# Patient Record
Sex: MALE | Race: WHITE | URBAN - METROPOLITAN AREA
[De-identification: names, ages, dates, MRNs, and addresses within clinical notes are randomized per-mention and may not be internally consistent; named-entity substitution may affect disease eponyms.]

---

## 2017-01-01 ENCOUNTER — HOSPITAL ENCOUNTER (OUTPATIENT)
Facility: CLINIC | Age: 57
Discharge: HOME | End: 2017-05-24
Attending: INTERNAL MEDICINE | Admitting: INTERNAL MEDICINE
Payer: MEDICAID

## 2017-01-01 ENCOUNTER — HOSPITAL ENCOUNTER (OUTPATIENT)
Dept: LAB | Facility: CLINIC | Age: 57
Discharge: HOME | End: 2017-10-19
Attending: INTERNAL MEDICINE | Admitting: INTERNAL MEDICINE
Payer: COMMERCIAL

## 2017-01-01 ENCOUNTER — ANESTHESIA EVENT (OUTPATIENT)
Dept: SURGERY | Facility: CLINIC | Age: 57
DRG: 371 | End: 2017-01-01
Payer: COMMERCIAL

## 2017-01-01 ENCOUNTER — OFFICE VISIT (OUTPATIENT)
Dept: FAMILY MEDICINE | Facility: CLINIC | Age: 57
End: 2017-01-01
Payer: COMMERCIAL

## 2017-01-01 ENCOUNTER — APPOINTMENT (OUTPATIENT)
Dept: CT IMAGING | Facility: CLINIC | Age: 57
DRG: 180 | End: 2017-01-01
Attending: FAMILY MEDICINE
Payer: MEDICAID

## 2017-01-01 ENCOUNTER — HOSPITAL ENCOUNTER (INPATIENT)
Facility: CLINIC | Age: 57
LOS: 4 days | Discharge: NURSING, REHAB OR LONG-TERM CARE | DRG: 371 | End: 2017-09-29
Attending: FAMILY MEDICINE | Admitting: FAMILY MEDICINE
Payer: COMMERCIAL

## 2017-01-01 ENCOUNTER — TELEPHONE (OUTPATIENT)
Dept: ONCOLOGY | Facility: CLINIC | Age: 57
End: 2017-01-01

## 2017-01-01 ENCOUNTER — ONCOLOGY VISIT (OUTPATIENT)
Dept: ONCOLOGY | Facility: CLINIC | Age: 57
End: 2017-01-01
Attending: INTERNAL MEDICINE
Payer: MEDICAID

## 2017-01-01 ENCOUNTER — INFUSION THERAPY VISIT (OUTPATIENT)
Dept: INFUSION THERAPY | Facility: CLINIC | Age: 57
End: 2017-01-01
Attending: INTERNAL MEDICINE
Payer: COMMERCIAL

## 2017-01-01 ENCOUNTER — TELEPHONE (OUTPATIENT)
Dept: GERIATRICS | Facility: CLINIC | Age: 57
End: 2017-01-01

## 2017-01-01 ENCOUNTER — APPOINTMENT (OUTPATIENT)
Dept: PHYSICAL THERAPY | Facility: CLINIC | Age: 57
DRG: 180 | End: 2017-01-01
Payer: MEDICAID

## 2017-01-01 ENCOUNTER — APPOINTMENT (OUTPATIENT)
Dept: OCCUPATIONAL THERAPY | Facility: CLINIC | Age: 57
DRG: 180 | End: 2017-01-01
Payer: MEDICAID

## 2017-01-01 ENCOUNTER — HOSPITAL ENCOUNTER (OUTPATIENT)
Facility: CLINIC | Age: 57
Discharge: HOME | End: 2017-09-07
Attending: INTERNAL MEDICINE | Admitting: INTERNAL MEDICINE
Payer: COMMERCIAL

## 2017-01-01 ENCOUNTER — HOSPITAL ENCOUNTER (OUTPATIENT)
Dept: LAB | Facility: CLINIC | Age: 57
Discharge: HOME | End: 2017-05-04
Attending: INTERNAL MEDICINE | Admitting: INTERNAL MEDICINE
Payer: MEDICAID

## 2017-01-01 ENCOUNTER — APPOINTMENT (OUTPATIENT)
Dept: MRI IMAGING | Facility: CLINIC | Age: 57
DRG: 371 | End: 2017-01-01
Attending: FAMILY MEDICINE
Payer: COMMERCIAL

## 2017-01-01 ENCOUNTER — APPOINTMENT (OUTPATIENT)
Dept: GENERAL RADIOLOGY | Facility: CLINIC | Age: 57
End: 2017-01-01
Attending: SURGERY
Payer: MEDICAID

## 2017-01-01 ENCOUNTER — NURSING HOME VISIT (OUTPATIENT)
Dept: GERIATRICS | Facility: CLINIC | Age: 57
End: 2017-01-01
Payer: COMMERCIAL

## 2017-01-01 ENCOUNTER — ONCOLOGY VISIT (OUTPATIENT)
Dept: ONCOLOGY | Facility: CLINIC | Age: 57
End: 2017-01-01
Attending: INTERNAL MEDICINE
Payer: COMMERCIAL

## 2017-01-01 ENCOUNTER — TRANSFERRED RECORDS (OUTPATIENT)
Dept: HEALTH INFORMATION MANAGEMENT | Facility: CLINIC | Age: 57
End: 2017-01-01

## 2017-01-01 ENCOUNTER — ONCOLOGY VISIT (OUTPATIENT)
Dept: ONCOLOGY | Facility: CLINIC | Age: 57
End: 2017-01-01
Attending: NURSE PRACTITIONER
Payer: COMMERCIAL

## 2017-01-01 ENCOUNTER — TELEPHONE (OUTPATIENT)
Dept: NUTRITION | Facility: CLINIC | Age: 57
End: 2017-01-01

## 2017-01-01 ENCOUNTER — NURSING HOME VISIT (OUTPATIENT)
Dept: GERIATRICS | Facility: CLINIC | Age: 57
End: 2017-01-01
Payer: MEDICAID

## 2017-01-01 ENCOUNTER — ANESTHESIA (OUTPATIENT)
Dept: SURGERY | Facility: CLINIC | Age: 57
End: 2017-01-01
Payer: MEDICAID

## 2017-01-01 ENCOUNTER — HOSPITAL ENCOUNTER (OUTPATIENT)
Dept: CT IMAGING | Facility: CLINIC | Age: 57
Discharge: HOME | End: 2017-10-18
Attending: INTERNAL MEDICINE | Admitting: INTERNAL MEDICINE
Payer: COMMERCIAL

## 2017-01-01 ENCOUNTER — CARE COORDINATION (OUTPATIENT)
Dept: CARE COORDINATION | Facility: CLINIC | Age: 57
End: 2017-01-01

## 2017-01-01 ENCOUNTER — MEDICAL CORRESPONDENCE (OUTPATIENT)
Dept: HEALTH INFORMATION MANAGEMENT | Facility: CLINIC | Age: 57
End: 2017-01-01

## 2017-01-01 ENCOUNTER — HOSPITAL ENCOUNTER (OUTPATIENT)
Facility: CLINIC | Age: 57
Discharge: HOME | End: 2017-09-14
Attending: INTERNAL MEDICINE | Admitting: INTERNAL MEDICINE
Payer: COMMERCIAL

## 2017-01-01 ENCOUNTER — OFFICE VISIT (OUTPATIENT)
Dept: PALLIATIVE MEDICINE | Facility: CLINIC | Age: 57
End: 2017-01-01
Payer: COMMERCIAL

## 2017-01-01 ENCOUNTER — OFFICE VISIT (OUTPATIENT)
Dept: FAMILY MEDICINE | Facility: CLINIC | Age: 57
End: 2017-01-01
Payer: MEDICAID

## 2017-01-01 ENCOUNTER — ANESTHESIA (OUTPATIENT)
Dept: SURGERY | Facility: CLINIC | Age: 57
DRG: 371 | End: 2017-01-01
Payer: COMMERCIAL

## 2017-01-01 ENCOUNTER — CARE COORDINATION (OUTPATIENT)
Dept: ENDOCRINOLOGY | Facility: CLINIC | Age: 57
End: 2017-01-01

## 2017-01-01 ENCOUNTER — TELEPHONE (OUTPATIENT)
Dept: ENDOCRINOLOGY | Facility: CLINIC | Age: 57
End: 2017-01-01

## 2017-01-01 ENCOUNTER — DOCUMENTATION ONLY (OUTPATIENT)
Dept: OTHER | Facility: CLINIC | Age: 57
End: 2017-01-01

## 2017-01-01 ENCOUNTER — RECORDS - HEALTHEAST (OUTPATIENT)
Dept: LAB | Facility: CLINIC | Age: 57
End: 2017-01-01

## 2017-01-01 ENCOUNTER — TELEPHONE (OUTPATIENT)
Dept: PALLIATIVE MEDICINE | Facility: CLINIC | Age: 57
End: 2017-01-01

## 2017-01-01 ENCOUNTER — APPOINTMENT (OUTPATIENT)
Dept: GENERAL RADIOLOGY | Facility: CLINIC | Age: 57
DRG: 180 | End: 2017-01-01
Attending: RADIOLOGY
Payer: MEDICAID

## 2017-01-01 ENCOUNTER — APPOINTMENT (OUTPATIENT)
Dept: GENERAL RADIOLOGY | Facility: CLINIC | Age: 57
DRG: 180 | End: 2017-01-01
Attending: FAMILY MEDICINE
Payer: MEDICAID

## 2017-01-01 ENCOUNTER — OFFICE VISIT (OUTPATIENT)
Dept: ENDOCRINOLOGY | Facility: CLINIC | Age: 57
End: 2017-01-01

## 2017-01-01 ENCOUNTER — OFFICE VISIT (OUTPATIENT)
Dept: SURGERY | Facility: CLINIC | Age: 57
End: 2017-01-01
Payer: MEDICAID

## 2017-01-01 ENCOUNTER — HOSPITAL ENCOUNTER (OUTPATIENT)
Facility: CLINIC | Age: 57
Discharge: HOME | End: 2017-10-26
Attending: INTERNAL MEDICINE | Admitting: INTERNAL MEDICINE
Payer: COMMERCIAL

## 2017-01-01 ENCOUNTER — DISCHARGE SUMMARY NURSING HOME (OUTPATIENT)
Dept: GERIATRICS | Facility: CLINIC | Age: 57
End: 2017-01-01
Payer: COMMERCIAL

## 2017-01-01 ENCOUNTER — APPOINTMENT (OUTPATIENT)
Dept: CT IMAGING | Facility: CLINIC | Age: 57
DRG: 371 | End: 2017-01-01
Attending: FAMILY MEDICINE
Payer: COMMERCIAL

## 2017-01-01 ENCOUNTER — SURGERY (OUTPATIENT)
Age: 57
End: 2017-01-01

## 2017-01-01 ENCOUNTER — APPOINTMENT (OUTPATIENT)
Dept: GENERAL RADIOLOGY | Facility: CLINIC | Age: 57
DRG: 371 | End: 2017-01-01
Attending: FAMILY MEDICINE
Payer: COMMERCIAL

## 2017-01-01 ENCOUNTER — NURSE TRIAGE (OUTPATIENT)
Dept: NURSING | Facility: CLINIC | Age: 57
End: 2017-01-01

## 2017-01-01 ENCOUNTER — HOSPITAL ENCOUNTER (OUTPATIENT)
Facility: CLINIC | Age: 57
Discharge: HOME | End: 2017-07-20
Attending: INTERNAL MEDICINE | Admitting: INTERNAL MEDICINE
Payer: COMMERCIAL

## 2017-01-01 ENCOUNTER — APPOINTMENT (OUTPATIENT)
Dept: PHYSICAL THERAPY | Facility: CLINIC | Age: 57
DRG: 371 | End: 2017-01-01
Payer: COMMERCIAL

## 2017-01-01 ENCOUNTER — HOSPITAL ENCOUNTER (OUTPATIENT)
Facility: CLINIC | Age: 57
Discharge: HOME | End: 2017-06-14
Attending: INTERNAL MEDICINE | Admitting: INTERNAL MEDICINE
Payer: MEDICAID

## 2017-01-01 ENCOUNTER — APPOINTMENT (OUTPATIENT)
Dept: ULTRASOUND IMAGING | Facility: CLINIC | Age: 57
DRG: 180 | End: 2017-01-01
Attending: INTERNAL MEDICINE
Payer: MEDICAID

## 2017-01-01 ENCOUNTER — HOSPITAL ENCOUNTER (OUTPATIENT)
Facility: CLINIC | Age: 57
Discharge: HOME | End: 2017-06-22
Attending: INTERNAL MEDICINE | Admitting: INTERNAL MEDICINE
Payer: MEDICAID

## 2017-01-01 ENCOUNTER — ANESTHESIA (OUTPATIENT)
Dept: CT IMAGING | Facility: CLINIC | Age: 57
DRG: 180 | End: 2017-01-01
Payer: MEDICAID

## 2017-01-01 ENCOUNTER — DOCUMENTATION ONLY (OUTPATIENT)
Dept: ONCOLOGY | Facility: CLINIC | Age: 57
End: 2017-01-01

## 2017-01-01 ENCOUNTER — INFUSION THERAPY VISIT (OUTPATIENT)
Dept: INFUSION THERAPY | Facility: CLINIC | Age: 57
End: 2017-01-01
Attending: INTERNAL MEDICINE
Payer: MEDICAID

## 2017-01-01 ENCOUNTER — HOSPITAL ENCOUNTER (OUTPATIENT)
Facility: CLINIC | Age: 57
Discharge: HOME | End: 2017-08-17
Attending: INTERNAL MEDICINE | Admitting: INTERNAL MEDICINE
Payer: COMMERCIAL

## 2017-01-01 ENCOUNTER — APPOINTMENT (OUTPATIENT)
Dept: OCCUPATIONAL THERAPY | Facility: CLINIC | Age: 57
DRG: 371 | End: 2017-01-01
Payer: COMMERCIAL

## 2017-01-01 ENCOUNTER — HOSPITAL ENCOUNTER (OUTPATIENT)
Facility: CLINIC | Age: 57
Discharge: HOME | End: 2017-05-17
Attending: SURGERY | Admitting: SURGERY
Payer: MEDICAID

## 2017-01-01 ENCOUNTER — APPOINTMENT (OUTPATIENT)
Dept: MRI IMAGING | Facility: CLINIC | Age: 57
DRG: 180 | End: 2017-01-01
Attending: FAMILY MEDICINE
Payer: MEDICAID

## 2017-01-01 ENCOUNTER — OFFICE VISIT (OUTPATIENT)
Dept: OTOLARYNGOLOGY | Facility: CLINIC | Age: 57
End: 2017-01-01
Payer: MEDICAID

## 2017-01-01 ENCOUNTER — DISCHARGE SUMMARY NURSING HOME (OUTPATIENT)
Dept: GERIATRICS | Facility: CLINIC | Age: 57
End: 2017-01-01
Payer: MEDICAID

## 2017-01-01 ENCOUNTER — APPOINTMENT (OUTPATIENT)
Dept: MRI IMAGING | Facility: CLINIC | Age: 57
DRG: 371 | End: 2017-01-01
Attending: PHYSICIAN ASSISTANT
Payer: COMMERCIAL

## 2017-01-01 ENCOUNTER — HOSPITAL ENCOUNTER (OUTPATIENT)
Dept: CT IMAGING | Facility: CLINIC | Age: 57
Discharge: HOME | End: 2017-08-03
Attending: INTERNAL MEDICINE | Admitting: INTERNAL MEDICINE
Payer: COMMERCIAL

## 2017-01-01 ENCOUNTER — CARE COORDINATION (OUTPATIENT)
Dept: ONCOLOGY | Facility: CLINIC | Age: 57
End: 2017-01-01

## 2017-01-01 ENCOUNTER — HOSPITAL ENCOUNTER (OUTPATIENT)
Facility: CLINIC | Age: 57
Discharge: HOME | End: 2017-08-10
Attending: INTERNAL MEDICINE | Admitting: INTERNAL MEDICINE
Payer: COMMERCIAL

## 2017-01-01 ENCOUNTER — ANESTHESIA EVENT (OUTPATIENT)
Dept: CT IMAGING | Facility: CLINIC | Age: 57
DRG: 180 | End: 2017-01-01
Payer: MEDICAID

## 2017-01-01 ENCOUNTER — HOSPITAL ENCOUNTER (INPATIENT)
Facility: CLINIC | Age: 57
LOS: 7 days | Discharge: NURSING, REHAB OR LONG-TERM CARE | DRG: 180 | End: 2017-04-27
Attending: FAMILY MEDICINE | Admitting: INTERNAL MEDICINE
Payer: MEDICAID

## 2017-01-01 ENCOUNTER — HOSPITAL ENCOUNTER (OUTPATIENT)
Facility: CLINIC | Age: 57
Discharge: HOME | End: 2017-07-13
Attending: INTERNAL MEDICINE | Admitting: INTERNAL MEDICINE
Payer: COMMERCIAL

## 2017-01-01 ENCOUNTER — INFUSION THERAPY VISIT (OUTPATIENT)
Dept: SPIRITUAL SERVICES | Facility: CLINIC | Age: 57
End: 2017-01-01

## 2017-01-01 ENCOUNTER — APPOINTMENT (OUTPATIENT)
Dept: CARDIOLOGY | Facility: CLINIC | Age: 57
DRG: 371 | End: 2017-01-01
Attending: FAMILY MEDICINE
Payer: COMMERCIAL

## 2017-01-01 ENCOUNTER — RADIANT APPOINTMENT (OUTPATIENT)
Dept: PET IMAGING | Facility: CLINIC | Age: 57
End: 2017-01-01
Attending: INTERNAL MEDICINE
Payer: MEDICAID

## 2017-01-01 ENCOUNTER — ANESTHESIA EVENT (OUTPATIENT)
Dept: SURGERY | Facility: CLINIC | Age: 57
End: 2017-01-01
Payer: MEDICAID

## 2017-01-01 VITALS
TEMPERATURE: 98.9 F | HEIGHT: 71 IN | OXYGEN SATURATION: 99 % | SYSTOLIC BLOOD PRESSURE: 135 MMHG | HEART RATE: 72 BPM | RESPIRATION RATE: 18 BRPM | BODY MASS INDEX: 21.14 KG/M2 | DIASTOLIC BLOOD PRESSURE: 82 MMHG | WEIGHT: 151 LBS

## 2017-01-01 VITALS
WEIGHT: 144 LBS | DIASTOLIC BLOOD PRESSURE: 71 MMHG | HEART RATE: 97 BPM | OXYGEN SATURATION: 97 % | TEMPERATURE: 98 F | RESPIRATION RATE: 18 BRPM | BODY MASS INDEX: 20 KG/M2 | SYSTOLIC BLOOD PRESSURE: 105 MMHG

## 2017-01-01 VITALS
DIASTOLIC BLOOD PRESSURE: 65 MMHG | WEIGHT: 136 LBS | BODY MASS INDEX: 22.98 KG/M2 | OXYGEN SATURATION: 91 % | TEMPERATURE: 97.4 F | HEART RATE: 98 BPM | SYSTOLIC BLOOD PRESSURE: 106 MMHG | RESPIRATION RATE: 20 BRPM

## 2017-01-01 VITALS — DIASTOLIC BLOOD PRESSURE: 76 MMHG | SYSTOLIC BLOOD PRESSURE: 125 MMHG | HEART RATE: 94 BPM

## 2017-01-01 VITALS
RESPIRATION RATE: 16 BRPM | HEART RATE: 92 BPM | WEIGHT: 137 LBS | OXYGEN SATURATION: 96 % | HEART RATE: 89 BPM | BODY MASS INDEX: 23.15 KG/M2 | DIASTOLIC BLOOD PRESSURE: 92 MMHG | SYSTOLIC BLOOD PRESSURE: 136 MMHG | BODY MASS INDEX: 23.15 KG/M2 | RESPIRATION RATE: 20 BRPM | WEIGHT: 137 LBS | OXYGEN SATURATION: 97 % | DIASTOLIC BLOOD PRESSURE: 65 MMHG | SYSTOLIC BLOOD PRESSURE: 96 MMHG | TEMPERATURE: 97.8 F | TEMPERATURE: 98.6 F

## 2017-01-01 VITALS
HEART RATE: 74 BPM | OXYGEN SATURATION: 98 % | SYSTOLIC BLOOD PRESSURE: 106 MMHG | RESPIRATION RATE: 18 BRPM | DIASTOLIC BLOOD PRESSURE: 68 MMHG | TEMPERATURE: 96.2 F

## 2017-01-01 VITALS
WEIGHT: 141.54 LBS | OXYGEN SATURATION: 94 % | DIASTOLIC BLOOD PRESSURE: 77 MMHG | BODY MASS INDEX: 23.93 KG/M2 | SYSTOLIC BLOOD PRESSURE: 109 MMHG | RESPIRATION RATE: 12 BRPM | HEART RATE: 94 BPM | TEMPERATURE: 99 F

## 2017-01-01 VITALS
HEART RATE: 65 BPM | RESPIRATION RATE: 16 BRPM | OXYGEN SATURATION: 98 % | DIASTOLIC BLOOD PRESSURE: 74 MMHG | TEMPERATURE: 97.9 F | HEIGHT: 71 IN | SYSTOLIC BLOOD PRESSURE: 115 MMHG | BODY MASS INDEX: 23.3 KG/M2 | WEIGHT: 166.4 LBS

## 2017-01-01 VITALS
WEIGHT: 181.44 LBS | SYSTOLIC BLOOD PRESSURE: 118 MMHG | TEMPERATURE: 98.2 F | DIASTOLIC BLOOD PRESSURE: 84 MMHG | HEART RATE: 84 BPM | OXYGEN SATURATION: 95 % | RESPIRATION RATE: 18 BRPM

## 2017-01-01 VITALS
TEMPERATURE: 98.6 F | RESPIRATION RATE: 18 BRPM | WEIGHT: 160.1 LBS | SYSTOLIC BLOOD PRESSURE: 114 MMHG | DIASTOLIC BLOOD PRESSURE: 69 MMHG | BODY MASS INDEX: 22.41 KG/M2 | HEIGHT: 71 IN | HEART RATE: 80 BPM | OXYGEN SATURATION: 100 %

## 2017-01-01 VITALS
OXYGEN SATURATION: 99 % | HEART RATE: 55 BPM | DIASTOLIC BLOOD PRESSURE: 82 MMHG | TEMPERATURE: 97.4 F | SYSTOLIC BLOOD PRESSURE: 134 MMHG

## 2017-01-01 VITALS
SYSTOLIC BLOOD PRESSURE: 122 MMHG | HEART RATE: 94 BPM | SYSTOLIC BLOOD PRESSURE: 128 MMHG | RESPIRATION RATE: 16 BRPM | WEIGHT: 146 LBS | DIASTOLIC BLOOD PRESSURE: 85 MMHG | HEART RATE: 86 BPM | WEIGHT: 157.9 LBS | OXYGEN SATURATION: 100 % | HEIGHT: 71 IN | BODY MASS INDEX: 22.1 KG/M2 | RESPIRATION RATE: 18 BRPM | OXYGEN SATURATION: 96 % | DIASTOLIC BLOOD PRESSURE: 82 MMHG | BODY MASS INDEX: 24.68 KG/M2 | TEMPERATURE: 98.2 F | TEMPERATURE: 98.2 F

## 2017-01-01 VITALS
SYSTOLIC BLOOD PRESSURE: 108 MMHG | DIASTOLIC BLOOD PRESSURE: 64 MMHG | TEMPERATURE: 96.9 F | OXYGEN SATURATION: 100 % | HEART RATE: 68 BPM

## 2017-01-01 VITALS
OXYGEN SATURATION: 100 % | HEART RATE: 67 BPM | TEMPERATURE: 97.3 F | BODY MASS INDEX: 21.97 KG/M2 | SYSTOLIC BLOOD PRESSURE: 111 MMHG | DIASTOLIC BLOOD PRESSURE: 72 MMHG | RESPIRATION RATE: 18 BRPM | HEIGHT: 71 IN | WEIGHT: 156.9 LBS

## 2017-01-01 VITALS
OXYGEN SATURATION: 97 % | HEIGHT: 65 IN | BODY MASS INDEX: 24.03 KG/M2 | RESPIRATION RATE: 14 BRPM | HEART RATE: 98 BPM | TEMPERATURE: 99 F | DIASTOLIC BLOOD PRESSURE: 67 MMHG | SYSTOLIC BLOOD PRESSURE: 106 MMHG | WEIGHT: 144.2 LBS

## 2017-01-01 VITALS
TEMPERATURE: 97.8 F | DIASTOLIC BLOOD PRESSURE: 72 MMHG | SYSTOLIC BLOOD PRESSURE: 128 MMHG | RESPIRATION RATE: 22 BRPM | OXYGEN SATURATION: 94 % | HEART RATE: 76 BPM | WEIGHT: 137 LBS | BODY MASS INDEX: 23.15 KG/M2

## 2017-01-01 VITALS
TEMPERATURE: 98.5 F | RESPIRATION RATE: 18 BRPM | SYSTOLIC BLOOD PRESSURE: 115 MMHG | HEART RATE: 102 BPM | OXYGEN SATURATION: 94 % | WEIGHT: 137 LBS | BODY MASS INDEX: 23.16 KG/M2 | DIASTOLIC BLOOD PRESSURE: 81 MMHG

## 2017-01-01 VITALS
TEMPERATURE: 99.5 F | BODY MASS INDEX: 21.82 KG/M2 | HEIGHT: 71 IN | OXYGEN SATURATION: 99 % | SYSTOLIC BLOOD PRESSURE: 111 MMHG | HEART RATE: 87 BPM | DIASTOLIC BLOOD PRESSURE: 72 MMHG | WEIGHT: 155.9 LBS | RESPIRATION RATE: 99 BRPM

## 2017-01-01 VITALS
OXYGEN SATURATION: 96 % | WEIGHT: 182 LBS | HEART RATE: 81 BPM | TEMPERATURE: 98.7 F | SYSTOLIC BLOOD PRESSURE: 130 MMHG | BODY MASS INDEX: 30.77 KG/M2 | DIASTOLIC BLOOD PRESSURE: 89 MMHG | RESPIRATION RATE: 16 BRPM

## 2017-01-01 VITALS
HEIGHT: 64 IN | WEIGHT: 171.5 LBS | OXYGEN SATURATION: 97 % | BODY MASS INDEX: 29.28 KG/M2 | DIASTOLIC BLOOD PRESSURE: 81 MMHG | RESPIRATION RATE: 18 BRPM | HEART RATE: 85 BPM | TEMPERATURE: 97.5 F | SYSTOLIC BLOOD PRESSURE: 129 MMHG

## 2017-01-01 VITALS
WEIGHT: 137 LBS | TEMPERATURE: 98 F | OXYGEN SATURATION: 98 % | RESPIRATION RATE: 16 BRPM | DIASTOLIC BLOOD PRESSURE: 76 MMHG | HEART RATE: 74 BPM | SYSTOLIC BLOOD PRESSURE: 124 MMHG | BODY MASS INDEX: 23.15 KG/M2

## 2017-01-01 VITALS
SYSTOLIC BLOOD PRESSURE: 108 MMHG | HEIGHT: 65 IN | HEART RATE: 85 BPM | DIASTOLIC BLOOD PRESSURE: 71 MMHG | BODY MASS INDEX: 28.49 KG/M2 | WEIGHT: 171 LBS

## 2017-01-01 VITALS
SYSTOLIC BLOOD PRESSURE: 111 MMHG | HEART RATE: 67 BPM | TEMPERATURE: 97.3 F | WEIGHT: 156.97 LBS | DIASTOLIC BLOOD PRESSURE: 72 MMHG | BODY MASS INDEX: 21.81 KG/M2

## 2017-01-01 VITALS
TEMPERATURE: 98.2 F | HEART RATE: 66 BPM | SYSTOLIC BLOOD PRESSURE: 138 MMHG | DIASTOLIC BLOOD PRESSURE: 86 MMHG | BODY MASS INDEX: 21.98 KG/M2 | OXYGEN SATURATION: 100 % | RESPIRATION RATE: 18 BRPM | HEIGHT: 71 IN | WEIGHT: 157 LBS

## 2017-01-01 VITALS
WEIGHT: 178.4 LBS | HEIGHT: 65 IN | DIASTOLIC BLOOD PRESSURE: 81 MMHG | BODY MASS INDEX: 29.72 KG/M2 | TEMPERATURE: 98.6 F | HEART RATE: 92 BPM | OXYGEN SATURATION: 96 % | SYSTOLIC BLOOD PRESSURE: 116 MMHG | RESPIRATION RATE: 20 BRPM

## 2017-01-01 VITALS
DIASTOLIC BLOOD PRESSURE: 68 MMHG | OXYGEN SATURATION: 98 % | RESPIRATION RATE: 18 BRPM | SYSTOLIC BLOOD PRESSURE: 114 MMHG | HEART RATE: 116 BPM | TEMPERATURE: 100.4 F | HEIGHT: 71 IN | BODY MASS INDEX: 20.77 KG/M2 | WEIGHT: 148.4 LBS

## 2017-01-01 VITALS
RESPIRATION RATE: 16 BRPM | WEIGHT: 146.8 LBS | HEIGHT: 65 IN | DIASTOLIC BLOOD PRESSURE: 83 MMHG | BODY MASS INDEX: 24.46 KG/M2 | TEMPERATURE: 98.4 F | OXYGEN SATURATION: 95 % | SYSTOLIC BLOOD PRESSURE: 118 MMHG | HEART RATE: 94 BPM

## 2017-01-01 VITALS
SYSTOLIC BLOOD PRESSURE: 114 MMHG | HEART RATE: 80 BPM | DIASTOLIC BLOOD PRESSURE: 69 MMHG | BODY MASS INDEX: 22.23 KG/M2 | WEIGHT: 160.05 LBS | OXYGEN SATURATION: 100 % | TEMPERATURE: 98.6 F | RESPIRATION RATE: 18 BRPM

## 2017-01-01 VITALS
HEART RATE: 74 BPM | SYSTOLIC BLOOD PRESSURE: 135 MMHG | DIASTOLIC BLOOD PRESSURE: 87 MMHG | OXYGEN SATURATION: 99 % | BODY MASS INDEX: 22.54 KG/M2 | HEIGHT: 71 IN | WEIGHT: 161 LBS | TEMPERATURE: 98 F

## 2017-01-01 VITALS
WEIGHT: 144 LBS | HEART RATE: 103 BPM | TEMPERATURE: 98.9 F | BODY MASS INDEX: 20 KG/M2 | RESPIRATION RATE: 16 BRPM | OXYGEN SATURATION: 96 % | SYSTOLIC BLOOD PRESSURE: 120 MMHG | DIASTOLIC BLOOD PRESSURE: 80 MMHG

## 2017-01-01 VITALS
SYSTOLIC BLOOD PRESSURE: 134 MMHG | HEART RATE: 59 BPM | TEMPERATURE: 96.7 F | OXYGEN SATURATION: 96 % | RESPIRATION RATE: 16 BRPM | DIASTOLIC BLOOD PRESSURE: 82 MMHG

## 2017-01-01 VITALS
HEIGHT: 64 IN | SYSTOLIC BLOOD PRESSURE: 76 MMHG | RESPIRATION RATE: 18 BRPM | OXYGEN SATURATION: 98 % | HEART RATE: 104 BPM | TEMPERATURE: 99 F | DIASTOLIC BLOOD PRESSURE: 45 MMHG

## 2017-01-01 VITALS
HEART RATE: 103 BPM | RESPIRATION RATE: 16 BRPM | SYSTOLIC BLOOD PRESSURE: 73 MMHG | TEMPERATURE: 99.3 F | OXYGEN SATURATION: 96 % | DIASTOLIC BLOOD PRESSURE: 50 MMHG

## 2017-01-01 VITALS
RESPIRATION RATE: 93 BRPM | OXYGEN SATURATION: 12 % | WEIGHT: 141.6 LBS | HEIGHT: 64 IN | HEART RATE: 94 BPM | BODY MASS INDEX: 24.17 KG/M2 | SYSTOLIC BLOOD PRESSURE: 109 MMHG | DIASTOLIC BLOOD PRESSURE: 77 MMHG | TEMPERATURE: 99 F

## 2017-01-01 VITALS
TEMPERATURE: 97.1 F | WEIGHT: 155.8 LBS | BODY MASS INDEX: 21.81 KG/M2 | HEIGHT: 71 IN | DIASTOLIC BLOOD PRESSURE: 73 MMHG | SYSTOLIC BLOOD PRESSURE: 124 MMHG | HEART RATE: 68 BPM | OXYGEN SATURATION: 100 %

## 2017-01-01 VITALS
HEART RATE: 105 BPM | TEMPERATURE: 99 F | RESPIRATION RATE: 18 BRPM | SYSTOLIC BLOOD PRESSURE: 102 MMHG | OXYGEN SATURATION: 93 % | WEIGHT: 147.49 LBS | BODY MASS INDEX: 20.49 KG/M2 | DIASTOLIC BLOOD PRESSURE: 76 MMHG

## 2017-01-01 VITALS
HEIGHT: 71 IN | HEART RATE: 93 BPM | TEMPERATURE: 97.5 F | OXYGEN SATURATION: 100 % | DIASTOLIC BLOOD PRESSURE: 68 MMHG | BODY MASS INDEX: 21.36 KG/M2 | WEIGHT: 152.6 LBS | SYSTOLIC BLOOD PRESSURE: 90 MMHG | RESPIRATION RATE: 18 BRPM

## 2017-01-01 VITALS
RESPIRATION RATE: 18 BRPM | OXYGEN SATURATION: 89 % | TEMPERATURE: 97.6 F | SYSTOLIC BLOOD PRESSURE: 91 MMHG | HEART RATE: 91 BPM | WEIGHT: 137 LBS | DIASTOLIC BLOOD PRESSURE: 56 MMHG | BODY MASS INDEX: 23.15 KG/M2

## 2017-01-01 VITALS — DIASTOLIC BLOOD PRESSURE: 74 MMHG | SYSTOLIC BLOOD PRESSURE: 123 MMHG | HEART RATE: 76 BPM

## 2017-01-01 VITALS
TEMPERATURE: 98 F | HEIGHT: 70 IN | DIASTOLIC BLOOD PRESSURE: 54 MMHG | HEART RATE: 78 BPM | BODY MASS INDEX: 26.05 KG/M2 | OXYGEN SATURATION: 92 % | SYSTOLIC BLOOD PRESSURE: 100 MMHG | RESPIRATION RATE: 19 BRPM | WEIGHT: 182 LBS

## 2017-01-01 VITALS — SYSTOLIC BLOOD PRESSURE: 134 MMHG | HEART RATE: 82 BPM | DIASTOLIC BLOOD PRESSURE: 89 MMHG | TEMPERATURE: 97.6 F

## 2017-01-01 VITALS — SYSTOLIC BLOOD PRESSURE: 114 MMHG | TEMPERATURE: 97.2 F | DIASTOLIC BLOOD PRESSURE: 71 MMHG | HEART RATE: 65 BPM

## 2017-01-01 VITALS
HEART RATE: 79 BPM | SYSTOLIC BLOOD PRESSURE: 192 MMHG | HEIGHT: 71 IN | TEMPERATURE: 97.8 F | DIASTOLIC BLOOD PRESSURE: 82 MMHG | RESPIRATION RATE: 14 BRPM | WEIGHT: 157 LBS | BODY MASS INDEX: 21.98 KG/M2

## 2017-01-01 VITALS
OXYGEN SATURATION: 98 % | TEMPERATURE: 99 F | DIASTOLIC BLOOD PRESSURE: 58 MMHG | HEART RATE: 105 BPM | SYSTOLIC BLOOD PRESSURE: 74 MMHG | WEIGHT: 144.18 LBS | BODY MASS INDEX: 24.38 KG/M2 | RESPIRATION RATE: 18 BRPM

## 2017-01-01 VITALS
DIASTOLIC BLOOD PRESSURE: 74 MMHG | HEART RATE: 83 BPM | SYSTOLIC BLOOD PRESSURE: 124 MMHG | TEMPERATURE: 97.8 F | BODY MASS INDEX: 21.91 KG/M2 | WEIGHT: 157 LBS

## 2017-01-01 VITALS
HEART RATE: 83 BPM | SYSTOLIC BLOOD PRESSURE: 112 MMHG | HEIGHT: 64 IN | BODY MASS INDEX: 29.19 KG/M2 | OXYGEN SATURATION: 97 % | DIASTOLIC BLOOD PRESSURE: 75 MMHG | WEIGHT: 171 LBS | TEMPERATURE: 97.7 F

## 2017-01-01 VITALS
SYSTOLIC BLOOD PRESSURE: 110 MMHG | OXYGEN SATURATION: 95 % | TEMPERATURE: 96.9 F | DIASTOLIC BLOOD PRESSURE: 66 MMHG | HEART RATE: 60 BPM

## 2017-01-01 VITALS
WEIGHT: 182 LBS | RESPIRATION RATE: 16 BRPM | SYSTOLIC BLOOD PRESSURE: 116 MMHG | TEMPERATURE: 97.8 F | BODY MASS INDEX: 25.48 KG/M2 | OXYGEN SATURATION: 95 % | DIASTOLIC BLOOD PRESSURE: 77 MMHG | HEIGHT: 71 IN

## 2017-01-01 VITALS
OXYGEN SATURATION: 91 % | DIASTOLIC BLOOD PRESSURE: 76 MMHG | HEART RATE: 77 BPM | TEMPERATURE: 99.2 F | WEIGHT: 182 LBS | BODY MASS INDEX: 30.76 KG/M2 | SYSTOLIC BLOOD PRESSURE: 121 MMHG | RESPIRATION RATE: 16 BRPM

## 2017-01-01 VITALS
TEMPERATURE: 97.2 F | HEIGHT: 71 IN | DIASTOLIC BLOOD PRESSURE: 81 MMHG | SYSTOLIC BLOOD PRESSURE: 132 MMHG | HEART RATE: 76 BPM | BODY MASS INDEX: 22.4 KG/M2 | OXYGEN SATURATION: 98 % | WEIGHT: 160 LBS

## 2017-01-01 VITALS — HEART RATE: 62 BPM | SYSTOLIC BLOOD PRESSURE: 117 MMHG | DIASTOLIC BLOOD PRESSURE: 66 MMHG

## 2017-01-01 DIAGNOSIS — R26.81 UNSTEADY GAIT: ICD-10-CM

## 2017-01-01 DIAGNOSIS — E86.1 HYPOVOLEMIA: ICD-10-CM

## 2017-01-01 DIAGNOSIS — A04.72 C. DIFFICILE COLITIS: ICD-10-CM

## 2017-01-01 DIAGNOSIS — R63.4 WEIGHT LOSS: ICD-10-CM

## 2017-01-01 DIAGNOSIS — G89.3 CANCER ASSOCIATED PAIN: ICD-10-CM

## 2017-01-01 DIAGNOSIS — C34.90 NON-SMALL CELL LUNG CANCER (NSCLC) (H): ICD-10-CM

## 2017-01-01 DIAGNOSIS — I95.1 ORTHOSTATIC HYPOTENSION: ICD-10-CM

## 2017-01-01 DIAGNOSIS — D35.2 PITUITARY MACROADENOMA (H): Primary | ICD-10-CM

## 2017-01-01 DIAGNOSIS — G89.3 CANCER ASSOCIATED PAIN: Chronic | ICD-10-CM

## 2017-01-01 DIAGNOSIS — C79.51 BONY METASTASIS: ICD-10-CM

## 2017-01-01 DIAGNOSIS — C78.00 MALIGNANT NEOPLASM METASTATIC TO LUNG, UNSPECIFIED LATERALITY (H): ICD-10-CM

## 2017-01-01 DIAGNOSIS — M62.81 GENERALIZED MUSCLE WEAKNESS: ICD-10-CM

## 2017-01-01 DIAGNOSIS — D64.9 ANEMIA, UNSPECIFIED TYPE: ICD-10-CM

## 2017-01-01 DIAGNOSIS — K59.03 DRUG-INDUCED CONSTIPATION: ICD-10-CM

## 2017-01-01 DIAGNOSIS — R41.0 DELIRIUM: Primary | ICD-10-CM

## 2017-01-01 DIAGNOSIS — C34.92 NON-SMALL CELL CANCER OF LEFT LUNG (H): Primary | Chronic | ICD-10-CM

## 2017-01-01 DIAGNOSIS — D69.6 THROMBOCYTOPENIA (H): ICD-10-CM

## 2017-01-01 DIAGNOSIS — F17.200 TOBACCO USE DISORDER: ICD-10-CM

## 2017-01-01 DIAGNOSIS — Z72.0 TOBACCO USE: ICD-10-CM

## 2017-01-01 DIAGNOSIS — C34.90 NON-SMALL CELL LUNG CANCER, UNSPECIFIED LATERALITY (H): Primary | Chronic | ICD-10-CM

## 2017-01-01 DIAGNOSIS — D35.2 PITUITARY MACROADENOMA (H): ICD-10-CM

## 2017-01-01 DIAGNOSIS — G89.3 CANCER ASSOCIATED PAIN: Primary | ICD-10-CM

## 2017-01-01 DIAGNOSIS — D63.8 ANEMIA, CHRONIC DISEASE: Primary | ICD-10-CM

## 2017-01-01 DIAGNOSIS — K59.09 CONSTIPATION, CHRONIC: Primary | Chronic | ICD-10-CM

## 2017-01-01 DIAGNOSIS — C79.31 MALIGNANT NEOPLASM METASTATIC TO BRAIN (H): ICD-10-CM

## 2017-01-01 DIAGNOSIS — R41.0 DELIRIUM: ICD-10-CM

## 2017-01-01 DIAGNOSIS — R41.89 COGNITIVE DECLINE: ICD-10-CM

## 2017-01-01 DIAGNOSIS — C78.00 MALIGNANT NEOPLASM METASTATIC TO LUNG, UNSPECIFIED LATERALITY (H): Primary | ICD-10-CM

## 2017-01-01 DIAGNOSIS — D63.8 ANEMIA, CHRONIC DISEASE: ICD-10-CM

## 2017-01-01 DIAGNOSIS — F33.1 MAJOR DEPRESSIVE DISORDER, RECURRENT EPISODE, MODERATE (H): ICD-10-CM

## 2017-01-01 DIAGNOSIS — C34.90 NON-SMALL CELL LUNG CANCER (NSCLC) (H): Primary | ICD-10-CM

## 2017-01-01 DIAGNOSIS — E29.1 HYPOGONADISM MALE: Primary | ICD-10-CM

## 2017-01-01 DIAGNOSIS — R20.2 PARESTHESIA: ICD-10-CM

## 2017-01-01 DIAGNOSIS — K59.03 CONSTIPATION DUE TO OPIOID THERAPY: ICD-10-CM

## 2017-01-01 DIAGNOSIS — R33.9 URINARY RETENTION WITH INCOMPLETE BLADDER EMPTYING: ICD-10-CM

## 2017-01-01 DIAGNOSIS — C79.9 METASTATIC CANCER (H): ICD-10-CM

## 2017-01-01 DIAGNOSIS — G93.89 BRAIN MASS: ICD-10-CM

## 2017-01-01 DIAGNOSIS — I95.1 ORTHOSTATIC HYPOTENSION: Primary | ICD-10-CM

## 2017-01-01 DIAGNOSIS — D64.9 ANEMIA, UNSPECIFIED TYPE: Chronic | ICD-10-CM

## 2017-01-01 DIAGNOSIS — Z72.0 TOBACCO ABUSE: ICD-10-CM

## 2017-01-01 DIAGNOSIS — R63.4 LOSS OF WEIGHT: ICD-10-CM

## 2017-01-01 DIAGNOSIS — R40.4 ALTERED LEVEL OF CONSCIOUSNESS: ICD-10-CM

## 2017-01-01 DIAGNOSIS — R33.8 ACUTE RETENTION OF URINE: ICD-10-CM

## 2017-01-01 DIAGNOSIS — F10.11 H/O ETOH ABUSE: ICD-10-CM

## 2017-01-01 DIAGNOSIS — D64.9 ANEMIA, UNSPECIFIED TYPE: Primary | Chronic | ICD-10-CM

## 2017-01-01 DIAGNOSIS — M54.18 RADICULAR PAIN OF SACRUM: ICD-10-CM
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
## 2017-01-01 DIAGNOSIS — R41.0 DISORIENTATION: ICD-10-CM

## 2017-01-01 DIAGNOSIS — M54.10 RADICULAR PAIN OF LOWER EXTREMITY: ICD-10-CM

## 2017-01-01 DIAGNOSIS — C34.90 NON-SMALL CELL LUNG CANCER, UNSPECIFIED LATERALITY (H): ICD-10-CM

## 2017-01-01 DIAGNOSIS — F10.10 ETOH ABUSE: ICD-10-CM

## 2017-01-01 DIAGNOSIS — E86.1 HYPOVOLEMIA: Primary | ICD-10-CM

## 2017-01-01 DIAGNOSIS — K59.03 DRUG-INDUCED CONSTIPATION: Primary | ICD-10-CM

## 2017-01-01 DIAGNOSIS — R52 PAIN: ICD-10-CM

## 2017-01-01 DIAGNOSIS — T40.2X5A CONSTIPATION DUE TO OPIOID THERAPY: ICD-10-CM

## 2017-01-01 DIAGNOSIS — C34.90 NON-SMALL CELL LUNG CANCER (NSCLC) (H): Chronic | ICD-10-CM

## 2017-01-01 DIAGNOSIS — Z71.89 ADVANCE CARE PLANNING: ICD-10-CM

## 2017-01-01 DIAGNOSIS — C34.90 NON-SMALL CELL LUNG CANCER, UNSPECIFIED LATERALITY (H): Chronic | ICD-10-CM

## 2017-01-01 DIAGNOSIS — R53.81 DEBILITY: ICD-10-CM

## 2017-01-01 DIAGNOSIS — I95.9 HYPOTENSION, UNSPECIFIED HYPOTENSION TYPE: ICD-10-CM

## 2017-01-01 DIAGNOSIS — E87.6 HYPOKALEMIA: ICD-10-CM

## 2017-01-01 DIAGNOSIS — K59.03 CONSTIPATION DUE TO PAIN MEDICATION: ICD-10-CM

## 2017-01-01 DIAGNOSIS — J32.0 CHRONIC MAXILLARY SINUSITIS: Primary | ICD-10-CM

## 2017-01-01 DIAGNOSIS — R91.8 LUNG MASS: Primary | ICD-10-CM

## 2017-01-01 DIAGNOSIS — R91.8 LUNG MASS: ICD-10-CM

## 2017-01-01 DIAGNOSIS — M25.50 MULTIPLE JOINT PAIN: ICD-10-CM

## 2017-01-01 DIAGNOSIS — C34.92 NON-SMALL CELL CANCER OF LEFT LUNG (H): ICD-10-CM

## 2017-01-01 DIAGNOSIS — R41.0 CONFUSION: ICD-10-CM

## 2017-01-01 DIAGNOSIS — C34.92 NON-SMALL CELL CANCER OF LEFT LUNG (H): Primary | ICD-10-CM

## 2017-01-01 DIAGNOSIS — R29.6 FALLS FREQUENTLY: ICD-10-CM

## 2017-01-01 DIAGNOSIS — R53.81 PHYSICAL DECONDITIONING: ICD-10-CM

## 2017-01-01 LAB
ABO + RH BLD: NORMAL
ACTH PLAS-MCNC: NORMAL PG/ML
ALBUMIN SERPL ELPH-MCNC: 3.2 G/DL (ref 3.7–5.1)
ALBUMIN SERPL ELPH-MCNC: 3.6 G/DL (ref 3.7–5.1)
ALBUMIN SERPL-MCNC: 2 G/DL (ref 3.4–5)
ALBUMIN SERPL-MCNC: 2.1 G/DL (ref 3.4–5)
ALBUMIN SERPL-MCNC: 2.1 G/DL (ref 3.4–5)
ALBUMIN SERPL-MCNC: 2.3 G/DL (ref 3.4–5)
ALBUMIN SERPL-MCNC: 2.4 G/DL (ref 3.4–5)
ALBUMIN SERPL-MCNC: 2.5 G/DL (ref 3.4–5)
ALBUMIN SERPL-MCNC: 3.1 G/DL (ref 3.4–5)
ALBUMIN SERPL-MCNC: 3.2 G/DL (ref 3.4–5)
ALBUMIN SERPL-MCNC: 3.2 G/DL (ref 3.4–5)
ALBUMIN SERPL-MCNC: 3.3 G/DL (ref 3.4–5)
ALBUMIN SERPL-MCNC: 3.3 G/DL (ref 3.4–5)
ALBUMIN SERPL-MCNC: 3.4 G/DL (ref 3.4–5)
ALBUMIN SERPL-MCNC: 3.4 G/DL (ref 3.4–5)
ALBUMIN SERPL-MCNC: 3.5 G/DL (ref 3.4–5)
ALBUMIN SERPL-MCNC: 3.5 G/DL (ref 3.4–5)
ALBUMIN SERPL-MCNC: 3.6 G/DL (ref 3.4–5)
ALBUMIN SERPL-MCNC: 3.7 G/DL (ref 3.4–5)
ALBUMIN SERPL-MCNC: 3.7 G/DL (ref 3.4–5)
ALBUMIN UR-MCNC: NEGATIVE MG/DL
ALP SERPL-CCNC: 108 U/L (ref 40–150)
ALP SERPL-CCNC: 110 U/L (ref 40–150)
ALP SERPL-CCNC: 111 U/L (ref 40–150)
ALP SERPL-CCNC: 124 U/L (ref 40–150)
ALP SERPL-CCNC: 129 U/L (ref 40–150)
ALP SERPL-CCNC: 157 U/L (ref 40–150)
ALP SERPL-CCNC: 171 U/L (ref 40–150)
ALP SERPL-CCNC: 175 U/L (ref 40–150)
ALP SERPL-CCNC: 178 U/L (ref 40–150)
ALP SERPL-CCNC: 197 U/L (ref 40–150)
ALP SERPL-CCNC: 214 U/L (ref 40–150)
ALP SERPL-CCNC: 214 U/L (ref 40–150)
ALP SERPL-CCNC: 237 U/L (ref 40–150)
ALP SERPL-CCNC: 244 U/L (ref 40–150)
ALP SERPL-CCNC: 246 U/L (ref 40–150)
ALP SERPL-CCNC: 247 U/L (ref 40–150)
ALP SERPL-CCNC: 249 U/L (ref 40–150)
ALP SERPL-CCNC: 274 U/L (ref 40–150)
ALP SERPL-CCNC: 276 U/L (ref 40–150)
ALP SERPL-CCNC: 315 U/L (ref 40–150)
ALPHA1 GLOB SERPL ELPH-MCNC: 0.4 G/DL (ref 0.2–0.4)
ALPHA1 GLOB SERPL ELPH-MCNC: 0.4 G/DL (ref 0.2–0.4)
ALPHA2 GLOB SERPL ELPH-MCNC: 0.9 G/DL (ref 0.5–0.9)
ALPHA2 GLOB SERPL ELPH-MCNC: 0.9 G/DL (ref 0.5–0.9)
ALT SERPL W P-5'-P-CCNC: 17 U/L (ref 0–70)
ALT SERPL W P-5'-P-CCNC: 18 U/L (ref 0–70)
ALT SERPL W P-5'-P-CCNC: 19 U/L (ref 0–70)
ALT SERPL W P-5'-P-CCNC: 19 U/L (ref 0–70)
ALT SERPL W P-5'-P-CCNC: 20 U/L (ref 0–70)
ALT SERPL W P-5'-P-CCNC: 21 U/L (ref 0–70)
ALT SERPL W P-5'-P-CCNC: 22 U/L (ref 0–70)
ALT SERPL W P-5'-P-CCNC: 22 U/L (ref 0–70)
ALT SERPL W P-5'-P-CCNC: 23 U/L (ref 0–70)
ALT SERPL W P-5'-P-CCNC: 23 U/L (ref 0–70)
ALT SERPL W P-5'-P-CCNC: 28 U/L (ref 0–70)
ALT SERPL W P-5'-P-CCNC: 28 U/L (ref 0–70)
ALT SERPL W P-5'-P-CCNC: 37 U/L (ref 0–70)
ALT SERPL W P-5'-P-CCNC: 38 U/L (ref 0–70)
ALT SERPL W P-5'-P-CCNC: 63 U/L (ref 0–70)
AMMONIA PLAS-SCNC: 17 UMOL/L (ref 10–50)
AMPHETAMINES UR QL SCN: NEGATIVE
ANION GAP SERPL CALCULATED.3IONS-SCNC: 10 MMOL/L (ref 3–14)
ANION GAP SERPL CALCULATED.3IONS-SCNC: 11 MMOL/L (ref 3–14)
ANION GAP SERPL CALCULATED.3IONS-SCNC: 12 MMOL/L (ref 3–14)
ANION GAP SERPL CALCULATED.3IONS-SCNC: 3 MMOL/L (ref 3–14)
ANION GAP SERPL CALCULATED.3IONS-SCNC: 5 MMOL/L (ref 3–14)
ANION GAP SERPL CALCULATED.3IONS-SCNC: 5 MMOL/L (ref 3–14)
ANION GAP SERPL CALCULATED.3IONS-SCNC: 7 MMOL/L (ref 3–14)
ANION GAP SERPL CALCULATED.3IONS-SCNC: 8 MMOL/L (ref 3–14)
ANION GAP SERPL CALCULATED.3IONS-SCNC: 8 MMOL/L (ref 5–18)
ANION GAP SERPL CALCULATED.3IONS-SCNC: 8 MMOL/L (ref 5–18)
ANION GAP SERPL CALCULATED.3IONS-SCNC: 9 MMOL/L (ref 3–14)
ANISOCYTOSIS BLD QL SMEAR: SLIGHT
APAP SERPL-MCNC: <2 MG/L (ref 10–20)
APPEARANCE UR: CLEAR
AST SERPL W P-5'-P-CCNC: 104 U/L (ref 0–45)
AST SERPL W P-5'-P-CCNC: 149 U/L (ref 0–45)
AST SERPL W P-5'-P-CCNC: 19 U/L (ref 0–45)
AST SERPL W P-5'-P-CCNC: 21 U/L (ref 0–45)
AST SERPL W P-5'-P-CCNC: 22 U/L (ref 0–45)
AST SERPL W P-5'-P-CCNC: 24 U/L (ref 0–45)
AST SERPL W P-5'-P-CCNC: 25 U/L (ref 0–45)
AST SERPL W P-5'-P-CCNC: 31 U/L (ref 0–45)
AST SERPL W P-5'-P-CCNC: 32 U/L (ref 0–45)
AST SERPL W P-5'-P-CCNC: 35 U/L (ref 0–45)
AST SERPL W P-5'-P-CCNC: 36 U/L (ref 0–45)
AST SERPL W P-5'-P-CCNC: 37 U/L (ref 0–45)
AST SERPL W P-5'-P-CCNC: 44 U/L (ref 0–45)
AST SERPL W P-5'-P-CCNC: 59 U/L (ref 0–45)
AST SERPL W P-5'-P-CCNC: 59 U/L (ref 0–45)
AST SERPL W P-5'-P-CCNC: 62 U/L (ref 0–45)
AST SERPL W P-5'-P-CCNC: 74 U/L (ref 0–45)
AST SERPL W P-5'-P-CCNC: 74 U/L (ref 0–45)
AST SERPL W P-5'-P-CCNC: 84 U/L (ref 0–45)
AST SERPL W P-5'-P-CCNC: 92 U/L (ref 0–45)
B-GLOBULIN SERPL ELPH-MCNC: 0.7 G/DL (ref 0.6–1)
B-GLOBULIN SERPL ELPH-MCNC: 0.8 G/DL (ref 0.6–1)
BACTERIA SPEC CULT: ABNORMAL
BACTERIA SPEC CULT: NO GROWTH
BACTERIA SPEC CULT: NORMAL
BARBITURATES UR QL: NEGATIVE
BASE DEFICIT BLDV-SCNC: 7.2 MMOL/L
BASE DEFICIT BLDV-SCNC: 8.9 MMOL/L
BASOPHILS # BLD AUTO: 0 10E9/L (ref 0–0.2)
BASOPHILS # BLD AUTO: 0.1 10E9/L (ref 0–0.2)
BASOPHILS NFR BLD AUTO: 0 %
BASOPHILS NFR BLD AUTO: 0.1 %
BASOPHILS NFR BLD AUTO: 0.2 %
BASOPHILS NFR BLD AUTO: 0.2 %
BASOPHILS NFR BLD AUTO: 0.3 %
BASOPHILS NFR BLD AUTO: 0.3 %
BASOPHILS NFR BLD AUTO: 0.4 %
BASOPHILS NFR BLD AUTO: 0.8 %
BENZODIAZ UR QL: NEGATIVE
BILIRUB SERPL-MCNC: 0.2 MG/DL (ref 0.2–1.3)
BILIRUB SERPL-MCNC: 0.3 MG/DL (ref 0.2–1.3)
BILIRUB SERPL-MCNC: 0.4 MG/DL (ref 0.2–1.3)
BILIRUB SERPL-MCNC: 0.5 MG/DL (ref 0.2–1.3)
BILIRUB SERPL-MCNC: 0.5 MG/DL (ref 0.2–1.3)
BILIRUB SERPL-MCNC: 0.6 MG/DL (ref 0.2–1.3)
BILIRUB SERPL-MCNC: 0.7 MG/DL (ref 0.2–1.3)
BILIRUB SERPL-MCNC: 0.9 MG/DL (ref 0.2–1.3)
BILIRUB SERPL-MCNC: 1 MG/DL (ref 0.2–1.3)
BILIRUB SERPL-MCNC: 1.1 MG/DL (ref 0.2–1.3)
BILIRUB UR QL STRIP: NEGATIVE
BLD GP AB SCN SERPL QL: NORMAL
BLD PROD TYP BPU: NORMAL
BLD UNIT ID BPU: 0
BLOOD BANK CMNT PATIENT-IMP: NORMAL
BLOOD PRODUCT CODE: NORMAL
BPU ID: NORMAL
BUN SERPL-MCNC: 10 MG/DL (ref 7–30)
BUN SERPL-MCNC: 11 MG/DL (ref 7–30)
BUN SERPL-MCNC: 11 MG/DL (ref 7–30)
BUN SERPL-MCNC: 12 MG/DL (ref 6–22)
BUN SERPL-MCNC: 12 MG/DL (ref 7–30)
BUN SERPL-MCNC: 13 MG/DL (ref 7–30)
BUN SERPL-MCNC: 14 MG/DL (ref 7–30)
BUN SERPL-MCNC: 15 MG/DL (ref 7–30)
BUN SERPL-MCNC: 15 MG/DL (ref 7–30)
BUN SERPL-MCNC: 16 MG/DL (ref 7–30)
BUN SERPL-MCNC: 16 MG/DL (ref 8–22)
BUN SERPL-MCNC: 17 MG/DL (ref 7–30)
BUN SERPL-MCNC: 18 MG/DL (ref 7–30)
BUN SERPL-MCNC: 19 MG/DL (ref 7–30)
BUN SERPL-MCNC: 21 MG/DL (ref 7–30)
BUN SERPL-MCNC: 22 MG/DL (ref 7–30)
BUN SERPL-MCNC: 23 MG/DL (ref 7–30)
BUN SERPL-MCNC: 9 MG/DL (ref 7–30)
BUN SERPL-MCNC: 9 MG/DL (ref 7–30)
C DIFF TOX B STL QL: POSITIVE
CALCIUM SERPL-MCNC: 7.1 MG/DL (ref 8.5–10.1)
CALCIUM SERPL-MCNC: 7.5 MG/DL (ref 8.5–10.1)
CALCIUM SERPL-MCNC: 7.5 MG/DL (ref 8.5–10.1)
CALCIUM SERPL-MCNC: 7.6 MG/DL (ref 8.5–10.1)
CALCIUM SERPL-MCNC: 7.8 MG/DL (ref 8.5–10.1)
CALCIUM SERPL-MCNC: 7.9 MG/DL (ref 8.5–10.1)
CALCIUM SERPL-MCNC: 8 MG/DL (ref 8.5–10.1)
CALCIUM SERPL-MCNC: 8 MG/DL (ref 8.5–10.1)
CALCIUM SERPL-MCNC: 8.4 MG/DL (ref 8.5–10.1)
CALCIUM SERPL-MCNC: 8.6 MG/DL (ref 8.5–10.1)
CALCIUM SERPL-MCNC: 8.7 MG/DL (ref 8.5–10.1)
CALCIUM SERPL-MCNC: 8.8 MG/DL (ref 8.5–10.1)
CALCIUM SERPL-MCNC: 8.8 MG/DL (ref 8.5–10.1)
CALCIUM SERPL-MCNC: 8.9 MG/DL (ref 8.5–10.1)
CALCIUM SERPL-MCNC: 9 MG/DL (ref 8.5–10.1)
CALCIUM SERPL-MCNC: 9.1 MG/DL (ref 8.5–10.1)
CALCIUM SERPL-MCNC: 9.1 MG/DL (ref 8.5–10.5)
CALCIUM SERPL-MCNC: 9.2 MG/DL (ref 8.5–10.1)
CALCIUM SERPL-MCNC: 9.4 MG/DL (ref 8.5–10.1)
CALCIUM SERPL-MCNC: 9.4 MG/DL (ref 8.5–10.5)
CALCIUM SERPL-MCNC: 9.5 MG/DL (ref 8.5–10.1)
CALCIUM SERPL-MCNC: 9.5 MG/DL (ref 8.5–10.1)
CANNABINOIDS UR QL SCN: NEGATIVE
CHLORIDE SERPL-SCNC: 104 MMOL/L (ref 94–109)
CHLORIDE SERPL-SCNC: 105 MMOL/L (ref 94–109)
CHLORIDE SERPL-SCNC: 105 MMOL/L (ref 94–109)
CHLORIDE SERPL-SCNC: 106 MMOL/L (ref 94–109)
CHLORIDE SERPL-SCNC: 106 MMOL/L (ref 94–109)
CHLORIDE SERPL-SCNC: 107 MMOL/L (ref 94–109)
CHLORIDE SERPL-SCNC: 108 MMOL/L (ref 94–109)
CHLORIDE SERPL-SCNC: 109 MMOL/L (ref 94–109)
CHLORIDE SERPL-SCNC: 109 MMOL/L (ref 94–109)
CHLORIDE SERPL-SCNC: 112 MMOL/L (ref 94–109)
CHLORIDE SERPL-SCNC: 113 MMOL/L (ref 94–109)
CHLORIDE SERPL-SCNC: 114 MMOL/L (ref 94–109)
CHLORIDE SERPL-SCNC: 114 MMOL/L (ref 94–109)
CHLORIDE SERPL-SCNC: 116 MMOL/L (ref 94–109)
CHLORIDE SERPL-SCNC: 117 MMOL/L (ref 94–109)
CHLORIDE SERPL-SCNC: 118 MMOL/L (ref 94–109)
CHLORIDE SERPL-SCNC: 119 MMOL/L (ref 94–109)
CHLORIDE SERPL-SCNC: 121 MMOL/L (ref 94–109)
CHLORIDE SERPL-SCNC: 122 MMOL/L (ref 94–109)
CHLORIDE SERPLBLD-SCNC: 102 MMOL/L (ref 98–107)
CHLORIDE SERPLBLD-SCNC: 110 MMOL/L (ref 98–107)
CK SERPL-CCNC: 1064 U/L (ref 30–300)
CK SERPL-CCNC: 353 U/L (ref 30–300)
CO2 SERPL-SCNC: 16 MMOL/L (ref 20–32)
CO2 SERPL-SCNC: 17 MMOL/L (ref 20–32)
CO2 SERPL-SCNC: 18 MMOL/L (ref 20–32)
CO2 SERPL-SCNC: 18 MMOL/L (ref 20–32)
CO2 SERPL-SCNC: 19 MMOL/L (ref 20–32)
CO2 SERPL-SCNC: 20 MMOL/L (ref 20–32)
CO2 SERPL-SCNC: 21 MMOL/L (ref 20–32)
CO2 SERPL-SCNC: 22 MMOL/L (ref 20–32)
CO2 SERPL-SCNC: 24 MMOL/L (ref 20–32)
CO2 SERPL-SCNC: 24 MMOL/L (ref 20–32)
CO2 SERPL-SCNC: 25 MMOL/L (ref 20–32)
CO2 SERPL-SCNC: 25 MMOL/L (ref 22–31)
CO2 SERPL-SCNC: 26 MMOL/L (ref 20–32)
CO2 SERPL-SCNC: 27 MMOL/L (ref 20–32)
CO2 SERPL-SCNC: 27 MMOL/L (ref 22–31)
CO2 SERPL-SCNC: 29 MMOL/L (ref 20–32)
COCAINE UR QL: NEGATIVE
COLLECT DURATION TIME UR: 24 H
COLOR UR AUTO: ABNORMAL
COLOR UR AUTO: NORMAL
COLOR UR AUTO: YELLOW
COLOR UR AUTO: YELLOW
COPATH REPORT: NORMAL
CORTICOSTER 1H P 250 UG ACTH SERPL-SCNC: 19.6 UG/DL
CORTICOSTER 30M P 250 UG ACTH SERPL-SCNC: 14.8 UG/DL
CORTICOSTER SERPL-MCNC: 5 UG/DL (ref 4–22)
CORTIS 24H UR-MRATE: 7.3
CORTIS SERPL-MCNC: 13 UG/DL (ref 4–22)
CORTIS SERPL-MCNC: 4.4 UG/DL (ref 4–22)
CORTISONE 24H UR-MRATE: 36
CREAT SERPL-MCNC: 0.69 MG/DL (ref 0.66–1.25)
CREAT SERPL-MCNC: 0.71 MG/DL (ref 0.66–1.25)
CREAT SERPL-MCNC: 0.73 MG/DL (ref 0.66–1.25)
CREAT SERPL-MCNC: 0.74 MG/DL (ref 0.7–1.3)
CREAT SERPL-MCNC: 0.75 MG/DL (ref 0.66–1.25)
CREAT SERPL-MCNC: 0.76 MG/DL (ref 0.66–1.25)
CREAT SERPL-MCNC: 0.77 MG/DL (ref 0.66–1.25)
CREAT SERPL-MCNC: 0.79 MG/DL (ref 0.66–1.25)
CREAT SERPL-MCNC: 0.8 MG/DL (ref 0.66–1.25)
CREAT SERPL-MCNC: 0.8 MG/DL (ref 0.66–1.25)
CREAT SERPL-MCNC: 0.82 MG/DL (ref 0.66–1.25)
CREAT SERPL-MCNC: 0.83 MG/DL (ref 0.7–1.3)
CREAT SERPL-MCNC: 0.85 MG/DL (ref 0.66–1.25)
CREAT SERPL-MCNC: 0.86 MG/DL (ref 0.66–1.25)
CREAT SERPL-MCNC: 0.86 MG/DL (ref 0.66–1.25)
CREAT SERPL-MCNC: 0.88 MG/DL (ref 0.66–1.25)
CREAT SERPL-MCNC: 0.93 MG/DL (ref 0.66–1.25)
CREAT SERPL-MCNC: 1 MG/DL (ref 0.66–1.25)
CREAT SERPL-MCNC: 1.07 MG/DL (ref 0.66–1.25)
CREAT SERPL-MCNC: 1.11 MG/DL (ref 0.66–1.25)
CREAT SERPL-MCNC: 1.26 MG/DL (ref 0.66–1.25)
CREAT SERPL-MCNC: 1.29 MG/DL (ref 0.66–1.25)
CREAT SERPL-MCNC: 1.32 MG/DL (ref 0.66–1.25)
CREAT SERPL-MCNC: 1.38 MG/DL (ref 0.66–1.25)
CREAT SERPL-MCNC: 1.81 MG/DL (ref 0.66–1.25)
DIFFERENTIAL METHOD BLD: ABNORMAL
DIFFERENTIAL: ABNORMAL
EOSINOPHIL # BLD AUTO: 0 10E9/L (ref 0–0.7)
EOSINOPHIL # BLD AUTO: 0.1 10E9/L (ref 0–0.7)
EOSINOPHIL # BLD AUTO: 0.6 10E9/L (ref 0–0.7)
EOSINOPHIL NFR BLD AUTO: 0 %
EOSINOPHIL NFR BLD AUTO: 0.8 %
EOSINOPHIL NFR BLD AUTO: 0.8 %
EOSINOPHIL NFR BLD AUTO: 1.4 %
EOSINOPHIL NFR BLD AUTO: 2 %
EOSINOPHIL NFR BLD AUTO: 7.3 %
EPO SERPL-ACNC: 59
ERYTHROCYTE [DISTWIDTH] IN BLOOD BY AUTOMATED COUNT: 15.6 % (ref 10–15)
ERYTHROCYTE [DISTWIDTH] IN BLOOD BY AUTOMATED COUNT: 15.8 % (ref 10–15)
ERYTHROCYTE [DISTWIDTH] IN BLOOD BY AUTOMATED COUNT: 15.9 % (ref 10–15)
ERYTHROCYTE [DISTWIDTH] IN BLOOD BY AUTOMATED COUNT: 15.9 % (ref 10–15)
ERYTHROCYTE [DISTWIDTH] IN BLOOD BY AUTOMATED COUNT: 16.1 % (ref 10–15)
ERYTHROCYTE [DISTWIDTH] IN BLOOD BY AUTOMATED COUNT: 16.2 % (ref 10–15)
ERYTHROCYTE [DISTWIDTH] IN BLOOD BY AUTOMATED COUNT: 16.5 % (ref 10–15)
ERYTHROCYTE [DISTWIDTH] IN BLOOD BY AUTOMATED COUNT: 16.5 % (ref 10–15)
ERYTHROCYTE [DISTWIDTH] IN BLOOD BY AUTOMATED COUNT: 16.9 % (ref 11–14.5)
ERYTHROCYTE [DISTWIDTH] IN BLOOD BY AUTOMATED COUNT: 17.7 % (ref 10–15)
ERYTHROCYTE [DISTWIDTH] IN BLOOD BY AUTOMATED COUNT: 17.8 % (ref 10–15)
ERYTHROCYTE [DISTWIDTH] IN BLOOD BY AUTOMATED COUNT: 18.2 % (ref 10–15)
ERYTHROCYTE [DISTWIDTH] IN BLOOD BY AUTOMATED COUNT: 18.4 % (ref 10–15)
ERYTHROCYTE [DISTWIDTH] IN BLOOD BY AUTOMATED COUNT: 18.6 % (ref 10–15)
ERYTHROCYTE [DISTWIDTH] IN BLOOD BY AUTOMATED COUNT: 18.7 % (ref 10–15)
ERYTHROCYTE [DISTWIDTH] IN BLOOD BY AUTOMATED COUNT: 20 % (ref 10–15)
ERYTHROCYTE [DISTWIDTH] IN BLOOD BY AUTOMATED COUNT: 20.8 % (ref 10–15)
ERYTHROCYTE [DISTWIDTH] IN BLOOD BY AUTOMATED COUNT: 21.4 % (ref 10–15)
ERYTHROCYTE [DISTWIDTH] IN BLOOD BY AUTOMATED COUNT: 22.9 % (ref 10–15)
ERYTHROCYTE [DISTWIDTH] IN BLOOD BY AUTOMATED COUNT: 23 % (ref 10–15)
ERYTHROCYTE [DISTWIDTH] IN BLOOD BY AUTOMATED COUNT: 23.2 % (ref 10–15)
ERYTHROCYTE [DISTWIDTH] IN BLOOD BY AUTOMATED COUNT: 23.3 % (ref 10–15)
ETHANOL SERPL-MCNC: 0 G/DL
ETHANOL SERPL-MCNC: <0.01 G/DL
FECAL OCCULT BLOOD - HISTORICAL: NEGATIVE
FERRITIN SERPL-MCNC: 1135 NG/ML (ref 26–388)
FERRITIN SERPL-MCNC: 687 NG/ML (ref 26–388)
FERRITIN SERPL-MCNC: 752 NG/ML (ref 26–388)
FOLATE SERPL-MCNC: 3.2 NG/ML
FOLATE SERPL-MCNC: 32 NG/ML
FSH SERPL-ACNC: 0.4 IU/L (ref 0.7–10.8)
GAMMA GLOB SERPL ELPH-MCNC: 0.9 G/DL (ref 0.7–1.6)
GAMMA GLOB SERPL ELPH-MCNC: 1.5 G/DL (ref 0.7–1.6)
GFR SERPL CREATININE-BSD FRML MDRD: 39 ML/MIN/1.7M2
GFR SERPL CREATININE-BSD FRML MDRD: 53 ML/MIN/1.7M2
GFR SERPL CREATININE-BSD FRML MDRD: 56 ML/MIN/1.7M2
GFR SERPL CREATININE-BSD FRML MDRD: 57 ML/MIN/1.7M2
GFR SERPL CREATININE-BSD FRML MDRD: 59 ML/MIN/1.7M2
GFR SERPL CREATININE-BSD FRML MDRD: 68 ML/MIN/1.7M2
GFR SERPL CREATININE-BSD FRML MDRD: 71 ML/MIN/1.7M2
GFR SERPL CREATININE-BSD FRML MDRD: 76 ML/MIN/1.7M2
GFR SERPL CREATININE-BSD FRML MDRD: 83 ML/MIN/1.7M2
GFR SERPL CREATININE-BSD FRML MDRD: 89 ML/MIN/1.7M2
GFR SERPL CREATININE-BSD FRML MDRD: >60 ML/MIN/1.73M2
GFR SERPL CREATININE-BSD FRML MDRD: >60 ML/MIN/1.73M2
GFR SERPL CREATININE-BSD FRML MDRD: >90 ML/MIN/1.7M2
GFR SERPL CREATININE-BSD FRML MDRD: ABNORMAL ML/MIN/1.7M2
GLUCOSE BLDC GLUCOMTR-MCNC: 88 MG/DL (ref 70–99)
GLUCOSE SERPL-MCNC: 101 MG/DL (ref 70–99)
GLUCOSE SERPL-MCNC: 105 MG/DL (ref 70–99)
GLUCOSE SERPL-MCNC: 112 MG/DL (ref 70–99)
GLUCOSE SERPL-MCNC: 116 MG/DL (ref 70–99)
GLUCOSE SERPL-MCNC: 123 MG/DL (ref 70–99)
GLUCOSE SERPL-MCNC: 124 MG/DL (ref 70–99)
GLUCOSE SERPL-MCNC: 126 MG/DL (ref 70–99)
GLUCOSE SERPL-MCNC: 128 MG/DL (ref 70–99)
GLUCOSE SERPL-MCNC: 138 MG/DL (ref 70–99)
GLUCOSE SERPL-MCNC: 155 MG/DL (ref 70–99)
GLUCOSE SERPL-MCNC: 175 MG/DL (ref 70–99)
GLUCOSE SERPL-MCNC: 75 MG/DL (ref 70–99)
GLUCOSE SERPL-MCNC: 80 MG/DL (ref 70–99)
GLUCOSE SERPL-MCNC: 81 MG/DL (ref 70–99)
GLUCOSE SERPL-MCNC: 81 MG/DL (ref 70–99)
GLUCOSE SERPL-MCNC: 85 MG/DL (ref 70–99)
GLUCOSE SERPL-MCNC: 85 MG/DL (ref 70–99)
GLUCOSE SERPL-MCNC: 91 MG/DL (ref 70–125)
GLUCOSE SERPL-MCNC: 92 MG/DL (ref 70–99)
GLUCOSE SERPL-MCNC: 93 MG/DL (ref 70–99)
GLUCOSE SERPL-MCNC: 95 MG/DL (ref 70–125)
GLUCOSE SERPL-MCNC: 97 MG/DL (ref 70–99)
GLUCOSE SERPL-MCNC: 98 MG/DL (ref 70–99)
GLUCOSE UR STRIP-MCNC: NEGATIVE MG/DL
HCO3 BLDV-SCNC: 16 MMOL/L (ref 21–28)
HCO3 BLDV-SCNC: 17 MMOL/L (ref 21–28)
HCT VFR BLD AUTO: 19.3 % (ref 40–53)
HCT VFR BLD AUTO: 19.7 % (ref 40–53)
HCT VFR BLD AUTO: 19.8 % (ref 40–53)
HCT VFR BLD AUTO: 19.9 % (ref 40–53)
HCT VFR BLD AUTO: 21.8 % (ref 40–53)
HCT VFR BLD AUTO: 24 % (ref 40–53)
HCT VFR BLD AUTO: 24.4 % (ref 40–51)
HCT VFR BLD AUTO: 24.6 % (ref 40–53)
HCT VFR BLD AUTO: 25.1 % (ref 40–53)
HCT VFR BLD AUTO: 25.6 % (ref 40–53)
HCT VFR BLD AUTO: 25.8 % (ref 40–53)
HCT VFR BLD AUTO: 26 % (ref 40–53)
HCT VFR BLD AUTO: 26.2 % (ref 40–53)
HCT VFR BLD AUTO: 26.6 % (ref 40–53)
HCT VFR BLD AUTO: 26.6 % (ref 40–53)
HCT VFR BLD AUTO: 26.9 % (ref 40–53)
HCT VFR BLD AUTO: 27.6 % (ref 40–53)
HCT VFR BLD AUTO: 27.9 % (ref 40–53)
HCT VFR BLD AUTO: 28.1 % (ref 40–53)
HCT VFR BLD AUTO: 28.2 % (ref 40–53)
HCT VFR BLD AUTO: 29.1 % (ref 40–53)
HCT VFR BLD AUTO: 31.2 % (ref 40–53)
HCT VFR BLD AUTO: 31.9 % (ref 40–53)
HEMOCCULT STL QL: NORMAL
HEMOGLOBIN: 7.7 G/DL (ref 14–18)
HEMOGLOBIN: 7.9 G/DL (ref 14–18)
HEMOGLOBIN: 8.2 G/DL (ref 14–18)
HEMOGLOBIN: 8.9 G/DL (ref 14–18)
HGB A1 MFR BLD: 95.3 %
HGB A2 MFR BLD: 3.9 %
HGB BLD-MCNC: 10.7 G/DL (ref 13.3–17.7)
HGB BLD-MCNC: 10.7 G/DL (ref 13.3–17.7)
HGB BLD-MCNC: 6.3 G/DL (ref 13.3–17.7)
HGB BLD-MCNC: 6.6 G/DL (ref 13.3–17.7)
HGB BLD-MCNC: 7.3 G/DL (ref 13.3–17.7)
HGB BLD-MCNC: 7.7 G/DL (ref 13.3–17.7)
HGB BLD-MCNC: 7.8 G/DL (ref 13.3–17.7)
HGB BLD-MCNC: 8.4 G/DL (ref 13.3–17.7)
HGB BLD-MCNC: 8.4 G/DL (ref 13.3–17.7)
HGB BLD-MCNC: 8.5 G/DL (ref 13.3–17.7)
HGB BLD-MCNC: 8.5 G/DL (ref 13.3–17.7)
HGB BLD-MCNC: 8.6 G/DL (ref 13.3–17.7)
HGB BLD-MCNC: 8.7 G/DL (ref 13.3–17.7)
HGB BLD-MCNC: 8.7 G/DL (ref 13.3–17.7)
HGB BLD-MCNC: 8.8 G/DL (ref 13.3–17.7)
HGB BLD-MCNC: 9 G/DL (ref 13.3–17.7)
HGB BLD-MCNC: 9 G/DL (ref 13.3–17.7)
HGB BLD-MCNC: 9.2 G/DL (ref 13.3–17.7)
HGB BLD-MCNC: 9.3 G/DL (ref 13.3–17.7)
HGB BLD-MCNC: 9.5 G/DL (ref 13.3–17.7)
HGB C MFR BLD: 0 %
HGB E MFR BLD: 0 %
HGB F MFR BLD: 0.8 %
HGB FRACT BLD ELPH-IMP: ABNORMAL
HGB OTHER MFR BLD: 0 %
HGB S BLD QL SOLY: ABNORMAL
HGB S MFR BLD: 0 %
HGB UR QL STRIP: NEGATIVE
HYPOCHROMIA BLD QL: PRESENT
HYPOCHROMIA BLD QL: PRESENT
IGA SERPL-MCNC: 265 MG/DL (ref 70–380)
IGG SERPL-MCNC: 1460 MG/DL (ref 695–1620)
IGM SERPL-MCNC: 184 MG/DL (ref 60–265)
IMM GRANULOCYTES # BLD: 0 10E9/L (ref 0–0.4)
IMM GRANULOCYTES # BLD: 0.1 10E9/L (ref 0–0.4)
IMM GRANULOCYTES NFR BLD: 0 %
IMM GRANULOCYTES NFR BLD: 0.1 %
IMM GRANULOCYTES NFR BLD: 0.1 %
IMM GRANULOCYTES NFR BLD: 0.2 %
IMM GRANULOCYTES NFR BLD: 0.3 %
IMM GRANULOCYTES NFR BLD: 0.3 %
IMM GRANULOCYTES NFR BLD: 0.4 %
IMM GRANULOCYTES NFR BLD: 0.5 %
IMM GRANULOCYTES NFR BLD: 0.6 %
IMM GRANULOCYTES NFR BLD: 0.8 %
IMM GRANULOCYTES NFR BLD: 0.8 %
IMMUNOFIXATION ELP: NORMAL
INR PPP: 1.02 (ref 0.86–1.14)
INR PPP: 1.05 (ref 0.86–1.14)
INTERNAL QC OK POCT: YES
IRON SATN MFR SERPL: 59 % (ref 15–46)
IRON SERPL-MCNC: 81 UG/DL (ref 35–180)
KETONES UR STRIP-MCNC: NEGATIVE MG/DL
LAB SCANNED RESULT: NORMAL
LAB SCANNED RESULT: NORMAL
LACTATE BLD-SCNC: 0.9 MMOL/L (ref 0.7–2)
LACTATE BLD-SCNC: 1.1 MMOL/L (ref 0.7–2.1)
LACTATE BLD-SCNC: 2.1 MMOL/L (ref 0.7–2.1)
LACTATE BLD-SCNC: 2.4 MMOL/L (ref 0.7–2.1)
LDH SERPL L TO P-CCNC: 256 U/L (ref 85–227)
LEUKOCYTE ESTERASE UR QL STRIP: NEGATIVE
LH SERPL-ACNC: <0.2 IU/L (ref 1.5–9.3)
LYMPHOCYTES # BLD AUTO: 0.4 10E9/L (ref 0.8–5.3)
LYMPHOCYTES # BLD AUTO: 0.4 10E9/L (ref 0.8–5.3)
LYMPHOCYTES # BLD AUTO: 0.5 10E9/L (ref 0.8–5.3)
LYMPHOCYTES # BLD AUTO: 0.6 10E9/L (ref 0.8–5.3)
LYMPHOCYTES # BLD AUTO: 0.7 10E9/L (ref 0.8–5.3)
LYMPHOCYTES # BLD AUTO: 0.8 10E9/L (ref 0.8–5.3)
LYMPHOCYTES # BLD AUTO: 0.8 10E9/L (ref 0.8–5.3)
LYMPHOCYTES # BLD AUTO: 0.9 10E9/L (ref 0.8–5.3)
LYMPHOCYTES # BLD AUTO: 0.9 10E9/L (ref 0.8–5.3)
LYMPHOCYTES # BLD AUTO: 1 10E9/L (ref 0.8–5.3)
LYMPHOCYTES # BLD AUTO: 1.5 10E9/L (ref 0.8–5.3)
LYMPHOCYTES # BLD AUTO: 1.6 10E9/L (ref 0.8–5.3)
LYMPHOCYTES # BLD AUTO: 1.8 10E9/L (ref 0.8–5.3)
LYMPHOCYTES # BLD AUTO: 2 10E9/L (ref 0.8–5.3)
LYMPHOCYTES # BLD AUTO: 2.5 10E9/L (ref 0.8–5.3)
LYMPHOCYTES NFR BLD AUTO: 12.6 %
LYMPHOCYTES NFR BLD AUTO: 18 %
LYMPHOCYTES NFR BLD AUTO: 19.1 %
LYMPHOCYTES NFR BLD AUTO: 19.2 %
LYMPHOCYTES NFR BLD AUTO: 20.9 %
LYMPHOCYTES NFR BLD AUTO: 21.3 %
LYMPHOCYTES NFR BLD AUTO: 23.5 %
LYMPHOCYTES NFR BLD AUTO: 24.5 %
LYMPHOCYTES NFR BLD AUTO: 28.7 %
LYMPHOCYTES NFR BLD AUTO: 29.7 %
LYMPHOCYTES NFR BLD AUTO: 31.1 %
LYMPHOCYTES NFR BLD AUTO: 31.6 %
LYMPHOCYTES NFR BLD AUTO: 8.1 %
LYMPHOCYTES NFR BLD AUTO: 9.2 %
LYMPHOCYTES NFR BLD AUTO: 9.8 %
Lab: NORMAL
M PROTEIN SERPL ELPH-MCNC: 0 G/DL
M PROTEIN SERPL ELPH-MCNC: 0.1 G/DL
MAGNESIUM SERPL-MCNC: 1.5 MG/DL (ref 1.6–2.3)
MAGNESIUM SERPL-MCNC: 1.8 MG/DL (ref 1.6–2.3)
MAGNESIUM SERPL-MCNC: 2 MG/DL (ref 1.6–2.3)
MAGNESIUM SERPL-MCNC: 2 MG/DL (ref 1.6–2.3)
MAGNESIUM SERPL-MCNC: 2.7 MG/DL (ref 1.6–2.3)
MCH RBC QN AUTO: 21.6 PG (ref 26.5–33)
MCH RBC QN AUTO: 23.6 PG (ref 26.5–33)
MCH RBC QN AUTO: 23.7 PG (ref 26.5–33)
MCH RBC QN AUTO: 24 PG (ref 26.5–33)
MCH RBC QN AUTO: 24 PG (ref 26.5–33)
MCH RBC QN AUTO: 24.1 PG (ref 26.5–33)
MCH RBC QN AUTO: 24.2 PG (ref 26.5–33)
MCH RBC QN AUTO: 24.2 PG (ref 26.5–33)
MCH RBC QN AUTO: 24.3 PG (ref 26.5–33)
MCH RBC QN AUTO: 24.4 PG (ref 26.5–33)
MCH RBC QN AUTO: 24.7 PG (ref 26.5–33)
MCH RBC QN AUTO: 24.8 PG (ref 26.5–33)
MCH RBC QN AUTO: 25.2 PG (ref 26.5–33)
MCH RBC QN AUTO: 25.3 PG (ref 26.5–33)
MCH RBC QN AUTO: 25.3 PG (ref 26.5–33)
MCH RBC QN AUTO: 25.7 PG (ref 26.5–33)
MCH RBC QN AUTO: 25.9 PG (ref 26.5–33)
MCH RBC QN AUTO: 26.2 PG (ref 26.5–33)
MCH RBC QN AUTO: 26.5 PG (ref 26.5–33)
MCH RBC QN AUTO: 26.6 PG (ref 26.5–33)
MCH RBC QN AUTO: 26.6 PG (ref 27–34)
MCH RBC QN AUTO: 26.7 PG (ref 26.5–33)
MCHC RBC AUTO-ENTMCNC: 32.3 G/DL (ref 31.5–36.5)
MCHC RBC AUTO-ENTMCNC: 32.4 G/DL (ref 32–36)
MCHC RBC AUTO-ENTMCNC: 32.5 G/DL (ref 31.5–36.5)
MCHC RBC AUTO-ENTMCNC: 32.6 G/DL (ref 31.5–36.5)
MCHC RBC AUTO-ENTMCNC: 32.7 G/DL (ref 31.5–36.5)
MCHC RBC AUTO-ENTMCNC: 32.7 G/DL (ref 31.5–36.5)
MCHC RBC AUTO-ENTMCNC: 32.8 G/DL (ref 31.5–36.5)
MCHC RBC AUTO-ENTMCNC: 33.1 G/DL (ref 31.5–36.5)
MCHC RBC AUTO-ENTMCNC: 33.2 G/DL (ref 31.5–36.5)
MCHC RBC AUTO-ENTMCNC: 33.3 G/DL (ref 31.5–36.5)
MCHC RBC AUTO-ENTMCNC: 33.5 G/DL (ref 31.5–36.5)
MCHC RBC AUTO-ENTMCNC: 33.6 G/DL (ref 31.5–36.5)
MCHC RBC AUTO-ENTMCNC: 33.8 G/DL (ref 31.5–36.5)
MCHC RBC AUTO-ENTMCNC: 34.1 G/DL (ref 31.5–36.5)
MCHC RBC AUTO-ENTMCNC: 34.3 G/DL (ref 31.5–36.5)
MCHC RBC AUTO-ENTMCNC: 34.3 G/DL (ref 31.5–36.5)
MCV RBC AUTO: 65 FL (ref 78–100)
MCV RBC AUTO: 70 FL (ref 78–100)
MCV RBC AUTO: 73 FL (ref 78–100)
MCV RBC AUTO: 74 FL (ref 78–100)
MCV RBC AUTO: 75 FL (ref 78–100)
MCV RBC AUTO: 77 FL (ref 78–100)
MCV RBC AUTO: 77 FL (ref 78–100)
MCV RBC AUTO: 78 FL (ref 78–100)
MCV RBC AUTO: 79 FL (ref 78–100)
MCV RBC AUTO: 79 FL (ref 78–100)
MCV RBC AUTO: 82 FL (ref 80–100)
METAMYELOCYTES # BLD: 0 10E9/L
METAMYELOCYTES NFR BLD MANUAL: 1 %
MICRO REPORT STATUS: ABNORMAL
MICRO REPORT STATUS: NORMAL
MICRO REPORT STATUS: NORMAL
MONOCYTES # BLD AUTO: 0 10E9/L (ref 0–1.3)
MONOCYTES # BLD AUTO: 0.1 10E9/L (ref 0–1.3)
MONOCYTES # BLD AUTO: 0.1 10E9/L (ref 0–1.3)
MONOCYTES # BLD AUTO: 0.2 10E9/L (ref 0–1.3)
MONOCYTES # BLD AUTO: 0.3 10E9/L (ref 0–1.3)
MONOCYTES # BLD AUTO: 0.4 10E9/L (ref 0–1.3)
MONOCYTES # BLD AUTO: 0.5 10E9/L (ref 0–1.3)
MONOCYTES # BLD AUTO: 0.6 10E9/L (ref 0–1.3)
MONOCYTES # BLD AUTO: 0.9 10E9/L (ref 0–1.3)
MONOCYTES NFR BLD AUTO: 0.9 %
MONOCYTES NFR BLD AUTO: 1.9 %
MONOCYTES NFR BLD AUTO: 10.3 %
MONOCYTES NFR BLD AUTO: 11.2 %
MONOCYTES NFR BLD AUTO: 2.1 %
MONOCYTES NFR BLD AUTO: 2.7 %
MONOCYTES NFR BLD AUTO: 4.8 %
MONOCYTES NFR BLD AUTO: 6.9 %
MONOCYTES NFR BLD AUTO: 7 %
MONOCYTES NFR BLD AUTO: 7.7 %
MONOCYTES NFR BLD AUTO: 8 %
MONOCYTES NFR BLD AUTO: 8.4 %
MONOCYTES NFR BLD AUTO: 8.5 %
MONOCYTES NFR BLD AUTO: 9.1 %
MONOCYTES NFR BLD AUTO: 9.3 %
MRSA DNA SPEC QL NAA+PROBE: NORMAL
NEUTROPHILS # BLD AUTO: 1.4 10E9/L (ref 1.6–8.3)
NEUTROPHILS # BLD AUTO: 1.5 10E9/L (ref 1.6–8.3)
NEUTROPHILS # BLD AUTO: 1.6 10E9/L (ref 1.6–8.3)
NEUTROPHILS # BLD AUTO: 2.7 10E9/L (ref 1.6–8.3)
NEUTROPHILS # BLD AUTO: 3.5 10E9/L (ref 1.6–8.3)
NEUTROPHILS # BLD AUTO: 3.6 10E9/L (ref 1.6–8.3)
NEUTROPHILS # BLD AUTO: 3.7 10E9/L (ref 1.6–8.3)
NEUTROPHILS # BLD AUTO: 3.8 10E9/L (ref 1.6–8.3)
NEUTROPHILS # BLD AUTO: 3.9 10E9/L (ref 1.6–8.3)
NEUTROPHILS # BLD AUTO: 4 10E9/L (ref 1.6–8.3)
NEUTROPHILS # BLD AUTO: 4.5 10E9/L (ref 1.6–8.3)
NEUTROPHILS # BLD AUTO: 4.8 10E9/L (ref 1.6–8.3)
NEUTROPHILS # BLD AUTO: 5.3 10E9/L (ref 1.6–8.3)
NEUTROPHILS # BLD AUTO: 6 10E9/L (ref 1.6–8.3)
NEUTROPHILS # BLD AUTO: 7.3 10E9/L (ref 1.6–8.3)
NEUTROPHILS NFR BLD AUTO: 57.7 %
NEUTROPHILS NFR BLD AUTO: 57.8 %
NEUTROPHILS NFR BLD AUTO: 59.6 %
NEUTROPHILS NFR BLD AUTO: 60.4 %
NEUTROPHILS NFR BLD AUTO: 61.8 %
NEUTROPHILS NFR BLD AUTO: 64.7 %
NEUTROPHILS NFR BLD AUTO: 67.6 %
NEUTROPHILS NFR BLD AUTO: 70 %
NEUTROPHILS NFR BLD AUTO: 73.1 %
NEUTROPHILS NFR BLD AUTO: 74 %
NEUTROPHILS NFR BLD AUTO: 75.7 %
NEUTROPHILS NFR BLD AUTO: 86.3 %
NEUTROPHILS NFR BLD AUTO: 87 %
NEUTROPHILS NFR BLD AUTO: 88 %
NEUTROPHILS NFR BLD AUTO: 89.9 %
NITRATE UR QL: NEGATIVE
NRBC # BLD AUTO: 0 10*3/UL
NRBC BLD AUTO-RTO: 1 /100
NUM BPU REQUESTED: 2
NUM BPU REQUESTED: 3
OPIATES UR QL SCN: POSITIVE
PATH INTERP BLD-IMP: ABNORMAL
PCO2 BLDV: 28 MM HG (ref 40–50)
PCO2 BLDV: 31 MM HG (ref 40–50)
PCP UR QL SCN: NEGATIVE
PH BLDV: 7.33 PH (ref 7.32–7.43)
PH BLDV: 7.4 PH (ref 7.32–7.43)
PH UR STRIP: 6 PH (ref 5–7)
PH UR STRIP: 6 PH (ref 5–7)
PH UR STRIP: 6.5 PH (ref 5–7)
PH UR STRIP: 7 PH (ref 5–7)
PHOSPHATE SERPL-MCNC: 1.3 MG/DL (ref 2.5–4.5)
PHOSPHATE SERPL-MCNC: 1.4 MG/DL (ref 2.5–4.5)
PHOSPHATE SERPL-MCNC: 2.7 MG/DL (ref 2.5–4.5)
PHOSPHATE SERPL-MCNC: 3.1 MG/DL (ref 2.5–4.5)
PLATELET # BLD AUTO: 143 10E9/L (ref 150–450)
PLATELET # BLD AUTO: 154 10E9/L (ref 150–450)
PLATELET # BLD AUTO: 159 10E9/L (ref 150–450)
PLATELET # BLD AUTO: 159 10E9/L (ref 150–450)
PLATELET # BLD AUTO: 173 10E9/L (ref 150–450)
PLATELET # BLD AUTO: 176 10E9/L (ref 150–450)
PLATELET # BLD AUTO: 196 10E9/L (ref 150–450)
PLATELET # BLD AUTO: 206 10E9/L (ref 150–450)
PLATELET # BLD AUTO: 206 THOU/UL (ref 140–440)
PLATELET # BLD AUTO: 217 10E9/L (ref 150–450)
PLATELET # BLD AUTO: 235 10E9/L (ref 150–450)
PLATELET # BLD AUTO: 236 10E9/L (ref 150–450)
PLATELET # BLD AUTO: 24 10E9/L (ref 150–450)
PLATELET # BLD AUTO: 245 10E9/L (ref 150–450)
PLATELET # BLD AUTO: 276 10E9/L (ref 150–450)
PLATELET # BLD AUTO: 314 10E9/L (ref 150–450)
PLATELET # BLD AUTO: 322 10E9/L (ref 150–450)
PLATELET # BLD AUTO: 372 10E9/L (ref 150–450)
PLATELET # BLD AUTO: 382 10E9/L (ref 150–450)
PLATELET # BLD AUTO: 42 10E9/L (ref 150–450)
PLATELET # BLD AUTO: 59 10E9/L (ref 150–450)
PLATELET # BLD AUTO: 64 10E9/L (ref 150–450)
PLATELET # BLD EST: ABNORMAL 10*3/UL
PLATELET # BLD EST: ABNORMAL 10*3/UL
PO2 BLDV: 40 MM HG (ref 25–47)
PO2 BLDV: 56 MM HG (ref 25–47)
POIKILOCYTOSIS BLD QL SMEAR: SLIGHT
POTASSIUM SERPL-SCNC: 2.8 MMOL/L (ref 3.4–5.3)
POTASSIUM SERPL-SCNC: 3.2 MMOL/L (ref 3.4–5.3)
POTASSIUM SERPL-SCNC: 3.3 MMOL/L (ref 3.4–5.3)
POTASSIUM SERPL-SCNC: 3.3 MMOL/L (ref 3.4–5.3)
POTASSIUM SERPL-SCNC: 3.4 MMOL/L (ref 3.4–5.3)
POTASSIUM SERPL-SCNC: 3.4 MMOL/L (ref 3.4–5.3)
POTASSIUM SERPL-SCNC: 3.5 MMOL/L (ref 3.4–5.3)
POTASSIUM SERPL-SCNC: 3.7 MMOL/L (ref 3.4–5.3)
POTASSIUM SERPL-SCNC: 3.8 MMOL/L (ref 3.4–5.3)
POTASSIUM SERPL-SCNC: 3.9 MMOL/L (ref 3.4–5.3)
POTASSIUM SERPL-SCNC: 3.9 MMOL/L (ref 3.5–5)
POTASSIUM SERPL-SCNC: 4 MMOL/L (ref 3.4–5.3)
POTASSIUM SERPL-SCNC: 4 MMOL/L (ref 3.4–5.3)
POTASSIUM SERPL-SCNC: 4 MMOL/L (ref 3.5–5)
POTASSIUM SERPL-SCNC: 4.1 MMOL/L (ref 3.4–5.3)
POTASSIUM SERPL-SCNC: 4.2 MMOL/L (ref 3.4–5.3)
POTASSIUM SERPL-SCNC: 4.3 MMOL/L (ref 3.4–5.3)
POTASSIUM SERPL-SCNC: 4.4 MMOL/L (ref 3.4–5.3)
POTASSIUM SERPL-SCNC: 5.4 MMOL/L (ref 3.4–5.3)
PROCALCITONIN SERPL-MCNC: 0.26 NG/ML
PROLACTIN SERPL-MCNC: 21 UG/L (ref 2–18)
PROT PATTERN SERPL ELPH-IMP: ABNORMAL
PROT PATTERN SERPL ELPH-IMP: ABNORMAL
PROT PATTERN SERPL IFE-IMP: NORMAL
PROT SERPL-MCNC: 5 G/DL (ref 6.8–8.8)
PROT SERPL-MCNC: 5 G/DL (ref 6.8–8.8)
PROT SERPL-MCNC: 5.5 G/DL (ref 6.8–8.8)
PROT SERPL-MCNC: 5.7 G/DL (ref 6.8–8.8)
PROT SERPL-MCNC: 5.8 G/DL (ref 6.8–8.8)
PROT SERPL-MCNC: 5.9 G/DL (ref 6.8–8.8)
PROT SERPL-MCNC: 6.2 G/DL (ref 6.8–8.8)
PROT SERPL-MCNC: 6.7 G/DL (ref 6.8–8.8)
PROT SERPL-MCNC: 6.8 G/DL (ref 6.8–8.8)
PROT SERPL-MCNC: 6.9 G/DL (ref 6.8–8.8)
PROT SERPL-MCNC: 6.9 G/DL (ref 6.8–8.8)
PROT SERPL-MCNC: 7.2 G/DL (ref 6.8–8.8)
PROT SERPL-MCNC: 7.3 G/DL (ref 6.8–8.8)
PROT SERPL-MCNC: 7.5 G/DL (ref 6.8–8.8)
PROT SERPL-MCNC: 7.5 G/DL (ref 6.8–8.8)
PROT SERPL-MCNC: 7.6 G/DL (ref 6.8–8.8)
PROT SERPL-MCNC: 7.7 G/DL (ref 6.8–8.8)
PROT SERPL-MCNC: 8.7 G/DL (ref 6.8–8.8)
RBC # BLD AUTO: 2.63 10E12/L (ref 4.4–5.9)
RBC # BLD AUTO: 2.67 10E12/L (ref 4.4–5.9)
RBC # BLD AUTO: 2.8 10E12/L (ref 4.4–5.9)
RBC # BLD AUTO: 2.89 10E12/L (ref 4.4–5.9)
RBC # BLD AUTO: 2.97 MILL/UL (ref 4.4–6.2)
RBC # BLD AUTO: 3.05 10E12/L (ref 4.4–5.9)
RBC # BLD AUTO: 3.22 10E12/L (ref 4.4–5.9)
RBC # BLD AUTO: 3.22 10E12/L (ref 4.4–5.9)
RBC # BLD AUTO: 3.37 10E12/L (ref 4.4–5.9)
RBC # BLD AUTO: 3.38 10E12/L (ref 4.4–5.9)
RBC # BLD AUTO: 3.39 10E12/L (ref 4.4–5.9)
RBC # BLD AUTO: 3.44 10E12/L (ref 4.4–5.9)
RBC # BLD AUTO: 3.5 10E12/L (ref 4.4–5.9)
RBC # BLD AUTO: 3.52 10E12/L (ref 4.4–5.9)
RBC # BLD AUTO: 3.56 10E12/L (ref 4.4–5.9)
RBC # BLD AUTO: 3.59 10E12/L (ref 4.4–5.9)
RBC # BLD AUTO: 3.61 10E12/L (ref 4.4–5.9)
RBC # BLD AUTO: 3.7 10E12/L (ref 4.4–5.9)
RBC # BLD AUTO: 3.75 10E12/L (ref 4.4–5.9)
RBC # BLD AUTO: 3.89 10E12/L (ref 4.4–5.9)
RBC # BLD AUTO: 4.02 10E12/L (ref 4.4–5.9)
RBC # BLD AUTO: 4.04 10E12/L (ref 4.4–5.9)
RBC #/AREA URNS AUTO: 0 /HPF (ref 0–2)
RBC #/AREA URNS AUTO: 1 /HPF (ref 0–2)
RBC #/AREA URNS AUTO: <1 /HPF (ref 0–2)
RBC INCLUSIONS BLD: SLIGHT
RETICS # AUTO: 17.6 10E9/L (ref 25–95)
RETICS/RBC NFR AUTO: 0.6 % (ref 0.5–2)
SALICYLATES SERPL-MCNC: 5 MG/DL
SODIUM SERPL-SCNC: 137 MMOL/L (ref 133–144)
SODIUM SERPL-SCNC: 137 MMOL/L (ref 136–145)
SODIUM SERPL-SCNC: 138 MMOL/L (ref 133–144)
SODIUM SERPL-SCNC: 139 MMOL/L (ref 133–144)
SODIUM SERPL-SCNC: 140 MMOL/L (ref 133–144)
SODIUM SERPL-SCNC: 141 MMOL/L (ref 133–144)
SODIUM SERPL-SCNC: 141 MMOL/L (ref 133–144)
SODIUM SERPL-SCNC: 143 MMOL/L (ref 135–146)
SODIUM SERPL-SCNC: 145 MMOL/L (ref 133–144)
SODIUM SERPL-SCNC: 146 MMOL/L (ref 133–144)
SODIUM SERPL-SCNC: 147 MMOL/L (ref 133–144)
SODIUM SERPL-SCNC: 147 MMOL/L (ref 133–144)
SODIUM SERPL-SCNC: 148 MMOL/L (ref 133–144)
SODIUM SERPL-SCNC: 148 MMOL/L (ref 133–144)
SODIUM SERPL-SCNC: 150 MMOL/L (ref 133–144)
SODIUM UR-SCNC: 11 MMOL/L
SOURCE: NORMAL
SP GR UR STRIP: 1 (ref 1–1.03)
SPECIMEN EXP DATE BLD: NORMAL
SPECIMEN SOURCE: ABNORMAL
SPECIMEN SOURCE: ABNORMAL
SPECIMEN SOURCE: NORMAL
SPECIMEN VOL 24H UR: 3825 L
T3FREE SERPL-MCNC: 1 PG/ML (ref 2.3–4.2)
T4 FREE SERPL-MCNC: 0.38 NG/DL (ref 0.76–1.46)
T4 FREE SERPL-MCNC: 0.76 NG/DL (ref 0.76–1.46)
T4 FREE SERPL-MCNC: 0.8 NG/DL (ref 0.76–1.46)
TEST CARD LOT NUMBER: NORMAL
TESTOST SERPL-MCNC: 5 NG/DL (ref 240–950)
TIBC SERPL-MCNC: 137 UG/DL (ref 240–430)
TRANSFUSION STATUS PATIENT QL: NORMAL
TROPONIN I SERPL-MCNC: <0.015 UG/L (ref 0–0.04)
TROPONIN I SERPL-MCNC: NORMAL UG/L (ref 0–0.04)
TSH SERPL DL<=0.005 MIU/L-ACNC: 0.17 MU/L (ref 0.4–4)
TSH SERPL DL<=0.005 MIU/L-ACNC: 0.34 MU/L (ref 0.4–4)
TSH SERPL DL<=0.005 MIU/L-ACNC: 0.66 MU/L (ref 0.4–4)
TSH SERPL DL<=0.05 MIU/L-ACNC: 1.3 MU/L (ref 0.4–4)
URN SPEC COLLECT METH UR: ABNORMAL
UROBILINOGEN UR STRIP-MCNC: NORMAL MG/DL (ref 0–2)
VIT B12 SERPL-MCNC: 1516 PG/ML (ref 193–986)
VIT B12 SERPL-MCNC: 510 PG/ML (ref 193–986)
VIT B12 SERPL-MCNC: 564 PG/ML (ref 193–986)
WBC # BLD AUTO: 2.2 10E9/L (ref 4–11)
WBC # BLD AUTO: 2.3 10E9/L (ref 4–11)
WBC # BLD AUTO: 2.5 10E9/L (ref 4–11)
WBC # BLD AUTO: 4 10E9/L (ref 4–11)
WBC # BLD AUTO: 4.4 10E9/L (ref 4–11)
WBC # BLD AUTO: 4.6 10E9/L (ref 4–11)
WBC # BLD AUTO: 4.7 10E9/L (ref 4–11)
WBC # BLD AUTO: 4.9 10E9/L (ref 4–11)
WBC # BLD AUTO: 5.4 10E9/L (ref 4–11)
WBC # BLD AUTO: 5.5 10E9/L (ref 4–11)
WBC # BLD AUTO: 5.6 10E9/L (ref 4–11)
WBC # BLD AUTO: 5.8 10E9/L (ref 4–11)
WBC # BLD AUTO: 5.8 THOU/UL (ref 4–11)
WBC # BLD AUTO: 5.9 10E9/L (ref 4–11)
WBC # BLD AUTO: 6.1 10E9/L (ref 4–11)
WBC # BLD AUTO: 6.5 10E9/L (ref 4–11)
WBC # BLD AUTO: 6.7 10E9/L (ref 4–11)
WBC # BLD AUTO: 7.2 10E9/L (ref 4–11)
WBC # BLD AUTO: 7.5 10E9/L (ref 4–11)
WBC # BLD AUTO: 7.6 10E9/L (ref 4–11)
WBC # BLD AUTO: 8.2 10E9/L (ref 4–11)
WBC # BLD AUTO: 8.3 10E9/L (ref 4–11)
WBC #/AREA URNS AUTO: 1 /HPF (ref 0–2)
WBC #/AREA URNS AUTO: 1 /HPF (ref 0–2)
WBC #/AREA URNS AUTO: <1 /HPF (ref 0–2)

## 2017-01-01 PROCEDURE — 96375 TX/PRO/DX INJ NEW DRUG ADDON: CPT

## 2017-01-01 PROCEDURE — 99285 EMERGENCY DEPT VISIT HI MDM: CPT | Mod: 25

## 2017-01-01 PROCEDURE — 25000132 ZZH RX MED GY IP 250 OP 250 PS 637: Performed by: PHYSICIAN ASSISTANT

## 2017-01-01 PROCEDURE — 25000128 H RX IP 250 OP 636: Performed by: INTERNAL MEDICINE

## 2017-01-01 PROCEDURE — 36415 COLL VENOUS BLD VENIPUNCTURE: CPT | Performed by: FAMILY MEDICINE

## 2017-01-01 PROCEDURE — 85025 COMPLETE CBC W/AUTO DIFF WBC: CPT | Performed by: INTERNAL MEDICINE

## 2017-01-01 PROCEDURE — 85027 COMPLETE CBC AUTOMATED: CPT | Performed by: FAMILY MEDICINE

## 2017-01-01 PROCEDURE — 97110 THERAPEUTIC EXERCISES: CPT | Mod: GO

## 2017-01-01 PROCEDURE — 40000277 XR SURGERY CARM FLUORO LESS THAN 5 MIN W STILLS

## 2017-01-01 PROCEDURE — 86901 BLOOD TYPING SEROLOGIC RH(D): CPT | Performed by: NURSE PRACTITIONER

## 2017-01-01 PROCEDURE — 12000000 ZZH R&B MED SURG/OB

## 2017-01-01 PROCEDURE — 85018 HEMOGLOBIN: CPT | Performed by: INTERNAL MEDICINE

## 2017-01-01 PROCEDURE — 80053 COMPREHEN METABOLIC PANEL: CPT | Performed by: INTERNAL MEDICINE

## 2017-01-01 PROCEDURE — 25000128 H RX IP 250 OP 636: Performed by: NURSE ANESTHETIST, CERTIFIED REGISTERED

## 2017-01-01 PROCEDURE — 84300 ASSAY OF URINE SODIUM: CPT | Performed by: INTERNAL MEDICINE

## 2017-01-01 PROCEDURE — 84484 ASSAY OF TROPONIN QUANT: CPT | Performed by: FAMILY MEDICINE

## 2017-01-01 PROCEDURE — 86850 RBC ANTIBODY SCREEN: CPT | Performed by: FAMILY MEDICINE

## 2017-01-01 PROCEDURE — 25000128 H RX IP 250 OP 636

## 2017-01-01 PROCEDURE — 99211 OFF/OP EST MAY X REQ PHY/QHP: CPT

## 2017-01-01 PROCEDURE — P9016 RBC LEUKOCYTES REDUCED: HCPCS | Performed by: FAMILY MEDICINE

## 2017-01-01 PROCEDURE — 85610 PROTHROMBIN TIME: CPT | Performed by: FAMILY MEDICINE

## 2017-01-01 PROCEDURE — 25000132 ZZH RX MED GY IP 250 OP 250 PS 637: Performed by: FAMILY MEDICINE

## 2017-01-01 PROCEDURE — 88377 M/PHMTRC ALYS ISHQUANT/SEMIQ: CPT | Performed by: FAMILY MEDICINE

## 2017-01-01 PROCEDURE — 86850 RBC ANTIBODY SCREEN: CPT | Performed by: INTERNAL MEDICINE

## 2017-01-01 PROCEDURE — C1788 PORT, INDWELLING, IMP: HCPCS | Performed by: SURGERY

## 2017-01-01 PROCEDURE — 36561 INSERT TUNNELED CV CATH: CPT | Performed by: SURGERY

## 2017-01-01 PROCEDURE — 25000132 ZZH RX MED GY IP 250 OP 250 PS 637: Performed by: INTERNAL MEDICINE

## 2017-01-01 PROCEDURE — 85014 HEMATOCRIT: CPT | Performed by: INTERNAL MEDICINE

## 2017-01-01 PROCEDURE — 99310 SBSQ NF CARE HIGH MDM 45: CPT | Performed by: NURSE PRACTITIONER

## 2017-01-01 PROCEDURE — 83605 ASSAY OF LACTIC ACID: CPT | Performed by: FAMILY MEDICINE

## 2017-01-01 PROCEDURE — 96413 CHEMO IV INFUSION 1 HR: CPT

## 2017-01-01 PROCEDURE — 99207 ZZC CDG-CODE INCORRECT PER BILLING BASED ON TIME: CPT | Performed by: FAMILY MEDICINE

## 2017-01-01 PROCEDURE — 86900 BLOOD TYPING SEROLOGIC ABO: CPT | Performed by: NURSE PRACTITIONER

## 2017-01-01 PROCEDURE — 99309 SBSQ NF CARE MODERATE MDM 30: CPT | Performed by: NURSE PRACTITIONER

## 2017-01-01 PROCEDURE — 83735 ASSAY OF MAGNESIUM: CPT | Performed by: FAMILY MEDICINE

## 2017-01-01 PROCEDURE — 40000133 ZZH STATISTIC OT WARD VISIT

## 2017-01-01 PROCEDURE — 83605 ASSAY OF LACTIC ACID: CPT | Performed by: INTERNAL MEDICINE

## 2017-01-01 PROCEDURE — 99214 OFFICE O/P EST MOD 30 MIN: CPT | Performed by: INTERNAL MEDICINE

## 2017-01-01 PROCEDURE — 25000128 H RX IP 250 OP 636: Performed by: RADIOLOGY

## 2017-01-01 PROCEDURE — S0028 INJECTION, FAMOTIDINE, 20 MG: HCPCS | Performed by: INTERNAL MEDICINE

## 2017-01-01 PROCEDURE — 93005 ELECTROCARDIOGRAM TRACING: CPT | Mod: 76 | Performed by: FAMILY MEDICINE

## 2017-01-01 PROCEDURE — 96417 CHEMO IV INFUS EACH ADDL SEQ: CPT

## 2017-01-01 PROCEDURE — 99205 OFFICE O/P NEW HI 60 MIN: CPT | Performed by: INTERNAL MEDICINE

## 2017-01-01 PROCEDURE — 97116 GAIT TRAINING THERAPY: CPT | Mod: GP | Performed by: PHYSICAL THERAPIST

## 2017-01-01 PROCEDURE — 25000125 ZZHC RX 250: Performed by: INTERNAL MEDICINE

## 2017-01-01 PROCEDURE — 99214 OFFICE O/P EST MOD 30 MIN: CPT | Performed by: NURSE PRACTITIONER

## 2017-01-01 PROCEDURE — 82533 TOTAL CORTISOL: CPT | Performed by: FAMILY MEDICINE

## 2017-01-01 PROCEDURE — 99306 1ST NF CARE HIGH MDM 50: CPT | Performed by: FAMILY MEDICINE

## 2017-01-01 PROCEDURE — 36430 TRANSFUSION BLD/BLD COMPNT: CPT

## 2017-01-01 PROCEDURE — 84443 ASSAY THYROID STIM HORMONE: CPT | Performed by: PHYSICIAN ASSISTANT

## 2017-01-01 PROCEDURE — 84443 ASSAY THYROID STIM HORMONE: CPT | Performed by: INTERNAL MEDICINE

## 2017-01-01 PROCEDURE — 80053 COMPREHEN METABOLIC PANEL: CPT | Performed by: FAMILY MEDICINE

## 2017-01-01 PROCEDURE — 85025 COMPLETE CBC W/AUTO DIFF WBC: CPT | Performed by: FAMILY MEDICINE

## 2017-01-01 PROCEDURE — 96361 HYDRATE IV INFUSION ADD-ON: CPT

## 2017-01-01 PROCEDURE — 96367 TX/PROPH/DG ADDL SEQ IV INF: CPT

## 2017-01-01 PROCEDURE — 99207 ZZC CDG-CORRECTLY CODED, REVIEWED AND AGREE: CPT | Performed by: NURSE PRACTITIONER

## 2017-01-01 PROCEDURE — 40000847 ZZHCL STATISTIC MORPHOLOGY W/INTERP HISTOLOGY TC 85060: Performed by: FAMILY MEDICINE

## 2017-01-01 PROCEDURE — 83021 HEMOGLOBIN CHROMOTOGRAPHY: CPT | Performed by: FAMILY MEDICINE

## 2017-01-01 PROCEDURE — 25000128 H RX IP 250 OP 636: Performed by: PHYSICIAN ASSISTANT

## 2017-01-01 PROCEDURE — 25000128 H RX IP 250 OP 636: Performed by: FAMILY MEDICINE

## 2017-01-01 PROCEDURE — 99211 OFF/OP EST MAY X REQ PHY/QHP: CPT | Mod: 25

## 2017-01-01 PROCEDURE — 84443 ASSAY THYROID STIM HORMONE: CPT | Performed by: FAMILY MEDICINE

## 2017-01-01 PROCEDURE — 36000052 ZZH SURGERY LEVEL 2 EA 15 ADDTL MIN: Performed by: SURGERY

## 2017-01-01 PROCEDURE — 80307 DRUG TEST PRSMV CHEM ANLYZR: CPT | Performed by: FAMILY MEDICINE

## 2017-01-01 PROCEDURE — 80048 BASIC METABOLIC PNL TOTAL CA: CPT | Performed by: INTERNAL MEDICINE

## 2017-01-01 PROCEDURE — 83540 ASSAY OF IRON: CPT | Performed by: FAMILY MEDICINE

## 2017-01-01 PROCEDURE — 99233 SBSQ HOSP IP/OBS HIGH 50: CPT | Performed by: FAMILY MEDICINE

## 2017-01-01 PROCEDURE — 71000027 ZZH RECOVERY PHASE 2 EACH 15 MINS: Performed by: SURGERY

## 2017-01-01 PROCEDURE — 97110 THERAPEUTIC EXERCISES: CPT | Mod: GP

## 2017-01-01 PROCEDURE — 36415 COLL VENOUS BLD VENIPUNCTURE: CPT | Performed by: INTERNAL MEDICINE

## 2017-01-01 PROCEDURE — 82668 ASSAY OF ERYTHROPOIETIN: CPT | Performed by: INTERNAL MEDICINE

## 2017-01-01 PROCEDURE — 84439 ASSAY OF FREE THYROXINE: CPT | Performed by: FAMILY MEDICINE

## 2017-01-01 PROCEDURE — 99215 OFFICE O/P EST HI 40 MIN: CPT | Performed by: INTERNAL MEDICINE

## 2017-01-01 PROCEDURE — 88341 IMHCHEM/IMCYTCHM EA ADD ANTB: CPT | Performed by: FAMILY MEDICINE

## 2017-01-01 PROCEDURE — 70450 CT HEAD/BRAIN W/O DYE: CPT

## 2017-01-01 PROCEDURE — 84439 ASSAY OF FREE THYROXINE: CPT | Performed by: INTERNAL MEDICINE

## 2017-01-01 PROCEDURE — 86923 COMPATIBILITY TEST ELECTRIC: CPT | Performed by: INTERNAL MEDICINE

## 2017-01-01 PROCEDURE — 25000125 ZZHC RX 250: Performed by: RADIOLOGY

## 2017-01-01 PROCEDURE — 90686 IIV4 VACC NO PRSV 0.5 ML IM: CPT | Performed by: FAMILY MEDICINE

## 2017-01-01 PROCEDURE — 88305 TISSUE EXAM BY PATHOLOGIST: CPT | Performed by: FAMILY MEDICINE

## 2017-01-01 PROCEDURE — 80329 ANALGESICS NON-OPIOID 1 OR 2: CPT | Performed by: FAMILY MEDICINE

## 2017-01-01 PROCEDURE — 84132 ASSAY OF SERUM POTASSIUM: CPT | Performed by: FAMILY MEDICINE

## 2017-01-01 PROCEDURE — 88305 TISSUE EXAM BY PATHOLOGIST: CPT | Mod: 26 | Performed by: FAMILY MEDICINE

## 2017-01-01 PROCEDURE — 84145 PROCALCITONIN (PCT): CPT | Performed by: INTERNAL MEDICINE

## 2017-01-01 PROCEDURE — 81001 URINALYSIS AUTO W/SCOPE: CPT | Performed by: FAMILY MEDICINE

## 2017-01-01 PROCEDURE — 85018 HEMOGLOBIN: CPT | Performed by: FAMILY MEDICINE

## 2017-01-01 PROCEDURE — 31231 NASAL ENDOSCOPY DX: CPT | Performed by: OTOLARYNGOLOGY

## 2017-01-01 PROCEDURE — 71000012 ZZH RECOVERY PHASE 1 LEVEL 1 FIRST HR

## 2017-01-01 PROCEDURE — 82728 ASSAY OF FERRITIN: CPT | Performed by: FAMILY MEDICINE

## 2017-01-01 PROCEDURE — 80320 DRUG SCREEN QUANTALCOHOLS: CPT | Performed by: FAMILY MEDICINE

## 2017-01-01 PROCEDURE — 00000402 ZZHCL STATISTIC TOTAL PROTEIN: Performed by: INTERNAL MEDICINE

## 2017-01-01 PROCEDURE — 84481 FREE ASSAY (FT-3): CPT | Performed by: FAMILY MEDICINE

## 2017-01-01 PROCEDURE — 70553 MRI BRAIN STEM W/O & W/DYE: CPT

## 2017-01-01 PROCEDURE — 71020 XR CHEST 2 VW: CPT

## 2017-01-01 PROCEDURE — 27110028 ZZH OR GENERAL SUPPLY NON-STERILE: Performed by: SURGERY

## 2017-01-01 PROCEDURE — 25000125 ZZHC RX 250: Performed by: SURGERY

## 2017-01-01 PROCEDURE — 82803 BLOOD GASES ANY COMBINATION: CPT | Performed by: FAMILY MEDICINE

## 2017-01-01 PROCEDURE — 99204 OFFICE O/P NEW MOD 45 MIN: CPT | Performed by: SURGERY

## 2017-01-01 PROCEDURE — 25000125 ZZHC RX 250: Performed by: FAMILY MEDICINE

## 2017-01-01 PROCEDURE — 81003 URINALYSIS AUTO W/O SCOPE: CPT | Performed by: INTERNAL MEDICINE

## 2017-01-01 PROCEDURE — 96372 THER/PROPH/DIAG INJ SC/IM: CPT | Mod: 59

## 2017-01-01 PROCEDURE — 99497 ADVNCD CARE PLAN 30 MIN: CPT | Performed by: NURSE PRACTITIONER

## 2017-01-01 PROCEDURE — 88342 IMHCHEM/IMCYTCHM 1ST ANTB: CPT | Mod: 26 | Performed by: FAMILY MEDICINE

## 2017-01-01 PROCEDURE — 93306 TTE W/DOPPLER COMPLETE: CPT | Mod: 26 | Performed by: INTERNAL MEDICINE

## 2017-01-01 PROCEDURE — 87040 BLOOD CULTURE FOR BACTERIA: CPT | Performed by: FAMILY MEDICINE

## 2017-01-01 PROCEDURE — 0B9G3ZX DRAINAGE OF LEFT UPPER LUNG LOBE, PERCUTANEOUS APPROACH, DIAGNOSTIC: ICD-10-PCS | Performed by: RADIOLOGY

## 2017-01-01 PROCEDURE — 85045 AUTOMATED RETICULOCYTE COUNT: CPT | Performed by: FAMILY MEDICINE

## 2017-01-01 PROCEDURE — 12000007 ZZH R&B INTERMEDIATE

## 2017-01-01 PROCEDURE — 80048 BASIC METABOLIC PNL TOTAL CA: CPT | Performed by: FAMILY MEDICINE

## 2017-01-01 PROCEDURE — 99214 OFFICE O/P EST MOD 30 MIN: CPT | Performed by: FAMILY MEDICINE

## 2017-01-01 PROCEDURE — 90732 PPSV23 VACC 2 YRS+ SUBQ/IM: CPT | Performed by: INTERNAL MEDICINE

## 2017-01-01 PROCEDURE — 99207 ZZC CDG-CORRECTLY CODED, REVIEWED AND AGREE: CPT | Performed by: FAMILY MEDICINE

## 2017-01-01 PROCEDURE — 25800025 ZZH RX 258: Performed by: FAMILY MEDICINE

## 2017-01-01 PROCEDURE — 82024 ASSAY OF ACTH: CPT | Performed by: FAMILY MEDICINE

## 2017-01-01 PROCEDURE — 74177 CT ABD & PELVIS W/CONTRAST: CPT

## 2017-01-01 PROCEDURE — P9016 RBC LEUKOCYTES REDUCED: HCPCS

## 2017-01-01 PROCEDURE — 85060 BLOOD SMEAR INTERPRETATION: CPT | Performed by: INTERNAL MEDICINE

## 2017-01-01 PROCEDURE — 82272 OCCULT BLD FECES 1-3 TESTS: CPT | Performed by: FAMILY MEDICINE

## 2017-01-01 PROCEDURE — 84100 ASSAY OF PHOSPHORUS: CPT | Performed by: FAMILY MEDICINE

## 2017-01-01 PROCEDURE — 83002 ASSAY OF GONADOTROPIN (LH): CPT | Performed by: FAMILY MEDICINE

## 2017-01-01 PROCEDURE — 82746 ASSAY OF FOLIC ACID SERUM: CPT | Performed by: PHYSICIAN ASSISTANT

## 2017-01-01 PROCEDURE — 40000986 XR CHEST 1 VW

## 2017-01-01 PROCEDURE — 84403 ASSAY OF TOTAL TESTOSTERONE: CPT | Performed by: INTERNAL MEDICINE

## 2017-01-01 PROCEDURE — 25000125 ZZHC RX 250: Performed by: NURSE ANESTHETIST, CERTIFIED REGISTERED

## 2017-01-01 PROCEDURE — 97535 SELF CARE MNGMENT TRAINING: CPT | Mod: GO

## 2017-01-01 PROCEDURE — 86334 IMMUNOFIX E-PHORESIS SERUM: CPT | Performed by: INTERNAL MEDICINE

## 2017-01-01 PROCEDURE — 20000003 ZZH R&B ICU

## 2017-01-01 PROCEDURE — 96415 CHEMO IV INFUSION ADDL HR: CPT

## 2017-01-01 PROCEDURE — 87640 STAPH A DNA AMP PROBE: CPT | Performed by: INTERNAL MEDICINE

## 2017-01-01 PROCEDURE — 40000305 ZZH STATISTIC PRE PROC ASSESS I: Performed by: SURGERY

## 2017-01-01 PROCEDURE — 87086 URINE CULTURE/COLONY COUNT: CPT | Performed by: FAMILY MEDICINE

## 2017-01-01 PROCEDURE — 82550 ASSAY OF CK (CPK): CPT | Performed by: FAMILY MEDICINE

## 2017-01-01 PROCEDURE — 88342 IMHCHEM/IMCYTCHM 1ST ANTB: CPT | Performed by: INTERNAL MEDICINE

## 2017-01-01 PROCEDURE — 37000009 ZZH ANESTHESIA TECHNICAL FEE, EACH ADDTL 15 MIN

## 2017-01-01 PROCEDURE — 97165 OT EVAL LOW COMPLEX 30 MIN: CPT | Mod: GO

## 2017-01-01 PROCEDURE — 78815 PET IMAGE W/CT SKULL-THIGH: CPT | Mod: PI | Performed by: RADIOLOGY

## 2017-01-01 PROCEDURE — 82533 TOTAL CORTISOL: CPT | Performed by: PHYSICIAN ASSISTANT

## 2017-01-01 PROCEDURE — 99316 NF DSCHRG MGMT 30 MIN+: CPT | Performed by: NURSE PRACTITIONER

## 2017-01-01 PROCEDURE — 88342 IMHCHEM/IMCYTCHM 1ST ANTB: CPT | Performed by: FAMILY MEDICINE

## 2017-01-01 PROCEDURE — 37000008 ZZH ANESTHESIA TECHNICAL FEE, 1ST 30 MIN: Performed by: SURGERY

## 2017-01-01 PROCEDURE — 71260 CT THORAX DX C+: CPT

## 2017-01-01 PROCEDURE — 93005 ELECTROCARDIOGRAM TRACING: CPT

## 2017-01-01 PROCEDURE — 80053 COMPREHEN METABOLIC PANEL: CPT | Performed by: PHYSICIAN ASSISTANT

## 2017-01-01 PROCEDURE — 96365 THER/PROPH/DIAG IV INF INIT: CPT

## 2017-01-01 PROCEDURE — 99291 CRITICAL CARE FIRST HOUR: CPT | Mod: 25 | Performed by: FAMILY MEDICINE

## 2017-01-01 PROCEDURE — 99285 EMERGENCY DEPT VISIT HI MDM: CPT | Mod: 25 | Performed by: FAMILY MEDICINE

## 2017-01-01 PROCEDURE — 99205 OFFICE O/P NEW HI 60 MIN: CPT | Performed by: NURSE PRACTITIONER

## 2017-01-01 PROCEDURE — 83001 ASSAY OF GONADOTROPIN (FSH): CPT | Performed by: FAMILY MEDICINE

## 2017-01-01 PROCEDURE — 96360 HYDRATION IV INFUSION INIT: CPT

## 2017-01-01 PROCEDURE — 37000009 ZZH ANESTHESIA TECHNICAL FEE, EACH ADDTL 15 MIN: Performed by: SURGERY

## 2017-01-01 PROCEDURE — 88341 IMHCHEM/IMCYTCHM EA ADD ANTB: CPT | Mod: 26 | Performed by: FAMILY MEDICINE

## 2017-01-01 PROCEDURE — 82550 ASSAY OF CK (CPK): CPT | Performed by: INTERNAL MEDICINE

## 2017-01-01 PROCEDURE — 74176 CT ABD & PELVIS W/O CONTRAST: CPT

## 2017-01-01 PROCEDURE — 99239 HOSP IP/OBS DSCHRG MGMT >30: CPT | Performed by: FAMILY MEDICINE

## 2017-01-01 PROCEDURE — 83735 ASSAY OF MAGNESIUM: CPT | Performed by: INTERNAL MEDICINE

## 2017-01-01 PROCEDURE — 84165 PROTEIN E-PHORESIS SERUM: CPT | Performed by: INTERNAL MEDICINE

## 2017-01-01 PROCEDURE — 86901 BLOOD TYPING SEROLOGIC RH(D): CPT | Performed by: INTERNAL MEDICINE

## 2017-01-01 PROCEDURE — 99203 OFFICE O/P NEW LOW 30 MIN: CPT | Performed by: FAMILY MEDICINE

## 2017-01-01 PROCEDURE — 93005 ELECTROCARDIOGRAM TRACING: CPT | Performed by: FAMILY MEDICINE

## 2017-01-01 PROCEDURE — 36430 TRANSFUSION BLD/BLD COMPNT: CPT | Performed by: NURSE PRACTITIONER

## 2017-01-01 PROCEDURE — 82607 VITAMIN B-12: CPT | Performed by: INTERNAL MEDICINE

## 2017-01-01 PROCEDURE — 87641 MR-STAPH DNA AMP PROBE: CPT | Performed by: INTERNAL MEDICINE

## 2017-01-01 PROCEDURE — 99204 OFFICE O/P NEW MOD 45 MIN: CPT | Mod: 25 | Performed by: OTOLARYNGOLOGY

## 2017-01-01 PROCEDURE — G0179 MD RECERTIFICATION HHA PT: HCPCS | Performed by: FAMILY MEDICINE

## 2017-01-01 PROCEDURE — 36000054 ZZH SURGERY LEVEL 2 W FLUORO 1ST 30 MIN: Performed by: SURGERY

## 2017-01-01 PROCEDURE — 82607 VITAMIN B-12: CPT | Performed by: FAMILY MEDICINE

## 2017-01-01 PROCEDURE — A9585 GADOBUTROL INJECTION: HCPCS | Performed by: FAMILY MEDICINE

## 2017-01-01 PROCEDURE — 97110 THERAPEUTIC EXERCISES: CPT | Mod: GP | Performed by: PHYSICAL THERAPIST

## 2017-01-01 PROCEDURE — 85025 COMPLETE CBC W/AUTO DIFF WBC: CPT | Performed by: PHYSICIAN ASSISTANT

## 2017-01-01 PROCEDURE — 76700 US EXAM ABDOM COMPLETE: CPT

## 2017-01-01 PROCEDURE — 00000159 ZZHCL STATISTIC H-SEND OUTS PREP: Performed by: FAMILY MEDICINE

## 2017-01-01 PROCEDURE — 82784 ASSAY IGA/IGD/IGG/IGM EACH: CPT | Performed by: INTERNAL MEDICINE

## 2017-01-01 PROCEDURE — A9552 F18 FDG: HCPCS | Performed by: RADIOLOGY

## 2017-01-01 PROCEDURE — 25500064 ZZH RX 255 OP 636: Performed by: FAMILY MEDICINE

## 2017-01-01 PROCEDURE — 86923 COMPATIBILITY TEST ELECTRIC: CPT | Performed by: NURSE PRACTITIONER

## 2017-01-01 PROCEDURE — 85060 BLOOD SMEAR INTERPRETATION: CPT | Performed by: FAMILY MEDICINE

## 2017-01-01 PROCEDURE — 40000847 ZZHCL STATISTIC MORPHOLOGY W/INTERP HISTOLOGY TC 85060: Performed by: INTERNAL MEDICINE

## 2017-01-01 PROCEDURE — 83550 IRON BINDING TEST: CPT | Performed by: FAMILY MEDICINE

## 2017-01-01 PROCEDURE — 85027 COMPLETE CBC AUTOMATED: CPT | Performed by: INTERNAL MEDICINE

## 2017-01-01 PROCEDURE — 82728 ASSAY OF FERRITIN: CPT | Performed by: INTERNAL MEDICINE

## 2017-01-01 PROCEDURE — 81445 SO NEO GSAP 5-50DNA/DNA&RNA: CPT | Performed by: INTERNAL MEDICINE

## 2017-01-01 PROCEDURE — 86850 RBC ANTIBODY SCREEN: CPT | Performed by: NURSE PRACTITIONER

## 2017-01-01 PROCEDURE — 86923 COMPATIBILITY TEST ELECTRIC: CPT | Performed by: FAMILY MEDICINE

## 2017-01-01 PROCEDURE — 87493 C DIFF AMPLIFIED PROBE: CPT | Performed by: FAMILY MEDICINE

## 2017-01-01 PROCEDURE — 77001 FLUOROGUIDE FOR VEIN DEVICE: CPT | Mod: 26 | Performed by: SURGERY

## 2017-01-01 PROCEDURE — 93306 TTE W/DOPPLER COMPLETE: CPT

## 2017-01-01 PROCEDURE — 25800025 ZZH RX 258: Performed by: INTERNAL MEDICINE

## 2017-01-01 PROCEDURE — 25000128 H RX IP 250 OP 636: Performed by: SURGERY

## 2017-01-01 PROCEDURE — 82746 ASSAY OF FOLIC ACID SERUM: CPT | Performed by: FAMILY MEDICINE

## 2017-01-01 PROCEDURE — 86900 BLOOD TYPING SEROLOGIC ABO: CPT | Performed by: FAMILY MEDICINE

## 2017-01-01 PROCEDURE — 84100 ASSAY OF PHOSPHORUS: CPT | Performed by: INTERNAL MEDICINE

## 2017-01-01 PROCEDURE — 97161 PT EVAL LOW COMPLEX 20 MIN: CPT | Mod: GP | Performed by: PHYSICAL THERAPIST

## 2017-01-01 PROCEDURE — 86901 BLOOD TYPING SEROLOGIC RH(D): CPT | Performed by: FAMILY MEDICINE

## 2017-01-01 PROCEDURE — 82542 COL CHROMOTOGRAPHY QUAL/QUAN: CPT | Performed by: FAMILY MEDICINE

## 2017-01-01 PROCEDURE — 40000193 ZZH STATISTIC PT WARD VISIT: Performed by: PHYSICAL THERAPIST

## 2017-01-01 PROCEDURE — 82140 ASSAY OF AMMONIA: CPT | Performed by: PHYSICIAN ASSISTANT

## 2017-01-01 PROCEDURE — 99223 1ST HOSP IP/OBS HIGH 75: CPT | Mod: AI | Performed by: FAMILY MEDICINE

## 2017-01-01 PROCEDURE — 99238 HOSP IP/OBS DSCHRG MGMT 30/<: CPT | Performed by: FAMILY MEDICINE

## 2017-01-01 PROCEDURE — 00000146 ZZHCL STATISTIC GLUCOSE BY METER IP

## 2017-01-01 PROCEDURE — 86900 BLOOD TYPING SEROLOGIC ABO: CPT | Performed by: INTERNAL MEDICINE

## 2017-01-01 PROCEDURE — 84146 ASSAY OF PROLACTIN: CPT | Performed by: FAMILY MEDICINE

## 2017-01-01 PROCEDURE — 97116 GAIT TRAINING THERAPY: CPT | Mod: GP

## 2017-01-01 PROCEDURE — 37000008 ZZH ANESTHESIA TECHNICAL FEE, 1ST 30 MIN

## 2017-01-01 PROCEDURE — 40000193 ZZH STATISTIC PT WARD VISIT

## 2017-01-01 PROCEDURE — 83615 LACTATE (LD) (LDH) ENZYME: CPT | Performed by: INTERNAL MEDICINE

## 2017-01-01 PROCEDURE — 36415 COLL VENOUS BLD VENIPUNCTURE: CPT | Performed by: NURSE PRACTITIONER

## 2017-01-01 PROCEDURE — 96360 HYDRATION IV INFUSION INIT: CPT | Performed by: FAMILY MEDICINE

## 2017-01-01 PROCEDURE — 82530 CORTISOL FREE: CPT | Performed by: FAMILY MEDICINE

## 2017-01-01 PROCEDURE — 27210794 ZZH OR GENERAL SUPPLY STERILE: Performed by: SURGERY

## 2017-01-01 PROCEDURE — 25000132 ZZH RX MED GY IP 250 OP 250 PS 637: Performed by: SURGERY

## 2017-01-01 PROCEDURE — 83519 RIA NONANTIBODY: CPT | Performed by: FAMILY MEDICINE

## 2017-01-01 PROCEDURE — P9041 ALBUMIN (HUMAN),5%, 50ML: HCPCS | Performed by: FAMILY MEDICINE

## 2017-01-01 PROCEDURE — 93010 ELECTROCARDIOGRAM REPORT: CPT | Performed by: FAMILY MEDICINE

## 2017-01-01 PROCEDURE — 84439 ASSAY OF FREE THYROXINE: CPT | Performed by: PHYSICIAN ASSISTANT

## 2017-01-01 PROCEDURE — 97161 PT EVAL LOW COMPLEX 20 MIN: CPT | Mod: GP

## 2017-01-01 PROCEDURE — 99223 1ST HOSP IP/OBS HIGH 75: CPT | Mod: AI | Performed by: INTERNAL MEDICINE

## 2017-01-01 PROCEDURE — 83735 ASSAY OF MAGNESIUM: CPT | Performed by: PHYSICIAN ASSISTANT

## 2017-01-01 DEVICE — CATH PORT MRI POWERPORT 8FR SL 1808000: Type: IMPLANTABLE DEVICE | Site: CHEST  WALL | Status: FUNCTIONAL

## 2017-01-01 RX ORDER — EPINEPHRINE 0.3 MG/.3ML
0.3 INJECTION SUBCUTANEOUS EVERY 5 MIN PRN
Status: CANCELLED | OUTPATIENT
Start: 2017-01-01

## 2017-01-01 RX ORDER — IBUPROFEN 600 MG/1
600 TABLET, FILM COATED ORAL EVERY 8 HOURS PRN
Qty: 30 TABLET | Refills: 3 | Status: SHIPPED | OUTPATIENT
Start: 2017-01-01 | End: 2017-01-01

## 2017-01-01 RX ORDER — LORAZEPAM 0.5 MG/1
0.5 TABLET ORAL EVERY 4 HOURS PRN
Qty: 30 TABLET | Refills: 2 | Status: SHIPPED | OUTPATIENT
Start: 2017-01-01 | End: 2017-01-01

## 2017-01-01 RX ORDER — PROMETHAZINE HYDROCHLORIDE 25 MG/ML
12.5 INJECTION, SOLUTION INTRAMUSCULAR; INTRAVENOUS
Status: DISCONTINUED | OUTPATIENT
Start: 2017-01-01 | End: 2017-01-01 | Stop reason: HOSPADM

## 2017-01-01 RX ORDER — SODIUM CHLORIDE, SODIUM LACTATE, POTASSIUM CHLORIDE, CALCIUM CHLORIDE 600; 310; 30; 20 MG/100ML; MG/100ML; MG/100ML; MG/100ML
INJECTION, SOLUTION INTRAVENOUS CONTINUOUS
Status: DISCONTINUED | OUTPATIENT
Start: 2017-01-01 | End: 2017-01-01

## 2017-01-01 RX ORDER — ALBUTEROL SULFATE 0.83 MG/ML
2.5 SOLUTION RESPIRATORY (INHALATION)
Status: CANCELLED | OUTPATIENT
Start: 2017-01-01

## 2017-01-01 RX ORDER — DIPHENHYDRAMINE HYDROCHLORIDE 50 MG/ML
50 INJECTION INTRAMUSCULAR; INTRAVENOUS
Status: CANCELLED
Start: 2017-01-01

## 2017-01-01 RX ORDER — OXYCODONE HYDROCHLORIDE 10 MG/1
10 TABLET ORAL EVERY 4 HOURS PRN
Qty: 100 TABLET | Refills: 0 | Status: ON HOLD | OUTPATIENT
Start: 2017-01-01 | End: 2017-01-01

## 2017-01-01 RX ORDER — ALBUTEROL SULFATE 90 UG/1
1-2 AEROSOL, METERED RESPIRATORY (INHALATION)
Status: CANCELLED
Start: 2017-01-01

## 2017-01-01 RX ORDER — DEXAMETHASONE SODIUM PHOSPHATE 4 MG/ML
4 INJECTION, SOLUTION INTRA-ARTICULAR; INTRALESIONAL; INTRAMUSCULAR; INTRAVENOUS; SOFT TISSUE
Status: DISCONTINUED | OUTPATIENT
Start: 2017-01-01 | End: 2017-01-01 | Stop reason: HOSPADM

## 2017-01-01 RX ORDER — HEPARIN SODIUM (PORCINE) LOCK FLUSH IV SOLN 100 UNIT/ML 100 UNIT/ML
SOLUTION INTRAVENOUS
Status: COMPLETED
Start: 2017-01-01 | End: 2017-01-01

## 2017-01-01 RX ORDER — HEPARIN SODIUM (PORCINE) LOCK FLUSH IV SOLN 100 UNIT/ML 100 UNIT/ML
5 SOLUTION INTRAVENOUS
Status: DISCONTINUED | OUTPATIENT
Start: 2017-01-01 | End: 2017-01-01 | Stop reason: HOSPADM

## 2017-01-01 RX ORDER — SODIUM CHLORIDE 9 MG/ML
1000 INJECTION, SOLUTION INTRAVENOUS CONTINUOUS PRN
Status: CANCELLED
Start: 2017-01-01

## 2017-01-01 RX ORDER — SODIUM CHLORIDE, SODIUM LACTATE, POTASSIUM CHLORIDE, CALCIUM CHLORIDE 600; 310; 30; 20 MG/100ML; MG/100ML; MG/100ML; MG/100ML
INJECTION, SOLUTION INTRAVENOUS CONTINUOUS
Status: DISCONTINUED | OUTPATIENT
Start: 2017-01-01 | End: 2017-01-01 | Stop reason: HOSPADM

## 2017-01-01 RX ORDER — POTASSIUM CHLORIDE 1500 MG/1
20-40 TABLET, EXTENDED RELEASE ORAL
Status: DISCONTINUED | OUTPATIENT
Start: 2017-01-01 | End: 2017-01-01 | Stop reason: HOSPADM

## 2017-01-01 RX ORDER — MEPERIDINE HYDROCHLORIDE 25 MG/ML
25 INJECTION INTRAMUSCULAR; INTRAVENOUS; SUBCUTANEOUS EVERY 30 MIN PRN
Status: CANCELLED | OUTPATIENT
Start: 2017-01-01

## 2017-01-01 RX ORDER — LIDOCAINE 40 MG/G
CREAM TOPICAL DAILY PRN
Status: DISCONTINUED | OUTPATIENT
Start: 2017-01-01 | End: 2017-01-01 | Stop reason: HOSPADM

## 2017-01-01 RX ORDER — LEVOTHYROXINE SODIUM 100 UG/1
100 TABLET ORAL
Status: DISCONTINUED | OUTPATIENT
Start: 2017-01-01 | End: 2017-01-01

## 2017-01-01 RX ORDER — NALOXONE HYDROCHLORIDE 0.4 MG/ML
.1-.4 INJECTION, SOLUTION INTRAMUSCULAR; INTRAVENOUS; SUBCUTANEOUS
Status: DISCONTINUED | OUTPATIENT
Start: 2017-01-01 | End: 2017-01-01 | Stop reason: HOSPADM

## 2017-01-01 RX ORDER — MORPHINE SULFATE 2 MG/ML
1 INJECTION, SOLUTION INTRAMUSCULAR; INTRAVENOUS
Status: DISCONTINUED | OUTPATIENT
Start: 2017-01-01 | End: 2017-01-01 | Stop reason: HOSPADM

## 2017-01-01 RX ORDER — HYDROXYZINE HYDROCHLORIDE 25 MG/1
25 TABLET, FILM COATED ORAL EVERY 6 HOURS PRN
Status: DISCONTINUED | OUTPATIENT
Start: 2017-01-01 | End: 2017-01-01 | Stop reason: HOSPADM

## 2017-01-01 RX ORDER — POTASSIUM CHLORIDE 1.5 G/1.58G
20-40 POWDER, FOR SOLUTION ORAL
Status: DISCONTINUED | OUTPATIENT
Start: 2017-01-01 | End: 2017-01-01 | Stop reason: HOSPADM

## 2017-01-01 RX ORDER — OXYCODONE HYDROCHLORIDE 5 MG/1
5 TABLET ORAL EVERY 4 HOURS PRN
Qty: 40 TABLET | Refills: 0 | Status: SHIPPED | OUTPATIENT
Start: 2017-01-01 | End: 2017-01-01

## 2017-01-01 RX ORDER — HYDROCODONE BITARTRATE AND ACETAMINOPHEN 7.5; 325 MG/1; MG/1
1 TABLET ORAL EVERY 4 HOURS PRN
Qty: 60 TABLET | Refills: 0 | COMMUNITY
Start: 2017-01-01 | End: 2017-01-01

## 2017-01-01 RX ORDER — FENTANYL CITRATE 50 UG/ML
INJECTION, SOLUTION INTRAMUSCULAR; INTRAVENOUS PRN
Status: DISCONTINUED | OUTPATIENT
Start: 2017-01-01 | End: 2017-01-01

## 2017-01-01 RX ORDER — MORPHINE SULFATE 30 MG/1
30 TABLET, FILM COATED, EXTENDED RELEASE ORAL 2 TIMES DAILY
Qty: 60 TABLET | Refills: 0 | Status: SHIPPED | OUTPATIENT
Start: 2017-01-01

## 2017-01-01 RX ORDER — POLYETHYLENE GLYCOL 3350 17 G/17G
17 POWDER, FOR SOLUTION ORAL 2 TIMES DAILY
COMMUNITY

## 2017-01-01 RX ORDER — DEXAMETHASONE 4 MG/1
20 TABLET ORAL DAILY
Qty: 15 TABLET | Refills: 3 | Status: SHIPPED | OUTPATIENT
Start: 2017-01-01 | End: 2017-01-01

## 2017-01-01 RX ORDER — LIDOCAINE 40 MG/G
CREAM TOPICAL
Status: DISCONTINUED | OUTPATIENT
Start: 2017-01-01 | End: 2017-01-01 | Stop reason: HOSPADM

## 2017-01-01 RX ORDER — THIAMINE HYDROCHLORIDE 100 MG/ML
100 INJECTION, SOLUTION INTRAMUSCULAR; INTRAVENOUS ONCE
Status: DISCONTINUED | OUTPATIENT
Start: 2017-01-01 | End: 2017-01-01 | Stop reason: DRUGHIGH

## 2017-01-01 RX ORDER — LIDOCAINE HYDROCHLORIDE 30 MG/G
CREAM TOPICAL 3 TIMES DAILY
COMMUNITY
End: 2017-01-01

## 2017-01-01 RX ORDER — MEPERIDINE HYDROCHLORIDE 25 MG/ML
12.5 INJECTION INTRAMUSCULAR; INTRAVENOUS; SUBCUTANEOUS
Status: DISCONTINUED | OUTPATIENT
Start: 2017-01-01 | End: 2017-01-01 | Stop reason: HOSPADM

## 2017-01-01 RX ORDER — AMOXICILLIN 250 MG
1 CAPSULE ORAL 2 TIMES DAILY
COMMUNITY
End: 2017-01-01

## 2017-01-01 RX ORDER — POTASSIUM CHLORIDE 29.8 MG/ML
20 INJECTION INTRAVENOUS
Status: DISCONTINUED | OUTPATIENT
Start: 2017-01-01 | End: 2017-01-01 | Stop reason: HOSPADM

## 2017-01-01 RX ORDER — OXYCODONE AND ACETAMINOPHEN 5; 325 MG/1; MG/1
1-2 TABLET ORAL EVERY 4 HOURS PRN
Status: DISCONTINUED | OUTPATIENT
Start: 2017-01-01 | End: 2017-01-01 | Stop reason: HOSPADM

## 2017-01-01 RX ORDER — METHYLPREDNISOLONE SODIUM SUCCINATE 125 MG/2ML
125 INJECTION, POWDER, LYOPHILIZED, FOR SOLUTION INTRAMUSCULAR; INTRAVENOUS
Status: CANCELLED
Start: 2017-01-01

## 2017-01-01 RX ORDER — FOLIC ACID 1 MG/1
1 TABLET ORAL DAILY
Qty: 30 TABLET | COMMUNITY
Start: 2017-01-01 | End: 2017-01-01

## 2017-01-01 RX ORDER — HEPARIN SODIUM (PORCINE) LOCK FLUSH IV SOLN 100 UNIT/ML 100 UNIT/ML
5 SOLUTION INTRAVENOUS
Status: CANCELLED | OUTPATIENT
Start: 2017-01-01

## 2017-01-01 RX ORDER — CAPSAICIN 0.025 %
CREAM (GRAM) TOPICAL
Status: DISCONTINUED | OUTPATIENT
Start: 2017-01-01 | End: 2017-01-01 | Stop reason: HOSPADM

## 2017-01-01 RX ORDER — NICOTINE 21 MG/24HR
1 PATCH, TRANSDERMAL 24 HOURS TRANSDERMAL EVERY 24 HOURS
Qty: 30 PATCH | Refills: 0 | Status: SHIPPED | OUTPATIENT
Start: 2017-01-01 | End: 2017-01-01

## 2017-01-01 RX ORDER — PROCHLORPERAZINE MALEATE 5 MG
5-10 TABLET ORAL EVERY 6 HOURS PRN
Status: DISCONTINUED | OUTPATIENT
Start: 2017-01-01 | End: 2017-01-01 | Stop reason: HOSPADM

## 2017-01-01 RX ORDER — POTASSIUM CHLORIDE 1.5 G/1.58G
20-40 POWDER, FOR SOLUTION ORAL ONCE
Status: COMPLETED | OUTPATIENT
Start: 2017-01-01 | End: 2017-01-01

## 2017-01-01 RX ORDER — COSYNTROPIN 0.25 MG/ML
0.25 INJECTION, POWDER, FOR SOLUTION INTRAMUSCULAR; INTRAVENOUS ONCE
Status: COMPLETED | OUTPATIENT
Start: 2017-01-01 | End: 2017-01-01

## 2017-01-01 RX ORDER — HEPARIN SODIUM,PORCINE 10 UNIT/ML
5-10 VIAL (ML) INTRAVENOUS EVERY 24 HOURS
Status: DISCONTINUED | OUTPATIENT
Start: 2017-01-01 | End: 2017-01-01 | Stop reason: HOSPADM

## 2017-01-01 RX ORDER — LORAZEPAM 2 MG/ML
0.5 INJECTION INTRAMUSCULAR EVERY 4 HOURS PRN
Status: CANCELLED
Start: 2017-01-01

## 2017-01-01 RX ORDER — METOCLOPRAMIDE 10 MG/1
10 TABLET ORAL EVERY 6 HOURS PRN
Status: DISCONTINUED | OUTPATIENT
Start: 2017-01-01 | End: 2017-01-01 | Stop reason: HOSPADM

## 2017-01-01 RX ORDER — AMOXICILLIN 250 MG
1-2 CAPSULE ORAL 2 TIMES DAILY
Status: DISCONTINUED | OUTPATIENT
Start: 2017-01-01 | End: 2017-01-01 | Stop reason: HOSPADM

## 2017-01-01 RX ORDER — EPINEPHRINE 1 MG/ML
0.3 INJECTION INTRAMUSCULAR; INTRAVENOUS; SUBCUTANEOUS EVERY 5 MIN PRN
Status: CANCELLED | OUTPATIENT
Start: 2017-01-01

## 2017-01-01 RX ORDER — ONDANSETRON 2 MG/ML
4 INJECTION INTRAMUSCULAR; INTRAVENOUS EVERY 6 HOURS PRN
Status: DISCONTINUED | OUTPATIENT
Start: 2017-01-01 | End: 2017-01-01 | Stop reason: HOSPADM

## 2017-01-01 RX ORDER — NALOXONE HYDROCHLORIDE 0.4 MG/ML
.1-.4 INJECTION, SOLUTION INTRAMUSCULAR; INTRAVENOUS; SUBCUTANEOUS
Status: DISCONTINUED | OUTPATIENT
Start: 2017-01-01 | End: 2017-01-01

## 2017-01-01 RX ORDER — CEFAZOLIN SODIUM 1 G/3ML
1 INJECTION, POWDER, FOR SOLUTION INTRAMUSCULAR; INTRAVENOUS SEE ADMIN INSTRUCTIONS
Status: DISCONTINUED | OUTPATIENT
Start: 2017-01-01 | End: 2017-01-01 | Stop reason: HOSPADM

## 2017-01-01 RX ORDER — DEXAMETHASONE 4 MG/1
20 TABLET ORAL DAILY
Qty: 15 TABLET | Refills: 3 | COMMUNITY
Start: 2017-01-01 | End: 2017-01-01

## 2017-01-01 RX ORDER — METRONIDAZOLE 500 MG/1
500 TABLET ORAL EVERY 8 HOURS SCHEDULED
Status: DISCONTINUED | OUTPATIENT
Start: 2017-01-01 | End: 2017-01-01 | Stop reason: HOSPADM

## 2017-01-01 RX ORDER — MULTIPLE VITAMINS W/ MINERALS TAB 9MG-400MCG
1 TAB ORAL DAILY
Status: DISCONTINUED | OUTPATIENT
Start: 2017-01-01 | End: 2017-01-01 | Stop reason: HOSPADM

## 2017-01-01 RX ORDER — MORPHINE SULFATE 15 MG/1
15 TABLET, FILM COATED, EXTENDED RELEASE ORAL EVERY 12 HOURS
Qty: 60 TABLET | Refills: 0 | Status: SHIPPED | OUTPATIENT
Start: 2017-01-01 | End: 2017-01-01

## 2017-01-01 RX ORDER — PAROXETINE 10 MG/1
10 TABLET, FILM COATED ORAL AT BEDTIME
Status: DISCONTINUED | OUTPATIENT
Start: 2017-01-01 | End: 2017-01-01 | Stop reason: HOSPADM

## 2017-01-01 RX ORDER — PAROXETINE 10 MG/1
10 TABLET, FILM COATED ORAL AT BEDTIME
Qty: 30 TABLET | Refills: 0 | Status: SHIPPED | OUTPATIENT
Start: 2017-01-01 | End: 2017-01-01

## 2017-01-01 RX ORDER — FENTANYL CITRATE 50 UG/ML
25-50 INJECTION, SOLUTION INTRAMUSCULAR; INTRAVENOUS
Status: DISCONTINUED | OUTPATIENT
Start: 2017-01-01 | End: 2017-01-01 | Stop reason: HOSPADM

## 2017-01-01 RX ORDER — LORAZEPAM 2 MG/ML
1 INJECTION INTRAMUSCULAR ONCE
Status: COMPLETED | OUTPATIENT
Start: 2017-01-01 | End: 2017-01-01

## 2017-01-01 RX ORDER — ONDANSETRON 2 MG/ML
4 INJECTION INTRAMUSCULAR; INTRAVENOUS EVERY 30 MIN PRN
Status: DISCONTINUED | OUTPATIENT
Start: 2017-01-01 | End: 2017-01-01 | Stop reason: HOSPADM

## 2017-01-01 RX ORDER — CEFAZOLIN SODIUM 2 G/100ML
2 INJECTION, SOLUTION INTRAVENOUS
Status: COMPLETED | OUTPATIENT
Start: 2017-01-01 | End: 2017-01-01

## 2017-01-01 RX ORDER — OXYCODONE HYDROCHLORIDE 5 MG/1
10 TABLET ORAL EVERY 4 HOURS PRN
Status: DISCONTINUED | OUTPATIENT
Start: 2017-01-01 | End: 2017-01-01 | Stop reason: HOSPADM

## 2017-01-01 RX ORDER — LANOLIN ALCOHOL/MO/W.PET/CERES
100 CREAM (GRAM) TOPICAL DAILY
Status: DISCONTINUED | OUTPATIENT
Start: 2017-01-01 | End: 2017-01-01 | Stop reason: HOSPADM

## 2017-01-01 RX ORDER — FOLIC ACID 1 MG/1
1 TABLET ORAL DAILY
Status: DISCONTINUED | OUTPATIENT
Start: 2017-01-01 | End: 2017-01-01 | Stop reason: HOSPADM

## 2017-01-01 RX ORDER — HYDROMORPHONE HYDROCHLORIDE 1 MG/ML
.3-.5 INJECTION, SOLUTION INTRAMUSCULAR; INTRAVENOUS; SUBCUTANEOUS EVERY 5 MIN PRN
Status: DISCONTINUED | OUTPATIENT
Start: 2017-01-01 | End: 2017-01-01 | Stop reason: HOSPADM

## 2017-01-01 RX ORDER — POLYETHYLENE GLYCOL 3350 17 G/17G
17 POWDER, FOR SOLUTION ORAL DAILY
Status: DISCONTINUED | OUTPATIENT
Start: 2017-01-01 | End: 2017-01-01

## 2017-01-01 RX ORDER — LIDOCAINE HYDROCHLORIDE 10 MG/ML
10 INJECTION, SOLUTION EPIDURAL; INFILTRATION; INTRACAUDAL; PERINEURAL ONCE
Status: COMPLETED | OUTPATIENT
Start: 2017-01-01 | End: 2017-01-01

## 2017-01-01 RX ORDER — MAGNESIUM CARB/ALUMINUM HYDROX 105-160MG
148 TABLET,CHEWABLE ORAL
Status: DISCONTINUED | OUTPATIENT
Start: 2017-01-01 | End: 2017-01-01 | Stop reason: HOSPADM

## 2017-01-01 RX ORDER — ALBUTEROL SULFATE 0.83 MG/ML
2.5 SOLUTION RESPIRATORY (INHALATION) EVERY 4 HOURS PRN
Status: DISCONTINUED | OUTPATIENT
Start: 2017-01-01 | End: 2017-01-01 | Stop reason: HOSPADM

## 2017-01-01 RX ORDER — POTASSIUM CHLORIDE 7.45 MG/ML
10 INJECTION INTRAVENOUS
Status: DISCONTINUED | OUTPATIENT
Start: 2017-01-01 | End: 2017-01-01 | Stop reason: HOSPADM

## 2017-01-01 RX ORDER — FLUDROCORTISONE ACETATE 0.1 MG/1
100 TABLET ORAL DAILY
Status: DISCONTINUED | OUTPATIENT
Start: 2017-01-01 | End: 2017-01-01

## 2017-01-01 RX ORDER — LIDOCAINE HYDROCHLORIDE 10 MG/ML
INJECTION, SOLUTION EPIDURAL; INFILTRATION; INTRACAUDAL; PERINEURAL PRN
Status: DISCONTINUED | OUTPATIENT
Start: 2017-01-01 | End: 2017-01-01

## 2017-01-01 RX ORDER — SODIUM CHLORIDE 9 MG/ML
INJECTION, SOLUTION INTRAVENOUS CONTINUOUS
Status: DISCONTINUED | OUTPATIENT
Start: 2017-01-01 | End: 2017-01-01

## 2017-01-01 RX ORDER — DIPHENHYDRAMINE HCL 50 MG
50 CAPSULE ORAL ONCE
Status: CANCELLED
Start: 2017-01-01

## 2017-01-01 RX ORDER — MAGNESIUM SULFATE HEPTAHYDRATE 40 MG/ML
4 INJECTION, SOLUTION INTRAVENOUS EVERY 4 HOURS PRN
Status: DISCONTINUED | OUTPATIENT
Start: 2017-01-01 | End: 2017-01-01 | Stop reason: HOSPADM

## 2017-01-01 RX ORDER — ALBUMIN, HUMAN INJ 5% 5 %
12.5 SOLUTION INTRAVENOUS EVERY 8 HOURS
Status: DISPENSED | OUTPATIENT
Start: 2017-01-01 | End: 2017-01-01

## 2017-01-01 RX ORDER — ACETAMINOPHEN 325 MG/1
650 TABLET ORAL EVERY 4 HOURS PRN
Status: DISCONTINUED | OUTPATIENT
Start: 2017-01-01 | End: 2017-01-01 | Stop reason: HOSPADM

## 2017-01-01 RX ORDER — PROPOFOL 10 MG/ML
INJECTION, EMULSION INTRAVENOUS PRN
Status: DISCONTINUED | OUTPATIENT
Start: 2017-01-01 | End: 2017-01-01

## 2017-01-01 RX ORDER — ONDANSETRON 4 MG/1
4 TABLET, ORALLY DISINTEGRATING ORAL EVERY 30 MIN PRN
Status: DISCONTINUED | OUTPATIENT
Start: 2017-01-01 | End: 2017-01-01 | Stop reason: HOSPADM

## 2017-01-01 RX ORDER — AMOXICILLIN 250 MG
1 CAPSULE ORAL 2 TIMES DAILY
Status: DISCONTINUED | OUTPATIENT
Start: 2017-01-01 | End: 2017-01-01

## 2017-01-01 RX ORDER — PAROXETINE 10 MG/1
10 TABLET, FILM COATED ORAL AT BEDTIME
Qty: 30 TABLET | Refills: 1 | Status: SHIPPED | OUTPATIENT
Start: 2017-01-01 | End: 2017-01-01

## 2017-01-01 RX ORDER — ONDANSETRON 2 MG/ML
INJECTION INTRAMUSCULAR; INTRAVENOUS PRN
Status: DISCONTINUED | OUTPATIENT
Start: 2017-01-01 | End: 2017-01-01

## 2017-01-01 RX ORDER — TAMSULOSIN HYDROCHLORIDE 0.4 MG/1
0.4 CAPSULE ORAL DAILY
Qty: 30 CAPSULE | Refills: 0 | Status: SHIPPED | OUTPATIENT
Start: 2017-01-01

## 2017-01-01 RX ORDER — IOPAMIDOL 755 MG/ML
77 INJECTION, SOLUTION INTRAVASCULAR ONCE
Status: COMPLETED | OUTPATIENT
Start: 2017-01-01 | End: 2017-01-01

## 2017-01-01 RX ORDER — FLUDROCORTISONE ACETATE 0.1 MG/1
100 TABLET ORAL DAILY
Status: DISCONTINUED | OUTPATIENT
Start: 2017-01-01 | End: 2017-01-01 | Stop reason: HOSPADM

## 2017-01-01 RX ORDER — PANTOPRAZOLE SODIUM 40 MG/1
40 TABLET, DELAYED RELEASE ORAL
Status: DISCONTINUED | OUTPATIENT
Start: 2017-01-01 | End: 2017-01-01 | Stop reason: HOSPADM

## 2017-01-01 RX ORDER — POTASSIUM CL/LIDO/0.9 % NACL 10MEQ/0.1L
10 INTRAVENOUS SOLUTION, PIGGYBACK (ML) INTRAVENOUS ONCE
Status: COMPLETED | OUTPATIENT
Start: 2017-01-01 | End: 2017-01-01

## 2017-01-01 RX ORDER — TAMSULOSIN HYDROCHLORIDE 0.4 MG/1
0.4 CAPSULE ORAL DAILY
Status: DISCONTINUED | OUTPATIENT
Start: 2017-01-01 | End: 2017-01-01 | Stop reason: HOSPADM

## 2017-01-01 RX ORDER — BISACODYL 10 MG
10 SUPPOSITORY, RECTAL RECTAL DAILY PRN
Qty: 12 SUPPOSITORY | Refills: 11 | Status: SHIPPED | OUTPATIENT
Start: 2017-01-01

## 2017-01-01 RX ORDER — PROCHLORPERAZINE MALEATE 10 MG
10 TABLET ORAL EVERY 6 HOURS PRN
Qty: 30 TABLET | Refills: 2 | Status: SHIPPED | OUTPATIENT
Start: 2017-01-01 | End: 2017-01-01

## 2017-01-01 RX ORDER — OXYCODONE HYDROCHLORIDE 5 MG/1
5 TABLET ORAL EVERY 4 HOURS PRN
Qty: 40 TABLET | Refills: 0 | Status: SHIPPED | OUTPATIENT
Start: 2017-01-01 | End: 2017-01-01 | Stop reason: DRUGHIGH

## 2017-01-01 RX ORDER — HYDROCODONE BITARTRATE AND ACETAMINOPHEN 5; 325 MG/1; MG/1
1-2 TABLET ORAL EVERY 4 HOURS PRN
Status: DISCONTINUED | OUTPATIENT
Start: 2017-01-01 | End: 2017-01-01

## 2017-01-01 RX ORDER — MORPHINE SULFATE 30 MG/1
30 TABLET, FILM COATED, EXTENDED RELEASE ORAL 2 TIMES DAILY
Qty: 60 TABLET | Refills: 0 | Status: ON HOLD | OUTPATIENT
Start: 2017-01-01 | End: 2017-01-01

## 2017-01-01 RX ORDER — LIDOCAINE HYDROCHLORIDE 10 MG/ML
INJECTION, SOLUTION INFILTRATION; PERINEURAL PRN
Status: DISCONTINUED | OUTPATIENT
Start: 2017-01-01 | End: 2017-01-01

## 2017-01-01 RX ORDER — OXYCODONE HYDROCHLORIDE 10 MG/1
10 TABLET ORAL EVERY 4 HOURS PRN
Qty: 60 TABLET | Refills: 0 | Status: SHIPPED | OUTPATIENT
Start: 2017-01-01 | End: 2017-01-01

## 2017-01-01 RX ORDER — PANTOPRAZOLE SODIUM 40 MG/1
40 TABLET, DELAYED RELEASE ORAL
Qty: 60 TABLET | Refills: 6 | Status: SHIPPED | OUTPATIENT
Start: 2017-01-01

## 2017-01-01 RX ORDER — SODIUM CHLORIDE 9 MG/ML
INJECTION, SOLUTION INTRAVENOUS CONTINUOUS PRN
Status: DISCONTINUED | OUTPATIENT
Start: 2017-01-01 | End: 2017-01-01

## 2017-01-01 RX ORDER — BUPIVACAINE HYDROCHLORIDE AND EPINEPHRINE 2.5; 5 MG/ML; UG/ML
INJECTION, SOLUTION INFILTRATION; PERINEURAL PRN
Status: DISCONTINUED | OUTPATIENT
Start: 2017-01-01 | End: 2017-01-01 | Stop reason: HOSPADM

## 2017-01-01 RX ORDER — GADOBUTROL 604.72 MG/ML
7 INJECTION INTRAVENOUS ONCE
Status: COMPLETED | OUTPATIENT
Start: 2017-01-01 | End: 2017-01-01

## 2017-01-01 RX ORDER — POTASSIUM CL/LIDO/0.9 % NACL 10MEQ/0.1L
10 INTRAVENOUS SOLUTION, PIGGYBACK (ML) INTRAVENOUS
Status: DISCONTINUED | OUTPATIENT
Start: 2017-01-01 | End: 2017-01-01 | Stop reason: HOSPADM

## 2017-01-01 RX ORDER — AMOXICILLIN 250 MG
2 CAPSULE ORAL 2 TIMES DAILY
Qty: 100 TABLET | Refills: 11 | COMMUNITY
Start: 2017-01-01

## 2017-01-01 RX ORDER — MORPHINE SULFATE 30 MG/1
30 TABLET, FILM COATED, EXTENDED RELEASE ORAL 2 TIMES DAILY
Status: DISCONTINUED | OUTPATIENT
Start: 2017-01-01 | End: 2017-01-01 | Stop reason: HOSPADM

## 2017-01-01 RX ORDER — NICOTINE 21 MG/24HR
1 PATCH, TRANSDERMAL 24 HOURS TRANSDERMAL DAILY
Status: DISCONTINUED | OUTPATIENT
Start: 2017-01-01 | End: 2017-01-01 | Stop reason: HOSPADM

## 2017-01-01 RX ORDER — HEPARIN SODIUM (PORCINE) LOCK FLUSH IV SOLN 100 UNIT/ML 100 UNIT/ML
5 SOLUTION INTRAVENOUS
Status: DISCONTINUED | OUTPATIENT
Start: 2017-01-01 | End: 2017-01-01 | Stop reason: CLARIF

## 2017-01-01 RX ORDER — PROPOFOL 10 MG/ML
INJECTION, EMULSION INTRAVENOUS CONTINUOUS PRN
Status: DISCONTINUED | OUTPATIENT
Start: 2017-01-01 | End: 2017-01-01

## 2017-01-01 RX ORDER — PANTOPRAZOLE SODIUM 40 MG/1
40 TABLET, DELAYED RELEASE ORAL
Qty: 30 TABLET | Refills: 11 | Status: SHIPPED | OUTPATIENT
Start: 2017-01-01 | End: 2017-01-01

## 2017-01-01 RX ORDER — GADOBUTROL 604.72 MG/ML
6 INJECTION INTRAVENOUS ONCE
Status: COMPLETED | OUTPATIENT
Start: 2017-01-01 | End: 2017-01-01

## 2017-01-01 RX ORDER — DEXAMETHASONE SODIUM PHOSPHATE 4 MG/ML
INJECTION, SOLUTION INTRA-ARTICULAR; INTRALESIONAL; INTRAMUSCULAR; INTRAVENOUS; SOFT TISSUE PRN
Status: DISCONTINUED | OUTPATIENT
Start: 2017-01-01 | End: 2017-01-01

## 2017-01-01 RX ORDER — LIDOCAINE 50 MG/G
2 PATCH TOPICAL
Status: DISCONTINUED | OUTPATIENT
Start: 2017-01-01 | End: 2017-01-01 | Stop reason: HOSPADM

## 2017-01-01 RX ORDER — IOPAMIDOL 755 MG/ML
67 INJECTION, SOLUTION INTRAVASCULAR ONCE
Status: COMPLETED | OUTPATIENT
Start: 2017-01-01 | End: 2017-01-01

## 2017-01-01 RX ORDER — OXYCODONE HYDROCHLORIDE 5 MG/1
5-10 TABLET ORAL EVERY 4 HOURS PRN
Status: DISCONTINUED | OUTPATIENT
Start: 2017-01-01 | End: 2017-01-01 | Stop reason: HOSPADM

## 2017-01-01 RX ORDER — DEXAMETHASONE 4 MG/1
20 TABLET ORAL DAILY
Qty: 15 TABLET | Refills: 3 | Status: ON HOLD | OUTPATIENT
Start: 2017-01-01 | End: 2017-01-01

## 2017-01-01 RX ORDER — ACETAMINOPHEN 325 MG/1
650 TABLET ORAL EVERY 4 HOURS PRN
Qty: 100 TABLET | Refills: 0 | COMMUNITY
Start: 2017-01-01 | End: 2017-01-01

## 2017-01-01 RX ORDER — GLYCOPYRROLATE 0.2 MG/ML
INJECTION, SOLUTION INTRAMUSCULAR; INTRAVENOUS PRN
Status: DISCONTINUED | OUTPATIENT
Start: 2017-01-01 | End: 2017-01-01

## 2017-01-01 RX ORDER — SODIUM CHLORIDE 9 MG/ML
INJECTION, SOLUTION INTRAVENOUS CONTINUOUS
Status: DISCONTINUED | OUTPATIENT
Start: 2017-01-01 | End: 2017-01-01 | Stop reason: HOSPADM

## 2017-01-01 RX ORDER — POLYETHYLENE GLYCOL 3350 17 G/17G
1 POWDER, FOR SOLUTION ORAL DAILY
Qty: 510 G | Refills: 11 | Status: SHIPPED | OUTPATIENT
Start: 2017-01-01 | End: 2017-01-01

## 2017-01-01 RX ORDER — FOLIC ACID 1 MG/1
1 TABLET ORAL DAILY
Qty: 30 TABLET | Refills: 0 | Status: SHIPPED | OUTPATIENT
Start: 2017-01-01

## 2017-01-01 RX ORDER — PAROXETINE 10 MG/1
10 TABLET, FILM COATED ORAL AT BEDTIME
Qty: 30 TABLET | Refills: 11 | Status: SHIPPED | OUTPATIENT
Start: 2017-01-01

## 2017-01-01 RX ORDER — MORPHINE SULFATE 15 MG/1
15 TABLET, FILM COATED, EXTENDED RELEASE ORAL EVERY 8 HOURS
Qty: 90 TABLET | Refills: 0 | Status: SHIPPED | OUTPATIENT
Start: 2017-01-01 | End: 2017-01-01

## 2017-01-01 RX ORDER — PHYSOSTIGMINE SALICYLATE 1 MG/ML
1.2 INJECTION INTRAVENOUS
Status: DISCONTINUED | OUTPATIENT
Start: 2017-01-01 | End: 2017-01-01 | Stop reason: HOSPADM

## 2017-01-01 RX ORDER — DEXAMETHASONE SODIUM PHOSPHATE 4 MG/ML
4 INJECTION, SOLUTION INTRA-ARTICULAR; INTRALESIONAL; INTRAMUSCULAR; INTRAVENOUS; SOFT TISSUE EVERY 10 MIN PRN
Status: DISCONTINUED | OUTPATIENT
Start: 2017-01-01 | End: 2017-01-01 | Stop reason: HOSPADM

## 2017-01-01 RX ORDER — PANTOPRAZOLE SODIUM 40 MG/1
40 TABLET, DELAYED RELEASE ORAL
Qty: 30 TABLET | COMMUNITY
Start: 2017-01-01 | End: 2017-01-01

## 2017-01-01 RX ORDER — CAPSAICIN 0.025 %
CREAM (GRAM) TOPICAL
Qty: 120 G | Refills: 3 | Status: SHIPPED | OUTPATIENT
Start: 2017-01-01 | End: 2017-01-01

## 2017-01-01 RX ORDER — AMOXICILLIN 250 MG
1 CAPSULE ORAL 2 TIMES DAILY
Qty: 100 TABLET | COMMUNITY
Start: 2017-01-01 | End: 2017-01-01

## 2017-01-01 RX ORDER — ONDANSETRON 8 MG/1
8 TABLET, FILM COATED ORAL EVERY 8 HOURS PRN
Qty: 10 TABLET | Refills: 2 | Status: SHIPPED | OUTPATIENT
Start: 2017-01-01 | End: 2017-01-01

## 2017-01-01 RX ORDER — IBUPROFEN 600 MG/1
600 TABLET, FILM COATED ORAL EVERY 8 HOURS PRN
Qty: 30 TABLET | Refills: 1 | Status: SHIPPED | OUTPATIENT
Start: 2017-01-01 | End: 2017-01-01

## 2017-01-01 RX ORDER — ONDANSETRON 4 MG/1
4 TABLET, ORALLY DISINTEGRATING ORAL EVERY 6 HOURS PRN
Status: DISCONTINUED | OUTPATIENT
Start: 2017-01-01 | End: 2017-01-01 | Stop reason: HOSPADM

## 2017-01-01 RX ORDER — METOPROLOL TARTRATE 1 MG/ML
1-2 INJECTION, SOLUTION INTRAVENOUS EVERY 5 MIN PRN
Status: DISCONTINUED | OUTPATIENT
Start: 2017-01-01 | End: 2017-01-01 | Stop reason: HOSPADM

## 2017-01-01 RX ORDER — SODIUM CHLORIDE 9 MG/ML
1000 INJECTION, SOLUTION INTRAVENOUS CONTINUOUS
Status: DISCONTINUED | OUTPATIENT
Start: 2017-01-01 | End: 2017-01-01

## 2017-01-01 RX ORDER — POLYETHYLENE GLYCOL 3350 17 G/17G
17 POWDER, FOR SOLUTION ORAL DAILY PRN
Status: DISCONTINUED | OUTPATIENT
Start: 2017-01-01 | End: 2017-01-01 | Stop reason: HOSPADM

## 2017-01-01 RX ORDER — PROCHLORPERAZINE 25 MG
25 SUPPOSITORY, RECTAL RECTAL EVERY 12 HOURS PRN
Status: DISCONTINUED | OUTPATIENT
Start: 2017-01-01 | End: 2017-01-01 | Stop reason: HOSPADM

## 2017-01-01 RX ORDER — OXYCODONE HYDROCHLORIDE 10 MG/1
10 TABLET ORAL EVERY 4 HOURS PRN
Qty: 100 TABLET | Refills: 0 | Status: SHIPPED | OUTPATIENT
Start: 2017-01-01 | End: 2017-01-01

## 2017-01-01 RX ORDER — MORPHINE SULFATE 30 MG/1
30 TABLET, FILM COATED, EXTENDED RELEASE ORAL 2 TIMES DAILY
Qty: 60 TABLET | Refills: 0 | Status: SHIPPED | OUTPATIENT
Start: 2017-01-01 | End: 2017-01-01

## 2017-01-01 RX ORDER — LIDOCAINE/PRILOCAINE 2.5 %-2.5%
CREAM (GRAM) TOPICAL
Qty: 30 G | Refills: 3 | Status: SHIPPED | OUTPATIENT
Start: 2017-01-01 | End: 2017-01-01

## 2017-01-01 RX ORDER — HYDROMORPHONE HYDROCHLORIDE 1 MG/ML
.3-.5 INJECTION, SOLUTION INTRAMUSCULAR; INTRAVENOUS; SUBCUTANEOUS EVERY 10 MIN PRN
Status: DISCONTINUED | OUTPATIENT
Start: 2017-01-01 | End: 2017-01-01 | Stop reason: HOSPADM

## 2017-01-01 RX ORDER — POTASSIUM CHLORIDE 29.8 MG/ML
20 INJECTION INTRAVENOUS
Status: DISCONTINUED | OUTPATIENT
Start: 2017-01-01 | End: 2017-01-01

## 2017-01-01 RX ORDER — HYDROMORPHONE HCL/0.9% NACL/PF 0.2MG/0.2
0.2 SYRINGE (ML) INTRAVENOUS
Status: DISCONTINUED | OUTPATIENT
Start: 2017-01-01 | End: 2017-01-01

## 2017-01-01 RX ORDER — METOCLOPRAMIDE HYDROCHLORIDE 5 MG/ML
10 INJECTION INTRAMUSCULAR; INTRAVENOUS EVERY 6 HOURS PRN
Status: DISCONTINUED | OUTPATIENT
Start: 2017-01-01 | End: 2017-01-01 | Stop reason: HOSPADM

## 2017-01-01 RX ORDER — LANOLIN ALCOHOL/MO/W.PET/CERES
100 CREAM (GRAM) TOPICAL DAILY
COMMUNITY
Start: 2017-01-01 | End: 2017-01-01

## 2017-01-01 RX ORDER — CAPSAICIN 0.025 %
CREAM (GRAM) TOPICAL 3 TIMES DAILY PRN
Status: DISCONTINUED | OUTPATIENT
Start: 2017-01-01 | End: 2017-01-01 | Stop reason: HOSPADM

## 2017-01-01 RX ORDER — AMOXICILLIN 250 MG
1 CAPSULE ORAL 2 TIMES DAILY
Qty: 100 TABLET | Refills: 3 | Status: SHIPPED | OUTPATIENT
Start: 2017-01-01 | End: 2017-01-01

## 2017-01-01 RX ORDER — METRONIDAZOLE 500 MG/1
500 TABLET ORAL 3 TIMES DAILY
Qty: 21 TABLET | Refills: 0 | Status: SHIPPED | OUTPATIENT
Start: 2017-01-01 | End: 2017-01-01

## 2017-01-01 RX ORDER — HYDROXYZINE HYDROCHLORIDE 50 MG/1
50 TABLET, FILM COATED ORAL EVERY 6 HOURS PRN
Status: DISCONTINUED | OUTPATIENT
Start: 2017-01-01 | End: 2017-01-01 | Stop reason: HOSPADM

## 2017-01-01 RX ADMIN — COSYNTROPIN 0.25 MG: 0.25 INJECTION, POWDER, LYOPHILIZED, FOR SOLUTION INTRAMUSCULAR; INTRAVENOUS at 18:39

## 2017-01-01 RX ADMIN — ACETAMINOPHEN 650 MG: 325 TABLET, FILM COATED ORAL at 23:34

## 2017-01-01 RX ADMIN — MIDAZOLAM HYDROCHLORIDE 1 MG: 1 INJECTION, SOLUTION INTRAMUSCULAR; INTRAVENOUS at 12:14

## 2017-01-01 RX ADMIN — MORPHINE SULFATE 30 MG: 30 TABLET, EXTENDED RELEASE ORAL at 08:31

## 2017-01-01 RX ADMIN — PANTOPRAZOLE SODIUM 40 MG: 40 TABLET, DELAYED RELEASE ORAL at 07:34

## 2017-01-01 RX ADMIN — PANTOPRAZOLE SODIUM 40 MG: 40 TABLET, DELAYED RELEASE ORAL at 15:45

## 2017-01-01 RX ADMIN — SODIUM CHLORIDE 1000 ML: 9 INJECTION, SOLUTION INTRAVENOUS at 14:25

## 2017-01-01 RX ADMIN — SODIUM CHLORIDE 500 ML: 9 INJECTION, SOLUTION INTRAVENOUS at 07:00

## 2017-01-01 RX ADMIN — LIDOCAINE 1 PATCH: 50 PATCH TOPICAL at 20:55

## 2017-01-01 RX ADMIN — OXYCODONE HYDROCHLORIDE 5 MG: 5 TABLET ORAL at 20:14

## 2017-01-01 RX ADMIN — POTASSIUM CHLORIDE 40 MEQ: 1.5 POWDER, FOR SOLUTION ORAL at 08:18

## 2017-01-01 RX ADMIN — CEFAZOLIN SODIUM 2 G: 2 INJECTION, SOLUTION INTRAVENOUS at 11:46

## 2017-01-01 RX ADMIN — DIPHENHYDRAMINE HYDROCHLORIDE 50 MG: 50 INJECTION, SOLUTION INTRAMUSCULAR; INTRAVENOUS at 09:19

## 2017-01-01 RX ADMIN — PANTOPRAZOLE SODIUM 40 MG: 40 TABLET, DELAYED RELEASE ORAL at 06:25

## 2017-01-01 RX ADMIN — PACLITAXEL 329 MG: 6 INJECTION, SOLUTION INTRAVENOUS at 12:00

## 2017-01-01 RX ADMIN — PROPOFOL 30 MG: 10 INJECTION, EMULSION INTRAVENOUS at 09:30

## 2017-01-01 RX ADMIN — PANTOPRAZOLE SODIUM 40 MG: 40 TABLET, DELAYED RELEASE ORAL at 18:26

## 2017-01-01 RX ADMIN — IOPAMIDOL 67 ML: 755 INJECTION, SOLUTION INTRAVENOUS at 08:44

## 2017-01-01 RX ADMIN — PROPOFOL 75 MCG/KG/MIN: 10 INJECTION, EMULSION INTRAVENOUS at 11:51

## 2017-01-01 RX ADMIN — PACLITAXEL 329 MG: 6 INJECTION, SOLUTION INTRAVENOUS at 09:43

## 2017-01-01 RX ADMIN — METRONIDAZOLE 500 MG: 500 TABLET ORAL at 05:55

## 2017-01-01 RX ADMIN — FENTANYL CITRATE 100 MCG: 50 INJECTION, SOLUTION INTRAMUSCULAR; INTRAVENOUS at 09:30

## 2017-01-01 RX ADMIN — FOLIC ACID 1 MG: 1 TABLET ORAL at 11:02

## 2017-01-01 RX ADMIN — PAROXETINE 10 MG: 10 TABLET, FILM COATED ORAL at 21:33

## 2017-01-01 RX ADMIN — PANTOPRAZOLE SODIUM 40 MG: 40 TABLET, DELAYED RELEASE ORAL at 16:49

## 2017-01-01 RX ADMIN — Medication 100 MG: at 08:37

## 2017-01-01 RX ADMIN — OXYCODONE HYDROCHLORIDE 5 MG: 5 TABLET ORAL at 18:49

## 2017-01-01 RX ADMIN — PANTOPRAZOLE SODIUM 40 MG: 40 TABLET, DELAYED RELEASE ORAL at 16:20

## 2017-01-01 RX ADMIN — SODIUM CHLORIDE 59 ML: 9 INJECTION, SOLUTION INTRAVENOUS at 10:55

## 2017-01-01 RX ADMIN — SODIUM CHLORIDE, PRESERVATIVE FREE 5 ML: 5 INJECTION INTRAVENOUS at 13:46

## 2017-01-01 RX ADMIN — POTASSIUM CHLORIDE 20 MEQ: 1.5 POWDER, FOR SOLUTION ORAL at 12:16

## 2017-01-01 RX ADMIN — OXYCODONE HYDROCHLORIDE 10 MG: 5 TABLET ORAL at 18:22

## 2017-01-01 RX ADMIN — POTASSIUM CHLORIDE 20 MEQ: 1500 TABLET, EXTENDED RELEASE ORAL at 21:26

## 2017-01-01 RX ADMIN — OXYCODONE HYDROCHLORIDE 5 MG: 5 TABLET ORAL at 16:34

## 2017-01-01 RX ADMIN — Medication 100 MG: at 08:35

## 2017-01-01 RX ADMIN — SODIUM CHLORIDE, POTASSIUM CHLORIDE, SODIUM LACTATE AND CALCIUM CHLORIDE 500 ML: 600; 310; 30; 20 INJECTION, SOLUTION INTRAVENOUS at 21:18

## 2017-01-01 RX ADMIN — NICOTINE 1 PATCH: 14 PATCH, EXTENDED RELEASE TRANSDERMAL at 09:26

## 2017-01-01 RX ADMIN — DICLOFENAC SODIUM 2 G: 10 GEL TOPICAL at 21:39

## 2017-01-01 RX ADMIN — FLUDROCORTISONE ACETATE 100 MCG: 0.1 TABLET ORAL at 08:12

## 2017-01-01 RX ADMIN — METRONIDAZOLE 500 MG: 500 TABLET ORAL at 21:25

## 2017-01-01 RX ADMIN — FOLIC ACID 1 MG: 1 TABLET ORAL at 08:45

## 2017-01-01 RX ADMIN — PANTOPRAZOLE SODIUM 40 MG: 40 TABLET, DELAYED RELEASE ORAL at 06:48

## 2017-01-01 RX ADMIN — POTASSIUM CHLORIDE 40 MEQ: 1.5 POWDER, FOR SOLUTION ORAL at 16:45

## 2017-01-01 RX ADMIN — PANTOPRAZOLE SODIUM 40 MG: 40 TABLET, DELAYED RELEASE ORAL at 18:22

## 2017-01-01 RX ADMIN — ONDANSETRON 4 MG: 4 TABLET, ORALLY DISINTEGRATING ORAL at 12:03

## 2017-01-01 RX ADMIN — MORPHINE SULFATE 30 MG: 30 TABLET, EXTENDED RELEASE ORAL at 08:08

## 2017-01-01 RX ADMIN — DEXAMETHASONE SODIUM PHOSPHATE: 10 INJECTION, SOLUTION INTRAMUSCULAR; INTRAVENOUS at 10:39

## 2017-01-01 RX ADMIN — GADOBUTROL 6 ML: 604.72 INJECTION INTRAVENOUS at 10:00

## 2017-01-01 RX ADMIN — PANTOPRAZOLE SODIUM 40 MG: 40 TABLET, DELAYED RELEASE ORAL at 05:57

## 2017-01-01 RX ADMIN — CARBOPLATIN 795 MG: 10 INJECTION, SOLUTION INTRAVENOUS at 14:59

## 2017-01-01 RX ADMIN — GADOBUTROL 7 ML: 604.72 INJECTION INTRAVENOUS at 11:51

## 2017-01-01 RX ADMIN — SODIUM CHLORIDE: 9 INJECTION, SOLUTION INTRAVENOUS at 00:51

## 2017-01-01 RX ADMIN — LORAZEPAM 1 MG: 2 INJECTION INTRAMUSCULAR; INTRAVENOUS at 13:23

## 2017-01-01 RX ADMIN — MULTIPLE VITAMINS W/ MINERALS TAB 1 TABLET: TAB at 08:45

## 2017-01-01 RX ADMIN — LIDOCAINE HYDROCHLORIDE 10 ML: 10 INJECTION, SOLUTION EPIDURAL; INFILTRATION; INTRACAUDAL; PERINEURAL at 09:35

## 2017-01-01 RX ADMIN — OXYCODONE HYDROCHLORIDE 10 MG: 5 TABLET ORAL at 08:45

## 2017-01-01 RX ADMIN — MIDAZOLAM HYDROCHLORIDE 1 MG: 1 INJECTION, SOLUTION INTRAMUSCULAR; INTRAVENOUS at 11:46

## 2017-01-01 RX ADMIN — FLUDROCORTISONE ACETATE 100 MCG: 0.1 TABLET ORAL at 07:57

## 2017-01-01 RX ADMIN — METRONIDAZOLE 500 MG: 500 TABLET ORAL at 21:36

## 2017-01-01 RX ADMIN — DICLOFENAC SODIUM 2 G: 10 GEL TOPICAL at 06:26

## 2017-01-01 RX ADMIN — SODIUM CHLORIDE, PRESERVATIVE FREE 500 UNITS: 5 INJECTION INTRAVENOUS at 13:58

## 2017-01-01 RX ADMIN — IOPAMIDOL 77 ML: 755 INJECTION, SOLUTION INTRAVENOUS at 10:55

## 2017-01-01 RX ADMIN — SODIUM CHLORIDE 250 ML: 9 INJECTION, SOLUTION INTRAVENOUS at 10:39

## 2017-01-01 RX ADMIN — PANTOPRAZOLE SODIUM 40 MG: 40 TABLET, DELAYED RELEASE ORAL at 15:56

## 2017-01-01 RX ADMIN — SODIUM CHLORIDE: 9 INJECTION, SOLUTION INTRAVENOUS at 14:11

## 2017-01-01 RX ADMIN — PANTOPRAZOLE SODIUM 40 MG: 40 TABLET, DELAYED RELEASE ORAL at 06:31

## 2017-01-01 RX ADMIN — SODIUM CHLORIDE 250 ML: 9 INJECTION, SOLUTION INTRAVENOUS at 09:36

## 2017-01-01 RX ADMIN — METRONIDAZOLE 500 MG: 500 TABLET ORAL at 16:09

## 2017-01-01 RX ADMIN — SENNOSIDES AND DOCUSATE SODIUM 1 TABLET: 8.6; 5 TABLET ORAL at 17:05

## 2017-01-01 RX ADMIN — Medication 100 MG: at 08:32

## 2017-01-01 RX ADMIN — SODIUM CHLORIDE, PRESERVATIVE FREE 500 UNITS: 5 INJECTION INTRAVENOUS at 14:44

## 2017-01-01 RX ADMIN — FAMOTIDINE 20 MG: 20 INJECTION, SOLUTION INTRAVENOUS at 10:59

## 2017-01-01 RX ADMIN — CARBOPLATIN 625 MG: 10 INJECTION, SOLUTION INTRAVENOUS at 12:50

## 2017-01-01 RX ADMIN — DEXAMETHASONE SODIUM PHOSPHATE: 10 INJECTION, SOLUTION INTRAMUSCULAR; INTRAVENOUS at 09:37

## 2017-01-01 RX ADMIN — SODIUM CHLORIDE, PRESERVATIVE FREE 5 ML: 5 INJECTION INTRAVENOUS at 14:15

## 2017-01-01 RX ADMIN — SODIUM CHLORIDE: 9 INJECTION, SOLUTION INTRAVENOUS at 18:45

## 2017-01-01 RX ADMIN — MIDAZOLAM HYDROCHLORIDE 2 MG: 1 INJECTION, SOLUTION INTRAMUSCULAR; INTRAVENOUS at 09:30

## 2017-01-01 RX ADMIN — SODIUM CHLORIDE, PRESERVATIVE FREE 5 ML: 5 INJECTION INTRAVENOUS at 15:56

## 2017-01-01 RX ADMIN — PNEUMOCOCCAL VACCINE POLYVALENT 0.5 ML
25; 25; 25; 25; 25; 25; 25; 25; 25; 25; 25; 25; 25; 25; 25; 25; 25; 25; 25; 25; 25; 25; 25 INJECTION, SOLUTION INTRAMUSCULAR; SUBCUTANEOUS at 12:17

## 2017-01-01 RX ADMIN — ACETAMINOPHEN 650 MG: 325 TABLET, FILM COATED ORAL at 00:31

## 2017-01-01 RX ADMIN — SODIUM CHLORIDE 500 ML: 9 INJECTION, SOLUTION INTRAVENOUS at 20:56

## 2017-01-01 RX ADMIN — SODIUM CHLORIDE, PRESERVATIVE FREE 5 ML: 5 INJECTION INTRAVENOUS at 14:26

## 2017-01-01 RX ADMIN — SODIUM CHLORIDE: 9 INJECTION, SOLUTION INTRAVENOUS at 03:04

## 2017-01-01 RX ADMIN — DIPHENHYDRAMINE HYDROCHLORIDE 50 MG: 50 INJECTION, SOLUTION INTRAMUSCULAR; INTRAVENOUS at 09:17

## 2017-01-01 RX ADMIN — FOLIC ACID 1 MG: 1 TABLET ORAL at 07:57

## 2017-01-01 RX ADMIN — SODIUM CHLORIDE, POTASSIUM CHLORIDE, SODIUM LACTATE AND CALCIUM CHLORIDE 2244 ML: 600; 310; 30; 20 INJECTION, SOLUTION INTRAVENOUS at 13:35

## 2017-01-01 RX ADMIN — PROPOFOL 30 MG: 10 INJECTION, EMULSION INTRAVENOUS at 12:14

## 2017-01-01 RX ADMIN — SODIUM CHLORIDE 500 ML: 9 INJECTION, SOLUTION INTRAVENOUS at 08:48

## 2017-01-01 RX ADMIN — HYDROCODONE BITARTRATE AND ACETAMINOPHEN 2 TABLET: 5; 325 TABLET ORAL at 10:35

## 2017-01-01 RX ADMIN — OXYCODONE HYDROCHLORIDE 10 MG: 5 TABLET ORAL at 16:25

## 2017-01-01 RX ADMIN — Medication 100 MG: at 07:57

## 2017-01-01 RX ADMIN — POTASSIUM PHOSPHATE, MONOBASIC AND POTASSIUM PHOSPHATE, DIBASIC 20 MMOL: 224; 236 INJECTION, SOLUTION INTRAVENOUS at 16:44

## 2017-01-01 RX ADMIN — SODIUM CHLORIDE, POTASSIUM CHLORIDE, SODIUM LACTATE AND CALCIUM CHLORIDE: 600; 310; 30; 20 INJECTION, SOLUTION INTRAVENOUS at 11:16

## 2017-01-01 RX ADMIN — FOLIC ACID 1 MG: 1 TABLET ORAL at 09:26

## 2017-01-01 RX ADMIN — PAROXETINE 10 MG: 10 TABLET, FILM COATED ORAL at 21:25

## 2017-01-01 RX ADMIN — SODIUM CHLORIDE: 9 INJECTION, SOLUTION INTRAVENOUS at 16:02

## 2017-01-01 RX ADMIN — HYDROCORTISONE SODIUM SUCCINATE 50 MG: 100 INJECTION, POWDER, FOR SOLUTION INTRAMUSCULAR; INTRAVENOUS at 07:46

## 2017-01-01 RX ADMIN — SENNOSIDES AND DOCUSATE SODIUM 1 TABLET: 8.6; 5 TABLET ORAL at 08:37

## 2017-01-01 RX ADMIN — OXYCODONE HYDROCHLORIDE 10 MG: 5 TABLET ORAL at 10:54

## 2017-01-01 RX ADMIN — FENTANYL CITRATE 50 MCG: 50 INJECTION, SOLUTION INTRAMUSCULAR; INTRAVENOUS at 11:54

## 2017-01-01 RX ADMIN — FENTANYL CITRATE 50 MCG: 50 INJECTION, SOLUTION INTRAMUSCULAR; INTRAVENOUS at 11:47

## 2017-01-01 RX ADMIN — ALBUMIN HUMAN 12.5 G: 0.05 INJECTION, SOLUTION INTRAVENOUS at 08:50

## 2017-01-01 RX ADMIN — Medication 100 MG: at 08:31

## 2017-01-01 RX ADMIN — MORPHINE SULFATE 30 MG: 30 TABLET, EXTENDED RELEASE ORAL at 20:05

## 2017-01-01 RX ADMIN — FAMOTIDINE 20 MG: 20 INJECTION, SOLUTION INTRAVENOUS at 08:48

## 2017-01-01 RX ADMIN — LIDOCAINE HYDROCHLORIDE 50 MG: 10 INJECTION, SOLUTION EPIDURAL; INFILTRATION; INTRACAUDAL; PERINEURAL at 09:30

## 2017-01-01 RX ADMIN — NICOTINE 1 PATCH: 14 PATCH, EXTENDED RELEASE TRANSDERMAL at 08:09

## 2017-01-01 RX ADMIN — OXYCODONE HYDROCHLORIDE 5 MG: 5 TABLET ORAL at 23:52

## 2017-01-01 RX ADMIN — PANTOPRAZOLE SODIUM 40 MG: 40 TABLET, DELAYED RELEASE ORAL at 16:10

## 2017-01-01 RX ADMIN — DIPHENHYDRAMINE HYDROCHLORIDE 50 MG: 50 INJECTION, SOLUTION INTRAMUSCULAR; INTRAVENOUS at 10:04

## 2017-01-01 RX ADMIN — MULTIPLE VITAMINS W/ MINERALS TAB 1 TABLET: TAB at 08:08

## 2017-01-01 RX ADMIN — SODIUM CHLORIDE: 9 INJECTION, SOLUTION INTRAVENOUS at 22:50

## 2017-01-01 RX ADMIN — ACETAMINOPHEN 650 MG: 325 TABLET, FILM COATED ORAL at 08:37

## 2017-01-01 RX ADMIN — PANTOPRAZOLE SODIUM 40 MG: 40 TABLET, DELAYED RELEASE ORAL at 08:37

## 2017-01-01 RX ADMIN — OXYCODONE HYDROCHLORIDE 10 MG: 5 TABLET ORAL at 14:06

## 2017-01-01 RX ADMIN — SODIUM CHLORIDE 57 ML: 9 INJECTION, SOLUTION INTRAVENOUS at 08:44

## 2017-01-01 RX ADMIN — NICOTINE 1 PATCH: 14 PATCH, EXTENDED RELEASE TRANSDERMAL at 08:33

## 2017-01-01 RX ADMIN — ONDANSETRON 4 MG: 2 INJECTION INTRAMUSCULAR; INTRAVENOUS at 11:53

## 2017-01-01 RX ADMIN — LEVOTHYROXINE SODIUM 100 MCG: 100 TABLET ORAL at 11:01

## 2017-01-01 RX ADMIN — SODIUM CHLORIDE 1000 ML: 9 INJECTION, SOLUTION INTRAVENOUS at 22:08

## 2017-01-01 RX ADMIN — PAROXETINE 10 MG: 10 TABLET, FILM COATED ORAL at 21:35

## 2017-01-01 RX ADMIN — SENNOSIDES AND DOCUSATE SODIUM 1 TABLET: 8.6; 5 TABLET ORAL at 09:26

## 2017-01-01 RX ADMIN — GADOBUTROL 6 ML: 604.72 INJECTION INTRAVENOUS at 15:08

## 2017-01-01 RX ADMIN — Medication 100 MG: at 11:00

## 2017-01-01 RX ADMIN — DIPHENHYDRAMINE HYDROCHLORIDE 50 MG: 50 INJECTION, SOLUTION INTRAMUSCULAR; INTRAVENOUS at 11:15

## 2017-01-01 RX ADMIN — MORPHINE SULFATE 30 MG: 30 TABLET, EXTENDED RELEASE ORAL at 08:45

## 2017-01-01 RX ADMIN — CARBOPLATIN 750 MG: 10 INJECTION, SOLUTION INTRAVENOUS at 13:29

## 2017-01-01 RX ADMIN — POTASSIUM CHLORIDE 40 MEQ: 1500 TABLET, EXTENDED RELEASE ORAL at 18:36

## 2017-01-01 RX ADMIN — SODIUM CHLORIDE 250 ML: 9 INJECTION, SOLUTION INTRAVENOUS at 09:37

## 2017-01-01 RX ADMIN — LIDOCAINE HYDROCHLORIDE 100 MG: 10 INJECTION, SOLUTION INFILTRATION; PERINEURAL at 11:51

## 2017-01-01 RX ADMIN — FOLIC ACID 1 MG: 1 TABLET ORAL at 08:08

## 2017-01-01 RX ADMIN — MORPHINE SULFATE 30 MG: 30 TABLET, EXTENDED RELEASE ORAL at 21:26

## 2017-01-01 RX ADMIN — TAMSULOSIN HYDROCHLORIDE 0.4 MG: 0.4 CAPSULE ORAL at 08:08

## 2017-01-01 RX ADMIN — METRONIDAZOLE 500 MG: 500 TABLET ORAL at 06:25

## 2017-01-01 RX ADMIN — SENNOSIDES AND DOCUSATE SODIUM 1 TABLET: 8.6; 5 TABLET ORAL at 08:36

## 2017-01-01 RX ADMIN — SODIUM CHLORIDE: 0.9 INJECTION, SOLUTION INTRAVENOUS at 09:28

## 2017-01-01 RX ADMIN — MORPHINE SULFATE 30 MG: 30 TABLET, EXTENDED RELEASE ORAL at 20:03

## 2017-01-01 RX ADMIN — FAMOTIDINE 20 MG: 20 INJECTION, SOLUTION INTRAVENOUS at 09:37

## 2017-01-01 RX ADMIN — METRONIDAZOLE 500 MG: 500 TABLET ORAL at 13:56

## 2017-01-01 RX ADMIN — HYDROMORPHONE HYDROCHLORIDE 0.2 MG: 1 INJECTION, SOLUTION INTRAMUSCULAR; INTRAVENOUS; SUBCUTANEOUS at 01:43

## 2017-01-01 RX ADMIN — MAGNESIUM SULFATE IN WATER 4 G: 40 INJECTION, SOLUTION INTRAVENOUS at 16:03

## 2017-01-01 RX ADMIN — PANTOPRAZOLE SODIUM 40 MG: 40 TABLET, DELAYED RELEASE ORAL at 06:28

## 2017-01-01 RX ADMIN — PANTOPRAZOLE SODIUM 40 MG: 40 TABLET, DELAYED RELEASE ORAL at 05:55

## 2017-01-01 RX ADMIN — SODIUM CHLORIDE, POTASSIUM CHLORIDE, SODIUM LACTATE AND CALCIUM CHLORIDE: 600; 310; 30; 20 INJECTION, SOLUTION INTRAVENOUS at 20:04

## 2017-01-01 RX ADMIN — PROPOFOL 20 MG: 10 INJECTION, EMULSION INTRAVENOUS at 09:46

## 2017-01-01 RX ADMIN — METRONIDAZOLE 500 MG: 500 TABLET ORAL at 06:28

## 2017-01-01 RX ADMIN — METRONIDAZOLE 500 MG: 500 TABLET ORAL at 12:34

## 2017-01-01 RX ADMIN — INFLUENZA A VIRUS A/MICHIGAN/45/2015 X-275 (H1N1) ANTIGEN (FORMALDEHYDE INACTIVATED), INFLUENZA A VIRUS A/HONG KONG/4801/2014 X-263B (H3N2) ANTIGEN (FORMALDEHYDE INACTIVATED), INFLUENZA B VIRUS B/PHUKET/3073/2013 ANTIGEN (FORMALDEHYDE INACTIVATED), AND INFLUENZA B VIRUS B/BRISBANE/60/2008 ANTIGEN (FORMALDEHYDE INACTIVATED) 0.5 ML: 15; 15; 15; 15 INJECTION, SUSPENSION INTRAMUSCULAR at 11:35

## 2017-01-01 RX ADMIN — PANTOPRAZOLE SODIUM 40 MG: 40 TABLET, DELAYED RELEASE ORAL at 20:48

## 2017-01-01 RX ADMIN — ACETAMINOPHEN 650 MG: 325 TABLET, FILM COATED ORAL at 23:21

## 2017-01-01 RX ADMIN — PROPOFOL 30 MG: 10 INJECTION, EMULSION INTRAVENOUS at 12:27

## 2017-01-01 RX ADMIN — DARBEPOETIN ALFA 100 MCG: 100 INJECTION, SOLUTION INTRAVENOUS; SUBCUTANEOUS at 15:27

## 2017-01-01 RX ADMIN — OXYCODONE HYDROCHLORIDE 10 MG: 5 TABLET ORAL at 04:02

## 2017-01-01 RX ADMIN — CARBOPLATIN 760 MG: 10 INJECTION, SOLUTION INTRAVENOUS at 13:08

## 2017-01-01 RX ADMIN — Medication 100 MG: at 09:26

## 2017-01-01 RX ADMIN — ACETAMINOPHEN 650 MG: 325 TABLET, FILM COATED ORAL at 23:52

## 2017-01-01 RX ADMIN — PROPOFOL 20 MG: 10 INJECTION, EMULSION INTRAVENOUS at 12:19

## 2017-01-01 RX ADMIN — PANTOPRAZOLE SODIUM 40 MG: 40 TABLET, DELAYED RELEASE ORAL at 16:09

## 2017-01-01 RX ADMIN — OXYCODONE HYDROCHLORIDE AND ACETAMINOPHEN 1 TABLET: 5; 325 TABLET ORAL at 13:11

## 2017-01-01 RX ADMIN — DEXAMETHASONE SODIUM PHOSPHATE 4 MG: 4 INJECTION, SOLUTION INTRA-ARTICULAR; INTRALESIONAL; INTRAMUSCULAR; INTRAVENOUS; SOFT TISSUE at 11:53

## 2017-01-01 RX ADMIN — Medication 100 MG: at 08:45

## 2017-01-01 RX ADMIN — ACETAMINOPHEN 650 MG: 325 TABLET, FILM COATED ORAL at 08:36

## 2017-01-01 RX ADMIN — SODIUM CHLORIDE, PRESERVATIVE FREE 5 ML: 5 INJECTION INTRAVENOUS at 15:30

## 2017-01-01 RX ADMIN — FLUDROCORTISONE ACETATE 100 MCG: 0.1 TABLET ORAL at 11:02

## 2017-01-01 RX ADMIN — GLYCOPYRROLATE 0.1 MG: 0.2 INJECTION, SOLUTION INTRAMUSCULAR; INTRAVENOUS at 11:46

## 2017-01-01 RX ADMIN — SODIUM CHLORIDE, PRESERVATIVE FREE 500 UNITS: 5 INJECTION INTRAVENOUS at 09:05

## 2017-01-01 RX ADMIN — MORPHINE SULFATE 30 MG: 30 TABLET, EXTENDED RELEASE ORAL at 09:25

## 2017-01-01 RX ADMIN — FAMOTIDINE 20 MG: 20 INJECTION, SOLUTION INTRAVENOUS at 08:52

## 2017-01-01 RX ADMIN — OXYCODONE HYDROCHLORIDE 5 MG: 5 TABLET ORAL at 11:08

## 2017-01-01 RX ADMIN — DEXAMETHASONE SODIUM PHOSPHATE: 10 INJECTION, SOLUTION INTRAMUSCULAR; INTRAVENOUS at 09:03

## 2017-01-01 RX ADMIN — SODIUM CHLORIDE 1000 ML: 9 INJECTION, SOLUTION INTRAVENOUS at 21:13

## 2017-01-01 RX ADMIN — Medication 100 MG: at 08:08

## 2017-01-01 RX ADMIN — OXYCODONE HYDROCHLORIDE 10 MG: 5 TABLET ORAL at 01:43

## 2017-01-01 RX ADMIN — TAMSULOSIN HYDROCHLORIDE 0.4 MG: 0.4 CAPSULE ORAL at 09:18

## 2017-01-01 RX ADMIN — SODIUM CHLORIDE, PRESERVATIVE FREE 5 ML: 5 INJECTION INTRAVENOUS at 13:29

## 2017-01-01 RX ADMIN — POLYETHYLENE GLYCOL 3350 17 G: 17 POWDER, FOR SOLUTION ORAL at 09:26

## 2017-01-01 RX ADMIN — SODIUM CHLORIDE: 9 INJECTION, SOLUTION INTRAVENOUS at 17:52

## 2017-01-01 RX ADMIN — PANTOPRAZOLE SODIUM 40 MG: 40 TABLET, DELAYED RELEASE ORAL at 06:18

## 2017-01-01 RX ADMIN — MIDAZOLAM HYDROCHLORIDE 1 MG: 1 INJECTION, SOLUTION INTRAMUSCULAR; INTRAVENOUS at 11:52

## 2017-01-01 RX ADMIN — LIDOCAINE 2 PATCH: 50 PATCH TOPICAL at 20:48

## 2017-01-01 RX ADMIN — FLUDROCORTISONE ACETATE 100 MCG: 0.1 TABLET ORAL at 08:35

## 2017-01-01 RX ADMIN — DEXAMETHASONE SODIUM PHOSPHATE: 10 INJECTION, SOLUTION INTRAMUSCULAR; INTRAVENOUS at 09:02

## 2017-01-01 RX ADMIN — OXYCODONE HYDROCHLORIDE 10 MG: 5 TABLET ORAL at 04:58

## 2017-01-01 RX ADMIN — MULTIPLE VITAMINS W/ MINERALS TAB 1 TABLET: TAB at 09:25

## 2017-01-01 RX ADMIN — OXYCODONE HYDROCHLORIDE 10 MG: 5 TABLET ORAL at 13:05

## 2017-01-01 RX ADMIN — MULTIPLE VITAMINS W/ MINERALS TAB 1 TABLET: TAB at 08:31

## 2017-01-01 RX ADMIN — SODIUM CHLORIDE 250 ML: 9 INJECTION, SOLUTION INTRAVENOUS at 08:51

## 2017-01-01 RX ADMIN — SODIUM CHLORIDE 240 MG: 9 INJECTION, SOLUTION INTRAVENOUS at 10:02

## 2017-01-01 RX ADMIN — PANTOPRAZOLE SODIUM 40 MG: 40 TABLET, DELAYED RELEASE ORAL at 09:26

## 2017-01-01 RX ADMIN — ACETAMINOPHEN 650 MG: 325 TABLET, FILM COATED ORAL at 17:31

## 2017-01-01 RX ADMIN — PACLITAXEL 329 MG: 6 INJECTION, SOLUTION INTRAVENOUS at 10:28

## 2017-01-01 RX ADMIN — FLUDROCORTISONE ACETATE 100 MCG: 0.1 TABLET ORAL at 14:09

## 2017-01-01 RX ADMIN — THIAMINE HYDROCHLORIDE: 100 INJECTION, SOLUTION INTRAMUSCULAR; INTRAVENOUS at 15:22

## 2017-01-01 RX ADMIN — PACLITAXEL 254 MG: 6 INJECTION, SOLUTION INTRAVENOUS at 09:42

## 2017-01-01 RX ADMIN — SODIUM CHLORIDE, PRESERVATIVE FREE 5 ML: 5 INJECTION INTRAVENOUS at 11:15

## 2017-01-01 RX ADMIN — OXYCODONE HYDROCHLORIDE 5 MG: 5 TABLET ORAL at 15:59

## 2017-01-01 RX ADMIN — Medication 100 MG: at 08:12

## 2017-01-01 RX ADMIN — GLYCOPYRROLATE 0.1 MG: 0.2 INJECTION, SOLUTION INTRAMUSCULAR; INTRAVENOUS at 11:47

## 2017-01-01 RX ADMIN — MIDAZOLAM HYDROCHLORIDE 1 MG: 1 INJECTION, SOLUTION INTRAMUSCULAR; INTRAVENOUS at 11:47

## 2017-01-01 RX ADMIN — SODIUM CHLORIDE, POTASSIUM CHLORIDE, SODIUM LACTATE AND CALCIUM CHLORIDE: 600; 310; 30; 20 INJECTION, SOLUTION INTRAVENOUS at 10:35

## 2017-01-01 RX ADMIN — SENNOSIDES AND DOCUSATE SODIUM 2 TABLET: 8.6; 5 TABLET ORAL at 09:50

## 2017-01-01 RX ADMIN — PANTOPRAZOLE SODIUM 40 MG: 40 TABLET, DELAYED RELEASE ORAL at 08:35

## 2017-01-01 RX ADMIN — Medication 100 MG: at 07:34

## 2017-01-01 RX ADMIN — FOLIC ACID 1 MG: 1 TABLET ORAL at 08:31

## 2017-01-01 RX ADMIN — SODIUM CHLORIDE, PRESERVATIVE FREE 5 ML: 5 INJECTION INTRAVENOUS at 14:05

## 2017-01-01 RX ADMIN — OXYCODONE HYDROCHLORIDE 5 MG: 5 TABLET ORAL at 17:05

## 2017-01-01 RX ADMIN — FAMOTIDINE 20 MG: 20 INJECTION, SOLUTION INTRAVENOUS at 09:50

## 2017-01-01 RX ADMIN — METRONIDAZOLE 500 MG: 500 TABLET ORAL at 21:33

## 2017-01-01 RX ADMIN — POTASSIUM CHLORIDE 10 MEQ: 14.9 INJECTION, SOLUTION, CONCENTRATE PARENTERAL at 16:45

## 2017-01-01 ASSESSMENT — ACTIVITIES OF DAILY LIVING (ADL)
DRESS: 2-->ASSISTIVE PERSON
TRANSFERRING: 2-->ASSISTIVE PERSON
EATING: 0-->INDEPENDENT
RETIRED_EATING: 0-->INDEPENDENT
AMBULATION: 2-->ASSISTIVE PERSON
AMBULATION: 2-->ASSISTIVE PERSON
DRESS: 2-->ASSISTIVE PERSON
TOILETING: 2-->ASSISTIVE PERSON
NUMBER_OF_TIMES_PATIENT_HAS_FALLEN_WITHIN_LAST_SIX_MONTHS: 10
FALL_HISTORY_WITHIN_LAST_SIX_MONTHS: YES
TRANSFERRING: 2-->ASSISTIVE PERSON
RETIRED_COMMUNICATION: 0-->UNDERSTANDS/COMMUNICATES WITHOUT DIFFICULTY
TOILETING: 2-->ASSISTIVE PERSON
COGNITION: 0 - NO COGNITION ISSUES REPORTED
BATHING: 2-->ASSISTIVE PERSON
SWALLOWING: 0-->SWALLOWS FOODS/LIQUIDS WITHOUT DIFFICULTY
PREVIOUS_RESPONSIBILITIES: MEAL PREP;HOUSEKEEPING;LAUNDRY;MEDICATION MANAGEMENT
COMMUNICATION: 0-->UNDERSTANDS/COMMUNICATES WITHOUT DIFFICULTY
BATHING: 2-->ASSISTIVE PERSON
SWALLOWING: 0-->SWALLOWS FOODS/LIQUIDS WITHOUT DIFFICULTY

## 2017-01-01 ASSESSMENT — PAIN SCALES - GENERAL
PAINLEVEL: MODERATE PAIN (5)
PAINLEVEL: WORST PAIN (10)
PAINLEVEL: MILD PAIN (2)
PAINLEVEL: MILD PAIN (2)
PAINLEVEL: MILD PAIN (3)
PAINLEVEL: MILD PAIN (3)
PAINLEVEL: MODERATE PAIN (5)
PAINLEVEL: WORST PAIN (10)
PAINLEVEL: WORST PAIN (10)
PAINLEVEL: EXTREME PAIN (8)
PAINLEVEL: SEVERE PAIN (6)
PAINLEVEL: NO PAIN (0)
PAINLEVEL: EXTREME PAIN (9)
PAINLEVEL: NO PAIN (0)
PAINLEVEL: SEVERE PAIN (7)
PAINLEVEL: MILD PAIN (2)
PAINLEVEL: NO PAIN (0)
PAINLEVEL: NO PAIN (1)
PAINLEVEL: EXTREME PAIN (8)

## 2017-01-01 ASSESSMENT — ENCOUNTER SYMPTOMS
MYALGIAS: 1
SORE THROAT: 0
PALPITATIONS: 0
WHEEZING: 0
DIARRHEA: 0
HEADACHES: 0
BACK PAIN: 1
SHORTNESS OF BREATH: 0
FEVER: 1
CONSTIPATION: 0
NAUSEA: 0
CHILLS: 1
SINUS PRESSURE: 0
VOMITING: 0
DYSURIA: 0
CONFUSION: 1
ABDOMINAL PAIN: 0
DIAPHORESIS: 1
BLOOD IN STOOL: 0
COUGH: 0
FREQUENCY: 0

## 2017-01-01 ASSESSMENT — PAIN DESCRIPTION - DESCRIPTORS
DESCRIPTORS: ACHING

## 2017-01-01 ASSESSMENT — VISUAL ACUITY
OU: NORMAL ACUITY
OU: NORMAL ACUITY

## 2017-01-01 ASSESSMENT — ANXIETY QUESTIONNAIRES
3. WORRYING TOO MUCH ABOUT DIFFERENT THINGS: NOT AT ALL
6. BECOMING EASILY ANNOYED OR IRRITABLE: NOT AT ALL
7. FEELING AFRAID AS IF SOMETHING AWFUL MIGHT HAPPEN: NOT AT ALL
1. FEELING NERVOUS, ANXIOUS, OR ON EDGE: NOT AT ALL
2. NOT BEING ABLE TO STOP OR CONTROL WORRYING: NOT AT ALL
5. BEING SO RESTLESS THAT IT IS HARD TO SIT STILL: NOT AT ALL
GAD7 TOTAL SCORE: 0
GAD7 TOTAL SCORE: 0

## 2017-01-01 ASSESSMENT — LIFESTYLE VARIABLES
TOBACCO_USE: 1

## 2017-01-01 ASSESSMENT — PATIENT HEALTH QUESTIONNAIRE - PHQ9
5. POOR APPETITE OR OVEREATING: NOT AT ALL
SUM OF ALL RESPONSES TO PHQ QUESTIONS 1-9: 6

## 2017-04-20 PROBLEM — I95.9 HYPOTENSION: Status: ACTIVE | Noted: 2017-01-01

## 2017-04-20 PROBLEM — D64.9 ANEMIA: Status: ACTIVE | Noted: 2017-01-01

## 2017-04-20 NOTE — IP AVS SNAPSHOT
` ` Patient Information     Patient Name Sex Wade Payne ([id number]) Male 1960       Room Bed    23066      Patient Demographics     Address Phone    [address] [facility] [address] [phone] (Home)  [phone] (Mobile)      Patient Ethnicity & Race     Ethnic Group Patient Race    American White      Emergency Contact(s)     Name Relation Home Work Mobile    Sarah Burch Other [phone]    Cristian Acevedo Relative [phone]        Documents on File        Status Date Received Description       Documents for the Patient    Privacy Notice - [place]  04     Insurance Card  10/25/06     Consent Form  10/25/06     Face Sheet  10/25/06     Consent Form  10/31/06     Affiliate Privacy placeholder   phase3    Consent for EHR Access Received 17     External Medication Information Consent       Patient ID       [facility] Specified Other       Consent for Services/Privacy Notice - Hospital/Clinic Received 17     Privacy Notice - [place] Received 17     Consent for Services - UM          Documents for the Encounter    CMS IM for Patient Signature       EMS/Ambulance Record  17 [facility] - EMS    Monitoring Device Output  17 SAVED EVENT REPORT    Consent for Services - Informed  17 CONSENT TO RECEIVE A BLOOD TRANSFUSION    Monitoring Device Output  17 SAVED EVENT REPORT    Assessment/Questionnaire  17 MRI HISTORY QUESTIONNAIRE AND CONTRAST NOTICE    ECG   ECG Report      Admission Information     Attending Provider Admitting Provider Admission Type Admission Date/Time    Cristian Batista MD Hemmila, Jay Michael, MD Emergency 17  1238    Discharge Date Hospital Service Auth/Cert [facility]    [facility] Room/Bed Admission Status       [place] [facility] 230 Admission (Confirmed)       Admission     Complaint    Anemia, Anemia, Hypotension,  Hypotension      Hospital Account     Name Acct ID Class Status Primary Coverage    Wade Acevedo [id number] [place]            Guarantor Account (for Hospital Account #[id number])     Name Relation to Pt Service Area Active? Acct Type    Wade Acevedo  FCS Yes Personal/Family    Address Phone          [address] [place] [address] [phone](H)              Coverage Information (for Hospital Account #[id number])     F/O Payor/Plan Precert #    [place]     Subscriber Subscriber #    Wade Acevedo [id number]    Address Phone    [address] [address]  [place] [address] [phone]

## 2017-04-20 NOTE — IP AVS SNAPSHOT
MRN:[id number]                      After Visit Summary   4/20/2017    Wade Acevedo    MRN: [id number]           Thank you!     Thank you for choosing [facility] for your care. Our goal is always to provide you with excellent care. Hearing back from our patients is one way we can continue to improve our services. Please take a few minutes to complete the written survey that you may receive in the mail after you visit with us. Thank you!        Patient Information     Date Of Birth          1960        Designated Caregiver       Most Recent Value    Caregiver    Will someone help with your care after discharge? no      About your hospital stay     You were admitted on:  April 20, 2017 You last received care in the:  [facility]    You were discharged on:  April 27, 2017       Who to Call     For medical emergencies, please call 911.  For non-urgent questions about your medical care, please call your primary care provider or clinic, None          Attending Provider     Provider Specialty    Blayne Guzman MD Emergency Medicine    Patel Schrader MD Internal Medicine    Placido Juares MD [facility]    [facility] Hospital[facility]       Primary Care Provider    Doctor Unknown, MD       No address on file        After Care Instructions     Activity - Up with nursing assistance           Advance Diet as Tolerated       Follow this diet upon discharge:       Regular Diet Adult            General info for SNF       Length of Stay Estimate: Short Term Care: Estimated # of Days <30  Condition at Discharge: Stable  Level of care:skilled   Rehabilitation Potential: Fair  Admission H&P remains valid and up-to-date: Yes  Recent Chemotherapy: N/A  Use Nursing Home Standing Orders: Yes            Mantoux instructions       Give two-step Mantoux (PPD) Per Facility Policy Yes                  Your next 10 appointments already scheduled     May 04, 2017 11:00 AM CDT   New  Visit with Pasquale Naylor MD   [facility] ([facility])    [facility]  [address]  [facility] [address]   [phone]            May 10, 2017 11:00 AM CDT   (Arrive by 10:45 AM)   NEW ENDOCRINE with Padma Medley MD   [facility] ([facility] [place])    [address]  3rd Floor  [facility] [address] [phone]              Additional Services     ENDOCRINOLOGY ADULT REFERRAL       Your provider has referred you to: [facility]: Endocrinology and Diabetes Clinic - [place] [phone]   [email or web address]      Please be aware that coverage of these services is subject to the terms and limitations of your health insurance plan.  Call member services at your health plan with any benefit or coverage questions.      Please bring the following to your appointment:    >>   Any x-rays, CTs or MRIs which have been performed.  Contact the facility where they were done to arrange for  prior to your scheduled appointment.    >>   List of current medications   >>   This referral request   >>   Any documents/labs given to you for this referral            Occupational Therapy Adult Consult       Evaluate and treat as clinically indicated.    Reason:  Weak            Physical Therapy Adult Consult       Evaluate and treat as clinically indicated.    Reason:  WEAK                  Further instructions from your care team                       Radiology  Discharge Instructions for Lung/Chest Biopsy    A lung/chest biopsy is a procedure to obtain a small tissue sample from your lung/chest.  This tissue is obtained using a biopsy needle.  This sample will be examined in a laboratory for any abnormalities.    AFTER YOU ARE HOME:    You may return to your normal diet    Relax and take it easy for 24 hours    Do have someone stay with you for the next 24 hours to help you if  you have any difficulties.  You may develop a complete or partial collapse of your lung (pneumothorax), from the needle entering your lung.    Remove band-aid from biopsy site in 24 hours.    You may use Tylenol for discomfort at biopsy site.    You may cough up small amounts of blood.    CALL YOUR PRIMARY PROVIDER IF:    You develop temperature over 101o F or redness at biopsy site.    AFTER HOURS CALL [facility] AT [phone],    COME TO EMERGENCY ROOM IF:    You are having severe difficulty breathing, severe chest pain, coughing up large amounts of blood, or have heavy bleeding from biopsy site.  DO NOT DRIVE YOURSELF.    Your ordering physician will contact you with the laboratory results.      ADDITIONAL INSTRUCTIONS:       I have reviewed and understand these discharge instructions.        __________________________________________________  Patient Signature        __________________________________________________  Nurse/Radiologist Signature  4/26/2017       Follow up with endocrinology.   Follow up with oncology.  Appointment is made for this     Pending Results     Date and Time Order Name Status Description    4/26/2017 0949 Surgical pathology exam In process     4/26/2017 0642 Adrenal corticotropin In process     4/24/2017 0000 IgF binding protein 1 In process             Statement of Approval     Ordered          04/27/17 0957  I have reviewed and agree with all the recommendations and orders detailed in this document.  EFFECTIVE NOW     Approved and electronically signed by:  Cristian Batista MD             Admission Information     Date & Time Provider Department Dept. Phone    4/20/2017 Cristian Batista MD [facility] Surgical [phone]      Your Vitals Were     Blood Pressure Pulse Temperature Respirations Weight Pulse Oximetry    118/84 84 98.2  F (36.8  C) (Oral) 18 82.3 kg (181 lb 7 oz) 95%      MyChart Information     [employer] lets you send messages to  "your doctor, view your test results, renew your prescriptions, schedule appointments and more. To sign up, go to [email or web address] . Click on \"Log in\" on the left side of the screen, which will take you to the Welcome page. Then click on \"Sign up Now\" on the right side of the page.     You will be asked to enter the access code listed below, as well as some personal information. Please follow the directions to create your username and password.     Your access code is: [email or web address]  Expires: 2017  9:09 AM     Your access code will  in 90 days. If you need help or a new code, please call your [facility] clinic or [phone].        Care EveryWhere ID     This is your Care EveryWhere ID. This could be used by other organizations to access your [facility] medical records  [id number]           Review of your medicines      START taking        Dose / Directions    folic acid 1 MG tablet   Commonly known as:  FOLVITE        Dose:  1 mg   Take 1 tablet (1 mg) by mouth daily   Quantity:  30 tablet   Refills:  0       pantoprazole 40 MG EC tablet   Commonly known as:  PROTONIX        Dose:  40 mg   Take 1 tablet (40 mg) by mouth 2 times daily (before meals)   Quantity:  30 tablet   Refills:  0       senna-docusate 8.6-50 MG per tablet   Commonly known as:  SENOKOT-S;PERICOLACE        Dose:  1-2 tablet   Take 1-2 tablets by mouth 2 times daily   Quantity:  100 tablet   Refills:  0       thiamine 100 MG tablet        Dose:  100 mg   Take 1 tablet (100 mg) by mouth daily   Refills:  0                Protect others around you: Learn how to safely use, store and throw away your medicines at www.disposemymeds.org.             Medication List: This is a list of all your medications and when to take them. Check marks below indicate your daily home schedule. Keep this list as a reference.      Medications           Morning Afternoon Evening Bedtime As Needed    folic acid 1 MG tablet   Commonly known as:  FOLVITE "   Take 1 tablet (1 mg) by mouth daily   Last time this was given:  1 mg on 4/27/2017  7:57 AM                                   pantoprazole 40 MG EC tablet   Commonly known as:  PROTONIX   Take 1 tablet (40 mg) by mouth 2 times daily (before meals)   Last time this was given:  40 mg on 4/27/2017  5:57 AM                                      senna-docusate 8.6-50 MG per tablet   Commonly known as:  SENOKOT-S;PERICOLACE   Take 1-2 tablets by mouth 2 times daily   Last time this was given:  1 tablet on 4/24/2017  5:05 PM                                      thiamine 100 MG tablet   Take 1 tablet (100 mg) by mouth daily   Last time this was given:  100 mg on 4/27/2017  7:57 AM

## 2017-04-20 NOTE — ED NOTES
Many bottles of ETOH were found in the house, 20 empty bottles of gin, states last drink was January

## 2017-04-20 NOTE — IP AVS SNAPSHOT
` `           [facility] SURGICAL: [phone]                                              INTERAGENCY TRANSFER FORM - NURSING   2017                    Hospital Admission Date: 2017  JOHN SANCHEZ   : 1960  Sex: Male        Attending Provider: Cristian Batista MD     Allergies:  Lubriderm    Infection:  None   Service:  HOSPITALIST    Ht:  --   Wt:  82.3 kg (181 lb 7 oz)   Admission Wt:  74.8 kg (165 lb)    BMI:  --   BSA:  --            Patient PCP Information     Provider PCP Type    Doctor Joe, MD General      Current Code Status     Date Active Code Status Order ID Comments User Context       2017  5:31 PM Full Code [id number]  Patel Schrader MD Inpatient       Code Status History     Date Active Date Inactive Code Status Order ID Comments User Context    This patient has a current code status but no historical code status.      Advance Directives        Does patient have a scanned Advance Directive/ACP document in EPIC?           No        Hospital Problems as of 2017              Priority Class Noted POA    Anemia Medium  2017 Yes    Hypotension Medium  2017 Yes      Non-Hospital Problems as of 2017     None      Immunizations     Name Date      Pneumococcal 23 valent 17          END      ASSESSMENT     Discharge Profile Flowsheet     EXPECTED DISCHARGE     Additional Documentation  Bowel Program (Group) 17 1718    Expected Discharge Date  17 1301   COMMUNICATION ASSESSMENT      DISCHARGE NEEDS ASSESSMENT     Patient's communication style  spoken language (English or Bilingual) 17 1241    Anticipated Changes Related to Illness  none 17 1731   FINAL RESOURCES      Transportation Available  family or friend will provide 17 1558   Resources List  Transitional Care 17 1121    FUNCTIONAL LEVEL CURRENT     Other Resources  Chemical Dependency Services 17 1121    Ambulation  0-->independent  "04/24/17 1718   SKIN      Transferring  0-->independent 04/24/17 1718   Inspection  Full 04/27/17 1114    Toileting  0-->independent 04/24/17 1718   Skin WDL  ex 04/27/17 1114    Bathing  2-->assistive person 04/24/17 1718   Skin Color/Characteristics  pale 04/27/17 1114    Dressing  2-->assistive person 04/24/17 1718   Skin Temperature  warm 04/27/17 1114    Eating  0-->independent 04/24/17 1718   Skin Moisture  dry 04/25/17 0823    Communication  0-->understands/communicates without difficulty 04/24/17 1718   Skin Elasticity  quick return to original state 04/25/17 0055    Swallowing  0-->swallows foods/liquids without difficulty 04/24/17 1718   Skin Integrity  bruise(s) 04/27/17 1114    GASTROINTESTINAL (ADULT,PEDIATRIC,OB)     Skin areas NOT inspected  Coccyx;Sacrum;Buttock, right;Buttock, left;Hip, right;Hip, left 04/26/17 0119    GI WDL  WDL 04/27/17 1109   SAFETY      All Quadrants Bowel Sounds  audible and active in all quadrants 04/27/17 1109   Safety WDL  WDL 04/27/17 1114    Last Bowel Movement  04/27/17 04/27/17 1109   Safety Factors  other (see comments) (refused alarm activation) 04/26/17 0119    Passing flatus  yes 04/27/17 0012                      Assessment WDL (Within Defined Limits) Definitions           Safety WDL     Effective: 09/28/15    Row Information: <b>WDL Definition:</b> Bed in low position, wheels locked; call light in reach; upper side rails up x 2; ID band on<br> <font color=\"gray\"><i>Item=AS safety wdl>>List=AS safety wdl>>Version=F14</i></font>      Skin WDL     Effective: 09/28/15    Row Information: <b>WDL Definition:</b> Warm; dry; intact; elastic; without discoloration; pressure points without redness<br> <font color=\"gray\"><i>Item=AS skin wdl>>List=AS skin wdl>>Version=F14</i></font>      Vitals     Vital Signs Flowsheet     VITAL SIGNS     Change in Pain  getting better 04/27/17 0803    Temp  98.2  F (36.8  C) 04/27/17 0721   Pain Control  partially effective 04/27/17 0803    " Temp src  Oral 04/27/17 0721   Functioning  can do most things, but pain gets in the way of some 04/27/17 0803    Resp  18 04/27/17 0721   Sleep  normal sleep 04/27/17 0803    Pulse  84 04/27/17 0721   ANALGESIA SIDE EFFECTS MONITORING      Heart Rate  80 04/26/17 2248   Side Effects Monitoring: Respiratory Quality  R 04/26/17 2223    Pulse/Heart Rate Source  Monitor 04/27/17 0721   Side Effects Monitoring: Respiratory Depth  N 04/26/17 2223    BP  118/84 04/27/17 0721   Side Effects Monitoring: Sedation Level  1 04/26/17 2223    BP Location  Right arm 04/26/17 2248   HEIGHT AND WEIGHT      OXYGEN THERAPY     Weight  82.3 kg (181 lb 7 oz) 04/26/17 0636    SpO2  95 % 04/27/17 0721   ECG      O2 Device  None (Room air) 04/27/17 0721   ECG Rhythm  Sinus rhythm 04/26/17 0750    PAIN/COMFORT     DE Interval  0.14 04/26/17 0750    Patient Currently in Pain  denies 04/26/17 0045   QRS Interval  0.08 04/26/17 0750    Preferred Pain Scale  CAPA (Clinically Aligned Pain Assessment) ([facility] Adults Only) 04/27/17 0013   QT Interval  0.33 04/26/17 0750    0-10 Pain Scale  0 04/22/17 1206   Lead Monitored  Lead II 04/26/17 0750    Pain Location  Head 04/26/17 1638   Ectopy  None 04/26/17 0750    Pain Orientation  Upper;Right 04/24/17 2128   POSITIONING      Pain Descriptors  Headache 04/27/17 0803   Body Position  independently positioning 04/27/17 0756    Pain Management Interventions  analgesia administered 04/24/17 2336   Head of Bed (HOB)  HOB at 30-45 degrees 04/26/17 1851    Pain Intervention(s)  Medication (See eMAR) 04/27/17 0013   Positioning/Transfer Devices  pillows;in use 04/24/17 1718    Response to Interventions  Relief 04/21/17 1207   DAILY CARE      CLINICALLY ALIGNED PAIN ASSESSMENT (CAPA) ([facility] ADULTS ONLY)     Activity Type  ambulated in ellis (with therapy) 04/27/17 1026    Comfort  comfortably manageable 04/27/17 0803   Activity Level of Assistance  assistance, stand-by  04/27/17 0756            Patient Lines/Drains/Airways Status    Active LINES/DRAINS/AIRWAYS     Name: Placement date: Placement time: Site: Days: Last dressing change:    Peripheral IV 04/25/17 Left 04/25/17         2             Patient Lines/Drains/Airways Status    Active PICC/CVC     None            Intake/Output Detail Report     Date Intake         Output   Net    Shift P.O. I.V. IV Piggyback Colloid Blood Components Total Urine Emesis/NG output Total       Noc 04/25/17 2300 - 04/26/17 0659 -- -- -- -- -- -- 1775 -- 1775 -1775    Day 04/26/17 0700 - 04/26/17 1459 960 400 -- -- -- 1360 250 -- 250 1110    [place] 04/26/17 1500 - 04/26/17 2259 240 -- -- -- -- 240 1400 -- 1400 -1160    Noc 04/26/17 2300 - 04/27/17 0659 -- -- -- -- -- -- 850 -- 850 -850    Day 04/27/17 0700 - 04/27/17 1459 -- -- -- -- -- -- -- -- -- 0      Last Void/BM       Most Recent Value    Urine Occurrence 1 at 04/27/2017 0800    Stool Occurrence 1 at 04/27/2017 0800      Case Management/Discharge Planning     Case Management/Discharge Planning Flowsheet     REFERRAL INFORMATION     EXPECTED DISCHARGE      Did the Initial Social Work Assessment result in a Social Work Case?  Yes 04/21/17 1121   Expected Discharge Date  04/26/17 04/25/17 1301    Admission Type  inpatient 04/21/17 1121   DISCHARGE PLANNING      Arrived From  home or self-care 04/21/17 1121   Anticipated Changes Related to Illness  none 04/20/17 1731    Referral Source  nursing 04/21/17 1121   Transportation Available  family or friend will provide 04/22/17 1558    Reason For Consult  discharge planning;substance use concerns 04/21/17 1121   FINAL RESOURCES      LIVING ENVIRONMENT     Resources List  Transitional Care 04/21/17 1121    Lives With  alone 04/22/17 1558   Other Resources  Chemical Dependency Services 04/21/17 1121    Living Arrangements  house 04/22/17 1558   ABUSE RISK SCREEN      ASSESSMENT OF FAMILY/SOCIAL SUPPORT     QUESTION TO PATIENT:  Has a member of your family  or a partner(now or in the past) intimidated, hurt, manipulated, or controlled you in any way?  no 04/20/17 1729    Who is your support system?  Other (specify) (Uncle) 04/21/17 1121   QUESTION TO PATIENT: Do you feel safe going back to the place where you are living?  yes 04/20/17 1729    Description of Support System  Uninvolved 04/21/17 1121   OBSERVATION: Is there reason to believe there has been maltreatment of a vulnerable adult (ie. Physical/Sexual/Emotional abuse, self neglect, lack of adequate food, shelter, medical care, or financial exploitation)?  no 04/20/17 1729    Support Assessment  Lacks necessary supervision and assistance;Limited social contact and support 04/21/17 1121   (R) MENTAL HEALTH SUICIDE RISK      COPING/STRESS     Willingness to Contact Staff Member if Feeling Like Hurting Self  no 04/20/17 1743    Major Change/Loss/Stressor  financial 04/20/17 1731   Are you depressed or being treated for depression?  No 04/20/17 1743

## 2017-04-20 NOTE — H&P
[facility]    History and Physical  Hospital Medicine       Date of Admission:  4/20/2017  Date of Service: 4/20/2017     Assessment & Plan   Wade Acevedo is a 57 year old male who presents with weakness and Dizziness on getting up. He was found to have a 3 cm mass in the Left Upper Lobes, Mediastinal Lymphadenopathy, , destructive bone lesion Left 5th Rib with history of Smoking and Brain CT scan showing ? Pituitary mass with ? Right Maxillary Sinusitis., Acute Hypokalemia and Acute Kidney Injury.     Active Problems:    Anemia  ? Due to nutritional deficiencies and/or due to cancer/CKD  Peripheral Blood Smear is done   2 Units of Blood will be transfused  Iron studies as well as B12 and Folate Levels will be sent     Acute Kidney Injury  ? Due to NSAIDS Induced Nephropathy or Pigment induced Nephropathy due to Rhabdomyolysis  Will check UA  Give IV Fluids     Acute Hypokalemia   Will replace Potassium  Also check Magnesium and Phosphorus level in a patient with history of Alcohol use.    Lung mass- Probably Lung Cancer  3 cm mass in the Left Upper Lobe AND Mediastinal Lymphadenopathy, destructive bone lesion Left 5th Rib  ? Due to metastatic Lung Cancer  Will discuss with Radiology and do Lung Biopsy    Alcohol Abuse  Recently quit Drinking  Will give Thiamine  Start Folic Acid later after Folic level is checked    Acute Rhabdomyolysis (New problem- added later)   IV Fluids  Monitor CPK in am    DVT Prophylaxis: Pneumatic Compression Devices  Code Status: Full Code    Disposition: Anticipate discharge in ? day(s). Appropriate for Inpatient care.    Patel Schrader        History is obtained from the patient and review of old records via the EMR.          Chief Complaint:  Weakness      Past Medical History    None known otherwise   Smoking  Alcohol use up to a liter of Gin daily up until last month    Past Surgical History   None known    History of Present Illness   Wade Acevedo is a 57  year old male with the above past medical history now presents with weakness which has been going on for 2-3 months and was progressive to a point that for the past few days he is not comfortable getting up at all with the fear of falling down. He felt lightheaded when he got up to stand. He did fall 2 weeks ago on the Right side. He has been taking Ibuprofen regularly.     Prior to Admission Medications   None     Allergies   Allergies   Allergen Reactions     Lubriderm Unknown       Family History    Father had Pancreatic Cancer. Mother had Lung Cancer.     Social History   Lives alone. Has no children. He was brought to the Emergency department on admission by his Uncle. Brother Sebastian and Sister who lives in [place].   Smoked a pack of cigarettes up until earlier lately a quarter pack per day. Was drinking up to a Liter of Gin a day but has quit since a month ago.     Review of Systems   The 10 point Review of Systems is negative other than noted in the HPI or here.    He denies any Headache, Neck, Jaw or Shoulder Pain. No Chest pain at rest or on normal exercise. Has SOB, cough. No nausea or Vomiting. No Abdominal pain or Urinary symptoms. No Hip pain or calf pain. No fever or Chills. No Bleeding.  No rashes or weakness in hands or Legs. No Seizure activity.    Physical Exam   /68  Pulse 103  Temp 98.7  F (37.1  C) (Oral)  Resp 12  Wt 74.8 kg (165 lb)  SpO2 100% /68  Pulse 103  Temp 98.7  F (37.1  C) (Oral)  Resp 12  Wt 74.8 kg (165 lb)  SpO2 100%      Weight: 165 lbs 0 oz There is no height or weight on file to calculate BMI.     Constitutional:cooperative, no apparent distress, appears nontoxic,  Eyes: Eyes are clear, pupils are reactive. Nystagmus Horizontal  HEENT: Oropharynx is clear and moist. No evidence of cranial trauma.  Lymph/Hematologic: No epitrochlear, axillary, anterior or posterior cervical, or supraclavicular lymphadenopathy is appreciated.  Cardiovascular: Regular rate and  rhythm, normal S1 and S2, and no murmur noted. JVP is normal. Good peripheral pulses in wrists bilaterally. No lower extremity edema.  Respiratory: Clear to auscultation bilaterally.   GI: Soft, non-tender, normal bowel sounds, no hepatosplenomegaly.  Genitourinary: Deferred  Musculoskeletal: Normal muscle bulk and tone.  Skin: Warm and dry, no rashes.   Neurologic:  Alert, oriented x 3 Neck supple. Cranial nerves are grossly intact.  is symmetric.     Data   Data reviewed today:     Recent Labs  Lab 04/20/17  1255   WBC 7.6   HGB 6.6*   MCV 65*      INR 1.05      POTASSIUM 2.8*   CHLORIDE 108   CO2 21   BUN 14   CR 1.81*   ANIONGAP 11   JOHANN 9.5   *   ALBUMIN 3.2*   PROTTOTAL 7.7   BILITOTAL 1.0   ALKPHOS 244*   ALT 28   *       Recent Results (from the past 24 hour(s))   CT Head w/o Contrast    Narrative    CT HEAD W/O CONTRAST   4/20/2017 2:20 PM     HISTORY: fall    TECHNIQUE: Axial images of the head without IV contrast material.  Radiation dose for this scan was reduced using automated exposure  control, adjustment of the mA and/or kV according to patient size, or  iterative reconstruction technique.    COMPARISON: None.    FINDINGS:  There is generalized atrophy of the brain. . There is a  mass arising from the sella extending into the suprasellar cistern.  This measures approximately 1.2 cm in cephalocaudad dimension. Extends  up to the optic chiasm. This would be consistent with a pituitary  adenoma. The right maxillary sinus is completely opacified. The medial  wall of the sinuses displaced medially. There is sclerosis and  thickening of the wall of the sinus. The findings would be consistent  with a mucocele within the right maxillary sinus. Mucosal thickening  is seen in the right frontal sinus and several right anterior ethmoid  sinuses.. There is no evidence of trauma.      Impression    IMPRESSION:   1. No intracranial bleed or skull fractures.  2. Soft tissue mass  arising from the sella extending into the  suprasellar region. This is probably due to a pituitary adenoma.  Meningioma is also in the differential diagnosis. MR scan of the sella  would be helpful for further characterization of this lesion. This  lesion could affect the optic chiasm.  3. Opacified right maxillary sinus with medial displacement of the  medial wall of the sinus. The appearance raises the possibility of a  mucocele within the right maxillary sinus.  4. Atrophy of the brain.    MICHAEL GARZON MD   CT Chest Abdomen Pelvis w/o Contrast    Narrative    CT CHEST/ABDOMEN/PELVIS WITHOUT CONTRAST April 20, 2017 2:22 PM    HISTORY: Fall, chest pain, falling hemoglobin. No IV contrast due to  poor renal function.    TECHNIQUE: CT scan obtained of the chest, abdomen, and pelvis without  IV contrast. Radiation dose for this scan was reduced using automated  exposure control, adjustment of the mA and/or kV according to patient  size, or iterative reconstruction technique.    COMPARISON:  None.    FINDINGS:  Chest: There is no acute thoracic aortic abnormality identified within  the limits of unenhanced scanning. Mild coronary artery and thoracic  aortic calcifications are present. No pleural or pericardial  effusions. No pneumothorax identified. There is prominent adenopathy  in the mediastinum. An example at the anterior mediastinum measures  4.1 x 2.2 cm series 3 image 21. Enlarged subcarinal lymph node  measures 2.6 x 1.6 cm image 30. There are other examples. Cannot  exclude hilar enlarged lymph nodes at unenhanced scanning. Axillary  lymph nodes are small. There may be a tiny sebaceous cyst at the left  anterior axilla image 13. There is a focal ill-defined nodular lesion  measuring 3.0 x 2.1 cm medial anterior left upper lobe series 4 image  22 with some atelectasis leading towards the left hilar region. No  acute airspace disease otherwise seen. Some mild subpleural  emphysematous change suggested. There are  no convincing acute  fractures identified. A few subacute fractures noted in the ribs.  There is a destructive bony lesion occupying the left lateral fifth  rib measuring approximately 4.4 x 1.5 cm series 3 image 23.    Abdomen/pelvis: No acute fractures visualized at the abdomen or pelvis  levels. No convincing destructive bony lesions identified at the  abdomen or pelvis. Contracted gallbladder. Unenhanced liver, adrenals,  spleen, pancreas, and kidneys do not show any acute abnormalities. No  hydronephrosis. Nonobstructing small stone lower left kidney is only  0.2 cm image 75. Diffuse vascular calcifications. No acute abdominal  aortic abnormality. A borderline prominent lymph node deep to the  right hemidiaphragm reji 0.8 cm series 3 image 53. Portocaval region  lymph node is 1.6 x 1.1 cm series 3 image 63. No acute bowel  abnormality. No bowel obstruction. Normal appendix. No free fluid or  free air. No evidence for acute hemorrhage.      Impression    IMPRESSION:  1. No acute traumatic abnormality is seen.  2. There are findings worrisome for neoplasm including an irregular  and elongated focal opacity measuring approximately 3 cm at the medial  left upper lobe. This could represent pulmonary neoplasm. There is  adjacent atelectasis leading towards the left hilum. There is also  prominent adenopathy within the mediastinum, and a destructive bone  lesion involving the left lateral fifth rib that may represent rib  metastasis. Recommend further oncologic workup.  3. Mildly enlarged retrocrural right-sided lymph node is nonspecific.  4. Nonobstructing small stone at the left kidney.    NISH WEBB MD       I personally reviewed the chest CT image(s) showing above findings.    Patel Schrader   Hospitalist. [facility]. [place].

## 2017-04-20 NOTE — IP AVS SNAPSHOT
` `     [facility] SURGICAL: [phone]            Medication Administration Report for Wade Acevedo as of 04/27/17 1307   Legend:    Given Hold Not Given Due Canceled Entry Other Actions    Time Time (Time) Time  Time-Action       Inactive    Active    Linked        Medications 04/21/17 04/22/17 04/23/17 04/24/17 04/25/17 04/26/17 04/27/17    acetaminophen (TYLENOL) tablet 650 mg  Dose: 650 mg Freq: EVERY 4 HOURS PRN Route: PO  PRN Reason: mild pain  Start: 04/20/17 1724   Admin Instructions: Alternate ibuprofen (if ordered) with acetaminophen.  Max 2400 mg per day.  Maximum acetaminophen dose from all sources = 75 mg/kg/day not to exceed 4 grams/day.      0836 (650 mg)-Given [C]        0031 (650 mg)-Given       0837 (650 mg)-Given        2334 (650 mg)-Given         2352 (650 mg)-Given            fludrocortisone (FLORINEF) tablet 100 mcg  Dose: 100 mcg Freq: DAILY Route: PO  Start: 04/26/17 1100         1102 (100 mcg)-Given        0757 (100 mcg)-Given           folic acid (FOLVITE) tablet 1 mg  Dose: 1 mg Freq: DAILY Route: PO  Start: 04/26/17 1030         1102 (1 mg)-Given        0757 (1 mg)-Given           lactated ringers BOLUS 500 mL  Dose: 500 mL Freq: EVERY 1 HOUR PRN Route: IV  PRN Comment: MAP < 60  Start: 04/22/17 0108              lidocaine (LIDODERM) 5 % Patch 2 patch  Dose: 2 patch Freq: EVERY 24 HOURS 2000 Route: TD  Start: 04/23/17 2000   Admin Instructions: Apply patch(s) to right anterior chest . To prevent lidocaine toxicity, patient should be patch free for 12 hrs daily. Patches may be cut to smaller size prior to removing release liner.  NEVER APPLY HEAT OVER PATCH which will increase absorption and may lead to risk of local anesthetic toxicity. Do not apply over area where liposomal bupivacaine was injected for 96 hours post injection.       2048 (2 patch)-Given        2055 (1 patch)-Given [C]        (2009)-Not Given        (1955)-Not Given [C]        [ ] 2000         "  And  lidocaine (LIDODERM) patch REMOVAL  Freq: EVERY 24 HOURS 0800 Route: TD  Start: 04/24/17 0800   Admin Instructions: Remove lidocaine Patch.        0839 ( )-Patch Removed        0812 ( )-Patch Removed                          And  lidocaine (LIDODERM) Patch in Place  Freq: EVERY 8 HOURS Route: TD  Start: 04/23/17 2000   Admin Instructions: Chart every shift, confirming that patch is still in place on patient (no barcode scan needed). See patch order for dose information.  NEVER APPLY HEAT OVER PATCH which will increase absorption and may lead to risk of local anesthetic toxicity. Do not apply over area where liposomal bupivacaine injected for 96 hours.       2054 ( )-Patch in Place        0343 ( )-Patch in Place       (1211)-Not Given       2100 ( )-Patch in Place        0400 ( )-Patch in Place       (1321)-Not Given [C]       (2009)-Not Given               (1319)-Not Given [C]       1955 ( )-Read [C]               [ ] 1200       [ ] 2000           lidocaine (LMX4) kit  Freq: EVERY 1 HOUR PRN Route: Top  PRN Reason: pain  PRN Comment: with VAD insertion or accessing implanted port.  Start: 04/26/17 0849   Admin Instructions: Do NOT give if patient has a history of allergy to any local anesthetic or any \"shaka\" product.   Apply 30 minutes prior to VAD insertion or port access. MAX Dose: 2.5 g (  of 5 g tube)               lidocaine 1 % 1 mL  Dose: 1 mL Freq: EVERY 1 HOUR PRN Route: OTHER  PRN Comment: mild pain with VAD insertion or accessing implanted port  Start: 04/26/17 0849   Admin Instructions: Do NOT give if patient has a history of allergy to any local anesthetic or any \"shaka\" product. MAX dose 1 mL subcutaneous OR intradermal in divided doses.               magnesium sulfate 4 g in 100 mL sterile water (premade)  Dose: 4 g Freq: EVERY 4 HOURS PRN Route: IV  PRN Reason: magnesium supplementation  Start: 04/20/17 4137   Admin Instructions: For serum Mg++ less than 1.6 mg/dL  Give 4 g and recheck " magnesium level 2 hours after dose, and next AM.               morphine (PF) injection 1 mg  Dose: 1 mg Freq: EVERY 2 HOURS PRN Route: IV  PRN Reason: severe pain  Start: 04/20/17 1724   Admin Instructions: Hold while on PCA.               naloxone (NARCAN) injection 0.1-0.4 mg  Dose: 0.1-0.4 mg Freq: EVERY 2 MIN PRN Route: IV  PRN Reason: opioid reversal  Start: 04/20/17 1749   Admin Instructions: For respiratory rate LESS than or EQUAL to 8.  Partial reversal dose:  0.1 mg titrated q 2 minutes for Analgesia Side Effects Monitoring Sedation Level of 3 (frequently drowsy, arousable, drifts to sleep during conversation).Full reversal dose:  0.4 mg bolus for Analgesia Side Effects Monitoring Sedation Level of 4 (somnolent, minimal or no response to stimulation).               ondansetron (ZOFRAN-ODT) ODT tab 4 mg  Dose: 4 mg Freq: EVERY 6 HOURS PRN Route: PO  PRN Reason: nausea  Start: 04/20/17 1724   Admin Instructions: This is Step 1 of nausea and vomiting management.  If nausea not resolved in 15 minutes, go to Step 2 prochlorperazine (COMPAZINE). Do not push through foil backing. Peel back foil and gently remove. Place on tongue immediately. Administration with liquid unnecessary      1203 (4 mg)-Given               Or  ondansetron (ZOFRAN) injection 4 mg  Dose: 4 mg Freq: EVERY 6 HOURS PRN Route: IV  PRN Reasons: nausea,vomiting  Start: 04/20/17 1724   Admin Instructions: This is Step 1 of nausea and vomiting management.  If nausea not resolved in 15 minutes, go to Step 2 prochlorperazine (COMPAZINE).  Irritant.                      oxyCODONE (ROXICODONE) IR tablet 5-10 mg  Dose: 5-10 mg Freq: EVERY 4 HOURS PRN Route: PO  PRN Reason: moderate to severe pain  Start: 04/21/17 1409      1108 (5 mg)-Given        1705 (5 mg)-Given        1559 (5 mg)-Given       2014 (5 mg)-Given        1634 (5 mg)-Given       1849 (5 mg)-Given       2352 (5 mg)-Given        1054 (10 mg)-Given           pantoprazole (PROTONIX) EC  tablet 40 mg  Dose: 40 mg Freq: 2 TIMES DAILY BEFORE MEALS Route: PO  Start: 04/20/17 1745   Admin Instructions: DO NOT CRUSH.     0734 (40 mg)-Given       1649 (40 mg)-Given        0648 (40 mg)-Given       1826 (40 mg)-Given        (0631)-Not Given       0837 (40 mg)-Given       (1738)-Not Given        (0629)-Not Given       0835 (40 mg)-Given       1610 (40 mg)-Given        0631 (40 mg)-Given       1556 (40 mg)-Given        0618 (40 mg)-Given       1620 (40 mg)-Given        0557 (40 mg)-Given       [ ] 1630           potassium chloride (KLOR-CON) Packet 20-40 mEq  Dose: 20-40 mEq Freq: EVERY 2 HOURS PRN Route: ORAL OR FEED  PRN Reason: potassium supplementation  Start: 04/20/17 1726   Admin Instructions: Use if unable to tolerate tablets.  If Serum K+ 3.0-3.3, dose = 60 mEq po total dose (40 mEq x1 followed in 2 hours by 20 mEq x1). Recheck K+ level 4 hours after dose and the next AM.  If Serum K+ 2.5-2.9, dose = 80 mEq po total dose (40 mEq Q2H x2). Recheck K+ level 4 hours after dose and the next AM.  If Serum K+ less than 2.5, See IV order.  Dissolve packet contents in 4-8 ounces of cold water or juice.     0818 (40 mEq)-Given       1216 (20 mEq)-Given                 potassium chloride 10 mEq in 100 mL intermittent infusion  Dose: 10 mEq Freq: EVERY 1 HOUR PRN Route: IV  PRN Reason: potassium supplementation  Start: 04/20/17 1726   Admin Instructions: Infuse via PERIPHERAL LINE or CENTRAL LINE. Use for central line replacement if patient weight less than 65 kg, if patient is on TPN with high potassium content or if unit does not stock 20 mEq bags.   If Serum K+ 3.0-3.3, dose = 10 mEq/hr x4 doses (40 mEq IV total dose). Recheck K+ level 2 hours after dose and the next AM.   If Serum K+ less than 3.0, dose = 10 mEq/hr x6 doses (60 mEq IV total dose). Recheck K+ level 2 hours after dose and the next AM.               potassium chloride 10 mEq in 100 mL intermittent infusion with 10 mg lidocaine  Dose: 10 mEq  Freq: EVERY 1 HOUR PRN Route: IV  PRN Reason: potassium supplementation  Start: 04/20/17 1726   Admin Instructions: Infuse via PERIPHERAL LINE. Use potassium with lidocaine for pain with peripheral administration.  If Serum K+ 3.0-3.3, dose = 10 mEq/hr x4 doses (40 mEq IV total dose). Recheck K+ level 2 hours after dose and the next AM.  If Serum K+ less than 3.0, dose = 10 mEq/hr x6 doses (60 mEq IV total dose). Recheck K+ level 2 hours after dose and the next AM.               potassium chloride SA (K-DUR/KLOR-CON M) CR tablet 20-40 mEq  Dose: 20-40 mEq Freq: EVERY 2 HOURS PRN Route: PO  PRN Reason: potassium supplementation  Start: 04/20/17 1726   Admin Instructions: Use if able to take PO.   If Serum K+ 3.0-3.3, dose = 60 mEq po total dose (40 mEq x1 followed in 2 hours by 20 mEq x1). Recheck K+ level 4 hours after dose and the next AM.  If Serum K+ 2.5-2.9, dose = 80 mEq po total dose (40 mEq Q2H x2). Recheck K+ level 4 hours after dose and the next AM.  If Serum K+ less than 2.5, See IV order.  DO NOT CRUSH               potassium phosphate 15 mmol in D5W 250 mL intermittent infusion  Dose: 15 mmol Freq: DAILY PRN Route: IV  PRN Reason: phosphorous supplementation  Start: 04/21/17 1429   Admin Instructions: For serum phosphorus level 2-2.4  Do not infuse Phosphorus in the same line as TPN.   Give 15 mmol and recheck phosphorus level next AM.               potassium phosphate 20 mmol in D5W 250 mL intermittent infusion  Dose: 20 mmol Freq: EVERY 6 HOURS PRN Route: IV  PRN Reason: phosphorous supplementation  Start: 04/21/17 1429   Admin Instructions: For serum phosphorus level 1.1-1.9  For CENTRAL Line ONLY  Do not infuse Phosphorus in the same line as TPN.   Give 20 mmol and recheck phosphorus level 2 hours after last dose and next AM.     1644 (20 mmol)-New Bag                 potassium phosphate 20 mmol in D5W 500 mL intermittent infusion  Dose: 20 mmol Freq: EVERY 6 HOURS PRN Route: IV  PRN Reason:  phosphorous supplementation  Start: 04/21/17 1429   Admin Instructions: For serum phosphorus level 1.1-1.9  For Peripheral Line  Do not infuse Phosphorus in the same line as TPN.   Give 20 mmol and recheck phosphorus level 2 hours after last dose and next AM.               potassium phosphate 25 mmol in D5W 500 mL intermittent infusion  Dose: 25 mmol Freq: EVERY 8 HOURS PRN Route: IV  PRN Reason: phosphorous supplementation  Start: 04/21/17 1429   Admin Instructions: For serum phosphorus level less than 1.1  Do not infuse Phosphorus in the same line as TPN.   Give 25 mmol and recheck phosphorus level 2 hours after last dose and next AM.               senna-docusate (SENOKOT-S;PERICOLACE) 8.6-50 MG per tablet 1-2 tablet  Dose: 1-2 tablet Freq: 2 TIMES DAILY Route: PO  Start: 04/20/17 2000   Admin Instructions: Start with 1 tablet PO BID, If no bowel movement in 24 hours, increase to 2 tablets PO BID.  Hold for loose stools.     (0734)-Not Given       (1938)-Not Given [C]        (0841)-Not Given       (2004)-Not Given        0837 (1 tablet)-Given       (2056)-Not Given        0836 (1 tablet)-Given       1705 (1 tablet)-Given               (0812)-Not Given [C]       (2009)-Not Given        (1101)-Not Given       (1955)-Not Given        (0758)-Not Given [C]       [ ] 2000           sodium chloride (PF) 0.9% PF flush 3 mL  Dose: 3 mL Freq: EVERY 8 HOURS Route: IK  Start: 04/26/17 0900   Admin Instructions: And Q1H PRN, to lock peripheral IV dormant line.                 1621 (3 mL)-Given       2353 (3 mL)-Given               0801 (3 mL)-Given       [ ] 1700           sodium chloride (PF) 0.9% PF flush 3 mL  Dose: 3 mL Freq: EVERY 1 HOUR PRN Route: IK  PRN Reason: line flush  Start: 04/26/17 0849   Admin Instructions: for peripheral IV flush post IV meds               thiamine tablet 100 mg  Dose: 100 mg Freq: DAILY Route: PO  Start: 04/21/17 0800    0734 (100 mg)-Given        0832 (100 mg)-Given        0837 (100  mg)-Given        0835 (100 mg)-Given        0812 (100 mg)-Given        1100 (100 mg)-Given        0757 (100 mg)-Given          Completed Medications  Medications 04/21/17 04/22/17 04/23/17 04/24/17 04/25/17 04/26/17 04/27/17         Dose: 0.25 mg Freq: ONCE Route: IV  Start: 04/25/17 1830   End: 04/25/17 1839   Admin Instructions: Give over 2 minutes         1839 (0.25 mg)-Given               Dose: 10 mL Freq: ONCE Route: ID  Start: 04/26/17 0930   End: 04/26/17 0935         0935 (10 mL)-Given by Other Clinician [C]           Discontinued Medications  Medications 04/21/17 04/22/17 04/23/17 04/24/17 04/25/17 04/26/17 04/27/17         Dose: 100 mcg Freq: DAILY Route: PO  Start: 04/23/17 1330   End: 04/26/17 0641      1409 (100 mcg)-Given        0835 (100 mcg)-Given        0812 (100 mcg)-Given        0641-Med Discontinued          Dose: 50 mg Freq: EVERY 8 HOURS Route: IV  Start: 04/26/17 0645   End: 04/26/17 0647                0647-Med Discontinued          Dose: 50 mg Freq: EVERY 8 HOURS Route: IV  Start: 04/26/17 0645   End: 04/26/17 0644         0644-Med Discontinued          Dose: 50 mg Freq: EVERY 8 HOURS Route: IV  Start: 04/26/17 0700   End: 04/26/17 1431         0746 (50 mg)-New Bag       1431-Med Discontinued          Dose: 100 mcg Freq: EVERY MORNING BEFORE BREAKFAST Route: PO  Start: 04/26/17 1100   End: 04/26/17 1431         1101 (100 mcg)-Given       1431-Med Discontinued          Dose: 3 mL Freq: EVERY 8 HOURS Route: IK  Start: 04/21/17 2015   End: 04/26/17 1614 2004 (3 mL)-Given        0655 (3 mL)-Given       (1336)-Not Given       (2003)-Not Given        (0343)-Not Given       (1410)-Not Given [C]       (2056)-Not Given        (0344)-Not Given       (1610)-Not Given       (2101)-Not Given        (0401)-Not Given       (1323)-Not Given       2014 (3 mL)-Given        0051 (3 mL)-Given       1106 (3 mL)-Given       1614-Med Discontinued     Medications 04/21/17 04/22/17 04/23/17 04/24/17 04/25/17  04/26/17 04/27/17

## 2017-04-20 NOTE — PROGRESS NOTES
1730- transfer from ER - admission for GI bleed.   Transfer to bed after shower- patient states he has not bathed in weeks.   Assessed. IV infusing. Labs and orders reviewed. Alert and oriented. Void in ER. X1.   History of ETOH abuse- states he has not drank since January. Stool guiac negative for blood.  Mass in brain and lung- MD has informed patient. hgb 6.6 /K+ 2.8  1740- b/p- 84/53 - additional reading- 70/46 pulse of 96.  MD notified- order placed for transfer to ICU.   1820- report given to Lashay CRUZ in ICU. Transferred via cart.

## 2017-04-20 NOTE — IP AVS SNAPSHOT
[facility]: [phone]                                              INTERAGENCY TRANSFER FORM - LAB / IMAGING / EKG / EMG RESULTS   2017                    Hospital Admission Date: 2017  JOHN SANCHEZ   : 1960  Sex: Male        Attending Provider: Cristian Batista MD     Allergies:  Lubriderm    Infection:  None   Service:  HOSPITALIST    Ht:  --   Wt:  82.3 kg (181 lb 7 oz)   Admission Wt:  74.8 kg (165 lb)    BMI:  --   BSA:  --            Patient PCP Information     Provider PCP Type    Doctor Unknown, MD General         Lab Results - 3 Days      Testosterone total [[id number]] (Abnormal)  Resulted: 17 0809, Result status: Final result    Ordering provider: Dmitry Molina MD  17 0640 Resulting lab: [facility]    Specimen Information    Type Source Collected On     17 0640          Components       Value Reference Range Flag Lab   Testosterone Total 5 240 - 950 ng/dL L 51   Comment:         This test was developed and its performance characteristics determined by the   [facility],  Special Chemistry Laboratory. It has   not been cleared or approved by the FDA. The laboratory is regulated under   CLIA   as qualified to perform high-complexity testing. This test is used for   clinical   purposes. It should not be regarded as investigational or for research.              Comprehensive metabolic panel [[id number]] (Abnormal)  Resulted: 17 0713, Result status: Final result    Ordering provider: Cristian Batista MD  17 0000 Resulting lab: [facility]    Specimen Information    Type Source Collected On   Blood  17 0630          Components       Value Reference Range Flag Lab   Sodium 145 133 - 144 mmol/L H 59   Potassium 3.5 3.4 - 5.3 mmol/L  59   Chloride 112 94 - 109 mmol/L H 59   Carbon Dioxide 22 20 - 32 mmol/L  59   Anion Gap 11 3 - 14 mmol/L   59   Glucose 101 70 - 99 mg/dL H 59   Urea Nitrogen 16 7 - 30 mg/dL  59   Creatinine 1.00 0.66 - 1.25 mg/dL  59   GFR Estimate 76 >60 mL/min/1.7m2  59   Comment:  Non  GFR Calc   GFR Estimate If Black -- >60 mL/min/1.7m2  59   Result:         >90   GFR Calc     Calcium 7.8 8.5 - 10.1 mg/dL L 59   Result:     Bilirubin Total 0.4 0.2 - 1.3 mg/dL  59   Albumin 2.4 3.4 - 5.0 g/dL L 59   Protein Total 5.8 6.8 - 8.8 g/dL L 59   Alkaline Phosphatase 246 40 - 150 U/L H 59   ALT 21 0 - 70 U/L  59   AST 59 0 - 45 U/L H 59            CBC with platelets [[id number]] (Abnormal)  Resulted: 04/27/17 0658, Result status: Final result    Ordering provider: Cristian Batista MD  04/27/17 0000 Resulting lab: [facility]    Specimen Information    Type Source Collected On   Blood  04/27/17 0630          Components       Value Reference Range Flag Lab   WBC 5.5 4.0 - 11.0 10e9/L  59   RBC Count 3.22 4.4 - 5.9 10e12/L L 59   Hemoglobin 7.8 13.3 - 17.7 g/dL L 59   Hematocrit 24.0 40.0 - 53.0 % L 59   MCV 75 78 - 100 fl L 59   MCH 24.2 26.5 - 33.0 pg L 59   MCHC 32.5 31.5 - 36.5 g/dL  59   RDW 23.3 10.0 - 15.0 % H 59   Platelet Count 217 150 - 450 10e9/L  59            Cortisol Cortisone Free 24 Hour Urine [[id number]]  Resulted: 04/26/17 1635, Result status: Final result    Ordering provider: Placido Juares MD  04/23/17 1320 Resulting lab: [facility]    Specimen Information    Type Source Collected On   Urine Urine 04/24/17 1428          Components       Value Reference Range Flag Lab   Cortisol Urine 7.3   59   Comment:  Reference range: 3.5 to 45  Unit: mcg/24 h     Cortisone Urine 36   59   Comment:  Reference range: 17 to 129  Unit: mcg/24 h     Collection Duration 24   59   Comment:  Unit: h  CORRECTED ON 04/26 AT 1635: PREVIOUSLY REPORTED AS 24.0     Urine Volume 3825   59   Comment:         Unit: mL  (Note)  This test was developed and its performance  characteristics  determined by [facility] in a manner consistent with CLIA  requirements. This test has not been cleared or approved by  the U.S. Food and Drug Administration.  Test Performed by:  [facility] - [address] [address]              Surgical pathology exam [[id number]]  Resulted: 04/26/17 1232, Result status: In process    Ordering provider: Chirs Kimble MD  04/26/17 0949 Resulting lab: [facility]    Specimen Information    Type Source Collected On     04/26/17 0945            Blood culture [[id number]]  Resulted: 04/26/17 0845, Result status: Final result    Ordering provider: Blayne Guzman MD  04/20/17 1313 Resulting lab: [facility]    Specimen Information    Type Source Collected On   Blood Arm, Right 04/20/17 1630          Components       Value Reference Range Flag Lab   Specimen Description Blood   59   Special Requests Right Arm   59   Culture Micro No growth after 6 days   59   Micro Report Status FINAL 04/26/2017   59            Blood culture [[id number]]  Resulted: 04/26/17 0845, Result status: Final result    Ordering provider: Blayne Guzman MD  04/20/17 1313 Resulting lab: [facility]    Specimen Information    Type Source Collected On   Blood Arm, Left 04/20/17 1255          Components       Value Reference Range Flag Lab   Specimen Description Blood   59   Culture Micro No growth after 6 days   59   Micro Report Status FINAL 04/26/2017   59            Comprehensive metabolic panel [[id number]] (Abnormal)  Resulted: 04/26/17 0736, Result status: Final result    Ordering provider: Cristian Batista MD  04/26/17 0000 Resulting lab: [facility]    Specimen Information    Type Source Collected On   Blood  04/26/17 0710          Components       Value Reference Range Flag Lab   Sodium 145 133 - 144 mmol/L H 59   Potassium 3.8 3.4 - 5.3 mmol/L  59   Chloride 113 94 - 109  mmol/L H 59   Carbon Dioxide 25 20 - 32 mmol/L  59   Anion Gap 7 3 - 14 mmol/L  59   Glucose 92 70 - 99 mg/dL  59   Urea Nitrogen 12 7 - 30 mg/dL  59   Creatinine 0.93 0.66 - 1.25 mg/dL  59   GFR Estimate 83 >60 mL/min/1.7m2  59   Comment:  Non  GFR Calc   GFR Estimate If Black -- >60 mL/min/1.7m2  59   Result:         >90   GFR Calc     Calcium 8.0 8.5 - 10.1 mg/dL L 59   Result:     Bilirubin Total 0.6 0.2 - 1.3 mg/dL  59   Albumin 2.5 3.4 - 5.0 g/dL L 59   Protein Total 5.9 6.8 - 8.8 g/dL L 59   Alkaline Phosphatase 276 40 - 150 U/L H 59   ALT 19 0 - 70 U/L  59   AST 62 0 - 45 U/L H 59            CBC with platelets [[id number]] (Abnormal)  Resulted: 04/26/17 0718, Result status: Final result    Ordering provider: Cristian Batista MD  04/26/17 0000 Resulting lab: [facility]    Specimen Information    Type Source Collected On   Blood  04/26/17 0710          Components       Value Reference Range Flag Lab   WBC 5.6 4.0 - 11.0 10e9/L  59   RBC Count 3.56 4.4 - 5.9 10e12/L L 59   Hemoglobin 8.6 13.3 - 17.7 g/dL L 59   Hematocrit 26.2 40.0 - 53.0 % L 59   MCV 74 78 - 100 fl L 59   MCH 24.2 26.5 - 33.0 pg L 59   MCHC 32.8 31.5 - 36.5 g/dL  59   RDW 23.0 10.0 - 15.0 % H 59   Platelet Count 235 150 - 450 10e9/L  59            Adrenal corticotropin [[id number]]  Resulted: 04/26/17 0715, Result status: In process    Ordering provider: Cristian Batista MD  04/26/17 0642 Resulting lab: [facility]    Specimen Information    Type Source Collected On   Blood  04/26/17 0710            Cosyntropin stimulation study post 30 [[id number]] (Abnormal)  Resulted: 04/26/17 0308, Result status: Final result    Ordering provider: Cristian Batista MD  04/25/17 5555 Resulting lab: [facility]    Specimen Information    Type Source Collected On   Blood  04/25/17 1900          Components       Value Reference Range Flag Lab   Cortisol Stimulation Post 30  14.8 >20 ug/dL L 51   Comment:         Peak serum cortisol should be greater than 20 ug/dL 30-60 minutes post   stimulation.              Cosyntropin stimulation study post 60 [[id number]] (Abnormal)  Resulted: 04/26/17 0308, Result status: Final result    Ordering provider: Cristian Batista MD  04/25/17 1730 Resulting lab: [facility]    Specimen Information    Type Source Collected On   Blood  04/25/17 1930          Components       Value Reference Range Flag Lab   Cortisol Stimulation Post 60 19.6 >20 ug/dL L 51   Comment:         Peak serum cortisol should be greater than 20 ug/dL 30-60 minutes post   stimulation.              Cosyntropin stimulation study baseline [[id number]]  Resulted: 04/26/17 0308, Result status: Final result    Ordering provider: Cristian Batista MD  04/25/17 1730 Resulting lab: [facility]    Specimen Information    Type Source Collected On   Blood  04/25/17 1755          Components       Value Reference Range Flag Lab   Cortisol Stimulation Baseline 5.0 4 - 22 ug/dL  51   Comment:         8 AM Cortisol Reference Range = 4-22 ug/dL   4 PM Cortisol Reference Range = 3-17 ug/dL              HGB Eval Reflex to ELP or RBC Solubility [[id number]] (Abnormal)  Resulted: 04/25/17 1301, Result status: Final result    Ordering provider: Placido Juares MD  04/24/17 0000 Resulting lab: [facility]    Specimen Information    Type Source Collected On   Blood  04/24/17 0640          Components       Value Reference Range Flag Lab   Hemoglobin A1 95.3   59   Comment:  Reference range: 95.0 to 97.9  Unit: %     Hemoglobin A2 3.9  H 59   Comment:  Reference range: 2.0 to 3.5  Unit: %     Hemoglobin F 0.8   59   Comment:         Reference range: 0.0 to 2.1  Unit: %  (Note)  REFERENCE INTERVAL: Hemoglobin F  Access complete set of age- and/or gender-specific  reference intervals for this test in the [facility]  Laboratory  Test Directory ([email or web address]).     Hemoglobin S Eval 0.0   59   Comment:  Reference range: 0.0 to 0.0  Unit: %     Hemoglobin C 0.0   59   Comment:  Reference range: 0.0 to 0.0  Unit: %     Hemoglobin E 0.0   59   Comment:  Reference range: 0.0 to 0.0  Unit: %     Hemoglobin Other 0.0   59   Comment:  Reference range: 0.0 to 0.0  Unit: %     HGB Abn Evaluation --   59   Result:         SEE NOTE  (Note)    Impression:  Elevated Hb A2    An elevated Hb A2 level can be seen in beta thalassemia  trait and rarely in unstable hemoglobin variants.     Sickle Cell Solubility Confirm Not Performed   59   Result:     Hemoglobin Capillary ELP --   59   Result:         Not Performed  (Note)  Performed by [employer],  [address] [place] [address] [phone]  [email or web address], Pardeep Anthony MD, Lab. Director              Comprehensive metabolic panel [[id number]] (Abnormal)  Resulted: 04/25/17 0736, Result status: Final result    Ordering provider: Cristian Batista MD  04/25/17 0001 Resulting lab: [facility]    Specimen Information    Type Source Collected On   Blood  04/25/17 0700          Components       Value Reference Range Flag Lab   Sodium 146 133 - 144 mmol/L H 59   Potassium 3.8 3.4 - 5.3 mmol/L  59   Chloride 114 94 - 109 mmol/L H 59   Carbon Dioxide 21 20 - 32 mmol/L  59   Anion Gap 11 3 - 14 mmol/L  59   Glucose 81 70 - 99 mg/dL  59   Urea Nitrogen 12 7 - 30 mg/dL  59   Creatinine 1.07 0.66 - 1.25 mg/dL  59   GFR Estimate 71 >60 mL/min/1.7m2  59   Comment:  Non  GFR Calc   GFR Estimate If Black 86 >60 mL/min/1.7m2  59   Comment:  African American GFR Calc   Calcium 8.0 8.5 - 10.1 mg/dL L 59   Bilirubin Total 0.6 0.2 - 1.3 mg/dL  59   Albumin 2.3 3.4 - 5.0 g/dL L 59   Protein Total 5.7 6.8 - 8.8 g/dL L 59   Alkaline Phosphatase 274 40 - 150 U/L H 59   ALT 20 0 - 70 U/L  59   AST 74 0 - 45 U/L H 59            INR [[id number]]  Resulted: 04/25/17 0721, Result  status: Final result    Ordering provider: Cristian Batista MD  04/25/17 0001 Resulting lab: [facility]    Specimen Information    Type Source Collected On   Blood  04/25/17 0700          Components       Value Reference Range Flag Lab   INR 1.02 0.86 - 1.14  59            CBC with platelets [[id number]] (Abnormal)  Resulted: 04/25/17 0710, Result status: Final result    Ordering provider: Cristian Batista MD  04/25/17 0001 Resulting lab: [facility]    Specimen Information    Type Source Collected On   Blood  04/25/17 0700          Components       Value Reference Range Flag Lab   WBC 5.4 4.0 - 11.0 10e9/L  59   RBC Count 3.50 4.4 - 5.9 10e12/L L 59   Hemoglobin 8.4 13.3 - 17.7 g/dL L 59   Hematocrit 25.8 40.0 - 53.0 % L 59   MCV 74 78 - 100 fl L 59   MCH 24.0 26.5 - 33.0 pg L 59   MCHC 32.6 31.5 - 36.5 g/dL  59   RDW 22.9 10.0 - 15.0 % H 59   Platelet Count 206 150 - 450 10e9/L  59            Cortisol [[id number]]  Resulted: 04/24/17 2259, Result status: Final result    Ordering provider: Cristian Batista MD  04/24/17 1207 Resulting lab: [facility]    Specimen Information    Type Source Collected On   Blood  04/24/17 0640          Components       Value Reference Range Flag Lab   Cortisol Serum 4.4 4 - 22 ug/dL  51   Comment:         8 AM Cortisol Reference Range = 4-22 ug/dL   4 PM Cortisol Reference Range = 3-17 ug/dL              Prolactin [[id number]] (Abnormal)  Resulted: 04/24/17 1634, Result status: Final result    Ordering provider: Placido Juares MD  04/24/17 0000 Resulting lab: [facility]    Specimen Information    Type Source Collected On   Blood  04/24/17 0640          Components       Value Reference Range Flag Lab   Prolactin 21 2 - 18 ug/L H 51            Lutropin [[id number]] (Abnormal)  Resulted: 04/24/17 1634, Result status: Final result    Ordering provider: Placido Juares MD   04/24/17 0000 Resulting lab: [facility]    Specimen Information    Type Source Collected On   Blood  04/24/17 0640          Components       Value Reference Range Flag Lab   Lutropin <0.2 1.5 - 9.3 IU/L L 51            Follicle stimulating hormone [[id number]] (Abnormal)  Resulted: 04/24/17 1634, Result status: Final result    Ordering provider: Placido Juares MD  04/24/17 0000 Resulting lab: [facility]    Specimen Information    Type Source Collected On   Blood  04/24/17 0640          Components       Value Reference Range Flag Lab   FSH 0.4 0.7 - 10.8 IU/L L 51            T3 Free [[id number]] (Abnormal)  Resulted: 04/24/17 1621, Result status: Final result    Ordering provider: Placido Juares MD  04/24/17 0712 Resulting lab: [facility]    Specimen Information    Type Source Collected On   Blood  04/24/17 0645          Components       Value Reference Range Flag Lab   Free T3 1.0 2.3 - 4.2 pg/mL L 51            Basic metabolic panel [[id number]] (Abnormal)  Resulted: 04/24/17 1332, Result status: Final result    Ordering provider: Cristian Batista MD  04/24/17 1210 Resulting lab: [facility]    Specimen Information    Type Source Collected On   Blood  04/24/17 0640          Components       Value Reference Range Flag Lab   Sodium 147 133 - 144 mmol/L H 59   Potassium 4.0 3.4 - 5.3 mmol/L  59   Chloride 117 94 - 109 mmol/L H 59   Carbon Dioxide 18 20 - 32 mmol/L L 59   Anion Gap 12 3 - 14 mmol/L  59   Glucose 75 70 - 99 mg/dL  59   Urea Nitrogen 12 7 - 30 mg/dL  59   Creatinine 1.11 0.66 - 1.25 mg/dL  59   GFR Estimate 68 >60 mL/min/1.7m2  59   Comment:  Non  GFR Calc   GFR Estimate If Black 82 >60 mL/min/1.7m2  59   Comment:  African American GFR Calc   Calcium 7.9 8.5 - 10.1 mg/dL L 59            Ferritin [[id number]] (Abnormal)  Resulted: 04/24/17 0827, Result status:  Final result    Ordering provider: Placido Juares MD  04/24/17 0000 Resulting lab: [facility]    Specimen Information    Type Source Collected On   Blood  04/24/17 0640          Components       Value Reference Range Flag Lab   Ferritin 752 26 - 388 ng/mL H 59            T4 free [[id number]] (Abnormal)  Resulted: 04/24/17 0756, Result status: Final result    Ordering provider: Placido Juares MD  04/24/17 0712 Resulting lab: [facility]    Specimen Information    Type Source Collected On   Blood  04/24/17 0645          Components       Value Reference Range Flag Lab   T4 Free 0.38 0.76 - 1.46 ng/dL L 59            TSH [[id number]]  Resulted: 04/24/17 0723, Result status: Final result    Ordering provider: Placido Juares MD  04/24/17 0000 Resulting lab: [facility]    Specimen Information    Type Source Collected On   Blood  04/24/17 0640          Components       Value Reference Range Flag Lab   TSH 1.30 0.40 - 4.00 mU/L  59            IgF binding protein 1 [[id number]]  Resulted: 04/24/17 0646, Result status: In process    Ordering provider: Placido Juares MD  04/24/17 0000 Resulting lab: [facility]    Specimen Information    Type Source Collected On   Blood  04/24/17 0640            Testing Performed By     Lab - Abbreviation Name Director Address Valid Date Range    45 - XUV140 [facility] Unknown Unknown 01/28/02 0000 - Present    51 - Unknown [facility] Unknown [address] [facility] [address] 12/31/14 1010 - Present    59 - Unknown [facility] Unknown [address] [facility] [address] 12/31/14 1006 - Present    88 - Unknown [facility] Unknown Unknown 10/30/02 0000 - Present            Unresulted Labs (24h ago through future)    Start       Ordered    04/25/17 0600  Comprehensive metabolic panel  DAILY,   Routine      04/24/17 1212    04/25/17 0600  CBC with platelets  DAILY,   Routine     Comments:   "Last Lab Result: Hemoglobin (g/dL)       Date                     Value                 04/23/2017               8.4 (L)          ----------    04/24/17 1212    Unscheduled  Potassium  (Potassium Replacement - \"Standard\" - For K levels less than 3.4 mmol/L - UU,UR,UA,RH,SH,PH,WY )  CONDITIONAL (SPECIFY),   Routine     Comments:  Obtain Potassium Level for these conditions:  *IF no potassium result within 24 hours before initiation of order set, draw potassium level with next lab collect.    *2 HOURS AFTER last IV potassium replacement dose and 4 hours after an oral replacement dose.  *Next morning after potassium dose.     Repeat Potassium Replacement if necessary.    04/20/17 1503    Unscheduled  Potassium  (Potassium Replacement - \"Standard\" - For K levels less than 3.4 mmol/L - UU,UR,UA,RH,SH,PH,WY )  CONDITIONAL (SPECIFY),   Routine     Comments:  Obtain Potassium Level for these conditions:  *IF no potassium result within 24 hours before initiation of order set, draw potassium level with next lab collect.    *2 HOURS AFTER last IV potassium replacement dose and 4 hours after an oral replacement dose.  *Next morning after potassium dose.     Repeat Potassium Replacement if necessary.    04/20/17 1731    Unscheduled  Magnesium  (Magnesium Replacement -  Adult - \"Standard\" - Replacement for all levels less than 1.6 mg/dL )  CONDITIONAL (SPECIFY),   Routine     Comments:  Obtain Magnesium Level for these conditions:  *IF no magnesium result within 24 hrs before initiation of order set, draw magnesium level with next lab collect.    *2 HOURS AFTER last magnesium replacement dose when magnesium replacement given for level less than 1.6   *Next morning after magnesium dose.     Repeat Magnesium Replacement if necessary.    04/20/17 1731    Unscheduled  Phosphorus  (POTASSIUM Phosphate - \"Standard\" - Replacement for levels less than or equal to 2.4 mg/dL )  CONDITIONAL (SPECIFY),   Routine     Comments:  Obtain " Phosphorus Level for these conditions:  *IF no phosphorus result within 24 hrs before initiation of order set, draw phosphorus level with next lab collect.    *2 HOURS AFTER last phosphorus replacement dose for levels less than 2.0.  *Next morning after phosphorus dose.     Repeat Phosphorus Replacement if necessary.    04/21/17 1429         Imaging Results - 3 Days      XR Chest 2 Views [[id number]]  Resulted: 04/27/17 0735, Result status: Final result    Ordering provider: Cristian Batista MD  04/27/17 0005 Resulted by: Azalea Art MD    Performed: 04/27/17 0604 - 04/27/17 0610 Resulting lab: RADIOLOGY RESULTS    Narrative:       XR CHEST 2 VW 4/27/2017 6:10 AM    HISTORY: Follow-up of a small pneumothorax after lung biopsy.    COMPARISON: Prior study done at 1208 hours on April 26.      Impression:       IMPRESSION: 2 views of the chest show a modest left apical  pneumothorax, slightly larger compared to the prior study. It measures  1.9 cm of lucency in the apical area compared to 0.9 cm on the prior  study. Small bilateral pleural effusions are present, also seen on  other recent imaging studies.     AZALEA ART MD      XR Chest 1 View [[id number]]  Resulted: 04/26/17 1446, Result status: Final result    Ordering provider: Azalea Art MD  04/26/17 0950 Resulted by: Hossein Powers MD    Performed: 04/26/17 1205 - 04/26/17 1206 Resulting lab: RADIOLOGY RESULTS    Narrative:       CHEST ONE VIEW April 26, 2017 12:06 PM     HISTORY: Chest biopsy.     COMPARISON: Chest x-ray 4/26/2017 at 10:00 AM.      Impression:       IMPRESSION: Single view of the chest is performed following CT-guided  left lung biopsy. A trace left apical pneumothorax is noted. This was  not definitely evident on the prior exam. No evidence of mediastinal  shift. Heart is normal in size. Anterior left lung mass is again  noted. Right lung is well expanded and clear.    HOSSEIN POWERS MD      XR Chest 1 View  [[id number]]  Resulted: 04/26/17 1008, Result status: Final result    Ordering provider: Azalea Art MD  04/26/17 0950 Resulted by: Moe Eagle MD    Performed: 04/26/17 0954 - 04/26/17 1000 Resulting lab: RADIOLOGY RESULTS    Narrative:       XR CHEST 1 VW 4/26/2017 10:00 AM    HISTORY: Lung biopsy.    COMPARISON: 10/31/2006    FINDINGS: No pneumothorax. Known mass is adjacent to the left  pulmonary hilum. Interstitial edema.      Impression:       IMPRESSION: No pneumothorax.    MOE EAGLE MD      CT Lung Mediastinum Biopsy [[id number]]  Resulted: 04/26/17 0954, Result status: Final result    Ordering provider: Placido Juares MD  04/23/17 1502 Resulted by: Azalea Art MD    Performed: 04/26/17 0908 - 04/26/17 1000 Resulting lab: RADIOLOGY RESULTS    Narrative:       4/26/2017 9:53 AM    HISTORY: Left upper lobe lung mass.    COMPARISON: 4/20/2017.    PROCEDURE: Risks and benefits of a CT guided core biopsy of the left  upper lobe lung mass are discussed with the patient. Under CT  guidance, aseptic conditions, and utilizing 10 mL 1% lidocaine as  local anesthetic, a 19 gauge coaxial needle is placed. Through this,  two 20 gauge core biopsy samples are obtained and placed in formalin  for pathologic analysis. The patient tolerated the procedure well.    Radiation dose for this procedure is reduced using automated exposure  control of mA and/or kV according to patient size, or iterative  reconstruction technique.    CONSCIOUS SEDATION: Provided by the anesthesia service.      Impression:       IMPRESSION: Technically uneventful CT guided needle core biopsy, as  described above. Pathologic results are pending.    AZALEA ART MD      Testing Performed By     Lab - Abbreviation Name Director Address Valid Date Range    104 - Rad R[facility]ts RADIOLOGY RESULTS Unknown Unknown 02/16/05 1553 - Present            Encounter-Level Documents:     There are no encounter-level documents.       Order-Level Documents:     There are no order-level documents.

## 2017-04-20 NOTE — IP AVS SNAPSHOT
[facility] SURGICAL: [phone]                                              INTERAGENCY TRANSFER FORM - PHYSICIAN ORDERS   2017                    Hospital Admission Date: 2017  JOHN SANCHEZ   : 1960  Sex: Male        Attending Provider: Cristian Batista MD     Allergies:  Lubriderm    Infection:  None   Service:  HOSPITALIST    Ht:  --   Wt:  82.3 kg (181 lb 7 oz)   Admission Wt:  74.8 kg (165 lb)    BMI:  --   BSA:  --            Patient PCP Information     Provider PCP Type    Doctor Unknown, MD General      ED Clinical Impression     Diagnosis Description Comment Added By Time Added    Hypotension, unspecified hypotension type [I95.9] Hypotension, unspecified hypotension type [I95.9]  Blayne Guzman MD 2017  3:54 PM    Anemia, unspecified type [D64.9] Anemia, unspecified type [D64.9]  Blayne Guzman MD 2017  3:54 PM    Lung mass [R91.8] Lung mass [R91.8]  Blayne Guzman MD 2017  3:55 PM    Brain mass [G93.9] Brain mass [G93.9]  Blayne Guzman MD 2017  3:55 PM    Hypokalemia [E87.6] Hypokalemia [E87.6]  Blayne Guzman MD 2017  3:55 PM      Hospital Problems as of 2017              Priority Class Noted POA    Anemia Medium  2017 Yes    Hypotension Medium  2017 Yes      Non-Hospital Problems as of 2017     None      Code Status History     Date Active Date Inactive Code Status Order ID Comments User Context    This patient has a current code status but no historical code status.         Medication Review      START taking        Dose / Directions Comments    folic acid 1 MG tablet   Commonly known as:  FOLVITE        Dose:  1 mg   Take 1 tablet (1 mg) by mouth daily   Quantity:  30 tablet   Refills:  0        pantoprazole 40 MG EC tablet   Commonly known as:  PROTONIX        Dose:  40 mg   Take 1 tablet (40 mg) by mouth 2 times daily (before meals)   Quantity:  30 tablet   Refills:  0        senna-docusate 8.6-50 MG per  tablet   Commonly known as:  SENOKOT-S;PERICOLACE        Dose:  1-2 tablet   Take 1-2 tablets by mouth 2 times daily   Quantity:  100 tablet   Refills:  0        thiamine 100 MG tablet        Dose:  100 mg   Take 1 tablet (100 mg) by mouth daily   Refills:  0                  Further instructions from your care team                       Radiology  Discharge Instructions for Lung/Chest Biopsy    A lung/chest biopsy is a procedure to obtain a small tissue sample from your lung/chest.  This tissue is obtained using a biopsy needle.  This sample will be examined in a laboratory for any abnormalities.    AFTER YOU ARE HOME:    You may return to your normal diet    Relax and take it easy for 24 hours    Do have someone stay with you for the next 24 hours to help you if you have any difficulties.  You may develop a complete or partial collapse of your lung (pneumothorax), from the needle entering your lung.    Remove band-aid from biopsy site in 24 hours.    You may use Tylenol for discomfort at biopsy site.    You may cough up small amounts of blood.    CALL YOUR PRIMARY PROVIDER IF:    You develop temperature over 101o F or redness at biopsy site.    AFTER HOURS CALL [facility] AT [phone],    COME TO EMERGENCY ROOM IF:    You are having severe difficulty breathing, severe chest pain, coughing up large amounts of blood, or have heavy bleeding from biopsy site.  DO NOT DRIVE YOURSELF.    Your ordering physician will contact you with the laboratory results.      ADDITIONAL INSTRUCTIONS:       I have reviewed and understand these discharge instructions.        __________________________________________________  Patient Signature        __________________________________________________  Nurse/Radiologist Signature  4/26/2017       Follow up with endocrinology.   Follow up with oncology.  Appointment is made for this     After Care     Activity - Up with nursing assistance           Advance Diet as  Tolerated       Follow this diet upon discharge:       Regular Diet Adult       General info for SNF       Length of Stay Estimate: Short Term Care: Estimated # of Days <30  Condition at Discharge: Stable  Level of care:skilled   Rehabilitation Potential: Fair  Admission H&P remains valid and up-to-date: Yes  Recent Chemotherapy: N/A  Use Nursing Home Standing Orders: Yes       Mantoux instructions       Give two-step Mantoux (PPD) Per Facility Policy Yes             Referrals     ENDOCRINOLOGY ADULT REFERRAL       Your provider has referred you to: [facility]: Endocrinology and Diabetes Clinic [facility] [phone]   [email or web address]      Please be aware that coverage of these services is subject to the terms and limitations of your health insurance plan.  Call member services at your health plan with any benefit or coverage questions.      Please bring the following to your appointment:    >>   Any x-rays, CTs or MRIs which have been performed.  Contact the facility where they were done to arrange for  prior to your scheduled appointment.    >>   List of current medications   >>   This referral request   >>   Any documents/labs given to you for this referral       Occupational Therapy Adult Consult       Evaluate and treat as clinically indicated.    Reason:  Weak       Physical Therapy Adult Consult       Evaluate and treat as clinically indicated.    Reason:  WEAK             Your next 10 appointments already scheduled     May 04, 2017 11:00 AM CDT   New Visit with Pasquale Naylor MD   [facility] ([facility])    [facility]  [address]  [place] [address]   [phone]            May 10, 2017 11:00 AM CDT   (Arrive by 10:45 AM)   NEW ENDOCRINE with Padma Medley MD   [facility] ([facility])    [address]  [address]rd [address]  [place]  [address]   [phone]              Statement of Approval     Ordered          04/27/17 0957  I have reviewed and agree with all the recommendations and orders detailed in this document.  EFFECTIVE NOW     Approved and electronically signed by:  Cristian Batista MD

## 2017-04-20 NOTE — ED NOTES
Pt alert and talking to uncle, who arrived shortly after the patient. Patient states he needs a shower, as he has been unable to shower at his home for fear of falling, and inability to get up off the couch.

## 2017-04-20 NOTE — PROGRESS NOTES
Patient had Hypotension  Will transfer to the ICU  Give a 500 ml NS Bolus as well as give Blood   Check Hb after Blood ptransfusion    Patel Schrader M.D.  Hospitalist

## 2017-04-20 NOTE — IP AVS SNAPSHOT
[facility]    [address] [address]    Phone:  [phone]    Fax:  [phone]                                       After Visit Summary   4/20/2017    Wade Acevedo    MRN: [id number]           After Visit Summary Signature Page     I have received my discharge instructions, and my questions have been answered. I have discussed any challenges I see with this plan with the nurse or doctor.    ..........................................................................................................................................  Patient/Patient Representative Signature      ..........................................................................................................................................  Patient Representative Print Name and Relationship to Patient    ..................................................               ................................................  Date                                            Time    ..........................................................................................................................................  Reviewed by Signature/Title    ...................................................              ..............................................  Date                                                            Time

## 2017-04-20 NOTE — IP AVS SNAPSHOT
` `     [facility] SURGICAL: [phone]                 INTERAGENCY TRANSFER FORM - NOTES (H&P, Discharge Summary, Consults, Procedures, Therapies)   2017                    Hospital Admission Date: 2017  WADE SANCHEZ   : 1960  Sex: Male        Patient PCP Information     Provider PCP Type    Doctor Unknown, MD General         History & Physicals      Interval H&P Note by Cristian Haskins APRN CRNA at 2017 10:57 AM     Author:  Cristian Haskins APRN CRNA Service:  Anesthesiology Author Type:  Nurse Anesthetist    Filed:  2017 10:57 AM Date of Service:  2017 10:57 AM Note Created:  2017 10:57 AM    Related:  Original note: H&P by Patel Schrader MD filed at 2017  1:35 AM Status:  Signed :  Cristian Haskins APRN CRNA (Nurse Anesthetist)         This H&P has been reviewed and there are no clinically significant changes in the patient s condition.  The Patient is approved for surgery.[JK1.1]         Revision History        User Key Date/Time User Provider Type Action    > JK1.1 2017 10:57 AM Cristian Haskins APRN CRNA Nurse Anesthetist Sign            H&P by Patel Schrader MD at 2017  5:11 PM     Author:  Patel Schrader MD Service:  Internal Medicine Author Type:  Physician    Filed:  2017  1:35 AM Date of Service:  2017  5:11 PM Note Created:  2017  4:28 PM    Status:  Addendum :  Patel Schrader MD (Physician)         [facility]    History and Physical  Hospital Medicine       Date of Admission:  2017  Date of Service: 2017     Assessment & Plan   Wade Sanchez is a 57 year old male who presents with weakness and Dizziness on getting up. He was found to have a 3 cm mass in the Left Upper Lobes, Mediastinal Lymphadenopathy, , destructive bone lesion Left 5th Rib with history of Smoking and Brain CT scan showing ? Pituitary mass with ? Right Maxillary Sinusitis., Acute Hypokalemia and Acute Kidney  Injury.     Active Problems:    Anemia  ? Due to nutritional deficiencies and/or due to cancer/CKD  Peripheral Blood Smear is done   2 Units of Blood will be transfused  Iron studies as well as B12 and Folate Levels will be sent[RK1.1]     Acute Kidney Injury  ? Due to NSAIDS Induced Nephropathy[RK1.2] or Pigment induced Nephropathy due to Rhabdomyolysis[RK1.3]  Will check UA  Give IV Fluids     Acute Hypokalemia   Will replace Potassium  Also check Magnesium and Phosphorus level in a patient with history of Alcohol use.[RK1.2]    Lung mass- Probably Lung Cancer  3 cm mass in the Left Upper Lobe AND Mediastinal Lymphadenopathy, destructive bone lesion Left 5th Rib  ? Due to metastatic Lung Cancer  Will discuss with Radiology and do Lung Biopsy[RK1.1]    Alcohol Abuse  Recently quit Drinking  Will give Thiamine  Start Folic Acid later after Folic level is checked[RK1.2]    Acute Rhabdomyolysis (New problem- added later)   IV Fluids  Monitor CPK in am[RK1.4]    DVT Prophylaxis:[RK1.1] Pneumatic Compression Devices[RK1.2]  Code Status:[RK1.1] Full Code[RK1.2]    Disposition: Anticipate discharge in[RK1.1] ?[RK1.2] day(s). Appropriate for[RK1.1] Inpatient[RK1.2] care.    Patel Schrader        History is obtained from the patient and review of old records via the EMR.          Chief Complaint:  Weakness      Past Medical History    None known otherwise   Smoking  Alcohol use up to a liter of Gin daily up until last month    Past Surgical History   None known    History of Present Illness   Wade Acevedo is a 57 year old male with the above past medical history now presents with weakness which has been going on for 2-3 months and was progressive to a point that for the past few days he is not comfortable getting up at all with the fear of falling down. He felt lightheaded when he got up to stand. He did fall 2 weeks ago on the Right side. He has been taking Ibuprofen regularly.     Prior to Admission Medications   None      Allergies   Allergies   Allergen Reactions     Lubriderm Unknown       Family History    Father had Pancreatic Cancer. Mother had Lung Cancer.     Social History   Lives alone. Has no children. He was brought to the Emergency department on admission by his Uncle. Brother Sebastian and Sister who lives in [place].   Smoked a pack of cigarettes up until earlier lately a quarter pack per day. Was drinking up to a Liter of Gin a day but has quit since a month ago.     Review of Systems   The 10 point Review of Systems is negative other than noted in the HPI or here.    He denies any Headache, Neck, Jaw or Shoulder Pain. No Chest pain at rest or on normal exercise. Has SOB, cough. No nausea or Vomiting. No Abdominal pain or Urinary symptoms. No Hip pain or calf pain. No fever or Chills. No Bleeding.  No rashes or weakness in hands or Legs. No Seizure activity.    Physical Exam   /68  Pulse 103  Temp 98.7  F (37.1  C) (Oral)  Resp 12  Wt 74.8 kg (165 lb)  SpO2 100% /68  Pulse 103  Temp 98.7  F (37.1  C) (Oral)  Resp 12  Wt 74.8 kg (165 lb)  SpO2 100%      Weight: 165 lbs 0 oz There is no height or weight on file to calculate BMI.     Constitutional:cooperative, no apparent distress, appears nontoxic,  Eyes: Eyes are clear, pupils are reactive.[RK1.1] Nystagmus Horizontal[RK1.2]  HEENT: Oropharynx is clear and moist. No evidence of cranial trauma.  Lymph/Hematologic: No epitrochlear, axillary, anterior or posterior cervical, or supraclavicular lymphadenopathy is appreciated.  Cardiovascular: Regular rate and rhythm, normal S1 and S2, and no murmur noted. JVP is normal. Good peripheral pulses in wrists bilaterally. No lower extremity edema.  Respiratory: Clear to auscultation bilaterally.   GI: Soft, non-tender, normal bowel sounds, no hepatosplenomegaly.  Genitourinary: Deferred  Musculoskeletal: Normal muscle bulk and tone.  Skin: Warm and dry, no rashes.   Neurologic:  Alert, oriented[RK1.1] x  3[RK1.2] Neck supple. Cranial nerves are grossly intact.  is symmetric.     Data   Data reviewed today:     Recent Labs  Lab 04/20/17  1255   WBC 7.6   HGB 6.6*   MCV 65*      INR 1.05      POTASSIUM 2.8*   CHLORIDE 108   CO2 21   BUN 14   CR 1.81*   ANIONGAP 11   JOHANN 9.5   *   ALBUMIN 3.2*   PROTTOTAL 7.7   BILITOTAL 1.0   ALKPHOS 244*   ALT 28   *       Recent Results (from the past 24 hour(s))   CT Head w/o Contrast    Narrative    CT HEAD W/O CONTRAST   4/20/2017 2:20 PM     HISTORY: fall    TECHNIQUE: Axial images of the head without IV contrast material.  Radiation dose for this scan was reduced using automated exposure  control, adjustment of the mA and/or kV according to patient size, or  iterative reconstruction technique.    COMPARISON: None.    FINDINGS:  There is generalized atrophy of the brain. . There is a  mass arising from the sella extending into the suprasellar cistern.  This measures approximately 1.2 cm in cephalocaudad dimension. Extends  up to the optic chiasm. This would be consistent with a pituitary  adenoma. The right maxillary sinus is completely opacified. The medial  wall of the sinuses displaced medially. There is sclerosis and  thickening of the wall of the sinus. The findings would be consistent  with a mucocele within the right maxillary sinus. Mucosal thickening  is seen in the right frontal sinus and several right anterior ethmoid  sinuses.. There is no evidence of trauma.      Impression    IMPRESSION:   1. No intracranial bleed or skull fractures.  2. Soft tissue mass arising from the sella extending into the  suprasellar region. This is probably due to a pituitary adenoma.  Meningioma is also in the differential diagnosis. MR scan of the sella  would be helpful for further characterization of this lesion. This  lesion could affect the optic chiasm.  3. Opacified right maxillary sinus with medial displacement of the  medial wall of the sinus. The  appearance raises the possibility of a  mucocele within the right maxillary sinus.  4. Atrophy of the brain.    MICHAEL GARZON MD   CT Chest Abdomen Pelvis w/o Contrast    Narrative    CT CHEST/ABDOMEN/PELVIS WITHOUT CONTRAST April 20, 2017 2:22 PM    HISTORY: Fall, chest pain, falling hemoglobin. No IV contrast due to  poor renal function.    TECHNIQUE: CT scan obtained of the chest, abdomen, and pelvis without  IV contrast. Radiation dose for this scan was reduced using automated  exposure control, adjustment of the mA and/or kV according to patient  size, or iterative reconstruction technique.    COMPARISON:  None.    FINDINGS:  Chest: There is no acute thoracic aortic abnormality identified within  the limits of unenhanced scanning. Mild coronary artery and thoracic  aortic calcifications are present. No pleural or pericardial  effusions. No pneumothorax identified. There is prominent adenopathy  in the mediastinum. An example at the anterior mediastinum measures  4.1 x 2.2 cm series 3 image 21. Enlarged subcarinal lymph node  measures 2.6 x 1.6 cm image 30. There are other examples. Cannot  exclude hilar enlarged lymph nodes at unenhanced scanning. Axillary  lymph nodes are small. There may be a tiny sebaceous cyst at the left  anterior axilla image 13. There is a focal ill-defined nodular lesion  measuring 3.0 x 2.1 cm medial anterior left upper lobe series 4 image  22 with some atelectasis leading towards the left hilar region. No  acute airspace disease otherwise seen. Some mild subpleural  emphysematous change suggested. There are no convincing acute  fractures identified. A few subacute fractures noted in the ribs.  There is a destructive bony lesion occupying the left lateral fifth  rib measuring approximately 4.4 x 1.5 cm series 3 image 23.    Abdomen/pelvis: No acute fractures visualized at the abdomen or pelvis  levels. No convincing destructive bony lesions identified at the  abdomen or pelvis. Contracted  gallbladder. Unenhanced liver, adrenals,  spleen, pancreas, and kidneys do not show any acute abnormalities. No  hydronephrosis. Nonobstructing small stone lower left kidney is only  0.2 cm image 75. Diffuse vascular calcifications. No acute abdominal  aortic abnormality. A borderline prominent lymph node deep to the  right hemidiaphragm reji 0.8 cm series 3 image 53. Portocaval region  lymph node is 1.6 x 1.1 cm series 3 image 63. No acute bowel  abnormality. No bowel obstruction. Normal appendix. No free fluid or  free air. No evidence for acute hemorrhage.      Impression    IMPRESSION:  1. No acute traumatic abnormality is seen.  2. There are findings worrisome for neoplasm including an irregular  and elongated focal opacity measuring approximately 3 cm at the medial  left upper lobe. This could represent pulmonary neoplasm. There is  adjacent atelectasis leading towards the left hilum. There is also  prominent adenopathy within the mediastinum, and a destructive bone  lesion involving the left lateral fifth rib that may represent rib  metastasis. Recommend further oncologic workup.  3. Mildly enlarged retrocrural right-sided lymph node is nonspecific.  4. Nonobstructing small stone at the left kidney.    NISH WEBB MD       I personally reviewed[RK1.1] the chest CT image(s) showing above findings[RK1.2].    Patel Schrader   Hospitalist. [facility]. [place].[RK1.1]        Revision History        User Key Date/Time User Provider Type Action    > RK1.3 4/21/2017  1:35 AM Patel Schrader MD Physician Addend     RK1.4 4/21/2017  1:34 AM Patel Schrader MD Physician Addend     RK1.2 4/20/2017  5:52 PM Patel Schrader MD Physician Sign     RK1.1 4/20/2017  4:28 PM Patel Schrader MD Physician                   Discharge Summaries     No notes of this type exist for this encounter.         Consult Notes      Consults by Micki Ramos LICSW at 4/21/2017 11:24 AM     Author:  Micki Ramos LICSW Service:   (none) Author Type:      Filed:  4/21/2017 11:24 AM Date of Service:  4/21/2017 11:24 AM Note Created:  4/21/2017 11:21 AM    Status:  Signed :  Micki Ramos LICSW ()     Consult Orders:    1. Care Transition RN/SW IP Consult [[id number]] ordered by Placido Juares MD at 04/21/17 0903                CARE TRANSITION SOCIAL WORK INITIAL ASSESSMENT:  Reason For Consult: discharge planning, substance use concerns   Met with: Patient.    DATA  Active Problems:    Anemia    Hypotension             Contact information and PCP information verified: Has not PCP      ASSESSMENT  Cognitive Status: awake, alert and oriented.       Resources List: Transitional Care  Other Resources: [facility] Depend[facility] Services  Lives With: alone  Living Arrangements: house, other (see comments) (trash scattered throughout)     Description of Support System: Uninvolved   Who is your support system?: Other (specify) (Uncle)   Support Assessment: Lacks necessary supervision and assistance, Limited social contact and support   Insurance Concerns: MA pending          This writer met with pt, introduced self and role. PFR also met with pt and completed MA application as pt has no insurance. This writer discussed TCU on dc as pt reports being quite weak. He understands that he will need to participate in PT and OT. Patient was provided with Medicare certified nursing home list. Pts choices are as follows [facility] Phone: ([phone]) Fax: ([phone]) and [facility] (Phone: [phone] Fax: [phone]).  Referrals pending at both facilities. CTS will cont to follow.       PLAN    Will need to verify TCU benefits, waiting on bed availability, therapy to evaluate.        Micki Ramos MSW, JAIR, [facility] [phone][AK1.1]       Revision History        User Key Date/Time User Provider Type Action    > AK1.1 4/21/2017 11:24 AM Micki Ramos LICSW  Sign                      Progress Notes - Physician (Notes from 04/24/17 through 04/27/17)      Progress Notes by Cristian Batista MD at 4/27/2017  9:57 AM     Author:  Cristian Batista MD Service:  Hospitalist Author Type:  Physician    Filed:  4/27/2017 10:33 AM Date of Service:  4/27/2017  9:57 AM Note Created:  4/27/2017  9:57 AM    Status:  Signed :  Cristian Batista MD (Physician)         [facility] Hospitalist Service      Subjective:[JE1.1]  Doing well  No difficulty breathing  ambulatory[JE1.2]    Review of Systems:[JE1.1]  C: NEGATIVE for fever, chills, change in weight  E/M: NEGATIVE for ear, mouth and throat problems  R: NEGATIVE for significant cough or SOB  CV: NEGATIVE for chest pain, palpitations or peripheral edema    P[JE1.2]hysical Exam:  Vitals Were Reviewed    Patient Vitals for the past 16 hrs:   BP Temp Temp src Pulse Heart Rate Resp SpO2   04/27/17 0720 118/84 98.2  F (36.8  C) Oral 84 - 18 95 %   04/26/17 2246 115/74 97.9  F (36.6  C) Oral - 80 18 96 %   04/26/17 1939 111/70 98.2  F (36.8  C) Oral 75 - 18 96 %         Intake/Output Summary (Last 24 hours) at 04/27/17 0957  Last data filed at 04/27/17 0641   Gross per 24 hour   Intake             1200 ml   Output             2500 ml   Net            -1300 ml[JE1.1]       GENERAL APPEARANCE: healthy, alert and no distress  EYES: conjunctiva clear, eyes grossly normal  RESP: lungs clear to auscultation - no rales, rhonchi or wheezes  CV: regular rate and rhythm, normal S1 S2, no S3 or S4 and no murmur, click or rub   ABDOMEN: soft, nontender, no HSM or masses and bowel sounds normal  MS: no clubbing, cyanosis; no edema  SKIN: clear without significant rashes or lesions    Lab:[JE1.2]  Recent Labs   Lab Test  04/27/17   0630  04/26/17   0710   NA  145*  145*   POTASSIUM  3.5  3.8   CHLORIDE  112*  113*   CO2  22  25   ANIONGAP  11  7   GLC  101*  92   BUN  16  12   CR  1.00  0.93   JOHANN  7.8*  8.0*     CBC RESULTS:   Recent Labs   Lab  Test  04/27/17   0630  04/26/17   0710   WBC  5.5  5.6   RBC  3.22*  3.56*   HGB  7.8*  8.6*   HCT  24.0*  26.2*   PLT  217  235       Results for orders placed or performed during the hospital encounter of 04/20/17 (from the past 24 hour(s))   CT Lung Mediastinum Biopsy    Narrative    4/26/2017 9:53 AM    HISTORY: Left upper lobe lung mass.    COMPARISON: 4/20/2017.    PROCEDURE: Risks and benefits of a CT guided core biopsy of the left  upper lobe lung mass are discussed with the patient. Under CT  guidance, aseptic conditions, and utilizing 10 mL 1% lidocaine as  local anesthetic, a 19 gauge coaxial needle is placed. Through this,  two 20 gauge core biopsy samples are obtained and placed in formalin  for pathologic analysis. The patient tolerated the procedure well.    Radiation dose for this procedure is reduced using automated exposure  control of mA and/or kV according to patient size, or iterative  reconstruction technique.    CONSCIOUS SEDATION: Provided by the anesthesia service.      Impression    IMPRESSION: Technically uneventful CT guided needle core biopsy, as  described above. Pathologic results are pending.    AZALEA ART MD   XR Chest 1 View    Narrative    XR CHEST 1 VW 4/26/2017 10:00 AM    HISTORY: Lung biopsy.    COMPARISON: 10/31/2006    FINDINGS: No pneumothorax. Known mass is adjacent to the left  pulmonary hilum. Interstitial edema.      Impression    IMPRESSION: No pneumothorax.    ALLEN ORTIZ MD   XR Chest 1 View    Narrative    CHEST ONE VIEW April 26, 2017 12:06 PM     HISTORY: Chest biopsy.     COMPARISON: Chest x-ray 4/26/2017 at 10:00 AM.      Impression    IMPRESSION: Single view of the chest is performed following CT-guided  left lung biopsy. A trace left apical pneumothorax is noted. This was  not definitely evident on the prior exam. No evidence of mediastinal  shift. Heart is normal in size. Anterior left lung mass is again  noted. Right lung is well expanded and  clear.    JIM POWERS MD   XR Chest 2 Views    Narrative    XR CHEST 2 VW 4/27/2017 6:10 AM    HISTORY: Follow-up of a small pneumothorax after lung biopsy.    COMPARISON: Prior study done at 1208 hours on April 26.      Impression    IMPRESSION: 2 views of the chest show a modest left apical  pneumothorax, slightly larger compared to the prior study. It measures  1.9 cm of lucency in the apical area compared to 0.9 cm on the prior  study. Small bilateral pleural effusions are present, also seen on  other recent imaging studies.     AZALEA ART MD   Comprehensive metabolic panel   Result Value Ref Range    Sodium 145 (H) 133 - 144 mmol/L    Potassium 3.5 3.4 - 5.3 mmol/L    Chloride 112 (H) 94 - 109 mmol/L    Carbon Dioxide 22 20 - 32 mmol/L    Anion Gap 11 3 - 14 mmol/L    Glucose 101 (H) 70 - 99 mg/dL    Urea Nitrogen 16 7 - 30 mg/dL    Creatinine 1.00 0.66 - 1.25 mg/dL    GFR Estimate 76 >60 mL/min/1.7m2    GFR Estimate If Black >90   GFR Calc   >60 mL/min/1.7m2    Calcium 7.8 (L) 8.5 - 10.1 mg/dL    Bilirubin Total 0.4 0.2 - 1.3 mg/dL    Albumin 2.4 (L) 3.4 - 5.0 g/dL    Protein Total 5.8 (L) 6.8 - 8.8 g/dL    Alkaline Phosphatase 246 (H) 40 - 150 U/L    ALT 21 0 - 70 U/L    AST 59 (H) 0 - 45 U/L   CBC with platelets   Result Value Ref Range    WBC 5.5 4.0 - 11.0 10e9/L    RBC Count 3.22 (L) 4.4 - 5.9 10e12/L    Hemoglobin 7.8 (L) 13.3 - 17.7 g/dL    Hematocrit 24.0 (L) 40.0 - 53.0 %    MCV 75 (L) 78 - 100 fl    MCH 24.2 (L) 26.5 - 33.0 pg    MCHC 32.5 31.5 - 36.5 g/dL    RDW 23.3 (H) 10.0 - 15.0 %    Platelet Count 217 150 - 450 10e9/L       Assessment and Plan:    Hypotension   April 26, 2017 starting stress dose steroids  April 27, 2017 discussed results of corticotropin stim test with endo yesterday--they don't want steroids now         Sellar mass-etiology unclear, possible metastasis vs adenoma/pituitary insufficiency:   -incidental noted, >10mm. Hormonal workup recommended. If low  cortisol, this could be the reason for the low BPs.  April 24, 2017 T4 quite low-but normal tsh, awaiting other lab   April 25, 2017 low T3, T4, FSH,LH, mildly elevated prolactin, normal cortisol, 24 cortisol pending, normal tsh-------will discuss with endo.  April 26, 2017 abn corticotropin stim test--stress dose steroids started, will start thyroid also, testosterone pending, continuing florinef[JE1.1]  April 27, 2017[JE1.3] as above      NAGMA:   Fluid related-resolved      Weakness: due to adrenal insufficiency, hypotension, probable cancer, hypothyroidism      Lung mass- Probably Lung Cancer/weight loss greater than 20% in 1 year (severe malnutrition)  3 cm mass in the Left Upper Lobe AND Mediastinal Lymphadenopathy, destructive bone lesion Left 5th Rib  ? Due to metastatic Lung Cancer  April 26, 2017 biopsy today[JE1.1]  April 27, 2017[JE1.3] small pneumo after bx.      Anemia  ? Due to nutritional deficiencies and/or due to cancer/CKD  Peripheral Blood Smear is done   2 Units of Blood will be transfused  Iron studies as well as B12 and Folate Levels normal .   Likely anemia of chronic disease.  April 25, 2017 hgb 8.4--8.4, assume some chronic ds      Acute Kidney Injury, likely on CKD.   ? Due to NSAIDS Induced Nephropathy or Pigment induced Nephropathy due to Rhabdomyolysis  -slightly better with fluids already.   -stable at 1.3 Cr.   April 25, 2017 creat 1.07      Acute Hypokalemia   Will replace Potassium  Also check Magnesium and Phosphorus level in a patient with history of Alcohol use.      Hypophos: Needs replacement.          Alcohol Abuse  Recently quit Drinking  Will give Thiamine     Low folic acid  replacement      Acute Rhabdomyolysis   resolved       DVT Prophylaxis: Pneumatic Compression Devices  Code Status: Full Code   Dispo:[JE1.1] to tcu, no hormonal rx now, needs endo fu, has oncology apmnt next week[JE1.2]                      To tcu[JE1.1]         Revision History        User Key  Date/Time User Provider Type Action    > JE1.2 4/27/2017 10:33 AM Cristian Batista MD Physician Sign     JE1.3 4/27/2017  9:58 AM Cristian Batista MD Physician      JE1.1 4/27/2017  9:57 AM Cristian Batista MD Physician             Progress Notes by Cristian Batista MD at 4/26/2017 10:17 AM     Author:  Cristian Batista MD Service:  Hospitalist Author Type:  Physician    Filed:  4/26/2017  3:22 PM Date of Service:  4/26/2017 10:17 AM Note Created:  4/26/2017 10:17 AM    Status:  Addendum :  Cristian Batista MD (Physician)         [facility] Hospitalist Service      Subjective:[JE1.1]  Feeling stronger  Dizziness is better  Biopsy went well  No sob[JE1.2]    Review of Systems:[JE1.1]  CONSTITUTIONAL:above  I: NEGATIVE for worrisome rashes, moles or lesions  E: NEGATIVE for vision changes or irritation  E/M: NEGATIVE for ear, mouth and throat problems  R: NEGATIVE for significant cough or SOB  B: NEGATIVE for masses, tenderness or discharge  CV: NEGATIVE for chest pain, palpitations or peripheral edema  GI: NEGATIVE for nausea, abdominal pain, heartburn, or change in bowel habits  : NEGATIVE for frequency, dysuria, or hematuria  M: NEGATIVE for significant arthralgias or myalgia  NEURO: above  E: NEGATIVE for temperature intolerance, skin/hair changes  H: NEGATIVE for bleeding problems  P: NEGATIVE for changes in mood or affect    Physical Ex[JE1.2]am:  Vitals Were Reviewed[JE1.1]    Patient Vitals for the past 16 hrs:   BP Temp Temp src Pulse Heart Rate Resp SpO2 Weight   04/26/17 0955 116/84 - - - 82 16 94 % -   04/26/17 0758 120/81 98.2  F (36.8  C) Oral - 81 18 96 % -   04/26/17 0636 - - - - - - - 82.3 kg (181 lb 7 oz)   04/26/17 0300 98/65 98.6  F (37  C) Oral 75 - 16 94 % -   04/25/17 2309 100/68 98.8  F (37.1  C) Oral - 82 16 92 % -   04/25/17 1928 100/68 - - - 85 18 96 % -[JE1.3]         Intake/Output Summary (Last 24 hours) at 04/26/17 1018  Last data filed at  04/26/17 0949   Gross per 24 hour   Intake             1200 ml   Output             2825 ml   Net            -1625 ml[JE1.1]       GENERAL APPEARANCE: healthy, alert and no distress  EYES: conjunctiva clear, eyes grossly normal  RESP: lungs clear to auscultation - no rales, rhonchi or wheezes  CV: regular rate and rhythm, normal S1 S2, no S3 or S4 and no murmur, click or rub   ABDOMEN: soft, nontender, no HSM or masses and bowel sounds normal  MS: no clubbing, cyanosis; no edema  SKIN: clear without significant rashes or lesions  NEURO: Normal strength and tone, sensory exam grossly normal, mentation intact and speech normal--diffuse weakness    Lab:[JE1.2]  Recent Labs   Lab Test  04/26/17   0710  04/25/17   0700   NA  145*  146*   POTASSIUM  3.8  3.8   CHLORIDE  113*  114*   CO2  25  21   ANIONGAP  7  11   GLC  92  81   BUN  12  12   CR  0.93  1.07   JOHANN  8.0*  8.0*     CBC RESULTS:   Recent Labs   Lab Test  04/26/17   0710  04/25/17   0700   WBC  5.6  5.4   RBC  3.56*  3.50*   HGB  8.6*  8.4*   HCT  26.2*  25.8*   PLT  235  206[JE1.1]       Results for orders placed or performed during the hospital encounter of 04/20/17 (from the past 24 hour(s))   Cosyntropin stimulation study baseline   Result Value Ref Range    Cortisol Stimulation Baseline 5.0 4 - 22 ug/dL   Cosyntropin stimulation study post 30   Result Value Ref Range    Cortisol Stimulation Post 30 14.8 (L) >20 ug/dL   Cosyntropin stimulation study post 60   Result Value Ref Range    Cortisol Stimulation Post 60 19.6 (L) >20 ug/dL   Comprehensive metabolic panel   Result Value Ref Range    Sodium 145 (H) 133 - 144 mmol/L    Potassium 3.8 3.4 - 5.3 mmol/L    Chloride 113 (H) 94 - 109 mmol/L    Carbon Dioxide 25 20 - 32 mmol/L    Anion Gap 7 3 - 14 mmol/L    Glucose 92 70 - 99 mg/dL    Urea Nitrogen 12 7 - 30 mg/dL    Creatinine 0.93 0.66 - 1.25 mg/dL    GFR Estimate 83 >60 mL/min/1.7m2    GFR Estimate If Black >90   GFR Calc   >60  mL/min/1.7m2    Calcium 8.0 (L) 8.5 - 10.1 mg/dL    Bilirubin Total 0.6 0.2 - 1.3 mg/dL    Albumin 2.5 (L) 3.4 - 5.0 g/dL    Protein Total 5.9 (L) 6.8 - 8.8 g/dL    Alkaline Phosphatase 276 (H) 40 - 150 U/L    ALT 19 0 - 70 U/L    AST 62 (H) 0 - 45 U/L   CBC with platelets   Result Value Ref Range    WBC 5.6 4.0 - 11.0 10e9/L    RBC Count 3.56 (L) 4.4 - 5.9 10e12/L    Hemoglobin 8.6 (L) 13.3 - 17.7 g/dL    Hematocrit 26.2 (L) 40.0 - 53.0 %    MCV 74 (L) 78 - 100 fl    MCH 24.2 (L) 26.5 - 33.0 pg    MCHC 32.8 31.5 - 36.5 g/dL    RDW 23.0 (H) 10.0 - 15.0 %    Platelet Count 235 150 - 450 10e9/L   CT Lung Mediastinum Biopsy    Narrative    4/26/2017 9:53 AM    HISTORY: Left upper lobe lung mass.    COMPARISON: 4/20/2017.    PROCEDURE: Risks and benefits of a CT guided core biopsy of the left  upper lobe lung mass are discussed with the patient. Under CT  guidance, aseptic conditions, and utilizing 10 mL 1% lidocaine as  local anesthetic, a 19 gauge coaxial needle is placed. Through this,  two 20 gauge core biopsy samples are obtained and placed in formalin  for pathologic analysis. The patient tolerated the procedure well.    Radiation dose for this procedure is reduced using automated exposure  control of mA and/or kV according to patient size, or iterative  reconstruction technique.    CONSCIOUS SEDATION: Provided by the anesthesia service.      Impression    IMPRESSION: Technically uneventful CT guided needle core biopsy, as  described above. Pathologic results are pending.    AZALEA ART MD   XR Chest 1 View    Narrative    XR CHEST 1 VW 4/26/2017 10:00 AM    HISTORY: Lung biopsy.    COMPARISON: 10/31/2006    FINDINGS: No pneumothorax. Known mass is adjacent to the left  pulmonary hilum. Interstitial edema.      Impression    IMPRESSION: No pneumothorax.    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 Hormonal workup recommended. If low cortisol, this could be the reason for the low BPs.  April 24, 2017 T4 quite low-but normal tsh, awaiting other lab   April 25, 2017 low T3, T4, FSH,LH, mildly elevated prolactin, normal cortisol, 24 cortisol pending, normal tsh-------will discuss with michelle.[JE1.1]  April 26, 2017[JE1.5] abn corticotropin stim test--stress dose steroids started, will start thyroid also[JE1.1], testosterone pending[JE1.6], continuing florinef[JE1.2]      NAGMA:[JE1.1]   Fluid related-resolved[JE1.6]      Weakness:[JE1.1] due to adrenal insufficiency, hypotension, probable cancer, hypothyroidism[JE1.6]      Lung mass- Probably Lung Cancer/weight loss greater than 20% in 1 year (severe malnutrition)  3 cm mass in the Left Upper Lobe AND Mediastinal Lymphadenopathy, destructive bone lesion Left 5th Rib  ? Due to metastatic Lung Cancer[JE1.1]  April 26, 2017[JE1.7] biopsy today[JE1.6]      Anemia  ? Due to nutritional deficiencies and/or due to cancer/CKD  Peripheral Blood Smear is done   2 Units of Blood will be transfused  Iron studies as well as B12 and Folate Levels normal .   Likely anemia of chronic disease.  April 25, 2017 hgb 8.4--8.4, assume some chronic ds      Acute Kidney Injury, likely on CKD.   ? Due to NSAIDS Induced Nephropathy or Pigment induced Nephropathy due to Rhabdomyolysis  -slightly better with fluids already.   -stable at 1.3 Cr.   April 25, 2017 creat 1.07      Acute Hypokalemia   Will replace Potassium  Also check Magnesium and Phosphorus level in a patient with history of Alcohol use.      Hypophos: Needs replacement.         Alcohol Abuse  Recently quit Drinking  Will give Thiamine[JE1.1]    Low folic acid  replacement[JE1.8]      Acute Rhabdomyolysis   resolved       DVT Prophylaxis: Pneumatic Compression Devices  Code Status: Full Code    Dispo:[JE1.1] stress dose steroids started, will start thyroid,[JE1.6] continue florinef[JE1.8] observe until tomorrow, arrange endo and oncology fu[JE1.6], transition to oral steroid  Needs tcu-probably tomorrow[JE1.8]                 [JE1.1]     2:32 PM[JE1.9]  Discussed with endo  They think the stim test is actually adequate  Suggest no cortisol  Since thyroid off with acute illness-hold on replacement  See endo two weeks[JE1.10]      3:18 PM  cxr post procedure shows small apical pneumo  Clinically stable  Will repeat cxr in am[JE1.11]  Discussed with Dr Kimble-he felt this should reabsorb on its own[JE1.12]     Revision History        User Key Date/Time User Provider Type Action    > JE1.12 4/26/2017  3:22 PM Cristian Batista MD Physician Addend     JE1.11 4/26/2017  3:19 PM Cristian Batista MD Physician Addend     JE1.9 4/26/2017  2:33 PM Cristian Batista MD Physician Addend     JE1.10 4/26/2017  2:31 PM Cristian Batista MD Physician      JE1.2 4/26/2017 10:43 AM Cristian Batista MD Physician Sign     JE1.4 4/26/2017 10:30 AM Cristian Batista MD Physician      JE1.8 4/26/2017 10:28 AM Cristian Batista MD Physician      JE1.7 4/26/2017 10:21 AM Cristian Batista MD Physician      JE1.6 4/26/2017 10:20 AM Cristian Batista MD Physician      JE1.5 4/26/2017 10:19 AM Cristian Batista MD Physician      JE1.3 4/26/2017 10:18 AM Cristian Batista MD Physician      JE1.1 4/26/2017 10:17 AM Cristian Batista MD Physician             Progress Notes by Micki Ramos LICSW at 4/26/2017  1:47 PM     Author:  Micki Ramos LICSW Service:  (none) Author Type:      Filed:  4/26/2017  1:54 PM Date of Service:  4/26/2017  1:47 PM Note Created:  4/26/2017  1:47 PM    Status:  Signed :  Micki Ramos LICSW ()         Reason for Follow up: DC planning    Anticipated discharge needs: This writer had lengthy conversation with pts dtr  in law, Sarah. Sarah reports that she has been cleaning out pts home for the past several days as there is a reportedly a tremendous amount of garbage. Sarah reports that pts father  of lung cancer and he also took care of his mother when she  from cancer. Pts wife  of an aggressive cancer about 10 years ago, she was diagnosed and  within 5 weeks. Sarah reports that since all of these deaths pt has been using alcohol more and more. In the past year she noted that he had stopped showing up for holidays. She feels he may be depressed and has some unresolved grief. THis writer did speak with MD about a psych consult on dc.     This writer also provided Sarah with a number of resources moving forward including SSDI, [employer], MOW, Mental health providers and housing.     Next steps: Pt will dc to [facility] most likely tomorrow if stable, transport will need be arranged and Sarah will need to be called    Micki Ramos [facility] [place] [facility], [facility] [phone][AK1.1]           Revision History        User Key Date/Time User Provider Type Action    > AK1.1 2017  1:54 PM Micki Ramos LICSW  Sign            Progress Notes by Patrica Paula RN at 2017 10:25 AM     Author:  Patrica Paula RN Service:  Radiology Author Type:  Registered Nurse    Filed:  2017 10:48 AM Date of Service:  2017 10:25 AM Note Created:  2017 10:46 AM    Status:  Signed :  Patrica Paula RN (Registered Nurse)         Left sided needle lung biopsy performed by radiologist. Sedation per CRNA. Pt tolerated procedure well, band aid applied at biopsy site. No bleeding noted at site. VSS in phase 2. Post CXR done and read by radiologist. Pt returned to inpt room via cart. Report to Enriqueta CRUZ.[CK1.1]     Revision History        User Key Date/Time User Provider Type Action    > CK1.1 2017 10:48 AM Patrica Paula, RN Registered Nurse Sign            Progress Notes by Cristian Batista MD at  4/25/2017 10:28 AM     Author:  Cristian Batista MD Service:  Hospitalist Author Type:  Physician    Filed:  4/25/2017  4:26 PM Date of Service:  4/25/2017 10:28 AM Note Created:  4/25/2017 10:28 AM    Status:  Addendum :  Cristian Batista MD (Physician)         [facility]      Subjective:[JE1.1]  Stronger  Still quite weak[JE1.2]    Review of Systems:[JE1.1]  C: NEGATIVE for fever, chills, change in weight  I: NEGATIVE for worrisome rashes, moles or lesions  E: NEGATIVE for vision changes or irritation  E/M: NEGATIVE for ear, mouth and throat problems  R: NEGATIVE for significant cough or SOB  B: NEGATIVE for masses, tenderness or discharge  CV: NEGATIVE for chest pain, palpitations or peripheral edema  GI: NEGATIVE for nausea, abdominal pain, heartburn, or change in bowel habits  : NEGATIVE for frequency, dysuria, or hematuria  M: NEGATIVE for significant arthralgias or myalgia  NEURO: weak  E: NEGATIVE for temperature intolerance, skin/hair changes  H: NEGATIVE for bleeding problems  P: NEGATIVE for changes in mood or affect    Physical Ex[JE1.2]am:  Vitals Were Reviewed    Patient Vitals for the past 16 hrs:   BP Temp Temp src Pulse Heart Rate Resp SpO2 Weight   04/25/17 0705 102/66 98.4  F (36.9  C) Oral 82 - 18 93 % -   04/25/17 0632 - - - - - - - 80.7 kg (177 lb 14.6 oz)   04/24/17 2331 98/65 98.9  F (37.2  C) Oral - 82 16 95 % -   04/24/17 2016 100/65 99.2  F (37.3  C) Oral - 82 16 95 % -         Intake/Output Summary (Last 24 hours) at 04/25/17 1028  Last data filed at 04/25/17 0632   Gross per 24 hour   Intake              250 ml   Output             1800 ml   Net            -1550 ml[JE1.1]       GENERAL APPEARANCE: healthy, alert and no distress  EYES: conjunctiva clear, eyes grossly normal  RESP: lungs clear to auscultation - no rales, rhonchi or wheezes  CV: regular rate and rhythm, normal S1 S2, no S3 or S4 and no murmur, click or rub   ABDOMEN: soft,  nontender, no HSM or masses and bowel sounds normal  MS: no clubbing, cyanosis; no edema  SKIN: clear without significant rashes or lesions  NEURO: diffusely weak    Lab:[JE1.2]  Recent Labs   Lab Test  04/25/17   0700  04/24/17   0640   NA  146*  147*   POTASSIUM  3.8  4.0   CHLORIDE  114*  117*   CO2  21  18*   ANIONGAP  11  12   GLC  81  75   BUN  12  12   CR  1.07  1.11   JOHANN  8.0*  7.9*     CBC RESULTS:   Recent Labs   Lab Test  04/25/17   0700  04/23/17   0720   WBC  5.4  5.9   RBC  3.50*  3.39*   HGB  8.4*  8.4*   HCT  25.8*  24.6*   PLT  206  159       Results for orders placed or performed during the hospital encounter of 04/20/17 (from the past 24 hour(s))   Comprehensive metabolic panel   Result Value Ref Range    Sodium 146 (H) 133 - 144 mmol/L    Potassium 3.8 3.4 - 5.3 mmol/L    Chloride 114 (H) 94 - 109 mmol/L    Carbon Dioxide 21 20 - 32 mmol/L    Anion Gap 11 3 - 14 mmol/L    Glucose 81 70 - 99 mg/dL    Urea Nitrogen 12 7 - 30 mg/dL    Creatinine 1.07 0.66 - 1.25 mg/dL    GFR Estimate 71 >60 mL/min/1.7m2    GFR Estimate If Black 86 >60 mL/min/1.7m2    Calcium 8.0 (L) 8.5 - 10.1 mg/dL    Bilirubin Total 0.6 0.2 - 1.3 mg/dL    Albumin 2.3 (L) 3.4 - 5.0 g/dL    Protein Total 5.7 (L) 6.8 - 8.8 g/dL    Alkaline Phosphatase 274 (H) 40 - 150 U/L    ALT 20 0 - 70 U/L    AST 74 (H) 0 - 45 U/L   INR   Result Value Ref Range    INR 1.02 0.86 - 1.14   CBC with platelets   Result Value Ref Range    WBC 5.4 4.0 - 11.0 10e9/L    RBC Count 3.50 (L) 4.4 - 5.9 10e12/L    Hemoglobin 8.4 (L) 13.3 - 17.7 g/dL    Hematocrit 25.8 (L) 40.0 - 53.0 %    MCV 74 (L) 78 - 100 fl    MCH 24.0 (L) 26.5 - 33.0 pg    MCHC 32.6 31.5 - 36.5 g/dL    RDW 22.9 (H) 10.0 - 15.0 %    Platelet Count 206 150 - 450 10e9/L       Assessment and Plan:    Hypotension:   -appeared hypovolemic on admission. Unclear cause, but seems poor intake plus some ongoing polyuria may be cause.   -remains low BPs but good UO, warm toes, making sense, so  perfusion seems ok. Will try some albumen.   -with the albumen BP came up short time but back down after a few hours and he refused further IV sticks.   -has been asymptomatic with these low BPs.   -will start some florinef.  -start some hormonal workup for the sellar tumor.  April 24, 2017 bp up , on florinef, creat 1.26 yesterday (down)-not ordered today  April 25, 2017 bp 102 systolic on florinef, creat 1.07      Sellar mass:   -incidental noted, >10mm. Hormonal workup recommended. If low cortisol, this could be the reason for the low BPs.  April 24, 2017 T4 quite low-but normal tsh, awaiting other lab[JE1.1]   April 25, 2017[JE1.3] low T3, T4, FSH,LH, mildly elevated prolactin, normal cortisol, 24 cortisol pending, normal tsh-------will discuss with endo.[JE1.4]      NAGMA:   -from all the saline he has had. Stop even the LR today. Try albumen for the hypotension. Follow.   April 24, 2017 will add on chem today, off fluids[JE1.1]  April 25, 2017[JE1.5] CO2 is normal[JE1.4]      Weakness: due to hypotension/anemia/ca  -this is multifactorial[JE1.1]   April 25, 2017[JE1.5] probably needs thyroid replacement[JE1.4]      Lung mass- Probably Lung Cancer[JE1.1]/weight loss greater than 20% in 1 year (severe malnutrition)[JE1.6]  3 cm mass in the Left Upper Lobe AND Mediastinal Lymphadenopathy, destructive bone lesion Left 5th Rib  ? Due to metastatic Lung Cancer  Will discuss with Radiology and do Lung Biopsy  April 24, 2017 Dr Kimble can do, INR ordered for tomorrow (previous normal)[JE1.1]  April 25, 2017[JE1.7] will have biopsy tomorrow[JE1.4]      Anemia  ? Due to nutritional deficiencies and/or due to cancer/CKD  Peripheral Blood Smear is done   2 Units of Blood will be transfused  Iron studies as well as B12 and Folate Levels normal .   Likely anemia of chronic disease.  April 2[JE1.1]5[JE1.4], 2017 hgb[JE1.1] 8.4--8.4, assume some chronic ds[JE1.4]      Acute Kidney Injury, likely on CKD.   ? Due to NSAIDS  Induced Nephropathy or Pigment induced Nephropathy due to Rhabdomyolysis  -slightly better with fluids already.   -stable at 1.3 Cr.   April 2[JE1.1]5[JE1.8], 2017[JE1.1] creat 1.07[JE1.8]      Acute Hypokalemia   Will replace Potassium  Also check Magnesium and Phosphorus level in a patient with history of Alcohol use.     Hypophos: Needs replacement.               Alcohol Abuse  Recently quit Drinking  Will give Thiamine  Start Folic Acid later after Folic level is checked      Acute Rhabdomyolysis[JE1.1]   resolved[JE1.8]       DVT Prophylaxis: Pneumatic Compression Devices  Code Status: Full Code   Dispo:[JE1.1]   Will have biopsy tomorrow  Will try to discuss with endo today  To tcu after biopsy tomorrow[JE1.8]                      [JE1.1]4:22 PM  Discussed with endo-Dr Yandle Zurita stim test  Testosterone level  If he has adrenal insufficiency -stress dose steroids  Start thyroid replacement after above (when adrenal function known)  Will try to get cosyn test done prior to bx.[JE1.9]           Revision History        User Key Date/Time User Provider Type Action    > JE1.9 4/25/2017  4:26 PM Cristian Batista MD Physician Addend     JE1.6 4/25/2017  2:48 PM Cristian Batista MD Physician Addend     JE1.2 4/25/2017 10:57 AM Cristian Batista MD Physician Sign     JE1.8 4/25/2017 10:38 AM Cristian Batista MD Physician      JE1.7 4/25/2017 10:37 AM Cristian Batista MD Physician      JE1.5 4/25/2017 10:36 AM Cristian Batista MD Physician      JE1.3 4/25/2017 10:33 AM Cristian Batista MD Physician      JE1.4 4/25/2017 10:32 AM Cristian Batista MD Physician      JE1.1 4/25/2017 10:28 AM Cristian Batista MD Physician             Progress Notes by Fish Schroeder RN at 4/25/2017  3:11 PM     Author:  Fish Schroeder, RN Service:  (none) Author Type:  Registered Nurse    Filed:  4/25/2017  3:12 PM Date of Service:  4/25/2017  3:11 PM Note Created:  4/25/2017  3:11 PM     Status:  Signed :  Fish Schroeder, RN (Registered Nurse)         Pt compliant with cares, showered this am. Using call light appropriately, but bed alarm on due to pt's impatience with waiting for staff.[JN1.1]      Revision History        User Key Date/Time User Provider Type Action    > JN1.1 4/25/2017  3:12 PM Fish Schroeder, RN Registered Nurse Sign            Progress Notes by Micki Ramos LICSW at 4/25/2017 12:55 PM     Author:  Micki Ramos LICSW Service:  (none) Author Type:      Filed:  4/25/2017 12:56 PM Date of Service:  4/25/2017 12:55 PM Note Created:  4/25/2017 12:55 PM    Status:  Signed :  Micki Ramos LICSW ()         Reason for Follow up: DC planning    Anticipated discharge needs: Pt has been accepted at [facility] Phone: [phone]) Fax: ([phone]) for as early as tomorrow. Pt is in agreement and is requesting that a ride be arranged.     PAS-RR    Per DHS regulation, CTS team completed and submitted PAS-RR to [place] [employer]ging Direct Connect via the [employer]. CTS team advised SNF and they are aware a PAS-RR has been submitted.     CTS team reviewed with pt or health care agent that they may be contacted for a follow up appointment within 10 days of hospital discharge if SNF stay is <30 days. Contact information for [employer] was also provided.     Pt or health care agent verbalized understanding.     PAS-RR # [id number]      Next steps: DC to [facility] Phone: ([phone]) Fax: ([phone]) tomorrow    Micki Ramos [facility] [place], [place], [facility] [phone][AK1.1]           Revision History        User Key Date/Time User Provider Type Action    > AK1.1 4/25/2017 12:56 PM Micki Ramos LICSW  Sign            Progress Notes by Cristian Batista MD at 4/24/2017 12:05 PM     Author:  Cristian Batista MD Service:  Hospitalist Author Type:  Physician    Filed:  4/24/2017   3:19 PM Date of Service:  4/24/2017 12:05 PM Note Created:  4/24/2017 12:05 PM    Status:  Addendum :  Cristian Batista MD (Physician)         [facility]ist Service      Subjective:[JE1.1]  Feels better  Still weak  Eating some  Able to walk some  Unsure if he could care for self[JE1.2]  Review of Systems:[JE1.1]  C: NEGATIVE for fever, chills, change in weight  I: NEGATIVE for worrisome rashes, moles or lesions  E: NEGATIVE for vision changes or irritation  E/M: NEGATIVE for ear, mouth and throat problems  RESP:slaughter  B: NEGATIVE for masses, tenderness or discharge  CV: some cp over abn rib area  GI: NEGATIVE for nausea, abdominal pain, heartburn, or change in bowel habits  : NEGATIVE for frequency, dysuria, or hematuria  M: NEGATIVE for significant arthralgias or myalgia  N: very weak  E: NEGATIVE for temperature intolerance, skin/hair changes  H: NEGATIVE for bleeding problems  P: NEGATIVE for changes in mood or affect    Physical Ex[JE1.2]am:  Vitals Were Reviewed    Patient Vitals for the past 16 hrs:   BP Temp Temp src Heart Rate Resp SpO2 Weight   04/24/17 1100 107/65 98.9  F (37.2  C) Oral 83 18 95 % -   04/24/17 0756 135/88 99.2  F (37.3  C) Oral 94 18 95 % -   04/24/17 0634 - - - - - - 81.1 kg (178 lb 12.7 oz)   04/24/17 0000 - - - 79 - - -   04/23/17 2358 (!) 88/58 99.2  F (37.3  C) Oral 76 18 92 % -         Intake/Output Summary (Last 24 hours) at 04/24/17 1205  Last data filed at 04/24/17 1136   Gross per 24 hour   Intake              450 ml   Output             2500 ml   Net            -2050 ml[JE1.1]       GENERAL APPEARANCE: very weak, alert nad  RESP: lungs clear to auscultation - no rales, rhonchi or wheezes  CV: regular rate and rhythm, normal S1 S2, no S3 or S4 and no murmur, click or rub   ABDOMEN: soft, nontender, no HSM or masses and bowel sounds normal  MS: no clubbing, cyanosis; no edema  SKIN: clear without significant rashes or lesions  NEURO: diffuse  weakness    Lab:[JE1.2]  Recent Labs   Lab Test  04/23/17   0720  04/22/17   0620   NA  147*  150*   POTASSIUM  3.9  4.1   CHLORIDE  118*  122*   CO2  17*  16*   ANIONGAP  12  12   GLC  81  85   BUN  11  11   CR  1.26*  1.32*   JOHANN  7.6*  7.5*     CBC RESULTS:   Recent Labs   Lab Test  04/23/17   0720  04/22/17   0620   WBC  5.9  5.8   RBC  3.39*  3.52*   HGB  8.4*  8.6*   HCT  24.6*  25.6*   PLT  159  159       Results for orders placed or performed during the hospital encounter of 04/20/17 (from the past 24 hour(s))   UA with Microscopic reflex to Culture   Result Value Ref Range    Color Urine Light Yellow     Appearance Urine Clear     Glucose Urine Negative NEG mg/dL    Bilirubin Urine Negative NEG    Ketones Urine Negative NEG mg/dL    Specific Gravity Urine 1.002 (L) 1.003 - 1.035    Blood Urine Negative NEG    pH Urine 6.0 5.0 - 7.0 pH    Protein Albumin Urine Negative NEG mg/dL    Urobilinogen mg/dL Normal 0.0 - 2.0 mg/dL    Nitrite Urine Negative NEG    Leukocyte Esterase Urine Negative NEG    Source Midstream Urine     WBC Urine 1 0 - 2 /HPF    RBC Urine 0 0 - 2 /HPF   Ferritin   Result Value Ref Range    Ferritin 752 (H) 26 - 388 ng/mL   TSH   Result Value Ref Range    TSH 1.30 0.40 - 4.00 mU/L   T4 free   Result Value Ref Range    T4 Free 0.38 (L) 0.76 - 1.46 ng/dL       Assessment and Plan:    Hypotension:   -appeared hypovolemic on admission. Unclear cause, but seems poor intake plus some ongoing polyuria may be cause.   -remains low BPs but good UO, warm toes, making sense, so perfusion seems ok. Will try some albumen.   -with the albumen BP came up short time but back down after a few hours and he refused further IV sticks.   -has been asymptomatic with these low BPs.   -will start some florinef.  -start some hormonal workup for the sellar tumor.[JE1.1]  April 24, 2017[JE1.3] bp up , on florinef, creat 1.26 yesterday (down)-not ordered today[JE1.4]     Sellar mass:   -incidental noted, >10mm.  Hormonal workup recommended. If low cortisol, this could be the reason for the low BPs.[JE1.1]  April 24, 2017[JE1.5] T4 quite low-but normal tsh, awaiting other lab[JE1.4]      NAGMA:   -from all the saline he has had. Stop even the LR today. Try albumen for the hypotension. Follow.[JE1.1]   April 24, 2017[JE1.6] will add on chem today, off fluids[JE1.4]     Weakness: due to hypotension/anemia/ca  -this is multifactorial      Lung mass- Probably Lung Cancer  3 cm mass in the Left Upper Lobe AND Mediastinal Lymphadenopathy, destructive bone lesion Left 5th Rib  ? Due to metastatic Lung Cancer  Will discuss with Radiology and do Lung Biopsy[JE1.1]  April 24, 2017[JE1.6] Dr Kimble can do, INR ordered for tomorrow (previous normal)[JE1.4]     Anemia  ? Due to nutritional deficiencies and/or due to cancer/CKD  Peripheral Blood Smear is done   2 Units of Blood will be transfused  Iron studies as well as B12 and Folate Levels normal .   Likely anemia of chronic disease.[JE1.1]  April 24, 2017[JE1.7] hgb yesterday 8.4-will check tomorrow[JE1.4]       Acute Kidney Injury, likely on CKD.   ? Due to NSAIDS Induced Nephropathy or Pigment induced Nephropathy due to Rhabdomyolysis  -slightly better with fluids already.   -stable at 1.3 Cr.[JE1.1]   April 24, 2017[JE1.7] add on chem today[JE1.4]      Acute Hypokalemia   Will replace Potassium  Also check Magnesium and Phosphorus level in a patient with history of Alcohol use.    Hypophos: Needs replacement.              Alcohol Abuse  Recently quit Drinking  Will give Thiamine  Start Folic Acid later after Folic level is checked      Acute Rhabdomyolysis   IV Fluids  Resolving       DVT Prophylaxis: Pneumatic Compression Devices  Code Status: Full Code   Dispo:[JE1.1]   I added on cortisol , chem  inr tomorrow[JE1.4]  Await time for bx  Will ultimately need to determine if he needs tcu[JE1.2]             [JE1.1]     3:18 PM cortisol still pending  Given hypotension and sella issue  -need this before dc.  Biopsy will not be until Wednesday[JE1.8]       Revision History        User Key Date/Time User Provider Type Action    > JE1.8 4/24/2017  3:19 PM Cristian Batista MD Physician Addend     JE1.2 4/24/2017 12:49 PM Cristian Batista MD Physician Sign     JE1.7 4/24/2017 12:15 PM Cristian Batista MD Physician      JE1.6 4/24/2017 12:14 PM Cristian Batista MD Physician      JE1.5 4/24/2017 12:13 PM Cristian Batista MD Physician      JE1.3 4/24/2017 12:12 PM Cristian Batista MD Physician      JE1.4 4/24/2017 12:06 PM Cristian Batista MD Physician      JE1.1 4/24/2017 12:05 PM Cristian Batista MD Physician             Progress Notes by Micki Ramos LICSW at 4/24/2017  2:20 PM     Author:  Micki Ramos LICSW Service:  (none) Author Type:      Filed:  4/24/2017  2:21 PM Date of Service:  4/24/2017  2:20 PM Note Created:  4/24/2017  2:20 PM    Status:  Signed :  Micki Ramos LICSW ()         Reason for Follow up: DC Planning    Anticipated discharge needs: Pt has been approved for MA. [facility] (Phone: [phone] Fax: [phone]), [facility] Phone: ([phone]) Fax: ([phone]) and [facility] (Phone: [phone]) Fax: ([phone]).     Next steps: Waiting on bed availability, pt may be ready for dc as early as tomorrow    JAIR Lucas, [facility] [phone][AK1.1]           Revision History        User Key Date/Time User Provider Type Action    > AK1.1 4/24/2017  2:21 PM Micki Ramos, [facility]  Sign                  Procedure Notes     No notes of this type exist for this encounter.      Progress Notes - Therapies (Notes from 04/24/17 through 04/27/17)     No notes of this type exist for this encounter.

## 2017-04-21 NOTE — PROGRESS NOTES
[facility] TRANSPORT NOTE  Data:   Reason for Transport: Hypotension, HGB 6.6, Potassium 2.8    Wade Acevedo was transported to ICU via cart at 1800.  Patient was accompanied by Registered Nurse. Equipment used for transport: Cardiac monitor , Pulse oximeter, Blood pressure monitor and IV pump. Family was aware of reason for transport: yes, Pt's uncle is here.    Action:  Report: received from Frederic CRUZ     Response:  Patient's condition when transferred off unit was Critical.    Nazia Lyman

## 2017-04-21 NOTE — PLAN OF CARE
Problem: Goal Outcome Summary  Goal: Goal Outcome Summary  OT: CANCEL- Pt with low BP this AM. Unable to check back in afternoon d/t therapy schedule.

## 2017-04-21 NOTE — PROGRESS NOTES
"Pt states pain to rt. Rib area and has a fx'd rib per Dr. MINGO Schrader. Pt states back of neck stiff and MD updated. B/P's improved but remain low and Second bolus  mls ordered and is infusing. Pt is alert and oriented and denies lighthead/dizziness when on the BSC. States \"I feel good. I don't feel sick and I do feel weak.\" Denies CP, nausea and SOA at rest States SOA with activity. States fall x2 in the past month and has dry dark scabs to his LFA. No bruises or swelling noted. Denies any alcohol intake for 1.5 months. Has lost 70 lbs in 4 months per pt due to no appetite. Pt's Uncle Cristian is with him.   "

## 2017-04-21 NOTE — PROGRESS NOTES
SUBJECTIVE:   Feels slightly stronger today.  Hasn't been able to get out of bed last few days  Has stooled in the bed last day.    No resp distress.  Some cough.           ROS:4 point ROS including Respiratory, CV, GI and , other than that noted in the HPI,  is negative     OBJECTIVE:   BP 99/71  Pulse 98  Temp 98.3  F (36.8  C) (Oral)  Resp 12  Wt 74.8 kg (165 lb)  SpO2 97%    GENERAL APPEARANCE:  Alert, NAD, Ox3     RESP:rales both bases      CV: regular rate and rhythm,  No  murmur , edema: none       Abdomen: soft, nontender, no liver or spleen enlargement, no masses, BSs normal   Skin: no cyanosis, pallor, or jaundice    CMP  Recent Labs  Lab 04/21/17  0600 04/20/17 2020 04/20/17  1255   * 148* 140   POTASSIUM 3.2* 3.4 2.8*   CHLORIDE 121* 119* 108   CO2 18* 19* 21   ANIONGAP 9 10 11   GLC 80 98 123*   BUN 10 12 14   CR 1.29* 1.38* 1.81*   GFRESTIMATED 57* 53* 39*   GFRESTBLACK 69 64 47*   JOHANN 7.1* 7.5* 9.5   MAG  --   --  2.0   PHOS  --   --  1.4*   PROTTOTAL 5.5*  --  7.7   ALBUMIN 2.1*  --  3.2*   BILITOTAL 0.9  --  1.0   ALKPHOS 175*  --  244*   AST 92*  --  149*   ALT 19  --  28     CBC  Recent Labs  Lab 04/21/17  0120 04/20/17  1255   WBC  --  7.6   RBC  --  3.05*   HGB 7.7* 6.6*   HCT  --  19.9*   MCV  --  65*   MCH  --  21.6*   MCHC  --  33.2   RDW  --  15.9*   PLT  --  236     INR  Recent Labs  Lab 04/20/17  1255   INR 1.05     Arterial BloodGas  No lab results found in last 7 days.   Venous Blood Gas  No lab results found in last 7 days.    Medications     vitamin  B-1  100 mg Oral Daily     senna-docusate  1-2 tablet Oral BID     pantoprazole  40 mg Oral BID AC       Intake/Output Summary (Last 24 hours) at 04/21/17 0909  Last data filed at 04/21/17 0657   Gross per 24 hour   Intake          4940.42 ml   Output              710 ml   Net          4230.42 ml       CK: 1000 yesterday     ASSESSMENT: PLAN:   Hypotension:   -appeared hypovolemic on admission.  Unclear cause, but sees poor  intake plus some ongoing polyuria may be cause.      Weakness: due to hypotension/anemia/ca  -this is multifactorial     Lung mass- Probably Lung Cancer  3 cm mass in the Left Upper Lobe AND Mediastinal Lymphadenopathy, destructive bone lesion Left 5th Rib  ? Due to metastatic Lung Cancer  Will discuss with Radiology and do Lung Biopsy  May be amenable to IR sampling, but unclear if here.  Will discuss Monday.  Needs stabilization until then anyway.      Anemia  ? Due to nutritional deficiencies and/or due to cancer/CKD  Peripheral Blood Smear is done   2 Units of Blood will be transfused  Iron studies as well as B12 and Folate Levels normal .    Likely anemia of chronic disease.       Acute Kidney Injury  ? Due to NSAIDS Induced Nephropathy or Pigment induced Nephropathy due to Rhabdomyolysis  -slightly better with fluids already.     NAGMA:   -likelly due to NS resuscitation. Will change to LR.      Acute Hypokalemia   Will replace Potassium  Also check Magnesium and Phosphorus level in a patient with history of Alcohol use.  Hypophos:  Needs replacement.            Alcohol Abuse  Recently quit Drinking  Will give Thiamine  Start Folic Acid later after Folic level is checked     Acute Rhabdomyolysis   IV Fluids  Monitor CPK.       DVT Prophylaxis: Pneumatic Compression Devices  Code Status: Full Code

## 2017-04-21 NOTE — PROGRESS NOTES
"CLINICAL NUTRITION SERVICES  -  ASSESSMENT NOTE     REASON FOR ASSESSMENT  Wade Acevedo is a 57 year old male seen by Registered Dietitian for Admission Nutrition Risk Screen - unintentional weight loss of 10#s or more in the past two months      NUTRITION HISTORY  - Information obtained from patient. The patient reports that he was not eating well at home. He reports that he has lost more than 50#s. He states that his usual weight was around 220#s.  He states he is eating well now since admission to the hospital. Did not review history further, the patient wanted to rest.      CURRENT NUTRITION ORDERS  Diet Order:     Regular    Current Intake/Tolerance:  Per the patient he is eating most of the foods on his meal trays.      PHYSICAL FINDINGS  Obtained from Chart/Interdisciplinary Team  None noted    ANTHROPOMETRICS  Height: Data Unavailable, per the patient he is 5'6\"  Weight: 165 lbs 0 oz  There is no height or weight on file to calculate BMI.  Weight Status:  Overweight BMI 25-29.9  IBW: 142#s  % IBW: 116%  Weight History:   Wt Readings from Last 8 Encounters:   04/20/17 74.8 kg (165 lb)         LABS  Labs reviewed    MEDICATIONS  Medications reviewed    Dosing Weight 75 kg    ASSESSED NUTRITION NEEDS (PER APPROVED PRACTICE GUIDELINES):  Estimated Energy Needs: 2176-8751 kcals (25-30 Kcal/Kg)  Justification: maintenance  Estimated Protein Needs:  grams protein (1.2-1.5 g pro/Kg)  Justification: Repletion  Estimated Fluid Needs: 0565-2092  mL (1 mL/Kcal)  Justification: maintenance    MALNUTRITION:  % Weight Loss:  > 20% in 1 year (severe malnutrition)  % Intake:  </= 50% for >/= 1 month (severe malnutrition)  Subcutaneous Fat Loss:  Not assessed  Muscle Loss:  Not assessed  Fluid Retention:  No lower extremity edema per provider note    Malnutrition Diagnosis: Severe malnutrition  In Context of:  Chronic illness or disease    NUTRITION DIAGNOSIS:  Inadequate oral intake related to patient not preparing " meals for himself, no appetite, has hx of etoh abuse as evidenced by patient reported weight loss of greater than 50#s      NUTRITION INTERVENTIONS  Recommendations / Nutrition Prescription  General healthy diet  Patient will request snacks if he would like one, did not want to schedule at this time, says he is full after his meals  .      Implementation  Nutrition education: No education needs assessed at this time  General/healthful diet  .      Nutrition Goals  Patient to consume % of his meals in 1-2 days.  .      MONITORING AND EVALUATION:  Progress towards goals will be monitored and evaluated per protocol and Practice Guidelines and Food intake    Liudmila Porras RD,LD  Clinical Dietitian

## 2017-04-21 NOTE — PROGRESS NOTES
Provider ordered another bag of blood and increase in rate for fluids as BP's continue to be soft. Tolerated well. Manual documentation of blood administration due to system downtime.

## 2017-04-21 NOTE — PLAN OF CARE
Problem: Individualization  Goal: Patient Preferences  Patient A/O x4. UP with assist on one; low BP's, dizziness when up. Administered 3 Bolus' of fluids. Utilize bedside commode; sits at edge of bed to use urinal. Order for Hsu to be placed; pt refused. Will see how he does on his own before placing Hsu. Blood administration x3 this shift; Hgb from 6.6 to 7.7. Before admission hadn't eaten for days/weeks/ only drank juice or water; had 1 yogurt, 2 apple sauces, 1 jello this shift. Looking forward to breakfast. Temp: 97.9  F (36.6  C) Temp src: Oral BP: (!) 81/58 Pulse: 98 Heart Rate: 70 Resp: 13 SpO2: 98 % O2 Device: None (Room air)        Continue to monitor and implement poc

## 2017-04-21 NOTE — CONSULTS
CARE TRANSITION SOCIAL WORK INITIAL ASSESSMENT:  Reason For Consult: discharge planning, substance use concerns   Met with: Patient.    DATA  Active Problems:    Anemia    Hypotension             Contact information and PCP information verified: Has not PCP      ASSESSMENT  Cognitive Status: awake, alert and oriented.       Resources List: Transitional Care  Other Resources: Chemical Dependency Services  Lives With: alone  Living Arrangements: house, other (see comments) (trash scattered throughout)     Description of Support System: Uninvolved   Who is your support system?: Other (specify) (Uncle)   Support Assessment: Lacks necessary supervision and assistance, Limited social contact and support   Insurance Concerns: MA pending          This writer met with pt, introduced self and role. PFR also met with pt and completed MA application as pt has no insurance. This writer discussed TCU on dc as pt reports being quite weak. He understands that he will need to participate in PT and OT. Patient was provided with Medicare certified nursing home list. Pts choices are as follows [facility] Phone: ([phone]) Fax: ([phone]) and [facility] (Phone: [phone] Fax: [phone]).  Referrals pending at both facilities. CTS will cont to follow.       PLAN    Will need to verify TCU benefits, waiting on bed availability, therapy to evaluate.        Micki CAPONE, [facility], [facility] [phone]

## 2017-04-21 NOTE — PLAN OF CARE
Problem: Goal Outcome Summary  Goal: Goal Outcome Summary  PT-  Cancel- Order received, pt not seen due to medical status- continues  to be  hypotensive ;will  Check on pt's status 4/22/2017

## 2017-04-21 NOTE — PROGRESS NOTES
Patient filled out MRI questionnaire wit staff assistance Patient requested pain medication as he has back pain and has trouble lying on his back.

## 2017-04-21 NOTE — PROGRESS NOTES
Patient continues to have low Blood Pressure's Patient asymptomatic. Patient c/o some nausea following dinner. Patient ate well for all mealtimes.

## 2017-04-22 NOTE — PROVIDER NOTIFICATION
Dr. Juares web base paged about pt's IVs not working and were removed. Pt is refusing to have another IV placed. This writer explained the importance of receiving the albumin, pt is refusing.

## 2017-04-22 NOTE — PROGRESS NOTES
[facility] ADMISSION NOTE    Patient admitted to room 2306 at approximately 1500 via wheel chair from ICU. Patient was accompanied by transport tech.     Verbal SBAR report received from Ivanna MORRIS prior to patient arrival.     Patient ambulated to bed with stand-by assist. Patient alert and oriented X 4. The patient is not having any pain. 0-10 Pain Scale: 3. Admission vital signs: Blood pressure 94/62, pulse 98, temperature 97.9  F (36.6  C), temperature source Oral, resp. rate 16, weight 80 kg (176 lb 5.9 oz), SpO2 100 %. Patient was oriented to plan of care, call light, bed controls, tv, telephone, bathroom and visiting hours.     The following safety risks were identified during admission: fall. Yellow risk band applied: YES.     Linh Moncada RN

## 2017-04-22 NOTE — PROGRESS NOTES
SUBJECTIVE:   Feels slightly stronger today.  Hasn't gotten out of bed  No lightheadedness when up.    Has stooled in the bed last day.    No resp distress.  Some cough.    minimal pain          ROS:4 point ROS including Respiratory, CV, GI and , other than that noted in the HPI,  is negative     OBJECTIVE:   BP (!) 83/50  Pulse 98  Temp 98.3  F (36.8  C) (Oral)  Resp 16  Wt 80 kg (176 lb 5.9 oz)  SpO2 98%    GENERAL APPEARANCE:  Alert, NAD, Ox3     RESP:rales both bases      CV: regular rate and rhythm,  No  murmur , edema: none       Abdomen: soft, nontender, no liver or spleen enlargement, no masses, BSs normal   Skin: no cyanosis, pallor, or jaundice    CMP    Recent Labs  Lab 04/22/17  0620 04/21/17  2125 04/21/17  1545 04/21/17  0600 04/20/17  2020 04/20/17  1255   *  --   --  148* 148* 140   POTASSIUM 4.1 3.9 3.9 3.2* 3.4 2.8*   CHLORIDE 122*  --   --  121* 119* 108   CO2 16*  --   --  18* 19* 21   ANIONGAP 12  --   --  9 10 11   GLC 85  --   --  80 98 123*   BUN 11  --   --  10 12 14   CR 1.32*  --   --  1.29* 1.38* 1.81*   GFRESTIMATED 56*  --   --  57* 53* 39*   GFRESTBLACK 67  --   --  69 64 47*   JOHANN 7.5*  --   --  7.1* 7.5* 9.5   MAG  --   --  1.8  --   --  2.0   PHOS  --  2.7 1.3*  --   --  1.4*   PROTTOTAL 5.0*  --   --  5.5*  --  7.7   ALBUMIN 2.0*  --   --  2.1*  --  3.2*   BILITOTAL 0.5  --   --  0.9  --  1.0   ALKPHOS 171*  --   --  175*  --  244*   AST 74*  --   --  92*  --  149*   ALT 17  --   --  19  --  28     CBC    Recent Labs  Lab 04/22/17  0620 04/21/17  2125 04/21/17  0120 04/20/17  1255   WBC 5.8  --   --  7.6   RBC 3.52*  --   --  3.05*   HGB 8.6* 8.5* 7.7* 6.6*   HCT 25.6*  --   --  19.9*   MCV 73*  --   --  65*   MCH 24.4*  --   --  21.6*   MCHC 33.6  --   --  33.2   RDW 20.8*  --   --  15.9*     --   --  236     INR    Recent Labs  Lab 04/20/17  1255   INR 1.05     Arterial BloodGas  No lab results found in last 7 days.   Venous Blood Gas    Recent Labs  Lab  04/22/17  0620   PHV 7.33   PCO2V 31*   PO2V 40   HCO3V 16*       Medications     albumin human  12.5 g Intravenous Q8H     vitamin  B-1  100 mg Oral Daily     sodium chloride (PF)  3 mL Intracatheter Q8H     senna-docusate  1-2 tablet Oral BID     pantoprazole  40 mg Oral BID AC       Intake/Output Summary (Last 24 hours) at 04/21/17 0909  Last data filed at 04/21/17 0657   Gross per 24 hour   Intake          4940.42 ml   Output              710 ml   Net          4230.42 ml       CK: 1000 yesterday     ASSESSMENT: PLAN:   Hypotension:   -appeared hypovolemic on admission.  Unclear cause, but seems poor intake plus some ongoing polyuria may be cause.    -remains low BPs but good UO, warm toes, making sense, so perfusion seems ok.  Will try some albumen.      NAGMA:   -from all the saline he has had.  Stop even the LR today.  Try albumen for the hypotension.  Follow.       Weakness: due to hypotension/anemia/ca  -this is multifactorial     Lung mass- Probably Lung Cancer  3 cm mass in the Left Upper Lobe AND Mediastinal Lymphadenopathy, destructive bone lesion Left 5th Rib  ? Due to metastatic Lung Cancer  Will discuss with Radiology and do Lung Biopsy  May be amenable to IR sampling, but unclear if here.  Will discuss Monday.  Needs stabilization until then anyway.      Anemia  ? Due to nutritional deficiencies and/or due to cancer/CKD  Peripheral Blood Smear is done   2 Units of Blood will be transfused  Iron studies as well as B12 and Folate Levels normal .    Likely anemia of chronic disease.       Acute Kidney Injury, likely on CKD.    ? Due to NSAIDS Induced Nephropathy or Pigment induced Nephropathy due to Rhabdomyolysis  -slightly better with fluids already.   -stable at 1.3 Cr.      Acute Hypokalemia   Will replace Potassium  Also check Magnesium and Phosphorus level in a patient with history of Alcohol use.  Hypophos:  Needs replacement.            Alcohol Abuse  Recently quit Drinking  Will give  Thiamine  Start Folic Acid later after Folic level is checked     Acute Rhabdomyolysis   IV Fluids  Resolving       DVT Prophylaxis: Pneumatic Compression Devices  Code Status: Full Code  Discussion: change to medsurg.  Will need TCU.    May do IR needle bx Monday if possible. Otherwise would set up [place] OP [place].

## 2017-04-22 NOTE — PROGRESS NOTES
Goal Outcome Summary    Pt transfers with SBA. Amb without AD with ', steady, normal gait pattern. Performs B LE exs x 10.    REC: Home with HC although pt reports he is going to a TCU.    Physical Therapy Evaluation     04/22/17 1500   Quick Adds   Type of Visit Initial PT Evaluation   Living Environment   Lives With alone   Living Arrangements house   Home Accessibility stairs to enter home;stairs within home   Number of Stairs to Enter Home 12   Number of Stairs Within Home 2   Stair Railings at Home inside, present on right side   Transportation Available family or friend will provide   Functional Level Prior   Ambulation 0-->independent   Transferring 0-->independent   Toileting 0-->independent   Bathing 0-->independent   Dressing 0-->independent   Eating 0-->independent   Communication 0-->understands/communicates without difficulty   Swallowing 0-->swallows foods/liquids without difficulty   Cognition 0 - no cognition issues reported   Fall history within last six months yes   Number of times patient has fallen within last six months 7   Which of the above functional risks had a recent onset or change? ambulation;transferring;fall history   General Information   Onset of Illness/Injury or Date of Surgery - Date 04/20/17   Referring Physician Dr TON Schrader   Patient/Family Goals Statement wants to get stronger so he can return home   Pertinent History of Current Problem (include personal factors and/or comorbidities that impact the POC) Pt admitted with weakness and dizziness   Cognitive Status Examination   Orientation orientation to person, place and time   Level of Consciousness alert   Follows Commands and Answers Questions 100% of the time   Personal Safety and Judgment intact   Posture    Posture Forward head position;Kyphosis   Range of Motion (ROM)   ROM Comment WFL for age   Strength   Strength Comments generally 4 to 4+/5 throughout B LE   Bed Mobility   Bed Mobility Comments sit <> supine with  "SBA   Transfer Skills   Transfer Comments sit <> stand with SBA   Gait   Gait Gait Skill;Stairs   Gait Skills   Level of [place]: Gait contact guard   Physical Assist/Nonphysical Assist: Gait 1 person assist   Weight-Bearing Restrictions: Gait full weight-bearing   Gait Distance 200 feet   Stairs   Self Performance Stand by assist   Physical/Nonphysical Assist: Stairs 1 person assist   Rails 1 rail   Indicate number of stairs 5   Balance   Balance Comments good static and dynamic standing balance   General Therapy Interventions   Planned Therapy Interventions strengthening;progressive activity/exercise   Clinical Impression   Criteria for Skilled Therapeutic Intervention yes, treatment indicated   PT Diagnosis weakness   Influenced by the following impairments weakness   Functional limitations due to impairments gait endurance   Clinical Presentation Stable/Uncomplicated   Clinical Presentation Rationale clinical judgement   Clinical Decision Making (Complexity) Low complexity   Therapy Frequency` daily   Predicted Duration of Therapy Intervention (days/wks) 3 days   Anticipated Discharge Disposition Home with Home Therapy;Transitional Care Facility   Risk & Benefits of therapy have been explained Yes   Patient, Family & other staff in agreement with plan of care Yes   [facility] [employer] TM \"6 Clicks\"   2016, Trustees of [facility], under license to [employer].  All rights reserved.   6 Clicks Short Forms Basic Mobility Inpatient Short Form   [facility] [employer]  \"6 Clicks\" V.2 Basic Mobility Inpatient Short Form   1. Turning from your back to your side while in a flat bed without using bedrails? 3 - A Little   2. Moving from lying on your back to sitting on the side of a flat bed without using bedrails? 4 - None   3. Moving to and from a bed to a chair (including a wheelchair)? 3 - A Little   4. Standing up from a chair using your arms (e.g., wheelchair, or bedside chair)? 4 - None   5. To " walk in hospital room? 3 - A Little   6. Climbing 3-5 steps with a railing? 3 - A Little   Basic Mobility Raw Score (Score out of 24.Lower scores equate to lower levels of function) 20   Total Evaluation Time   Total Evaluation Time (Minutes) 15     See Care Plan for Goals.    Daria Renee PT

## 2017-04-22 NOTE — PROGRESS NOTES
Patient status changed to medical Patient with telemetry. Patient to receive albumin x3 over 2 hours. First dose running at this time.

## 2017-04-22 NOTE — PLAN OF CARE
Problem: Discharge Planning  Goal: Discharge Planning (Adult, OB, Behavioral, Peds)  Outcome: Improving  Patient disposition will possibly be to TCU.    Problem: Goal Outcome Summary  Goal: Goal Outcome Summary  Outcome: Improving  Patient declines to get up in chair to eat. Patient ate at side of bed for breakfast. Patient reportedly slept well last night. Patient stated that he did not sleep well last night. This was his excuse to not get up. Patient encouraged to get back to his baseline activity level. Physical and Occupupational Therapy ordered .

## 2017-04-22 NOTE — PROGRESS NOTES
Occupational Therapy Evaluation       04/22/17 1300   Quick Adds   Type of Visit Initial Occupational Therapy Evaluation   Living Environment   Lives With alone   Living Arrangements house   Living Environment Comment multi level, bathrooms on upper and lower levels. No BR on main level   Self-Care   Dominant Hand right   Current Activity Tolerance fair   Functional Level Prior   Prior Functional Level Comment Pt reports he was previously ind with all ADLs/IADLs   General Information   Onset of Illness/Injury or Date of Surgery - Date 04/20/17   Referring Physician Dr. Schrader   Patient/Family Goals Statement unstated   Additional Occupational Profile Info/Pertinent History of Current Problem Pt presents with generalized weakness and deconditioning impacting his ability to complete ADLs safely and independently. Per chart: Wade Acevedo is a 57 year old male who presents with weakness and Dizziness on getting up. He was found to have a 3 cm mass in the Left Upper Lobes, Mediastinal Lymphadenopathy, , destructive bone lesion Left 5th Rib with history of Smoking and Brain CT scan showing ? Pituitary mass with ? Right Maxillary Sinusitis., Acute Hypokalemia and Acute Kidney Injury.    Precautions/Limitations fall precautions   General Observations generally unkempt   General Info Comments Needs encouragement for participation in therapy   Cognitive Status Examination   Orientation orientation to person, place and time   Level of Consciousness alert   Range of Motion (ROM)   ROM Comment BUE WFL   Strength   Strength Comments BUE WFL   Mobility   Bed Mobility Comments SBA supine to EOB with HOB raised and use of bed rails   Transfer Skills   Transfer Comments CGA for short distance mobility in room    Transfer Skill: Sit to Stand   Level of [place]: Sit/Stand contact guard   Physical Assist/Nonphysical Assist: Sit/Stand 1 person assist   Transfer Skill: Toilet Transfer   Level of [place]: Toilet contact guard  "  Physical Assist/Nonphysical Assist: Toilet 1 person assist   Assistive Device grab bars   Balance   Balance Comments slightly unsteady during ambulation   Lower Body Dressing   Level of [place]: Dress Lower Body minimum assist (75% patients effort)  (pants)   Physical Assist/Nonphysical Assist: Dress Lower Body 1 person assist   Grooming   Level of [place]: Grooming contact guard  (x3 mins)   Physical Assist/Nonphysical Assist: Grooming 1 person assist   Activities of Daily Living Analysis   Impairments Contributing to Impaired Activities of Daily Living strength decreased  (activity tolerance)   General Therapy Interventions   Planned Therapy Interventions ADL retraining;strengthening   Clinical Impression   Criteria for Skilled Therapeutic Interventions Met yes, treatment indicated   OT Diagnosis decreased functional independence   Influenced by the following impairments weakness, decreased activity tolerance   Assessment of Occupational Performance 1-3 Performance Deficits   Identified Performance Deficits mobility, bathing, IADLs   Clinical Decision Making (Complexity) Low complexity   Therapy Frequency daily   Predicted Duration of Therapy Intervention (days/wks) 2-3 days   Anticipated Discharge Disposition Transitional Care Facility   Risks and Benefits of Treatment have been explained. Yes   Patient, Family & other staff in agreement with plan of care Yes   Clinical Impression Comments Pt will benefit from ongoing OT to maximize safety and independence with ADLs   [facility] [employer] TM \"6 Clicks\"   2016, Trustees of [facility], under license to [employer].  All rights reserved.   6 Clicks Short Forms Daily Activity Inpatient Short Form   [facility] AM-PAC  \"6 Clicks\" Daily Activity Inpatient Short Form   1. Putting on and taking off regular lower body clothing? 3 - A Little   2. Bathing (including washing, rinsing, drying)? 3 - A Little   3. Toileting, which includes using " toilet, bedpan or urinal? 3 - A Little   4. Putting on and taking off regular upper body clothing? 4 - None   5. Taking care of personal grooming such as brushing teeth? 4 - None   6. Eating meals? 4 - None   Daily Activity Raw Score (Score out of 24.Lower scores equate to lower levels of function) 21   Total Evaluation Time   Total Evaluation Time (Minutes) 15   See care plan for goals

## 2017-04-22 NOTE — PROGRESS NOTES
Notified MD on-call of continued lower BP. Pt remains asymptomatic. 500mls bolus infusing now and pt has no complaints , repositions self to comfort in bed.

## 2017-04-22 NOTE — PLAN OF CARE
Problem: Cardiac Output Decreased (Adult)  Goal: Identify Related Risk Factors and Signs and Symptoms  Related risk factors and signs and symptoms are identified upon initiation of Human Response Clinical Practice Guideline (CPG)pt will have optimal BP and HR. Pt will have increased strength and independence with activity  Pt alert and oriented, speech clear and appropriate. Pt use the call light for assist to the BR. Pt able to sit at bedside with assist of 1 and stand to bedside commode.he had some incontinence but was able to void in the commode. His BP while standing was 85/57 with HR 94. He denied dizziness but did state weakness. BP have been in the upper 70's on right forearm with map > 60 while in bed. Pt turns side to side and likes to sleep on his side.

## 2017-04-22 NOTE — PROGRESS NOTES
[facility] TRANSPORT NOTE  Data:   Reason for Transport:  To 2306    Wade Acevedo was transported from 1006 via wheel chair at 1452.  Patient was accompanied by Transport Aide. Equipment used for transport: telemetry. Family was aware of reason for transport: yes    Action:  Report: given to Linh    Response:  Patient's condition when transferred off unit was stable.    Ra Colon RN

## 2017-04-23 NOTE — PLAN OF CARE
Problem: Goal Outcome Summary  Goal: Goal Outcome Summary  Outcome: Improving  Pt reports sleeping well. Refused Albumin and Protonix. Low grade temp of 99.7 overnight. Pt received Tylenol.

## 2017-04-23 NOTE — PROVIDER NOTIFICATION
Dr. Chappell ([facility], [place]) updated on pt's status of losing IV sites and refusing a new IV site, therefore refusing albumin. No new orders.

## 2017-04-23 NOTE — PROGRESS NOTES
SUBJECTIVE:   Feels slightly stronger today.  Hasn't gotten out of bed  No lightheadedness when up.    Has stooled in the bed last day.    No resp distress.  Some cough.    minimal pain          ROS:4 point ROS including Respiratory, CV, GI and , other than that noted in the HPI,  is negative     OBJECTIVE:   BP 97/66 (BP Location: Right arm)  Pulse 84  Temp 99.1  F (37.3  C) (Oral)  Resp 20  Wt 81.4 kg (179 lb 7.3 oz)  SpO2 91%    GENERAL APPEARANCE:  Alert, NAD, Ox3     RESP:rales both bases      CV: regular rate and rhythm,  No  murmur , edema: none       Abdomen: soft, nontender, no liver or spleen enlargement, no masses, BSs normal   Skin: no cyanosis, pallor, or jaundice    CMP    Recent Labs  Lab 04/23/17  0720 04/22/17  0620 04/21/17  2125 04/21/17  1545 04/21/17  0600 04/20/17  2020 04/20/17  1255   * 150*  --   --  148* 148* 140   POTASSIUM 3.9 4.1 3.9 3.9 3.2* 3.4 2.8*   CHLORIDE 118* 122*  --   --  121* 119* 108   CO2 17* 16*  --   --  18* 19* 21   ANIONGAP 12 12  --   --  9 10 11   GLC 81 85  --   --  80 98 123*   BUN 11 11  --   --  10 12 14   CR 1.26* 1.32*  --   --  1.29* 1.38* 1.81*   GFRESTIMATED 59* 56*  --   --  57* 53* 39*   GFRESTBLACK 71 67  --   --  69 64 47*   JOHANN 7.6* 7.5*  --   --  7.1* 7.5* 9.5   MAG  --   --   --  1.8  --   --  2.0   PHOS  --   --  2.7 1.3*  --   --  1.4*   PROTTOTAL 5.0* 5.0*  --   --  5.5*  --  7.7   ALBUMIN 2.1* 2.0*  --   --  2.1*  --  3.2*   BILITOTAL 0.6 0.5  --   --  0.9  --  1.0   ALKPHOS 214* 171*  --   --  175*  --  244*   AST 84* 74*  --   --  92*  --  149*   ALT 20 17  --   --  19  --  28     CBC    Recent Labs  Lab 04/23/17  0720 04/22/17  0620 04/21/17  2125 04/21/17  0120 04/20/17  1255   WBC 5.9 5.8  --   --  7.6   RBC 3.39* 3.52*  --   --  3.05*   HGB 8.4* 8.6* 8.5* 7.7* 6.6*   HCT 24.6* 25.6*  --   --  19.9*   MCV 73* 73*  --   --  65*   MCH 24.8* 24.4*  --   --  21.6*   MCHC 34.1 33.6  --   --  33.2   RDW 21.4* 20.8*  --   --  15.9*   PLT  159 159  --   --  236     INR    Recent Labs  Lab 04/20/17  1255   INR 1.05     Arterial BloodGas  No lab results found in last 7 days.   Venous Blood Gas    Recent Labs  Lab 04/23/17  0720 04/22/17  0620   PHV 7.40 7.33   PCO2V 28* 31*   PO2V 56* 40   HCO3V 17* 16*       Medications     vitamin  B-1  100 mg Oral Daily     sodium chloride (PF)  3 mL Intracatheter Q8H     senna-docusate  1-2 tablet Oral BID     pantoprazole  40 mg Oral BID AC         Intake/Output Summary (Last 24 hours) at 04/23/17 1225  Last data filed at 04/23/17 0901   Gross per 24 hour   Intake              600 ml   Output              950 ml   Net             -350 ml      CK: 1000 yesterday     ASSESSMENT: PLAN:   Hypotension:   -appeared hypovolemic on admission.  Unclear cause, but seems poor intake plus some ongoing polyuria may be cause.    -remains low BPs but good UO, warm toes, making sense, so perfusion seems ok.  Will try some albumen.    -with the albumen BP came up short time but back down after a few hours and he refused further IV sticks.    -has been asymptomatic with these low BPs.   -will start some florinef.  -start some hormonal workup for the sellar tumor.      Sellar mass:   -incidental noted,  >10mm.  Hormonal workup recommended.  If low cortisol, this could be the reason for the low BPs.      NAGMA:   -from all the saline he has had.  Stop even the LR today.  Try albumen for the hypotension.  Follow.   -improving.      Weakness: due to hypotension/anemia/ca  -this is multifactorial     Lung mass- Probably Lung Cancer  3 cm mass in the Left Upper Lobe AND Mediastinal Lymphadenopathy, destructive bone lesion Left 5th Rib  ? Due to metastatic Lung Cancer  Will discuss with Radiology and do Lung Biopsy  May be amenable to IR sampling, but unclear if here.  Will discuss Monday.  Needs stabilization until then anyway.      Anemia  ? Due to nutritional deficiencies and/or due to cancer/CKD  Peripheral Blood Smear is done   2  Units of Blood will be transfused  Iron studies as well as B12 and Folate Levels normal .    Likely anemia of chronic disease.       Acute Kidney Injury, likely on CKD.    ? Due to NSAIDS Induced Nephropathy or Pigment induced Nephropathy due to Rhabdomyolysis  -slightly better with fluids already.   -stable at 1.3 Cr.      Acute Hypokalemia   Will replace Potassium  Also check Magnesium and Phosphorus level in a patient with history of Alcohol use.  Hypophos:  Needs replacement.            Alcohol Abuse  Recently quit Drinking  Will give Thiamine  Start Folic Acid later after Folic level is checked     Acute Rhabdomyolysis   IV Fluids  Resolving       DVT Prophylaxis: Pneumatic Compression Devices  Code Status: Full Code    Dispo: needs TCU. May do IR needle bx Monday if possible. Otherwise would set up as OP downtown or transfer depending on ability to find TCU.

## 2017-04-23 NOTE — PLAN OF CARE
"Problem: Goal Outcome Summary  Goal: Goal Outcome Summary  Variance: Patient/family refused or delayed decision  Comments: Pt refused PT due to feeling \"woozy\" from the pain meds.      "

## 2017-04-23 NOTE — PLAN OF CARE
"Problem: Goal Outcome Summary  Goal: Goal Outcome Summary  Outcome: Improving  Pt reports sleeping ok last night. BP this morning lying down 79/48, sitting up eating 97/66 MAP=78. Pt is having some discomfort in his right upper chest \"where I need that biopsy done\" tylenol given, pt declined anything stronger. Pt ate 75% of his breakfast. Does reports having some problems with urgency and dribbling when needing to urinate, he is wondering if there is a medication he can take for that; he said he has never had this problem before.       "

## 2017-04-23 NOTE — PLAN OF CARE
"Problem: Goal Outcome Summary  Goal: Goal Outcome Summary  Pt seen for UE strengthening today.   Pt completes BUE strengthening exercises with Tband. 5 exercises x2 sets x15 reps, requires rest break between sets.   Pt declining OOB activity d/t feeling \"loopy\" from pain meds.      Recommendation: TCU at [place] to maximize safety and independence with ADLs.       "

## 2017-04-23 NOTE — PROGRESS NOTES
"Pt refusing Senna, says he has a BM every other day and only sometimes every day. States he has not had much of an appetite and has not been eating much at all while at home, has been \"sending food back to the kitchen.\" Offered a snack or juice or something that would be appetizing to him, refuses any foods/juices at this time. Patient requesting to rest and would like door shut during the night. Encouraged to call before bathroom and for needs, verbalizes understanding.   "

## 2017-04-23 NOTE — PROGRESS NOTES
"Post void residual bladder scan 12 ml. Pt aware that we need to collect a urine sample. Pt will call when he needs to go to the bathroom again. Pt's pain/discomfort continues in his right upper chest after tylenol. Pt offered oxycodone, he wants to try it, but he did say that he is afraid of \"liking it too much.\" Will give oxycodone 5 mg.   "

## 2017-04-24 NOTE — PLAN OF CARE
"Problem: Goal Outcome Summary  Goal: Goal Outcome Summary  Outcome: Improving  Pt voiding frequently overnight; continues to have dribbling which he states is not baseline for him. Reports some relief from lidocaine patches on upper right chest. Minimally discussed biopsy scheduled for today and suggested that he will need an IV, to which he stated \"maybe\" and then pt expressed some frustration about wanting water. Offered to take pt for a walk, he declined. Drank 2 milks. 24 hour urine is being collected.       "

## 2017-04-24 NOTE — PLAN OF CARE
Problem: Goal Outcome Summary  Goal: Goal Outcome Summary  OT: CANCEL- Pt declines OOB activity or B UE exercises. Requesting to rest just worked with physical therapy.

## 2017-04-24 NOTE — PLAN OF CARE
Problem: Goal Outcome Summary  Goal: Goal Outcome Summary  Pt initially declined exercising, but agreed when told we could start with supine exers. Able to do exers independently with PT guiding through each exercise. Did fatigue with hip flexor SLRs. Pt declined walking, but when told he will get better faster doing either standing exercises or a longer walk, he chose the walk.  Pt amb without AD with CGA. Mild unsteadiness with turn, but no LOB.   This writer recommends pt going home with home care vs TCU. States his house is somewhat accessible d/t wife's illness a number of years ago. Will need to improve strength for 7-8 steps in order to return home.

## 2017-04-24 NOTE — PROGRESS NOTES
Reason for Follow up: DC Planning    Anticipated discharge needs: Pt has been approved for [facility]. [facility]Phone: [phone] Fax: [phone]), [facility] Phone: [phone]) Fax: [phone]) and [facility] (Phone: [phone]) Fax: ([phone]).     Next steps: Waiting on bed availability, pt may be ready for dc as early as tomorrow    Micki CAPONE, LICSW, [phone]

## 2017-04-24 NOTE — PLAN OF CARE
Problem: Goal Outcome Summary  Goal: Goal Outcome Summary  Outcome: Improving  Completed 24 hr urine collection at 1630 sent to lab. Pt C/O mild rib/pleural pain all day, requested Oxycodone at 1705, pain improved at this time. Tolerated regular diet . Pt reports having stool this afternoon, unwitnessed. 1400 Pt ambulates in room, refused to allow bed alarm on. Discussed risks of fall when up independently, encouraged pt to call for assist. Pt did use call light then got up and was in bathroom when staff arrived. Cont to monitor.

## 2017-04-25 NOTE — PROGRESS NOTES
[facility] Hospitalist Service      Subjective:  Stronger  Still quite weak    Review of Systems:  C: NEGATIVE for fever, chills, change in weight  I: NEGATIVE for worrisome rashes, moles or lesions  E: NEGATIVE for vision changes or irritation  E/M: NEGATIVE for ear, mouth and throat problems  R: NEGATIVE for significant cough or SOB  B: NEGATIVE for masses, tenderness or discharge  CV: NEGATIVE for chest pain, palpitations or peripheral edema  GI: NEGATIVE for nausea, abdominal pain, heartburn, or change in bowel habits  : NEGATIVE for frequency, dysuria, or hematuria  M: NEGATIVE for significant arthralgias or myalgia  NEURO: weak  E: NEGATIVE for temperature intolerance, skin/hair changes  H: NEGATIVE for bleeding problems  P: NEGATIVE for changes in mood or affect    Physical Exam:  Vitals Were Reviewed    Patient Vitals for the past 16 hrs:   BP Temp Temp src Pulse Heart Rate Resp SpO2 Weight   04/25/17 0705 102/66 98.4  F (36.9  C) Oral 82 - 18 93 % -   04/25/17 0632 - - - - - - - 80.7 kg (177 lb 14.6 oz)   04/24/17 2331 98/65 98.9  F (37.2  C) Oral - 82 16 95 % -   04/24/17 2016 100/65 99.2  F (37.3  C) Oral - 82 16 95 % -         Intake/Output Summary (Last 24 hours) at 04/25/17 1028  Last data filed at 04/25/17 0632   Gross per 24 hour   Intake              250 ml   Output             1800 ml   Net            -1550 ml       GENERAL APPEARANCE: healthy, alert and no distress  EYES: conjunctiva clear, eyes grossly normal  RESP: lungs clear to auscultation - no rales, rhonchi or wheezes  CV: regular rate and rhythm, normal S1 S2, no S3 or S4 and no murmur, click or rub   ABDOMEN: soft, nontender, no HSM or masses and bowel sounds normal  MS: no clubbing, cyanosis; no edema  SKIN: clear without significant rashes or lesions  NEURO: diffusely weak    Lab:  Recent Labs   Lab Test  04/25/17   0700  04/24/17   0640   NA  146*  147*   POTASSIUM  3.8  4.0   CHLORIDE  114*  117*   CO2  21  18*   ANIONGAP  11   12   GLC  81  75   BUN  12  12   CR  1.07  1.11   JOHANN  8.0*  7.9*     CBC RESULTS:   Recent Labs   Lab Test  04/25/17   0700  04/23/17   0720   WBC  5.4  5.9   RBC  3.50*  3.39*   HGB  8.4*  8.4*   HCT  25.8*  24.6*   PLT  206  159       Results for orders placed or performed during the hospital encounter of 04/20/17 (from the past 24 hour(s))   Comprehensive metabolic panel   Result Value Ref Range    Sodium 146 (H) 133 - 144 mmol/L    Potassium 3.8 3.4 - 5.3 mmol/L    Chloride 114 (H) 94 - 109 mmol/L    Carbon Dioxide 21 20 - 32 mmol/L    Anion Gap 11 3 - 14 mmol/L    Glucose 81 70 - 99 mg/dL    Urea Nitrogen 12 7 - 30 mg/dL    Creatinine 1.07 0.66 - 1.25 mg/dL    GFR Estimate 71 >60 mL/min/1.7m2    GFR Estimate If Black 86 >60 mL/min/1.7m2    Calcium 8.0 (L) 8.5 - 10.1 mg/dL    Bilirubin Total 0.6 0.2 - 1.3 mg/dL    Albumin 2.3 (L) 3.4 - 5.0 g/dL    Protein Total 5.7 (L) 6.8 - 8.8 g/dL    Alkaline Phosphatase 274 (H) 40 - 150 U/L    ALT 20 0 - 70 U/L    AST 74 (H) 0 - 45 U/L   INR   Result Value Ref Range    INR 1.02 0.86 - 1.14   CBC with platelets   Result Value Ref Range    WBC 5.4 4.0 - 11.0 10e9/L    RBC Count 3.50 (L) 4.4 - 5.9 10e12/L    Hemoglobin 8.4 (L) 13.3 - 17.7 g/dL    Hematocrit 25.8 (L) 40.0 - 53.0 %    MCV 74 (L) 78 - 100 fl    MCH 24.0 (L) 26.5 - 33.0 pg    MCHC 32.6 31.5 - 36.5 g/dL    RDW 22.9 (H) 10.0 - 15.0 %    Platelet Count 206 150 - 450 10e9/L       Assessment and Plan:    Hypotension:   -appeared hypovolemic on admission. Unclear cause, but seems poor intake plus some ongoing polyuria may be cause.   -remains low BPs but good UO, warm toes, making sense, so perfusion seems ok. Will try some albumen.   -with the albumen BP came up short time but back down after a few hours and he refused further IV sticks.   -has been asymptomatic with these low BPs.   -will start some florinef.  -start some hormonal workup for the sellar tumor.  April 24, 2017 bp up , on florinef, creat 1.26 yesterday  (down)-not ordered today  April 25, 2017 bp 102 systolic on florinef, creat 1.07      Sellar mass:   -incidental noted, >10mm. Hormonal workup recommended. If low cortisol, this could be the reason for the low BPs.  April 24, 2017 T4 quite low-but normal tsh, awaiting other lab   April 25, 2017 low T3, T4, FSH,LH, mildly elevated prolactin, normal cortisol, 24 cortisol pending, normal tsh-------will discuss with armesh SWARTZ:   -from all the saline he has had. Stop even the LR today. Try albumen for the hypotension. Follow.   April 24, 2017 will add on chem today, off fluids  April 25, 2017 CO2 is normal      Weakness: due to hypotension/anemia/ca  -this is multifactorial   April 25, 2017 probably needs thyroid replacement      Lung mass- Probably Lung Cancer/weight loss greater than 20% in 1 year (severe malnutrition)  3 cm mass in the Left Upper Lobe AND Mediastinal Lymphadenopathy, destructive bone lesion Left 5th Rib  ? Due to metastatic Lung Cancer  Will discuss with Radiology and do Lung Biopsy  April 24, 2017 Dr Kimble can do, INR ordered for tomorrow (previous normal)  April 25, 2017 will have biopsy tomorrow      Anemia  ? Due to nutritional deficiencies and/or due to cancer/CKD  Peripheral Blood Smear is done   2 Units of Blood will be transfused  Iron studies as well as B12 and Folate Levels normal .   Likely anemia of chronic disease.  April 25, 2017 hgb 8.4--8.4, assume some chronic ds      Acute Kidney Injury, likely on CKD.   ? Due to NSAIDS Induced Nephropathy or Pigment induced Nephropathy due to Rhabdomyolysis  -slightly better with fluids already.   -stable at 1.3 Cr.   April 25, 2017 creat 1.07      Acute Hypokalemia   Will replace Potassium  Also check Magnesium and Phosphorus level in a patient with history of Alcohol use.     Hypophos: Needs replacement.               Alcohol Abuse  Recently quit Drinking  Will give Thiamine  Start Folic Acid later after Folic level is checked      Acute  Rhabdomyolysis   resolved       DVT Prophylaxis: Pneumatic Compression Devices  Code Status: Full Code   Dispo:   Will have biopsy tomorrow  Will try to discuss with endo today  To tcu after biopsy tomorrow                      4:22 PM  Discussed with endo-Dr Humphries  Cosyntropin stim test  Testosterone level  If he has adrenal insufficiency -stress dose steroids  Start thyroid replacement after above (when adrenal function known)  Will try to get cosyn test done prior to bx.

## 2017-04-25 NOTE — PLAN OF CARE
"Problem: Cardiac Output Decreased (Adult)  Goal: Adequate Cardiac Output/Effective Tissue Perfusion  Patient will demonstrate the desired outcomes by discharge/transition of care.   Outcome: Improving  BP 98/65 (BP Location: Right arm)  Pulse 80  Temp 98.9  F (37.2  C) (Oral)  Resp 16  Wt 81.1 kg (178 lb 12.7 oz)  SpO2 95%  PO intake encouraged with interactions. Pt does not have IV and refused placement of new IV on prior shift.      RESPIRATORY: Pt denies SOB at rest and remains stable on room air. Pt with rare nonproductive cough. Pt LS diminished/coarse throughout.      MUSCULAR: Pt continues to report generalized weakness, though is able to participate in cares. Pt is up to bathroom during the night with SBA.     GI: Pt reports slight constipation. Pt reports last BM was 4/24 AM and that it was \"small and hard\". Pt was given stool softer on evenings. Pt given prune juice at HS. Pt reports passing flatus. Active bowel sounds present.      PAIN/COMFORT/SLEEP: Pt initially denies c/o pain with interaction. Scheduled lidoderm patch applied-though patient was resistant in application and allowed only one patch to be placed. Pt slept well most of the night. Pt did request PRN tylenol for c/o headache--pt sleeping after one hour.      MOOD/AFFECT: Pt remains very flat and withdrawn. Pt is irritated with interactions and wishes to not be bothered by staff interactions (staff to attempt to cluster cares for patient satisfaction.).pt encouraged by CNA to shower at HS, though patient declined. RN to visit with patient and again encourage shower and offer choices. Pt refused HS shower or to get washed but because \"I'm already in bed\" and did agree with RN to take AM shower. Will continue to encourage participation in cares. Spiritual care consult placed for emotional support.           "

## 2017-04-25 NOTE — PROGRESS NOTES
Reason for Follow up: DC planning    Anticipated discharge needs: Pt has been accepted at [facility] Phone: [phone]) Fax: ([phone]) for as early as tomorrow. Pt is in agreement and is requesting that a ride be arranged.     PAS-RR    Per DHS regulation, CTS team completed and submitted PAS-RR to [place] [employer] Direct Connect via the [employer]. CTS team advised SNF and they are aware a PAS-RR has been submitted.     CTS team reviewed with pt or health care agent that they may be contacted for a follow up appointment within 10 days of hospital discharge if SNF stay is <30 days. Contact information for [employer] was also provided.     Pt or health care agent verbalized understanding.     PAS-RR # [id number]      Next steps: DC to [facility] Phone: ([phone]) Fax: ([phone]) tomorrow    Micki CAPONE, LICSW, [phone]

## 2017-04-25 NOTE — PLAN OF CARE
Problem: Goal Outcome Summary  Goal: Goal Outcome Summary  OT: Pt declining OOB activity d/t fatigue from shower today. Encouragement provided and pt willing to participate in B UE exercises. Pt completes 5 exercises x10 reps using blue theraband. Exercises program handout provided and encouraged pt to complete outside of therapy.         REC: TCU to increase independence with ADLs and functional mobility

## 2017-04-25 NOTE — PROGRESS NOTES
Pt compliant with cares, showered this am. Using call light appropriately, but bed alarm on due to pt's impatience with waiting for staff.

## 2017-04-26 NOTE — PLAN OF CARE
Problem: Goal Outcome Summary  Goal: Goal Outcome Summary  Outcome: No Change  Patient transferred to CT via cart per nursing assistant. Consent started and in front of chart. Patrica engel.

## 2017-04-26 NOTE — ANESTHESIA CARE TRANSFER NOTE
Patient: Wade Acevedo    * No procedures listed *    Diagnosis: * No pre-op diagnosis entered *  Diagnosis Additional Information: No value filed.    Anesthesia Type:   MAC     Note:  Airway :Nasal Cannula  Patient transferred to:Phase II        Vitals: (Last set prior to Anesthesia Care Transfer)    CRNA VITALS  4/26/2017 0919 - 4/26/2017 0950      4/26/2017             NIBP: 103/76    Pulse: 75    SpO2: 96 %    EKG: NSR                Electronically Signed By: SALINAS Bates CRNA  April 26, 2017  9:50 AM

## 2017-04-26 NOTE — PLAN OF CARE
Problem: Goal Outcome Summary  Goal: Goal Outcome Summary  Outcome: No Change  Out going nurse reported patient has been NPO since midnight. Patient states he ate a fruit cup at 3 am. Water and banana at patient bedside. All food and water removed from patient room. Patient is aware that he is NPO. Radiology notified and they notified anesthesia. Plan is to push back biopsy until 0900.

## 2017-04-26 NOTE — ANESTHESIA PREPROCEDURE EVALUATION
Anesthesia Evaluation     .             ROS/MED HX    ENT/Pulmonary:     (+)tobacco use, Current use , . Other pulmonary disease Lung mass suspec yonathan neoplasm on left.    Neurologic:  - neg neurologic ROS     Cardiovascular: Comment: Recent hypotension    (+) ----. : . . . :. . Previous cardiac testing date:results:date: results:ECG reviewed date:4/24/17 results:Sinus tachycardia date: results:          METS/Exercise Tolerance:  3 - Able to walk 1-2 blocks without stopping   Hematologic: Comments: Hypokalemia - resolved    (+) Anemia, -      Musculoskeletal:  - neg musculoskeletal ROS       GI/Hepatic: Comment: ETOH abuse, was drinking a liter of gin/day.        Renal/Genitourinary:     (+) chronic renal disease, type: ARF,       Endo:  - neg endo ROS       Psychiatric:  - neg psychiatric ROS       Infectious Disease:  - neg infectious disease ROS       Malignancy:      - no malignancy   Other:    - neg other ROS                 Physical Exam  Normal systems: pulmonary    Airway   Mallampati: II  TM distance: >3 FB  Neck ROM: full    Dental   (+) missing  Comment: Poor dentition    Cardiovascular       Pulmonary                     Anesthesia Plan      History & Physical Review      ASA Status:  3 .    NPO Status:  > 6 hours    Plan for MAC Reason for MAC:  Deep or markedly invasive procedure (G8)         Postoperative Care      Consents  Anesthetic plan, risks, benefits and alternatives discussed with:  Patient..                          .

## 2017-04-26 NOTE — PLAN OF CARE
Problem: Goal Outcome Summary  Goal: Goal Outcome Summary  OT: CANCEL- Pt getting lung biopsy. Will attempt afternoon as schedule allows or tomorrow.

## 2017-04-26 NOTE — PLAN OF CARE
Problem: Goal Outcome Summary  Goal: Goal Outcome Summary  Outcome: No Change  Dr. Batista was notified of abnormal chest xray. Patient denies feeling shortness of breath. Occasional cough. Continues on room air. Will have repeat x ray in the morning. Band aid intact over biopsy site. Continues on fall precautions.

## 2017-04-26 NOTE — PROGRESS NOTES
Reason for Follow up: DC planning    Anticipated discharge needs: This writer had lengthy conversation with pts dtr in law, Sarah. Sarah reports that she has been cleaning out pts home for the past several days as there is a reportedly a tremendous amount of garbage. Sarah reports that pts father  of lung cancer and he also took care of his mother when she  from cancer. Pts wife  of an aggressive cancer about 10 years ago, she was diagnosed and  within 5 weeks. Sarah reports that since all of these deaths pt has been using alcohol more and more. In the past year she noted that he had stopped showing up for holidays. She feels he may be depressed and has some unresolved grief. THis writer did speak with MD about a psych consult on dc.     This writer also provided Sarah with a number of resources moving forward including SSDI, Aline Phillip, MOW, Mental health providers and housing.     Next steps: Pt will dc to [facility] TCU most likely tomorrow if stable, transport will need be arranged and Sarah will need to be called    Micki CAPONE, [facility], [facility] [phone]

## 2017-04-26 NOTE — PLAN OF CARE
Problem: Goal Outcome Summary  Goal: Goal Outcome Summary  Outcome: No Change  Patient returned from lung biopsy. No shortness of breath. Site covered with band aid. Lungs with crackles in both bases. On continuous oximetry. Declined medication for pain. Patient states only hurts a little bit like a 3. Declined to keep head of bed flat. Used urinal at bedside. Drinking water until next chest xray done.

## 2017-04-26 NOTE — PROGRESS NOTES
[facility]      Subjective:  Feeling stronger  Dizziness is better  Biopsy went well  No sob    Review of Systems:  CONSTITUTIONAL:above  I: NEGATIVE for worrisome rashes, moles or lesions  E: NEGATIVE for vision changes or irritation  E/M: NEGATIVE for ear, mouth and throat problems  R: NEGATIVE for significant cough or SOB  B: NEGATIVE for masses, tenderness or discharge  CV: NEGATIVE for chest pain, palpitations or peripheral edema  GI: NEGATIVE for nausea, abdominal pain, heartburn, or change in bowel habits  : NEGATIVE for frequency, dysuria, or hematuria  M: NEGATIVE for significant arthralgias or myalgia  NEURO: above  E: NEGATIVE for temperature intolerance, skin/hair changes  H: NEGATIVE for bleeding problems  P: NEGATIVE for changes in mood or affect    Physical Exam:  Vitals Were Reviewed    Patient Vitals for the past 16 hrs:   BP Temp Temp src Pulse Heart Rate Resp SpO2 Weight   04/26/17 0955 116/84 - - - 82 16 94 % -   04/26/17 0758 120/81 98.2  F (36.8  C) Oral - 81 18 96 % -   04/26/17 0636 - - - - - - - 82.3 kg (181 lb 7 oz)   04/26/17 0300 98/65 98.6  F (37  C) Oral 75 - 16 94 % -   04/25/17 2309 100/68 98.8  F (37.1  C) Oral - 82 16 92 % -   04/25/17 1928 100/68 - - - 85 18 96 % -         Intake/Output Summary (Last 24 hours) at 04/26/17 1018  Last data filed at 04/26/17 0949   Gross per 24 hour   Intake             1200 ml   Output             2825 ml   Net            -1625 ml       GENERAL APPEARANCE: healthy, alert and no distress  EYES: conjunctiva clear, eyes grossly normal  RESP: lungs clear to auscultation - no rales, rhonchi or wheezes  CV: regular rate and rhythm, normal S1 S2, no S3 or S4 and no murmur, click or rub   ABDOMEN: soft, nontender, no HSM or masses and bowel sounds normal  MS: no clubbing, cyanosis; no edema  SKIN: clear without significant rashes or lesions  NEURO: Normal strength and tone, sensory exam grossly normal, mentation intact and speech  normal--diffuse weakness    Lab:  Recent Labs   Lab Test  04/26/17   0710  04/25/17   0700   NA  145*  146*   POTASSIUM  3.8  3.8   CHLORIDE  113*  114*   CO2  25  21   ANIONGAP  7  11   GLC  92  81   BUN  12  12   CR  0.93  1.07   JOHANN  8.0*  8.0*     CBC RESULTS:   Recent Labs   Lab Test  04/26/17   0710  04/25/17   0700   WBC  5.6  5.4   RBC  3.56*  3.50*   HGB  8.6*  8.4*   HCT  26.2*  25.8*   PLT  235  206       Results for orders placed or performed during the hospital encounter of 04/20/17 (from the past 24 hour(s))   Cosyntropin stimulation study baseline   Result Value Ref Range    Cortisol Stimulation Baseline 5.0 4 - 22 ug/dL   Cosyntropin stimulation study post 30   Result Value Ref Range    Cortisol Stimulation Post 30 14.8 (L) >20 ug/dL   Cosyntropin stimulation study post 60   Result Value Ref Range    Cortisol Stimulation Post 60 19.6 (L) >20 ug/dL   Comprehensive metabolic panel   Result Value Ref Range    Sodium 145 (H) 133 - 144 mmol/L    Potassium 3.8 3.4 - 5.3 mmol/L    Chloride 113 (H) 94 - 109 mmol/L    Carbon Dioxide 25 20 - 32 mmol/L    Anion Gap 7 3 - 14 mmol/L    Glucose 92 70 - 99 mg/dL    Urea Nitrogen 12 7 - 30 mg/dL    Creatinine 0.93 0.66 - 1.25 mg/dL    GFR Estimate 83 >60 mL/min/1.7m2    GFR Estimate If Black >90   GFR Calc   >60 mL/min/1.7m2    Calcium 8.0 (L) 8.5 - 10.1 mg/dL    Bilirubin Total 0.6 0.2 - 1.3 mg/dL    Albumin 2.5 (L) 3.4 - 5.0 g/dL    Protein Total 5.9 (L) 6.8 - 8.8 g/dL    Alkaline Phosphatase 276 (H) 40 - 150 U/L    ALT 19 0 - 70 U/L    AST 62 (H) 0 - 45 U/L   CBC with platelets   Result Value Ref Range    WBC 5.6 4.0 - 11.0 10e9/L    RBC Count 3.56 (L) 4.4 - 5.9 10e12/L    Hemoglobin 8.6 (L) 13.3 - 17.7 g/dL    Hematocrit 26.2 (L) 40.0 - 53.0 %    MCV 74 (L) 78 - 100 fl    MCH 24.2 (L) 26.5 - 33.0 pg    MCHC 32.8 31.5 - 36.5 g/dL    RDW 23.0 (H) 10.0 - 15.0 %    Platelet Count 235 150 - 450 10e9/L   CT Lung Mediastinum Biopsy    Narrative     4/26/2017 9:53 AM    HISTORY: Left upper lobe lung mass.    COMPARISON: 4/20/2017.    PROCEDURE: Risks and benefits of a CT guided core biopsy of the left  upper lobe lung mass are discussed with the patient. Under CT  guidance, aseptic conditions, and utilizing 10 mL 1% lidocaine as  local anesthetic, a 19 gauge coaxial needle is placed. Through this,  two 20 gauge core biopsy samples are obtained and placed in formalin  for pathologic analysis. The patient tolerated the procedure well.    Radiation dose for this procedure is reduced using automated exposure  control of mA and/or kV according to patient size, or iterative  reconstruction technique.    CONSCIOUS SEDATION: Provided by the anesthesia service.      Impression    IMPRESSION: Technically uneventful CT guided needle core biopsy, as  described above. Pathologic results are pending.    AZALEA ART MD   XR Chest 1 View    Narrative    XR CHEST 1 VW 4/26/2017 10:00 AM    HISTORY: Lung biopsy.    COMPARISON: 10/31/2006    FINDINGS: No pneumothorax. Known mass is adjacent to the left  pulmonary hilum. Interstitial edema.      Impression    IMPRESSION: No pneumothorax.    ALLEN ORTIZ MD       Assessment and Plan:    Hypotension related to adrenal insufficiency  April 26, 2017 starting stress dose steroids       Sellar mass-etiology unclear, possible metastasis vs adenoma/pituitary insufficiency:   -incidental noted, >10mm. Hormonal workup recommended. If low cortisol, this could be the reason for the low BPs.  April 24, 2017 T4 quite low-but normal tsh, awaiting other lab   April 25, 2017 low T3, T4, FSH,LH, mildly elevated prolactin, normal cortisol, 24 cortisol pending, normal tsh-------will discuss with endo.  April 26, 2017 abn corticotropin stim test--stress dose steroids started, will start thyroid also, testosterone pending, continuing florinef      NAGMA:   Fluid related-resolved      Weakness: due to adrenal insufficiency, hypotension, probable  cancer, hypothyroidism      Lung mass- Probably Lung Cancer/weight loss greater than 20% in 1 year (severe malnutrition)  3 cm mass in the Left Upper Lobe AND Mediastinal Lymphadenopathy, destructive bone lesion Left 5th Rib  ? Due to metastatic Lung Cancer  April 26, 2017 biopsy today      Anemia  ? Due to nutritional deficiencies and/or due to cancer/CKD  Peripheral Blood Smear is done   2 Units of Blood will be transfused  Iron studies as well as B12 and Folate Levels normal .   Likely anemia of chronic disease.  April 25, 2017 hgb 8.4--8.4, assume some chronic ds      Acute Kidney Injury, likely on CKD.   ? Due to NSAIDS Induced Nephropathy or Pigment induced Nephropathy due to Rhabdomyolysis  -slightly better with fluids already.   -stable at 1.3 Cr.   April 25, 2017 creat 1.07      Acute Hypokalemia   Will replace Potassium  Also check Magnesium and Phosphorus level in a patient with history of Alcohol use.      Hypophos: Needs replacement.         Alcohol Abuse  Recently quit Drinking  Will give Thiamine    Low folic acid  replacement      Acute Rhabdomyolysis   resolved       DVT Prophylaxis: Pneumatic Compression Devices  Code Status: Full Code   Dispo: stress dose steroids started, will start thyroid, continue florinef observe until tomorrow, arrange endo and oncology fu, transition to oral steroid  Needs tcu-probably tomorrow                      2:32 PM  Discussed with endo  They think the stim test is actually adequate  Suggest no cortisol  Since thyroid off with acute illness-hold on replacement  See endo two weeks      3:18 PM  cxr post procedure shows small apical pneumo  Clinically stable  Will repeat cxr in am  Discussed with Dr Kimble-he felt this should reabsorb on its own

## 2017-04-26 NOTE — DISCHARGE INSTRUCTIONS
Radiology  Discharge Instructions for Lung/Chest Biopsy    A lung/chest biopsy is a procedure to obtain a small tissue sample from your lung/chest.  This tissue is obtained using a biopsy needle.  This sample will be examined in a laboratory for any abnormalities.    AFTER YOU ARE HOME:    You may return to your normal diet    Relax and take it easy for 24 hours    Do have someone stay with you for the next 24 hours to help you if you have any difficulties.  You may develop a complete or partial collapse of your lung (pneumothorax), from the needle entering your lung.    Remove band-aid from biopsy site in 24 hours.    You may use Tylenol for discomfort at biopsy site.    You may cough up small amounts of blood.    CALL YOUR PRIMARY PROVIDER IF:    You develop temperature over 101o F or redness at biopsy site.    AFTER HOURS CALL [facility] AT [phone],    COME TO EMERGENCY ROOM IF:    You are having severe difficulty breathing, severe chest pain, coughing up large amounts of blood, or have heavy bleeding from biopsy site.  DO NOT DRIVE YOURSELF.    Your ordering physician will contact you with the laboratory results.      ADDITIONAL INSTRUCTIONS:       I have reviewed and understand these discharge instructions.        __________________________________________________  Patient Signature        __________________________________________________  Nurse/Radiologist Signature  4/26/2017       Follow up with endocrinology.   Follow up with oncology.  Appointment is made for this

## 2017-04-26 NOTE — PROGRESS NOTES
RADIOLOGY PROCEDURE NOTE  Patient name: Wade Acevedo  MRN: [id number]  : 1960    Pre-procedure diagnosis: NAZ lung mass.  Post-procedure diagnosis: Same    Procedure Date/Time: 2017  9:50 AM  Procedure: CT guided needle core biopsy.  Estimated blood loss: None  Specimen(s) collected:  Two needle cores.  The patient tolerated the procedure well with no immediate complications.    See imaging dictation for procedural details.    Provider name: Chris Kimble  Assistant(s):None

## 2017-04-26 NOTE — PLAN OF CARE
Problem: Goal Outcome Summary  Goal: Goal Outcome Summary  Outcome: Improving  Pt declined getting up in chair for dinner meal, eating only few bites. Drinking sips of PO fluids. Encouraged pt to drink & offered other foods, pt again declined. A & O. Up with sba staff. LS diminished. PIV inserted for Cortisotropyn admin.  First x 2 labs drawn, awaiting for lab to draw last lab.  VSS, afebrile. Maggie Meneses RN

## 2017-04-26 NOTE — ANESTHESIA POSTPROCEDURE EVALUATION
Patient: Wade Acevedo    * No procedures listed *    Diagnosis:* No pre-op diagnosis entered *  Diagnosis Additional Information: No value filed.    Anesthesia Type:  MAC    Note:  Anesthesia Post Evaluation    Patient location during evaluation: Bedside  Patient participation: Able to fully participate in evaluation  Level of consciousness: awake and alert  Pain management: adequate  Airway patency: patent  Cardiovascular status: acceptable  Respiratory status: acceptable  Hydration status: acceptable  PONV: none     Anesthetic complications: None          Last vitals:  Vitals:    04/25/17 2309 04/26/17 0300 04/26/17 0758   BP: 100/68 98/65 120/81   Pulse:  75    Resp: 16 16 18   Temp: 37.1  C (98.8  F) 37  C (98.6  F) 36.8  C (98.2  F)   SpO2: 92% 94% 96%         Electronically Signed By: SALINAS Bates CRNA  April 26, 2017  9:51 AM

## 2017-04-26 NOTE — PROGRESS NOTES
Left sided needle lung biopsy performed by radiologist. Sedation per CRNA. Pt tolerated procedure well, band aid applied at biopsy site. No bleeding noted at site. VSS in phase 2. Post CXR done and read by radiologist. Pt returned to inpt room via cart. Report to Enriqueta CRUZ.

## 2017-04-26 NOTE — PLAN OF CARE
Problem: Cardiac Output Decreased (Adult)  Goal: Adequate Cardiac Output/Effective Tissue Perfusion  Patient will demonstrate the desired outcomes by discharge/transition of care.   Outcome: Improving  BP 98/65  Pulse 75  Temp 98.6  F (37  C) (Oral)  Resp 16  Wt 80.7 kg (177 lb 14.6 oz)  SpO2 94%  BP stable in upper 90's, low 100's systolic. Pt hgb stable over past few days.     RESPIRATORY: Pt denies SOB. Pt remains stable on room air. LS diminished throughout with bilateral lower lobe crackles present. Pt is NPO at this time for morning CT lung biopsy with d/c to TCU afterwards.      PAIN/COMFORT/MOOD: Pt remains flat with interactions, though more agreeable to cares/interactions/assessments. Pt smiles occasionally. Pt denies pain complaints s/p administration of oxycodone on prior shift. Pt sleeps well most of the night.

## 2017-04-27 NOTE — DISCHARGE SUMMARY
HISTORY OF PRESENT ILLNESS/HOSPITAL COURSE:  Wade Acevedo is a 57-year-old male with a history of alcohol use who presented with a 2-3 month history of weakness and orthostatic hypotension.  He had falls prior to admission.  Apparently he was drinking up to a liter of gin daily until about a month prior to admission.  His home was apparently found in disarray.      Upon admission the patient was noted to have a lung mass.  CT showed findings worrisome for neoplasm.  He had a 3 cm mass in the medial left upper lobe.  There was adjacent atelectasis and prominent mediastinal adenopathy and a destructive bone lesion involving the left lateral 5th rib.  The patient underwent a transthoracic CT-guided biopsy in the hospital and pathology results are pending.  He did have a small pneumonia following the procedure that was observed.  It did not require any further treatment.      The patient was found to be anemic in the hospital.  Iron studies, B12 and folate were normal.  It was thought to be anemia of chronic disease.  His hemoglobin was 7.8 upon discharge.  He was transfused 2 units of blood in the hospital.      The patient was found to have a mass in his sella consistent with a pituitary microadenoma, was a large mass so workup was begun.  He had a normal TSH, but he had low T3 and T4.  He had a serum cortisol morning level 4.4.  He underwent a cosyntropin stim test which was mildly abnormal.  He had a cortisol level 14.8 at 30 minutes and 19.6 at 60 minutes.  His 24-hour cortisol in his urine was normal.  This was discussed multiple times with Dr. Humphries, an Endocrinology fellow at the [facility].  Ultimately, it was recommended that we not treat him hormonally at this point.  They want to recheck things in the clinic and this has been set up 2 weeks prior to discharge.  His testosterone level also came back at 5.  His Lutropin was less than 0.2 and his FSH was low at 0.4.  Ultimately, it was unclear  whether he had an adenoma or whether he could have metastasis in this area and Endocrinology is going to be following this, they did not want any thyroid started since the levels were obtained when he was ill so we are deferring any treatment to them.      He had an initial hypotension.  Ultimately, this resolved with fluid repletion.  He was put on Florinef briefly, but this was discontinued.  At the time of discharge, his hypotension resolved, his systolic blood pressure was running 120-130 and he was ambulatory without problems.      He did develop a non-anion gap metabolic acidosis in the hospital related to fluid resuscitation, this resolved.      He had some mild acute kidney injury, probably related to volume depletion when he came in.  This resolved.        He was found to be low on folic acid, this was repleted.  He was also started on thiamin and we also repleted his potassium.      He had some mild rhabdomyolysis upon admission with a CK level that was 353.  This was thought to not be clinically significant.      Ultimately, it was felt that he probably has a lung cancer that is metastatic.  The lesion in his pituitary is of unclear origin.  I think metastasis is possible.  He seems to have some hormonal dysfunction and Endocrinology is going to be following this, we set him up to see Dr. Naylor on 05/04 in the Oncology Clinic and he will be seeing Endo in about 2 weeks as per their request.      ASSESSMENT:   1.  Lung mass with mediastinal adenopathy, transthoracic biopsy results pending.   2.  Sellar mass with endocrine abnormalities.   3.  Hypertension, probably related to volume depletion.   4.  Anemia of chronic disease, status post 2 units of blood.   5.  Acute kidney injury related to volume depletion, resolved.   6.  Hypokalemia, resolved.   7.  Hypophosphatemia, resolved.   8.  Alcohol abuse with recent cessation of drinking.   9.  Low folic acid, resolved.   10.  Small pneumothorax following  transthoracic CT-guided biopsy.   11.  Diffuse weakness.      PLAN:  The patient will have Oncology followup on .  He is not being treated with any hormonal agents at discharge as per Endocrinology.  They will be following him up in the Endo clinic.  This is being arranged.  He is going to go to a TCU for strengthening.  He did have fairly dramatic improvement in his functional status while in the hospital.     Discharge Medication List as of 2017  1:07 PM      START taking these medications    Details   folic acid (FOLVITE) 1 MG tablet Take 1 tablet (1 mg) by mouth daily, Disp-30 tablet, Historical      senna-docusate (SENOKOT-S;PERICOLACE) 8.6-50 MG per tablet Take 1-2 tablets by mouth 2 times daily, Disp-100 tablet, Historical      thiamine 100 MG tablet Take 1 tablet (100 mg) by mouth daily, Historical      pantoprazole (PROTONIX) 40 MG EC tablet Take 1 tablet (40 mg) by mouth 2 times daily (before meals), Disp-30 tablet, Historical           Unresulted Labs Ordered in the Past 30 Days of this Admission     Date and Time Order Name Status Description    2017 0949 Surgical pathology exam In process     2017 0000 IgF binding protein 1 In process               TOTAL TIME SPENT:  Greater than 30 minutes spent on this.         AMARIS JOHNSON MD             D: 2017 10:44   T: 2017 14:49   MT: [id number]      Name:     JOHN SANCHEZ   MRN:      [id number]        Account:        [id number]   :      1960           Admit Date:                                       Discharge Date: 2017      Document: [id number]

## 2017-04-27 NOTE — PLAN OF CARE
Problem: Goal Outcome Summary  Goal: Goal Outcome Summary  Outcome: Improving  A/Ox4. VSS. Up with standby assist.  PRN Oxycodone and Tylenol given for c/o pain with stated adequate results.  Adequate intake and output.  Denies dyspnea. /74 (BP Location: Right arm)  Pulse 75  Temp 97.9  F (36.6  C) (Oral)  Resp 18  Wt 82.3 kg (181 lb 7 oz)  SpO2 96%

## 2017-04-27 NOTE — PLAN OF CARE
Problem: Goal Outcome Summary  Goal: Goal Outcome Summary  Outcome: Adequate for Discharge Date Met:  04/27/17  Physical Therapy Discharge Summary     Reason for therapy discharge:    Discharged to transitional care facility.     Progress towards therapy goal(s). See goals on Care Plan in [facility] electronic health record for goal details.  Goals met     Therapy recommendation(s):    Continued therapy is recommended.  Rationale/Recommendations:  strengthening and conditioning.      Daria Renee PT

## 2017-04-27 NOTE — PROGRESS NOTES
[facility] Hospitalist Service      Subjective:  Doing well  No difficulty breathing  ambulatory    Review of Systems:  C: NEGATIVE for fever, chills, change in weight  E/M: NEGATIVE for ear, mouth and throat problems  R: NEGATIVE for significant cough or SOB  CV: NEGATIVE for chest pain, palpitations or peripheral edema    Physical Exam:  Vitals Were Reviewed    Patient Vitals for the past 16 hrs:   BP Temp Temp src Pulse Heart Rate Resp SpO2   04/27/17 0720 118/84 98.2  F (36.8  C) Oral 84 - 18 95 %   04/26/17 2246 115/74 97.9  F (36.6  C) Oral - 80 18 96 %   04/26/17 1939 111/70 98.2  F (36.8  C) Oral 75 - 18 96 %         Intake/Output Summary (Last 24 hours) at 04/27/17 0957  Last data filed at 04/27/17 0641   Gross per 24 hour   Intake             1200 ml   Output             2500 ml   Net            -1300 ml       GENERAL APPEARANCE: healthy, alert and no distress  EYES: conjunctiva clear, eyes grossly normal  RESP: lungs clear to auscultation - no rales, rhonchi or wheezes  CV: regular rate and rhythm, normal S1 S2, no S3 or S4 and no murmur, click or rub   ABDOMEN: soft, nontender, no HSM or masses and bowel sounds normal  MS: no clubbing, cyanosis; no edema  SKIN: clear without significant rashes or lesions    Lab:  Recent Labs   Lab Test  04/27/17   0630  04/26/17   0710   NA  145*  145*   POTASSIUM  3.5  3.8   CHLORIDE  112*  113*   CO2  22  25   ANIONGAP  11  7   GLC  101*  92   BUN  16  12   CR  1.00  0.93   JOHANN  7.8*  8.0*     CBC RESULTS:   Recent Labs   Lab Test  04/27/17   0630  04/26/17   0710   WBC  5.5  5.6   RBC  3.22*  3.56*   HGB  7.8*  8.6*   HCT  24.0*  26.2*   PLT  217  235       Results for orders placed or performed during the hospital encounter of 04/20/17 (from the past 24 hour(s))   CT Lung Mediastinum Biopsy    Narrative    4/26/2017 9:53 AM    HISTORY: Left upper lobe lung mass.    COMPARISON: 4/20/2017.    PROCEDURE: Risks and benefits of a CT guided core biopsy of the  left  upper lobe lung mass are discussed with the patient. Under CT  guidance, aseptic conditions, and utilizing 10 mL 1% lidocaine as  local anesthetic, a 19 gauge coaxial needle is placed. Through this,  two 20 gauge core biopsy samples are obtained and placed in formalin  for pathologic analysis. The patient tolerated the procedure well.    Radiation dose for this procedure is reduced using automated exposure  control of mA and/or kV according to patient size, or iterative  reconstruction technique.    CONSCIOUS SEDATION: Provided by the anesthesia service.      Impression    IMPRESSION: Technically uneventful CT guided needle core biopsy, as  described above. Pathologic results are pending.    AZALEA ART MD   XR Chest 1 View    Narrative    XR CHEST 1 VW 4/26/2017 10:00 AM    HISTORY: Lung biopsy.    COMPARISON: 10/31/2006    FINDINGS: No pneumothorax. Known mass is adjacent to the left  pulmonary hilum. Interstitial edema.      Impression    IMPRESSION: No pneumothorax.    ALLEN ORTIZ MD   XR Chest 1 View    Narrative    CHEST ONE VIEW April 26, 2017 12:06 PM     HISTORY: Chest biopsy.     COMPARISON: Chest x-ray 4/26/2017 at 10:00 AM.      Impression    IMPRESSION: Single view of the chest is performed following CT-guided  left lung biopsy. A trace left apical pneumothorax is noted. This was  not definitely evident on the prior exam. No evidence of mediastinal  shift. Heart is normal in size. Anterior left lung mass is again  noted. Right lung is well expanded and clear.    JIM POWERS MD   XR Chest 2 Views    Narrative    XR CHEST 2 VW 4/27/2017 6:10 AM    HISTORY: Follow-up of a small pneumothorax after lung biopsy.    COMPARISON: Prior study done at 1208 hours on April 26.      Impression    IMPRESSION: 2 views of the chest show a modest left apical  pneumothorax, slightly larger compared to the prior study. It measures  1.9 cm of lucency in the apical area compared to 0.9 cm on the prior  study. Small  bilateral pleural effusions are present, also seen on  other recent imaging studies.     AZALEA ART MD   Comprehensive metabolic panel   Result Value Ref Range    Sodium 145 (H) 133 - 144 mmol/L    Potassium 3.5 3.4 - 5.3 mmol/L    Chloride 112 (H) 94 - 109 mmol/L    Carbon Dioxide 22 20 - 32 mmol/L    Anion Gap 11 3 - 14 mmol/L    Glucose 101 (H) 70 - 99 mg/dL    Urea Nitrogen 16 7 - 30 mg/dL    Creatinine 1.00 0.66 - 1.25 mg/dL    GFR Estimate 76 >60 mL/min/1.7m2    GFR Estimate If Black >90   GFR Calc   >60 mL/min/1.7m2    Calcium 7.8 (L) 8.5 - 10.1 mg/dL    Bilirubin Total 0.4 0.2 - 1.3 mg/dL    Albumin 2.4 (L) 3.4 - 5.0 g/dL    Protein Total 5.8 (L) 6.8 - 8.8 g/dL    Alkaline Phosphatase 246 (H) 40 - 150 U/L    ALT 21 0 - 70 U/L    AST 59 (H) 0 - 45 U/L   CBC with platelets   Result Value Ref Range    WBC 5.5 4.0 - 11.0 10e9/L    RBC Count 3.22 (L) 4.4 - 5.9 10e12/L    Hemoglobin 7.8 (L) 13.3 - 17.7 g/dL    Hematocrit 24.0 (L) 40.0 - 53.0 %    MCV 75 (L) 78 - 100 fl    MCH 24.2 (L) 26.5 - 33.0 pg    MCHC 32.5 31.5 - 36.5 g/dL    RDW 23.3 (H) 10.0 - 15.0 %    Platelet Count 217 150 - 450 10e9/L       Assessment and Plan:    Hypotension   April 26, 2017 starting stress dose steroids  April 27, 2017 discussed results of corticotropin stim test with endo yesterday--they don't want steroids now         Sellar mass-etiology unclear, possible metastasis vs adenoma/pituitary insufficiency:   -incidental noted, >10mm. Hormonal workup recommended. If low cortisol, this could be the reason for the low BPs.  April 24, 2017 T4 quite low-but normal tsh, awaiting other lab   April 25, 2017 low T3, T4, FSH,LH, mildly elevated prolactin, normal cortisol, 24 cortisol pending, normal tsh-------will discuss with michelle.  April 26, 2017 abn corticotropin stim test--stress dose steroids started, will start thyroid also, testosterone pending, continuing florinef  April 27, 2017 as above      ST[place]:   Fluid  related-resolved      Weakness: due to adrenal insufficiency, hypotension, probable cancer, hypothyroidism      Lung mass- Probably Lung Cancer/weight loss greater than 20% in 1 year (severe malnutrition)  3 cm mass in the Left Upper Lobe AND Mediastinal Lymphadenopathy, destructive bone lesion Left 5th Rib  ? Due to metastatic Lung Cancer  April 26, 2017 biopsy today  April 27, 2017 small pneumo after bx.      Anemia  ? Due to nutritional deficiencies and/or due to cancer/CKD  Peripheral Blood Smear is done   2 Units of Blood will be transfused  Iron studies as well as B12 and Folate Levels normal .   Likely anemia of chronic disease.  April 25, 2017 hgb 8.4--8.4, assume some chronic ds      Acute Kidney Injury, likely on CKD.   ? Due to NSAIDS Induced Nephropathy or Pigment induced Nephropathy due to Rhabdomyolysis  -slightly better with fluids already.   -stable at 1.3 Cr.   April 25, 2017 creat 1.07      Acute Hypokalemia   Will replace Potassium  Also check Magnesium and Phosphorus level in a patient with history of Alcohol use.      Hypophos: Needs replacement.          Alcohol Abuse  Recently quit Drinking  Will give Thiamine     Low folic acid  replacement      Acute Rhabdomyolysis   resolved       DVT Prophylaxis: Pneumatic Compression Devices  Code Status: Full Code   Dispo: to tcu, no hormonal rx now, needs endo fu, has oncology apmnt next week                      To tcu

## 2017-04-27 NOTE — PLAN OF CARE
Problem: Discharge Planning  Goal: Discharge Planning (Adult, OB, Behavioral, Peds)  Outcome: Completed Date Met:  04/27/17  [facility] [facility] DISCHARGE NOTE     Patient discharged to transitional care unit at 1:17 PM via wheel chair. Accompanied by other:[facility] transport. Discharge instructions reviewed with patient, opportunity offered to ask questions. All belongings sent with patient. Report called to Th[facility] [place].     Enriqueta Waggoner

## 2017-04-27 NOTE — TELEPHONE ENCOUNTER
Spoke with Dr. Medley, scheduled 5/10 at 11am per MD. Informed Alessia about appt date/time    ----- Message from Carmen Zhao sent at 4/27/2017  8:34 AM CDT -----  Regarding: Sellular mass  Contact: [phone]  Please call Alessia at [facility] at [phone].  This pt has a sellular mass and I wasn't sure who pt should be seen by.  She said the referring doctor spoke to a dr Humphries in the hospital.  Please call Alessia or let me know who pt can be seen by and I can call her.  Thanks!    Carmen CASTELLANOS  Community Referral Specialist

## 2017-04-27 NOTE — PHARMACY - DISCHARGE MEDICATION RECONCILIATION
Discharge medication review for this patient is complete. Pharmacist assisted with medication reconciliation of discharge medications with prior to admission medications.     The following changes were made to the discharge medication list based on pharmacist review:  Added:  none  Discontinued: none  Changed: none    Patient's Discharge Medication List  - medications as listed on After Visit Summary (AVS)     Review of your medicines      START taking       Dose / Directions    folic acid 1 MG tablet   Commonly known as:  FOLVITE        Dose:  1 mg   Take 1 tablet (1 mg) by mouth daily   Quantity:  30 tablet   Refills:  0       pantoprazole 40 MG EC tablet   Commonly known as:  PROTONIX        Dose:  40 mg   Take 1 tablet (40 mg) by mouth 2 times daily (before meals)   Quantity:  30 tablet   Refills:  0       senna-docusate 8.6-50 MG per tablet   Commonly known as:  SENOKOT-S;PERICOLACE        Dose:  1-2 tablet   Take 1-2 tablets by mouth 2 times daily   Quantity:  100 tablet   Refills:  0       thiamine 100 MG tablet        Dose:  100 mg   Take 1 tablet (100 mg) by mouth daily   Refills:  0

## 2017-04-27 NOTE — PLAN OF CARE
Problem: Goal Outcome Summary  Goal: Goal Outcome Summary  Occupational Therapy Discharge Summary     Reason for therapy discharge:    Discharged to transitional care facility.     Progress towards therapy goal(s). See goals on Care Plan in [facility] electronic health record for goal details.  Goals partially met.  Barriers to achieving goals:   discharge from facility.     Therapy recommendation(s):    Continue home exercise program.

## 2017-05-02 NOTE — TELEPHONE ENCOUNTER
TELEPHONE ENCOUNTER:    Wade Acevedo is a 57 year old  (1960),Nurse called today to report: Mr. Acevedo is a new patient with metastatic lung cancer to the brain.  He is having headaches not relieved by Oxycodone 5 mg, states when he was in the hospital he received 10 mg at a time which helped.    ASSESSMENT/PLAN  Uncontrolled pain - sent script to [facility] for Oxycodone 5-10 mg every 4 hours PRN pain 1-5/10 or 6-10/10.    SALINAS Canchola CNP

## 2017-05-04 PROBLEM — C34.90 NON-SMALL CELL LUNG CANCER (NSCLC) (H): Status: ACTIVE | Noted: 2017-01-01

## 2017-05-04 NOTE — NURSING NOTE
"Oncology Rooming Note    May 4, 2017 11:09 AM   Wade Acevedo is a 57 year old male who presents for:    Chief Complaint   Patient presents with     Oncology Clinic Visit     New Patient - Lung CA     Initial Vitals: /81 (BP Location: Right arm, Patient Position: Chair, Cuff Size: Adult Regular)  Pulse 92  Temp 98.6  F (37  C) (Tympanic)  Resp 20  Ht 1.638 m (5' 4.5\")  Wt 80.9 kg (178 lb 6.4 oz)  SpO2 96%  BMI 30.15 kg/m2 Estimated body mass index is 30.15 kg/(m^2) as calculated from the following:    Height as of this encounter: 1.638 m (5' 4.5\").    Weight as of this encounter: 80.9 kg (178 lb 6.4 oz). Body surface area is 1.92 meters squared.  Extreme Pain (8) Comment: Left shoulder, chest 4/10   No LMP for male patient.  Allergies reviewed: Yes  Medications reviewed: Yes    Medications: Medication refills not needed today.  Pharm[facility] entered into [facility] jobs[facility] [place], [place] - [address] AT [address]    Clinical concerns:New Patient - Lung CA    7  minutes for nursing intake (face to face time)     Reena Leon CMA              "

## 2017-05-04 NOTE — PROGRESS NOTES
DATE OF VISIT: May 4, 2017    REASON FOR REFERRAL: Management of lung cancer.    CHIEF COMPLAINT:   Chief Complaint   Patient presents with     Oncology Clinic Visit     New Patient - Lung CA       HISTORY OF PRESENT ILLNESS:   57-year-old gentleman who presents today to discuss management of metastatic lung cancer. Patient presents with 2-3 months history of weakness and fainting episodes. He was seen in the emergency room further workup was done including a CT scan which showed a 3 cm mass in the medial left upper lobe. There was adjacent atelectasis at the prominent mediastinal lymph nodes. There is a destructive bone lesion involving left lateral fifth rib. Patient underwent transthoracic CT-guided biopsy in the hospital. The pathology report came back consistent with mixed tumor including adenocarcinoma and squamous histology. MRI of the brain was done at that time showing a mass in the sella consistent with pituitary macroadenoma.He had a normal TSH, but he had low T3 and T4. He had a serum cortisol morning level 4.4. He underwent a cosyntropin stim test which was mildly abnormal. He had a cortisol level 14.8 at 30 minutes and 19.6 at 60 minutes. His 24-hour cortisol in his urine was normal. The patient is scheduled to see endocrinology clinic. The patient is here today to discuss management of lung cancer.    REVIEW OF SYSTEMS:   Constitutional: Negative for fever, chills, and night sweats. Fatigue and weight loss as mentioned above  Skin: negative.  Eyes: negative.  Ears/Nose/Throat: negative.  Respiratory: No shortness of breath, dyspnea on exertion, cough, or hemoptysis.  Cardiovascular: negative.  Gastrointestinal: negative.  Genitourinary: negative.  Musculoskeletal: negative.  Neurologic: negative.  Psychiatric: negative.  Hematologic/Lymphatic/Immunologic: negative.  Endocrine: negative.    PAST MEDICAL HISTORY:   No past medical history on file.    PAST SURGICAL HISTORY:   No past surgical history on  "file.    ALLERGIES:   Allergies as of 05/04/2017 - Kvng as Reviewed 05/04/2017   Allergen Reaction Noted     Lubriderm Rash 04/20/2017       MEDICATIONS:   Current Outpatient Prescriptions   Medication Sig Dispense Refill     OXYCODONE HCL PO Take 5 mg by mouth every 4 hours as needed        OXYCODONE HCL PO Take 10 mg by mouth every 4 hours as needed       Acetaminophen (TYLENOL PO) Take 1,000 mg by mouth every 6 hours as needed for mild pain or fever       amoxicillin-clavulanate (AUGMENTIN) 875-125 MG per tablet Take 1 tablet by mouth 2 times daily 20 tablet 0     ibuprofen (ADVIL/MOTRIN) 600 MG tablet Take 1 tablet (600 mg) by mouth every 8 hours as needed for moderate pain 30 tablet 1     folic acid (FOLVITE) 1 MG tablet Take 1 tablet (1 mg) by mouth daily 30 tablet      thiamine 100 MG tablet Take 1 tablet (100 mg) by mouth daily       pantoprazole (PROTONIX) 40 MG EC tablet Take 1 tablet (40 mg) by mouth 2 times daily (before meals) 30 tablet      senna-docusate (SENOKOT-S;PERICOLACE) 8.6-50 MG per tablet Take 1-2 tablets by mouth 2 times daily Reported on 5/4/2017 100 tablet         FAMILY HISTORY:   No family history on file.    SOCIAL HISTORY:   Social History     Social History     Marital status: Single     Spouse name: N/A     Number of children: N/A     Years of education: N/A     Social History Main Topics     Smoking status: Former Smoker     Packs/day: 1.00     Years: 45.00     Types: Cigarettes     Quit date: 4/20/2017     Smokeless tobacco: Never Used     Alcohol use None     Drug use: None     Sexual activity: Not Asked     Other Topics Concern     None     Social History Narrative     None       PHYSICAL EXAMINATION:   /81 (BP Location: Right arm, Patient Position: Chair, Cuff Size: Adult Regular)  Pulse 92  Temp 98.6  F (37  C) (Tympanic)  Resp 20  Ht 1.638 m (5' 4.5\")  Wt 80.9 kg (178 lb 6.4 oz)  SpO2 96%  BMI 30.15 kg/m2  Wt Readings from Last 10 Encounters:   05/04/17 80.9 kg " (178 lb 6.4 oz)   04/26/17 82.3 kg (181 lb 7 oz)      ECOG performance status:1  GENERAL APPEARANCE: Healthy, alert and in no acute distress.  HEENT: Sclerae anicteric. PERRLA. Oropharynx without ulcers, lesions, or thrush.  NECK: Supple. No asymmetry or masses.  LYMPHATICS: No palpable cervical, supraclavicular, axillary, or inguinal lymphadenopathy.  RESP: Lungs clear to auscultation bilaterally without rales, rhonchi or wheezes.  CARDIOVASCULAR: Regular rate and rhythm. Normal S1, S2; no S3 or S4. No murmur, gallop, or rub.  ABDOMEN: Soft, nontender. Bowel sounds normal. No palpable organomegaly or masses.  MUSCULOSKELETAL: Extremities without gross deformities noted. No edema of bilateral lower extremities.  SKIN: No suspicious lesions or rashes.  NEURO: Alert and oriented x 3. Cranial nerves II-XII grossly intact.  PSYCHIATRIC: Mentation and affect appear normal.    LABORATORY RESULTS:  Admission on 04/20/2017, Discharged on 04/27/2017   No results displayed because visit has over 200 results.          IMAGING RESULTS:  Recent Results (from the past 744 hour(s))   CT Head w/o Contrast    Narrative    CT HEAD W/O CONTRAST   4/20/2017 2:20 PM     HISTORY: fall    TECHNIQUE: Axial images of the head without IV contrast material.  Radiation dose for this scan was reduced using automated exposure  control, adjustment of the mA and/or kV according to patient size, or  iterative reconstruction technique.    COMPARISON: None.    FINDINGS:  There is generalized atrophy of the brain. . There is a  mass arising from the sella extending into the suprasellar cistern.  This measures approximately 1.2 cm in cephalocaudad dimension. Extends  up to the optic chiasm. This would be consistent with a pituitary  adenoma. The right maxillary sinus is completely opacified. The medial  wall of the sinuses displaced medially. There is sclerosis and  thickening of the wall of the sinus. The findings would be consistent  with a mucocele  within the right maxillary sinus. Mucosal thickening  is seen in the right frontal sinus and several right anterior ethmoid  sinuses.. There is no evidence of trauma.      Impression    IMPRESSION:   1. No intracranial bleed or skull fractures.  2. Soft tissue mass arising from the sella extending into the  suprasellar region. This is probably due to a pituitary adenoma.  Meningioma is also in the differential diagnosis. MR scan of the sella  would be helpful for further characterization of this lesion. This  lesion could affect the optic chiasm.  3. Opacified right maxillary sinus with medial displacement of the  medial wall of the sinus. The appearance raises the possibility of a  mucocele within the right maxillary sinus.  4. Atrophy of the brain.    MICHAEL GARZON MD   CT Chest Abdomen Pelvis w/o Contrast    Narrative    CT CHEST/ABDOMEN/PELVIS WITHOUT CONTRAST April 20, 2017 2:22 PM    HISTORY: Fall, chest pain, falling hemoglobin. No IV contrast due to  poor renal function.    TECHNIQUE: CT scan obtained of the chest, abdomen, and pelvis without  IV contrast. Radiation dose for this scan was reduced using automated  exposure control, adjustment of the mA and/or kV according to patient  size, or iterative reconstruction technique.    COMPARISON:  None.    FINDINGS:  Chest: There is no acute thoracic aortic abnormality identified within  the limits of unenhanced scanning. Mild coronary artery and thoracic  aortic calcifications are present. No pleural or pericardial  effusions. No pneumothorax identified. There is prominent adenopathy  in the mediastinum. An example at the anterior mediastinum measures  4.1 x 2.2 cm series 3 image 21. Enlarged subcarinal lymph node  measures 2.6 x 1.6 cm image 30. There are other examples. Cannot  exclude hilar enlarged lymph nodes at unenhanced scanning. Axillary  lymph nodes are small. There may be a tiny sebaceous cyst at the left  anterior axilla image 13. There is a focal  ill-defined nodular lesion  measuring 3.0 x 2.1 cm medial anterior left upper lobe series 4 image  22 with some atelectasis leading towards the left hilar region. No  acute airspace disease otherwise seen. Some mild subpleural  emphysematous change suggested. There are no convincing acute  fractures identified. A few subacute fractures noted in the ribs.  There is a destructive bony lesion occupying the left lateral fifth  rib measuring approximately 4.4 x 1.5 cm series 3 image 23.    Abdomen/pelvis: No acute fractures visualized at the abdomen or pelvis  levels. No convincing destructive bony lesions identified at the  abdomen or pelvis. Contracted gallbladder. Unenhanced liver, adrenals,  spleen, pancreas, and kidneys do not show any acute abnormalities. No  hydronephrosis. Nonobstructing small stone lower left kidney is only  0.2 cm image 75. Diffuse vascular calcifications. No acute abdominal  aortic abnormality. A borderline prominent lymph node deep to the  right hemidiaphragm reji 0.8 cm series 3 image 53. Portocaval region  lymph node is 1.6 x 1.1 cm series 3 image 63. No acute bowel  abnormality. No bowel obstruction. Normal appendix. No free fluid or  free air. No evidence for acute hemorrhage.      Impression    IMPRESSION:  1. No acute traumatic abnormality is seen.  2. There are findings worrisome for neoplasm including an irregular  and elongated focal opacity measuring approximately 3 cm at the medial  left upper lobe. This could represent pulmonary neoplasm. There is  adjacent atelectasis leading towards the left hilum. There is also  prominent adenopathy within the mediastinum, and a destructive bone  lesion involving the left lateral fifth rib that may represent rib  metastasis. Recommend further oncologic workup.  3. Mildly enlarged retrocrural right-sided lymph node is nonspecific.  4. Nonobstructing small stone at the left kidney.    NISH WEBB MD   US Abdomen Complete Portable    Narrative     ULTRASOUND ABDOMEN COMPLETE 4/20/2017 9:55 PM     HISTORY: Acute kidney injury.    COMPARISON: None.    FINDINGS:  Liver is coarse and increased in echogenicity without focal  solid lesions. Gallbladder demonstrates gallbladder sludge without  shadowing gallstones. No definite pathologic wall thickening given its  incompletely distended state. Extrahepatic bile duct is normal in  diameter. Pancreas is normal where visualized. Spleen is normal.  Kidneys are normal in size. There is no hydronephrosis. No shadowing  stones demonstrated. Aorta is not well seen. Visualized IVC is  nonaneurysmal.      Impression    IMPRESSION:    1. Coarse increased echogenicity of the liver compatible with  intrinsic liver disease.  2. Gallbladder sludge.  3. No hydronephrosis.     AMARIS CLEMONS MD   MR Brain w/o & w Contrast    Narrative    MRI BRAIN WITHOUT AND WITH CONTRAST  4/21/2017 11:52 AM    HISTORY:  Pineal tumor.    TECHNIQUE:  Multiplanar, multisequence MRI of the brain without and  with 7 mL Gadavist.    COMPARISON: CT dated 4/20/2017.    FINDINGS: Diffusion-weighted images are normal. There is no evidence  for intracranial hemorrhage or acute infarct. There are a few tiny  punctate signal hyperintensities in the supratentorial white matter  without enhancement or mass effect. There is a sellar and suprasellar  enhancing mass which measures 1.1 x 1.2 x 1.5 cm in size. This does  not extend into the cavernous sinuses or sphenoid sinus. The mass is  in continuity with the optic chiasm. No other abnormal areas of  enhancement are present. There is some abnormal soft tissue density in  the right ostiomeatal unit with secondary complete opacification of  the right maxillary sinus. This soft tissue density could be due to  polyp, although other etiologies cannot be excluded. Incidental note  is made of a retention cyst in the left maxillary sinus.      Impression    IMPRESSION:  1. 1.1 x 1.2 x 1.5 cm sellar and suprasellar mass  consistent with a  pituitary macroadenoma. This is in continuity with the undersurface of  the optic chiasm.  2. Right ostiomeatal unit disease with secondary right maxillary sinus  disease. On the CT performed on the prior day, there appears be some  chronic osteitis. This is presumably related to chronic sinus disease  due to possible polyp, although it is difficult to exclude malignancy.  If clinically indicated, ENT consultation might helpful in further  evaluation.    TRACY PARKS MD   CT Lung Mediastinum Biopsy    Narrative    4/26/2017 9:53 AM    HISTORY: Left upper lobe lung mass.    COMPARISON: 4/20/2017.    PROCEDURE: Risks and benefits of a CT guided core biopsy of the left  upper lobe lung mass are discussed with the patient. Under CT  guidance, aseptic conditions, and utilizing 10 mL 1% lidocaine as  local anesthetic, a 19 gauge coaxial needle is placed. Through this,  two 20 gauge core biopsy samples are obtained and placed in formalin  for pathologic analysis. The patient tolerated the procedure well.    Radiation dose for this procedure is reduced using automated exposure  control of mA and/or kV according to patient size, or iterative  reconstruction technique.    CONSCIOUS SEDATION: Provided by the anesthesia service.      Impression    IMPRESSION: Technically uneventful CT guided needle core biopsy, as  described above. Pathologic results are pending.    AZALEA ART MD   XR Chest 1 View    Narrative    XR CHEST 1 VW 4/26/2017 10:00 AM    HISTORY: Lung biopsy.    COMPARISON: 10/31/2006    FINDINGS: No pneumothorax. Known mass is adjacent to the left  pulmonary hilum. Interstitial edema.      Impression    IMPRESSION: No pneumothorax.    ALLEN OTRIZ MD   XR Chest 1 View    Narrative    CHEST ONE VIEW April 26, 2017 12:06 PM     HISTORY: Chest biopsy.     COMPARISON: Chest x-ray 4/26/2017 at 10:00 AM.      Impression    IMPRESSION: Single view of the chest is performed following CT-guided  left  lung biopsy. A trace left apical pneumothorax is noted. This was  not definitely evident on the prior exam. No evidence of mediastinal  shift. Heart is normal in size. Anterior left lung mass is again  noted. Right lung is well expanded and clear.    JIM POWERS MD   XR Chest 2 Views    Narrative    XR CHEST 2 VW 4/27/2017 6:10 AM    HISTORY: Follow-up of a small pneumothorax after lung biopsy.    COMPARISON: Prior study done at 1208 hours on April 26.      Impression    IMPRESSION: 2 views of the chest show a modest left apical  pneumothorax, slightly larger compared to the prior study. It measures  1.9 cm of lucency in the apical area compared to 0.9 cm on the prior  study. Small bilateral pleural effusions are present, also seen on  other recent imaging studies.     AZALEA ART MD       ASSESSMENT AND PLAN:    (C34.90) Non-small cell lung cancer (NSCLC) (H)  57-year-old male patient with metastatic non-small cell lung cancer. Probably stage IV. I reviewed with the patient today the natural history of non-small cell lung cancer. We talked about staging, biology, management and prognosis. I told the patient that further workup will include a PET scan. We talked about management of non-small cell lung cancer in great details. We also talked about biology of non-small cell lung cancer. We talked about targetable drivers including EGFR ALK 4. We also talked about immunotherapy. We also reviewed the role and benefits of palliative chemotherapy. I gave him an overview about potential side effects of chemotherapy. I will meet with the patient again with the results of the PET scan is available to discuss management plan in more details.    (D64.9) Anemia, unspecified type  (primary encounter diagnosis)  Anemia probably related to anemia of chronic disease. Today we will arrange for further workup including serum ferritin, B12 and blood smear.    Maxillary sinusitis.   This is seen in MRI as incidental finding. Patient  has been complaining of pain behind her right eye.  Patient will be starting on antibiotic today.    The patient is ready to learn, no apparent learning barriers were identified, Diagnosis and treatment plans were explained to the patient. The patient expressed understanding of the content. The patient questions were answered to his satisfaction.    Pasquale Naylor MD    Time spent 60 minutes more than 50% of the timing counseling coordination of care including discussion of natural history of non-small cell lung cancer, biology, staging, management and prognosis    Chart documentation with Dragon Voice recognition Software. Although reviewed after completion, some words and grammatical errors may remain.

## 2017-05-04 NOTE — MR AVS SNAPSHOT
After Visit Summary   5/4/2017    Wade Acevedo    MRN: [id number]           Patient Information     Date Of Birth          1960        Visit Information        Provider Department      5/4/2017 11:00 AM Pasquale Naylor MD [facility] ONCOLOGY      Today's Diagnoses     Anemia, unspecified type    -  1    Non-small cell lung cancer (NSCLC) (H)          Care Instructions    We would like to see you back in clinic with Dr. Naylor in 1 week with PET scan and labs prior. You will need to have labs drawn today.  Some of the lab testing will take 10-14 days to result.  Your prescription (Augmentin, Ibuprofen) has been sent to:   [facility] [facility] [address] [facility] AT [facility] [address]  [address] [facility] [address]  Phone: [phone] Fax: [phone]  When you are in need of a refill, please call your pharmacy and they will send us a request.  Copy of appointments, and after visit summary (AVS) given to patient.  If you have any questions during business hours (M-F 8 AM- 4PM), please call Jillian Renee RN, BSN, OCN Oncology Hematology /Breast Cancer Navigator at [facility] [phone].   For questions after business hours, or on holidays/weekends, please call our after hours Nurse Triage line [phone]. Thank you.          Follow-ups after your visit        Your next 10 appointments already scheduled     May 09, 2017  2:00 PM CDT   PE NPET ONCOLOGY (EYES TO THIGHS) with MGPET1   [facility]    [address] [address] [place] [address] [phone]           Tell your doctor:   If there is any chance you may be pregnant or if you are breastfeeding.   If you have problems lying in small spaces (claustrophobia). If you do, your doctor may give you medicine to help you relax. If you have diabetes:   Have your exam early in the morning.  Your blood glucose will go up as the day goes by.   Your glucose level must be 180 or less at the start of the exam. Please take any medicines you need to ensure this blood glucose level. 24 hours before your scan: Don t do any heavy exercise. (No jogging, aerobics or other workouts.) Exercise will make your pictures less accurate. 6 hours before your scan:   Stop all food and liquids (except water).   Do not chew gum or suck on mints.   If you need to take medicine with food, you may take it with a few crackers.  Please call your Imaging Department at your exam site with any questions.            May 10, 2017 11:00 AM CDT   (Arrive by 10:45 AM)   NEW ENDOCRINE with Padma Medley MD   [facility] ([facility])    [address] [id number] [phone]            May 11, 2017  3:30 PM CDT   Return Visit with Pasquale Naylor MD   [facility] ([facility])    [facility]  [address] [facility] [address]  [facility] [address] [phone]              Future tests that were ordered for you today     Open Future Orders        Priority Expected Expires Ordered    PET Oncology (Eyes to Thighs) Routine  5/5/2018 5/4/2017            Who to contact     If you have questions or need follow up information about today's clinic visit or your schedule please contact [facility] CANCER [facility] directly at [phone].  Normal or non-critical lab and imaging results will be communicated to you by MyChart, letter or phone within 4 business days after the clinic has received the results. If you do not hear from us within 7 days, please contact the clinic through MyChart or phone. If you have a critical or abnormal lab result, we will notify you by phone as soon as possible.  Submit refill requests through [employer] or call your pharmacy and they will forward the refill request to us. Please allow 3 business days for your refill to  "be completed.          Additional Information About Your Visit        [employer] Information     [employer] lets you send messages to your doctor, view your test results, renew your prescriptions, schedule appointments and more. To sign up, go to [email or web address] . Click on \"Log in\" on the left side of the screen, which will take you to the Welcome page. Then click on \"Sign up Now\" on the right side of the page.     You will be asked to enter the access code listed below, as well as some personal information. Please follow the directions to create your username and password.     Your access code is: [email or web address]  Expires: 2017  9:09 AM     Your access code will  in 90 days. If you need help or a new code, please call your [place] clinic or [phone].        Care EveryWhere ID     This is your Care EveryWhere ID. This could be used by other organizations to access your [facility] [facility] medical records  [id number]        Your Vitals Were     Pulse Temperature Respirations Height Pulse Oximetry BMI (Body Mass Index)    92 98.6  F (37  C) (Tympanic) 20 1.638 m (5' 4.5\") 96% 30.15 kg/m2       Blood Pressure from Last 3 Encounters:   17 116/81   17 118/84    Weight from Last 3 Encounters:   17 80.9 kg (178 lb 6.4 oz)   17 82.3 kg (181 lb 7 oz)              We Performed the Following     Bld morphology pathology review     CBC with platelets differential     ELP reflex to IELP     Lactate Dehydrogenase     UPPER EUS     Vitamin B12          Today's Medication Changes          These changes are accurate as of: 17 12:48 PM.  If you have any questions, ask your nurse or doctor.               Start taking these medicines.        Dose/Directions    amoxicillin-clavulanate 875-125 MG per tablet   Commonly known as:  AUGMENTIN   Used for:  Anemia, unspecified type, Non-small cell lung cancer (NSCLC) (H)   Started by:  Pasquale Naylor MD        Dose:  1 tablet   Take 1 tablet by mouth 2 " times daily   Quantity:  20 tablet   Refills:  0       ibuprofen 600 MG tablet   Commonly known as:  ADVIL/MOTRIN   Used for:  Anemia, unspecified type, Non-small cell lung cancer (NSCLC) (H)   Started by:  Pasquale Naylor MD        Dose:  600 mg   Take 1 tablet (600 mg) by mouth every 8 hours as needed for moderate pain   Quantity:  30 tablet   Refills:  1            Where to get your medicines      [facility] [id number] [facility] AT [address]  [address] [facility] [address]     Phone:  [phone]     amoxicillin-clavulanate 875-125 MG per tablet    ibuprofen 600 MG tablet                Primary Care Provider    Doctor Unknown, MD       No address on file        Thank you!     Thank you for choosing [facility]  for your care. Our goal is always to provide you with excellent care. Hearing back from our patients is one way we can continue to improve our services. Please take a few minutes to complete the written survey that you may receive in the mail after your visit with us. Thank you!             Your Updated Medication List - Protect others around you: Learn how to safely use, store and throw away your medicines at www.disposemymeds.org.          This list is accurate as of: 5/4/17 12:48 PM.  Always use your most recent med list.                   Brand Name Dispense Instructions for use    amoxicillin-clavulanate 875-125 MG per tablet    AUGMENTIN    20 tablet    Take 1 tablet by mouth 2 times daily       folic acid 1 MG tablet    FOLVITE    30 tablet    Take 1 tablet (1 mg) by mouth daily       ibuprofen 600 MG tablet    ADVIL/MOTRIN    30 tablet    Take 1 tablet (600 mg) by mouth every 8 hours as needed for moderate pain       * OXYCODONE HCL PO      Take 5 mg by mouth every 4 hours as needed       * OXYCODONE HCL PO      Take 10 mg by mouth every 4 hours as needed       pantoprazole 40 MG EC tablet     PROTONIX    30 tablet    Take 1 tablet (40 mg) by mouth 2 times daily (before meals)       senna-docusate 8.6-50 MG per tablet    SENOKOT-S;PERICOLACE    100 tablet    Take 1-2 tablets by mouth 2 times daily Reported on 5/4/2017       thiamine 100 MG tablet      Take 1 tablet (100 mg) by mouth daily       TYLENOL PO      Take 1,000 mg by mouth every 6 hours as needed for mild pain or fever       * Notice:  This list has 2 medication(s) that are the same as other medications prescribed for you. Read the directions carefully, and ask your doctor or other care provider to review them with you.

## 2017-05-04 NOTE — PATIENT INSTRUCTIONS
We would like to see you back in clinic with Dr. Naylor in 1 week with PET scan and labs prior. You will need to have labs drawn today.  Some of the lab testing will take 10-14 days to result.  Your prescription (Augmentin, Ibuprofen) has been sent to:   [id number] - [place], [place] - [address] AT [facility] [address]  [address] [facility] [address]  Phone: [phone] Fax: [phone]  When you are in need of a refill, please call your pharmacy and they will send us a request.  Copy of appointments, and after visit summary (AVS) given to patient.  If you have any questions during business hours (M-F 8 AM- 4PM), please call Jillian Renee RN, BSN, OCN Oncology Hematology /Breast Cancer Navigator at [facility] [phone].   For questions after business hours, or on holidays/weekends, please call our after hours Nurse Triage line [phone]. Thank you.

## 2017-05-06 PROBLEM — D63.8 ANEMIA, CHRONIC DISEASE: Status: ACTIVE | Noted: 2017-01-01

## 2017-05-06 PROBLEM — R52 PAIN: Status: ACTIVE | Noted: 2017-01-01

## 2017-05-06 PROBLEM — R63.4 WEIGHT LOSS: Status: ACTIVE | Noted: 2017-01-01

## 2017-05-06 NOTE — PROGRESS NOTES
"Chief Complaint   Patient presents with     Hospital F/U     Establish Care     HPI:    Wade Acevedo is a 57 year old  (1960),admitted to the [facility] from [facility].  Hospital stay 4/20  through 4/27/17.  Admitted to this facility for  rehab, medical management and nursing care.  Current issues are:      Malignant neoplasm metastatic to lung, unspecified laterality (H)  Malignant neoplasm to brain  Recent dx found on CT  Will see oncology next week with goal to aggressive fight disease.  Somewhat overwhelmed with recent hospitalization, many diagnostic tests and new dx.  Prior to hospitalization was not followed by PCP but agreeable to f/u on d/c.    Generalized muscle weakness   Requiring extensive assistance from nursing Ambulates independently with walker Hx of recurring falls some with injury or fracture prior to admit to NH  Patient states the weakness increased this past 4 months or so  Currently unable to live independently at home but goal is to d/c home as soon as therapy goals are met.  Family involved and assisting with cleaning home.  Patient open to home care on d/c.  Multi level home with BR in upper level and lower level and stairs going into home.  Unable to do more than 3-4 steps at this time per therapy.  Gait unsteady and shaky on admit.    Weight loss  R/t cancer and poor appetite and po intake  Usual weight is around 220#  No appetite at home but feeling better now about eating  No N/V or abd pain  Patient states \"I just didn't eat\"  And \"I don't get hungry\"   Apparently will eat if food prepared for him but unable to independently manage.  Followed by dietician and po intake supervised by nursing while at NH   ETOH abuse  States he has now stopped drinking  No shakes, c/o withdrawal   [facility] was involved prior to admit r/t welfare check and found home in disarray with multiple empty gin bottles.  Not interested CD counseling   Tobacco abuse  Recent " cessation r/t new dx lung cancer  No c/o withdrawal    Pain  Intermittent  Prn oxycodone effective for head pain, left chest/shoulder pain.     Anemia  Received 2 u PRBC at hospital  Denies HA, blurred vision, increased weakness       Allergies   Allergen Reactions     Lubriderm Rash       PAST MEDICAL HISTORY:   Past Medical History:   Diagnosis Date     Pain 5/6/2017     PAST SURGICAL HISTORY: No past surgical history on file.  FAMILY HISTORY: No family history on file.  SOCIAL HISTORY:   Social History   Substance Use Topics     Smoking status: Former Smoker     Packs/day: 1.00     Years: 45.00     Types: Cigarettes     Quit date: 4/20/2017     Smokeless tobacco: Never Used     Alcohol use Not on file       Post Discharge Medication Reconciliation Status: discharge medications reconciled, continue medications without change.  Current Outpatient Prescriptions   Medication Sig Dispense Refill     OXYCODONE HCL PO Take 5 mg by mouth every 4 hours as needed                Acetaminophen (TYLENOL PO) Take 1,000 mg by mouth every 6 hours as needed for mild pain or fever                        folic acid (FOLVITE) 1 MG tablet Take 1 tablet (1 mg) by mouth daily 30 tablet      senna-docusate (SENOKOT-S;PERICOLACE) 8.6-50 MG per tablet Take 1-2 tablets by mouth 2 times daily Reported on 5/4/2017 100 tablet      thiamine 100 MG tablet Take 1 tablet (100 mg) by mouth daily       pantoprazole (PROTONIX) 40 MG EC tablet Take 1 tablet (40 mg) by mouth 2 times daily (before meals) 30 tablet        Patient's living condition: lives alone  Information reviewed:  Medications, vital signs, orders, nursing notes, problem list, hospital information.   Facility MDS and care plan reviewed.      ROS:  10 point ROS of systems including Constitutional, Eyes, Respiratory, Cardiovascular, Gastroenterology, Genitourinary, Integumentary, Muscularskeletal, Psychiatric were all negative except for pertinent positives noted in my  "HPI.      Exam:  /54  Pulse 78  Temp 98  F (36.7  C)  Resp 19  Ht 5' 9.5\" (1.765 m)  Wt 182 lb (82.6 kg)  SpO2 92%  BMI 26.49 kg/m2  GENERAL APPEARANCE:  Alert, in no distress, pleasant  ENT:   moist mucous membranes, hearing acuity normal  EYES:  EOM, conjunctivae, lids normal  RESP:  respiratory effort of chest normal, no respiratory distress, Lung sounds clear  CV:    auscultation of heart done , rate and rhythm reg, no murmur, no rub or gallop, Edema no  ABDOMEN:  normal bowel sounds, soft, nontender,   M/S:   Gait and station unsteady  OMALLEY    SKIN:  Inspection and  Palpation of skin and subcutaneous tissue Pale w/d   NEURO:   Cranial nerves 2-12 are normal tested and grossly at patient's baseline  PSYCH:  insight and judgement, memory intact , affect and mood normal    Lab/Diagnostic data:    Results for orders placed or performed during the hospital encounter of 04/20/17   CT Head w/o Contrast    Narrative    CT HEAD W/O CONTRAST   4/20/2017 2:20 PM     HISTORY: fall    TECHNIQUE: Axial images of the head without IV contrast material.  Radiation dose for this scan was reduced using automated exposure  control, adjustment of the mA and/or kV according to patient size, or  iterative reconstruction technique.    COMPARISON: None.    FINDINGS:  There is generalized atrophy of the brain. . There is a  mass arising from the sella extending into the suprasellar cistern.  This measures approximately 1.2 cm in cephalocaudad dimension. Extends  up to the optic chiasm. This would be consistent with a pituitary  adenoma. The right maxillary sinus is completely opacified. The medial  wall of the sinuses displaced medially. There is sclerosis and  thickening of the wall of the sinus. The findings would be consistent  with a mucocele within the right maxillary sinus. Mucosal thickening  is seen in the right frontal sinus and several right anterior ethmoid  sinuses.. There is no evidence of trauma.      " Impression    IMPRESSION:   1. No intracranial bleed or skull fractures.  2. Soft tissue mass arising from the sella extending into the  suprasellar region. This is probably due to a pituitary adenoma.  Meningioma is also in the differential diagnosis. MR scan of the sella  would be helpful for further characterization of this lesion. This  lesion could affect the optic chiasm.  3. Opacified right maxillary sinus with medial displacement of the  medial wall of the sinus. The appearance raises the possibility of a  mucocele within the right maxillary sinus.  4. Atrophy of the brain.    MICHAEL GARZON MD   CT Chest Abdomen Pelvis w/o Contrast    Narrative    CT CHEST/ABDOMEN/PELVIS WITHOUT CONTRAST April 20, 2017 2:22 PM    HISTORY: Fall, chest pain, falling hemoglobin. No IV contrast due to  poor renal function.    TECHNIQUE: CT scan obtained of the chest, abdomen, and pelvis without  IV contrast. Radiation dose for this scan was reduced using automated  exposure control, adjustment of the mA and/or kV according to patient  size, or iterative reconstruction technique.    COMPARISON:  None.    FINDINGS:  Chest: There is no acute thoracic aortic abnormality identified within  the limits of unenhanced scanning. Mild coronary artery and thoracic  aortic calcifications are present. No pleural or pericardial  effusions. No pneumothorax identified. There is prominent adenopathy  in the mediastinum. An example at the anterior mediastinum measures  4.1 x 2.2 cm series 3 image 21. Enlarged subcarinal lymph node  measures 2.6 x 1.6 cm image 30. There are other examples. Cannot  exclude hilar enlarged lymph nodes at unenhanced scanning. Axillary  lymph nodes are small. There may be a tiny sebaceous cyst at the left  anterior axilla image 13. There is a focal ill-defined nodular lesion  measuring 3.0 x 2.1 cm medial anterior left upper lobe series 4 image  22 with some atelectasis leading towards the left hilar region. No  acute  airspace disease otherwise seen. Some mild subpleural  emphysematous change suggested. There are no convincing acute  fractures identified. A few subacute fractures noted in the ribs.  There is a destructive bony lesion occupying the left lateral fifth  rib measuring approximately 4.4 x 1.5 cm series 3 image 23.    Abdomen/pelvis: No acute fractures visualized at the abdomen or pelvis  levels. No convincing destructive bony lesions identified at the  abdomen or pelvis. Contracted gallbladder. Unenhanced liver, adrenals,  spleen, pancreas, and kidneys do not show any acute abnormalities. No  hydronephrosis. Nonobstructing small stone lower left kidney is only  0.2 cm image 75. Diffuse vascular calcifications. No acute abdominal  aortic abnormality. A borderline prominent lymph node deep to the  right hemidiaphragm reji 0.8 cm series 3 image 53. Portocaval region  lymph node is 1.6 x 1.1 cm series 3 image 63. No acute bowel  abnormality. No bowel obstruction. Normal appendix. No free fluid or  free air. No evidence for acute hemorrhage.      Impression    IMPRESSION:  1. No acute traumatic abnormality is seen.  2. There are findings worrisome for neoplasm including an irregular  and elongated focal opacity measuring approximately 3 cm at the medial  left upper lobe. This could represent pulmonary neoplasm. There is  adjacent atelectasis leading towards the left hilum. There is also  prominent adenopathy within the mediastinum, and a destructive bone  lesion involving the left lateral fifth rib that may represent rib  metastasis. Recommend further oncologic workup.  3. Mildly enlarged retrocrural right-sided lymph node is nonspecific.  4. Nonobstructing small stone at the left kidney.    NISH WEBB MD   US Abdomen Complete Portable    Narrative    ULTRASOUND ABDOMEN COMPLETE 4/20/2017 9:55 PM     HISTORY: Acute kidney injury.    COMPARISON: None.    FINDINGS:  Liver is coarse and increased in echogenicity without  focal  solid lesions. Gallbladder demonstrates gallbladder sludge without  shadowing gallstones. No definite pathologic wall thickening given its  incompletely distended state. Extrahepatic bile duct is normal in  diameter. Pancreas is normal where visualized. Spleen is normal.  Kidneys are normal in size. There is no hydronephrosis. No shadowing  stones demonstrated. Aorta is not well seen. Visualized IVC is  nonaneurysmal.      Impression    IMPRESSION:    1. Coarse increased echogenicity of the liver compatible with  intrinsic liver disease.  2. Gallbladder sludge.  3. No hydronephrosis.     AMARIS CLEMONS MD   MR Brain w/o & w Contrast    Narrative    MRI BRAIN WITHOUT AND WITH CONTRAST  4/21/2017 11:52 AM    HISTORY:  Pineal tumor.    TECHNIQUE:  Multiplanar, multisequence MRI of the brain without and  with 7 mL Gadavist.    COMPARISON: CT dated 4/20/2017.    FINDINGS: Diffusion-weighted images are normal. There is no evidence  for intracranial hemorrhage or acute infarct. There are a few tiny  punctate signal hyperintensities in the supratentorial white matter  without enhancement or mass effect. There is a sellar and suprasellar  enhancing mass which measures 1.1 x 1.2 x 1.5 cm in size. This does  not extend into the cavernous sinuses or sphenoid sinus. The mass is  in continuity with the optic chiasm. No other abnormal areas of  enhancement are present. There is some abnormal soft tissue density in  the right ostiomeatal unit with secondary complete opacification of  the right maxillary sinus. This soft tissue density could be due to  polyp, although other etiologies cannot be excluded. Incidental note  is made of a retention cyst in the left maxillary sinus.      Impression    IMPRESSION:  1. 1.1 x 1.2 x 1.5 cm sellar and suprasellar mass consistent with a  pituitary macroadenoma. This is in continuity with the undersurface of  the optic chiasm.  2. Right ostiomeatal unit disease with secondary right  maxillary sinus  disease. On the CT performed on the prior day, there appears be some  chronic osteitis. This is presumably related to chronic sinus disease  due to possible polyp, although it is difficult to exclude malignancy.  If clinically indicated, ENT consultation might helpful in further  evaluation.    TRACY PARKS MD   CT Lung Mediastinum Biopsy    Narrative    4/26/2017 9:53 AM    HISTORY: Left upper lobe lung mass.    COMPARISON: 4/20/2017.    PROCEDURE: Risks and benefits of a CT guided core biopsy of the left  upper lobe lung mass are discussed with the patient. Under CT  guidance, aseptic conditions, and utilizing 10 mL 1% lidocaine as  local anesthetic, a 19 gauge coaxial needle is placed. Through this,  two 20 gauge core biopsy samples are obtained and placed in formalin  for pathologic analysis. The patient tolerated the procedure well.    Radiation dose for this procedure is reduced using automated exposure  control of mA and/or kV according to patient size, or iterative  reconstruction technique.    CONSCIOUS SEDATION: Provided by the anesthesia service.      Impression    IMPRESSION: Technically uneventful CT guided needle core biopsy, as  described above. Pathologic results are pending.    AZALEA ART MD   XR Chest 1 View    Narrative    XR CHEST 1 VW 4/26/2017 10:00 AM    HISTORY: Lung biopsy.    COMPARISON: 10/31/2006    FINDINGS: No pneumothorax. Known mass is adjacent to the left  pulmonary hilum. Interstitial edema.      Impression    IMPRESSION: No pneumothorax.    ALLEN ORTIZ MD   XR Chest 1 View    Narrative    CHEST ONE VIEW April 26, 2017 12:06 PM     HISTORY: Chest biopsy.     COMPARISON: Chest x-ray 4/26/2017 at 10:00 AM.      Impression    IMPRESSION: Single view of the chest is performed following CT-guided  left lung biopsy. A trace left apical pneumothorax is noted. This was  not definitely evident on the prior exam. No evidence of mediastinal  shift. Heart is normal in size.  Anterior left lung mass is again  noted. Right lung is well expanded and clear.    JIM POWERS MD   XR Chest 2 Views    Narrative    XR CHEST 2 VW 4/27/2017 6:10 AM    HISTORY: Follow-up of a small pneumothorax after lung biopsy.    COMPARISON: Prior study done at 1208 hours on April 26.      Impression    IMPRESSION: 2 views of the chest show a modest left apical  pneumothorax, slightly larger compared to the prior study. It measures  1.9 cm of lucency in the apical area compared to 0.9 cm on the prior  study. Small bilateral pleural effusions are present, also seen on  other recent imaging studies.     AZALEA ART MD   CBC with platelets differential   Result Value Ref Range    WBC 7.6 4.0 - 11.0 10e9/L    RBC Count 3.05 (L) 4.4 - 5.9 10e12/L    Hemoglobin 6.6 (LL) 13.3 - 17.7 g/dL    Hematocrit 19.9 (L) 40.0 - 53.0 %    MCV 65 (L) 78 - 100 fl    MCH 21.6 (L) 26.5 - 33.0 pg    MCHC 33.2 31.5 - 36.5 g/dL    RDW 15.9 (H) 10.0 - 15.0 %    Platelet Count 236 150 - 450 10e9/L    Diff Method Automated Method     % Neutrophils 70.0 %    % Lymphocytes 20.9 %    % Monocytes 7.7 %    % Eosinophils 0.8 %    % Basophils 0.3 %    % Immature Granulocytes 0.3 %    Absolute Neutrophil 5.3 1.6 - 8.3 10e9/L    Absolute Lymphocytes 1.6 0.8 - 5.3 10e9/L    Absolute Monocytes 0.6 0.0 - 1.3 10e9/L    Absolute Eosinophils 0.1 0.0 - 0.7 10e9/L    Absolute Basophils 0.0 0.0 - 0.2 10e9/L    Abs Immature Granulocytes 0.0 0 - 0.4 10e9/L   Comprehensive metabolic panel   Result Value Ref Range    Sodium 140 133 - 144 mmol/L    Potassium 2.8 (L) 3.4 - 5.3 mmol/L    Chloride 108 94 - 109 mmol/L    Carbon Dioxide 21 20 - 32 mmol/L    Anion Gap 11 3 - 14 mmol/L    Glucose 123 (H) 70 - 99 mg/dL    Urea Nitrogen 14 7 - 30 mg/dL    Creatinine 1.81 (H) 0.66 - 1.25 mg/dL    GFR Estimate 39 (L) >60 mL/min/1.7m2    GFR Estimate If Black 47 (L) >60 mL/min/1.7m2    Calcium 9.5 8.5 - 10.1 mg/dL    Bilirubin Total 1.0 0.2 - 1.3 mg/dL    Albumin 3.2 (L) 3.4  - 5.0 g/dL    Protein Total 7.7 6.8 - 8.8 g/dL    Alkaline Phosphatase 244 (H) 40 - 150 U/L    ALT 28 0 - 70 U/L     (H) 0 - 45 U/L   Lactic acid whole blood   Result Value Ref Range    Lactic Acid 2.4 (H) 0.7 - 2.1 mmol/L   UA with Microscopic   Result Value Ref Range    Color Urine Yellow     Appearance Urine Clear     Glucose Urine Negative NEG mg/dL    Bilirubin Urine Negative NEG    Ketones Urine Negative NEG mg/dL    Specific Gravity Urine 1.002 (L) 1.003 - 1.035    Blood Urine Negative NEG    pH Urine 6.0 5.0 - 7.0 pH    Protein Albumin Urine Negative NEG mg/dL    Urobilinogen mg/dL Normal 0.0 - 2.0 mg/dL    Nitrite Urine Negative NEG    Leukocyte Esterase Urine Negative NEG    Source Midstream Urine     WBC Urine 1 0 - 2 /HPF    RBC Urine 1 0 - 2 /HPF   INR   Result Value Ref Range    INR 1.05 0.86 - 1.14   TSH with free T4 reflex   Result Value Ref Range    TSH 0.66 0.40 - 4.00 mU/L   Troponin I   Result Value Ref Range    Troponin I ES  0.000 - 0.045 ug/L     <0.015  The 99th percentile for upper reference range is 0.045 ug/L.  Troponin values in   the range of 0.045 - 0.120 ug/L may be associated with risks of adverse   clinical events.     Iron and iron binding capacity   Result Value Ref Range    Iron 81 35 - 180 ug/dL    Iron Binding Cap 137 (L) 240 - 430 ug/dL    Iron Saturation Index 59 (H) 15 - 46 %   Blood Morphology Pathology Review   Result Value Ref Range    Copath Report       Patient Name: JOHN SANCHEZ  MR#: [id number]  Specimen #: [id number]  Collected: 4/20/2017  Received: 4/21/2017  Reported: 4/21/2017 20:31  Ordering Phy(s): CHET NINA    For improved result formatting, select 'View Enhanced Report Format'  under Linked Documents section.    TEST(S):  Peripheral Smear Morphology    FINAL DIAGNOSIS:  Peripheral blood morphology:  - Marked microcytic normochromic anemia.    COMMENT:  Microcytic anemia may be due to iron deficiency, anemia of chronic  disease, a sideroblastic  process or a hemoglobinopathy.    In this patient iron is normal, iron saturation index is elevated and  iron binding capacity is decreased.  Ferritin may be helpful.  Folate is  also decreased which may be a contributing factor in the anemia, among  other causes.    Electronically signed out by:    Johann Taylor M.D.    CLINICAL HISTORY:  57 year old male.  From electronic medical record:  3 cm NAZ mass,  mediastinal lymphadenopathy, destructive bone lesion in left 5th rib.  His tory of smoking.  Acute kidney injury and hypokalemia.  On 4/20/2017  absolute reticulocyte count was decreased at 17.6 10e9/L, folate was  decreased at 3/2 ng/,L (normal range >5.4 ng/mL), vitamin B12 was  increased at 1516 pg.mL, iron was normal at 81 ug/dL, iron binding  capacity was decreased at 137 ug/dL, iron saturation index was increased  at 59% (normal range 15-46%), and TSH was normal.    PERIPHERAL BLOOD DATA:  PERIPHERAL BLOOD DATA (Date: 04/20/2017)    Patient Value (Reference Range >18 year old male)    7.62    WBC        (4.0-11.0 x 10*9/L)  3.05    RBC         (4.4-5.9 x 10*12/L)   6.6    HGB         (13.3-17.7 g/dL)  19.9    HCT         (40.0-53.0 %)  65.2    MCV        (78-100fL)  21.6    MCH        (26.5-33.0 pg)  33.2    MCHC     (31.5-36.5 g/dL)  15.9    RDW       (10.0-15.0 %)   236    PLT         (150-450 x 10*9/L)    PERIPHERAL BLOOD DIFFERENTIAL - Manual 200 cells..  (Reference ranges >18 year old)    Percent  69.0%  Neutrophils  21.0%  Lymphocytes   9.0%  Mono cytes   1.0%  Eosinophils   0.0%  Basophils    Absolute  5.26   Neutrophils (Ref normal 1.6 - 8.3 x 10*9/L)  1.60   Lymphocytes  (Ref normal 0.8 - 5.3 x 10*9/L)  0.69   Monocytes  (Ref normal 0 -1.3 x 10*9/L)  0.08   Eosinophils  (Ref normal 0 - 0.7 x 10*9/L)  0.00   Basophils  (Ref normal 0 - 0.2 x 10*9/L)    PERIPHERAL BLOOD MORPHOLOGY:    ERYTHROCYTES:  The red cells are microcytic, normochromic and are mildly  decreased in number for the patient's age  and gender. There is moderate  anisocytosis.  There is mild poikilocytosis with target cells. No  features of hemolysis or increased polychromasia are identified.  No  intracellular microorganisms are identified.    LEUKOCYTES:  The leukocytes are normal in number, morphology and  differential distribution. No immature precursors or evidence of  neutrophilic dysplasia is seen. No atypical lymphoid cells are seen. No  intracellular microorganisms are identiifed.    PLATELETS:  The platelets are normal in number and morphology.    CPT C odes:  A: [id number]    TESTING LAB LOCATION:  [address] [address] [phone]    COLLECTION SITE:  Client:  [facility]  Location:  [facility] ([facility])     Vitamin B12   Result Value Ref Range    Vitamin B12 1516 (H) 193 - 986 pg/mL   Folate   Result Value Ref Range    Folate 3.2 (L) >5.4 ng/mL   Reticulocyte count   Result Value Ref Range    % Retic 0.6 0.5 - 2.0 %    Absolute Retic 17.6 (L) 25 - 95 10e9/L   Lactic acid whole blood   Result Value Ref Range    Lactic Acid 1.1 0.7 - 2.1 mmol/L   Alcohol ethyl   Result Value Ref Range    Ethanol g/dL <0.01 <0.01 g/dL   CK total   Result Value Ref Range    CK Total 1064 (HH) 30 - 300 U/L   Magnesium   Result Value Ref Range    Magnesium 2.0 1.6 - 2.3 mg/dL   Phosphorus   Result Value Ref Range    Phosphorus 1.4 (L) 2.5 - 4.5 mg/dL   Basic metabolic panel   Result Value Ref Range    Sodium 148 (H) 133 - 144 mmol/L    Potassium 3.4 3.4 - 5.3 mmol/L    Chloride 119 (H) 94 - 109 mmol/L    Carbon Dioxide 19 (L) 20 - 32 mmol/L    Anion Gap 10 3 - 14 mmol/L    Glucose 98 70 - 99 mg/dL    Urea Nitrogen 12 7 - 30 mg/dL    Creatinine 1.38 (H) 0.66 - 1.25 mg/dL    GFR Estimate 53 (L) >60 mL/min/1.7m2    GFR Estimate If Black 64 >60 mL/min/1.7m2    Calcium 7.5 (L) 8.5 - 10.1 mg/dL   Comprehensive metabolic panel   Result Value Ref Range    Sodium 148  (H) 133 - 144 mmol/L    Potassium 3.2 (L) 3.4 - 5.3 mmol/L    Chloride 121 (H) 94 - 109 mmol/L    Carbon Dioxide 18 (L) 20 - 32 mmol/L    Anion Gap 9 3 - 14 mmol/L    Glucose 80 70 - 99 mg/dL    Urea Nitrogen 10 7 - 30 mg/dL    Creatinine 1.29 (H) 0.66 - 1.25 mg/dL    GFR Estimate 57 (L) >60 mL/min/1.7m2    GFR Estimate If Black 69 >60 mL/min/1.7m2    Calcium 7.1 (L) 8.5 - 10.1 mg/dL    Bilirubin Total 0.9 0.2 - 1.3 mg/dL    Albumin 2.1 (L) 3.4 - 5.0 g/dL    Protein Total 5.5 (L) 6.8 - 8.8 g/dL    Alkaline Phosphatase 175 (H) 40 - 150 U/L    ALT 19 0 - 70 U/L    AST 92 (H) 0 - 45 U/L   Lactic acid whole blood   Result Value Ref Range    Lactic Acid 2.1 0.7 - 2.1 mmol/L   UA reflex to Microscopic   Result Value Ref Range    Color Urine Yellow     Appearance Urine Clear     Glucose Urine Negative NEG mg/dL    Bilirubin Urine Negative NEG    Ketones Urine Negative NEG mg/dL    Specific Gravity Urine 1.002 (L) 1.003 - 1.035    Blood Urine Negative NEG    pH Urine 6.5 5.0 - 7.0 pH    Protein Albumin Urine Negative NEG mg/dL    Urobilinogen mg/dL Normal 0.0 - 2.0 mg/dL    Nitrite Urine Negative NEG    Leukocyte Esterase Urine Negative NEG    Source Midstream Urine    Sodium random urine   Result Value Ref Range    Sodium Urine mmol/L 11 mmol/L   Hemoglobin   Result Value Ref Range    Hemoglobin 7.7 (L) 13.3 - 17.7 g/dL   Potassium   Result Value Ref Range    Potassium 3.9 3.4 - 5.3 mmol/L   Phosphorus   Result Value Ref Range    Phosphorus 1.3 (L) 2.5 - 4.5 mg/dL   Magnesium   Result Value Ref Range    Magnesium 1.8 1.6 - 2.3 mg/dL   Glucose by meter   Result Value Ref Range    Glucose 88 70 - 99 mg/dL   Phosphorus   Result Value Ref Range    Phosphorus 2.7 2.5 - 4.5 mg/dL   Hemoglobin   Result Value Ref Range    Hemoglobin 8.5 (L) 13.3 - 17.7 g/dL   Potassium   Result Value Ref Range    Potassium 3.9 3.4 - 5.3 mmol/L   CK total   Result Value Ref Range    CK Total 353 (H) 30 - 300 U/L   CBC with platelets   Result Value  Ref Range    WBC 5.8 4.0 - 11.0 10e9/L    RBC Count 3.52 (L) 4.4 - 5.9 10e12/L    Hemoglobin 8.6 (L) 13.3 - 17.7 g/dL    Hematocrit 25.6 (L) 40.0 - 53.0 %    MCV 73 (L) 78 - 100 fl    MCH 24.4 (L) 26.5 - 33.0 pg    MCHC 33.6 31.5 - 36.5 g/dL    RDW 20.8 (H) 10.0 - 15.0 %    Platelet Count 159 150 - 450 10e9/L   Comprehensive metabolic panel   Result Value Ref Range    Sodium 150 (H) 133 - 144 mmol/L    Potassium 4.1 3.4 - 5.3 mmol/L    Chloride 122 (H) 94 - 109 mmol/L    Carbon Dioxide 16 (L) 20 - 32 mmol/L    Anion Gap 12 3 - 14 mmol/L    Glucose 85 70 - 99 mg/dL    Urea Nitrogen 11 7 - 30 mg/dL    Creatinine 1.32 (H) 0.66 - 1.25 mg/dL    GFR Estimate 56 (L) >60 mL/min/1.7m2    GFR Estimate If Black 67 >60 mL/min/1.7m2    Calcium 7.5 (L) 8.5 - 10.1 mg/dL    Bilirubin Total 0.5 0.2 - 1.3 mg/dL    Albumin 2.0 (L) 3.4 - 5.0 g/dL    Protein Total 5.0 (L) 6.8 - 8.8 g/dL    Alkaline Phosphatase 171 (H) 40 - 150 U/L    ALT 17 0 - 70 U/L    AST 74 (H) 0 - 45 U/L   Blood gas venous   Result Value Ref Range    Ph Venous 7.33 7.32 - 7.43 pH    PCO2 Venous 31 (L) 40 - 50 mm Hg    PO2 Venous 40 25 - 47 mm Hg    Bicarbonate Venous 16 (L) 21 - 28 mmol/L    Base Deficit Venous 8.9 mmol/L   Procalcitonin   Result Value Ref Range    Procalcitonin 0.26 ng/ml   CBC with platelets   Result Value Ref Range    WBC 5.9 4.0 - 11.0 10e9/L    RBC Count 3.39 (L) 4.4 - 5.9 10e12/L    Hemoglobin 8.4 (L) 13.3 - 17.7 g/dL    Hematocrit 24.6 (L) 40.0 - 53.0 %    MCV 73 (L) 78 - 100 fl    MCH 24.8 (L) 26.5 - 33.0 pg    MCHC 34.1 31.5 - 36.5 g/dL    RDW 21.4 (H) 10.0 - 15.0 %    Platelet Count 159 150 - 450 10e9/L     *Note: Due to a large number of results and/or encounters for the requested time period, some results have not been displayed. A complete set of results can be found in Results Review.         ASSESSMENT/PLAN:   Encounter Diagnoses   Name Primary?     Malignant neoplasm metastatic to lung, unspecified laterality (H) Yes     Malignant  neoplasm metastatic to brain (H)      Generalized muscle weakness      ETOH abuse      Tobacco abuse      Pain      Anemia, chronic disease      Weight loss        ORDERS  The current medical regimen is effective;  continue present plan and medications.    Total time spent with patient visit was 35 min including patient visit and review of past records  Greater than 50% of total time spent with counseling and coordinating care.    SALINAS Velez CNP

## 2017-05-10 PROBLEM — D35.2 PITUITARY MACROADENOMA (H): Status: ACTIVE | Noted: 2017-01-01

## 2017-05-10 NOTE — PROGRESS NOTES
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   He initially presented with 2-3 month history of weakness and weight loss. He was seen in the ER and was found to have a 3 cm mass in NAZ of the lung. There was adjacent atelectasis at the prominent mediastinal lymph nodes. There is a destructive bone lesion involving left lateral fifth rib. Patient underwent transthoracic CT-guided biopsy in the hospital. The pathology report came back consistent with mixed tumor including adenocarcinoma and squamous histology. MRI of the brain was done at that time showing a mass in the sella consistent with pituitary macroadenoma.He had a normal TSH, but he had low T3 and T4. He had a serum cortisol morning level 4.4. He underwent a cosyntropin stim test which was mildly abnormal. He had a cortisol level 14.8 at 30 minutes and 19.6 at 60 minutes. His 24-hour cortisol in his urine was normal.  He did have a PET scan done on 5/9/17 and showed Hypermetabolic focus in the sella consistent with pituitary macroadenoma demonstrated on brain MRI 4/21/2017.  The patient denies any visual disturbance, or double vision. He reports chronic headache behind his eyes, but he associated his pain with sinus infection.   The patient reports thirst and polyuria. He admits to drink more than a liter of water daily. He also reports going to the bathroom and waking up in the middle of the night to urinate.    Past Medical/Surgical History:  Past Medical History:   Diagnosis Date     Brain cancer (H)      Constipation      H/O ETOH abuse      Hx of tobacco use, presenting hazards to health      Lung cancer (H)      Pain 5/6/2017     Recurrent falls      Unsteady gait      Weight loss      No past surgical history on  "file.    Allergies:  Allergies   Allergen Reactions     Lubriderm Rash       Current Medications   Current Outpatient Prescriptions   Medication     OXYCODONE HCL PO     OXYCODONE HCL PO     Acetaminophen (TYLENOL PO)     amoxicillin-clavulanate (AUGMENTIN) 875-125 MG per tablet     ibuprofen (ADVIL/MOTRIN) 600 MG tablet     folic acid (FOLVITE) 1 MG tablet     senna-docusate (SENOKOT-S;PERICOLACE) 8.6-50 MG per tablet     thiamine 100 MG tablet     pantoprazole (PROTONIX) 40 MG EC tablet     No current facility-administered medications for this visit.        Family History:  No family history on file.    Social History:  Social History   Substance Use Topics     Smoking status: Former Smoker     Packs/day: 1.00     Years: 45.00     Types: Cigarettes     Quit date: 4/20/2017     Smokeless tobacco: Never Used     Alcohol use No      Comment: hx ETOH  quit 4/2017       ROS:  10 point negative except as mentioned in HPI    Exam  Blood pressure 108/71, pulse 85, height 1.638 m (5' 4.5\"), weight 77.6 kg (171 lb).  Gen: well appearing, nad, pleasant and conversant  HEENT: anicteric, EOMI, no proptosis or lid lag  Thyroid: no thyroid goiter or cervical LAD  Cardio: RRR, no m/r/g  Resp: CTAB. No wheezing or crackles  Abd: soft, NT/ND. Normal BS  Skin: Warm and dry. No rash or lesions.   Ext: no swelling or edema  Feet: no deformities or ulcers, 2+ DP pulses  Neuro: A&Ox3, relaxation phase of reflexes normal, no tremor on outstretched arms  Psych: Normal affect and mood.    Labs/Imaging    We also reviewed original images of pituitary MRI and noted lesion mentioned below, agree below report.   PET scan 5/9/17  IMPRESSION: In this patient with newly diagnosed non-small cell  carcinoma of the left upper lobe:  1. Hypermetabolic bilateral level 1B and right level 3 lymph nodes in  the neck highly suspicious for metastatic disease.  2. Hypermetabolic focus in the sella consistent with pituitary  macroadenoma demonstrated on " brain MRI 4/21/2017.  3. 2.8 x 3.4 cm hypermetabolic left upper lobe mass consistent with  patient's known non-small cell lung carcinoma.   4. Hypermetabolic mediastinal and left hilar lymphadenopathy.   5. Metastatic disease including the left scapula and left fifth rib.  6. Increased moderate left pleural effusion and small right pleural  effusion. Left pneumothorax has almost totally resolved. No focal FDG  uptake to suggest that these are malignant, however thoracentesis  could be considered for further evaluation if clinically indicated.  7. Small amount of new nonspecific pelvic free fluid.     Brain MRI 4/21/17  IMPRESSION:  1. 1.1 x 1.2 x 1.5 cm sellar and suprasellar mass consistent with a  pituitary macroadenoma. This is in continuity with the undersurface of  the optic chiasm.  2. Right ostiomeatal unit disease with secondary right maxillary sinus  disease. On the CT performed on the prior day, there appears be some  chronic osteitis. This is presumably related to chronic sinus disease  due to possible polyp, although it is difficult to exclude malignancy.  If clinically indicated, ENT consultation might helpful in further  evaluation.     TSH   Date Value Ref Range Status   04/24/2017 1.30 0.40 - 4.00 mU/L Final   04/20/2017 0.66 0.40 - 4.00 mU/L Final     T4 Free   Date Value Ref Range Status   04/24/2017 0.38 (L) 0.76 - 1.46 ng/dL Final     No results found for: A1C      Assessment and Plan  57 year old male with recent diagnosis of non small cell lung cancer presenting to the endocrine clinic for evaluation of incidental finding of pituitary macroadenoma. Non functioning pituitary macroadenoma is the highest possibility, there is signs of some of anterior pituitary axis supression (gonado, possible TSH?),  normal stim test and only mild elevation in prolactin, possibly due to stalk effect. Noted recent hypernatremia, and polydipsia/polyuria, however, he was heavy drinker and polydipsia/polyuria seems  chronic issue.    Less possibility of metastasis from his lung cancer. However, we can not rule it out completely.     -We will repeat pituitary panel today and consider replacement of levothyroxine if FT4 is still low  -We will check NA and urine specific gravity to evaluate for potential DI  -Repeat brain MRI in 3 months for evaluation of any potential growth in the pituitary mass size (if metastasis, it will grow quickly)  - Pituitary image was reviewed with neurosurgery, appreciated Dr. Richmond's curbside input today.  - We also talked with his daugheter in law, together with patient today over the phone, requested and initiated by patient during this encounter.  -RTC in 6 months    Patient seen and examined with staff endocrinologist Dr Jasmyne Guaman MD  Diabetes, Metabolism and Endocrinology Fellow  Pager: [phone]    --- Addendum----  I saw the patient with endocrine fellow Dr. Guaman and directly examined patient and discussed. Agree above note and plan.     We spent 60 minutes with this patient face to face and explained the conditions and plans (more than 50% of time was counseling/coordination of care, discussed follow plan of his pituitary legion) . The patient understood and is satisfied with today's visit. Return to clinic with me in 6 months.     Padma Medley MD  Staff Physician  Endocrinology and Metabolism  [facility]  License: [id number]  Pager: [phone]

## 2017-05-10 NOTE — NURSING NOTE
Chief Complaint   Patient presents with     Consult     NEW PATIENT- PITUITARY MASS     Anahi Pantoja, SHANIA  Endocrinology & Diabetes 3G

## 2017-05-10 NOTE — PROGRESS NOTES
"[facility]    Chief Complaint   Patient presents with     Nursing Home Acute       HPI:    Wade Acevedo is a 57 year old  (1960), who is being seen today for an episodic care visit at [facility] . Today's concern is:  Malignant neoplasm metastatic to lung, unspecified laterality (H)  Pituitary macroadenoma (H)  New dx for patient and he is finding it difficult to comply with the clinic visits and multiple testing required.  Patient states \"I'm annoyed about all of this\"   No c/o pain today    Generalized muscle weakness   Requiring extensive assistance from nursing  Up for meals only o/w spends the day resting in bed  Participates with therapy  Weight loss  R/t cancer and poor po intake and appetite  Followed by dietician and po intake supervised by nursing  Wt Readings from Last 10 Encounters:   05/10/17 171 lb (77.6 kg)   05/10/17 182 lb (82.6 kg)   05/04/17 178 lb 6.4 oz (80.9 kg)   04/26/17 181 lb 7 oz (82.3 kg)   04/27/17 182 lb (82.6 kg)     ALLERGIES: Lubriderm  Past Medical, Surgical, Family and Social History reviewed and updated in [employer].    Current Outpatient Prescriptions   Medication Sig Dispense Refill     OXYCODONE HCL PO Take 5 mg by mouth every 4 hours as needed        OXYCODONE HCL PO Take 10 mg by mouth every 4 hours as needed       Acetaminophen (TYLENOL PO) Take 1,000 mg by mouth every 6 hours as needed for mild pain or fever       amoxicillin-clavulanate (AUGMENTIN) 875-125 MG per tablet Take 1 tablet by mouth 2 times daily 20 tablet 0     ibuprofen (ADVIL/MOTRIN) 600 MG tablet Take 1 tablet (600 mg) by mouth every 8 hours as needed for moderate pain 30 tablet 1     folic acid (FOLVITE) 1 MG tablet Take 1 tablet (1 mg) by mouth daily 30 tablet      senna-docusate (SENOKOT-S;PERICOLACE) 8.6-50 MG per tablet Take 1 tablet by mouth 2 times daily Reported on 5/4/2017 100 tablet      thiamine 100 MG tablet Take 1 tablet (100 mg) by mouth daily       " pantoprazole (PROTONIX) 40 MG EC tablet Take 1 tablet (40 mg) by mouth 2 times daily (before meals) 30 tablet      Medications reviewed:  Medications reconciled to facility chart and changes were made to reflect current medications as identified as above med list. Below are the changes that were made:   Medications stopped since last EPIC medication reconciliation:   There are no discontinued medications.    Medications started since last [facility] medication reconciliation:  No orders of the defined types were placed in this encounter.        REVIEW OF SYSTEMS:  4 point ROS including Respiratory, CV, GI and , other than that noted in the HPI,  is negative    Physical Exam:  /76  Pulse 77  Temp 99.2  F (37.3  C)  Resp 16  Wt 182 lb (82.6 kg)  SpO2 91%  BMI 30.76 kg/m2  GENERAL APPEARANCE:  Alert, in no distress, pleasant  ENT:   moist mucous membranes, hearing acuity normal  EYES:  EOM, conjunctivae, lids normal  RESP:  respiratory effort of chest normal, no respiratory distress, Lung sounds clear  CV:    auscultation of heart done , rate and rhythm reg, no murmur, no rub or gallop, Edema no  ABDOMEN:  normal bowel sounds, soft, nontender,   M/S:   Gait and station  In bed at present  OMALLEY    SKIN:  Inspection and  Palpation of skin and subcutaneous tissue Pale w/d   PSYCH:  insight and judgement, memory intact , affect and mood normal      Recent Labs:  All labs reviewed, none recent    Assessment/Plan:     Malignant neoplasm metastatic to lung, unspecified laterality (H)  Pituitary macroadenoma (H)  Generalized muscle weakness  Weight loss      Orders:  The current medical regimen is effective;  continue present plan and medications.      Time  Total time spent with patient visit was 25 min including patient visit and review of past records. Greater than 50% of total time spent with counseling and coordinating care.       Electronically signed by  SALINAS Velez CNP

## 2017-05-10 NOTE — LETTER
5/10/2017       RE: Wade Acevedo  [address] [facility] [address]     Dear Colleague,    Thank you for referring your patient, Wade Acevedo, to the [facility] ENDOCRINOLOGY at [facility]. Please see a copy of my visit note below.    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   He initially presented with 2-3 month history of weakness and weight loss. He was seen in the ER and was found to have a 3 cm mass in NAZ of the lung. There was adjacent atelectasis at the prominent mediastinal lymph nodes. There is a destructive bone lesion involving left lateral fifth rib. Patient underwent transthoracic CT-guided biopsy in the hospital. The pathology report came back consistent with mixed tumor including adenocarcinoma and squamous histology. MRI of the brain was done at that time showing a mass in the sella consistent with pituitary macroadenoma.He had a normal TSH, but he had low T3 and T4. He had a serum cortisol morning level 4.4. He underwent a cosyntropin stim test which was mildly abnormal. He had a cortisol level 14.8 at 30 minutes and 19.6 at 60 minutes. His 24-hour cortisol in his urine was normal.  He did have a PET scan done on 5/9/17 and showed Hypermetabolic focus in the sella consistent with pituitary macroadenoma demonstrated on brain MRI 4/21/2017.  The patient denies any visual disturbance, or double vision. He reports chronic headache behind his eyes, but he associated his pain with sinus infection.   The patient reports thirst and polyuria. He admits to drink more than a liter of water daily. He also reports going to the bathroom and waking up in the middle of the night to urinate.    Past Medical/Surgical History:  Past  "Medical History:   Diagnosis Date     Brain cancer (H)      Constipation      H/O ETOH abuse      Hx of tobacco use, presenting hazards to health      Lung cancer (H)      Pain 5/6/2017     Recurrent falls      Unsteady gait      Weight loss      No past surgical history on file.    Allergies:  Allergies   Allergen Reactions     Lubriderm Rash       Current Medications   Current Outpatient Prescriptions   Medication     OXYCODONE HCL PO     OXYCODONE HCL PO     Acetaminophen (TYLENOL PO)     amoxicillin-clavulanate (AUGMENTIN) 875-125 MG per tablet     ibuprofen (ADVIL/MOTRIN) 600 MG tablet     folic acid (FOLVITE) 1 MG tablet     senna-docusate (SENOKOT-S;PERICOLACE) 8.6-50 MG per tablet     thiamine 100 MG tablet     pantoprazole (PROTONIX) 40 MG EC tablet     No current facility-administered medications for this visit.        Family History:  No family history on file.    Social History:  Social History   Substance Use Topics     Smoking status: Former Smoker     Packs/day: 1.00     Years: 45.00     Types: Cigarettes     Quit date: 4/20/2017     Smokeless tobacco: Never Used     Alcohol use No      Comment: hx ETOH  quit 4/2017       ROS:  10 point negative except as mentioned in HPI    Exam  Blood pressure 108/71, pulse 85, height 1.638 m (5' 4.5\"), weight 77.6 kg (171 lb).  Gen: well appearing, nad, pleasant and conversant  HEENT: anicteric, EOMI, no proptosis or lid lag  Thyroid: no thyroid goiter or cervical LAD  Cardio: RRR, no m/r/g  Resp: CTAB. No wheezing or crackles  Abd: soft, NT/ND. Normal BS  Skin: Warm and dry. No rash or lesions.   Ext: no swelling or edema  Feet: no deformities or ulcers, 2+ DP pulses  Neuro: A&Ox3, relaxation phase of reflexes normal, no tremor on outstretched arms  Psych: Normal affect and mood.    Labs/Imaging    We also reviewed original images of pituitary MRI and noted lesion mentioned below, agree below report.   PET scan 5/9/17  IMPRESSION: In this patient with newly " diagnosed non-small cell  carcinoma of the left upper lobe:  1. Hypermetabolic bilateral level 1B and right level 3 lymph nodes in  the neck highly suspicious for metastatic disease.  2. 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  3. 2.8 x 3.4 cm hypermetabolic left upper lobe mass consistent with  patient's known non-small cell lung carcinoma.   4. Hypermetabolic mediastinal and left hilar lymphadenopathy.   5. Metastatic disease including the left scapula and left fifth rib.  6. Increased moderate left pleural effusion and small right pleural  effusion. Left pneumothorax has almost totally resolved. No focal FDG  uptake to suggest that these are malignant, however thoracentesis  could be considered for further evaluation if clinically indicated.  7. Small amount of new nonspecific pelvic free fluid.     Brain MRI 4/21/17  IMPRESSION:  1. 1.1 x 1.2 x 1.5 cm sellar and suprasellar mass consistent with a  pituitary macroadenoma. This is in continuity with the undersurface of  the optic chiasm.  2. Right ostiomeatal unit disease with secondary right maxillary sinus  disease. On the CT performed on the prior day, there appears be some  chronic osteitis. This is presumably related to chronic sinus disease  due to possible polyp, although it is difficult to exclude malignancy.  If clinically indicated, ENT consultation might helpful in further  evaluation.     TSH   Date Value Ref Range Status   04/24/2017 1.30 0.40 - 4.00 mU/L Final   04/20/2017 0.66 0.40 - 4.00 mU/L Final     T4 Free   Date Value Ref Range Status   04/24/2017 0.38 (L) 0.76 - 1.46 ng/dL Final     No results found for: A1C      Assessment and Plan  57 year old male with recent diagnosis of non small cell lung cancer presenting to the endocrine clinic for evaluation of incidental finding of pituitary macroadenoma. Non functioning pituitary macroadenoma is the highest possibility, there is  signs of some of anterior pituitary axis supression (gonado, possible TSH?),  normal stim test and only mild elevation in prolactin, possibly due to stalk effect. Noted recent hypernatremia, and polydipsia/polyuria, however, he was heavy drinker and polydipsia/polyuria seems chronic issue.    Less possibility of metastasis from his lung cancer. However, we can not rule it out completely.     -We will repeat pituitary panel today and consider replacement of levothyroxine if FT4 is still low  -We will check NA and urine specific gravity to evaluate for potential DI  -Repeat brain MRI in 3 months for evaluation of any potential growth in the pituitary mass size (if metastasis, it will grow quickly)  - Pituitary image was reviewed with neurosurgery, appreciated Dr. Richmond's curbside input today.  - We also talked with his daugheter in law, together with patient today over the phone, requested and initiated by patient during this encounter.  -RTC in 6 months    Patient seen and examined with staff endocrinologist Dr Jasmyne Guaman MD  Diabetes, Metabolism and Endocrinology Fellow  Pager: [phone]    --- Addendum----  I saw the patient with endocrine fellow Dr. Guaman and directly examined patient and discussed. Agree above note and plan.     We spent 60 minutes with this patient face to face and explained the conditions and plans (more than 50% of time was counseling/coordination of care, discussed follow plan of his pituitary legion) . The patient understood and is satisfied with today's visit. Return to clinic with me in 6 months.     Padma Medley MD  Staff Physician  Endocrinology and Metabolism  [facility]  License: [id number]  Pager: [phone]

## 2017-05-10 NOTE — MR AVS SNAPSHOT
After Visit Summary   5/10/2017    Wade Acevedo    MRN: [id number]           Patient Information     Date Of Birth          1960        Visit Information        Provider Department      5/10/2017 11:00 AM Padma Medley MD [facility]        Today's Diagnoses     Pituitary macroadenoma (H)    -  1      Care Instructions    Check your blood work when you come back for your oncology appointment   Follow up in 6 months with Dr Medley      To expedite your medication refill(s), please contact your pharmacy and have them fax a refill request to: [phone].  *Please allow 3 business days for routine medication refills.  *Please allow 5 business days for controlled substance medication refills.  --------------------  For scheduling appointments (including lab work), please request an appointment through [employer], or call: [phone].    For questions for your provider or the endocrine nurse, please send a [employer] message.  For after-hours urgent issues, please dial [phone], and ask to speak with the Endocrinologist On-Call.  --------------------  Please Note: If you are active on [employer], all future test results will be sent by [employer] message only and will no longer be sent by mail. You may also receive communication directly from your physician.          Follow-ups after your visit        Follow-up notes from your care team     Return in about 6 months (around 11/10/2017).      Your next 10 appointments already scheduled     May 11, 2017  3:30 PM CDT   Return Visit with Pasuqale Naylor MD   [facility] ([facility])    [facility]  [address]  [facility] [address]   [phone]            Nov 14, 2017 11:30 AM CST   (Arrive by 11:15 AM)   RETURN ENDOCRINE with Padma Medley MD   [facility] ([facility])    [address] [address] [phone]               Future tests that were ordered for you today     Open Standing Orders        Priority Remaining Interval Expires Ordered    TSH Routine 2/2  2017 5/10/2017          Open Future Orders        Priority Expected Expires Ordered    Follicle stimulating hormone Routine  5/10/2018 5/10/2017    Lutropin Routine  5/10/2018 5/10/2017    Prolactin Routine  5/10/2018 5/10/2017    Insulin growth factor 1 Routine  5/10/2018 5/10/2017    T4 free Routine  5/10/2018 5/10/2017    T3 total Routine  5/10/2018 5/10/2017    Sodium Routine  5/10/2018 5/10/2017    Specific gravity urine Routine  2018 5/10/2017            Who to contact     Please call your clinic at [phone] to:    Ask questions about your health    Make or cancel appointments    Discuss your medicines    Learn about your test results    Speak to your doctor   If you have compliments or concerns about an experience at your clinic, or if you wish to file a complaint, please contact [facility] Patient Relations at [phone] or email us at [email or web address]         Additional Information About Your Visit        [employer] Information     [employer] is an electronic gateway that provides easy, online access to your medical records. With [employer], you can request a clinic appointment, read your test results, renew a prescription or communicate with your care team.     To sign up for [employer] visit the website at [email or web address]   You will be asked to enter the access code listed below, as well as some personal information. Please follow the directions to create your username and password.     Your access code is: [email or web address]  Expires: 2017  9:09 AM     Your access code will  in 90 days. If you need help or a new code, please contact your [facility] or call [phone] for assistance.        Care EveryWhere ID     This is your Care EveryWhere ID. This could be  "used by other organizations to access your [facility] medical records  [id number]        Your Vitals Were     Pulse Height BMI (Body Mass Index)             85 1.638 m (5' 4.5\") 28.9 kg/m2          Blood Pressure from Last 3 Encounters:   05/10/17 108/71   05/10/17 121/76   05/04/17 116/81    Weight from Last 3 Encounters:   05/10/17 77.6 kg (171 lb)   05/10/17 82.6 kg (182 lb)   05/04/17 80.9 kg (178 lb 6.4 oz)               Primary Care Provider    Doctor Unknown, MD       No address on file        Thank you!     Thank you for choosing [facility]  for your care. Our goal is always to provide you with excellent care. Hearing back from our patients is one way we can continue to improve our services. Please take a few minutes to complete the written survey that you may receive in the mail after your visit with us. Thank you!             Your Updated Medication List - Protect others around you: Learn how to safely use, store and throw away your medicines at www.disposemymeds.org.          This list is accurate as of: 5/10/17 11:56 AM.  Always use your most recent med list.                   Brand Name Dispense Instructions for use    amoxicillin-clavulanate 875-125 MG per tablet    AUGMENTIN    20 tablet    Take 1 tablet by mouth 2 times daily       folic acid 1 MG tablet    FOLVITE    30 tablet    Take 1 tablet (1 mg) by mouth daily       ibuprofen 600 MG tablet    ADVIL/MOTRIN    30 tablet    Take 1 tablet (600 mg) by mouth every 8 hours as needed for moderate pain       * OXYCODONE HCL PO      Take 5 mg by mouth every 4 hours as needed       * OXYCODONE HCL PO      Take 10 mg by mouth every 4 hours as needed       pantoprazole 40 MG EC tablet    PROTONIX    30 tablet    Take 1 tablet (40 mg) by mouth 2 times daily (before meals)       senna-docusate 8.6-50 MG per tablet    SENOKOT-S;PERICOLACE    100 tablet    Take 1-2 tablets by mouth 2 times daily Reported on 5/4/2017       thiamine 100 MG tablet      " Take 1 tablet (100 mg) by mouth daily       TYLENOL PO      Take 1,000 mg by mouth every 6 hours as needed for mild pain or fever       * Notice:  This list has 2 medication(s) that are the same as other medications prescribed for you. Read the directions carefully, and ask your doctor or other care provider to review them with you.

## 2017-05-10 NOTE — PATIENT INSTRUCTIONS
Check your blood work when you come back for your oncology appointment   Follow up in 6 months with Dr Medley      To expedite your medication refill(s), please contact your pharmacy and have them fax a refill request to: [phone].  *Please allow 3 business days for routine medication refills.  *Please allow 5 business days for controlled substance medication refills.  --------------------  For scheduling appointments (including lab work), please request an appointment through [employer], or call: [phone].    For questions for your provider or the endocrine nurse, please send a [employer] message.  For after-hours urgent issues, please dial [phone], and ask to speak with the Endocrinologist On-Call.  --------------------  Please Note: If you are active on [employer], all future test results will be sent by [employer] message only and will no longer be sent by mail. You may also receive communication directly from your physician.

## 2017-05-11 NOTE — PATIENT INSTRUCTIONS
You will receive education today on Paclitaxel, Carboplatin today.  We are referring you to ENT for sinusitis and to general surgery for port a cath.  We would like to see you back in clinic with Dr. Naylor on cycle 1 day 1 of your chemotherapy.  Your prescriptions have been sent to:   [id number] - [facility] [address] AT [facility] OF [address]  [address] [address]  Phone: [phone] Fax: [phone]  When you are in need of a refill, please call your pharmacy and they will send us a request.  Copy of appointments, and after visit summary (AVS) given to patient.  If you have any questions during business hours (M-F 8 AM- 4PM), please call Jillian Renee RN, BSN, OCN Oncology Hematology /Breast Cancer Navigator at [facility] [phone].   For questions after business hours, or on holidays/weekends, please call our after hours Nurse Triage line [phone]. Thank you.

## 2017-05-11 NOTE — NURSING NOTE
"Oncology Rooming Note    May 11, 2017 3:22 PM   Wade Acevedo is a 57 year old male who presents for:    Chief Complaint   Patient presents with     Oncology Clinic Visit     1 week recheck Non-small cell lung cancer, review Labs & PET      Initial Vitals: /81 (BP Location: Right arm, Patient Position: Chair, Cuff Size: Adult Regular)  Pulse 85  Temp 97.5  F (36.4  C) (Tympanic)  Resp 18  Ht 1.638 m (5' 4.49\")  Wt 77.8 kg (171 lb 8 oz)  SpO2 97%  BMI 28.99 kg/m2 Estimated body mass index is 28.99 kg/(m^2) as calculated from the following:    Height as of this encounter: 1.638 m (5' 4.49\").    Weight as of this encounter: 77.8 kg (171 lb 8 oz). Body surface area is 1.88 meters squared.  Mild Pain (2) Comment: right   No LMP for male patient.  Allergies reviewed: Yes  Medications reviewed: Yes    Medications: Medication refills not needed today.  Pharmacy name entered into [employer]: [facility] - [address] AT [address]    Clinical concerns: 1 week recheck Non-small cell lung cancer, review Labs & PET.     7  minutes for nursing intake (face to face time)     Reena Leon [facility]              "

## 2017-05-11 NOTE — MR AVS SNAPSHOT
After Visit Summary   5/11/2017    Wade Acevedo    MRN: [id number]           Patient Information     Date Of Birth          1960        Visit Information        Provider Department      5/11/2017 3:30 PM Pasquale Naylor MD [facility]        Today's Diagnoses     Sinusitis    -  1    Anemia, unspecified type        Non-small cell lung cancer (NSCLC) (H)          Care Instructions    You will receive education today on Paclitaxel, Carboplatin today.  We are referring you to ENT for sinusitis and to general surgery for port a cath.  We would like to see you back in clinic with Dr. Naylor on cycle 1 day 1 of your chemotherapy.  Your prescriptions have been sent to:   [id number] - [facility] [address] AT [facility] OF [facility] & [place]  [address] [facility] [address]  Phone: [phone] Fax: [phone]  When you are in need of a refill, please call your pharmacy and they will send us a request.  Copy of appointments, and after visit summary (AVS) given to patient.  If you have any questions during business hours (M-F 8 AM- 4PM), please call Jillian Renee RN, BSN, OCN Oncology Hematology /Breast Cancer Navigator at [facility] [phone].   For questions after business hours, or on holidays/weekends, please call our after hours Nurse Triage line [phone]. Thank you.          Follow-ups after your visit        Additional Services     GENERAL SURG ADULT REFERRAL       Your provider has referred you to: [facility]: [facility] [phone]   [email or web address] [email or web address]/[email or web address]    For port consult and placement    Please be aware that coverage of these services is subject to the terms and limitations of your health insurance plan.  Call member services at your health plan with any benefit or coverage questions.      Please bring the following to your appointment:  >>   Any  x-rays, CTs or MRIs which have been performed.  Contact the facility where they were done to arrange for  prior to your scheduled appointment.  Any new CT, MRI or other procedures ordered by your specialist must be performed at a [place] facility or coordinated by your clinic's referral office.    >>   List of current medications   >>   This referral request   >>   Any documents/labs given to you for this referral            OTOLARYNGOLOGY REFERRAL       Your provider has referred you to: [facility]: [facility] [phone]   [email or web address]    For Sinusitis    Please be aware that coverage of these services is subject to the terms and limitations of your health insurance plan.  Call member services at your health plan with any benefit or coverage questions.      Please bring the following with you to your appointment:    (1) Any X-Rays, CTs or MRIs which have been performed.  Contact the facility where they were done to arrange for  prior to your scheduled appointment.   (2) List of current medications  (3) This referral request   (4) Any documents/labs given to you for this referral                  Your next 10 appointments already scheduled     May 15, 2017  2:00 PM CDT   [facility]    [address] [address]   [phone]            May 24, 2017  8:00 AM CDT   Level 6 with ROOM 1 [facility] ([facility])    [facility]  [address] [place] [address]  [facility] [address]   [phone]            May 24, 2017  8:30 AM CDT   Return Visit with Pasquale Naylor MD   [facility] ([facility])    [facility]  [address] [place] [address]  [facility] [address]   [phone]            May 25, 2017  2:15 PM CDT   New Visit with José Miguel Amezcua MD   [facility]  ([facility])    [address] [place]  [facility] [address]   [phone]            May 25, 2017  3:00 PM CDT   Level O with ROOM 4 [facility] ([facility])    n Medical Ctr [facility]  [address]  [facility] [address]   [phone]            Nov 14, 2017 11:30 AM CST   (Arrive by 11:15 AM)   RETURN ENDOCRINE with Padma Medley MD   [facility] ([facility])    [address] [place] [address] [phone]              Future tests that were ordered for you today     Open Standing Orders        Priority Remaining Interval Expires Ordered    TSH Routine 2/2  12/6/2017 5/10/2017          Open Future Orders        Priority Expected Expires Ordered    MRI Brain-PITUITARY  w & w/o contrast Routine 8/10/2017 12/6/2017 5/10/2017    Follicle stimulating hormone Routine  5/10/2018 5/10/2017    Lutropin Routine  5/10/2018 5/10/2017    Prolactin Routine  5/10/2018 5/10/2017    Insulin growth factor 1 Routine  5/10/2018 5/10/2017    T4 free Routine  5/10/2018 5/10/2017    T3 total Routine  5/10/2018 5/10/2017    Sodium Routine  5/10/2018 5/10/2017    Specific gravity urine Routine  5/11/2018 5/10/2017            Who to contact     If you have questions or need follow up information about today's clinic visit or your schedule please contact [facility] directly at [phone].  Normal or non-critical lab and imaging results will be communicated to you by MyChart, letter or phone within 4 business days after the clinic has received the results. If you do not hear from us within 7 days, please contact the clinic through MyChart or phone. If you have a critical or abnormal lab result, we will notify you by phone as soon as possible.  Submit refill requests through [employer] or call your pharmacy and they will forward the refill request to us. Please allow 3 business days for your refill to  "be completed.          Additional Information About Your Visit        [email or web address] Information     [employer] lets you send messages to your doctor, view your test results, renew your prescriptions, schedule appointments and more. To sign up, go to [email or web address] . Click on \"Log in\" on the left side of the screen, which will take you to the Welcome page. Then click on \"Sign up Now\" on the right side of the page.     You will be asked to enter the access code listed below, as well as some personal information. Please follow the directions to create your username and password.     Your access code is: [email or web address]  Expires: 2017  9:09 AM     Your access code will  in 90 days. If you need help or a new code, please call your [facility] clinic or [phone].        Care EveryWhere ID     This is your [facility] EveryWhere ID. This could be used by other organizations to access your [facility] medical records  [id number]        Your Vitals Were     Pulse Temperature Respirations Height Pulse Oximetry BMI (Body Mass Index)    85 97.5  F (36.4  C) (Tympanic) 18 1.638 m (5' 4.49\") 97% 28.99 kg/m2       Blood Pressure from Last 3 Encounters:   17 129/81   05/10/17 108/71   05/10/17 121/76    Weight from Last 3 Encounters:   17 77.8 kg (171 lb 8 oz)   05/10/17 77.6 kg (171 lb)   05/10/17 82.6 kg (182 lb)              We Performed the Following     GENERAL SURG ADULT REFERRAL     OTOLARYNGOLOGY REFERRAL          Where to get your medicines      These medications were sent to [id number] - [facility] [address] AT [facility] OF [address] [place] [address]  [address] [facility] [address]     Phone:  [phone]     amoxicillin-clavulanate 875-125 MG per tablet          Primary Care Provider    Doctor Unknown, MD       No address on file        Thank you!     Thank you for choosing [facility]  for your care. Our goal is always to provide you with excellent care. Hearing back " from our patients is one way we can continue to improve our services. Please take a few minutes to complete the written survey that you may receive in the mail after your visit with us. Thank you!             Your Updated Medication List - Protect others around you: Learn how to safely use, store and throw away your medicines at www.disposemymeds.org.          This list is accurate as of: 5/11/17  4:21 PM.  Always use your most recent med list.                   Brand Name Dispense Instructions for use    amoxicillin-clavulanate 875-125 MG per tablet    AUGMENTIN    20 tablet    Take 1 tablet by mouth 2 times daily       folic acid 1 MG tablet    FOLVITE    30 tablet    Take 1 tablet (1 mg) by mouth daily       ibuprofen 600 MG tablet    ADVIL/MOTRIN    30 tablet    Take 1 tablet (600 mg) by mouth every 8 hours as needed for moderate pain       * OXYCODONE HCL PO      Take 5 mg by mouth every 4 hours as needed       * OXYCODONE HCL PO      Take 10 mg by mouth every 4 hours as needed       pantoprazole 40 MG EC tablet    PROTONIX    30 tablet    Take 1 tablet (40 mg) by mouth 2 times daily (before meals)       senna-docusate 8.6-50 MG per tablet    SENOKOT-S;PERICOLACE    100 tablet    Take 1 tablet by mouth 2 times daily Reported on 5/4/2017       thiamine 100 MG tablet      Take 1 tablet (100 mg) by mouth daily       TYLENOL PO      Take 1,000 mg by mouth every 6 hours as needed for mild pain or fever       * Notice:  This list has 2 medication(s) that are the same as other medications prescribed for you. Read the directions carefully, and ask your doctor or other care provider to review them with you.

## 2017-05-11 NOTE — PROGRESS NOTES
"Chemotherapy Education    Patient is a 57 year old male here today for chemotherapy education, accompanied via telephone by daughter in law aSrah.  Pt has a cancer diagnosis of Non Small Cell lung Cancer and their main concern is \"nothing I guess\".  Their Oncologist is Dr. SHAGGY Naylor, and PCP is Joe, Doctor.  Reviewed the following with the patient and their support person:  Treatment goal: Palliative  Treatment regimen & duration: Paclitaxel with Carboplatin with Neulasta therapy every 3 weeks; and rationale for strict adherence to this schedule, including specific medication names including pre-treatment medications and at home scheduled or as needed medications, delivery methods,.  Potential side effects: Side effects and management; including skin changes/hand-foot syndrome, anemia, neutropenia, thrombocytopenia, diarrhea/constipation, hair loss syndrome, memory changes/ \"chemobrain\", mouth sores, taste changes, neuropathy, fatigue, myelosuppression, sexuality/infertility, and risk of extravasation or infiltration.  Infection prevention, and monitoring of lab values, what lab tests and what changes of these values meant, along with the possibility of hydration or blood product transfusion, or the need to defer or hold treatment.    Chemotherapy information, including ways it is excreted from the body and cleaning and containment of vomitus or other bodily fluid, use of the bathroom, sexual health and intimacy, what to do if needing to miss a treatment, when to call a provider and the need for staff to wear protective equipment.  Importance of Central line care (port) or IV site care.  Written information: Written information including the \"Getting Ready for Chemotherapy: What to Expect, Before, During, and After your Treatment\" booklet, specific drug information guides printed from [employer], [employer] booklets \"Eating Hints: Before, During, and After Cancer Treatment\" and \"Chemotherapy and You\".  Also, a " folder with information on when to contact the provider, various programs offered at [facility], and our business card with contact information given; Oncology Clinic, RN Case Manager, and the after hours Nurse Advise Line.  General orientation to the Medical Oncology department, Infusion Services department, Huc/scheduling, bathrooms and usual flow of the treatment day provided as well as introduction to the Infusion nurses.  No barriers to learning identified. Patient and family verbalized understanding of all written and verbal information. All questions answered to patients satisfaction.   Other concerns: Reviewed port placement and use of EMLA cream to prevent pain/discomfort when accessing it.  Discussed take home medications for nausea prevention and treatment as well as use of dexamethasone for 3 days each cycle starting the day before. To be take the day before, the day of, and the day after.  Pt instructed to call with further questions or concerns.  Patient states understanding and is in agreement with this plan.  Copy of appointments, and after visit summary (AVS) given to patient. Patient discharged ambulatory.    Face to Face time with patient: 25 min    Jillian Renee RN, BSN, OCN  Oncology Hematology   Breast Cancer Navigator  [facility]  [email or web address]  Phone [phone]

## 2017-05-12 PROBLEM — G89.3 CANCER ASSOCIATED PAIN: Status: ACTIVE | Noted: 2017-01-01

## 2017-05-12 PROBLEM — J32.9 SINUSITIS: Status: ACTIVE | Noted: 2017-01-01

## 2017-05-12 NOTE — PROGRESS NOTES
Hematology/ Oncology Follow-up Visit:  May 11, 2017    Reason for Visit:   Chief Complaint   Patient presents with     Oncology Clinic Visit     1 week recheck Non-small cell lung cancer, review Labs & PET          Interval History:  I met with the patient today to discuss the results of PET scan. Since last week the patient has been gradually improving. His headache has been improving as well, since he started on antibiotics. He denies any fever or chills. He denies any cough or wheezing. He denies any chest pain. His appetite has improved. He was seen by endocrinology for management of pituitary macroadenoma. He is scheduled to see them back again in 6 months time.    Review Of Systems:  Constitutional: Negative for fever, chills, and night sweats.  Skin: negative.  Eyes: negative.  Ears/Nose/Throat: negative.  Respiratory: No shortness of breath, dyspnea on exertion, cough, or hemoptysis.  Cardiovascular: negative.  Gastrointestinal: negative.  Genitourinary: negative.  Musculoskeletal: negative.  Neurologic: negative.  Psychiatric: negative.  Hematologic/Lymphatic/Immunologic: negative.  Endocrine: negative.    All other ROS negative unless mentioned in interval history.    Past medical, social, surgical, and family histories reviewed.    Allergies:  Allergies as of 05/11/2017 - Kvng as Reviewed 05/11/2017   Allergen Reaction Noted     Lubriderm Rash 04/20/2017       Current Medications:  Current Outpatient Prescriptions   Medication Sig Dispense Refill     amoxicillin-clavulanate (AUGMENTIN) 875-125 MG per tablet Take 1 tablet by mouth 2 times daily 20 tablet 0     dexamethasone (DECADRON) 4 MG tablet Take 5 tablets (20 mg) by mouth daily for 3 days X 3 days each cycle. To be taken the day before, day of, and day after chemotherapy infusion. 15 tablet 3     OXYCODONE HCL PO Take 5 mg by mouth every 4 hours as needed        Acetaminophen (TYLENOL PO) Take 1,000 mg by mouth every 6 hours as needed for mild pain  "or fever       ibuprofen (ADVIL/MOTRIN) 600 MG tablet Take 1 tablet (600 mg) by mouth every 8 hours as needed for moderate pain 30 tablet 1     folic acid (FOLVITE) 1 MG tablet Take 1 tablet (1 mg) by mouth daily 30 tablet      senna-docusate (SENOKOT-S;PERICOLACE) 8.6-50 MG per tablet Take 1 tablet by mouth 2 times daily Reported on 5/4/2017 100 tablet      thiamine 100 MG tablet Take 1 tablet (100 mg) by mouth daily       pantoprazole (PROTONIX) 40 MG EC tablet Take 1 tablet (40 mg) by mouth 2 times daily (before meals) 30 tablet      LORazepam (ATIVAN) 0.5 MG tablet Take 1 tablet (0.5 mg) by mouth every 4 hours as needed (Anxiety, Nausea/Vomiting or Sleep) 30 tablet 2     prochlorperazine (COMPAZINE) 10 MG tablet Take 1 tablet (10 mg) by mouth every 6 hours as needed (Nausea/Vomiting) 30 tablet 2     ondansetron (ZOFRAN) 8 MG tablet Take 1 tablet (8 mg) by mouth every 8 hours as needed (Nausea/Vomiting) 10 tablet 2     OXYCODONE HCL PO Take 10 mg by mouth every 4 hours as needed          Physical Exam:  /81 (BP Location: Right arm, Patient Position: Chair, Cuff Size: Adult Regular)  Pulse 85  Temp 97.5  F (36.4  C) (Tympanic)  Resp 18  Ht 1.638 m (5' 4.49\")  Wt 77.8 kg (171 lb 8 oz)  SpO2 97%  BMI 28.99 kg/m2  Wt Readings from Last 12 Encounters:   05/11/17 77.8 kg (171 lb 8 oz)   05/10/17 77.6 kg (171 lb)   05/10/17 82.6 kg (182 lb)   05/04/17 80.9 kg (178 lb 6.4 oz)   04/26/17 82.3 kg (181 lb 7 oz)   04/27/17 82.6 kg (182 lb)     ECOG performance status: 1  GENERAL APPEARANCE: Healthy, alert and in no acute distress.  HEENT: Sclerae anicteric. PERRLA. Oropharynx without ulcers, lesions, or thrush.  NECK: Supple. No asymmetry or masses.  LYMPHATICS: No palpable cervical, supraclavicular, axillary, or inguinal lymphadenopathy.  RESP: Lungs clear to auscultation bilaterally without rales, rhonchi or wheezes.  CARDIOVASCULAR: Regular rate and rhythm. Normal S1, S2; no S3 or S4. No murmur, gallop, or " rub.  ABDOMEN: Soft, nontender. Bowel sounds normal. No palpable organomegaly or masses.  MUSCULOSKELETAL: Extremities without gross deformities noted. No edema of bilateral lower extremities.  SKIN: No suspicious lesions or rashes.  NEURO: Alert and oriented x 3. Cranial nerves II-XII grossly intact.  PSYCHIATRIC: Mentation and affect appear normal.    Laboratory/Imaging Studies:  Oncology Visit on 05/04/2017   Component Date Value Ref Range Status     WBC 05/04/2017 7.5  4.0 - 11.0 10e9/L Final     RBC Count 05/04/2017 3.61* 4.4 - 5.9 10e12/L Final     Hemoglobin 05/04/2017 8.7* 13.3 - 17.7 g/dL Final     Hematocrit 05/04/2017 26.9* 40.0 - 53.0 % Final     MCV 05/04/2017 75* 78 - 100 fl Final     MCH 05/04/2017 24.1* 26.5 - 33.0 pg Final     MCHC 05/04/2017 32.3  31.5 - 36.5 g/dL Final     RDW 05/04/2017 23.2* 10.0 - 15.0 % Final     Platelet Count 05/04/2017 314  150 - 450 10e9/L Final     Diff Method 05/04/2017 Automated Method   Final     % Neutrophils 05/04/2017 59.6  % Final     % Lymphocytes 05/04/2017 23.5  % Final     % Monocytes 05/04/2017 8.5  % Final     % Eosinophils 05/04/2017 7.3  % Final     % Basophils 05/04/2017 0.8  % Final     % Immature Granulocytes 05/04/2017 0.3  % Final     Absolute Neutrophil 05/04/2017 4.5  1.6 - 8.3 10e9/L Final     Absolute Lymphocytes 05/04/2017 1.8  0.8 - 5.3 10e9/L Final     Absolute Monocytes 05/04/2017 0.6  0.0 - 1.3 10e9/L Final     Absolute Eosinophils 05/04/2017 0.6  0.0 - 0.7 10e9/L Final     Absolute Basophils 05/04/2017 0.1  0.0 - 0.2 10e9/L Final     Abs Immature Granulocytes 05/04/2017 0.0  0 - 0.4 10e9/L Final     Lactate Dehydrogenase 05/04/2017 256* 85 - 227 U/L Final     Vitamin B12 05/04/2017 564  193 - 986 pg/mL Final     Copath Report 05/04/2017    Final                    Value:Patient Name: JOHN SANCHEZ  MR#: [id number]  Specimen #: [id number]  Collected: 5/4/2017  Received: 5/5/2017  Reported: 5/5/2017 12:51  Ordering Phy(s): TERESA CAMILO  BEATRIS    For improved result formatting, select 'View Enhanced Report Format'  under Linked Documents section.    TEST(S):  Peripheral Smear Morphology    FINAL DIAGNOSIS:  Peripheral blood morphology:  - Moderate microcytic, normochromic anemia with increased  anisopoikilocytosis (see comment).    COMMENT:  Considerations may include medications, anemia chronic disease,  nutritional deficiency, alcohol or other exposures, and other  conditions.  There is increased erythrocyte anisopoikilocytosis with  occasional dacryocytes, which may be observed in myelophthisic  processes.    Electronically signed out by:    Michaela Smith M.D.    CLINICAL HISTORY:  57-year-old male with anemia, non-small cell lung cancer.    PERIPHERAL BLOOD DATA:  PERIPHERAL BLOOD DATA (Date: 5/4/2017)  Patient Value (Reference Range >18 year old                           male)  7.49     WBC (4.0-11.0 x 10*9/L)  3.61     RBC (4.4-5.9 x 10*12/L)  8.7     HGB (13.3-17.7 g/dL)  26.9     HCT (40.0-53.0 %)  74.5     MCV (78-100fL)  24.1     MCH (26.5-33.0 pg)  32.3     MCHC (31.5-36.5 g/dL)  23.2     RDW (10.0-15.0 %)  314     PLT (150-450 x 10*9/L)    PERIPHERAL BLOOD DIFFERENTIAL  (Reference ranges >18 year old)    Percent  69.5  Neutrophils, segmented and bands  17.5  Lymphocytes  4.5  Monocytes  8.5  Eosinophils  0.0  Basophils    Absolute  5.2   Neutrophils, segmented and bands  (1.6 - 8.3 x 10*9/L)  1.3   Lymphocytes  (0.8 - 5.3 x 10*9/L)  0.3   Monocytes  (0 -1.3 x 10*9/L)  0.6   Eosinophils  (0 - 0.7 x 10*9/L)  0.0   Basophils  (0 - 0.2 x 10*9/L)    PERIPHERAL BLOOD MORPHOLOGY  Red Blood Cells:  The erythrocytes appear decreased in number and  morphologically appear hypochromic, though the MCHC is within normal  limits.  There is increased anisopoikilocytosis with target cells and  dacryocytes noted.  Polychromasia is not increased.  There is no  sig                          nificant population of microspherocytes or fragments.   No  intracellular organisms are seen.    White Blood Cells:  The leukocytes appear normal in number.  The  absolute counts of all leukocyte subpopulations are within normal  limits.  Granulocyte morphology is unremarkable.  No intracellular  organisms are seen.  The lymphocytes are mature.    Platelets:  The platelets appear normal in number and morphology.    CPT Codes:  A: 64742-IPVL    TESTING LAB LOCATION:  [facility], [address] [place] [id number] [phone]    COLLECTION SITE:  Client:  [facility]  Location:  [facility] ([facility])       Albumin Fraction 05/04/2017 3.2* 3.7 - 5.1 g/dL Final     Alpha 1 Fraction 05/04/2017 0.4  0.2 - 0.4 g/dL Final     Alpha 2 Fraction 05/04/2017 0.9  0.5 - 0.9 g/dL Final     Beta Fraction 05/04/2017 0.8  0.6 - 1.0 g/dL Final     Gamma Fraction 05/04/2017 1.5  0.7 - 1.6 g/dL Final     Monoclonal Peak 05/04/2017 0.1* 0.0 g/dL Final     ELP Interpretation: 05/04/2017    Final                    Value:Hypoalbuminemia.  Small monoclonal protein (about 0.1 g/dL) seen in the gamma   fraction.  See immunofixation report on same specimen. Pathologic significance   requires clinical correlation.  OFELIA Ewing M.D., Ph.D., Pathologist ([phone]).       Copath Report 04/26/2017    Final                    Value:Patient Name: JOHN SANCHEZ  MR#: [id number]  Specimen #: [id number]  Collected: 4/26/2017 09:45  Received: 5/8/2017 12:19  Reported: 5/11/2017 15:35  Ordering Phy(s): AMARIS JOHNSON  Additional Phy(s): JOSE SILVEIRA    For improved result formatting, select 'View Enhanced Report Format'  under Linked Documents section.  __________________________________________    TEST(S) REQUESTED:  A: FISH Interphase  B: FISH Interphase    SPECIMEN DESCRIPTION:  Lung Tissue, Paraffin Embedded    CLINICAL COMMENTS:  Squamous cell carcinoma [id number]    METHODS:  Fluorescence in-situ hybridization (FISH) was  performed on  formalin-fixed, paraffin-embedded tissue using [employer] SureFISH  breakapart probes to ALK (2p23) and to ROS1 (6q22). 100 tumor cells were  scored independently by two technologists and images were captured on an  [employer] image analysis system.    RESULTS:  No evidence of rearrangement of ALK or ROS1    INTERPRETATION:  No evidence of rearrangement of the ALK or ROS1 gen                          es was detected.  Gains of ALK and ROS1 are well-documented recurring abnormalities in  non-small cell lung carcinoma (Josefa M et al, 2011, J [facility]ac Oncol  6:21; Damien Y et al, 2015, [facility] 466:45).    ISCN:  nuc naty(ALKx3-4)[64/100],(WEG3z0-7)[63/100]    ADDITIONAL COMMENT:  Analyte Specific Reagents (ASRs) are used in many laboratory tests  necessary for standard medical care and generally do not require FDA  approval.  This test was developed and its performance characteristics  determined by the [facility] [place]  [facility].  It has not been cleared or approved by the [place]  Food and Drug Administration.    Electronically Signed Out By:  Christianne Maya M.D., [facility]    CPT Codes:   90486-GSHNYPWO, ALKINTERP, [id number], 40653-VXG3FIJDN    TESTING LAB LOCATION:  [facility]  [address], [address] [id number] [phone]    COLLECTION SITE:  Client:  [facility]  Location:  [facility] (K)       Immunofixation ELP 05/04/2017    Final                    Value:(Note)  Very samll monoclonal IgG immunoglobulin of lambda light chain type.  Pathological significance requires clinical correlation.  OFELIA Ewing M.D., Ph.D., Pathologist ([phone])         IGG 05/04/2017 1460  695 - 1620 mg/dL Final     IGA 05/04/2017 265  70 - 380 mg/dL Final     IGM 05/04/2017 184  60 - 265 mg/dL Final     Lab Scanned Result 05/05/2017 [id number]    Final-Edited        Recent Results (from the past 744 hour(s))   CT Head w/o Contrast    Narrative    CT HEAD W/O CONTRAST   4/20/2017 2:20 PM     HISTORY: fall    TECHNIQUE: Axial images of the head without IV contrast material.  Radiation dose for this scan was reduced using automated exposure  control, adjustment of the mA and/or kV according to patient size, or  iterative reconstruction technique.    COMPARISON: None.    FINDINGS:  There is generalized atrophy of the brain. . There is a  mass arising from the sella extending into the suprasellar cistern.  This measures approximately 1.2 cm in cephalocaudad dimension. Extends  up to the optic chiasm. This would be consistent with a pituitary  adenoma. The right maxillary sinus is completely opacified. The medial  wall of the sinuses displaced medially. There is sclerosis and  thickening of the wall of the sinus. The findings would be consistent  with a mucocele within the right maxillary sinus. Mucosal thickening  is seen in the right frontal sinus and several right anterior ethmoid  sinuses.. There is no evidence of trauma.      Impression    IMPRESSION:   1. No intracranial bleed or skull fractures.  2. Soft tissue mass arising from the sella extending into the  suprasellar region. This is probably due to a pituitary adenoma.  Meningioma is also in the differential diagnosis. MR scan of the sella  would be helpful for further characterization of this lesion. This  lesion could affect the optic chiasm.  3. Opacified right maxillary sinus with medial displacement of the  medial wall of the sinus. The appearance raises the possibility of a  mucocele within the right maxillary sinus.  4. Atrophy of the brain.    MICHAEL GARZON MD   CT Chest Abdomen Pelvis w/o Contrast    Narrative    CT CHEST/ABDOMEN/PELVIS WITHOUT CONTRAST April 20, 2017 2:22 PM    HISTORY: Fall, chest pain, falling hemoglobin. No IV contrast due to  poor renal function.    TECHNIQUE: CT scan obtained  of the chest, abdomen, and pelvis without  IV contrast. Radiation dose for this scan was reduced using automated  exposure control, adjustment of the mA and/or kV according to patient  size, or iterative reconstruction technique.    COMPARISON:  None.    FINDINGS:  Chest: There is no acute thoracic aortic abnormality identified within  the limits of unenhanced scanning. Mild coronary artery and thoracic  aortic calcifications are present. No pleural or pericardial  effusions. No pneumothorax identified. There is prominent adenopathy  in the mediastinum. An example at the anterior mediastinum measures  4.1 x 2.2 cm series 3 image 21. Enlarged subcarinal lymph node  measures 2.6 x 1.6 cm image 30. There are other examples. Cannot  exclude hilar enlarged lymph nodes at unenhanced scanning. Axillary  lymph nodes are small. There may be a tiny sebaceous cyst at the left  anterior axilla image 13. There is a focal ill-defined nodular lesion  measuring 3.0 x 2.1 cm medial anterior left upper lobe series 4 image  22 with some atelectasis leading towards the left hilar region. No  acute airspace disease otherwise seen. Some mild subpleural  emphysematous change suggested. There are no convincing acute  fractures identified. A few subacute fractures noted in the ribs.  There is a destructive bony lesion occupying the left lateral fifth  rib measuring approximately 4.4 x 1.5 cm series 3 image 23.    Abdomen/pelvis: No acute fractures visualized at the abdomen or pelvis  levels. No convincing destructive bony lesions identified at the  abdomen or pelvis. Contracted gallbladder. Unenhanced liver, adrenals,  spleen, pancreas, and kidneys do not show any acute abnormalities. No  hydronephrosis. Nonobstructing small stone lower left kidney is only  0.2 cm image 75. Diffuse vascular calcifications. No acute abdominal  aortic abnormality. A borderline prominent lymph node deep to the  right hemidiaphragm reji 0.8 cm series 3 image  53. Portocaval region  lymph node is 1.6 x 1.1 cm series 3 image 63. No acute bowel  abnormality. No bowel obstruction. Normal appendix. No free fluid or  free air. No evidence for acute hemorrhage.      Impression    IMPRESSION:  1. No acute traumatic abnormality is seen.  2. There are findings worrisome for neoplasm including an irregular  and elongated focal opacity measuring approximately 3 cm at the medial  left upper lobe. This could represent pulmonary neoplasm. There is  adjacent atelectasis leading towards the left hilum. There is also  prominent adenopathy within the mediastinum, and a destructive bone  lesion involving the left lateral fifth rib that may represent rib  metastasis. Recommend further oncologic workup.  3. Mildly enlarged retrocrural right-sided lymph node is nonspecific.  4. Nonobstructing small stone at the left kidney.    NISH WEBB MD   US Abdomen Complete Portable    Narrative    ULTRASOUND ABDOMEN COMPLETE 4/20/2017 9:55 PM     HISTORY: Acute kidney injury.    COMPARISON: None.    FINDINGS:  Liver is coarse and increased in echogenicity without focal  solid lesions. Gallbladder demonstrates gallbladder sludge without  shadowing gallstones. No definite pathologic wall thickening given its  incompletely distended state. Extrahepatic bile duct is normal in  diameter. Pancreas is normal where visualized. Spleen is normal.  Kidneys are normal in size. There is no hydronephrosis. No shadowing  stones demonstrated. Aorta is not well seen. Visualized IVC is  nonaneurysmal.      Impression    IMPRESSION:    1. Coarse increased echogenicity of the liver compatible with  intrinsic liver disease.  2. Gallbladder sludge.  3. No hydronephrosis.     AMARIS CLEMONS MD   MR Brain w/o & w Contrast    Narrative    MRI BRAIN WITHOUT AND WITH CONTRAST  4/21/2017 11:52 AM    HISTORY:  Pineal tumor.    TECHNIQUE:  Multiplanar, multisequence MRI of the brain without and  with 7 mL Gadavist.    COMPARISON: CT  dated 4/20/2017.    FINDINGS: Diffusion-weighted images are normal. There is no evidence  for intracranial hemorrhage or acute infarct. There are a few tiny  punctate signal hyperintensities in the supratentorial white matter  without enhancement or mass effect. There is a sellar and suprasellar  enhancing mass which measures 1.1 x 1.2 x 1.5 cm in size. This does  not extend into the cavernous sinuses or sphenoid sinus. The mass is  in continuity with the optic chiasm. No other abnormal areas of  enhancement are present. There is some abnormal soft tissue density in  the right ostiomeatal unit with secondary complete opacification of  the right maxillary sinus. This soft tissue density could be due to  polyp, although other etiologies cannot be excluded. Incidental note  is made of a retention cyst in the left maxillary sinus.      Impression    IMPRESSION:  1. 1.1 x 1.2 x 1.5 cm sellar and suprasellar mass consistent with a  pituitary macroadenoma. This is in continuity with the undersurface of  the optic chiasm.  2. Right ostiomeatal unit disease with secondary right maxillary sinus  disease. On the CT performed on the prior day, there appears be some  chronic osteitis. This is presumably related to chronic sinus disease  due to possible polyp, although it is difficult to exclude malignancy.  If clinically indicated, ENT consultation might helpful in further  evaluation.    TRACY PARKS MD   CT Lung Mediastinum Biopsy    Narrative    4/26/2017 9:53 AM    HISTORY: Left upper lobe lung mass.    COMPARISON: 4/20/2017.    PROCEDURE: Risks and benefits of a CT guided core biopsy of the left  upper lobe lung mass are discussed with the patient. Under CT  guidance, aseptic conditions, and utilizing 10 mL 1% lidocaine as  local anesthetic, a 19 gauge coaxial needle is placed. Through this,  two 20 gauge core biopsy samples are obtained and placed in formalin  for pathologic analysis. The patient tolerated the procedure  well.    Radiation dose for this procedure is reduced using automated exposure  control of mA and/or kV according to patient size, or iterative  reconstruction technique.    CONSCIOUS SEDATION: Provided by the anesthesia service.      Impression    IMPRESSION: Technically uneventful CT guided needle core biopsy, as  described above. Pathologic results are pending.    AZALEA ART MD   XR Chest 1 View    Narrative    XR CHEST 1 VW 4/26/2017 10:00 AM    HISTORY: Lung biopsy.    COMPARISON: 10/31/2006    FINDINGS: No pneumothorax. Known mass is adjacent to the left  pulmonary hilum. Interstitial edema.      Impression    IMPRESSION: No pneumothorax.    ALLEN ORTIZ MD   XR Chest 1 View    Narrative    CHEST ONE VIEW April 26, 2017 12:06 PM     HISTORY: Chest biopsy.     COMPARISON: Chest x-ray 4/26/2017 at 10:00 AM.      Impression    IMPRESSION: Single view of the chest is performed following CT-guided  left lung biopsy. A trace left apical pneumothorax is noted. This was  not definitely evident on the prior exam. No evidence of mediastinal  shift. Heart is normal in size. Anterior left lung mass is again  noted. Right lung is well expanded and clear.    JIM POWERS MD   XR Chest 2 Views    Narrative    XR CHEST 2 VW 4/27/2017 6:10 AM    HISTORY: Follow-up of a small pneumothorax after lung biopsy.    COMPARISON: Prior study done at 1208 hours on April 26.      Impression    IMPRESSION: 2 views of the chest show a modest left apical  pneumothorax, slightly larger compared to the prior study. It measures  1.9 cm of lucency in the apical area compared to 0.9 cm on the prior  study. Small bilateral pleural effusions are present, also seen on  other recent imaging studies.     AZALEA ART MD   PET Oncology (Eyes to Thighs)    Narrative    Combined Report of:    PET and CT on  5/9/2017 4:04 PM :    1. PET of the neck, chest, abdomen, and pelvis.  2. PET CT Fusion for Attenuation Correction and Anatomical  Localization:     3. 3D MIP and PET-CT fused images were processed on an independent  workstation and archived to PACS and reviewed by a radiologist.    Technique:    1. PET: The patient received 13.25 mCi of F-18-FDG; the serum glucose  was 90 prior to administration, body weight was 80.9 kg. Images were  evaluated in the axial, sagittal, and coronal planes as well as the  rotational whole body MIP. Images were acquired from the Eyes to the  proximal thighs.    UPTAKE WAS MEASURED AT 60 MINUTES.     2. CT: Volumetric acquisition for clinical interpretation of the  chest, abdomen, and pelvis acquired at 3 mm sections . The chest,  abdomen, and pelvis were evaluated at 5 mm sections in bone, soft  tissue, and lung windows.      No intravenous contrast administered.    --    3. 3D MIP and PET-CT fused images were processed on an independent  workstation and archived to PACS and reviewed by a radiologist.    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    COMPARISON: Chest radiograph 4/27/2017, CT chest abdomen and pelvis  4/20/2017    FINDINGS:     HEAD/NECK:  Bilateral hypermetabolic level 1B and right level 3 lymph nodes. 11 mm  right level 1B lymph node (series 3 image 62), with SUV max of 12, and  8 mm left level 1B lymph node (series 3 image 62), with SUV max of  7.1. Right level 3 lymph node measuring 12 mm with SUV max of 8.3  (series 3 image 74). Small left level 4 FDG avid node.  Partially  visualized hypermetabolism in the sella with SUV max of 16.6 better  evaluated on MRI dated 4/21/2017.    Unchanged complete opacification of the right maxillary sinus and left  maxillary sinus mucus retention cysts. The mastoid air cells are  clear. The remaining paranasal sinuses are unremarkable.     The  mucosal pharyngeal space, the , prevertebral and carotid  spaces are within normal limits.     The thyroid gland is normal.    CHEST:  Multiple foci of abnormal FDG uptake are noted in the chest, including  the left upper lobe, superior mediastinum, subcarinal region, anterior  prevascular, right upper paratracheal, left lower paratracheal and  left hilar. Again demonstrated is a 2.8 x 3.4 cm left upper lobe mass  with SUV max of 18.2, consistent with patient's known primary  non-small cell lung carcinoma. Soft tissue mass along the left  superior aspect of the superior mediastinum measuring approximately  1.9 x 5.7 cm (series 3 image 128), with SUV max of 14.3. 2.1 cm right  superior mediastinal lymph node (series 3 image 118), with SUV max of  12.7, and 2.5 cm short axis diameter subcarinal lymph node (series 3  image 152), with SUV max of 16.1.    Moderate left pleural effusion with tiny amount of residual air  demonstrates a larger fluid component and smaller air component since  4/27/2017. Small right pleural effusion is increased from previous.  Overlying atelectasis is present. No additional pulmonary nodules are  identified, though findings are limited on this nondiagnostic CT. No  significant pericardial effusion.    ABDOMEN AND PELVIS:  There is no suspicious FDG uptake in the abdomen or pelvis.    Small amount of nonspecific pelvic free fluid. No free air. Punctate  left lower pole nonobstructing calyceal tip calculus unchanged from  previous.    LOWER EXTREMITIES:   No abnormal masses or hypermetabolic lesions.    BONES:   Lytic lesions involving the left scapula and left fifth rib with soft  tissue component. The left scapular lesion measures 18 mm (series 3  image 74), with SUV max of 14.5, and the left fifth rib lesion  measures 4.3 x 2.2 cm (series 3 image 143), with SUV max of 20.9.  Multiple healed right-sided rib fractures.        Impression    IMPRESSION: In this patient with newly  diagnosed non-small cell  carcinoma of the left upper lobe:  1. Hypermetabolic bilateral level 1B and right level 3 lymph nodes in  the neck highly suspicious for metastatic disease.  2. 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  3. 2.8 x 3.4 cm hypermetabolic left upper lobe mass consistent with  patient's known non-small cell lung carcinoma.   4. Hypermetabolic mediastinal and left hilar lymphadenopathy.   5. Metastatic disease including the left scapula and left fifth rib.  6. Increased moderate left pleural effusion and small right pleural  effusion. Left pneumothorax has almost totally resolved. No focal FDG  uptake to suggest that these are malignant, however thoracentesis  could be considered for further evaluation if clinically indicated.  7. Small amount of new nonspecific pelvic free fluid.     I have personally reviewed the examination and initial interpretation  and I agree with the findings.    AHMET BUITRAGO MD       Assessment and plan:    (C34.90) Non-small cell lung cancer (NSCLC) (H)  This is a 57-year-old male patient with metastatic non-small cell lung cancer stage IV. I reviewed with the patient today the results from most recent PET scan. It did show hypermetabolic bilateral level Ib and right level III lymph nodes in the neck suspicious for metastatic disease. There was also hypermetabolic activity seen in the sella consistent with pituitary macroadenoma. There is hypermetabolic mediastinal left hilar lymphadenopathy.  There is metastatic disease seen in the left scapula and left fifth rib. There is left pleural effusion and small right pleural effusion seen as well. i discussed with the patient the natural history of non-small cell lung cancer. We talked about stage of his disease. The patient understand that his disease is not curable. There is no indication for surgery and radiation therapy with a curative intent. We talked  about the role of palliative chemotherapy in details. I explained to the patient that ALK 4 came back negative for mutation. EGFR is still pending. PDL 1 is less than 1%. If EGFR is negative for mutations I would recommend use of chemotherapy. I would recommend carboplatin and Taxol. Patient will be receiving carboplatin with AUC of 6 on day 1 paclitaxel and a dose of 2 25 mg/m  on day 1 every 21 day cycle. We discussed the rationale behind using combination Carboplatin and Paclitaxel in detail as well as major side effects including, but not limited to immediate/short term side effects of acute infusion reaction/anaphylaxis, life-threatening infection, flushing, body aches, nausea/vomiting, diarrhea/constipation, mucositis, alopecia, anemia, neutropenia, thrombocytopenia, and fatigue as well as small risk of long-term side effect of duration and dose dependent peripheral neuropathy.  Patient instructed to call with signs or symptoms of infection including, but not limited to temperature greater than or equal to 100.5 degrees Fahrenheit, chills, severe sore throat, ear or sinus pain, mouth sores, cough, painful urination, and/or non-healing wound. The patient was given written information about Carboplatin and Paclitaxel, agrees to proceed with treatment, and denies any further questions at this time.      (G89.3) Cancer associated pain  Patient pain is currently well controlled on the current regimen including p.r.n. Oxycodone.    (D63.8) Anemia, chronic disease  His anemia probably related to anemia of chronic disease. We will continue to monitor the patient's hemoglobin. The patient will be offered blood transfusion if the coming or symptomatic or hemoglobin is less than 7    (J32.9) Sinusitis   Patient will continue antibiotics withAugmentin. We will arrange for a referal to ENT.  Plan: OTOLARYNGOLOGY REFERRAL    The patient is ready to learn, no apparent learning barriers were identified.  Diagnosis and  treatment plans were explained to the patient. The patient expressed understanding of the content. The patient asked appropriate questions. The patient questions were answered to his satisfaction.    Time spent 60 minutes more than 50% of the time in counseling and coordination of care including discussion of natural history of non-small cell lung cancer management plan and  Potential side effects of chemotherapeutic drugs    Chart documentation with Dragon Voice recognition Software. Although reviewed after completion, some words and grammatical errors may remain.

## 2017-05-15 NOTE — NURSING NOTE
"Initial /75 (BP Location: Right arm, Patient Position: Chair, Cuff Size: Adult Regular)  Pulse 83  Temp 97.7  F (36.5  C) (Oral)  Ht 1.638 m (5' 4.49\")  Wt 77.6 kg (171 lb)  SpO2 97%  BMI 28.91 kg/m2 Estimated body mass index is 28.91 kg/(m^2) as calculated from the following:    Height as of this encounter: 1.638 m (5' 4.49\").    Weight as of this encounter: 77.6 kg (171 lb). .    Daniella Mullins CMA    "

## 2017-05-15 NOTE — MR AVS SNAPSHOT
After Visit Summary   5/15/2017    Wade Acevedo    MRN: [id number]           Patient Information     Date Of Birth          1960        Visit Information        Provider Department      5/15/2017 2:00 PM Pawan Collins MD [facility]        Today's Diagnoses     Anemia, unspecified type        Non-small cell lung cancer (NSCLC) (H)          Care Instructions    Per Physician's instructions          Follow-ups after your visit        Your next 10 appointments already scheduled     May 24, 2017  8:00 AM CDT   Level 6 with ROOM 1 [facility] ([facility])    [facility]  [address]  [facility] [address]   [phone]            May 24, 2017  8:30 AM CDT   Return Visit with Pasquale Naylor MD   [facility] ([facility])    [facility]  [address]  [facility] [address]   [phone]            May 25, 2017  2:15 PM CDT   New Visit with José Miguel Amezcua MD   [facility]    [address] [place] [facility] [address]   [phone]            May 25, 2017  3:00 PM CDT   Level O with ROOM 4 [facility] ([facility])    [facility]  [address]  [facility] [address]   [phone]            Nov 14, 2017 11:30 AM CST   (Arrive by 11:15 AM)   RETURN ENDOCRINE with Padma Medley MD   [facility] End[facility] ([facility] [place])    [address] [place] [address] [phone]              Future tests that were ordered for you today     Open Future Orders        Priority Expected Expires Ordered    XR Chest 2 Views Routine 5/15/2017 5/15/2018 5/15/2017            Who to contact     If you have questions or need follow up information about today's clinic visit or your schedule please contact [facility]  "directly at [phone].  Normal or non-critical lab and imaging results will be communicated to you by opvizorhart, letter or phone within 4 business days after the clinic has received the results. If you do not hear from us within 7 days, please contact the clinic through opvizorhart or phone. If you have a critical or abnormal lab result, we will notify you by phone as soon as possible.  Submit refill requests through [employer]gu[employer] mobile or call your pharmacy and they will forward the refill request to us. Please allow 3 business days for your refill to be completed.          Additional Information About Your Visit        [employer] Information     [employer]gu[employer] mobile lets you send messages to your doctor, view your test results, renew your prescriptions, schedule appointments and more. To sign up, go to [email or web address] . Click on \"Log in\" on the left side of the screen, which will take you to the Welcome page. Then click on \"Sign up Now\" on the right side of the page.     You will be asked to enter the access code listed below, as well as some personal information. Please follow the directions to create your username and password.     Your access code is: [email or web address]  Expires: 2017  9:09 AM     Your access code will  in 90 days. If you need help or a new code, please call your [facility] clinic or [phone].        Care EveryWhere ID     This is your Care EveryWhere ID. This could be used by other organizations to access your [facility] medical records  [id number]        Your Vitals Were     Pulse Temperature Height Pulse Oximetry BMI (Body Mass Index)       83 97.7  F (36.5  C) (Oral) 1.638 m (5' 4.49\") 97% 28.91 kg/m2        Blood Pressure from Last 3 Encounters:   05/15/17 112/75   05/15/17 130/89   17 129/81    Weight from Last 3 Encounters:   05/15/17 77.6 kg (171 lb)   05/15/17 82.6 kg (182 lb)   17 77.8 kg (171 lb 8 oz)                 Today's Medication Changes          These changes are accurate as of: " 5/15/17  2:37 PM.  If you have any questions, ask your nurse or doctor.               Stop taking these medicines if you haven't already. Please contact your care team if you have questions.     dexamethasone 4 MG tablet   Commonly known as:  DECADRON   Stopped by:  Pawan Collins MD                    Primary Care Provider    Doctor Unknown, MD       No address on file        Thank you!     Thank you for choosing [facility]  for your care. Our goal is always to provide you with excellent care. Hearing back from our patients is one way we can continue to improve our services. Please take a few minutes to complete the written survey that you may receive in the mail after your visit with us. Thank you!             Your Updated Medication List - Protect others around you: Learn how to safely use, store and throw away your medicines at www.disposemymeds.org.          This list is accurate as of: 5/15/17  2:37 PM.  Always use your most recent med list.                   Brand Name Dispense Instructions for use    amoxicillin-clavulanate 875-125 MG per tablet    AUGMENTIN    20 tablet    Take 1 tablet by mouth 2 times daily       folic acid 1 MG tablet    FOLVITE    30 tablet    Take 1 tablet (1 mg) by mouth daily       ibuprofen 600 MG tablet    ADVIL/MOTRIN    30 tablet    Take 1 tablet (600 mg) by mouth every 8 hours as needed for moderate pain       LORazepam 0.5 MG tablet    ATIVAN    30 tablet    Take 1 tablet (0.5 mg) by mouth every 4 hours as needed (Anxiety, Nausea/Vomiting or Sleep)       ondansetron 8 MG tablet    ZOFRAN    10 tablet    Take 1 tablet (8 mg) by mouth every 8 hours as needed (Nausea/Vomiting)       * OXYCODONE HCL PO      Take 5 mg by mouth every 4 hours as needed       * OXYCODONE HCL PO      Take 10 mg by mouth every 4 hours as needed       pantoprazole 40 MG EC tablet    PROTONIX    30 tablet    Take 1 tablet (40 mg) by mouth 2 times daily (before meals)       prochlorperazine  10 MG tablet    COMPAZINE    30 tablet    Take 1 tablet (10 mg) by mouth every 6 hours as needed (Nausea/Vomiting)       senna-docusate 8.6-50 MG per tablet    SENOKOT-S;PERICOLACE    100 tablet    Take 1 tablet by mouth 2 times daily Reported on 5/4/2017       thiamine 100 MG tablet      Take 1 tablet (100 mg) by mouth daily       TYLENOL PO      Take 1,000 mg by mouth every 6 hours as needed for mild pain or fever       * Notice:  This list has 2 medication(s) that are the same as other medications prescribed for you. Read the directions carefully, and ask your doctor or other care provider to review them with you.

## 2017-05-15 NOTE — PROGRESS NOTES
PCP:  Unknown, Doctor    Chief complaint: Lung cancer, need for venous access    History of Present Illness: Wade is 57 years old and has been recently diagnosed with lung cancer. He has stage IV disease. He will be getting chemotherapy for treatment, but no surgery or radiation as planned. He is here for Port-A-Cath placement. His first chemotherapy treatment is 9 days from now.    He had a biopsy of his lung on April 27 which left him with a small pneumothorax. He then had a PET scan done on May 9. The PET scan shows up significant left pleural effusion. We will order a chest x-ray to see what that looks like today.    Histories:  Past Medical History:   Diagnosis Date     Brain cancer (H)      Constipation      H/O ETOH abuse      Hx of tobacco use, presenting hazards to health      Lung cancer (H)      Pain 5/6/2017     Recurrent falls      Unsteady gait      Weight loss        History reviewed. No pertinent surgical history.    History reviewed. No pertinent family history.    Social History   Substance Use Topics     Smoking status: Former Smoker     Packs/day: 1.00     Years: 45.00     Types: Cigarettes     Quit date: 4/20/2017     Smokeless tobacco: Never Used     Alcohol use No      Comment: hx ETOH  quit 4/2017       Current Outpatient Prescriptions   Medication Sig Dispense Refill     amoxicillin-clavulanate (AUGMENTIN) 875-125 MG per tablet Take 1 tablet by mouth 2 times daily 20 tablet 0     LORazepam (ATIVAN) 0.5 MG tablet Take 1 tablet (0.5 mg) by mouth every 4 hours as needed (Anxiety, Nausea/Vomiting or Sleep) 30 tablet 2     prochlorperazine (COMPAZINE) 10 MG tablet Take 1 tablet (10 mg) by mouth every 6 hours as needed (Nausea/Vomiting) 30 tablet 2     ondansetron (ZOFRAN) 8 MG tablet Take 1 tablet (8 mg) by mouth every 8 hours as needed (Nausea/Vomiting) 10 tablet 2     OXYCODONE HCL PO Take 5 mg by mouth every 4 hours as needed        OXYCODONE HCL PO Take 10 mg by mouth every 4 hours as  needed       Acetaminophen (TYLENOL PO) Take 1,000 mg by mouth every 6 hours as needed for mild pain or fever       ibuprofen (ADVIL/MOTRIN) 600 MG tablet Take 1 tablet (600 mg) by mouth every 8 hours as needed for moderate pain 30 tablet 1     folic acid (FOLVITE) 1 MG tablet Take 1 tablet (1 mg) by mouth daily 30 tablet      senna-docusate (SENOKOT-S;PERICOLACE) 8.6-50 MG per tablet Take 1 tablet by mouth 2 times daily Reported on 5/4/2017 100 tablet      thiamine 100 MG tablet Take 1 tablet (100 mg) by mouth daily       pantoprazole (PROTONIX) 40 MG EC tablet Take 1 tablet (40 mg) by mouth 2 times daily (before meals) 30 tablet        Allergies   Allergen Reactions     Lubriderm Rash       Images:  Recent Results (from the past 744 hour(s))   CT Head w/o Contrast    Narrative    CT HEAD W/O CONTRAST   4/20/2017 2:20 PM     HISTORY: fall    TECHNIQUE: Axial images of the head without IV contrast material.  Radiation dose for this scan was reduced using automated exposure  control, adjustment of the mA and/or kV according to patient size, or  iterative reconstruction technique.    COMPARISON: None.    FINDINGS:  There is generalized atrophy of the brain. . There is a  mass arising from the sella extending into the suprasellar cistern.  This measures approximately 1.2 cm in cephalocaudad dimension. Extends  up to the optic chiasm. This would be consistent with a pituitary  adenoma. The right maxillary sinus is completely opacified. The medial  wall of the sinuses displaced medially. There is sclerosis and  thickening of the wall of the sinus. The findings would be consistent  with a mucocele within the right maxillary sinus. Mucosal thickening  is seen in the right frontal sinus and several right anterior ethmoid  sinuses.. There is no evidence of trauma.      Impression    IMPRESSION:   1. No intracranial bleed or skull fractures.  2. Soft tissue mass arising from the sella extending into the  suprasellar region.  This is probably due to a pituitary adenoma.  Meningioma is also in the differential diagnosis. MR scan of the sella  would be helpful for further characterization of this lesion. This  lesion could affect the optic chiasm.  3. Opacified right maxillary sinus with medial displacement of the  medial wall of the sinus. The appearance raises the possibility of a  mucocele within the right maxillary sinus.  4. Atrophy of the brain.    MICHAEL GARZON MD   CT Chest Abdomen Pelvis w/o Contrast    Narrative    CT CHEST/ABDOMEN/PELVIS WITHOUT CONTRAST April 20, 2017 2:22 PM    HISTORY: Fall, chest pain, falling hemoglobin. No IV contrast due to  poor renal function.    TECHNIQUE: CT scan obtained of the chest, abdomen, and pelvis without  IV contrast. Radiation dose for this scan was reduced using automated  exposure control, adjustment of the mA and/or kV according to patient  size, or iterative reconstruction technique.    COMPARISON:  None.    FINDINGS:  Chest: There is no acute thoracic aortic abnormality identified within  the limits of unenhanced scanning. Mild coronary artery and thoracic  aortic calcifications are present. No pleural or pericardial  effusions. No pneumothorax identified. There is prominent adenopathy  in the mediastinum. An example at the anterior mediastinum measures  4.1 x 2.2 cm series 3 image 21. Enlarged subcarinal lymph node  measures 2.6 x 1.6 cm image 30. There are other examples. Cannot  exclude hilar enlarged lymph nodes at unenhanced scanning. Axillary  lymph nodes are small. There may be a tiny sebaceous cyst at the left  anterior axilla image 13. There is a focal ill-defined nodular lesion  measuring 3.0 x 2.1 cm medial anterior left upper lobe series 4 image  22 with some atelectasis leading towards the left hilar region. No  acute airspace disease otherwise seen. Some mild subpleural  emphysematous change suggested. There are no convincing acute  fractures identified. A few subacute  fractures noted in the ribs.  There is a destructive bony lesion occupying the left lateral fifth  rib measuring approximately 4.4 x 1.5 cm series 3 image 23.    Abdomen/pelvis: No acute fractures visualized at the abdomen or pelvis  levels. No convincing destructive bony lesions identified at the  abdomen or pelvis. Contracted gallbladder. Unenhanced liver, adrenals,  spleen, pancreas, and kidneys do not show any acute abnormalities. No  hydronephrosis. Nonobstructing small stone lower left kidney is only  0.2 cm image 75. Diffuse vascular calcifications. No acute abdominal  aortic abnormality. A borderline prominent lymph node deep to the  right hemidiaphragm reji 0.8 cm series 3 image 53. Portocaval region  lymph node is 1.6 x 1.1 cm series 3 image 63. No acute bowel  abnormality. No bowel obstruction. Normal appendix. No free fluid or  free air. No evidence for acute hemorrhage.      Impression    IMPRESSION:  1. No acute traumatic abnormality is seen.  2. There are findings worrisome for neoplasm including an irregular  and elongated focal opacity measuring approximately 3 cm at the medial  left upper lobe. This could represent pulmonary neoplasm. There is  adjacent atelectasis leading towards the left hilum. There is also  prominent adenopathy within the mediastinum, and a destructive bone  lesion involving the left lateral fifth rib that may represent rib  metastasis. Recommend further oncologic workup.  3. Mildly enlarged retrocrural right-sided lymph node is nonspecific.  4. Nonobstructing small stone at the left kidney.    NISH WEBB MD   US Abdomen Complete Portable    Narrative    ULTRASOUND ABDOMEN COMPLETE 4/20/2017 9:55 PM     HISTORY: Acute kidney injury.    COMPARISON: None.    FINDINGS:  Liver is coarse and increased in echogenicity without focal  solid lesions. Gallbladder demonstrates gallbladder sludge without  shadowing gallstones. No definite pathologic wall thickening given its  incompletely  distended state. Extrahepatic bile duct is normal in  diameter. Pancreas is normal where visualized. Spleen is normal.  Kidneys are normal in size. There is no hydronephrosis. No shadowing  stones demonstrated. Aorta is not well seen. Visualized IVC is  nonaneurysmal.      Impression    IMPRESSION:    1. Coarse increased echogenicity of the liver compatible with  intrinsic liver disease.  2. Gallbladder sludge.  3. No hydronephrosis.     AMARIS CLEMONS MD   MR Brain w/o & w Contrast    Narrative    MRI BRAIN WITHOUT AND WITH CONTRAST  4/21/2017 11:52 AM    HISTORY:  Pineal tumor.    TECHNIQUE:  Multiplanar, multisequence MRI of the brain without and  with 7 mL Gadavist.    COMPARISON: CT dated 4/20/2017.    FINDINGS: Diffusion-weighted images are normal. There is no evidence  for intracranial hemorrhage or acute infarct. There are a few tiny  punctate signal hyperintensities in the supratentorial white matter  without enhancement or mass effect. There is a sellar and suprasellar  enhancing mass which measures 1.1 x 1.2 x 1.5 cm in size. This does  not extend into the cavernous sinuses or sphenoid sinus. The mass is  in continuity with the optic chiasm. No other abnormal areas of  enhancement are present. There is some abnormal soft tissue density in  the right ostiomeatal unit with secondary complete opacification of  the right maxillary sinus. This soft tissue density could be due to  polyp, although other etiologies cannot be excluded. Incidental note  is made of a retention cyst in the left maxillary sinus.      Impression    IMPRESSION:  1. 1.1 x 1.2 x 1.5 cm sellar and suprasellar mass consistent with a  pituitary macroadenoma. This is in continuity with the undersurface of  the optic chiasm.  2. Right ostiomeatal unit disease with secondary right maxillary sinus  disease. On the CT performed on the prior day, there appears be some  chronic osteitis. This is presumably related to chronic sinus disease  due to  possible polyp, although it is difficult to exclude malignancy.  If clinically indicated, ENT consultation might helpful in further  evaluation.    TRACY PARKS MD   CT Lung Mediastinum Biopsy    Narrative    4/26/2017 9:53 AM    HISTORY: Left upper lobe lung mass.    COMPARISON: 4/20/2017.    PROCEDURE: Risks and benefits of a CT guided core biopsy of the left  upper lobe lung mass are discussed with the patient. Under CT  guidance, aseptic conditions, and utilizing 10 mL 1% lidocaine as  local anesthetic, a 19 gauge coaxial needle is placed. Through this,  two 20 gauge core biopsy samples are obtained and placed in formalin  for pathologic analysis. The patient tolerated the procedure well.    Radiation dose for this procedure is reduced using automated exposure  control of mA and/or kV according to patient size, or iterative  reconstruction technique.    CONSCIOUS SEDATION: Provided by the anesthesia service.      Impression    IMPRESSION: Technically uneventful CT guided needle core biopsy, as  described above. Pathologic results are pending.    AZALEA ART MD   XR Chest 1 View    Narrative    XR CHEST 1 VW 4/26/2017 10:00 AM    HISTORY: Lung biopsy.    COMPARISON: 10/31/2006    FINDINGS: No pneumothorax. Known mass is adjacent to the left  pulmonary hilum. Interstitial edema.      Impression    IMPRESSION: No pneumothorax.    ALLEN ORTIZ MD   XR Chest 1 View    Narrative    CHEST ONE VIEW April 26, 2017 12:06 PM     HISTORY: Chest biopsy.     COMPARISON: Chest x-ray 4/26/2017 at 10:00 AM.      Impression    IMPRESSION: Single view of the chest is performed following CT-guided  left lung biopsy. A trace left apical pneumothorax is noted. This was  not definitely evident on the prior exam. No evidence of mediastinal  shift. Heart is normal in size. Anterior left lung mass is again  noted. Right lung is well expanded and clear.    JIM POWERS MD   XR Chest 2 Views    Narrative    XR CHEST 2 VW 4/27/2017 6:10  AM    HISTORY: Follow-up of a small pneumothorax after lung biopsy.    COMPARISON: Prior study done at 1208 hours on April 26.      Impression    IMPRESSION: 2 views of the chest show a modest left apical  pneumothorax, slightly larger compared to the prior study. It measures  1.9 cm of lucency in the apical area compared to 0.9 cm on the prior  study. Small bilateral pleural effusions are present, also seen on  other recent imaging studies.     AZALEA ART MD   PET Oncology (Eyes to Thighs)    Narrative    Combined Report of:    PET and CT on  5/9/2017 4:04 PM :    1. PET of the neck, chest, abdomen, and pelvis.  2. PET CT Fusion for Attenuation Correction and Anatomical  Localization:    3. 3D MIP and PET-CT fused images were processed on an independent  workstation and archived to PACS and reviewed by a radiologist.    Technique:    1. PET: The patient received 13.25 mCi of F-18-FDG; the serum glucose  was 90 prior to administration, body weight was 80.9 kg. Images were  evaluated in the axial, sagittal, and coronal planes as well as the  rotational whole body MIP. Images were acquired from the Eyes to the  proximal thighs.    UPTAKE WAS MEASURED AT 60 MINUTES.     2. CT: Volumetric acquisition for clinical interpretation of the  chest, abdomen, and pelvis acquired at 3 mm sections . The chest,  abdomen, and pelvis were evaluated at 5 mm sections in bone, soft  tissue, and lung windows.      No intravenous contrast administered.    --    3. 3D MIP and PET-CT fused images were processed on an independent  workstation and archived to PACS and reviewed by a radiologist.    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    COMPARISON: Chest radiograph 4/27/2017, CT chest abdomen and  pelvis  4/20/2017    FINDINGS:     HEAD/NECK:  Bilateral hypermetabolic level 1B and right level 3 lymph nodes. 11 mm  right level 1B lymph node (series 3 image 62), with SUV max of 12, and  8 mm left level 1B lymph node (series 3 image 62), with SUV max of  7.1. Right level 3 lymph node measuring 12 mm with SUV max of 8.3  (series 3 image 74). Small left level 4 FDG avid node.  Partially  visualized hypermetabolism in the sella with SUV max of 16.6 better  evaluated on MRI dated 4/21/2017.    Unchanged complete opacification of the right maxillary sinus and left  maxillary sinus mucus retention cysts. The mastoid air cells are  clear. The remaining paranasal sinuses are unremarkable.     The mucosal pharyngeal space, the , prevertebral and carotid  spaces are within normal limits.     The thyroid gland is normal.    CHEST:  Multiple foci of abnormal FDG uptake are noted in the chest, including  the left upper lobe, superior mediastinum, subcarinal region, anterior  prevascular, right upper paratracheal, left lower paratracheal and  left hilar. Again demonstrated is a 2.8 x 3.4 cm left upper lobe mass  with SUV max of 18.2, consistent with patient's known primary  non-small cell lung carcinoma. Soft tissue mass along the left  superior aspect of the superior mediastinum measuring approximately  1.9 x 5.7 cm (series 3 image 128), with SUV max of 14.3. 2.1 cm right  superior mediastinal lymph node (series 3 image 118), with SUV max of  12.7, and 2.5 cm short axis diameter subcarinal lymph node (series 3  image 152), with SUV max of 16.1.    Moderate left pleural effusion with tiny amount of residual air  demonstrates a larger fluid component and smaller air component since  4/27/2017. Small right pleural effusion is increased from previous.  Overlying atelectasis is present. No additional pulmonary nodules are  identified, though findings are limited on this nondiagnostic CT. No  significant pericardial  effusion.    ABDOMEN AND PELVIS:  There is no suspicious FDG uptake in the abdomen or pelvis.    Small amount of nonspecific pelvic free fluid. No free air. Punctate  left lower pole nonobstructing calyceal tip calculus unchanged from  previous.    LOWER EXTREMITIES:   No abnormal masses or hypermetabolic lesions.    BONES:   Lytic lesions involving the left scapula and left fifth rib with soft  tissue component. The left scapular lesion measures 18 mm (series 3  image 74), with SUV max of 14.5, and the left fifth rib lesion  measures 4.3 x 2.2 cm (series 3 image 143), with SUV max of 20.9.  Multiple healed right-sided rib fractures.        Impression    IMPRESSION: In this patient with newly diagnosed non-small cell  carcinoma of the left upper lobe:  1. Hypermetabolic bilateral level 1B and right level 3 lymph nodes in  the neck highly suspicious for metastatic disease.  2. 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  3. 2.8 x 3.4 cm hypermetabolic left upper lobe mass consistent with  patient's known non-small cell lung carcinoma.   4. Hypermetabolic mediastinal and left hilar lymphadenopathy.   5. Metastatic disease including the left scapula and left fifth rib.  6. Increased moderate left pleural effusion and small right pleural  effusion. Left pneumothorax has almost totally resolved. No focal FDG  uptake to suggest that these are malignant, however thoracentesis  could be considered for further evaluation if clinically indicated.  7. Small amount of new nonspecific pelvic free fluid.     I have personally reviewed the examination and initial interpretation  and I agree with the findings.    AHMET BUITRAGO MD       Labs:  Results for orders placed or performed in visit on 05/09/17   PET Oncology (Eyes to Thighs)    Narrative    Combined Report of:    PET and CT on  5/9/2017 4:04 PM :    1. PET of the neck, chest, abdomen, and pelvis.  2. PET CT  Fusion for Attenuation Correction and Anatomical  Localization:    3. 3D MIP and PET-CT fused images were processed on an independent  workstation and archived to PACS and reviewed by a radiologist.    Technique:    1. PET: The patient received 13.25 mCi of F-18-FDG; the serum glucose  was 90 prior to administration, body weight was 80.9 kg. Images were  evaluated in the axial, sagittal, and coronal planes as well as the  rotational whole body MIP. Images were acquired from the Eyes to the  proximal thighs.    UPTAKE WAS MEASURED AT 60 MINUTES.     2. CT: Volumetric acquisition for clinical interpretation of the  chest, abdomen, and pelvis acquired at 3 mm sections . The chest,  abdomen, and pelvis were evaluated at 5 mm sections in bone, soft  tissue, and lung windows.      No intravenous contrast administered.    --    3. 3D MIP and PET-CT fused images were processed on an independent  workstation and archived to PACS and reviewed by a radiologist.    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    COMPARISON: Chest radiograph 4/27/2017, CT chest abdomen and pelvis  4/20/2017    FINDINGS:     HEAD/NECK:  Bilateral hypermetabolic level 1B and right level 3 lymph nodes. 11 mm  right level 1B lymph node (series 3 image 62), with SUV max of 12, and  8 mm left level 1B lymph node (series 3 image 62), with SUV max of  7.1. Right level 3 lymph node measuring 12 mm with SUV max of 8.3  (series 3 image 74). Small left level 4 FDG avid node.  Partially  visualized hypermetabolism in the sella with SUV max of 16.6 better  evaluated on MRI dated 4/21/2017.    Unchanged complete opacification of the right maxillary sinus and left  maxillary sinus mucus retention cysts. The mastoid air cells  are  clear. The remaining paranasal sinuses are unremarkable.     The mucosal pharyngeal space, the , prevertebral and carotid  spaces are within normal limits.     The thyroid gland is normal.    CHEST:  Multiple foci of abnormal FDG uptake are noted in the chest, including  the left upper lobe, superior mediastinum, subcarinal region, anterior  prevascular, right upper paratracheal, left lower paratracheal and  left hilar. Again demonstrated is a 2.8 x 3.4 cm left upper lobe mass  with SUV max of 18.2, consistent with patient's known primary  non-small cell lung carcinoma. Soft tissue mass along the left  superior aspect of the superior mediastinum measuring approximately  1.9 x 5.7 cm (series 3 image 128), with SUV max of 14.3. 2.1 cm right  superior mediastinal lymph node (series 3 image 118), with SUV max of  12.7, and 2.5 cm short axis diameter subcarinal lymph node (series 3  image 152), with SUV max of 16.1.    Moderate left pleural effusion with tiny amount of residual air  demonstrates a larger fluid component and smaller air component since  4/27/2017. Small right pleural effusion is increased from previous.  Overlying atelectasis is present. No additional pulmonary nodules are  identified, though findings are limited on this nondiagnostic CT. No  significant pericardial effusion.    ABDOMEN AND PELVIS:  There is no suspicious FDG uptake in the abdomen or pelvis.    Small amount of nonspecific pelvic free fluid. No free air. Punctate  left lower pole nonobstructing calyceal tip calculus unchanged from  previous.    LOWER EXTREMITIES:   No abnormal masses or hypermetabolic lesions.    BONES:   Lytic lesions involving the left scapula and left fifth rib with soft  tissue component. The left scapular lesion measures 18 mm (series 3  image 74), with SUV max of 14.5, and the left fifth rib lesion  measures 4.3 x 2.2 cm (series 3 image 143), with SUV max of 20.9.  Multiple healed right-sided rib  "fractures.        Impression    IMPRESSION: In this patient with newly diagnosed non-small cell  carcinoma of the left upper lobe:  1. Hypermetabolic bilateral level 1B and right level 3 lymph nodes in  the neck highly suspicious for metastatic disease.  2. 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  3. 2.8 x 3.4 cm hypermetabolic left upper lobe mass consistent with  patient's known non-small cell lung carcinoma.   4. Hypermetabolic mediastinal and left hilar lymphadenopathy.   5. Metastatic disease including the left scapula and left fifth rib.  6. Increased moderate left pleural effusion and small right pleural  effusion. Left pneumothorax has almost totally resolved. No focal FDG  uptake to suggest that these are malignant, however thoracentesis  could be considered for further evaluation if clinically indicated.  7. Small amount of new nonspecific pelvic free fluid.     I have personally reviewed the examination and initial interpretation  and I agree with the findings.    AHMET BUITRAGO MD       ROS:  10 point review of systems is negative except as per history of present illness    /75 (BP Location: Right arm, Patient Position: Chair, Cuff Size: Adult Regular)  Pulse 83  Temp 97.7  F (36.5  C) (Oral)  Ht 1.638 m (5' 4.49\")  Wt 77.6 kg (171 lb)  SpO2 97%  BMI 28.91 kg/m2    Exam:  General - Alert and Oriented X4, NAD, well nourished  HEENT - Normocephalic, atraumatic,  Neck - supple, no LAD,  Lungs -respirations unlabored  CV - pulses regular  Abdomen - Soft, non-tender, +BS, no hepatosplenomegaly, no palpable masses  Groins -not examined  Rectal -not examined Neuro - Full ROM, Strength 5/5 and major muscle groups, sensation intact  Extremities - No cyanosis, clubbing or edema.      Assessment and Plan: Metastatic non-small cell lung cancer, moderate left pleural effusion.    Plan: #1 chest x-ray today to evaluate the effusion. He " may need thoracentesis performed the port in.  #2 schedule him for Port-A-Cath insertion on Wednesday. If the effusion is still quite large we will plan to have a thoracentesis performed on Wednesday either surgery or in radiology.    Risks benefits alternatives were discussed with the patient. He has agreed to proceed.        Pawan Collins MD FACS

## 2017-05-15 NOTE — LETTER
SURGERYPLANNING/SCHEDULING WORKSHEET                              [facility]  [address] [address] [phone]                          Wade Acevedo                :  1960  MRN:  [id number]  Phone: [phone] (home)     Same Day Surgery   Surgeon: Pawan Collins MD  Diagnosis:   Metastatic lung cancer  Allergies:  Lubriderm   A preoperative evaluation by the primary care provider has been requested to summarize and modify, where possible, medical risks to the proposed surgery.  Specific risks requiring evaluation include   Patient Active Problem List    Diagnosis     Sinusitis     Pituitary macroadenoma (H)     Cancer associated pain     Anemia, chronic disease     Weight loss     Constipation     H/O ETOH abuse     Unsteady gait     Recurrent falls     Non-small cell lung cancer (NSCLC) (H)     Anemia     Hypotension     ====================================================  Surgical Procedure:  General Surgery: Port-A-Cath insertion  Length of Procedure:  40 minutes  Type of anesthesia:  Local with MAC  The proposed surgical procedure is considered LOW risk.  Date of Procedure:__17______________    Time: _will call to confirm  ____________________       Special Equipment: None  Informed Consent Obtained and Signed:  NO  ====================================================  Instructions to Same Day Surgery Staff  WILLIE Stockings Knee High  Preop Antibiotic:  Ancef 2 gm IV  pre-op within one hour prior to incision For > 80 kg  Preop Pain Meds:  None  Preop Orders:  Routine Standing Orders.  ====================================================  Instructions to the patient:  Preop physical exam scheduled (within 30 days or 7 days prior) with:  Dr. ___Appel will be updated_________________  Clinic:  ____________________                                          Date______________Time_________________________  Come to the hospital at: _____ONE HOUR PRIOR  ___________________________  HOME PREPARATION:   Shower with Hibiclens the night before or the morning of surgery, gently cleaning skin from neck to feet  Bathe and brush teeth the morning of surgery.  Take medications with a sip of water the morning of surgery:   Check with DrSandra if taking insulin.  May have  a light meal, toast and clear liquids, up to 8 hrs before surgery  May have clear liquids (liquids one can read through) up to 4 hrs before surgery  NOTHING after 4 hrs before surgery  Stop aspirin 7-10 days before surgery  Stop NSAIDS (Ibuproven, Naproxen, etc) 5 days before surgery  Stop Plavix 7-10 days before surgery      Pawan Collins MD    5/15/2017  This form was electronically signed at chart closure                                                                        Chart Copy

## 2017-05-16 NOTE — PROGRESS NOTES
Patient is discharging from LTC to home and is in need of home care orders per [facility].  Patient does not have a PCP at this time, is in process of establishing care.  Per Dr. Naylor, he will sign initials orders for home care with the stipulation the PCP will assume responsibility once pt has established care.    Message left on [facility] voicemail with this information.

## 2017-05-17 NOTE — IP AVS SNAPSHOT
[facility] PreOP/Phase II    [address] [address]    Phone:  [phone]    Fax:  [phone]                                       After Visit Summary   5/17/2017    Wade Acevedo    MRN: [id number]           After Visit Summary Signature Page     I have received my discharge instructions, and my questions have been answered. I have discussed any challenges I see with this plan with the nurse or doctor.    ..........................................................................................................................................  Patient/Patient Representative Signature      ..........................................................................................................................................  Patient Representative Print Name and Relationship to Patient    ..................................................               ................................................  Date                                            Time    ..........................................................................................................................................  Reviewed by Signature/Title    ...................................................              ..............................................  Date                                                            Time

## 2017-05-17 NOTE — ANESTHESIA POSTPROCEDURE EVALUATION
Patient: Wade Acevedo    Procedure(s):  Port-a-Cath Insertion - Wound Class: I-Clean    Diagnosis:metastatic lung cancer  Diagnosis Additional Information: No value filed.    Anesthesia Type:  No value filed.    Note:  Anesthesia Post Evaluation    Patient location during evaluation: Phase 2  Patient participation: Able to fully participate in evaluation  Level of consciousness: awake and alert  Pain management: adequate  Airway patency: patent  Cardiovascular status: acceptable and hemodynamically stable  Respiratory status: acceptable, room air and spontaneous ventilation  Hydration status: acceptable  PONV: none     Anesthetic complications: None          Last vitals:  Vitals:    05/17/17 1032   BP: 111/75   Resp: 16   Temp: 37.2  C (98.9  F)   SpO2: 98%         Electronically Signed By: SALINAS Birch CRNA  May 17, 2017  12:46 PM

## 2017-05-17 NOTE — ANESTHESIA CARE TRANSFER NOTE
Patient: Wade Acevedo    Procedure(s):  Port-a-Cath Insertion - Wound Class: I-Clean    Diagnosis: metastatic lung cancer  Diagnosis Additional Information: No value filed.    Anesthesia Type:   No value filed.     Note:  Airway :Nasal Cannula  Patient transferred to:Phase II        Vitals: (Last set prior to Anesthesia Care Transfer)    CRNA VITALS  5/17/2017 1209 - 5/17/2017 1245      5/17/2017             Pulse: 98    SpO2: 94 %                Electronically Signed By: SALINAS Birch CRNA  May 17, 2017  12:45 PM

## 2017-05-17 NOTE — BRIEF OP NOTE
[facility]   Brief Operative Note    Pre-operative diagnosis: metastatic lung cancer   Post-operative diagnosis metastatic lung cancer   Procedure: Procedure(s):  Port-a-Cath Insertion - Wound Class: I-Clean   Surgeon(s): Surgeon(s) and Role:     * Pawan Collins MD - Primary     * Kvng Delacruz PA-C - Assisting   Estimated blood loss: * No values recorded between 5/17/2017 12:01 PM and 5/17/2017 12:27 PM *    Specimens: * No specimens in log *   Findings: Left subclavian port-a-cath placed with some difficulty.  Took 6-7 passes to locate the vein.  CXR pending.

## 2017-05-17 NOTE — ANESTHESIA PREPROCEDURE EVALUATION
Anesthesia Evaluation     . Pt has had prior anesthetic.     No history of anesthetic complications          ROS/MED HX    ENT/Pulmonary:     (+)tobacco use, Past use 45 pk yrs packs/day  , . .    Neurologic:     (+)other neuro unsteady gait    Cardiovascular:  - neg cardiovascular ROS   (+) ----. : . . . :. . Previous cardiac testing date:results:date: results:ECG reviewed date:4-2017 results:Sinus Tachycardia   Low voltage in limb leads.    -Nonspecific ST depression +   Nonspecific T-abnormality  -Nondiagnostic.     ABNORMAL date: results:          METS/Exercise Tolerance:  3 - Able to walk 1-2 blocks without stopping   Hematologic:     (+) Anemia, -      Musculoskeletal:  - neg musculoskeletal ROS       GI/Hepatic:     (+) GERD Asymptomatic on medication, liver disease, Other GI/Hepatic ETOH, acute weight loss      Renal/Genitourinary:  - ROS Renal section negative       Endo:     (+) Other Endocrine Disorder pituitary macroadenoma.      Psychiatric:  - neg psychiatric ROS       Infectious Disease:  - neg infectious disease ROS       Malignancy:   (+) Malignancy History of Lung  Lung CA Active status post.         Other:    (+) No chance of pregnancy C-spine cleared: N/A, H/O Chronic Pain,H/O chronic opiod use , no other significant disability                    Physical Exam  Normal systems: cardiovascular and pulmonary    Airway   Mallampati: I  TM distance: >3 FB  Neck ROM: full    Dental   (+) missing, chipped and other    Cardiovascular       Pulmonary                     Anesthesia Plan      History & Physical Review  History and physical reviewed and following examination; no interval change.    ASA Status:  3 .    NPO Status:  > 8 hours    Plan for MAC Maintenance will be Balanced.  Reason for MAC:  Deep or markedly invasive procedure (G8)  PONV prophylaxis:  Ondansetron (or other 5HT-3) and Dexamethasone or Solumedrol       Postoperative Care  Postoperative pain management:  IV analgesics and Oral  pain medications.      Consents  Anesthetic plan, risks, benefits and alternatives discussed with:  Patient..                          .

## 2017-05-17 NOTE — IP AVS SNAPSHOT
MRN:[id number]                      After Visit Summary   5/17/2017    Wade Acevedo    MRN: [id number]           Thank you!     Thank you for choosing [facility] for your care. Our goal is always to provide you with excellent care. Hearing back from our patients is one way we can continue to improve our services. Please take a few minutes to complete the written survey that you may receive in the mail after you visit with us. Thank you!        Patient Information     Date Of Birth          1960        About your hospital stay     You were admitted on:  May 17, 2017 You last received care in the:  [facility] PreOP/Phase II    You were discharged on:  May 17, 2017       Who to Call     For medical emergencies, please call 911.  For non-urgent questions about your medical care, please call your primary care provider or clinic, None  For questions related to your surgery, please call your surgery clinic        Attending Provider     Provider Specialty    Pawan Collins MD Surgery       Primary Care Provider    Doctor Unknown, MD       No address on file        Your next 10 appointments already scheduled     May 24, 2017  8:00 AM CDT   Level 6 with ROOM 1 [facility] ([facility])    [address] [place] [address] [place] [address]   [phone]            May 24, 2017  8:30 AM CDT   Return Visit with Pasquale Naylor MD   [facility] ([facility])    [facility]  [address] [place], [place] [address] [facility] [address]  [facility] [address]   [phone]            May 25, 2017  2:15 PM CDT   New Visit with José Miguel Amezcua MD   [facility] ([facility])    [address] [place] [address]   [phone]            May 25, 2017  3:00 PM CDT   Level O with ROOM 4 [facility] ([facility])    [address]  [address]  [facility] [address]   [phone]            Nov 14, 2017 11:30 AM CST   (Arrive by 11:15 AM)   RETURN ENDOCRINE with Padma Medley MD   [facility] ([facility])    [address] [facility]  [address] [facility] [address] [phone]              Further instructions from your care team       Wash incisions daily with soap and water. Some mild redness or swelling is expected. If draining, cover with dry gauze.    No lifting restrictions.    Okay to use ice pack over wound as necessary for comfort.    Use your own pain medication or tylenol or ibuprofen as necessary.    Diet as tolerated. No restrictions.    No follow-up is necessary.                          Same Day Surgery Discharge Instructions  Special Precautions After Surgery - Adult    1. It is not unusual to feel lightheaded or faint, up to 24 hours after surgery or while taking pain medication.  If you have these symptoms; sit for a few minutes before standing and have someone assist you when getting up.  2. You should rest and relax for the next 24 hours and must have someone stay with you for at least 24 hours after your discharge.  3. DO NOT DRIVE any vehicle or operate mechanical equipment for 24 hours following the end of your surgery.  DO NOT DRIVE while taking narcotic pain medications that have been prescribed by your physician.  If you had a limb operated on, you must be able to use it fully to drive.  4. DO NOT drink alcoholic beverages for 24 hours following surgery or while taking prescription pain medication.  5. Drink clear liquids (apple juice, ginger ale, broth, 7-Up, etc.).  Progress to your regular diet as you feel able.  6. Any questions call your physician and do not make important decisions for 24 hours.    ACTIVITY  ? Resume activity as tolerated.     INCISIONAL CARE  ? Keep incision dry for 24 hours.  ? Apply ice 1/2 hour on and 1/2 hour off while awake.  ? Be alert for signs of  "infection:  redness, swelling, heat, drainage of pus, and/or elevated temperature.  Contact your doctor if these occur.        Call for an appointment to return to the clinic (no follow-up needed).    Medications:  ? Meds as you have at home. No Tylenol today until after 5:15pm (took Percocet at 1:15)  ? Follow the instructions on the bottle.     Additional discharge instructions: as writtenper MD.  __________________________________________________________________________________________________________________________________  IMPORTANT NUMBERS:    [facility] Main Number:  [phone], [phone]  Pharmacy:  [phone]  Same Day Surgery:  [phone], Monday - Friday until 8:30 p.m.  Urgent Care:  [phone]  Emergency Room:  [phone]      [facility]:  [phone]                                                                             [facility]:  [phone] option 1  [facility]:  [phone]     [facility]:  [phone]   Surgery Specialty Clinic:  [phone]   Home Medical Equipment: [phone]  [facility]:  [phone]                  Pending Results     Date and Time Order Name Status Description    5/17/2017 1239 XR Chest 1 View In process     5/17/2017 0609 XR Surgery VIDHYA L/T 5 Min Fluoro w Stills In process             Admission Information     Date & Time Provider Department Dept. Phone    5/17/2017 Pawan Collins MD [facility] PreOP/Phase [phone]      Your Vitals Were     Blood Pressure Temperature Respirations Height Weight Pulse Oximetry    101/66 97.8  F (36.6  C) (Oral) 16 1.803 m (5' 11\") 82.6 kg (182 lb) 98%    BMI (Body Mass Index)                   25.38 kg/m2           [facility] Information     [facility] lets you send messages to your doctor, view your test results, renew your prescriptions, schedule appointments and more. To sign up, go to [email or web address] . Click on \"Log in\" on the left side of the " "screen, which will take you to the Welcome page. Then click on \"Sign up Now\" on the right side of the page.     You will be asked to enter the access code listed below, as well as some personal information. Please follow the directions to create your username and password.     Your access code is: [email or web address]  Expires: 2017  9:09 AM     Your access code will  in 90 days. If you need help or a new code, please call your [facility] or [phone].        Care EveryWhere ID     This is your Care EveryWhere ID. This could be used by other organizations to access your [facility] medical records  [id number]           Review of your medicines      UNREVIEWED medicines. Ask your doctor about these medicines        Dose / Directions    amoxicillin-clavulanate 875-125 MG per tablet   Commonly known as:  AUGMENTIN   Used for:  Anemia, unspecified type, Non-small cell lung cancer (NSCLC) (H)        Dose:  1 tablet   Take 1 tablet by mouth 2 times daily   Quantity:  20 tablet   Refills:  0       dexamethasone 4 MG tablet   Commonly known as:  DECADRON   Used for:  Anemia, unspecified type, Non-small cell lung cancer (NSCLC) (H)        Dose:  20 mg   Start taking on:  2017   Take 5 tablets (20 mg) by mouth daily X 3 days each cycle. To be taken the day before, day of, and day after chemotherapy infusion.   Quantity:  15 tablet   Refills:  3       folic acid 1 MG tablet   Commonly known as:  FOLVITE        Dose:  1 mg   Take 1 tablet (1 mg) by mouth daily   Quantity:  30 tablet   Refills:  0       ibuprofen 600 MG tablet   Commonly known as:  ADVIL/MOTRIN   Used for:  Anemia, unspecified type, Non-small cell lung cancer (NSCLC) (H)        Dose:  600 mg   Take 1 tablet (600 mg) by mouth every 8 hours as needed for moderate pain   Quantity:  30 tablet   Refills:  1       LORazepam 0.5 MG tablet   Commonly known as:  ATIVAN   Used for:  Non-small cell lung cancer (NSCLC) (H)        Dose:  0.5 mg   Take 1 tablet " (0.5 mg) by mouth every 4 hours as needed (Anxiety, Nausea/Vomiting or Sleep)   Quantity:  30 tablet   Refills:  2       ondansetron 8 MG tablet   Commonly known as:  ZOFRAN   Used for:  Non-small cell lung cancer (NSCLC) (H)        Dose:  8 mg   Take 1 tablet (8 mg) by mouth every 8 hours as needed (Nausea/Vomiting)   Quantity:  10 tablet   Refills:  2       * OXYCODONE HCL PO   Used for:  Anemia, unspecified type, Non-small cell lung cancer (NSCLC) (H)        Dose:  5 mg   Take 5 mg by mouth every 4 hours as needed   Refills:  0       * OXYCODONE HCL PO   Used for:  Anemia, unspecified type, Non-small cell lung cancer (NSCLC) (H)        Dose:  10 mg   Take 10 mg by mouth every 4 hours as needed   Refills:  0       * oxyCODONE 5 MG IR tablet   Commonly known as:  ROXICODONE   Used for:  Non-small cell lung cancer (NSCLC) (H)        Dose:  5 mg   Take 1 tablet (5 mg) by mouth every 4 hours as needed for pain maximum 8 tablet(s) per day   Quantity:  40 tablet   Refills:  0       pantoprazole 40 MG EC tablet   Commonly known as:  PROTONIX        Dose:  40 mg   Take 1 tablet (40 mg) by mouth 2 times daily (before meals)   Quantity:  30 tablet   Refills:  0       prochlorperazine 10 MG tablet   Commonly known as:  COMPAZINE   Used for:  Non-small cell lung cancer (NSCLC) (H)        Dose:  10 mg   Take 1 tablet (10 mg) by mouth every 6 hours as needed (Nausea/Vomiting)   Quantity:  30 tablet   Refills:  2       senna-docusate 8.6-50 MG per tablet   Commonly known as:  SENOKOT-S;PERICOLACE        Dose:  1 tablet   Take 1 tablet by mouth 2 times daily Reported on 5/4/2017   Quantity:  100 tablet   Refills:  0       thiamine 100 MG tablet        Dose:  100 mg   Take 1 tablet (100 mg) by mouth daily   Refills:  0       TYLENOL PO   Used for:  Anemia, unspecified type, Non-small cell lung cancer (NSCLC) (H)        Dose:  1000 mg   Take 1,000 mg by mouth every 6 hours as needed for mild pain or fever   Refills:  0       *  Notice:  This list has 3 medication(s) that are the same as other medications prescribed for you. Read the directions carefully, and ask your doctor or other care provider to review them with you.             Protect others around you: Learn how to safely use, store and throw away your medicines at www.disposemymeds.org.             Medication List: This is a list of all your medications and when to take them. Check marks below indicate your daily home schedule. Keep this list as a reference.      Medications           Morning Afternoon Evening Bedtime As Needed    amoxicillin-clavulanate 875-125 MG per tablet   Commonly known as:  AUGMENTIN   Take 1 tablet by mouth 2 times daily                                dexamethasone 4 MG tablet   Commonly known as:  DECADRON   Take 5 tablets (20 mg) by mouth daily X 3 days each cycle. To be taken the day before, day of, and day after chemotherapy infusion.   Start taking on:  5/24/2017                                folic acid 1 MG tablet   Commonly known as:  FOLVITE   Take 1 tablet (1 mg) by mouth daily                                ibuprofen 600 MG tablet   Commonly known as:  ADVIL/MOTRIN   Take 1 tablet (600 mg) by mouth every 8 hours as needed for moderate pain                                LORazepam 0.5 MG tablet   Commonly known as:  ATIVAN   Take 1 tablet (0.5 mg) by mouth every 4 hours as needed (Anxiety, Nausea/Vomiting or Sleep)                                ondansetron 8 MG tablet   Commonly known as:  ZOFRAN   Take 1 tablet (8 mg) by mouth every 8 hours as needed (Nausea/Vomiting)                                * OXYCODONE HCL PO   Take 5 mg by mouth every 4 hours as needed                                * OXYCODONE HCL PO   Take 10 mg by mouth every 4 hours as needed                                * oxyCODONE 5 MG IR tablet   Commonly known as:  ROXICODONE   Take 1 tablet (5 mg) by mouth every 4 hours as needed for pain maximum 8 tablet(s) per day                                 pantoprazole 40 MG EC tablet   Commonly known as:  PROTONIX   Take 1 tablet (40 mg) by mouth 2 times daily (before meals)                                prochlorperazine 10 MG tablet   Commonly known as:  COMPAZINE   Take 1 tablet (10 mg) by mouth every 6 hours as needed (Nausea/Vomiting)                                senna-docusate 8.6-50 MG per tablet   Commonly known as:  SENOKOT-S;PERICOLACE   Take 1 tablet by mouth 2 times daily Reported on 5/4/2017                                thiamine 100 MG tablet   Take 1 tablet (100 mg) by mouth daily                                TYLENOL PO   Take 1,000 mg by mouth every 6 hours as needed for mild pain or fever                                * Notice:  This list has 3 medication(s) that are the same as other medications prescribed for you. Read the directions carefully, and ask your doctor or other care provider to review them with you.

## 2017-05-17 NOTE — DISCHARGE INSTRUCTIONS
Wash incisions daily with soap and water. Some mild redness or swelling is expected. If draining, cover with dry gauze.    No lifting restrictions.    Okay to use ice pack over wound as necessary for comfort.    Use your own pain medication or tylenol or ibuprofen as necessary.    Diet as tolerated. No restrictions.    No follow-up is necessary.                          Same Day Surgery Discharge Instructions  Special Precautions After Surgery - Adult    1. It is not unusual to feel lightheaded or faint, up to 24 hours after surgery or while taking pain medication.  If you have these symptoms; sit for a few minutes before standing and have someone assist you when getting up.  2. You should rest and relax for the next 24 hours and must have someone stay with you for at least 24 hours after your discharge.  3. DO NOT DRIVE any vehicle or operate mechanical equipment for 24 hours following the end of your surgery.  DO NOT DRIVE while taking narcotic pain medications that have been prescribed by your physician.  If you had a limb operated on, you must be able to use it fully to drive.  4. DO NOT drink alcoholic beverages for 24 hours following surgery or while taking prescription pain medication.  5. Drink clear liquids (apple juice, ginger ale, broth, 7-Up, etc.).  Progress to your regular diet as you feel able.  6. Any questions call your physician and do not make important decisions for 24 hours.    ACTIVITY  ? Resume activity as tolerated.     INCISIONAL CARE  ? Keep incision dry for 24 hours.  ? Apply ice 1/2 hour on and 1/2 hour off while awake.  ? Be alert for signs of infection:  redness, swelling, heat, drainage of pus, and/or elevated temperature.  Contact your doctor if these occur.        Call for an appointment to return to the clinic (no follow-up needed).    Medications:  ? Meds as you have at home. No Tylenol today until after 5:15pm (took Percocet at 1:15)  ? Follow the instructions on the bottle.      Additional discharge instructions: as writtenper MD.  __________________________________________________________________________________________________________________________________  IMPORTANT NUMBERS:    [facility] Main Number:  [phone], [phone]  Pharmacy:  [phone]  Same Day Surgery:  [phone], Monday - Friday until 8:30 p.m.  Urgent Care:  [phone]  Emergency Room:  [phone]      [facility]:  [phone]                                                                             [facility]:  [phone] option [address] [place] [address] Orthopedics:  [phone]     [facility]:  [phone]   [facility]:  [phone]   Home Medical Equipment: [phone]  [facility]:  [phone]

## 2017-05-18 NOTE — OP NOTE
DATE OF PROCEDURE:        PREOPERATIVE DIAGNOSIS:  Metastatic lung cancer, need for venous access.      POSTOPERATIVE DIAGNOSIS:  Metastatic lung cancer, need for venous access.      PROCEDURES PERFORMED:  Insertion of left subclavian port-A-Cath.      SURGEON:  Pawan Collins MD      ASSISTANT:  NICOLLE Fortune  I requested Kvng's assistance for his expertise with wound closure, hemostasis management and placement of the device.      ANESTHESIA:  Local MAC.      INDICATIONS:  As above.  Prior to the procedure, the patient was counseled about the risks, benefits and alternatives of the procedure and he agreed to proceed.  All of his questions were answered.      DESCRIPTION OF PROCEDURE:  Wade Acevedo was brought to the operating room, placed on the table in the supine position.  After sedation by Anesthesia, the left and right subclavian areas as well as neck were prepped and draped in the usual sterile fashion.  Beginning on the left side the skin was marked.  At this point, a timeout was performed to identify the patient and the proposed procedure.  The left side was infiltrated with local anesthetic, both in the device insertion site as well as the subclavian area.  The entire field was anesthetized with probably 20 mL of local.  We then started with the finder needle.  This was attached to a 10 mL syringe.  On the first pass I obtained blood and placed a wire easily.  The wire, unfortunately, was shown to be going up the internal jugular vein.  I tried to manipulate it under fluoroscopy, but was unable to do so.  The wire was removed.  We then attempted puncturing the subclavian vein about 6-7 more times before finally was successful.  We placed him in a little bit more Trendelenburg and I believe that was helpful.  Once the vein was punctured we placed a wire and verified under fluoroscopy that it was going down the subclavian to the superior vena cava.  The wire was secured to the drapes at this point.   Next, the incision for the device was made with a knife and a pocket was developed using blunt finger dissection.  The device was then assembled and sutured to the fascia using 2 interrupted silk sutures.  The device was flushed as well.  Next, the tunneler was used to tunnel the catheter between the device insertion site and the subclavian puncture site.  This incision under the clavicle had been made just a tiny bit bigger with an 11 blade.  Using fluoroscopy then the catheter was cut to appropriate length.  The introducer dilator was then placed and the dilator and wire were removed.  The catheter was placed into the subclavian vein and into the vena cava through the introducer.  The introducer sheath was then peeled away.  Fluoroscopy at this point and showed the catheter to be in good position.  The tip was in the superior vena cava right atrial junction.  It took a little bit of extra gentle curve under the clavicle but was not kinked in any way.  The device flushed easily and aspirated well.  Fluoroscopy showed it to be in good position.  We then closed the device insertion site with a few interrupted 3-0 Vicryls and a running subcuticular Monocryl.  The subclavian puncture site was closed with a single 4-0 Monocryl.  All the wounds were covered with surgical glue to provide dressings and watertight closure.  The patient was then taken back to the Ambulatory Surgery unit for chest x-ray and eventual discharge to home.  At the time of this dictation, the chest x-ray is pending and the patient was doing well without apparent complications.         TUSHAR SEO MD             D: 2017 12:34   T: 2017 20:18   MT: #[id number]      Name:     JOHN SANCHEZ   MRN:      [id number]        Account:        [id number]   :      1960           Procedure Date: 2017      Document: [id number]

## 2017-05-22 NOTE — PROGRESS NOTES
Spoke with Sarah per patient's request.  Verified that patient has dexamethasone and knows to take it for 3 days starting tomorrow.

## 2017-05-24 NOTE — PROGRESS NOTES
Infusion Nursing Note:  Wade Acevedo presents today for C1D1 Taxol/Carbo    Patient seen by provider today: Yes: Dr. Naylor   present during visit today: Not Applicable.    Note: Went over side effects of each chemo with pt as well as needing a thermometer at home and what to do if temperature reaches 100.4 or higher.  Ordered EMLA cream for pt and went over on how to use.  Discussed antiemetics with pt and wrote down instructions as well.  Pt verbalized understanding of information and has no further questions or concerns at this time.    Per Dr. Naylor, pt does NOT need to take Decadron on Day 2.  Pt updated and verbalized understanding.    Confirmed with Dr. Naylor no Neulasta for pt at this time.  He will update orders.        Intravenous Access:  Labs drawn without difficulty.  Implanted Port.    Treatment Conditions:  Lab Results   Component Value Date    HGB 9.2 05/24/2017     Lab Results   Component Value Date    WBC 6.7 05/24/2017      Lab Results   Component Value Date    ANEU 6.0 05/24/2017     Lab Results   Component Value Date     05/24/2017      Lab Results   Component Value Date     05/24/2017                   Lab Results   Component Value Date    POTASSIUM 3.9 05/24/2017           Lab Results   Component Value Date    MAG 1.8 04/21/2017            Lab Results   Component Value Date    CR 0.75 05/24/2017                   Lab Results   Component Value Date    JOHANN 9.5 05/24/2017                Lab Results   Component Value Date    BILITOTAL 0.5 05/24/2017           Lab Results   Component Value Date    ALBUMIN 3.7 05/24/2017                    Lab Results   Component Value Date    ALT 22 05/24/2017           Lab Results   Component Value Date    AST 37 05/24/2017     Results reviewed, labs MET treatment parameters, ok to proceed with treatment.      Post Infusion Assessment:  Patient tolerated infusion without incident.  Blood return noted pre and post infusion.  Site  patent and intact, free from redness, edema or discomfort.  No evidence of extravasations.  Access discontinued per protocol.    Discharge Plan:   Patient discharged in stable condition accompanied by: self and Uncle, Iker.  Departure Mode: Ambulatory  Pt to return on 5/31/17 at 1:00 pm for MD appt with Dr. Naylor..    Mayte Moreira RN

## 2017-05-24 NOTE — PROGRESS NOTES
Hematology/ Oncology Follow-up Visit:  May 24, 2017    Reason for Visit:   Chief Complaint   Patient presents with     Oncology Clinic Visit     1 week recheck NSCLC, Labs & Chemo today       Oncologic History:  Non-small cell lung cancer (NSCLC) (H)  The patient presented with 2-3 months history of weakness and fainting episodes. He was seen in the emergency room further workup was done including a CT scan which showed a 3 cm mass in the medial left upper lobe. There was adjacent atelectasis at the prominent mediastinal lymph nodes. There is a destructive bone lesion involving left lateral fifth rib. Patient underwent transthoracic CT-guided biopsy in the hospital. The pathology report came back consistent with mixed tumor including adenocarcinoma and squamous histology. 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 He had a normal TSH, but he had low T3 and T4. He had a serum cortisol morning level 4.4. He underwent a cosyntropin stim test which was mildly abnormal. He had a cortisol level 14.8 at 30 minutes and 19.6 at 60 minutes. His 24-hour cortisol in his urine was normal. He was seen by endocrinology we will continue to monitor him in 6 months.   PET scan showed hypermetabolic bilateral level Ib and right level III lymph nodes in the neck suspicious for metastatic disease. There was also hypermetabolic activity seen in the sella consistent with pituitary macroadenoma. There is hypermetabolic mediastinal left hilar lymphadenopathy.  There is metastatic disease seen in the left scapula and left fifth rib. There is left pleural effusion and small right pleural effusion seen as well.  ALK 4 came back negative. EGFR was negative for mutation. . PDL 1 is less than 1%.     Interval History:  Patient returning today for follow-up and to start his first cycle of chemotherapy with carboplatin and Taxol.  He is currently home.  He is smoking about half a pack  of cigarettes a day. he is having poor appetite with poor oral intake. His pain is controlled. He denies any dizziness or fever or chills. He denies any cough or wheezing.    Review Of Systems:  Constitutional: Negative for fever, chills, and night sweats.  Skin: negative.  Eyes: negative.  Ears/Nose/Throat: negative.  Respiratory: No shortness of breath, dyspnea on exertion, cough, or hemoptysis.  Cardiovascular: negative.  Gastrointestinal: negative.  Genitourinary: negative.  Musculoskeletal: negative.  Neurologic: negative.  Psychiatric: negative.  Hematologic/Lymphatic/Immunologic: negative.  Endocrine: negative.    All other ROS negative unless mentioned in interval history.    Past medical, social, surgical, and family histories reviewed.    Allergies:  Allergies as of 05/24/2017 - Kvng as Reviewed 05/24/2017   Allergen Reaction Noted     Lubriderm Rash 04/20/2017       Current Medications:  Current Outpatient Prescriptions   Medication Sig Dispense Refill     dexamethasone (DECADRON) 4 MG tablet Take 5 tablets (20 mg) by mouth daily X 3 days each cycle. To be taken the day before, day of, and day after chemotherapy infusion. 15 tablet 3     oxyCODONE (ROXICODONE) 5 MG IR tablet Take 1 tablet (5 mg) by mouth every 4 hours as needed for pain maximum 8 tablet(s) per day 40 tablet 0     amoxicillin-clavulanate (AUGMENTIN) 875-125 MG per tablet Take 1 tablet by mouth 2 times daily 20 tablet 0     Acetaminophen (TYLENOL PO) Take 1,000 mg by mouth every 6 hours as needed for mild pain or fever       ibuprofen (ADVIL/MOTRIN) 600 MG tablet Take 1 tablet (600 mg) by mouth every 8 hours as needed for moderate pain 30 tablet 1     senna-docusate (SENOKOT-S;PERICOLACE) 8.6-50 MG per tablet Take 1 tablet by mouth 2 times daily Reported on 5/4/2017 100 tablet      thiamine 100 MG tablet Take 1 tablet (100 mg) by mouth daily       pantoprazole (PROTONIX) 40 MG EC tablet Take 1 tablet (40 mg) by mouth 2 times daily (before  "meals) 30 tablet      LORazepam (ATIVAN) 0.5 MG tablet Take 1 tablet (0.5 mg) by mouth every 4 hours as needed (Anxiety, Nausea/Vomiting or Sleep) (Patient not taking: Reported on 5/24/2017) 30 tablet 2     prochlorperazine (COMPAZINE) 10 MG tablet Take 1 tablet (10 mg) by mouth every 6 hours as needed (Nausea/Vomiting) (Patient not taking: Reported on 5/24/2017) 30 tablet 2     ondansetron (ZOFRAN) 8 MG tablet Take 1 tablet (8 mg) by mouth every 8 hours as needed (Nausea/Vomiting) (Patient not taking: Reported on 5/24/2017) 10 tablet 2     folic acid (FOLVITE) 1 MG tablet Take 1 tablet (1 mg) by mouth daily 30 tablet         Physical Exam:  /85 (BP Location: Right arm, Patient Position: Chair, Cuff Size: Adult Regular)  Pulse 94  Temp 98.2  F (36.8  C) (Tympanic)  Resp 16  Ht 1.803 m (5' 10.98\")  Wt 71.6 kg (157 lb 14.4 oz)  SpO2 100%  BMI 22.03 kg/m2  Wt Readings from Last 12 Encounters:   05/24/17 71.6 kg (157 lb 14.4 oz)   05/17/17 82.6 kg (182 lb)   05/15/17 77.6 kg (171 lb)   05/15/17 82.6 kg (182 lb)   05/11/17 77.8 kg (171 lb 8 oz)   05/10/17 77.6 kg (171 lb)   05/10/17 82.6 kg (182 lb)   05/04/17 80.9 kg (178 lb 6.4 oz)   04/26/17 82.3 kg (181 lb 7 oz)   04/27/17 82.6 kg (182 lb)     ECOG performance status: 1  GENERAL APPEARANCE: Healthy, alert and in no acute distress.  HEENT: Sclerae anicteric. PERRLA. Oropharynx without ulcers, lesions, or thrush.  NECK: Supple. No asymmetry or masses.  LYMPHATICS: No palpable cervical, supraclavicular, axillary, or inguinal lymphadenopathy.  RESP: Lungs clear to auscultation bilaterally without rales, rhonchi or wheezes.  CARDIOVASCULAR: Regular rate and rhythm. Normal S1, S2; no S3 or S4. No murmur, gallop, or rub.  ABDOMEN: Soft, nontender. Bowel sounds normal. No palpable organomegaly or masses.  MUSCULOSKELETAL: Extremities without gross deformities noted. No edema of bilateral lower extremities.  SKIN: No suspicious lesions or rashes.  NEURO: Alert " and oriented x 3. Cranial nerves II-XII grossly intact.  PSYCHIATRIC: Mentation and affect appear normal.    Laboratory/Imaging Studies:  Infusion Therapy Visit on 05/24/2017   Component Date Value Ref Range Status     WBC 05/24/2017 6.7  4.0 - 11.0 10e9/L Final     RBC Count 05/24/2017 3.89* 4.4 - 5.9 10e12/L Final     Hemoglobin 05/24/2017 9.2* 13.3 - 17.7 g/dL Final     Hematocrit 05/24/2017 28.2* 40.0 - 53.0 % Final     MCV 05/24/2017 73* 78 - 100 fl Final     MCH 05/24/2017 23.7* 26.5 - 33.0 pg Final     MCHC 05/24/2017 32.6  31.5 - 36.5 g/dL Final     RDW 05/24/2017 18.7* 10.0 - 15.0 % Final     Platelet Count 05/24/2017 382  150 - 450 10e9/L Final     Diff Method 05/24/2017 Automated Method   Final     % Neutrophils 05/24/2017 89.9  % Final     % Lymphocytes 05/24/2017 8.1  % Final     % Monocytes 05/24/2017 1.9  % Final     % Eosinophils 05/24/2017 0.0  % Final     % Basophils 05/24/2017 0.0  % Final     % Immature Granulocytes 05/24/2017 0.1  % Final     Absolute Neutrophil 05/24/2017 6.0  1.6 - 8.3 10e9/L Final     Absolute Lymphocytes 05/24/2017 0.5* 0.8 - 5.3 10e9/L Final     Absolute Monocytes 05/24/2017 0.1  0.0 - 1.3 10e9/L Final     Absolute Eosinophils 05/24/2017 0.0  0.0 - 0.7 10e9/L Final     Absolute Basophils 05/24/2017 0.0  0.0 - 0.2 10e9/L Final     Abs Immature Granulocytes 05/24/2017 0.0  0 - 0.4 10e9/L Final     Sodium 05/24/2017 138  133 - 144 mmol/L Final     Potassium 05/24/2017 3.9  3.4 - 5.3 mmol/L Final     Chloride 05/24/2017 108  94 - 109 mmol/L Final     Carbon Dioxide 05/24/2017 22  20 - 32 mmol/L Final     Anion Gap 05/24/2017 8  3 - 14 mmol/L Final     Glucose 05/24/2017 128* 70 - 99 mg/dL Final     Urea Nitrogen 05/24/2017 21  7 - 30 mg/dL Final     Creatinine 05/24/2017 0.75  0.66 - 1.25 mg/dL Final     GFR Estimate 05/24/2017   >60 mL/min/1.7m2 Final                    Value:>90  Non  GFR Calc       GFR Estimate If Black 05/24/2017   >60 mL/min/1.7m2 Final                     Value:>90   GFR Calc       Calcium 05/24/2017 9.5  8.5 - 10.1 mg/dL Final     Bilirubin Total 05/24/2017 0.5  0.2 - 1.3 mg/dL Final     Albumin 05/24/2017 3.7  3.4 - 5.0 g/dL Final     Protein Total 05/24/2017 8.7  6.8 - 8.8 g/dL Final     Alkaline Phosphatase 05/24/2017 197* 40 - 150 U/L Final     ALT 05/24/2017 22  0 - 70 U/L Final     AST 05/24/2017 37  0 - 45 U/L Final        Recent Results (from the past 744 hour(s))   CT Lung Mediastinum Biopsy    Narrative    4/26/2017 9:53 AM    HISTORY: Left upper lobe lung mass.    COMPARISON: 4/20/2017.    PROCEDURE: Risks and benefits of a CT guided core biopsy of the left  upper lobe lung mass are discussed with the patient. Under CT  guidance, aseptic conditions, and utilizing 10 mL 1% lidocaine as  local anesthetic, a 19 gauge coaxial needle is placed. Through this,  two 20 gauge core biopsy samples are obtained and placed in formalin  for pathologic analysis. The patient tolerated the procedure well.    Radiation dose for this procedure is reduced using automated exposure  control of mA and/or kV according to patient size, or iterative  reconstruction technique.    CONSCIOUS SEDATION: Provided by the anesthesia service.      Impression    IMPRESSION: Technically uneventful CT guided needle core biopsy, as  described above. Pathologic results are pending.    AZALEA ART MD   XR Chest 1 View    Narrative    XR CHEST 1 VW 4/26/2017 10:00 AM    HISTORY: Lung biopsy.    COMPARISON: 10/31/2006    FINDINGS: No pneumothorax. Known mass is adjacent to the left  pulmonary hilum. Interstitial edema.      Impression    IMPRESSION: No pneumothorax.    ALLEN ORTIZ MD   XR Chest 1 View    Narrative    CHEST ONE VIEW April 26, 2017 12:06 PM     HISTORY: Chest biopsy.     COMPARISON: Chest x-ray 4/26/2017 at 10:00 AM.      Impression    IMPRESSION: Single view of the chest is performed following CT-guided  left lung biopsy. A trace left apical  pneumothorax is noted. This was  not definitely evident on the prior exam. No evidence of mediastinal  shift. Heart is normal in size. Anterior left lung mass is again  noted. Right lung is well expanded and clear.    JIM POWERS MD   XR Chest 2 Views    Narrative    XR CHEST 2 VW 4/27/2017 6:10 AM    HISTORY: Follow-up of a small pneumothorax after lung biopsy.    COMPARISON: Prior study done at 1208 hours on April 26.      Impression    IMPRESSION: 2 views of the chest show a modest left apical  pneumothorax, slightly larger compared to the prior study. It measures  1.9 cm of lucency in the apical area compared to 0.9 cm on the prior  study. Small bilateral pleural effusions are present, also seen on  other recent imaging studies.     AZALEA ART MD   PET Oncology (Eyes to Thighs)    Narrative    Combined Report of:    PET and CT on  5/9/2017 4:04 PM :    1. PET of the neck, chest, abdomen, and pelvis.  2. PET CT Fusion for Attenuation Correction and Anatomical  Localization:    3. 3D MIP and PET-CT fused images were processed on an independent  workstation and archived to PACS and reviewed by a radiologist.    Technique:    1. PET: The patient received 13.25 mCi of F-18-FDG; the serum glucose  was 90 prior to administration, body weight was 80.9 kg. Images were  evaluated in the axial, sagittal, and coronal planes as well as the  rotational whole body MIP. Images were acquired from the Eyes to the  proximal thighs.    UPTAKE WAS MEASURED AT 60 MINUTES.     2. CT: Volumetric acquisition for clinical interpretation of the  chest, abdomen, and pelvis acquired at 3 mm sections . The chest,  abdomen, and pelvis were evaluated at 5 mm sections in bone, soft  tissue, and lung windows.      No intravenous contrast administered.    --    3. 3D MIP and PET-CT fused images were processed on an independent  workstation and archived to PACS and reviewed by a radiologist.    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 Brain MRI with  pituitary macroadenoma.    COMPARISON: Chest radiograph 4/27/2017, CT chest abdomen and pelvis  4/20/2017    FINDINGS:     HEAD/NECK:  Bilateral hypermetabolic level 1B and right level 3 lymph nodes. 11 mm  right level 1B lymph node (series 3 image 62), with SUV max of 12, and  8 mm left level 1B lymph node (series 3 image 62), with SUV max of  7.1. Right level 3 lymph node measuring 12 mm with SUV max of 8.3  (series 3 image 74). Small left level 4 FDG avid node.  Partially  visualized hypermetabolism in the sella with SUV max of 16.6 better  evaluated on MRI dated 4/21/2017.    Unchanged complete opacification of the right maxillary sinus and left  maxillary sinus mucus retention cysts. The mastoid air cells are  clear. The remaining paranasal sinuses are unremarkable.     The mucosal pharyngeal space, the , prevertebral and carotid  spaces are within normal limits.     The thyroid gland is normal.    CHEST:  Multiple foci of abnormal FDG uptake are noted in the chest, including  the left upper lobe, superior mediastinum, subcarinal region, anterior  prevascular, right upper paratracheal, left lower paratracheal and  left hilar. Again demonstrated is a 2.8 x 3.4 cm left upper lobe mass  with SUV max of 18.2, consistent with patient's known primary  non-small cell lung carcinoma. Soft tissue mass along the left  superior aspect of the superior mediastinum measuring approximately  1.9 x 5.7 cm (series 3 image 128), with SUV max of 14.3. 2.1 cm right  superior mediastinal lymph node (series 3 image 118), with SUV max of  12.7, and 2.5 cm short axis diameter subcarinal lymph node (series 3  image 152), with SUV max of 16.1.    Moderate left pleural effusion with tiny amount of residual  air  demonstrates a larger fluid component and smaller air component since  4/27/2017. Small right pleural effusion is increased from previous.  Overlying atelectasis is present. No additional pulmonary nodules are  identified, though findings are limited on this nondiagnostic CT. No  significant pericardial effusion.    ABDOMEN AND PELVIS:  There is no suspicious FDG uptake in the abdomen or pelvis.    Small amount of nonspecific pelvic free fluid. No free air. Punctate  left lower pole nonobstructing calyceal tip calculus unchanged from  previous.    LOWER EXTREMITIES:   No abnormal masses or hypermetabolic lesions.    BONES:   Lytic lesions involving the left scapula and left fifth rib with soft  tissue component. The left scapular lesion measures 18 mm (series 3  image 74), with SUV max of 14.5, and the left fifth rib lesion  measures 4.3 x 2.2 cm (series 3 image 143), with SUV max of 20.9.  Multiple healed right-sided rib fractures.        Impression    IMPRESSION: In this patient with newly diagnosed non-small cell  carcinoma of the left upper lobe:  1. Hypermetabolic bilateral level 1B and right level 3 lymph nodes in  the neck highly suspicious for metastatic disease.  2. 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  3. 2.8 x 3.4 cm hypermetabolic left upper lobe mass consistent with  patient's known non-small cell lung carcinoma.   4. Hypermetabolic mediastinal and left hilar lymphadenopathy.   5. Metastatic disease including the left scapula and left fifth rib.  6. Increased moderate left pleural effusion and small right pleural  effusion. Left pneumothorax has almost totally resolved. No focal FDG  uptake to suggest that these are malignant, however thoracentesis  could be considered for further evaluation if clinically indicated.  7. Small amount of new nonspecific pelvic free fluid.     I have personally reviewed the examination and  initial interpretation  and I agree with the findings.    AHMET BUITRAGO MD   XR Chest 2 Views    Narrative    CHEST TWO VIEWS   5/15/2017 2:42 PM     HISTORY: Left pleural effusion shown on PET scan. Malignant neoplasm  of unspecified part of unspecified bronchus or lung.    COMPARISON: Chest x-ray 4/27/2017.      Impression    IMPRESSION: Two views of the chest are performed. Scarring in the left  hilar region appears stable. Trace amount of left pleural fluid is  likely unchanged. Heart is normal in size. Left pneumothorax has  resolved. Right lung is well expanded and clear.    JIM POWERS MD   XR Surgery VIDHYA L/T 5 Min Fluoro w Stills    Narrative    XR SURGERY VIDHYA FLUORO LESS THAN 5 MIN W STILLS 5/17/2017 12:29 PM    HISTORY: Port placement.    Fluoroscopy time: 0.56 minutes.    COMPARISON: None.    FINDINGS: 2 fluoroscopic views were obtained during port placement.      Impression    IMPRESSION: Procedural fluoroscopy.    ALLEN ORTIZ MD   XR Chest 1 View    Narrative    XR CHEST 1 VW 5/17/2017 12:56 PM    HISTORY: Port-A-Cath placement.    COMPARISON: 5/15/2017.      Impression    IMPRESSION: Single view the chest shows interval placement of a  left-sided power port catheter. The tip is in the expected region of  the mid SVC.  Haziness to the left hemithorax slightly linear relates  to the patient's known left pleural effusion. Masslike fullness in the  left suprahilar region is more prominent compared to two days ago,  possibly due to patient positioning and/or radiation therapy (in the  appropriate clinical setting).    AZALEA ART MD       Assessment and plan:    (C34.90) Non-small cell lung cancer (NSCLC) (H)  (primary encounter diagnosis)  I reviewed with the patient today the results of the PET scan. We talked about the management plan in details. We talked about the role of palliative chemotherapy. We also talked about the benefits and risks.would recommend use of chemotherapy. I  recommend  carboplatin and Taxol. Patient will be receiving carboplatin with AUC of 6 on day 1 paclitaxel and a dose of 175 mg/m  on day 1 every 21 day cycle.  We discussed the rationale behind using combination Carboplatin and Paclitaxel in detail as well as major side effects including, but not limited to immediate/short term side effects of acute infusion reaction/anaphylaxis, life-threatening infection, flushing, body aches, nausea/vomiting, diarrhea/constipation, mucositis, alopecia, anemia, neutropenia, thrombocytopenia, and fatigue as well as small risk of long-term side effect of duration and dose dependent peripheral neuropathy.  Patient instructed to call with signs or symptoms of infection including, but not limited to temperature greater than or equal to 100.5 degrees Fahrenheit, chills, severe sore throat, ear or sinus pain, mouth sores, cough, painful urination, and/or non-healing wound. The patient agrees to proceed with treatment, and denies any further questions at this time. He will be starting his first cycle today. We will plan to repeat imaging studies after 2 cycles to assess response to treatment.    (D35.2) Pituitary macroadenoma (H)  Patient is currently followed by endocrinology.    (D63.8) Anemia, chronic disease  Hemoglobin has slightly improved. We will continue to monitor. The patient will be offered blood transfusion if  Becoming more symptomatic.    (G89.3) Cancer associated pain  Patient pain is well-controlled.    (K59.03) Drug-induced constipation  Constipation has improved.    (Z87.898) H/O ETOH abuse  He is not currently drinking.    Smoking  I strongly emphasized the importance of quitting smoking.    The patient is ready to learn, no apparent learning barriers were identified.  Diagnosis and treatment plans were explained to the patient. The patient expressed understanding of the content. The patient asked appropriate questions. The patient questions were answered to his  satisfaction.    Chart documentation with Dragon Voice recognition Software. Although reviewed after completion, some words and grammatical errors may remain.

## 2017-05-24 NOTE — ASSESSMENT & PLAN NOTE
The patient presented with 2-3 months history of weakness and fainting episodes. He was seen in the emergency room further workup was done including a CT scan which showed a 3 cm mass in the medial left upper lobe. There was adjacent atelectasis at the prominent mediastinal lymph nodes. There is a destructive bone lesion involving left lateral fifth rib. Patient underwent transthoracic CT-guided biopsy in the hospital. The pathology report came back consistent with mixed tumor including adenocarcinoma and squamous histology. 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 He had a normal TSH, but he had low T3 and T4. He had a serum cortisol morning level 4.4. He underwent a cosyntropin stim test which was mildly abnormal. He had a cortisol level 14.8 at 30 minutes and 19.6 at 60 minutes. His 24-hour cortisol in his urine was normal. He was seen by endocrinology we will continue to monitor him in 6 months.   PET scan showed hypermetabolic bilateral level Ib and right level III lymph nodes in the neck suspicious for metastatic disease. There was also hypermetabolic activity seen in the sella consistent with pituitary macroadenoma. There is hypermetabolic mediastinal left hilar lymphadenopathy.  There is metastatic disease seen in the left scapula and left fifth rib. There is left pleural effusion and small right pleural effusion seen as well.  ALK 4 came back negative. EGFR was negative for mutation. . PDL 1 is less than 1%.

## 2017-05-24 NOTE — MR AVS SNAPSHOT
After Visit Summary   5/24/2017    Wade Acevedo    MRN: [id number]           Patient Information     Date Of Birth          1960        Visit Information        Provider Department      5/24/2017 8:30 AM Pasquale Naylor MD [facility]        Today's Diagnoses     Non-small cell lung cancer (NSCLC) (H)    -  1    Pituitary macroadenoma (H)        Anemia, chronic disease        Cancer associated pain        Drug-induced constipation        H/O ETOH abuse          Care Instructions    We would like to see you back in clinic with Dr. Naylor next week, proceed with chemotherapy today.  Chemotherapy to be scheduled per treatment plan. Copy of appointments, and after visit summary (AVS) given to patient.  If you have any questions during business hours (M-F 8 AM- 4PM), please call Jillian Renee RN, BSN, OCN Oncology Hematology /Breast Cancer Navigator at [facility] [phone].   For questions after business hours, or on holidays/weekends, please call our after hours Nurse Triage line [phone]. Thank you.            Follow-ups after your visit        Your next 10 appointments already scheduled     May 25, 2017 12:40 PM CDT   [facility] Pawan Hampton MD   [facility]    [address] [address]   [phone]           Bring a current list of meds and any records pertaining to this visit.  For Physicals, please bring immunization records and any forms needing to be filled out.  Please arrive 10 minutes early to complete paperwork.            May 25, 2017  2:15 PM CDT   New Visit with José Miguel Amezcua MD   [facility]    [address] [address]   [phone]            May 31, 2017  1:00 PM CDT   Return Visit with Pasquale Naylor MD   [facility] ([facility])    [facility]  "[facility]  [address]  [facility] [address]   [phone]            2017  8:00 AM CDT   Level 6 with ROOM 10 [facility] ([facility])    [facility]  [address]  [facility] [address]   [phone]            2017 11:30 AM CST   (Arrive by 11:15 AM)   RETURN ENDOCRINE with Padma Medley MD   [facility] Endocrinology ([facility])    [address] [place] [address] [phone]              Who to contact     If you have questions or need follow up information about today's clinic visit or your schedule please contact [facility] directly at [phone].  Normal or non-critical lab and imaging results will be communicated to you by MyChart, letter or phone within 4 business days after the clinic has received the results. If you do not hear from us within 7 days, please contact the clinic through MyChart or phone. If you have a critical or abnormal lab result, we will notify you by phone as soon as possible.  Submit refill requests through [employer] or call your pharmacy and they will forward the refill request to us. Please allow 3 business days for your refill to be completed.          Additional Information About Your Visit        [employer] Information     [employer] lets you send messages to your doctor, view your test results, renew your prescriptions, schedule appointments and more. To sign up, go to [email or web address] . Click on \"Log in\" on the left side of the screen, which will take you to the Welcome page. Then click on \"Sign up Now\" on the right side of the page.     You will be asked to enter the access code listed below, as well as some personal information. Please follow the directions to create your username and password.     Your access code is: [email or web address]  Expires: 2017  9:09 AM     Your access code will  in 90 days. If you " "need help or a new code, please call your [facility] or [phone].        Care EveryWhere ID     This is your Care EveryWhere ID. This could be used by other organizations to access your [place] medical records  [id number]        Your Vitals Were     Pulse Temperature Respirations Height Pulse Oximetry BMI (Body Mass Index)    94 98.2  F (36.8  C) (Tympanic) 16 1.803 m (5' 10.98\") 100% 22.03 kg/m2       Blood Pressure from Last 3 Encounters:   05/24/17 128/85   05/17/17 116/77   05/15/17 112/75    Weight from Last 3 Encounters:   05/24/17 71.6 kg (157 lb 14.4 oz)   05/17/17 82.6 kg (182 lb)   05/15/17 77.6 kg (171 lb)              Today, you had the following     No orders found for display         Today's Medication Changes          These changes are accurate as of: 5/24/17 10:42 AM.  If you have any questions, ask your nurse or doctor.               These medicines have changed or have updated prescriptions.        Dose/Directions    oxyCODONE 5 MG IR tablet   Commonly known as:  ROXICODONE   This may have changed:  Another medication with the same name was removed. Continue taking this medication, and follow the directions you see here.   Used for:  Non-small cell lung cancer (NSCLC) (H)   Changed by:  Pawan Collins MD        Dose:  5 mg   Take 1 tablet (5 mg) by mouth every 4 hours as needed for pain maximum 8 tablet(s) per day   Quantity:  40 tablet   Refills:  0                Primary Care Provider    Doctor Unknown, MD       No address on file        Thank you!     Thank you for choosing [facility]  for your care. Our goal is always to provide you with excellent care. Hearing back from our patients is one way we can continue to improve our services. Please take a few minutes to complete the written survey that you may receive in the mail after your visit with us. Thank you!             Your Updated Medication List - Protect others around you: Learn how to safely use, store and throw away " your medicines at www.disposemymeds.org.          This list is accurate as of: 5/24/17 10:42 AM.  Always use your most recent med list.                   Brand Name Dispense Instructions for use    amoxicillin-clavulanate 875-125 MG per tablet    AUGMENTIN    20 tablet    Take 1 tablet by mouth 2 times daily       dexamethasone 4 MG tablet    DECADRON    15 tablet    Take 5 tablets (20 mg) by mouth daily X 3 days each cycle. To be taken the day before, day of, and day after chemotherapy infusion.       folic acid 1 MG tablet    FOLVITE    30 tablet    Take 1 tablet (1 mg) by mouth daily       ibuprofen 600 MG tablet    ADVIL/MOTRIN    30 tablet    Take 1 tablet (600 mg) by mouth every 8 hours as needed for moderate pain       LORazepam 0.5 MG tablet    ATIVAN    30 tablet    Take 1 tablet (0.5 mg) by mouth every 4 hours as needed (Anxiety, Nausea/Vomiting or Sleep)       ondansetron 8 MG tablet    ZOFRAN    10 tablet    Take 1 tablet (8 mg) by mouth every 8 hours as needed (Nausea/Vomiting)       oxyCODONE 5 MG IR tablet    ROXICODONE    40 tablet    Take 1 tablet (5 mg) by mouth every 4 hours as needed for pain maximum 8 tablet(s) per day       pantoprazole 40 MG EC tablet    PROTONIX    30 tablet    Take 1 tablet (40 mg) by mouth 2 times daily (before meals)       prochlorperazine 10 MG tablet    COMPAZINE    30 tablet    Take 1 tablet (10 mg) by mouth every 6 hours as needed (Nausea/Vomiting)       senna-docusate 8.6-50 MG per tablet    SENOKOT-S;PERICOLACE    100 tablet    Take 1 tablet by mouth 2 times daily Reported on 5/4/2017       thiamine 100 MG tablet      Take 1 tablet (100 mg) by mouth daily       TYLENOL PO      Take 1,000 mg by mouth every 6 hours as needed for mild pain or fever

## 2017-05-24 NOTE — MR AVS SNAPSHOT
After Visit Summary   5/24/2017    Wade Acevedo    MRN: [id number]           Patient Information     Date Of Birth          1960        Visit Information        Provider Department      5/24/2017 8:00 AM ROOM 1 [facility]        Today's Diagnoses     Non-small cell lung cancer (NSCLC) (H)    -  1      Care Instructions    Teddy Rangel       Below are the following items we discussed today at your first chemotherapy infusion appointment:    1.  EMLA cream - apply this cream about 45 - 60 minutes prior to having your port accessed.  Put the EMLA cream on your port like you're icing a cake, not rubbing it in like a lotion.  This will help numb the area.  Don't forget to put on the tegaderm dressing over the cream so it doesn't get on your clothes.    2.  You do NOT need to take your Decadron (steroid) the day AFTER your chemo per Dr. Naylor.  Continue to take it the day before and morning of your chemotherapy.    3.   You have both Ondansetron (Zofran) and Prochlorperazine (Compazine) at home for nausea.  These are both as needed medications meaning you take them IF you need them.  I recommend alternating them if you start to feel slightly nauseous.  You can also take the Lorazepam (Ativan) for nausea but this I would take for nausea only if you aren't having any relief with the Ondansetron or Prochlorperazine.  The Lorazepam helps with anxiety and sleep as well.    4.  Please don't hesitate to call us with any questions or concerns. Dr. Naylor's nurse is Jillian and her direct # is [phone].  For after hours or on the weekend, call the triage RN at [phone].    It has been a pleasure meeting you today.  We'll see you at your next appointment on 5/31/17 at 1:00 pm with Dr. Naylor in clinic.     Sincerely,    Mayte Moreira RN          Follow-ups after your visit        Your next 10 appointments already scheduled     May 25, 2017 12:40 PM CDT   Office Visit with Pawan Foster  MD Memo   [facility]    [address] [address]   [phone]           Bring a current list of meds and any records pertaining to this visit.  For Physicals, please bring immunization records and any forms needing to be filled out.  Please arrive 10 minutes early to complete paperwork.            May 25, 2017  2:15 PM CDT   New Visit with José Miguel Amezcua MD   [facility] ([facility])    [address] [address]   [phone]            May 31, 2017  1:00 PM CDT   Return Visit with Pasquale Naylor MD   [facility] ([facility])    [facility]  [address]  [facility] [address]   [phone]            Jun 14, 2017  8:00 AM CDT   Level 6 with ROOM 10 [facility] ([facility])    [facility]  [address]  [facility] [address]   [phone]            Nov 14, 2017 11:30 AM CST   (Arrive by 11:15 AM)   RETURN ENDOCRINE with Padma Medley MD   [facility] Endocrinology ([facility])    [address] [place] [address] [phone]              Who to contact     If you have questions or need follow up information about today's clinic visit or your schedule please contact [facility] directly at [phone].  Normal or non-critical lab and imaging results will be communicated to you by MyChart, letter or phone within 4 business days after the clinic has received the results. If you do not hear from us within 7 days, please contact the clinic through MyChart or phone. If you have a critical or abnormal lab result, we will notify you by phone as soon as possible.  Submit refill requests through [employer] or call your pharmacy and they will forward the refill request to us. Please allow 3 business days for your refill to be  "completed.          Additional Information About Your Visit        [employer] Information     [employer] lets you send messages to your doctor, view your test results, renew your prescriptions, schedule appointments and more. To sign up, go to [email or web address] . Click on \"Log in\" on the left side of the screen, which will take you to the Welcome page. Then click on \"Sign up Now\" on the right side of the page.     You will be asked to enter the access code listed below, as well as some personal information. Please follow the directions to create your username and password.     Your access code is: [email or web address]  Expires: 2017  9:09 AM     Your access code will  in 90 days. If you need help or a new code, please call your [place] clinic or [phone].        Care EveryWhere ID     This is your Care EveryWhere ID. This could be used by other organizations to access your [place] medical records  [id number]        Your Vitals Were     Pulse                   94            Blood Pressure from Last 3 Encounters:   17 128/85   17 125/76   17 116/77    Weight from Last 3 Encounters:   17 71.6 kg (157 lb 14.4 oz)   17 82.6 kg (182 lb)   05/15/17 77.6 kg (171 lb)              We Performed the Following     CBC with platelets differential     Comprehensive metabolic panel          Today's Medication Changes          These changes are accurate as of: 17  3:56 PM.  If you have any questions, ask your nurse or doctor.               Start taking these medicines.        Dose/Directions    lidocaine-prilocaine cream   Commonly known as:  EMLA   Used for:  Non-small cell lung cancer (NSCLC) (H)        Apply topically over port 45 - 60 minutes prior to port access.   Quantity:  30 g   Refills:  3         These medicines have changed or have updated prescriptions.        Dose/Directions    oxyCODONE 5 MG IR tablet   Commonly known as:  ROXICODONE   This may have changed:  Another " medication with the same name was removed. Continue taking this medication, and follow the directions you see here.   Used for:  Non-small cell lung cancer (NSCLC) (H)   Changed by:  Pawan Collins MD        Dose:  5 mg   Take 1 tablet (5 mg) by mouth every 4 hours as needed for pain maximum 8 tablet(s) per day   Quantity:  40 tablet   Refills:  0            Where to get your medicines      [facility] [address] [address] [facility] AT [address]  [address] [facility] [address]     Phone:  [phone]     lidocaine-prilocaine cream                Primary Care Provider    Doctor Unknown, MD       No address on file        Thank you!     Thank you for choosing [facility] INFUSION  for your care. Our goal is always to provide you with excellent care. Hearing back from our patients is one way we can continue to improve our services. Please take a few minutes to complete the written survey that you may receive in the mail after your visit with us. Thank you!             Your Updated Medication List - Protect others around you: Learn how to safely use, store and throw away your medicines at www.disposemymeds.org.          This list is accurate as of: 5/24/17  3:56 PM.  Always use your most recent med list.                   Brand Name Dispense Instructions for use    amoxicillin-clavulanate 875-125 MG per tablet    AUGMENTIN    20 tablet    Take 1 tablet by mouth 2 times daily       dexamethasone 4 MG tablet    DECADRON    15 tablet    Take 5 tablets (20 mg) by mouth daily X 3 days each cycle. To be taken the day before, day of, and day after chemotherapy infusion.       folic acid 1 MG tablet    FOLVITE    30 tablet    Take 1 tablet (1 mg) by mouth daily       ibuprofen 600 MG tablet    ADVIL/MOTRIN    30 tablet    Take 1 tablet (600 mg) by mouth every 8 hours as needed for moderate pain       lidocaine-prilocaine cream    EMLA    30 g     Apply topically over port 45 - 60 minutes prior to port access.       LORazepam 0.5 MG tablet    ATIVAN    30 tablet    Take 1 tablet (0.5 mg) by mouth every 4 hours as needed (Anxiety, Nausea/Vomiting or Sleep)       ondansetron 8 MG tablet    ZOFRAN    10 tablet    Take 1 tablet (8 mg) by mouth every 8 hours as needed (Nausea/Vomiting)       oxyCODONE 5 MG IR tablet    ROXICODONE    40 tablet    Take 1 tablet (5 mg) by mouth every 4 hours as needed for pain maximum 8 tablet(s) per day       pantoprazole 40 MG EC tablet    PROTONIX    30 tablet    Take 1 tablet (40 mg) by mouth 2 times daily (before meals)       prochlorperazine 10 MG tablet    COMPAZINE    30 tablet    Take 1 tablet (10 mg) by mouth every 6 hours as needed (Nausea/Vomiting)       senna-docusate 8.6-50 MG per tablet    SENOKOT-S;PERICOLACE    100 tablet    Take 1 tablet by mouth 2 times daily Reported on 5/4/2017       thiamine 100 MG tablet      Take 1 tablet (100 mg) by mouth daily       TYLENOL PO      Take 1,000 mg by mouth every 6 hours as needed for mild pain or fever

## 2017-05-24 NOTE — PATIENT INSTRUCTIONS
Teddy Olvera~       Below are the following items we discussed today at your first chemotherapy infusion appointment:    1.  EMLA cream - apply this cream about 45 - 60 minutes prior to having your port accessed.  Put the EMLA cream on your port like you're icing a cake, not rubbing it in like a lotion.  This will help numb the area.  Don't forget to put on the tegaderm dressing over the cream so it doesn't get on your clothes.    2.  You do NOT need to take your Decadron (steroid) the day AFTER your chemo per Dr. Naylor.  Continue to take it the day before and morning of your chemotherapy.    3.   You have both Ondansetron (Zofran) and Prochlorperazine (Compazine) at home for nausea.  These are both as needed medications meaning you take them IF you need them.  I recommend alternating them if you start to feel slightly nauseous.  You can also take the Lorazepam (Ativan) for nausea but this I would take for nausea only if you aren't having any relief with the Ondansetron or Prochlorperazine.  The Lorazepam helps with anxiety and sleep as well.    4.  Please don't hesitate to call us with any questions or concerns. Dr. Naylor's nurse is Jillian and her direct # is [phone].  For after hours or on the weekend, call the triage RN at [phone].    It has been a pleasure meeting you today.  We'll see you at your next appointment on 5/31/17 at 1:00 pm with Dr. Naylor in clinic.     Sincerely,    Mayte Moreira RN

## 2017-05-24 NOTE — NURSING NOTE
"Oncology Rooming Note    May 24, 2017 9:52 AM   Wade Acevedo is a 57 year old male who presents for:    Chief Complaint   Patient presents with     Oncology Clinic Visit     1 week recheck NSCLC, Labs & Chemo today     Initial Vitals: /85 (BP Location: Right arm, Patient Position: Chair, Cuff Size: Adult Regular)  Pulse 94  Temp 98.2  F (36.8  C) (Tympanic)  Resp 16  Ht 1.803 m (5' 10.98\")  Wt 71.6 kg (157 lb 14.4 oz)  SpO2 100%  BMI 22.03 kg/m2 Estimated body mass index is 22.03 kg/(m^2) as calculated from the following:    Height as of this encounter: 1.803 m (5' 10.98\").    Weight as of this encounter: 71.6 kg (157 lb 14.4 oz). Body surface area is 1.89 meters squared.  No Pain (0) Comment: Data Unavailable   No LMP for male patient.  Allergies reviewed: Yes  Medications reviewed: Yes    Medications: Medication refills not needed today.  Pharmacy name entered into My[facility]: [facility] - [address] AT [address]    Clinical concerns:  1 week recheck NSCLC, Labs & Chemo today. Patient reports his appetite has decreased, lost weight & constipation. Pain in eye & butt is better.     7  minutes for nursing intake (face to face time)     Reena Leon CMA              "

## 2017-05-25 PROBLEM — F33.1 MAJOR DEPRESSIVE DISORDER, RECURRENT EPISODE, MODERATE (H): Status: ACTIVE | Noted: 2017-01-01

## 2017-05-25 NOTE — NURSING NOTE
"Chief Complaint   Patient presents with     Establish Care     Here to establish care.       Initial Ht 5' 11\" (1.803 m)  Wt 160 lb (72.6 kg)  BMI 22.32 kg/m2 Estimated body mass index is 22.32 kg/(m^2) as calculated from the following:    Height as of this encounter: 5' 11\" (1.803 m).    Weight as of this encounter: 160 lb (72.6 kg).  Medication Reconciliation: complete  "

## 2017-05-25 NOTE — PROGRESS NOTES
SUBJECTIVE:                                                    Wade Acevedo is a 57 year old male who presents to clinic today for the following health issues:      ESTABLISH CARE:  Patient is here today to establish care.  Newly diagnosed with  Non-small cell lung cancer, Stage 4. Dr. Naylor  Here to discuss about his medications.  They are not sure what he should be taking since being discharged.   They state the Thiamine, Folic Acid and Protonix may be .  Not sure if they were just a 30 day RX or if he needs to continue taking.    Discuss about Oxycodone, taking about 2-4 tablets daily.   4 on average.    CONSTIPATION:  Has been having bouts of constipation.  He almost went to the ER the other day.      BLADDER:  Bladder urgency at night-discuss about medications.     SLEEPING:  He has been sleeping more per Sarah.  Patient feels he is not sleeping that much.    FOOD PREP:  Lack of motivation with preparing food.  He has an appetite just not prepping foods. Consider meal delivery.     Sarah his daughter-in-law is his POA.  She would like to dis    ENT visit today following his visit for a sinus polyp.    He has homecaring staff and the RN is coordinating this, through the [facility].       Current Outpatient Prescriptions:      lidocaine-prilocaine (EMLA) cream, Apply topically over port 45 - 60 minutes prior to port access., Disp: 30 g, Rfl: 3     dexamethasone (DECADRON) 4 MG tablet, Take 5 tablets (20 mg) by mouth daily X 3 days each cycle. To be taken the day before, day of, and day after chemotherapy infusion., Disp: 15 tablet, Rfl: 3     oxyCODONE (ROXICODONE) 5 MG IR tablet, Take 1 tablet (5 mg) by mouth every 4 hours as needed for pain maximum 8 tablet(s) per day, Disp: 40 tablet, Rfl: 0     amoxicillin-clavulanate (AUGMENTIN) 875-125 MG per tablet, Take 1 tablet by mouth 2 times daily, Disp: 20 tablet, Rfl: 0     LORazepam (ATIVAN) 0.5 MG tablet, Take 1 tablet (0.5 mg) by mouth every 4 hours as  "needed (Anxiety, Nausea/Vomiting or Sleep), Disp: 30 tablet, Rfl: 2     prochlorperazine (COMPAZINE) 10 MG tablet, Take 1 tablet (10 mg) by mouth every 6 hours as needed (Nausea/Vomiting), Disp: 30 tablet, Rfl: 2     ondansetron (ZOFRAN) 8 MG tablet, Take 1 tablet (8 mg) by mouth every 8 hours as needed (Nausea/Vomiting), Disp: 10 tablet, Rfl: 2     ibuprofen (ADVIL/MOTRIN) 600 MG tablet, Take 1 tablet (600 mg) by mouth every 8 hours as needed for moderate pain, Disp: 30 tablet, Rfl: 1     folic acid (FOLVITE) 1 MG tablet, Take 1 tablet (1 mg) by mouth daily, Disp: 30 tablet, Rfl:      senna-docusate (SENOKOT-S;PERICOLACE) 8.6-50 MG per tablet, Take 1 tablet by mouth 2 times daily Reported on 5/4/2017, Disp: 100 tablet, Rfl:      thiamine 100 MG tablet, Take 1 tablet (100 mg) by mouth daily, Disp: , Rfl:      pantoprazole (PROTONIX) 40 MG EC tablet, Take 1 tablet (40 mg) by mouth 2 times daily (before meals), Disp: 30 tablet, Rfl:      Acetaminophen (TYLENOL PO), Take 1,000 mg by mouth every 6 hours as needed for mild pain or fever, Disp: , Rfl:     Patient Active Problem List   Diagnosis     Anemia     Hypotension     Non-small cell lung cancer (NSCLC) (H)     Cancer associated pain     Anemia, chronic disease     Weight loss     Constipation     H/O ETOH abuse     Unsteady gait     Recurrent falls     Pituitary macroadenoma (H)     Sinusitis       Blood pressure 132/81, pulse 76, temperature 97.2  F (36.2  C), temperature source Tympanic, height 5' 11\" (1.803 m), weight 160 lb (72.6 kg), SpO2 98 %.    Exam:  GENERAL APPEARANCE: active, mild distress and cooperative  EYES: EOMI,  PERRL  NECK: no adenopathy, no asymmetry, masses, or scars and thyroid normal to palpation  SKIN: no suspicious lesions or rashes  PSYCH: affect flat      (C34.90) Non-small cell lung cancer (NSCLC) (H)  (primary encounter diagnosis)  Comment:   Plan: oxyCODONE (ROXICODONE) 5 MG IR tablet,         senna-docusate (SENOKOT-S;PERICOLACE) " 8.6-50 MG        per tablet, pantoprazole (PROTONIX) 40 MG EC         tablet, ibuprofen (ADVIL/MOTRIN) 600 MG tablet        For the Thiamine and Folic acid you may switch to a general one a day multivitamin, and eat well. You may add the nutritional supplements as discussed.   Follow the instructions of Dr. Naylor and see him next week. For the constipation this is caused from the pain medication, Oxycodone. For a pain plan use the Tylenol first at 650 mg per dose, as needed every 4-6 hours, the max of 5 times daily.   The Advil or Ibuprofen should be second in line, with food up to four times daily at 600 mg per dose. Use non spicy food. Use no alcohol. The Oxycodone should be the third line of pain medication.   This will make you drowsy and constipated. There are medications for constipation, Pericolace twice daily. Miralax may be added to this. Fresh fruits and crunchy vegetables will help. Prunes and raisins and juices. Use about one gallon of fluids daily.     (Z72.0) Tobacco abuse  Comment:   Plan: nicotine (NICODERM CQ) 21 MG/24HR 24 hr patch,         nicotine (NICODERM CQ) 14 MG/24HR 24 hr patch,         nicotine (NICODERM CQ) 7 MG/24HR 24 hr patch        For the tobacco cessation we discussed the options and will give the Rx for the nicotine patches for the sizes of 21, 14 and 7 mg per day. Move the patch around and do not use tobacco with this. Taper the dose every few weeks as possible and if quit then call our clinic RN at [phone].     (F33.1) Major depressive disorder, recurrent episode, moderate (H)  Comment:   Plan: PARoxetine (PAXIL) 10 MG tablet        We discussed with him and Sarah about medication and Paxil at 10 mg daily will be started now. Call if there are any side effects. Use the non drug therapies.   Recheck in three to four weeks and call if any worsening.     For the bladder consider stopping fluid intake two hours before bedtime. The medications to limit bladder spasms all cause side  effects, including constipation.   Empty the bladder right before bedtime.     For the food intake use the easily prepared meals. Consider a shared meal. Exercise may increase the desire to eat. Get a scale and do the daily or twice a week weighing with the empty bladder and stomach and the same clothes. We discussed the possibility of depression and considering a treatment for this. Sarah feels he does have depression. This started in  after his wife . Compazine may be used about 30-40 minutes before a meal to improve the appetite.       Pawan Hampton

## 2017-05-25 NOTE — PROGRESS NOTES
History of Present Illness - Wade Acevedo is a 57 year old male recently starting chemotherapy for lung cancer. He had a right sinusitis on imaging from April 2017. He denies prior trouble with sinusitis. He does report some right periorbital discomfort, but overall he felt better after a round of augmentin 3 weeks ago. Current smoker, former alcohol abuse.    Past Medical History -   Patient Active Problem List   Diagnosis     Anemia     Hypotension     Non-small cell lung cancer (NSCLC) (H)     Cancer associated pain     Anemia, chronic disease     Weight loss     Constipation     H/O ETOH abuse     Unsteady gait     Recurrent falls     Pituitary macroadenoma (H)     Sinusitis     Major depressive disorder, recurrent episode, moderate (H)       Current Medications -   Current Outpatient Prescriptions:      nicotine (NICODERM CQ) 21 MG/24HR 24 hr patch, Place 1 patch onto the skin every 24 hours, Disp: 30 patch, Rfl: 0     nicotine (NICODERM CQ) 14 MG/24HR 24 hr patch, Place 1 patch onto the skin every 24 hours, Disp: 30 patch, Rfl: 0     nicotine (NICODERM CQ) 7 MG/24HR 24 hr patch, Place 1 patch onto the skin every 24 hours, Disp: 30 patch, Rfl: 0     oxyCODONE (ROXICODONE) 5 MG IR tablet, Take 1 tablet (5 mg) by mouth every 4 hours as needed for pain maximum 8 tablet(s) per day, Disp: 40 tablet, Rfl: 0     senna-docusate (SENOKOT-S;PERICOLACE) 8.6-50 MG per tablet, Take 1 tablet by mouth 2 times daily Reported on 5/4/2017, Disp: 100 tablet, Rfl: 3     pantoprazole (PROTONIX) 40 MG EC tablet, Take 1 tablet (40 mg) by mouth 2 times daily (before meals), Disp: 30 tablet, Rfl: 11     ibuprofen (ADVIL/MOTRIN) 600 MG tablet, Take 1 tablet (600 mg) by mouth every 8 hours as needed for moderate pain, Disp: 30 tablet, Rfl: 3     PARoxetine (PAXIL) 10 MG tablet, Take 1 tablet (10 mg) by mouth At Bedtime, Disp: 30 tablet, Rfl: 1     lidocaine-prilocaine (EMLA) cream, Apply topically over port 45 - 60 minutes prior to  "port access., Disp: 30 g, Rfl: 3     dexamethasone (DECADRON) 4 MG tablet, Take 5 tablets (20 mg) by mouth daily X 3 days each cycle. To be taken the day before, day of, and day after chemotherapy infusion., Disp: 15 tablet, Rfl: 3     LORazepam (ATIVAN) 0.5 MG tablet, Take 1 tablet (0.5 mg) by mouth every 4 hours as needed (Anxiety, Nausea/Vomiting or Sleep), Disp: 30 tablet, Rfl: 2     prochlorperazine (COMPAZINE) 10 MG tablet, Take 1 tablet (10 mg) by mouth every 6 hours as needed (Nausea/Vomiting), Disp: 30 tablet, Rfl: 2     ondansetron (ZOFRAN) 8 MG tablet, Take 1 tablet (8 mg) by mouth every 8 hours as needed (Nausea/Vomiting), Disp: 10 tablet, Rfl: 2     Acetaminophen (TYLENOL PO), Take 1,000 mg by mouth every 6 hours as needed for mild pain or fever, Disp: , Rfl:     Allergies -   Allergies   Allergen Reactions     Lubriderm Rash       Social History -   Social History     Social History     Marital status: Single     Spouse name: N/A     Number of children: N/A     Years of education: N/A     Social History Main Topics     Smoking status: Current Every Day Smoker     Packs/day: 0.00     Years: 45.00     Types: Cigarettes     Last attempt to quit: 4/20/2017     Smokeless tobacco: Never Used      Comment: 12-15 cig per day.  Will be quiting soon.     Alcohol use No      Comment: hx ETOH  quit 4/2017     Drug use: None     Sexual activity: Not Asked     Other Topics Concern     None     Social History Narrative       Family History - History reviewed. No pertinent family history.    Review of Systems - As per HPI and PMHx, otherwise 7 system review of the head and neck negative. 10+ system review negative.    Physical Exam  /87 (BP Location: Right arm, Patient Position: Chair, Cuff Size: Adult Regular)  Pulse 74  Temp 98  F (36.7  C) (Oral)  Ht 1.803 m (5' 11\")  Wt 73 kg (161 lb)  SpO2 99%  BMI 22.45 kg/m2  General - The patient is well nourished and well developed, and appears to have good " nutritional status.  Alert and oriented to person and place, answers questions and cooperates with examination appropriately.   Head and Face - Normocephalic and atraumatic, with no gross asymmetry noted of the contour of the facial features.  The facial nerve is intact, with strong symmetric movements.  Voice and Breathing - The patient was breathing comfortably without the use of accessory muscles. There was no wheezing, stridor, or stertor.  The patients voice was clear and strong, and had appropriate pitch and quality.  Ears - Bilateral pinna and EACs with normal appearing overlying skin. Tympanic membrane intact with good mobility on pneumatic otoscopy bilaterally. Bony landmarks of the ossicular chain are normal. The tympanic membranes are normal in appearance. No retraction, perforation, or masses.  No fluid or purulence was seen in the external canal or the middle ear.   Eyes - Extraocular movements intact.  Sclera were not icteric or injected, conjunctiva were pink and moist.  Mouth - Examination of the oral cavity showed pink, healthy oral mucosa. No lesions or ulcerations noted.  The tongue was mobile and midline, and the dentition were in poor condition  Throat - The walls of the oropharynx were smooth, pink, moist, symmetric, and had no lesions or ulcerations.  The tonsillar pillars and soft palate were symmetric.  The uvula was midline on elevation.  Neck - Normal midline excursion of the laryngotracheal complex during swallowing.  Full range of motion on passive movement.  Palpation of the occipital, submental, submandibular, internal jugular chain, and supraclavicular nodes did not demonstrate any abnormal lymph nodes or masses.  The carotid pulse was palpable bilaterally.  Palpation of the thyroid was soft and smooth, with no nodules or goiter appreciated.  The trachea was mobile and midline.  Nose - External contour is symmetric, no gross deflection or scars.  Nasal mucosa is pink and moist with no  abnormal mucus.  The septum was midline and non-obstructive, turbinates of normal size and position.  No polyps, masses, or purulence noted on examination.    Procedure: Rigid nasal endoscopy  Indication: Chronic sinusitis  Technique: The nose was treated with tipical oxymetazoline. The right nasal cavity was entered with a 0degree rigid endoscope. Purulent material was noted to be coming from the right maxillary sinus. On the left, the middle meatus was open, no pus, polyps, or masses.        Assessment - Wade Acevedo is a 57 year old male with right maxillary sinusitis, which is likely chronic although we only have about a month of history for this. I put him on another round of augmentin and gave him a saline irrigation system. I will see him back in 2 months and if the purulence persists, then would recommend sinus surgery. I will forward to Dr. Call in case he needs this to be evacuated sooner that the next couple of months due to his current chemotherapy.       Dr. José Miguel Amezcua MD  Otolaryngology  [facility]

## 2017-05-25 NOTE — PATIENT INSTRUCTIONS
Thank you for choosing [facility].  You may be receiving a survey in the mail from Roshni Zuluaga regarding your visit today.  Please take a few minutes to complete and return the survey to let us know how we are doing.      If you have questions or concerns, please contact us via W4 or you can contact your care team at [phone].    Our Clinic hours are:  Monday 6:40 am  to 7:00 pm  Tuesday -Friday 6:40 am to 5:00 pm    The [place] outpatient lab hours are:  Monday - Friday 6:10 am to 4:45 pm  Saturdays 7:00 am to 11:00 am  Appointments are required, call [phone]    If you have clinical questions after hours or would like to schedule an appointment,  call the clinic at [phone].    For the constipation this is caused from the pain medication, Oxycodone. For a pain plan use the Tylenol first at 650 mg per dose, as needed every 4-6 hours, the max of 5 times daily.   The Advil or Ibuprofen should be second in line, with food up to four times daily at 600 mg per dose. Use non spicy food. Use no alcohol. The Oxycodone should be the third line of pain medication.   This will make you drowsy and constipated. There are medications for constipation, Pericolace twice daily. Miralax may be added to this. Fresh fruits and crunchy vegetables will help. Prunes and raisins and juices. Use about one gallon of fluids daily.

## 2017-05-25 NOTE — NURSING NOTE
"Initial /87 (BP Location: Right arm, Patient Position: Chair, Cuff Size: Adult Regular)  Pulse 74  Temp 98  F (36.7  C) (Oral)  Ht 1.803 m (5' 11\")  Wt 73 kg (161 lb)  SpO2 99%  BMI 22.45 kg/m2 Estimated body mass index is 22.45 kg/(m^2) as calculated from the following:    Height as of this encounter: 1.803 m (5' 11\").    Weight as of this encounter: 73 kg (161 lb). .    Daneilla Mullins CMA  "

## 2017-05-25 NOTE — NURSING NOTE
"Chief Complaint   Patient presents with     Establish Care     Here to establish care.       Initial /81  Pulse 76  Temp 97.2  F (36.2  C) (Tympanic)  Ht 5' 11\" (1.803 m)  Wt 160 lb (72.6 kg)  SpO2 98%  BMI 22.32 kg/m2 Estimated body mass index is 22.32 kg/(m^2) as calculated from the following:    Height as of this encounter: 5' 11\" (1.803 m).    Weight as of this encounter: 160 lb (72.6 kg).  Medication Reconciliation: complete  "

## 2017-05-25 NOTE — MR AVS SNAPSHOT
After Visit Summary   5/25/2017    Wade Acevedo    MRN: [id number]           Patient Information     Date Of Birth          1960        Visit Information        Provider Department      5/25/2017 2:15 PM José Miguel Amezcua MD [facility]        Today's Diagnoses     Chronic maxillary sinusitis    -  1      Care Instructions    Per Physician's instructions            Follow-ups after your visit        Your next 10 appointments already scheduled     May 31, 2017  1:00 PM CDT   Return Visit with Pasquale Naylor MD   [facility] ([facility])    [facility]  [address]  [facility] [address]   [phone]            Jun 14, 2017  8:00 AM CDT   Level 6 with ROOM 10 [facility] ([facility])    [facility]  [address]  [facility] [address]   [phone]            Nov 14, 2017 11:30 AM CST   (Arrive by 11:15 AM)   RETURN ENDOCRINE with Padma Medley MD   [facility] ([facility] [place])    [address] [place] [address] [phone]              Who to contact     If you have questions or need follow up information about today's clinic visit or your schedule please contact [facility] directly at [phone].  Normal or non-critical lab and imaging results will be communicated to you by MyChart, letter or phone within 4 business days after the clinic has received the results. If you do not hear from us within 7 days, please contact the clinic through MyChart or phone. If you have a critical or abnormal lab result, we will notify you by phone as soon as possible.  Submit refill requests through Anaptys[employer] or call your pharmacy and they will forward the refill request to us. Please allow 3 business days for your refill to be completed.          Additional Information About Your Visit        MyChart  "Information     BookBag lets you send messages to your doctor, view your test results, renew your prescriptions, schedule appointments and more. To sign up, go to [email or web address] . Click on \"Log in\" on the left side of the screen, which will take you to the Welcome page. Then click on \"Sign up Now\" on the right side of the page.     You will be asked to enter the access code listed below, as well as some personal information. Please follow the directions to create your username and password.     Your access code is: [email or web address]  Expires: 2017  9:09 AM     Your access code will  in 90 days. If you need help or a new code, please call your [facility] clinic or [phone].        Care EveryWhere ID     This is your Care EveryWhere ID. This could be used by other organizations to access your [facility] medical records  [id number]        Your Vitals Were     Pulse Temperature Height Pulse Oximetry BMI (Body Mass Index)       74 98  F (36.7  C) (Oral) 1.803 m (5' 11\") 99% 22.45 kg/m2        Blood Pressure from Last 3 Encounters:   17 135/87   17 132/81   17 128/85    Weight from Last 3 Encounters:   17 73 kg (161 lb)   17 72.6 kg (160 lb)   17 71.6 kg (157 lb 14.4 oz)              We Performed the Following     NASAL ENDOSCOPY, DIAGNOSTIC          Today's Medication Changes          These changes are accurate as of: 17  2:40 PM.  If you have any questions, ask your nurse or doctor.               Start taking these medicines.        Dose/Directions    * nicotine 21 MG/24HR 24 hr patch   Commonly known as:  NICODERM CQ   Used for:  Tobacco abuse   Started by:  Pawan Hampton MD        Dose:  1 patch   Place 1 patch onto the skin every 24 hours   Quantity:  30 patch   Refills:  0       * nicotine 14 MG/24HR 24 hr patch   Commonly known as:  NICODERM CQ   Used for:  Tobacco abuse   Started by:  Pawan Hampton MD        Dose:  1 patch   Place 1 patch " onto the skin every 24 hours   Quantity:  30 patch   Refills:  0       * nicotine 7 MG/24HR 24 hr patch   Commonly known as:  NICODERM CQ   Used for:  Tobacco abuse   Started by:  Pawan Hampton MD        Dose:  1 patch   Place 1 patch onto the skin every 24 hours   Quantity:  30 patch   Refills:  0       PARoxetine 10 MG tablet   Commonly known as:  PAXIL   Used for:  Major depressive disorder, recurrent episode, moderate (H)   Started by:  Pawan Hampton MD        Dose:  10 mg   Take 1 tablet (10 mg) by mouth At Bedtime   Quantity:  30 tablet   Refills:  1       * Notice:  This list has 3 medication(s) that are the same as other medications prescribed for you. Read the directions carefully, and ask your doctor or other care provider to review them with you.      Stop taking these medicines if you haven't already. Please contact your care team if you have questions.     folic acid 1 MG tablet   Commonly known as:  FOLVITE   Stopped by:  Pawan Hampton MD           thiamine 100 MG tablet   Stopped by:  Pawan Hampton MD                Where to get your medicines      These medications were sent to [facility] [address] [place] [address] AT [facility] OF [address] [place] [address]  [address] [facility] [address]     Phone:  [phone]     amoxicillin-clavulanate 875-125 MG per tablet    ibuprofen 600 MG tablet    nicotine 14 MG/24HR 24 hr patch    nicotine 21 MG/24HR 24 hr patch    nicotine 7 MG/24HR 24 hr patch    pantoprazole 40 MG EC tablet    PARoxetine 10 MG tablet    senna-docusate 8.6-50 MG per tablet         Some of these will need a paper prescription and others can be bought over the counter.  Ask your nurse if you have questions.     Bring a paper prescription for each of these medications     oxyCODONE 5 MG IR tablet                Primary Care Provider    Doctor Unknown, MD       No address on file        Thank you!     Thank you for choosing  [facility]  for your care. Our goal is always to provide you with excellent care. Hearing back from our patients is one way we can continue to improve our services. Please take a few minutes to complete the written survey that you may receive in the mail after your visit with us. Thank you!             Your Updated Medication List - Protect others around you: Learn how to safely use, store and throw away your medicines at www.disposemymeds.org.          This list is accurate as of: 5/25/17  2:40 PM.  Always use your most recent med list.                   Brand Name Dispense Instructions for use    amoxicillin-clavulanate 875-125 MG per tablet    AUGMENTIN    20 tablet    Take 1 tablet by mouth 2 times daily       dexamethasone 4 MG tablet    DECADRON    15 tablet    Take 5 tablets (20 mg) by mouth daily X 3 days each cycle. To be taken the day before, day of, and day after chemotherapy infusion.       ibuprofen 600 MG tablet    ADVIL/MOTRIN    30 tablet    Take 1 tablet (600 mg) by mouth every 8 hours as needed for moderate pain       lidocaine-prilocaine cream    EMLA    30 g    Apply topically over port 45 - 60 minutes prior to port access.       LORazepam 0.5 MG tablet    ATIVAN    30 tablet    Take 1 tablet (0.5 mg) by mouth every 4 hours as needed (Anxiety, Nausea/Vomiting or Sleep)       * nicotine 21 MG/24HR 24 hr patch    NICODERM CQ    30 patch    Place 1 patch onto the skin every 24 hours       * nicotine 14 MG/24HR 24 hr patch    NICODERM CQ    30 patch    Place 1 patch onto the skin every 24 hours       * nicotine 7 MG/24HR 24 hr patch    NICODERM CQ    30 patch    Place 1 patch onto the skin every 24 hours       ondansetron 8 MG tablet    ZOFRAN    10 tablet    Take 1 tablet (8 mg) by mouth every 8 hours as needed (Nausea/Vomiting)       oxyCODONE 5 MG IR tablet    ROXICODONE    40 tablet    Take 1 tablet (5 mg) by mouth every 4 hours as needed for pain maximum 8 tablet(s) per day        pantoprazole 40 MG EC tablet    PROTONIX    30 tablet    Take 1 tablet (40 mg) by mouth 2 times daily (before meals)       PARoxetine 10 MG tablet    PAXIL    30 tablet    Take 1 tablet (10 mg) by mouth At Bedtime       prochlorperazine 10 MG tablet    COMPAZINE    30 tablet    Take 1 tablet (10 mg) by mouth every 6 hours as needed (Nausea/Vomiting)       senna-docusate 8.6-50 MG per tablet    SENOKOT-S;PERICOLACE    100 tablet    Take 1 tablet by mouth 2 times daily Reported on 5/4/2017       TYLENOL PO      Take 1,000 mg by mouth every 6 hours as needed for mild pain or fever       * Notice:  This list has 3 medication(s) that are the same as other medications prescribed for you. Read the directions carefully, and ask your doctor or other care provider to review them with you.

## 2017-05-25 NOTE — Clinical Note
Felix Naylor, Did you need this patient's sinuses evacuated in the very near term as he is on chemotherapy? I would ordinarily follow patients for a couple of months, but if you need this more urgently due to the chemo, then let me know and I'll arrange it.  Dr. Amezcua

## 2017-05-25 NOTE — MR AVS SNAPSHOT
After Visit Summary   5/25/2017    Wade Acevedo    MRN: [id number]           Patient Information     Date Of Birth          1960        Visit Information        Provider Department      5/25/2017 12:40 PM Pawan Hampton MD [facility]        Today's Diagnoses     Non-small cell lung cancer (NSCLC) (H)    -  1    Tobacco abuse        Major depressive disorder, recurrent episode, moderate (H)        Anemia, unspecified type          Care Instructions          Thank you for choosing [facility]  You may be receiving a survey in the mail from Roshni Zuluaga regarding your visit today.  Please take a few minutes to complete and return the survey to let us know how we are doing.      If you have questions or concerns, please contact us via Upworthy or you can contact your care team at [phone].    Our Clinic hours are:  Monday 6:40 am  to 7:00 pm  Tuesday -Friday 6:40 am to 5:00 pm    The [place] outpatient lab hours are:  Monday - Friday 6:10 am to 4:45 pm  Saturdays 7:00 am to 11:00 am  Appointments are required, call [phone]    If you have clinical questions after hours or would like to schedule an appointment,  call the clinic at [phone].    For the constipation this is caused from the pain medication, Oxycodone. For a pain plan use the Tylenol first at 650 mg per dose, as needed every 4-6 hours, the max of 5 times daily.   The Advil or Ibuprofen should be second in line, with food up to four times daily at 600 mg per dose. Use non spicy food. Use no alcohol. The Oxycodone should be the third line of pain medication.   This will make you drowsy and constipated. There are medications for constipation, Pericolace twice daily. Miralax may be added to this. Fresh fruits and crunchy vegetables will help. Prunes and raisins and juices. Use about one gallon of fluids daily.           Follow-ups after your visit        Your next 10 appointments already scheduled     May  "25, 2017  2:15 PM CDT   New Visit with José Miguel Amezcua MD   [facility]    [address]  [facility] [address]   [phone]            May 31, 2017  1:00 PM CDT   Return Visit with Pasquale Naylor MD   [facility] ([facility])    [facility]  [address]  [facility] [address]   [phone]            Jun 14, 2017  8:00 AM CDT   Level 6 with ROOM 10 [facility] ([facility])    [facility]  [address]  [facility] [address]   [phone]            Nov 14, 2017 11:30 AM CST   (Arrive by 11:15 AM)   RETURN ENDOCRINE with Padma Medley MD   [facility] ([facility])    [address] [place] [address] [phone]              Who to contact     If you have questions or need follow up information about today's clinic visit or your schedule please contact [facility] directly at [phone].  Normal or non-critical lab and imaging results will be communicated to you by ClairMailhart, letter or phone within 4 business days after the clinic has received the results. If you do not hear from us within 7 days, please contact the clinic through ClairMailhart or phone. If you have a critical or abnormal lab result, we will notify you by phone as soon as possible.  Submit refill requests through [employer]aze or call your pharmacy and they will forward the refill request to us. Please allow 3 business days for your refill to be completed.          Additional Information About Your Visit        [employer]airMail[employer]ent[employer] Information     Eyebrid Blaze lets you send messages to your doctor, view your test results, renew your prescriptions, schedule appointments and more. To sign up, go to [email or web address] . Click on \"Log in\" on the left side of the screen, which will take you to the Welcome page. Then click on " "\"Sign up Now\" on the right side of the page.     You will be asked to enter the access code listed below, as well as some personal information. Please follow the directions to create your username and password.     Your access code is: [email or web address]  Expires: 2017  9:09 AM     Your access code will  in 90 days. If you need help or a new code, please call your [place] clinic or [phone].        Care EveryWhere ID     This is your Care EveryWhere ID. This could be used by other organizations to access your [place] medical records  [id number]        Your Vitals Were     Pulse Temperature Height Pulse Oximetry BMI (Body Mass Index)       76 97.2  F (36.2  C) (Tympanic) 5' 11\" (1.803 m) 98% 22.32 kg/m2        Blood Pressure from Last 3 Encounters:   17 132/81   17 128/85   17 125/76    Weight from Last 3 Encounters:   17 160 lb (72.6 kg)   17 157 lb 14.4 oz (71.6 kg)   17 182 lb (82.6 kg)              Today, you had the following     No orders found for display         Today's Medication Changes          These changes are accurate as of: 17  1:47 PM.  If you have any questions, ask your nurse or doctor.               Start taking these medicines.        Dose/Directions    * nicotine 21 MG/24HR 24 hr patch   Commonly known as:  NICODERM CQ   Used for:  Tobacco abuse   Started by:  Pawan Hampton MD        Dose:  1 patch   Place 1 patch onto the skin every 24 hours   Quantity:  30 patch   Refills:  0       * nicotine 14 MG/24HR 24 hr patch   Commonly known as:  NICODERM CQ   Used for:  Tobacco abuse   Started by:  Pawan Hampton MD        Dose:  1 patch   Place 1 patch onto the skin every 24 hours   Quantity:  30 patch   Refills:  0       * nicotine 7 MG/24HR 24 hr patch   Commonly known as:  NICODERM CQ   Used for:  Tobacco abuse   Started by:  Pawan Hampton MD        Dose:  1 patch   Place 1 patch onto the skin every 24 hours   Quantity:  30 " patch   Refills:  0       PARoxetine 10 MG tablet   Commonly known as:  PAXIL   Used for:  Major depressive disorder, recurrent episode, moderate (H)   Started by:  Pawan Hampton MD        Dose:  10 mg   Take 1 tablet (10 mg) by mouth At Bedtime   Quantity:  30 tablet   Refills:  1       * Notice:  This list has 3 medication(s) that are the same as other medications prescribed for you. Read the directions carefully, and ask your doctor or other care provider to review them with you.      Stop taking these medicines if you haven't already. Please contact your care team if you have questions.     amoxicillin-clavulanate 875-125 MG per tablet   Commonly known as:  AUGMENTIN   Stopped by:  Pawan Hampton MD           folic acid 1 MG tablet   Commonly known as:  FOLVITE   Stopped by:  Pawan Hampton MD           thiamine 100 MG tablet   Stopped by:  Pawan Hampton MD                Where to get your medicines      These medications were sent to [facility] [address] [place] [address] AT [facility] OF [address] [place] [address]     Phone:  [phone]     ibuprofen 600 MG tablet    nicotine 14 MG/24HR 24 hr patch    nicotine 21 MG/24HR 24 hr patch    nicotine 7 MG/24HR 24 hr patch    pantoprazole 40 MG EC tablet    PARoxetine 10 MG tablet    senna-docusate 8.6-50 MG per tablet         Some of these will need a paper prescription and others can be bought over the counter.  Ask your nurse if you have questions.     Bring a paper prescription for each of these medications     oxyCODONE 5 MG IR tablet                Primary Care Provider    Doctor Unknown, MD       No address on file        Thank you!     Thank you for choosing [facility]  for your care. Our goal is always to provide you with excellent care. Hearing back from our patients is one way we can continue to improve our services. Please take a few minutes to complete  the written survey that you may receive in the mail after your visit with us. Thank you!             Your Updated Medication List - Protect others around you: Learn how to safely use, store and throw away your medicines at www.disposemymeds.org.          This list is accurate as of: 5/25/17  1:47 PM.  Always use your most recent med list.                   Brand Name Dispense Instructions for use    dexamethasone 4 MG tablet    DECADRON    15 tablet    Take 5 tablets (20 mg) by mouth daily X 3 days each cycle. To be taken the day before, day of, and day after chemotherapy infusion.       ibuprofen 600 MG tablet    ADVIL/MOTRIN    30 tablet    Take 1 tablet (600 mg) by mouth every 8 hours as needed for moderate pain       lidocaine-prilocaine cream    EMLA    30 g    Apply topically over port 45 - 60 minutes prior to port access.       LORazepam 0.5 MG tablet    ATIVAN    30 tablet    Take 1 tablet (0.5 mg) by mouth every 4 hours as needed (Anxiety, Nausea/Vomiting or Sleep)       * nicotine 21 MG/24HR 24 hr patch    NICODERM CQ    30 patch    Place 1 patch onto the skin every 24 hours       * nicotine 14 MG/24HR 24 hr patch    NICODERM CQ    30 patch    Place 1 patch onto the skin every 24 hours       * nicotine 7 MG/24HR 24 hr patch    NICODERM CQ    30 patch    Place 1 patch onto the skin every 24 hours       ondansetron 8 MG tablet    ZOFRAN    10 tablet    Take 1 tablet (8 mg) by mouth every 8 hours as needed (Nausea/Vomiting)       oxyCODONE 5 MG IR tablet    ROXICODONE    40 tablet    Take 1 tablet (5 mg) by mouth every 4 hours as needed for pain maximum 8 tablet(s) per day       pantoprazole 40 MG EC tablet    PROTONIX    30 tablet    Take 1 tablet (40 mg) by mouth 2 times daily (before meals)       PARoxetine 10 MG tablet    PAXIL    30 tablet    Take 1 tablet (10 mg) by mouth At Bedtime       prochlorperazine 10 MG tablet    COMPAZINE    30 tablet    Take 1 tablet (10 mg) by mouth every 6 hours as needed  (Nausea/Vomiting)       senna-docusate 8.6-50 MG per tablet    SENOKOT-S;PERICOLACE    100 tablet    Take 1 tablet by mouth 2 times daily Reported on 5/4/2017       TYLENOL PO      Take 1,000 mg by mouth every 6 hours as needed for mild pain or fever       * Notice:  This list has 3 medication(s) that are the same as other medications prescribed for you. Read the directions carefully, and ask your doctor or other care provider to review them with you.

## 2017-05-31 PROBLEM — F17.200 TOBACCO USE DISORDER: Status: ACTIVE | Noted: 2017-01-01

## 2017-05-31 NOTE — MR AVS SNAPSHOT
After Visit Summary   5/31/2017    Wdae Acevedo    MRN: [id number]           Patient Information     Date Of Birth          1960        Visit Information        Provider Department      5/31/2017 1:00 PM Pasquale Naylor MD [facility] ONCOLOGY      Today's Diagnoses     Anemia, chronic disease    -  1    Non-small cell lung cancer (NSCLC) (H)        Cancer associated pain        Drug-induced constipation        Tobacco use disorder           Follow-ups after your visit        Follow-up notes from your care team     Return in about 2 weeks (around 6/14/2017) for Schedule for chemotherapy as per treatment plan.      Your next 10 appointments already scheduled     Jun 14, 2017  8:00 AM CDT   Level 6 with ROOM 10 [facility] Cancer Infusion ([facility])    [facility]  [address]  [facility] [address]   [phone]            Jun 14, 2017  8:30 AM CDT   Return Visit with Pasquale Naylor MD   [facility] ([facility])    [facility]  [address]  [facility] [address]   [phone]            Nov 14, 2017 11:30 AM CST   (Arrive by 11:15 AM)   RETURN ENDOCRINE with Padma Medley MD   [facility] Endocrinology ([facility])    [address] [place] [id number] [phone]              Who to contact     If you have questions or need follow up information about today's clinic visit or your schedule please contact [facility] directly at [phone].  Normal or non-critical lab and imaging results will be communicated to you by MyChart, letter or phone within 4 business days after the clinic has received the results. If you do not hear from us within 7 days, please contact the clinic through MyChart or phone. If you have a critical or abnormal lab result, we will notify you by phone as soon as possible.  Submit refill  "requests through [employer] or call your pharmacy and they will forward the refill request to us. Please allow 3 business days for your refill to be completed.          Additional Information About Your Visit        [employer] Information     [employer] lets you send messages to your doctor, view your test results, renew your prescriptions, schedule appointments and more. To sign up, go to [email or web address] . Click on \"Log in\" on the left side of the screen, which will take you to the Welcome page. Then click on \"Sign up Now\" on the right side of the page.     You will be asked to enter the access code listed below, as well as some personal information. Please follow the directions to create your username and password.     Your access code is: [email or web address]  Expires: 2017  9:09 AM     Your access code will  in 90 days. If you need help or a new code, please call your [place] clinic or [phone].        Care EveryWhere ID     This is your Care EveryWhere ID. This could be used by other organizations to access your [place] medical records  [id number]        Your Vitals Were     Pulse Temperature Respirations Height Pulse Oximetry BMI (Body Mass Index)    93 97.5  F (36.4  C) (Tympanic) 18 1.803 m (5' 10.98\") 100% 21.29 kg/m2       Blood Pressure from Last 3 Encounters:   17 90/68   17 135/87   17 132/81    Weight from Last 3 Encounters:   17 69.2 kg (152 lb 9.6 oz)   17 73 kg (161 lb)   17 72.6 kg (160 lb)              Today, you had the following     No orders found for display       Primary Care Provider    Doctor Unknown, MD       No address on file        Thank you!     Thank you for choosing [facility]  for your care. Our goal is always to provide you with excellent care. Hearing back from our patients is one way we can continue to improve our services. Please take a few minutes to complete the written survey that you may receive in the mail after your " visit with us. Thank you!             Your Updated Medication List - Protect others around you: Learn how to safely use, store and throw away your medicines at www.disposemymeds.org.          This list is accurate as of: 5/31/17  1:21 PM.  Always use your most recent med list.                   Brand Name Dispense Instructions for use    amoxicillin-clavulanate 875-125 MG per tablet    AUGMENTIN    20 tablet    Take 1 tablet by mouth 2 times daily       dexamethasone 4 MG tablet    DECADRON    15 tablet    Take 5 tablets (20 mg) by mouth daily X 3 days each cycle. To be taken the day before, day of, and day after chemotherapy infusion.       ibuprofen 600 MG tablet    ADVIL/MOTRIN    30 tablet    Take 1 tablet (600 mg) by mouth every 8 hours as needed for moderate pain       lidocaine-prilocaine cream    EMLA    30 g    Apply topically over port 45 - 60 minutes prior to port access.       LORazepam 0.5 MG tablet    ATIVAN    30 tablet    Take 1 tablet (0.5 mg) by mouth every 4 hours as needed (Anxiety, Nausea/Vomiting or Sleep)       * nicotine 21 MG/24HR 24 hr patch    NICODERM CQ    30 patch    Place 1 patch onto the skin every 24 hours       * nicotine 14 MG/24HR 24 hr patch    NICODERM CQ    30 patch    Place 1 patch onto the skin every 24 hours       * nicotine 7 MG/24HR 24 hr patch    NICODERM CQ    30 patch    Place 1 patch onto the skin every 24 hours       ondansetron 8 MG tablet    ZOFRAN    10 tablet    Take 1 tablet (8 mg) by mouth every 8 hours as needed (Nausea/Vomiting)       oxyCODONE 5 MG IR tablet    ROXICODONE    40 tablet    Take 1 tablet (5 mg) by mouth every 4 hours as needed for pain maximum 8 tablet(s) per day       pantoprazole 40 MG EC tablet    PROTONIX    30 tablet    Take 1 tablet (40 mg) by mouth 2 times daily (before meals)       PARoxetine 10 MG tablet    PAXIL    30 tablet    Take 1 tablet (10 mg) by mouth At Bedtime       prochlorperazine 10 MG tablet    COMPAZINE    30 tablet     Take 1 tablet (10 mg) by mouth every 6 hours as needed (Nausea/Vomiting)       senna-docusate 8.6-50 MG per tablet    SENOKOT-S;PERICOLACE    100 tablet    Take 1 tablet by mouth 2 times daily Reported on 5/4/2017       TYLENOL PO      Take 1,000 mg by mouth every 6 hours as needed for mild pain or fever       * Notice:  This list has 3 medication(s) that are the same as other medications prescribed for you. Read the directions carefully, and ask your doctor or other care provider to review them with you.

## 2017-05-31 NOTE — PROGRESS NOTES
Hematology/ Oncology Follow-up Visit:  May 31, 2017    Reason for Visit:   Chief Complaint   Patient presents with     Oncology Clinic Visit     1 week recheck NSCLC after first chemo       Oncologic History:  Non-small cell lung cancer (NSCLC) (H)  The patient presented with 2-3 months history of weakness and fainting episodes. He was seen in the emergency room further workup was done including a CT scan which showed a 3 cm mass in the medial left upper lobe. There was adjacent atelectasis at the prominent mediastinal lymph nodes. There is a destructive bone lesion involving left lateral fifth rib. Patient underwent transthoracic CT-guided biopsy in the hospital. The pathology report came back consistent with mixed tumor including adenocarcinoma and squamous histology. 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 He had a normal TSH, but he had low T3 and T4. He had a serum cortisol morning level 4.4. He underwent a cosyntropin stim test which was mildly abnormal. He had a cortisol level 14.8 at 30 minutes and 19.6 at 60 minutes. His 24-hour cortisol in his urine was normal. He was seen by endocrinology we will continue to monitor him in 6 months.   PET scan showed hypermetabolic bilateral level Ib and right level III lymph nodes in the neck suspicious for metastatic disease. There was also hypermetabolic activity seen in the sella consistent with pituitary macroadenoma. There is hypermetabolic mediastinal left hilar lymphadenopathy.  There is metastatic disease seen in the left scapula and left fifth rib. There is left pleural effusion and small right pleural effusion seen as well.  ALK 4 came back negative. EGFR was negative for mutation. . PDL 1 is less than 1%.     Interval History:  Patient seen today for follow-up. He had his first cycle of chemotherapy last week. He tolerated it very well without significant complaints other than mild nausea.  Denies any fever or chills. Denies any change in bowel habits. He continues to smoke about 12 cigarettes a day.His pain is currently well controlled on the current regimen.    Review Of Systems:  Constitutional: Negative for fever, chills, and night sweats.  Skin: negative.  Eyes: negative.  Ears/Nose/Throat: negative.  Respiratory: No shortness of breath, dyspnea on exertion, cough, or hemoptysis.  Cardiovascular: negative.  Gastrointestinal: Mild nausea  Genitourinary: negative.  Musculoskeletal: negative.  Neurologic: negative.  Psychiatric: negative.  Hematologic/Lymphatic/Immunologic: negative.  Endocrine: negative.    All other ROS negative unless mentioned in interval history.    Past medical, social, surgical, and family histories reviewed.    Allergies:  Allergies as of 05/31/2017 - Kvng as Reviewed 05/31/2017   Allergen Reaction Noted     Lubriderm Rash 04/20/2017       Current Medications:  Current Outpatient Prescriptions   Medication Sig Dispense Refill     oxyCODONE (ROXICODONE) 5 MG IR tablet Take 1 tablet (5 mg) by mouth every 4 hours as needed for pain maximum 8 tablet(s) per day 40 tablet 0     senna-docusate (SENOKOT-S;PERICOLACE) 8.6-50 MG per tablet Take 1 tablet by mouth 2 times daily Reported on 5/4/2017 100 tablet 3     pantoprazole (PROTONIX) 40 MG EC tablet Take 1 tablet (40 mg) by mouth 2 times daily (before meals) 30 tablet 11     ibuprofen (ADVIL/MOTRIN) 600 MG tablet Take 1 tablet (600 mg) by mouth every 8 hours as needed for moderate pain 30 tablet 3     PARoxetine (PAXIL) 10 MG tablet Take 1 tablet (10 mg) by mouth At Bedtime 30 tablet 1     amoxicillin-clavulanate (AUGMENTIN) 875-125 MG per tablet Take 1 tablet by mouth 2 times daily 20 tablet 0     lidocaine-prilocaine (EMLA) cream Apply topically over port 45 - 60 minutes prior to port access. 30 g 3     nicotine (NICODERM CQ) 21 MG/24HR 24 hr patch Place 1 patch onto the skin every 24 hours (Patient not taking: Reported on  "5/31/2017) 30 patch 0     nicotine (NICODERM CQ) 14 MG/24HR 24 hr patch Place 1 patch onto the skin every 24 hours (Patient not taking: Reported on 5/31/2017) 30 patch 0     nicotine (NICODERM CQ) 7 MG/24HR 24 hr patch Place 1 patch onto the skin every 24 hours (Patient not taking: Reported on 5/31/2017) 30 patch 0     dexamethasone (DECADRON) 4 MG tablet Take 5 tablets (20 mg) by mouth daily X 3 days each cycle. To be taken the day before, day of, and day after chemotherapy infusion. 15 tablet 3     LORazepam (ATIVAN) 0.5 MG tablet Take 1 tablet (0.5 mg) by mouth every 4 hours as needed (Anxiety, Nausea/Vomiting or Sleep) (Patient not taking: Reported on 5/31/2017) 30 tablet 2     prochlorperazine (COMPAZINE) 10 MG tablet Take 1 tablet (10 mg) by mouth every 6 hours as needed (Nausea/Vomiting) 30 tablet 2     ondansetron (ZOFRAN) 8 MG tablet Take 1 tablet (8 mg) by mouth every 8 hours as needed (Nausea/Vomiting) 10 tablet 2     Acetaminophen (TYLENOL PO) Take 1,000 mg by mouth every 6 hours as needed for mild pain or fever          Physical Exam:  BP 90/68 (BP Location: Right arm, Patient Position: Chair, Cuff Size: Adult Regular)  Pulse 93  Temp 97.5  F (36.4  C) (Tympanic)  Resp 18  Ht 1.803 m (5' 10.98\")  Wt 69.2 kg (152 lb 9.6 oz)  SpO2 100%  BMI 21.29 kg/m2  Wt Readings from Last 12 Encounters:   05/31/17 69.2 kg (152 lb 9.6 oz)   05/25/17 73 kg (161 lb)   05/25/17 72.6 kg (160 lb)   05/24/17 71.6 kg (157 lb 14.4 oz)   05/17/17 82.6 kg (182 lb)   05/15/17 77.6 kg (171 lb)   05/15/17 82.6 kg (182 lb)   05/11/17 77.8 kg (171 lb 8 oz)   05/10/17 77.6 kg (171 lb)   05/10/17 82.6 kg (182 lb)   05/04/17 80.9 kg (178 lb 6.4 oz)   04/26/17 82.3 kg (181 lb 7 oz)     ECOG performance status: 1  GENERAL APPEARANCE: Healthy, alert and in no acute distress.  HEENT: Sclerae anicteric. PERRLA. Oropharynx without ulcers, lesions, or thrush.  NECK: Supple. No asymmetry or masses.  LYMPHATICS: No palpable cervical, " supraclavicular, axillary, or inguinal lymphadenopathy.  RESP: Lungs clear to auscultation bilaterally without rales, rhonchi or wheezes.  CARDIOVASCULAR: Regular rate and rhythm. Normal S1, S2; no S3 or S4. No murmur, gallop, or rub.  ABDOMEN: Soft, nontender. Bowel sounds normal. No palpable organomegaly or masses.  MUSCULOSKELETAL: Extremities without gross deformities noted. No edema of bilateral lower extremities.  SKIN: No suspicious lesions or rashes.  NEURO: Alert and oriented x 3. Cranial nerves II-XII grossly intact.  PSYCHIATRIC: Mentation and affect appear normal.    Laboratory/Imaging Studies:  Infusion Therapy Visit on 05/24/2017   Component Date Value Ref Range Status     WBC 05/24/2017 6.7  4.0 - 11.0 10e9/L Final     RBC Count 05/24/2017 3.89* 4.4 - 5.9 10e12/L Final     Hemoglobin 05/24/2017 9.2* 13.3 - 17.7 g/dL Final     Hematocrit 05/24/2017 28.2* 40.0 - 53.0 % Final     MCV 05/24/2017 73* 78 - 100 fl Final     MCH 05/24/2017 23.7* 26.5 - 33.0 pg Final     MCHC 05/24/2017 32.6  31.5 - 36.5 g/dL Final     RDW 05/24/2017 18.7* 10.0 - 15.0 % Final     Platelet Count 05/24/2017 382  150 - 450 10e9/L Final     Diff Method 05/24/2017 Automated Method   Final     % Neutrophils 05/24/2017 89.9  % Final     % Lymphocytes 05/24/2017 8.1  % Final     % Monocytes 05/24/2017 1.9  % Final     % Eosinophils 05/24/2017 0.0  % Final     % Basophils 05/24/2017 0.0  % Final     % Immature Granulocytes 05/24/2017 0.1  % Final     Absolute Neutrophil 05/24/2017 6.0  1.6 - 8.3 10e9/L Final     Absolute Lymphocytes 05/24/2017 0.5* 0.8 - 5.3 10e9/L Final     Absolute Monocytes 05/24/2017 0.1  0.0 - 1.3 10e9/L Final     Absolute Eosinophils 05/24/2017 0.0  0.0 - 0.7 10e9/L Final     Absolute Basophils 05/24/2017 0.0  0.0 - 0.2 10e9/L Final     Abs Immature Granulocytes 05/24/2017 0.0  0 - 0.4 10e9/L Final     Sodium 05/24/2017 138  133 - 144 mmol/L Final     Potassium 05/24/2017 3.9  3.4 - 5.3 mmol/L Final     Chloride  05/24/2017 108  94 - 109 mmol/L Final     Carbon Dioxide 05/24/2017 22  20 - 32 mmol/L Final     Anion Gap 05/24/2017 8  3 - 14 mmol/L Final     Glucose 05/24/2017 128* 70 - 99 mg/dL Final     Urea Nitrogen 05/24/2017 21  7 - 30 mg/dL Final     Creatinine 05/24/2017 0.75  0.66 - 1.25 mg/dL Final     GFR Estimate 05/24/2017   >60 mL/min/1.7m2 Final                    Value:>90  Non  GFR Calc       GFR Estimate If Black 05/24/2017   >60 mL/min/1.7m2 Final                    Value:>90   GFR Calc       Calcium 05/24/2017 9.5  8.5 - 10.1 mg/dL Final     Bilirubin Total 05/24/2017 0.5  0.2 - 1.3 mg/dL Final     Albumin 05/24/2017 3.7  3.4 - 5.0 g/dL Final     Protein Total 05/24/2017 8.7  6.8 - 8.8 g/dL Final     Alkaline Phosphatase 05/24/2017 197* 40 - 150 U/L Final     ALT 05/24/2017 22  0 - 70 U/L Final     AST 05/24/2017 37  0 - 45 U/L Final        Recent Results (from the past 744 hour(s))   PET Oncology (Eyes to Thighs)    Narrative    Combined Report of:    PET and CT on  5/9/2017 4:04 PM :    1. PET of the neck, chest, abdomen, and pelvis.  2. PET CT Fusion for Attenuation Correction and Anatomical  Localization:    3. 3D MIP and PET-CT fused images were processed on an independent  workstation and archived to PACS and reviewed by a radiologist.    Technique:    1. PET: The patient received 13.25 mCi of F-18-FDG; the serum glucose  was 90 prior to administration, body weight was 80.9 kg. Images were  evaluated in the axial, sagittal, and coronal planes as well as the  rotational whole body MIP. Images were acquired from the Eyes to the  proximal thighs.    UPTAKE WAS MEASURED AT 60 MINUTES.     2. CT: Volumetric acquisition for clinical interpretation of the  chest, abdomen, and pelvis acquired at 3 mm sections . The chest,  abdomen, and pelvis were evaluated at 5 mm sections in bone, soft  tissue, and lung windows.      No intravenous contrast administered.    --    3. 3D MIP and  PET-CT fused images were processed on an independent  workstation and archived to PACS and reviewed by a radiologist.    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    COMPARISON: Chest radiograph 4/27/2017, CT chest abdomen and pelvis  4/20/2017    FINDINGS:     HEAD/NECK:  Bilateral hypermetabolic level 1B and right level 3 lymph nodes. 11 mm  right level 1B lymph node (series 3 image 62), with SUV max of 12, and  8 mm left level 1B lymph node (series 3 image 62), with SUV max of  7.1. Right level 3 lymph node measuring 12 mm with SUV max of 8.3  (series 3 image 74). Small left level 4 FDG avid node.  Partially  visualized hypermetabolism in the sella with SUV max of 16.6 better  evaluated on MRI dated 4/21/2017.    Unchanged complete opacification of the right maxillary sinus and left  maxillary sinus mucus retention cysts. The mastoid air cells are  clear. The remaining paranasal sinuses are unremarkable.     The mucosal pharyngeal space, the , prevertebral and carotid  spaces are within normal limits.     The thyroid gland is normal.    CHEST:  Multiple foci of abnormal FDG uptake are noted in the chest, including  the left upper lobe, superior mediastinum, subcarinal region, anterior  prevascular, right upper paratracheal, left lower paratracheal and  left hilar. Again demonstrated is a 2.8 x 3.4 cm left upper lobe mass  with SUV max of 18.2, consistent with patient's known primary  non-small cell lung carcinoma. Soft tissue mass along the left  superior aspect of the superior mediastinum measuring approximately  1.9 x 5.7 cm (series 3 image 128), with SUV max of 14.3. 2.1 cm right  superior mediastinal lymph node (series 3 image 118), with SUV max of  12.7, and 2.5 cm  short axis diameter subcarinal lymph node (series 3  image 152), with SUV max of 16.1.    Moderate left pleural effusion with tiny amount of residual air  demonstrates a larger fluid component and smaller air component since  4/27/2017. Small right pleural effusion is increased from previous.  Overlying atelectasis is present. No additional pulmonary nodules are  identified, though findings are limited on this nondiagnostic CT. No  significant pericardial effusion.    ABDOMEN AND PELVIS:  There is no suspicious FDG uptake in the abdomen or pelvis.    Small amount of nonspecific pelvic free fluid. No free air. Punctate  left lower pole nonobstructing calyceal tip calculus unchanged from  previous.    LOWER EXTREMITIES:   No abnormal masses or hypermetabolic lesions.    BONES:   Lytic lesions involving the left scapula and left fifth rib with soft  tissue component. The left scapular lesion measures 18 mm (series 3  image 74), with SUV max of 14.5, and the left fifth rib lesion  measures 4.3 x 2.2 cm (series 3 image 143), with SUV max of 20.9.  Multiple healed right-sided rib fractures.        Impression    IMPRESSION: In this patient with newly diagnosed non-small cell  carcinoma of the left upper lobe:  1. Hypermetabolic bilateral level 1B and right level 3 lymph nodes in  the neck highly suspicious for metastatic disease.  2. 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  3. 2.8 x 3.4 cm hypermetabolic left upper lobe mass consistent with  patient's known non-small cell lung carcinoma.   4. Hypermetabolic mediastinal and left hilar lymphadenopathy.   5. Metastatic disease including the left scapula and left fifth rib.  6. Increased moderate left pleural effusion and small right pleural  effusion. Left pneumothorax has almost totally resolved. No focal FDG  uptake to suggest that these are malignant, however thoracentesis  could be considered for  further evaluation if clinically indicated.  7. Small amount of new nonspecific pelvic free fluid.     I have personally reviewed the examination and initial interpretation  and I agree with the findings.    AHMET BUITRAGO MD   XR Chest 2 Views    Narrative    CHEST TWO VIEWS   5/15/2017 2:42 PM     HISTORY: Left pleural effusion shown on PET scan. Malignant neoplasm  of unspecified part of unspecified bronchus or lung.    COMPARISON: Chest x-ray 4/27/2017.      Impression    IMPRESSION: Two views of the chest are performed. Scarring in the left  hilar region appears stable. Trace amount of left pleural fluid is  likely unchanged. Heart is normal in size. Left pneumothorax has  resolved. Right lung is well expanded and clear.    JIM POWERS MD   XR Surgery VIDHYA L/T 5 Min Fluoro w Stills    Narrative    XR SURGERY VIDHYA FLUORO LESS THAN 5 MIN W STILLS 5/17/2017 12:29 PM    HISTORY: Port placement.    Fluoroscopy time: 0.56 minutes.    COMPARISON: None.    FINDINGS: 2 fluoroscopic views were obtained during port placement.      Impression    IMPRESSION: Procedural fluoroscopy.    ALLEN ORTIZ MD   XR Chest 1 View    Narrative    XR CHEST 1 VW 5/17/2017 12:56 PM    HISTORY: Port-A-Cath placement.    COMPARISON: 5/15/2017.      Impression    IMPRESSION: Single view the chest shows interval placement of a  left-sided power port catheter. The tip is in the expected region of  the mid SVC.  Haziness to the left hemithorax slightly linear relates  to the patient's known left pleural effusion. Masslike fullness in the  left suprahilar region is more prominent compared to two days ago,  possibly due to patient positioning and/or radiation therapy (in the  appropriate clinical setting).    AZALEA ART MD       Assessment and plan:  (D63.8) Anemia, chronic disease    We will continue to monitor the patient's symptoms. We will offer the patient blood transfusion if he becomes more symptomatic.    (C34.90) Non-small cell lung  cancer (NSCLC) (H)  Patient tolerated treatment with  Paclitaxel and carboplatin without significant side effects. We will continue with the current regimen. We discussed starting Avastin with the regimen.  I will see the patient again in 2 weeks or sooner if there's new developments or concerns.    (G89.3) Cancer associated pain  Pain is currently well controlled on the current regimen.    (K59.03) Drug-induced constipation  Patient currently on Senokot S twice daily.    (F17.200) Tobacco use disorder  I strongly advised the patient to stop smoking.    The patient is ready to learn, no apparent learning barriers were identified.  Diagnosis and treatment plans were explained to the patient. The patient expressed understanding of the content. The patient asked appropriate questions. The patient questions were answered to his satisfaction.    Chart documentation with Dragon Voice recognition Software. Although reviewed after completion, some words and grammatical errors may remain.

## 2017-05-31 NOTE — PATIENT INSTRUCTIONS
We would like to see you back in clinic with Dr. Naylor in 2 weeks with chemotherapy to be scheduled per treatment plan. Copy of appointments, and after visit summary (AVS) given to patient.  If you have any questions during business hours (M-F 8 AM- 4PM), please call Jillian Renee RN, BSN, OCN Oncology Hematology /Breast Cancer Navigator at [facility] [phone].   For questions after business hours, or on holidays/weekends, please call our after hours Nurse Triage line [phone]. Thank you.

## 2017-05-31 NOTE — NURSING NOTE
"Oncology Rooming Note    May 31, 2017 1:03 PM   Wade Acevedo is a 57 year old male who presents for:    Chief Complaint   Patient presents with     Oncology Clinic Visit     1 week recheck NSCLC after first chemo     Initial Vitals: BP 90/68 (BP Location: Right arm, Patient Position: Chair, Cuff Size: Adult Regular)  Pulse 93  Temp 97.5  F (36.4  C) (Tympanic)  Resp 18  Ht 1.803 m (5' 10.98\")  Wt 69.2 kg (152 lb 9.6 oz)  SpO2 100%  BMI 21.29 kg/m2 Estimated body mass index is 21.29 kg/(m^2) as calculated from the following:    Height as of this encounter: 1.803 m (5' 10.98\").    Weight as of this encounter: 69.2 kg (152 lb 9.6 oz). Body surface area is 1.86 meters squared.  No Pain (1) Comment: Right buttocks 3/10    No LMP for male patient.  Allergies reviewed: Yes  Medications reviewed: Yes    Medications: Medication refills not needed today.  Pharmacy name entered into [employer]: [facility] - [address] AT [address]    Clinical concerns: 1 week recheck NSCLC after first chemo.    10  minutes for nursing intake (face to face time)     Reena Leon CMA              "

## 2017-06-05 NOTE — PROGRESS NOTES
Clinic Care Coordination Contact  Care Team Conversations    RN CC spoke with [facility] . Pt is still receiving skilled home care services at this time.   RN CC will plan to f/u with [facility] again in 2-4 weeks to f/u with discharge plans.     Mckayla Wadsworth RN, BSN, PHN   Clinic Care Coordinator  [facility]:  [facility]   [email or web address]   Office: [phone] Fax: [phone]

## 2017-06-14 NOTE — PATIENT INSTRUCTIONS
We will reschedule your chemotherapy to next week.  You will also need to have a blood transfusion, 2 units PRBC. We would like to see you back in clinic with Dr. Naylor in 4 weeks with chemotherapy to be scheduled per treatment plan.  Your prescription (Oxycodobe) has been given to you to hand carry to the pharmacy of your choice. When you are in need of a refill, please call your pharmacy and they will send us a request.  Copy of appointments, and after visit summary (AVS) given to patient.  If you have any questions during business hours (M-F 8 AM- 4PM), please call Jillian Renee RN, BSN, OCN Oncology Hematology /Breast Cancer Navigator at [facility] [phone].   For questions after business hours, or on holidays/weekends, please call our after hours Nurse Triage line [phone]. Thank you.

## 2017-06-14 NOTE — NURSING NOTE
"Oncology Rooming Note    June 14, 2017 8:38 AM   Wade Acevedo is a 57 year old male who presents for:    Chief Complaint   Patient presents with     Oncology Clinic Visit     2 week recheck NSCLC, Labs & Chemo today     Initial Vitals: /72 (BP Location: Right arm, Patient Position: Chair, Cuff Size: Adult Regular)  Pulse 87  Temp 99.5  F (37.5  C) (Tympanic)  Resp (!) 99  Ht 1.803 m (5' 10.98\")  Wt 70.7 kg (155 lb 14.4 oz)  SpO2 99%  BMI 21.75 kg/m2 Estimated body mass index is 21.75 kg/(m^2) as calculated from the following:    Height as of this encounter: 1.803 m (5' 10.98\").    Weight as of this encounter: 70.7 kg (155 lb 14.4 oz). Body surface area is 1.88 meters squared.  Extreme Pain (9) Comment: Right leg    No LMP for male patient.  Allergies reviewed: Yes  Medications reviewed: Yes    Medications: MEDICATION REFILLS NEEDED TODAY. Provider was notified.  Pharmacy name entered into [email or web address]: [id number] - [facility] [address] AT [facility] OF [address] [place] [address]    Clinical concerns: 2 week recheck NSCLC, Labs & Chemo today. Patient reports he was unable to take this morning dose of Dexamethasone.     7  minutes for nursing intake (face to face time)     Reena Leon CMA              "

## 2017-06-14 NOTE — MR AVS SNAPSHOT
After Visit Summary   6/14/2017    Wade Acevedo    MRN: [id number]           Patient Information     Date Of Birth          1960        Visit Information        Provider Department      6/14/2017 8:00 AM Gely Hanna APRN [facility]        Today's Diagnoses     Cancer associated pain    -  1    Non-small cell lung cancer (NSCLC) (H)        Anemia, unspecified type          Care Instructions    We will reschedule your chemotherapy to next week.  You will also need to have a blood transfusion, 2 units PRBC. We would like to see you back in clinic with Dr. Naylor in 4 weeks with chemotherapy to be scheduled per treatment plan.  Your prescription (Oxycodobe) has been given to you to hand carry to the pharmacy of your choice. When you are in need of a refill, please call your pharmacy and they will send us a request.  Copy of appointments, and after visit summary (AVS) given to patient.  If you have any questions during business hours (M-F 8 AM- 4PM), please call Jillian Renee RN, BSN, OCN Oncology Hematology /Breast Cancer Navigator at [facility] [phone].   For questions after business hours, or on holidays/weekends, please call our after hours Nurse Triage line [phone]. Thank you.            Follow-ups after your visit        Your next 10 appointments already scheduled     Jun 22, 2017  8:30 AM CDT   Level 6 with ROOM 1 [facility] ([facility])    [facility]  [address]  [facility] [address]   [phone]            Jul 13, 2017  8:00 AM CDT   Level 6 with ROOM 9 [facility] ([facility])    [facility]  [address]  [facility] [address]   [phone]            Jul 13, 2017  9:00 AM CDT   Return Visit with Pasquale Naylor MD   [facility] ([facility])    [facility]  "C[facility]  [address]  [facility] [address]   [phone]            Nov 14, 2017 11:30 AM CST   (Arrive by 11:15 AM)   RETURN ENDOCRINE with Padma Medley MD   [facility] ([facility] [place])    [address] [facility] [facility]  3rd [facility] [address] [phone]              Future tests that were ordered for you today     Open Standing Orders        Priority Remaining Interval Expires Ordered    Transfuse red blood cell unit Routine 1/2 TRANSFUSE 2 UNITS  6/14/2017    Red blood cell prepare order unit Routine 99/100 CONDITIONAL (SPECIFY) BLOOD  6/14/2017            Who to contact     If you have questions or need follow up information about today's clinic visit or your schedule please contact [facility] directly at [phone].  Normal or non-critical lab and imaging results will be communicated to you by MyChart, letter or phone within 4 business days after the clinic has received the results. If you do not hear from us within 7 days, please contact the clinic through Asktourismhart or phone. If you have a critical or abnormal lab result, we will notify you by phone as soon as possible.  Submit refill requests through Flypad or call your pharmacy and they will forward the refill request to us. Please allow 3 business days for your refill to be completed.          Additional Information About Your Visit        AsktourismharA-Gas Information     Flypad lets you send messages to your doctor, view your test results, renew your prescriptions, schedule appointments and more. To sign up, go to [email or web address] . Click on \"Log in\" on the left side of the screen, which will take you to the Welcome page. Then click on \"Sign up Now\" on the right side of the page.     You will be asked to enter the access code listed below, as well as some personal information. Please follow the directions to create your username and password.     Your access code is: " "[email or web address]  Expires: 2017  9:09 AM     Your access code will  in 90 days. If you need help or a new code, please call your [facility] or [phone].        Care EveryWhere ID     This is your Care EveryWhere ID. This could be used by other organizations to access your [place] medical records  [id number]        Your Vitals Were     Pulse Temperature Respirations Height Pulse Oximetry BMI (Body Mass Index)    87 99.5  F (37.5  C) (Tympanic) 99 1.803 m (5' 10.98\") 99% 21.75 kg/m2       Blood Pressure from Last 3 Encounters:   17 111/72   17 90/68   17 135/87    Weight from Last 3 Encounters:   17 70.7 kg (155 lb 14.4 oz)   17 69.2 kg (152 lb 9.6 oz)   17 73 kg (161 lb)              Today, you had the following     No orders found for display         Where to get your medicines      Some of these will need a paper prescription and others can be bought over the counter.  Ask your nurse if you have questions.     Bring a paper prescription for each of these medications     oxyCODONE 5 MG IR tablet          Primary Care Provider    Doctor Unknown, MD       No address on file        Thank you!     Thank you for choosing [facility]  for your care. Our goal is always to provide you with excellent care. Hearing back from our patients is one way we can continue to improve our services. Please take a few minutes to complete the written survey that you may receive in the mail after your visit with us. Thank you!             Your Updated Medication List - Protect others around you: Learn how to safely use, store and throw away your medicines at www.disposemymeds.org.          This list is accurate as of: 17 10:05 AM.  Always use your most recent med list.                   Brand Name Dispense Instructions for use    dexamethasone 4 MG tablet    DECADRON    15 tablet    Take 5 tablets (20 mg) by mouth daily X 3 days each cycle. To be taken the day before, day " of, and day after chemotherapy infusion.       ibuprofen 600 MG tablet    ADVIL/MOTRIN    30 tablet    Take 1 tablet (600 mg) by mouth every 8 hours as needed for moderate pain       lidocaine-prilocaine cream    EMLA    30 g    Apply topically over port 45 - 60 minutes prior to port access.       LORazepam 0.5 MG tablet    ATIVAN    30 tablet    Take 1 tablet (0.5 mg) by mouth every 4 hours as needed (Anxiety, Nausea/Vomiting or Sleep)       * nicotine 21 MG/24HR 24 hr patch    NICODERM CQ    30 patch    Place 1 patch onto the skin every 24 hours       * nicotine 14 MG/24HR 24 hr patch    NICODERM CQ    30 patch    Place 1 patch onto the skin every 24 hours       * nicotine 7 MG/24HR 24 hr patch    NICODERM CQ    30 patch    Place 1 patch onto the skin every 24 hours       ondansetron 8 MG tablet    ZOFRAN    10 tablet    Take 1 tablet (8 mg) by mouth every 8 hours as needed (Nausea/Vomiting)       oxyCODONE 5 MG IR tablet    ROXICODONE    40 tablet    Take 1 tablet (5 mg) by mouth every 4 hours as needed for pain maximum 8 tablet(s) per day       pantoprazole 40 MG EC tablet    PROTONIX    30 tablet    Take 1 tablet (40 mg) by mouth 2 times daily (before meals)       PARoxetine 10 MG tablet    PAXIL    30 tablet    Take 1 tablet (10 mg) by mouth At Bedtime       prochlorperazine 10 MG tablet    COMPAZINE    30 tablet    Take 1 tablet (10 mg) by mouth every 6 hours as needed (Nausea/Vomiting)       senna-docusate 8.6-50 MG per tablet    SENOKOT-S;PERICOLACE    100 tablet    Take 1 tablet by mouth 2 times daily Reported on 5/4/2017       TYLENOL PO      Take 1,000 mg by mouth every 6 hours as needed for mild pain or fever       * Notice:  This list has 3 medication(s) that are the same as other medications prescribed for you. Read the directions carefully, and ask your doctor or other care provider to review them with you.

## 2017-06-14 NOTE — Clinical Note
Gave blood today and adjusted plan with decrease in carbo AUC from 6 to 5 due to cytopenias after 1 cycle-treatment deferred to next week.

## 2017-06-14 NOTE — MR AVS SNAPSHOT
After Visit Summary   6/14/2017    Wade Acevedo    MRN: [id number]           Patient Information     Date Of Birth          1960        Visit Information        Provider Department      6/14/2017 8:00 AM ROOM 10 [facility]        Today's Diagnoses     Non-small cell lung cancer (NSCLC) (H)    -  1    Anemia, unspecified type           Follow-ups after your visit        Your next 10 appointments already scheduled     Jun 22, 2017  8:30 AM CDT   Level 6 with ROOM 1 [facility] ([facility])    [facility]  [address]  [facility] [address]   [phone]            Jul 13, 2017  8:00 AM CDT   Level 6 with ROOM 9 [facility] ([facility])    [facility]  [address]  [facility] [address]   [phone]            Jul 13, 2017  9:00 AM CDT   Return Visit with Pasquale Naylor MD   [facility] ([facility])    [facility]  [address]  [facility] [address]   [phone]            Nov 14, 2017 11:30 AM CST   (Arrive by 11:15 AM)   RETURN ENDOCRINE with Padma Medley MD   [facility] End[facility]in[facility] ([facility] [place])    [address] [place] [address] [phone]              Future tests that were ordered for you today     Open Standing Orders        Priority Remaining Interval Expires Ordered    Red blood cell prepare order unit Routine 99/100 CONDITIONAL (SPECIFY) BLOOD  6/14/2017            Who to contact     If you have questions or need follow up information about today's clinic visit or your schedule please contact [facility] directly at [phone].  Normal or non-critical lab and imaging results will be communicated to you by MyChart, letter or phone within 4 business days after the clinic has received the results. If you do not hear  "from us within 7 days, please contact the clinic through DOZ or phone. If you have a critical or abnormal lab result, we will notify you by phone as soon as possible.  Submit refill requests through DOZ or call your pharmacy and they will forward the refill request to us. Please allow 3 business days for your refill to be completed.          Additional Information About Your Visit        LyticsharRemoteReality Information     DOZ lets you send messages to your doctor, view your test results, renew your prescriptions, schedule appointments and more. To sign up, go to [email or web address] . Click on \"Log in\" on the left side of the screen, which will take you to the Welcome page. Then click on \"Sign up Now\" on the right side of the page.     You will be asked to enter the access code listed below, as well as some personal information. Please follow the directions to create your username and password.     Your access code is: [email or web address]  Expires: 2017  9:09 AM     Your access code will  in 90 days. If you need help or a new code, please call your [place] clinic or [phone].        Care EveryWhere ID     This is your Care EveryWhere ID. This could be used by other organizations to access your [place] medical records  [id number]        Your Vitals Were     Pulse Temperature Respirations Pulse Oximetry          74 96.2  F (35.7  C) (Oral) 18 98%         Blood Pressure from Last 3 Encounters:   17 111/72   17 106/68   17 90/68    Weight from Last 3 Encounters:   17 70.7 kg (155 lb 14.4 oz)   17 69.2 kg (152 lb 9.6 oz)   17 73 kg (161 lb)              We Performed the Following     ABO/Rh type and screen     Blood component     Blood component     CBC with platelets differential     Comprehensive metabolic panel     Transfuse red blood cell unit     Transfuse red blood cell unit          Where to get your medicines      Some of these will need a paper prescription and " others can be bought over the counter.  Ask your nurse if you have questions.     Bring a paper prescription for each of these medications     oxyCODONE 5 MG IR tablet          Primary Care Provider    Doctor Unknown, MD       No address on file        Thank you!     Thank you for choosing [facility]  for your care. Our goal is always to provide you with excellent care. Hearing back from our patients is one way we can continue to improve our services. Please take a few minutes to complete the written survey that you may receive in the mail after your visit with us. Thank you!             Your Updated Medication List - Protect others around you: Learn how to safely use, store and throw away your medicines at www.disposemymeds.org.          This list is accurate as of: 6/14/17  4:10 PM.  Always use your most recent med list.                   Brand Name Dispense Instructions for use    dexamethasone 4 MG tablet    DECADRON    15 tablet    Take 5 tablets (20 mg) by mouth daily X 3 days each cycle. To be taken the day before, day of, and day after chemotherapy infusion.       ibuprofen 600 MG tablet    ADVIL/MOTRIN    30 tablet    Take 1 tablet (600 mg) by mouth every 8 hours as needed for moderate pain       lidocaine-prilocaine cream    EMLA    30 g    Apply topically over port 45 - 60 minutes prior to port access.       LORazepam 0.5 MG tablet    ATIVAN    30 tablet    Take 1 tablet (0.5 mg) by mouth every 4 hours as needed (Anxiety, Nausea/Vomiting or Sleep)       * nicotine 21 MG/24HR 24 hr patch    NICODERM CQ    30 patch    Place 1 patch onto the skin every 24 hours       * nicotine 14 MG/24HR 24 hr patch    NICODERM CQ    30 patch    Place 1 patch onto the skin every 24 hours       * nicotine 7 MG/24HR 24 hr patch    NICODERM CQ    30 patch    Place 1 patch onto the skin every 24 hours       ondansetron 8 MG tablet    ZOFRAN    10 tablet    Take 1 tablet (8 mg) by mouth every 8 hours as needed  (Nausea/Vomiting)       oxyCODONE 5 MG IR tablet    ROXICODONE    40 tablet    Take 1 tablet (5 mg) by mouth every 4 hours as needed for pain maximum 8 tablet(s) per day       pantoprazole 40 MG EC tablet    PROTONIX    30 tablet    Take 1 tablet (40 mg) by mouth 2 times daily (before meals)       PARoxetine 10 MG tablet    PAXIL    30 tablet    Take 1 tablet (10 mg) by mouth At Bedtime       prochlorperazine 10 MG tablet    COMPAZINE    30 tablet    Take 1 tablet (10 mg) by mouth every 6 hours as needed (Nausea/Vomiting)       senna-docusate 8.6-50 MG per tablet    SENOKOT-S;PERICOLACE    100 tablet    Take 1 tablet by mouth 2 times daily Reported on 5/4/2017       TYLENOL PO      Take 1,000 mg by mouth every 6 hours as needed for mild pain or fever       * Notice:  This list has 3 medication(s) that are the same as other medications prescribed for you. Read the directions carefully, and ask your doctor or other care provider to review them with you.

## 2017-06-14 NOTE — PROGRESS NOTES
Infusion Nursing Note:  Wade Acevedo presents today for C2D1 Taxol/Carbo.     Patient seen by provider today: Yes: Gely Schultz   present during visit today: Not Applicable.    Note: Held chemo today due to hemoglobin of 6.6. Pt saw Gely Schultz NP in clinic and she ordered 2 units of PRBCs.  Pt signed blood transfusion consent in clinic.  Pt received 2 units of PRBCs.  Lung sounds clear before, during, and after transfusion.      Intravenous Access:  Labs drawn without difficulty.  Implanted Port.    Treatment Conditions:  Lab Results   Component Value Date    HGB 6.6 06/14/2017     Lab Results   Component Value Date    WBC 2.3 06/14/2017      Lab Results   Component Value Date    ANEU 1.4 06/14/2017     Lab Results   Component Value Date    PLT 64 06/14/2017      Lab Results   Component Value Date     06/14/2017                   Lab Results   Component Value Date    POTASSIUM 3.9 06/14/2017           Lab Results   Component Value Date    MAG 1.8 04/21/2017            Lab Results   Component Value Date    CR 0.86 06/14/2017                   Lab Results   Component Value Date    JOHANN 8.7 06/14/2017                Lab Results   Component Value Date    BILITOTAL 0.4 06/14/2017           Lab Results   Component Value Date    ALBUMIN 3.4 06/14/2017                    Lab Results   Component Value Date    ALT 37 06/14/2017           Lab Results   Component Value Date    AST 59 06/14/2017     Results reviewed, labs did NOT meet treatment parameters: Hemoglobin 6.6.      Blood transfusion consent signed 6/14/17 at clinic appt.      Post Infusion Assessment:  Patient tolerated blood transfusion without incident.  Blood return noted pre and post infusion.  Site patent and intact, free from redness, edema or discomfort.  No evidence of extravasations.  Access discontinued per protocol.    Discharge Plan:   Patient discharged in stable condition accompanied by: self.  Departure Mode: Ambulatory.  Pt  given side effect sheet for possible reactions post transfuion.  Pt to return on 6/22/17 at 8:30 am for C2D1 Carbo/Taxol.     Mayte Moreira RN

## 2017-06-14 NOTE — PROGRESS NOTES
Oncology Follow Up Visit: June 14, 2017    Oncologist: Dr Pasquale Naylor  PCP: Unknown, Doctor    Diagnosis: Non Small Cell Lung Cancer  Wade Clinton is a 58 yo  male that presented ion 4/2017 with weakness and fainting episodes x 2-3 months. He was found to have a 3 cm mass to the Medial left upper lobe and prominent mediastinal nodes and bone leision to the left lateral fifth rib. Pathology proved adenocarcinoma mixed with squamous cell histology. PET showed hypermetabolic bialteral level 1b and right level III neck lymph nodes suspicious for disease as well as mediastinal left hilar nodes and left scapula and left fifth rib. 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   ALK 4 negative, EGFR negative.PDL 1 =<1%.    Treatment:   5/11/2017 began Carboplatin/paclitaxel    Interval History: Mr. Clinton comes to clinic for toxicity evaluation prior to cycle 2 of carboplatin/Paclitaxel to treat his NSCLC. Pt admits to being fatigued today but states he is eating regular meals and working on getting at least 64 ounces of fluids daily- weight stable since last visit but decreased since diagnosis. He reveals he is having some left hip pain and is now going to return to oxycodone- was trying to hold off use over last week but feels the pain is worse at times and is needing the help- ranked 3/10 at present. bowels are back to normal when off the oxycodone but is needing stool softeners if going back on medication- use to use 2 in morning and 2at night of the 5 mg tabs. He continues to smoke but states he has not had any alcohol since before starting chemotherapy. He is trying to be more active- no falls. Denies depression or anxiety about disease and is aware of the poor prognosis. Has had blood in hospital previously. Admits he is still smoking cigarettes- has not set quit date.   Rest of comprehensive and complete ROS is reviewed and is negative.   Past Medical History:   Diagnosis Date  "    Brain cancer (H)      Constipation      H/O ETOH abuse      Hx of tobacco use, presenting hazards to health      Lung cancer (H)      Pain 5/6/2017     Recurrent falls      Unsteady gait      Weight loss      Current Outpatient Prescriptions   Medication     nicotine (NICODERM CQ) 21 MG/24HR 24 hr patch     nicotine (NICODERM CQ) 14 MG/24HR 24 hr patch     nicotine (NICODERM CQ) 7 MG/24HR 24 hr patch     oxyCODONE (ROXICODONE) 5 MG IR tablet     senna-docusate (SENOKOT-S;PERICOLACE) 8.6-50 MG per tablet     pantoprazole (PROTONIX) 40 MG EC tablet     ibuprofen (ADVIL/MOTRIN) 600 MG tablet     PARoxetine (PAXIL) 10 MG tablet     amoxicillin-clavulanate (AUGMENTIN) 875-125 MG per tablet     lidocaine-prilocaine (EMLA) cream     dexamethasone (DECADRON) 4 MG tablet     LORazepam (ATIVAN) 0.5 MG tablet     prochlorperazine (COMPAZINE) 10 MG tablet     ondansetron (ZOFRAN) 8 MG tablet     Acetaminophen (TYLENOL PO)     No current facility-administered medications for this visit.      Allergies   Allergen Reactions     Lubriderm Rash       Physical Exam:/72 (BP Location: Right arm, Patient Position: Chair, Cuff Size: Adult Regular)  Pulse 87  Temp 99.5  F (37.5  C) (Tympanic)  Resp (!) 99  Ht 1.803 m (5' 10.98\")  Wt 70.7 kg (155 lb 14.4 oz)  SpO2 99%  BMI 21.75 kg/m2    ECOG PS- 1  Constitutional: Alert, pale but in no distress.   ENT: Eyes bright - no yellow hue , No mouth sores  Neck: Supple, No adenopathy.Thyroid symmetric  Cardiac: Heart rate and rhythm is regular and strong without murmur  Respiratory: Breathing easy. Lung sounds clear to auscultation  Port to left side of chest without redness and is patent  GI: Abdomen is soft,  Mildly tender to RUQ, BS normal. Possible hepatomegaly to rib to just below  MS: Muscle tone normal, extremities normal with no edema.   Skin: No suspicious lesions or rashes- occasional bruising but no injuries noted  Neuro: Sensory grossly WNL, gait slow but normal. "   Lymph: Normal ant/post cervical, axillary, supraclavicular nodes  Psych: Mentation appears normal and affect normal/bright and smiling and talkative.     Laboratory Results:   Results for orders placed or performed in visit on 06/14/17   CBC with platelets differential   Result Value Ref Range    WBC 2.3 (L) 4.0 - 11.0 10e9/L    RBC Count 2.80 (L) 4.4 - 5.9 10e12/L    Hemoglobin 6.6 (LL) 13.3 - 17.7 g/dL    Hematocrit 19.7 (L) 40.0 - 53.0 %    MCV 70 (L) 78 - 100 fl    MCH 23.6 (L) 26.5 - 33.0 pg    MCHC 33.5 31.5 - 36.5 g/dL    RDW 16.5 (H) 10.0 - 15.0 %    Platelet Count 64 (L) 150 - 450 10e9/L    Diff Method Automated Method     % Neutrophils 61.8 %    % Lymphocytes 28.7 %    % Monocytes 9.1 %    % Eosinophils 0.0 %    % Basophils 0.0 %    % Immature Granulocytes 0.4 %    Absolute Neutrophil 1.4 (L) 1.6 - 8.3 10e9/L    Absolute Lymphocytes 0.7 (L) 0.8 - 5.3 10e9/L    Absolute Monocytes 0.2 0.0 - 1.3 10e9/L    Absolute Eosinophils 0.0 0.0 - 0.7 10e9/L    Absolute Basophils 0.0 0.0 - 0.2 10e9/L    Abs Immature Granulocytes 0.0 0 - 0.4 10e9/L    Anisocytosis Slight     Poikilocytosis Slight     Hypochromasia Present    Comprehensive metabolic panel   Result Value Ref Range    Sodium 138 133 - 144 mmol/L    Potassium 3.9 3.4 - 5.3 mmol/L    Chloride 109 94 - 109 mmol/L    Carbon Dioxide 20 20 - 32 mmol/L    Anion Gap 9 3 - 14 mmol/L    Glucose 124 (H) 70 - 99 mg/dL    Urea Nitrogen 16 7 - 30 mg/dL    Creatinine 0.86 0.66 - 1.25 mg/dL    GFR Estimate >90  Non  GFR Calc   >60 mL/min/1.7m2    GFR Estimate If Black >90   GFR Calc   >60 mL/min/1.7m2    Calcium 8.7 8.5 - 10.1 mg/dL    Bilirubin Total 0.4 0.2 - 1.3 mg/dL    Albumin 3.4 3.4 - 5.0 g/dL    Protein Total 7.5 6.8 - 8.8 g/dL    Alkaline Phosphatase 249 (H) 40 - 150 U/L    ALT 37 0 - 70 U/L    AST 59 (H) 0 - 45 U/L   ABO/Rh type and screen   Result Value Ref Range    Units Ordered 2     ABO O     RH(D)  Pos     Antibody Screen Neg      Test Valid Only At [facility]     Specimen Expires 06/17/2017     Crossmatch Red Blood Cells    Blood component   Result Value Ref Range    Unit Number [id number]     Blood Component Type Red Blood Cells Leukocyte Reduced     Division Number 00     Status of Unit Released to care unit 06/14/2017 1058     Blood Product Code [id number]     Unit Status ISS    Blood component   Result Value Ref Range    Unit Number [id number]     Blood Component Type Red Blood Cells Leukocyte Reduced     Division Number 00     Status of Unit Ready for patient 06/14/2017 1056     Blood Product Code [id number]     Unit Status ELLEN      Assessment and Plan:   Non Small Cell Lung Cancer-Pt does not meet goals for cycle 2 of carboplatin/Taxol due to cytopenias- anemia, thrombocytopenia and neutorpenia. Will defer cycle and have pt return in 1 week for review of labs to continue on with adjusted treatment.   Cytopenias- will give 2 units of blood today for Hgb =6.6- consent signed. Platelets and ANC also below goals. Will adjust plan to help with tolerance for next cycle by decreasing AUC of carboplatin from 6 to 5 for when he does meet goals and continue with treatment.   Cancer related pain- sharing pain to left hip is worse. Tried going without oxycodone but now having more pain and wants to return to the pills. WIll renew the Oxycodone 5 mg #60- he will be using 2 - 5mg tabs morning and bedtime.   Opioid induced constipation- reviewed need to restart the stools softeners - Senna S- with the pain medication bid. Also review that adequate fluids and activity are very helpful as well as varied diet.   Tobacco use- use of cigarettes- thinking about a quit date but refused today  This was a 35  min visit with > 50% in counseling and coordinating care including education and management of concerns.    Gely Hanna,CNP

## 2017-06-21 NOTE — PROGRESS NOTES
Message received from Katarina  home care RN in regards to patient running out of pain medication before he should due to knee pain.     Spoke with Leanne at  Home care, as this is not cancer related pain, and Dr. Naylor is an oncologist not PCP, Leanne will contact PCP to manage knee pain and have knee evaluated.

## 2017-06-22 NOTE — MR AVS SNAPSHOT
After Visit Summary   6/22/2017    Wade Acevedo    MRN: [id number]           Patient Information     Date Of Birth          1960        Visit Information        Provider Department      6/22/2017 8:30 AM ROOM 1 [facility]        Today's Diagnoses     Non-small cell lung cancer (NSCLC) (H)    -  1       Follow-ups after your visit        Your next 10 appointments already scheduled     Jul 13, 2017  8:00 AM CDT   Level 6 with ROOM 9 [facility] ([facility])    Critical access hospital [facility]  [address]  [facility] [address]   [phone]            Jul 13, 2017  9:00 AM CDT   Return Visit with Pasquale Naylor MD   [facility] ([facility])    Critical access hospital [facility]  [address]  [facility] [address]   [phone]            Nov 14, 2017 11:30 AM CST   (Arrive by 11:15 AM)   RETURN ENDOCRINE with Padma Medley MD   [facility] ([facility])    [address] [place] [id number] [phone]              Who to contact     If you have questions or need follow up information about today's clinic visit or your schedule please contact [facility] directly at [phone].  Normal or non-critical lab and imaging results will be communicated to you by [employer], letter or phone within 4 business days after the clinic has received the results. If you do not hear from us within 7 days, please contact the clinic through MyChart or phone. If you have a critical or abnormal lab result, we will notify you by phone as soon as possible.  Submit refill requests through [employer] or call your pharmacy and they will forward the refill request to us. Please allow 3 business days for your refill to be completed.          Additional Information About Your Visit        [employer]Kiosked Information     [employer] lets you send messages to your doctor,  "view your test results, renew your prescriptions, schedule appointments and more. To sign up, go to [email or web address] . Click on \"Log in\" on the left side of the screen, which will take you to the Welcome page. Then click on \"Sign up Now\" on the right side of the page.     You will be asked to enter the access code listed below, as well as some personal information. Please follow the directions to create your username and password.     Your access code is: [email or web address]  Expires: 2017  9:09 AM     Your access code will  in 90 days. If you need help or a new code, please call your [facility] clinic or [phone].        Care EveryWhere ID     This is your Care EveryWhere ID. This could be used by other organizations to access your [facility] medical records  [id number]        Your Vitals Were     Pulse Temperature BMI (Body Mass Index)             83 97.8  F (36.6  C) (Oral) 21.91 kg/m2          Blood Pressure from Last 3 Encounters:   17 192/82   17 124/74   17 111/72    Weight from Last 3 Encounters:   17 71.2 kg (157 lb)   17 71.2 kg (157 lb)   17 70.7 kg (155 lb 14.4 oz)              We Performed the Following     CBC with platelets differential     Comprehensive metabolic panel          Today's Medication Changes          These changes are accurate as of: 17  2:43 PM.  If you have any questions, ask your nurse or doctor.               Start taking these medicines.        Dose/Directions    morphine 15 MG 12 hr tablet   Commonly known as:  MS CONTIN   Used for:  Non-small cell lung cancer (NSCLC) (H)   Started by:  Pasquale Naylor MD        Dose:  15 mg   Take 1 tablet (15 mg) by mouth every 12 hours   Quantity:  60 tablet   Refills:  0            Where to get your medicines      Some of these will need a paper prescription and others can be bought over the counter.  Ask your nurse if you have questions.     Bring a paper prescription for each of these " medications     morphine 15 MG 12 hr tablet    oxyCODONE 5 MG IR tablet                Primary Care Provider    Doctor Unknown, MD       No address on file        Equal Access to Services     [facility]: Hadii jaxon Molina, [place] luilia, qaslyta kadenida raymundo, gena jesuskaycee nathan. So [facility] [phone].    ATENCIÓN: Si habla español, tiene a burns disposición servicios gratuitos de asistencia lingüística. Llame al [phone].    We comply with applicable federal civil rights laws and [place] laws. We do not discriminate on the basis of race, color, national origin, age, disability sex, sexual orientation or gender identity.            Thank you!     Thank you for choosing [facility]  for your care. Our goal is always to provide you with excellent care. Hearing back from our patients is one way we can continue to improve our services. Please take a few minutes to complete the written survey that you may receive in the mail after your visit with us. Thank you!             Your Updated Medication List - Protect others around you: Learn how to safely use, store and throw away your medicines at www.disposemymeds.org.          This list is accurate as of: 6/22/17  2:43 PM.  Always use your most recent med list.                   Brand Name Dispense Instructions for use Diagnosis    dexamethasone 4 MG tablet    DECADRON    15 tablet    Take 5 tablets (20 mg) by mouth daily X 3 days each cycle. To be taken the day before, day of, and day after chemotherapy infusion.    Anemia, unspecified type, Non-small cell lung cancer (NSCLC) (H)       ibuprofen 600 MG tablet    ADVIL/MOTRIN    30 tablet    Take 1 tablet (600 mg) by mouth every 8 hours as needed for moderate pain    Anemia, unspecified type, Non-small cell lung cancer (NSCLC) (H)       lidocaine-prilocaine cream    EMLA    30 g    Apply topically over port 45 - 60 minutes prior to port access.    Non-small cell lung  cancer (NSCLC) (H)       LORazepam 0.5 MG tablet    ATIVAN    30 tablet    Take 1 tablet (0.5 mg) by mouth every 4 hours as needed (Anxiety, Nausea/Vomiting or Sleep)    Non-small cell lung cancer (NSCLC) (H)       morphine 15 MG 12 hr tablet    MS CONTIN    60 tablet    Take 1 tablet (15 mg) by mouth every 12 hours    Non-small cell lung cancer (NSCLC) (H)       * nicotine 21 MG/24HR 24 hr patch    NICODERM CQ    30 patch    Place 1 patch onto the skin every 24 hours    Tobacco abuse       * nicotine 14 MG/24HR 24 hr patch    NICODERM CQ    30 patch    Place 1 patch onto the skin every 24 hours    Tobacco abuse       * nicotine 7 MG/24HR 24 hr patch    NICODERM CQ    30 patch    Place 1 patch onto the skin every 24 hours    Tobacco abuse       ondansetron 8 MG tablet    ZOFRAN    10 tablet    Take 1 tablet (8 mg) by mouth every 8 hours as needed (Nausea/Vomiting)    Non-small cell lung cancer (NSCLC) (H)       oxyCODONE 5 MG IR tablet    ROXICODONE    40 tablet    Take 1 tablet (5 mg) by mouth every 4 hours as needed for pain maximum 8 tablet(s) per day    Non-small cell lung cancer (NSCLC) (H)       pantoprazole 40 MG EC tablet    PROTONIX    30 tablet    Take 1 tablet (40 mg) by mouth 2 times daily (before meals)    Non-small cell lung cancer (NSCLC) (H)       PARoxetine 10 MG tablet    PAXIL    30 tablet    Take 1 tablet (10 mg) by mouth At Bedtime    Major depressive disorder, recurrent episode, moderate (H)       prochlorperazine 10 MG tablet    COMPAZINE    30 tablet    Take 1 tablet (10 mg) by mouth every 6 hours as needed (Nausea/Vomiting)    Non-small cell lung cancer (NSCLC) (H)       senna-docusate 8.6-50 MG per tablet    SENOKOT-S;PERICOLACE    100 tablet    Take 1 tablet by mouth 2 times daily Reported on 5/4/2017    Non-small cell lung cancer (NSCLC) (H)       TYLENOL PO      Take 1,000 mg by mouth every 6 hours as needed for mild pain or fever    Anemia, unspecified type, Non-small cell lung cancer  (NSCLC) (H)       * Notice:  This list has 3 medication(s) that are the same as other medications prescribed for you. Read the directions carefully, and ask your doctor or other care provider to review them with you.

## 2017-06-22 NOTE — MR AVS SNAPSHOT
After Visit Summary   6/22/2017    Wade Acevedo    MRN: [id number]           Patient Information     Date Of Birth          1960        Visit Information        Provider Department      6/22/2017 9:15 AM Pasquale Naylor MD [facility] ONCOLOGY      Today's Diagnoses     Non-small cell lung cancer (NSCLC) (H)    -  1      Care Instructions    We would like to see you back in clinic with Dr. Naylor in 3 weeks with chemotherapy to be scheduled per treatment plan.  Your prescriptions have been given to you to hand carry to the pharmacy of your choice. When you are in need of a refill, please call your pharmacy and they will send us a request.  Copy of appointments, and after visit summary (AVS) given to patient.  If you have any questions during business hours (M-F 8 AM- 4PM), please call Jillian Renee RN, BSN, OCN Oncology Hematology /Breast Cancer Navigator at [facility] [phone].   For questions after business hours, or on holidays/weekends, please call our after hours Nurse Triage line [phone]. Thank you.            Follow-ups after your visit        Your next 10 appointments already scheduled     Jul 13, 2017  8:00 AM CDT   Level 6 with ROOM 9 [facility] ([facility])    [facility]  [address]  [facility] [address]   [phone]            Jul 13, 2017  9:00 AM CDT   Return Visit with Pasquale Naylor MD   [facility] [place] ([facility])    [facility]  [address]  [facility] [place] [address]   [phone]            Nov 14, 2017 11:30 AM CST   (Arrive by 11:15 AM)   RETURN ENDOCRINE with Padma Medley MD   [facility] Endocrinology ([facility])    [address] [place] [id number] [phone]              Who to contact     If you have questions or need follow  "up information about today's clinic visit or your schedule please contact [facility] directly at [phone].  Normal or non-critical lab and imaging results will be communicated to you by [employer], letter or phone within 4 business days after the clinic has received the results. If you do not hear from us within 7 days, please contact the clinic through [employer] or phone. If you have a critical or abnormal lab result, we will notify you by phone as soon as possible.  Submit refill requests through [employer] or call your pharmacy and they will forward the refill request to us. Please allow 3 business days for your refill to be completed.          Additional Information About Your Visit        MyChart Information     [employer] lets you send messages to your doctor, view your test results, renew your prescriptions, schedule appointments and more. To sign up, go to [email or web address] . Click on \"Log in\" on the left side of the screen, which will take you to the Welcome page. Then click on \"Sign up Now\" on the right side of the page.     You will be asked to enter the access code listed below, as well as some personal information. Please follow the directions to create your username and password.     Your access code is: [email or web address]  Expires: 2017  9:09 AM     Your access code will  in 90 days. If you need help or a new code, please call your [place] clinic or [phone].        Care EveryWhere ID     This is your Care EveryWhere ID. This could be used by other organizations to access your [place] medical records  [id number]        Your Vitals Were     Pulse Temperature Respirations Height BMI (Body Mass Index)       79 97.8  F (36.6  C) (Tympanic) 14 1.803 m (5' 10.98\") 21.91 kg/m2        Blood Pressure from Last 3 Encounters:   17 192/82   17 129/82   17 111/72    Weight from Last 3 Encounters:   17 71.2 kg (157 lb)   17 71.2 kg (157 lb)   17 70.7 kg (155 " lb 14.4 oz)              Today, you had the following     No orders found for display         Today's Medication Changes          These changes are accurate as of: 6/22/17 11:08 AM.  If you have any questions, ask your nurse or doctor.               Start taking these medicines.        Dose/Directions    morphine 15 MG 12 hr tablet   Commonly known as:  MS CONTIN   Used for:  Non-small cell lung cancer (NSCLC) (H)   Started by:  Pasquale Naylor MD        Dose:  15 mg   Take 1 tablet (15 mg) by mouth every 12 hours   Quantity:  60 tablet   Refills:  0            Where to get your medicines      Some of these will need a paper prescription and others can be bought over the counter.  Ask your nurse if you have questions.     Bring a paper prescription for each of these medications     morphine 15 MG 12 hr tablet    oxyCODONE 5 MG IR tablet                Primary Care Provider    Doctor Unknown, MD       No address on file        Equal Access to Services     [facility]: Saul Molina, waduane apple, thiago samalradhika fonseca, gena romero . So [facility] [phone].    ATENCIÓN: Si habla español, tiene a burns disposición servicios gratuitos de asistencia lingüística. [facility] [phone].    We comply with applicable federal civil rights laws and [place] laws. We do not discriminate on the basis of race, color, national origin, age, disability sex, sexual orientation or gender identity.            Thank you!     Thank you for choosing [facility] [place] [facility]  for your care. Our goal is always to provide you with excellent care. Hearing back from our patients is one way we can continue to improve our services. Please take a few minutes to complete the written survey that you may receive in the mail after your visit with us. Thank you!             Your Updated Medication List - Protect others around you: Learn how to safely use, store and throw away your medicines at  www.disposemymeds.org.          This list is accurate as of: 6/22/17 11:08 AM.  Always use your most recent med list.                   Brand Name Dispense Instructions for use Diagnosis    dexamethasone 4 MG tablet    DECADRON    15 tablet    Take 5 tablets (20 mg) by mouth daily X 3 days each cycle. To be taken the day before, day of, and day after chemotherapy infusion.    Anemia, unspecified type, Non-small cell lung cancer (NSCLC) (H)       ibuprofen 600 MG tablet    ADVIL/MOTRIN    30 tablet    Take 1 tablet (600 mg) by mouth every 8 hours as needed for moderate pain    Anemia, unspecified type, Non-small cell lung cancer (NSCLC) (H)       lidocaine-prilocaine cream    EMLA    30 g    Apply topically over port 45 - 60 minutes prior to port access.    Non-small cell lung cancer (NSCLC) (H)       LORazepam 0.5 MG tablet    ATIVAN    30 tablet    Take 1 tablet (0.5 mg) by mouth every 4 hours as needed (Anxiety, Nausea/Vomiting or Sleep)    Non-small cell lung cancer (NSCLC) (H)       morphine 15 MG 12 hr tablet    MS CONTIN    60 tablet    Take 1 tablet (15 mg) by mouth every 12 hours    Non-small cell lung cancer (NSCLC) (H)       * nicotine 21 MG/24HR 24 hr patch    NICODERM CQ    30 patch    Place 1 patch onto the skin every 24 hours    Tobacco abuse       * nicotine 14 MG/24HR 24 hr patch    NICODERM CQ    30 patch    Place 1 patch onto the skin every 24 hours    Tobacco abuse       * nicotine 7 MG/24HR 24 hr patch    NICODERM CQ    30 patch    Place 1 patch onto the skin every 24 hours    Tobacco abuse       ondansetron 8 MG tablet    ZOFRAN    10 tablet    Take 1 tablet (8 mg) by mouth every 8 hours as needed (Nausea/Vomiting)    Non-small cell lung cancer (NSCLC) (H)       oxyCODONE 5 MG IR tablet    ROXICODONE    40 tablet    Take 1 tablet (5 mg) by mouth every 4 hours as needed for pain maximum 8 tablet(s) per day    Non-small cell lung cancer (NSCLC) (H)       pantoprazole 40 MG EC tablet    PROTONIX     30 tablet    Take 1 tablet (40 mg) by mouth 2 times daily (before meals)    Non-small cell lung cancer (NSCLC) (H)       PARoxetine 10 MG tablet    PAXIL    30 tablet    Take 1 tablet (10 mg) by mouth At Bedtime    Major depressive disorder, recurrent episode, moderate (H)       prochlorperazine 10 MG tablet    COMPAZINE    30 tablet    Take 1 tablet (10 mg) by mouth every 6 hours as needed (Nausea/Vomiting)    Non-small cell lung cancer (NSCLC) (H)       senna-docusate 8.6-50 MG per tablet    SENOKOT-S;PERICOLACE    100 tablet    Take 1 tablet by mouth 2 times daily Reported on 5/4/2017    Non-small cell lung cancer (NSCLC) (H)       TYLENOL PO      Take 1,000 mg by mouth every 6 hours as needed for mild pain or fever    Anemia, unspecified type, Non-small cell lung cancer (NSCLC) (H)       * Notice:  This list has 3 medication(s) that are the same as other medications prescribed for you. Read the directions carefully, and ask your doctor or other care provider to review them with you.

## 2017-06-22 NOTE — PROGRESS NOTES
Infusion Nursing Note:  Wade Acevedo presents today for C2D1 .    Patient seen by provider today: Yes: Dr. Naylor visited with pt while he was in the infusion chair.    present during visit today: Not Applicable.    Note: N/A.    Intravenous Access:  Implanted Port.    Treatment Conditions:  Lab Results   Component Value Date    HGB 9.0 06/22/2017     Lab Results   Component Value Date    WBC 4.6 06/22/2017      Lab Results   Component Value Date    ANEU 3.5 06/22/2017     Lab Results   Component Value Date     06/22/2017      Lab Results   Component Value Date     06/22/2017                   Lab Results   Component Value Date    POTASSIUM 4.2 06/22/2017           Lab Results   Component Value Date    MAG 1.8 04/21/2017            Lab Results   Component Value Date    CR 0.80 06/22/2017                   Lab Results   Component Value Date    JOHANN 8.9 06/22/2017                Lab Results   Component Value Date    BILITOTAL 0.4 06/22/2017           Lab Results   Component Value Date    ALBUMIN 3.4 06/22/2017                    Lab Results   Component Value Date    ALT 63 06/22/2017           Lab Results   Component Value Date     06/22/2017     Results reviewed, labs MET treatment parameters, ok to proceed with treatment.      Post Infusion Assessment:  Patient tolerated infusion without incident.  Blood return noted pre and post infusion.  Site patent and intact, free from redness, edema or discomfort.  No evidence of extravasations.  Access discontinued per protocol.    Discharge Plan:   Patient discharged in stable condition accompanied by: self.  Departure Mode: Ambulatory.    Homa Alvarez RN

## 2017-06-22 NOTE — NURSING NOTE
"Oncology Rooming Note    June 22, 2017 10:31 AM   Wade Acevedo is a 57 year old male who presents for:    Chief Complaint   Patient presents with     Oncology Clinic Visit     f/u NSCLC, pain management     Initial Vitals: /82 (BP Location: Right arm, Patient Position: Chair, Cuff Size: Adult Regular)  Pulse 79  Temp 97.8  F (36.6  C) (Tympanic)  Resp 14  Ht 1.803 m (5' 10.98\")  Wt 71.2 kg (157 lb)  BMI 21.91 kg/m2 Estimated body mass index is 21.91 kg/(m^2) as calculated from the following:    Height as of this encounter: 1.803 m (5' 10.98\").    Weight as of this encounter: 71.2 kg (157 lb). Body surface area is 1.89 meters squared.  Severe Pain (7) Comment: Data Unavailable   No LMP for male patient.  Allergies reviewed: Yes  Medications reviewed: Yes    Medications: MEDICATION REFILLS NEEDED TODAY. Provider was notified.  Pharmacy [facility] entered into [employer] [address] [place] [address] AT [address]    Clinical concerns: Patient presents today in f/u of NSCLC and pain management.  He is requesting refills of his pain medications, and possibly a dose increase as he states they are not holding him. Dr. Naylor was notified.    5 minutes for nursing intake (face to face time)     Jillian Renee RN              "

## 2017-06-22 NOTE — PATIENT INSTRUCTIONS
We would like to see you back in clinic with Dr. Naylor in 3 weeks with chemotherapy to be scheduled per treatment plan.  Your prescriptions have been given to you to hand carry to the pharmacy of your choice. When you are in need of a refill, please call your pharmacy and they will send us a request.  Copy of appointments, and after visit summary (AVS) given to patient.  If you have any questions during business hours (M-F 8 AM- 4PM), please call Jillian Renee RN, BSN, OCN Oncology Hematology /Breast Cancer Navigator at [facility] [phone].   For questions after business hours, or on holidays/weekends, please call our after hours Nurse Triage line [phone]. Thank you.

## 2017-06-22 NOTE — PROGRESS NOTES
Hematology/ Oncology Follow-up Visit:  Jun 22, 2017    Reason for Visit:   Chief Complaint   Patient presents with     Oncology Clinic Visit     f/u NSCLC, pain management       Oncologic History:  Non-small cell lung cancer (NSCLC) (H)  The patient presented with 2-3 months history of weakness and fainting episodes. He was seen in the emergency room further workup was done including a CT scan which showed a 3 cm mass in the medial left upper lobe. There was adjacent atelectasis at the prominent mediastinal lymph nodes. There is a destructive bone lesion involving left lateral fifth rib. Patient underwent transthoracic CT-guided biopsy in the hospital. The pathology report came back consistent with mixed tumor including adenocarcinoma and squamous histology. 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 He had a normal TSH, but he had low T3 and T4. He had a serum cortisol morning level 4.4. He underwent a cosyntropin stim test which was mildly abnormal. He had a cortisol level 14.8 at 30 minutes and 19.6 at 60 minutes. His 24-hour cortisol in his urine was normal. He was seen by endocrinology we will continue to monitor him in 6 months.   PET scan showed hypermetabolic bilateral level Ib and right level III lymph nodes in the neck suspicious for metastatic disease. There was also hypermetabolic activity seen in the sella consistent with pituitary macroadenoma. There is hypermetabolic mediastinal left hilar lymphadenopathy.  There is metastatic disease seen in the left scapula and left fifth rib. There is left pleural effusion and small right pleural effusion seen as well.  ALK 4 came back negative. EGFR was negative for mutation. . PDL 1 is less than 1%.       Interval History:  Patient is here today for cycle #2 of chemotherapy. Patient has been tolerating chemotherapy well. Denies any nausea vomiting or diarrhea. He denies any fever or chills.  He  continues problem is bone aches and pains. His currently using oxycodone on average 6 times a day. He continues taking stool softener without constipation. He denies any weight loss. He continues to have minimal appetite. He continues to smoke about6-8 cigarettes a day.    Review Of Systems:  Constitutional: Negative for fever, chills, and night sweats.  Skin: negative.  Eyes: negative.  Ears/Nose/Throat: negative.  Respiratory: No shortness of breath, dyspnea on exertion, cough, or hemoptysis.  Cardiovascular: negative.  Gastrointestinal: negative.  Genitourinary: negative.  Musculoskeletal: Bone aches as mentioned above  Neurologic: negative.  Psychiatric: negative.  Hematologic/Lymphatic/Immunologic: negative.  Endocrine: negative.    All other ROS negative unless mentioned in interval history.    Past medical, social, surgical, and family histories reviewed.    Allergies:  Allergies as of 06/22/2017 - Kvng as Reviewed 06/22/2017   Allergen Reaction Noted     Lubriderm Rash 04/20/2017       Current Medications:  Current Outpatient Prescriptions   Medication Sig Dispense Refill     morphine (MS CONTIN) 15 MG 12 hr tablet Take 1 tablet (15 mg) by mouth every 12 hours 60 tablet 0     oxyCODONE (ROXICODONE) 5 MG IR tablet Take 1 tablet (5 mg) by mouth every 4 hours as needed for pain maximum 8 tablet(s) per day 40 tablet 0     nicotine (NICODERM CQ) 21 MG/24HR 24 hr patch Place 1 patch onto the skin every 24 hours 30 patch 0     nicotine (NICODERM CQ) 14 MG/24HR 24 hr patch Place 1 patch onto the skin every 24 hours 30 patch 0     nicotine (NICODERM CQ) 7 MG/24HR 24 hr patch Place 1 patch onto the skin every 24 hours 30 patch 0     senna-docusate (SENOKOT-S;PERICOLACE) 8.6-50 MG per tablet Take 1 tablet by mouth 2 times daily Reported on 5/4/2017 100 tablet 3     pantoprazole (PROTONIX) 40 MG EC tablet Take 1 tablet (40 mg) by mouth 2 times daily (before meals) 30 tablet 11     ibuprofen (ADVIL/MOTRIN) 600 MG tablet  "Take 1 tablet (600 mg) by mouth every 8 hours as needed for moderate pain 30 tablet 3     PARoxetine (PAXIL) 10 MG tablet Take 1 tablet (10 mg) by mouth At Bedtime 30 tablet 1     lidocaine-prilocaine (EMLA) cream Apply topically over port 45 - 60 minutes prior to port access. 30 g 3     dexamethasone (DECADRON) 4 MG tablet Take 5 tablets (20 mg) by mouth daily X 3 days each cycle. To be taken the day before, day of, and day after chemotherapy infusion. 15 tablet 3     LORazepam (ATIVAN) 0.5 MG tablet Take 1 tablet (0.5 mg) by mouth every 4 hours as needed (Anxiety, Nausea/Vomiting or Sleep) 30 tablet 2     prochlorperazine (COMPAZINE) 10 MG tablet Take 1 tablet (10 mg) by mouth every 6 hours as needed (Nausea/Vomiting) 30 tablet 2     ondansetron (ZOFRAN) 8 MG tablet Take 1 tablet (8 mg) by mouth every 8 hours as needed (Nausea/Vomiting) 10 tablet 2     Acetaminophen (TYLENOL PO) Take 1,000 mg by mouth every 6 hours as needed for mild pain or fever          Physical Exam:  /82 (BP Location: Right arm, Patient Position: Chair, Cuff Size: Adult Regular)  Pulse 79  Temp 97.8  F (36.6  C) (Tympanic)  Resp 14  Ht 1.803 m (5' 10.98\")  Wt 71.2 kg (157 lb)  BMI 21.91 kg/m2  Wt Readings from Last 12 Encounters:   06/22/17 71.2 kg (157 lb)   06/22/17 71.2 kg (157 lb)   06/14/17 70.7 kg (155 lb 14.4 oz)   05/31/17 69.2 kg (152 lb 9.6 oz)   05/25/17 73 kg (161 lb)   05/25/17 72.6 kg (160 lb)   05/24/17 71.6 kg (157 lb 14.4 oz)   05/17/17 82.6 kg (182 lb)   05/15/17 77.6 kg (171 lb)   05/15/17 82.6 kg (182 lb)   05/11/17 77.8 kg (171 lb 8 oz)   05/10/17 77.6 kg (171 lb)     ECOG performance status: 1  GENERAL APPEARANCE: Healthy, alert and in no acute distress.  HEENT: Sclerae anicteric. PERRLA. Oropharynx without ulcers, lesions, or thrush.  NECK: Supple. No asymmetry or masses.  LYMPHATICS: No palpable cervical, supraclavicular, axillary, or inguinal lymphadenopathy.  RESP: Lungs clear to auscultation bilaterally " without rales, rhonchi or wheezes.  CARDIOVASCULAR: Regular rate and rhythm. Normal S1, S2; no S3 or S4. No murmur, gallop, or rub.  ABDOMEN: Soft, nontender. Bowel sounds normal. No palpable organomegaly or masses.  MUSCULOSKELETAL: Extremities without gross deformities noted. No edema of bilateral lower extremities.  SKIN: No suspicious lesions or rashes.  NEURO: Alert and oriented x 3. Cranial nerves II-XII grossly intact.  PSYCHIATRIC: Mentation and affect appear normal.    Laboratory/Imaging Studies:  Infusion Therapy Visit on 06/22/2017   Component Date Value Ref Range Status     WBC 06/22/2017 4.6  4.0 - 11.0 10e9/L Final     RBC Count 06/22/2017 3.70* 4.4 - 5.9 10e12/L Final     Hemoglobin 06/22/2017 9.0* 13.3 - 17.7 g/dL Final     Hematocrit 06/22/2017 27.6* 40.0 - 53.0 % Final     MCV 06/22/2017 75* 78 - 100 fl Final     MCH 06/22/2017 24.3* 26.5 - 33.0 pg Final     MCHC 06/22/2017 32.6  31.5 - 36.5 g/dL Final     RDW 06/22/2017 18.2* 10.0 - 15.0 % Final     Platelet Count 06/22/2017 372  150 - 450 10e9/L Final     Diff Method 06/22/2017 Automated Method   Final     % Neutrophils 06/22/2017 75.7  % Final     % Lymphocytes 06/22/2017 19.1  % Final     % Monocytes 06/22/2017 4.8  % Final     % Eosinophils 06/22/2017 0.0  % Final     % Basophils 06/22/2017 0.2  % Final     % Immature Granulocytes 06/22/2017 0.2  % Final     Absolute Neutrophil 06/22/2017 3.5  1.6 - 8.3 10e9/L Final     Absolute Lymphocytes 06/22/2017 0.9  0.8 - 5.3 10e9/L Final     Absolute Monocytes 06/22/2017 0.2  0.0 - 1.3 10e9/L Final     Absolute Eosinophils 06/22/2017 0.0  0.0 - 0.7 10e9/L Final     Absolute Basophils 06/22/2017 0.0  0.0 - 0.2 10e9/L Final     Abs Immature Granulocytes 06/22/2017 0.0  0 - 0.4 10e9/L Final     Sodium 06/22/2017 139  133 - 144 mmol/L Final     Potassium 06/22/2017 4.2  3.4 - 5.3 mmol/L Final     Chloride 06/22/2017 106  94 - 109 mmol/L Final     Carbon Dioxide 06/22/2017 25  20 - 32 mmol/L Final     Anion  Gap 06/22/2017 8  3 - 14 mmol/L Final     Glucose 06/22/2017 124* 70 - 99 mg/dL Final     Urea Nitrogen 06/22/2017 18  7 - 30 mg/dL Final     Creatinine 06/22/2017 0.80  0.66 - 1.25 mg/dL Final     GFR Estimate 06/22/2017   >60 mL/min/1.7m2 Final                    Value:>90  Non  GFR Calc       GFR Estimate If Black 06/22/2017   >60 mL/min/1.7m2 Final                    Value:>90   GFR Calc       Calcium 06/22/2017 8.9  8.5 - 10.1 mg/dL Final     Bilirubin Total 06/22/2017 0.4  0.2 - 1.3 mg/dL Final     Albumin 06/22/2017 3.4  3.4 - 5.0 g/dL Final     Protein Total 06/22/2017 7.3  6.8 - 8.8 g/dL Final     Alkaline Phosphatase 06/22/2017 315* 40 - 150 U/L Final     ALT 06/22/2017 63  0 - 70 U/L Final     AST 06/22/2017 104* 0 - 45 U/L Final   Infusion Therapy Visit on 06/14/2017   Component Date Value Ref Range Status     WBC 06/14/2017 2.3* 4.0 - 11.0 10e9/L Final     RBC Count 06/14/2017 2.80* 4.4 - 5.9 10e12/L Final     Hemoglobin 06/14/2017 6.6* 13.3 - 17.7 g/dL Final    Comment: This result has been called to OSCAR KWON by Tabitha Verdugo on 06 14 2017 at   0835, and has been read back.       Hematocrit 06/14/2017 19.7* 40.0 - 53.0 % Final     MCV 06/14/2017 70* 78 - 100 fl Final     MCH 06/14/2017 23.6* 26.5 - 33.0 pg Final     MCHC 06/14/2017 33.5  31.5 - 36.5 g/dL Final     RDW 06/14/2017 16.5* 10.0 - 15.0 % Final     Platelet Count 06/14/2017 64* 150 - 450 10e9/L Final     Diff Method 06/14/2017 Automated Method   Final     % Neutrophils 06/14/2017 61.8  % Final     % Lymphocytes 06/14/2017 28.7  % Final     % Monocytes 06/14/2017 9.1  % Final     % Eosinophils 06/14/2017 0.0  % Final     % Basophils 06/14/2017 0.0  % Final     % Immature Granulocytes 06/14/2017 0.4  % Final     Absolute Neutrophil 06/14/2017 1.4* 1.6 - 8.3 10e9/L Final     Absolute Lymphocytes 06/14/2017 0.7* 0.8 - 5.3 10e9/L Final     Absolute Monocytes 06/14/2017 0.2  0.0 - 1.3 10e9/L Final     Absolute  Eosinophils 06/14/2017 0.0  0.0 - 0.7 10e9/L Final     Absolute Basophils 06/14/2017 0.0  0.0 - 0.2 10e9/L Final     Abs Immature Granulocytes 06/14/2017 0.0  0 - 0.4 10e9/L Final     Anisocytosis 06/14/2017 Slight   Final     Poikilocytosis 06/14/2017 Slight   Final     Hypochromasia 06/14/2017 Present   Final     Sodium 06/14/2017 138  133 - 144 mmol/L Final     Potassium 06/14/2017 3.9  3.4 - 5.3 mmol/L Final     Chloride 06/14/2017 109  94 - 109 mmol/L Final     Carbon Dioxide 06/14/2017 20  20 - 32 mmol/L Final     Anion Gap 06/14/2017 9  3 - 14 mmol/L Final     Glucose 06/14/2017 124* 70 - 99 mg/dL Final     Urea Nitrogen 06/14/2017 16  7 - 30 mg/dL Final     Creatinine 06/14/2017 0.86  0.66 - 1.25 mg/dL Final     GFR Estimate 06/14/2017   >60 mL/min/1.7m2 Final                    Value:>90  Non  GFR Calc       GFR Estimate If Black 06/14/2017   >60 mL/min/1.7m2 Final                    Value:>90   GFR Calc       Calcium 06/14/2017 8.7  8.5 - 10.1 mg/dL Final     Bilirubin Total 06/14/2017 0.4  0.2 - 1.3 mg/dL Final     Albumin 06/14/2017 3.4  3.4 - 5.0 g/dL Final     Protein Total 06/14/2017 7.5  6.8 - 8.8 g/dL Final     Alkaline Phosphatase 06/14/2017 249* 40 - 150 U/L Final     ALT 06/14/2017 37  0 - 70 U/L Final     AST 06/14/2017 59* 0 - 45 U/L Final     Units Ordered 06/14/2017 2   Final     ABO 06/14/2017 O   Final     RH(D) 06/14/2017  Pos   Final     Antibody Screen 06/14/2017 Neg   Final     Test Valid Only At 06/14/2017 [facility]   Final     Specimen Expires 06/14/2017 06/17/2017   Final     Crossmatch 06/14/2017 Red Blood Cells   Final     Unit Number 06/14/2017 [id number]   Final     Blood Component Type 06/14/2017 Red Blood Cells Leukocyte Reduced   Final     Division Number 06/14/2017 00   Final     Status of Unit 06/14/2017 Released to care unit   Final     Blood Product Code 06/14/2017 [id number]   Final     Unit Status 06/14/2017 ISS   Final      Unit Number 06/14/2017 [id number]   Final     Blood Component Type 06/14/2017 Red Blood Cells Leukocyte Reduced   Final     Division Number 06/14/2017 00   Final     Status of Unit 06/14/2017 Released to care unit   Final     Blood Product Code 06/14/2017 [id number]   Final     Unit Status 06/14/2017 ISS   Final        Assessment and plan:    (C34.90) Non-small cell lung cancer (NSCLC) (H)  (primary encounter diagnosis)  I reviewed with the patient today the management plan. Patient has been tolerating chemotherapy well. The patient will proceed today with cycle #2 of chemotherapy was carboplatin and Taxol. We will plan to repeat imaging studies after cycle #3.I'll see the patient again in 3 weeks or sooner if there's new developments or concerns.    (G89.3) Cancer associated pain  Reviewed with the patient the pain management protocol. I would recommend starting MS Contin 15 mg twice daily. Patient will continue his oxycodone 5-10 milligram every 4-6 hours as needed for breakthrough pain.    (D63.8) Anemia, chronic disease  We'll continue monitor hemoglobin and offered blood transfusion if required.    (F17.200) Tobacco use disorder  Strongly emphasize the importance of quitting smoking.    The patient is ready to learn, no apparent learning barriers were identified.  Diagnosis and treatment plans were explained to the patient. The patient expressed understanding of the content. The patient asked appropriate questions. The patient questions were answered to his satisfaction.    Chart documentation with Dragon Voice recognition Software. Although reviewed after completion, some words and grammatical errors may remain.

## 2017-06-23 NOTE — TELEPHONE ENCOUNTER
Chief Complaint   Patient presents with     Appointment     Palliative, referred by Dr Naylor      Referred by Dr Naylor for pain mgmt  Sarah states his pain is good since start of MS Contin  I gave her my number in case she has to move up visit, but otherwise will plan on seeing him when he is here for next chemo on 7/13.  To best of her knowledge, he ramains abstinent from ETOH    Thank you for referral.    Mickey Bernard CNP ([facility])  Palliative Care  Private cell:  [phone]

## 2017-07-07 NOTE — TELEPHONE ENCOUNTER
Paroxetine    Last Written Prescription Date: 05/25/17  Last Fill Quantity: 30, # refills: 1  Last Office Visit with [facility] primary care provider:  05/25/17   Next 5 appointments (look out 90 days)     Jul 13, 2017  9:00 AM CDT   Return Visit with Pasquale Naylor MD   [facility] ([facility])    [facility] [address] [address]   [phone]                   Last PHQ-9 score on record= No flowsheet data found.

## 2017-07-13 PROBLEM — D69.6 THROMBOCYTOPENIA (H): Status: ACTIVE | Noted: 2017-01-01

## 2017-07-13 NOTE — NURSING NOTE
"Oncology Rooming Note    July 13, 2017 8:50 AM   Wade Acevedo is a 57 year old male who presents for:    Chief Complaint   Patient presents with     Oncology Clinic Visit     4 week recheck NSCLC Labs & Chemo today     Initial Vitals: /72 (BP Location: Right arm, Patient Position: Sitting, Cuff Size: Adult Regular)  Pulse 67  Temp 97.3  F (36.3  C) (Tympanic)  Resp 18  Ht 1.807 m (5' 11.14\")  Wt 71.2 kg (156 lb 14.4 oz)  SpO2 100%  BMI 21.8 kg/m2 Estimated body mass index is 21.8 kg/(m^2) as calculated from the following:    Height as of this encounter: 1.807 m (5' 11.14\").    Weight as of this encounter: 71.2 kg (156 lb 14.4 oz). Body surface area is 1.89 meters squared.  Severe Pain (6) Comment: Left leg & foot   No LMP for male patient.  Allergies reviewed: Yes  Medications reviewed: Yes    Medications: MEDICATION REFILLS NEEDED TODAY. Provider was notified.  Pharmacy name entered into [employer]: [facility] - [place], [place] - [address] AT [facility] OF [address] [place] [address]    Clinical concerns:  4 week recheck NSCLC Labs & Chemo today.     1. Patient reports Left Leg & Foot pain 3/10.    2. Constipation currently using Senna 2 tabs in AM & 1 in the PM.     3. Requesting refill of Oxycodone & MS Contin.      4. Sleeping has increased.     7 minutes for nursing intake (face to face time)     Reena Leon CMA              "

## 2017-07-13 NOTE — MR AVS SNAPSHOT
After Visit Summary   7/13/2017    Wade Acevedo    MRN: [id number]           Patient Information     Date Of Birth          1960        Visit Information        Provider Department      7/13/2017 7:55 AM Hailey Bernard APRN [facility]        Today's Diagnoses     Drug-induced constipation    -  1      Care Instructions    From Maru Bernard, Palliative Care NP, Cell [phone]      Very nice to meet you Efrain.      For your bowels, I have ordered Miralax, which is also called polyethylene glycol.  I would suggest you start taking it every single day at the same time of day.  Also take 2 Senna at bedtime (try Miralax in the morning).  If you need to adjust the dose of either med, you can do so.  Just keep in mind the Senna works SLOWLY, and it's NOT the best medicine if you need fast results.    If you feel really constipated, give yourself a BISACODYL suppository.  They should work in about 4 hours.    If you need a fast-acting ORAL constipation medicine, then BISACODYL also comes as a pill that you can buy without a prescription.  Take 2 tabs.  If no results in 4 hours, take 2 more, but then be prepared for diarrhea.      Call me at the number above if I may be of service to you.      Thank you.     maru          Follow-ups after your visit        Your next 10 appointments already scheduled     Sep 07, 2017  8:30 AM CDT   Level 6 with ROOM 1 [facility] ([facility])    [facility]  [address]  [facility] [place] [address]   [phone]            Sep 07, 2017  9:30 AM CDT   Return Visit with Pasquale Naylor MD   [facility] ([facility])    [facility]  [address]  [facility] [place] [address]   [phone]            Nov 14, 2017 11:30 AM CST   (Arrive by 11:15 AM)   RETURN ENDOCRINE with Padma Medley MD   [facility] Endocrinology ([facility] and Surgery  "[place])    [address] [address] [phone]              Who to contact     If you have questions or need follow up information about today's clinic visit or your schedule please contact [facility] directly at [phone].  Normal or non-critical lab and imaging results will be communicated to you by MyChart, letter or phone within 4 business days after the clinic has received the results. If you do not hear from us within 7 days, please contact the clinic through MyChart or phone. If you have a critical or abnormal lab result, we will notify you by phone as soon as possible.  Submit refill requests through BlockTrail or call your pharmacy and they will forward the refill request to us. Please allow 3 business days for your refill to be completed.          Additional Information About Your Visit        MyChart Information     BlockTrail lets you send messages to your doctor, view your test results, renew your prescriptions, schedule appointments and more. To sign up, go to [email or web address] . Click on \"Log in\" on the left side of the screen, which will take you to the Welcome page. Then click on \"Sign up Now\" on the right side of the page.     You will be asked to enter the access code listed below, as well as some personal information. Please follow the directions to create your username and password.     Your access code is: [email or web address]  Expires: 2017  9:54 AM     Your access code will  in 90 days. If you need help or a new code, please call your [place] clinic or [phone].        Care EveryWhere ID     This is your Care EveryWhere ID. This could be used by other organizations to access your [place] medical records  [id number]        Your Vitals Were     Pulse Temperature BMI (Body Mass Index)             67 97.3  F (36.3  C) 21.81 kg/m2          Blood Pressure from Last 3 Encounters:   17 124/73   17 115/74   17 123/74 "    Weight from Last 3 Encounters:   08/24/17 155 lb 12.8 oz (70.7 kg)   08/17/17 166 lb 6.4 oz (75.5 kg)   08/10/17 160 lb 1.6 oz (72.6 kg)              Today, you had the following     No orders found for display         Today's Medication Changes          These changes are accurate as of: 7/13/17 11:59 PM.  If you have any questions, ask your nurse or doctor.               Start taking these medicines.        Dose/Directions    bisacodyl 10 MG Suppository   Commonly known as:  DULCOLAX   Used for:  Drug-induced constipation   Started by:  Hailey Bernard APRN CNP        Dose:  10 mg   Place 1 suppository (10 mg) rectally daily as needed for constipation   Quantity:  12 suppository   Refills:  11       polyethylene glycol powder   Commonly known as:  MIRALAX   Used for:  Drug-induced constipation   Started by:  Hailey Bernard APRN CNP        Dose:  1 capful   Take 17 g (1 capful) by mouth daily Mix in 4-8 oz of fluid of choice. For constipation   Quantity:  510 g   Refills:  11         These medicines have changed or have updated prescriptions.        Dose/Directions    senna-docusate 8.6-50 MG per tablet   Commonly known as:  SENOKOT-S;PERICOLACE   This may have changed:  additional instructions   Used for:  Non-small cell lung cancer (NSCLC) (H)        Dose:  1 tablet   Take 1 tablet by mouth 2 times daily Reported on 5/4/2017   Quantity:  100 tablet   Refills:  3            Where to get your medicines      These medications were sent to [facility] [address] [place] [address] AT [place] [id number] [facility] [address]     Phone:  [phone]     bisacodyl 10 MG Suppository    polyethylene glycol powder         Some of these will need a paper prescription and others can be bought over the counter.  Ask your nurse if you have questions.     Bring a paper prescription for each of these medications     morphine 15 MG 12 hr tablet    oxyCODONE 5 MG IR  tablet                Primary Care Provider    Doctor MD Joe       [address] [facility] [address]        Equal Access to Services     [facility] : Hadii jaxon gerard Sonilsa, waaxda lukayliadaha, qaybta kaalmada raymundo, gena evans. So [facility] [phone].    ATENCIÓN: Si habla español, tiene a burns disposición servicios gratuitos de asistencia lingüística. Llame al [phone].    We comply with applicable federal civil rights laws and [place] laws. We do not discriminate on the basis of race, color, national origin, age, disability sex, sexual orientation or gender identity.            Thank you!     Thank you for choosing [facility]  for your care. Our goal is always to provide you with excellent care. Hearing back from our patients is one way we can continue to improve our services. Please take a few minutes to complete the written survey that you may receive in the mail after your visit with us. Thank you!             Your Updated Medication List - Protect others around you: Learn how to safely use, store and throw away your medicines at www.disposemymeds.org.          This list is accurate as of: 7/13/17 11:59 PM.  Always use your most recent med list.                   Brand Name Dispense Instructions for use Diagnosis    bisacodyl 10 MG Suppository    DULCOLAX    12 suppository    Place 1 suppository (10 mg) rectally daily as needed for constipation    Drug-induced constipation       lidocaine-prilocaine cream    EMLA    30 g    Apply topically over port 45 - 60 minutes prior to port access.    Non-small cell lung cancer (NSCLC) (H)       LORazepam 0.5 MG tablet    ATIVAN    30 tablet    Take 1 tablet (0.5 mg) by mouth every 4 hours as needed (Anxiety, Nausea/Vomiting or Sleep)    Non-small cell lung cancer (NSCLC) (H)       morphine 15 MG 12 hr tablet    MS CONTIN    60 tablet    Take 1 tablet (15 mg) by mouth every 12 hours    Non-small cell lung  cancer (NSCLC) (H)       * nicotine 21 MG/24HR 24 hr patch    NICODERM CQ    30 patch    Place 1 patch onto the skin every 24 hours    Tobacco abuse       * nicotine 14 MG/24HR 24 hr patch    NICODERM CQ    30 patch    Place 1 patch onto the skin every 24 hours    Tobacco abuse       * nicotine 7 MG/24HR 24 hr patch    NICODERM CQ    30 patch    Place 1 patch onto the skin every 24 hours    Tobacco abuse       ondansetron 8 MG tablet    ZOFRAN    10 tablet    Take 1 tablet (8 mg) by mouth every 8 hours as needed (Nausea/Vomiting)    Non-small cell lung cancer (NSCLC) (H)       oxyCODONE 5 MG IR tablet    ROXICODONE    40 tablet    Take 1 tablet (5 mg) by mouth every 4 hours as needed for pain maximum 8 tablet(s) per day    Non-small cell lung cancer (NSCLC) (H)       polyethylene glycol powder    MIRALAX    510 g    Take 17 g (1 capful) by mouth daily Mix in 4-8 oz of fluid of choice. For constipation    Drug-induced constipation       prochlorperazine 10 MG tablet    COMPAZINE    30 tablet    Take 1 tablet (10 mg) by mouth every 6 hours as needed (Nausea/Vomiting)    Non-small cell lung cancer (NSCLC) (H)       senna-docusate 8.6-50 MG per tablet    SENOKOT-S;PERICOLACE    100 tablet    Take 1 tablet by mouth 2 times daily Reported on 5/4/2017    Non-small cell lung cancer (NSCLC) (H)       * Notice:  This list has 3 medication(s) that are the same as other medications prescribed for you. Read the directions carefully, and ask your doctor or other care provider to review them with you.

## 2017-07-13 NOTE — MR AVS SNAPSHOT
After Visit Summary   7/13/2017    Wade Acevedo    MRN: [id number]           Patient Information     Date Of Birth          1960        Visit Information        Provider Department      7/13/2017 9:00 AM Pasquale Naylor MD [facility] ONCOLOGY      Today's Diagnoses     Cancer associated pain    -  1    Non-small cell lung cancer (NSCLC) (H)        Anemia, chronic disease        Weight loss        Tobacco use disorder        Drug-induced constipation          Care Instructions    We will be holding your chemotherapy today and reschedule it to next week.  You do not need to see the doctor prior to that appointment. We would like to see you back in clinic with Dr. Naylor in 3 weeks with labs and CT prior. Chemotherapy to be scheduled per treatment plan. Your prescriptions (Oxycodone, MS Contin) have been given to you to hand carry to the pharmacy of your choice. When you are in need of a refill, please call your pharmacy and they will send us a request.      Copy of appointments, and after visit summary (AVS) given to patient.  If you have any questions during business hours (M-F 8 AM- 4PM), please call Jillian Renee RN, BSN, OCN Oncology Hematology /Breast Cancer Navigator at [facility] [phone].   For questions after business hours, or on holidays/weekends, please call our after hours Nurse Triage line [phone]. Thank you.            Follow-ups after your visit        Follow-up notes from your care team     Return in about 3 weeks (around 8/3/2017) for Imaging ordered before next appointment, Schedule for chemotherapy as per treatment plan.      Your next 10 appointments already scheduled     Jul 20, 2017  8:00 AM CDT   Level 6 with ROOM 9 [facility] ([facility])    [facility]  [address]  [facility] [address]   [phone]            Aug 03, 2017 11:00 AM CDT    CT CHEST/ABDOMEN/PELVIS W CONTRAST with [place]CT1   [facility] CT ([facility])    [address]  [facility] [place] [address] [phone]           Please bring any scans or X-rays taken at other hospitals, if similar tests were done. Also bring a list of your medicines, including vitamins, minerals and over-the-counter drugs. It is safest to leave personal items at home.  Be sure to tell your doctor:   If you have any allergies.   If there s any chance you are pregnant.   If you are breastfeeding.   If you have any special needs.  You may have contrast for this exam. To prepare:   Do not eat or drink for 2 hours before your exam. If you need to take medicine, you may take it with small sips of water. (We may ask you to take liquid medicine as well.)   The day before your exam, drink extra fluids at least six 8-ounce glasses (unless your doctor tells you to restrict your fluids).  Patients over 70 or patients with diabetes or kidney problems:   If you haven t had a blood test (creatinine test) within the last 30 days, go to your clinic or Diagnostic Imaging Department for this test.  If you have diabetes:   If your kidney function is normal, continue taking your metformin (Avandamet, Glucophage, Glucovance, Metaglip) on the day of your exam.   If your kidney function is abnormal, wait 48 hours before restarting this medicine.  You will have oral contrast for this exam:   You will drink the contrast at home. Get this from your clinic or Diagnostic Imaging Department. Please follow the directions given.  Please wear loose clothing, such as a sweat suit or jogging clothes. Avoid snaps, zippers and other metal. We may ask you to undress and put on a hospital gown.  If you have any questions, please call the Imaging Department where you will have your exam.            Aug 10, 2017  8:00 AM CDT   Level 6 with ROOM 3 [facility]fusion ([facility])    [facility]  [place]  [address]  [facility] [address]   [phone]            Aug 10, 2017  9:00 AM CDT   Return Visit with Pasquale Naylor MD   [facility] ([facility])    [facility]  [address]  [facility] [address]   [phone]            Nov 14, 2017 11:30 AM CST   (Arrive by 11:15 AM)   RETURN ENDOCRINE with Padma Medley MD   [facility] Endocrinology ([facility] [place])    [address] [place] [id number] [phone]              Future tests that were ordered for you today     Open Standing Orders        Priority Remaining Interval Expires Ordered    Red blood cell prepare order unit Routine 99/100 CONDITIONAL (SPECIFY) BLOOD  7/13/2017    Transfuse red blood cell unit Routine 2/2 TRANSFUSE 2 UNITS  7/13/2017          Open Future Orders        Priority Expected Expires Ordered    CT Chest/Abdomen/Pelvis w Contrast Routine 7/27/2017 7/13/2018 7/13/2017            Who to contact     If you have questions or need follow up information about today's clinic visit or your schedule please contact [facility] directly at [phone].  Normal or non-critical lab and imaging results will be communicated to you by Spectrawatthart, letter or phone within 4 business days after the clinic has received the results. If you do not hear from us within 7 days, please contact the clinic through Spectrawatthart or phone. If you have a critical or abnormal lab result, we will notify you by phone as soon as possible.  Submit refill requests through IndiaIdeas or call your pharmacy and they will forward the refill request to us. Please allow 3 business days for your refill to be completed.          Additional Information About Your Visit        SpectrawattharBest Five Reviewed Information     IndiaIdeas lets you send messages to your doctor, view your test results, renew your prescriptions, schedule appointments and more. To sign up, go to  [email or web address] . Click on \"Log in\" on the left side of the screen, which will take you to the Welcome page. Then click on \"Sign up Now\" on the right side of the page.     You will be asked to enter the access code listed below, as well as some personal information. Please follow the directions to create your username and password.     Your access code is: [email or web address]  Expires: 2017  9:09 AM     Your access code will  in 90 days. If you need help or a new code, please call your [place] clinic or [phone].        Care EveryWhere ID     This is your Care EveryWhere ID. This could be used by other organizations to access your [place] medical records  [id number]        Your Vitals Were     Pulse Temperature Respirations Height Pulse Oximetry BMI (Body Mass Index)    67 97.3  F (36.3  C) (Tympanic) 18 1.807 m (5' 11.14\") 100% 21.8 kg/m2       Blood Pressure from Last 3 Encounters:   17 111/72   17 192/82   17 124/74    Weight from Last 3 Encounters:   17 71.2 kg (156 lb 14.4 oz)   17 71.2 kg (157 lb)   17 71.2 kg (157 lb)                 Today's Medication Changes          These changes are accurate as of: 17 10:04 AM.  If you have any questions, ask your nurse or doctor.               These medicines have changed or have updated prescriptions.        Dose/Directions    senna-docusate 8.6-50 MG per tablet   Commonly known as:  SENOKOT-S;PERICOLACE   This may have changed:  additional instructions   Used for:  Non-small cell lung cancer (NSCLC) (H)        Dose:  1 tablet   Take 1 tablet by mouth 2 times daily Reported on 2017   Quantity:  100 tablet   Refills:  3            Where to get your medicines      Some of these will need a paper prescription and others can be bought over the counter.  Ask your nurse if you have questions.     Bring a paper prescription for each of these medications     morphine 15 MG 12 hr tablet    oxyCODONE 5 MG IR tablet "                Primary Care Provider    Doctor Unknown, MD       No address on file        Equal Access to Services     GONZALO SMITH : Hadii jaxno laurent moustapha Cooperali, monchofam valenciamaryha, thiago cecydenifam fonseca, waxalejandro андрейin huang scottziaannie evans. So [facility] [phone].    ATENCIÓN: Si habla español, tiene a burns disposición servicios gratuitos de asistencia lingüística. [facility] [phone].    We comply with applicable federal civil rights laws and [place] laws. We do not discriminate on the basis of race, color, national origin, age, disability sex, sexual orientation or gender identity.            Thank you!     Thank you for choosing [facility]  for your care. Our goal is always to provide you with excellent care. Hearing back from our patients is one way we can continue to improve our services. Please take a few minutes to complete the written survey that you may receive in the mail after your visit with us. Thank you!             Your Updated Medication List - Protect others around you: Learn how to safely use, store and throw away your medicines at www.disposemymeds.org.          This list is accurate as of: 7/13/17 10:04 AM.  Always use your most recent med list.                   Brand Name Dispense Instructions for use Diagnosis    dexamethasone 4 MG tablet    DECADRON    15 tablet    Take 5 tablets (20 mg) by mouth daily X 3 days each cycle. To be taken the day before, day of, and day after chemotherapy infusion.    Anemia, unspecified type, Non-small cell lung cancer (NSCLC) (H)       lidocaine-prilocaine cream    EMLA    30 g    Apply topically over port 45 - 60 minutes prior to port access.    Non-small cell lung cancer (NSCLC) (H)       LORazepam 0.5 MG tablet    ATIVAN    30 tablet    Take 1 tablet (0.5 mg) by mouth every 4 hours as needed (Anxiety, Nausea/Vomiting or Sleep)    Non-small cell lung cancer (NSCLC) (H)       morphine 15 MG 12 hr tablet    MS CONTIN    60 tablet    Take 1  tablet (15 mg) by mouth every 12 hours    Non-small cell lung cancer (NSCLC) (H)       * nicotine 21 MG/24HR 24 hr patch    NICODERM CQ    30 patch    Place 1 patch onto the skin every 24 hours    Tobacco abuse       * nicotine 14 MG/24HR 24 hr patch    NICODERM CQ    30 patch    Place 1 patch onto the skin every 24 hours    Tobacco abuse       * nicotine 7 MG/24HR 24 hr patch    NICODERM CQ    30 patch    Place 1 patch onto the skin every 24 hours    Tobacco abuse       ondansetron 8 MG tablet    ZOFRAN    10 tablet    Take 1 tablet (8 mg) by mouth every 8 hours as needed (Nausea/Vomiting)    Non-small cell lung cancer (NSCLC) (H)       oxyCODONE 5 MG IR tablet    ROXICODONE    40 tablet    Take 1 tablet (5 mg) by mouth every 4 hours as needed for pain maximum 8 tablet(s) per day    Non-small cell lung cancer (NSCLC) (H)       pantoprazole 40 MG EC tablet    PROTONIX    30 tablet    Take 1 tablet (40 mg) by mouth 2 times daily (before meals)    Non-small cell lung cancer (NSCLC) (H)       PARoxetine 10 MG tablet    PAXIL    30 tablet    Take 1 tablet (10 mg) by mouth At Bedtime    Major depressive disorder, recurrent episode, moderate (H)       prochlorperazine 10 MG tablet    COMPAZINE    30 tablet    Take 1 tablet (10 mg) by mouth every 6 hours as needed (Nausea/Vomiting)    Non-small cell lung cancer (NSCLC) (H)       senna-docusate 8.6-50 MG per tablet    SENOKOT-S;PERICOLACE    100 tablet    Take 1 tablet by mouth 2 times daily Reported on 5/4/2017    Non-small cell lung cancer (NSCLC) (H)       * Notice:  This list has 3 medication(s) that are the same as other medications prescribed for you. Read the directions carefully, and ask your doctor or other care provider to review them with you.

## 2017-07-13 NOTE — MR AVS SNAPSHOT
After Visit Summary   7/13/2017    Wade Acevedo    MRN: [id number]           Patient Information     Date Of Birth          1960        Visit Information        Provider Department      7/13/2017 8:00 AM ROOM 9 [facility]fusion        Today's Diagnoses     Non-small cell lung cancer (NSCLC) (H)    -  1    Anemia, unspecified type           Follow-ups after your visit        Your next 10 appointments already scheduled     Jul 20, 2017  7:55 AM CDT   Return Visit with SALINAS Raphael CNP   [facility] ([facility])    [address] [address]   [phone]            Jul 20, 2017  8:00 AM CDT   Level 6 with ROOM 9 [facility]fusion ([facility])    [facility]  [address]  [facility] [place] [place] [address]   [phone]            Aug 03, 2017 11:00 AM CDT   CT CHEST/ABDOMEN/PELVIS W CONTRAST with [id number]   [facility] CT ([facility])    [address] [address]   [phone]           Please bring any scans or X-rays taken at other hospitals, if similar tests were done. Also bring a list of your medicines, including vitamins, minerals and over-the-counter drugs. It is safest to leave personal items at home.  Be sure to tell your doctor:   If you have any allergies.   If there s any chance you are pregnant.   If you are breastfeeding.   If you have any special needs.  You may have contrast for this exam. To prepare:   Do not eat or drink for 2 hours before your exam. If you need to take medicine, you may take it with small sips of water. (We may ask you to take liquid medicine as well.)   The day before your exam, drink extra fluids at least six 8-ounce glasses (unless your doctor tells you to restrict your fluids).  Patients over 70 or patients with diabetes or kidney problems:   If you haven t had a blood test (creatinine test) within the last 30  days, go to your clinic or Diagnostic Imaging Department for this test.  If you have diabetes:   If your kidney function is normal, continue taking your metformin (Avandamet, Glucophage, Glucovance, Metaglip) on the day of your exam.   If your kidney function is abnormal, wait 48 hours before restarting this medicine.  You will have oral contrast for this exam:   You will drink the contrast at home. Get this from your clinic or Diagnostic Imaging Department. Please follow the directions given.  Please wear loose clothing, such as a sweat suit or jogging clothes. Avoid snaps, zippers and other metal. We may ask you to undress and put on a hospital gown.  If you have any questions, please call the Imaging Department where you will have your exam.            Aug 10, 2017  7:55 AM CDT   Return Visit with SALINAS Raphael [facility] ([facility])    [address] [address]   [phone]            Aug 10, 2017  8:00 AM CDT   Level 6 with ROOM 3 [facility] ([facility])    [facility]  [address]  [facility] [address]   [phone]            Aug 10, 2017  9:00 AM CDT   Return Visit with Pasquale Naylor MD   [facility] ([facility])    [facility]  [address]  [facility] [address]   [phone]            Nov 14, 2017 11:30 AM CST   (Arrive by 11:15 AM)   RETURN ENDOCRINE with Padma Medley MD   [facility]ology ([facility])    [address] [place] [address] [phone]              Future tests that were ordered for you today     Open Standing Orders        Priority Remaining Interval Expires Ordered    Red blood cell prepare order unit Routine 98/100 CONDITIONAL (SPECIFY) BLOOD  7/13/2017          Open Future Orders        Priority Expected Expires Ordered    CT  "Chest/Abdomen/Pelvis w Contrast Routine 2017            Who to contact     If you have questions or need follow up information about today's clinic visit or your schedule please contact [facility] directly at [phone].  Normal or non-critical lab and imaging results will be communicated to you by MyChart, letter or phone within 4 business days after the clinic has received the results. If you do not hear from us within 7 days, please contact the clinic through [employer] or phone. If you have a critical or abnormal lab result, we will notify you by phone as soon as possible.  Submit refill requests through [employer] or call your pharmacy and they will forward the refill request to us. Please allow 3 business days for your refill to be completed.          Additional Information About Your Visit        MyChart Information     [employer] lets you send messages to your doctor, view your test results, renew your prescriptions, schedule appointments and more. To sign up, go to [email or web address] . Click on \"Log in\" on the left side of the screen, which will take you to the Welcome page. Then click on \"Sign up Now\" on the right side of the page.     You will be asked to enter the access code listed below, as well as some personal information. Please follow the directions to create your username and password.     Your access code is: [email or web address]  Expires: 2017  9:09 AM     Your access code will  in 90 days. If you need help or a new code, please call your [facility] clinic or [phone].        Care EveryWhere ID     This is your Care EveryWhere ID. This could be used by other organizations to access your [facility] medical records  [id number]        Your Vitals Were     Pulse Temperature                65 97.2  F (36.2  C) (Tympanic)           Blood Pressure from Last 3 Encounters:   17 111/72   17 114/71   17 111/72    Weight from Last 3 Encounters: "   07/13/17 71.2 kg (156 lb 14.4 oz)   07/13/17 71.2 kg (156 lb 15.5 oz)   06/22/17 71.2 kg (157 lb)              We Performed the Following     ABO/Rh type and screen     Blood component     Blood component     Blood component     CBC with platelets differential     Comprehensive metabolic panel     Transfuse red blood cell unit     Transfuse red blood cell unit          Today's Medication Changes          These changes are accurate as of: 7/13/17  4:22 PM.  If you have any questions, ask your nurse or doctor.               Start taking these medicines.        Dose/Directions    bisacodyl 10 MG Suppository   Commonly known as:  DULCOLAX   Used for:  Drug-induced constipation   Started by:  Hailey Bernard APRN CNP        Dose:  10 mg   Place 1 suppository (10 mg) rectally daily as needed for constipation   Quantity:  12 suppository   Refills:  11       polyethylene glycol powder   Commonly known as:  MIRALAX   Used for:  Drug-induced constipation   Started by:  Hailey Bernard APRN CNP        Dose:  1 capful   Take 17 g (1 capful) by mouth daily Mix in 4-8 oz of fluid of choice. For constipation   Quantity:  510 g   Refills:  11         These medicines have changed or have updated prescriptions.        Dose/Directions    senna-docusate 8.6-50 MG per tablet   Commonly known as:  SENOKOT-S;PERICOLACE   This may have changed:  additional instructions   Used for:  Non-small cell lung cancer (NSCLC) (H)        Dose:  1 tablet   Take 1 tablet by mouth 2 times daily Reported on 5/4/2017   Quantity:  100 tablet   Refills:  3            Where to get your medicines      These medications were sent to [facility] [address] [place] [address] AT [facility] OF [address] [place] [address]  [address] [facility] [address]     Phone:  [phone]     bisacodyl 10 MG Suppository    polyethylene glycol powder         Some of these will need a paper prescription and others can be bought over the counter.  Ask  your nurse if you have questions.     Bring a paper prescription for each of these medications     morphine 15 MG 12 hr tablet    oxyCODONE 5 MG IR tablet                Primary Care Provider    Doctor Unknown, MD       No address on file        Equal Access to Services     GONZALO LEWTON : Saul jaxon laurent moustapha Molina, wagniada luilia, qaslyta kagopal fonseca, gena jesuskaycee nathan. So [facility] [phone].    ATENCIÓN: Si habla español, tiene a burns disposición servicios gratuitos de asistencia lingüística. Llame al [phone].    We comply with applicable federal civil rights laws and [place] laws. We do not discriminate on the basis of race, color, national origin, age, disability sex, sexual orientation or gender identity.            Thank you!     Thank you for choosing [facility]  for your care. Our goal is always to provide you with excellent care. Hearing back from our patients is one way we can continue to improve our services. Please take a few minutes to complete the written survey that you may receive in the mail after your visit with us. Thank you!             Your Updated Medication List - Protect others around you: Learn how to safely use, store and throw away your medicines at www.disposemymeds.org.          This list is accurate as of: 7/13/17  4:22 PM.  Always use your most recent med list.                   Brand Name Dispense Instructions for use Diagnosis    bisacodyl 10 MG Suppository    DULCOLAX    12 suppository    Place 1 suppository (10 mg) rectally daily as needed for constipation    Drug-induced constipation       dexamethasone 4 MG tablet    DECADRON    15 tablet    Take 5 tablets (20 mg) by mouth daily X 3 days each cycle. To be taken the day before, day of, and day after chemotherapy infusion.    Anemia, unspecified type, Non-small cell lung cancer (NSCLC) (H)       lidocaine-prilocaine cream    EMLA    30 g    Apply topically over port 45 - 60 minutes  prior to port access.    Non-small cell lung cancer (NSCLC) (H)       LORazepam 0.5 MG tablet    ATIVAN    30 tablet    Take 1 tablet (0.5 mg) by mouth every 4 hours as needed (Anxiety, Nausea/Vomiting or Sleep)    Non-small cell lung cancer (NSCLC) (H)       morphine 15 MG 12 hr tablet    MS CONTIN    60 tablet    Take 1 tablet (15 mg) by mouth every 12 hours    Non-small cell lung cancer (NSCLC) (H)       * nicotine 21 MG/24HR 24 hr patch    NICODERM CQ    30 patch    Place 1 patch onto the skin every 24 hours    Tobacco abuse       * nicotine 14 MG/24HR 24 hr patch    NICODERM CQ    30 patch    Place 1 patch onto the skin every 24 hours    Tobacco abuse       * nicotine 7 MG/24HR 24 hr patch    NICODERM CQ    30 patch    Place 1 patch onto the skin every 24 hours    Tobacco abuse       ondansetron 8 MG tablet    ZOFRAN    10 tablet    Take 1 tablet (8 mg) by mouth every 8 hours as needed (Nausea/Vomiting)    Non-small cell lung cancer (NSCLC) (H)       oxyCODONE 5 MG IR tablet    ROXICODONE    40 tablet    Take 1 tablet (5 mg) by mouth every 4 hours as needed for pain maximum 8 tablet(s) per day    Non-small cell lung cancer (NSCLC) (H)       pantoprazole 40 MG EC tablet    PROTONIX    30 tablet    Take 1 tablet (40 mg) by mouth 2 times daily (before meals)    Non-small cell lung cancer (NSCLC) (H)       PARoxetine 10 MG tablet    PAXIL    30 tablet    Take 1 tablet (10 mg) by mouth At Bedtime    Major depressive disorder, recurrent episode, moderate (H)       polyethylene glycol powder    MIRALAX    510 g    Take 17 g (1 capful) by mouth daily Mix in 4-8 oz of fluid of choice. For constipation    Drug-induced constipation       prochlorperazine 10 MG tablet    COMPAZINE    30 tablet    Take 1 tablet (10 mg) by mouth every 6 hours as needed (Nausea/Vomiting)    Non-small cell lung cancer (NSCLC) (H)       senna-docusate 8.6-50 MG per tablet    SENOKOT-S;PERICOLACE    100 tablet    Take 1 tablet by mouth 2 times  daily Reported on 5/4/2017    Non-small cell lung cancer (NSCLC) (H)       * Notice:  This list has 3 medication(s) that are the same as other medications prescribed for you. Read the directions carefully, and ask your doctor or other care provider to review them with you.

## 2017-07-13 NOTE — PROGRESS NOTES
Hematology/ Oncology Follow-up Visit:  Jul 13, 2017    Reason for Visit:   Chief Complaint   Patient presents with     Oncology Clinic Visit     4 week recheck NSCLC Labs & Chemo today       Oncologic History:  Non-small cell lung cancer (NSCLC) (H)  The patient presented with 2-3 months history of weakness and fainting episodes. He was seen in the emergency room further workup was done including a CT scan which showed a 3 cm mass in the medial left upper lobe. There was adjacent atelectasis at the prominent mediastinal lymph nodes. There is a destructive bone lesion involving left lateral fifth rib. Patient underwent transthoracic CT-guided biopsy in the hospital. The pathology report came back consistent with mixed tumor including adenocarcinoma and squamous histology. 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 He had a normal TSH, but he had low T3 and T4. He had a serum cortisol morning level 4.4. He underwent a cosyntropin stim test which was mildly abnormal. He had a cortisol level 14.8 at 30 minutes and 19.6 at 60 minutes. His 24-hour cortisol in his urine was normal. He was seen by endocrinology we will continue to monitor him in 6 months.   PET scan showed hypermetabolic bilateral level Ib and right level III lymph nodes in the neck suspicious for metastatic disease. There was also hypermetabolic activity seen in the sella consistent with pituitary macroadenoma. There is hypermetabolic mediastinal left hilar lymphadenopathy.  There is metastatic disease seen in the left scapula and left fifth rib. There is left pleural effusion and small right pleural effusion seen as well.  ALK 4 came back negative. EGFR was negative for mutation. . PDL 1 is less than 1%.       Interval History:  Patient is here before cycle #3 of chemotherapy. He has been tolerating chemotherapy well. He denies any nausea vomiting or diarrhea. Denies any fever or chills.  Denies any bruising or bleeding. Denies any Stools.    Review Of Systems:  Constitutional: Negative for fever, chills, and night sweats.  Skin: negative.  Eyes: negative.  Ears/Nose/Throat: negative.  Respiratory: No shortness of breath, dyspnea on exertion, cough, or hemoptysis.  Cardiovascular: negative.  Gastrointestinal: negative.  Genitourinary: negative.  Musculoskeletal: negative.  Neurologic: negative.  Psychiatric: negative.  Hematologic/Lymphatic/Immunologic: negative.  Endocrine: negative.    All other ROS negative unless mentioned in interval history.    Past medical, social, surgical, and family histories reviewed.    Allergies:  Allergies as of 07/13/2017 - Kvng as Reviewed 07/13/2017   Allergen Reaction Noted     Lubriderm Rash 04/20/2017       Current Medications:  Current Outpatient Prescriptions   Medication Sig Dispense Refill     oxyCODONE (ROXICODONE) 5 MG IR tablet Take 1 tablet (5 mg) by mouth every 4 hours as needed for pain maximum 8 tablet(s) per day 40 tablet 0     morphine (MS CONTIN) 15 MG 12 hr tablet Take 1 tablet (15 mg) by mouth every 12 hours 60 tablet 0     senna-docusate (SENOKOT-S;PERICOLACE) 8.6-50 MG per tablet Take 1 tablet by mouth 2 times daily Reported on 5/4/2017 (Patient taking differently: Take 1 tablet by mouth 2 times daily Reported on 5/4/2017 Taking 2 tablets in AM & 1 tablet at night.) 100 tablet 3     PARoxetine (PAXIL) 10 MG tablet Take 1 tablet (10 mg) by mouth At Bedtime 30 tablet 1     dexamethasone (DECADRON) 4 MG tablet Take 5 tablets (20 mg) by mouth daily X 3 days each cycle. To be taken the day before, day of, and day after chemotherapy infusion. 15 tablet 3     polyethylene glycol (MIRALAX) powder Take 17 g (1 capful) by mouth daily Mix in 4-8 oz of fluid of choice. For constipation 510 g 11     bisacodyl (DULCOLAX) 10 MG Suppository Place 1 suppository (10 mg) rectally daily as needed for constipation 12 suppository 11     nicotine (NICODERM CQ) 21  "MG/24HR 24 hr patch Place 1 patch onto the skin every 24 hours (Patient not taking: Reported on 7/13/2017) 30 patch 0     nicotine (NICODERM CQ) 14 MG/24HR 24 hr patch Place 1 patch onto the skin every 24 hours (Patient not taking: Reported on 7/13/2017) 30 patch 0     nicotine (NICODERM CQ) 7 MG/24HR 24 hr patch Place 1 patch onto the skin every 24 hours (Patient not taking: Reported on 7/13/2017) 30 patch 0     pantoprazole (PROTONIX) 40 MG EC tablet Take 1 tablet (40 mg) by mouth 2 times daily (before meals) 30 tablet 11     lidocaine-prilocaine (EMLA) cream Apply topically over port 45 - 60 minutes prior to port access. 30 g 3     LORazepam (ATIVAN) 0.5 MG tablet Take 1 tablet (0.5 mg) by mouth every 4 hours as needed (Anxiety, Nausea/Vomiting or Sleep) (Patient not taking: Reported on 7/13/2017) 30 tablet 2     prochlorperazine (COMPAZINE) 10 MG tablet Take 1 tablet (10 mg) by mouth every 6 hours as needed (Nausea/Vomiting) (Patient not taking: Reported on 7/13/2017) 30 tablet 2     ondansetron (ZOFRAN) 8 MG tablet Take 1 tablet (8 mg) by mouth every 8 hours as needed (Nausea/Vomiting) (Patient not taking: Reported on 7/13/2017) 10 tablet 2        Physical Exam:  /72 (BP Location: Right arm, Patient Position: Sitting, Cuff Size: Adult Regular)  Pulse 67  Temp 97.3  F (36.3  C) (Tympanic)  Resp 18  Ht 1.807 m (5' 11.14\")  Wt 71.2 kg (156 lb 14.4 oz)  SpO2 100%  BMI 21.8 kg/m2  Wt Readings from Last 12 Encounters:   07/13/17 71.2 kg (156 lb 14.4 oz)   07/13/17 71.2 kg (156 lb 15.5 oz)   06/22/17 71.2 kg (157 lb)   06/22/17 71.2 kg (157 lb)   06/14/17 70.7 kg (155 lb 14.4 oz)   05/31/17 69.2 kg (152 lb 9.6 oz)   05/25/17 73 kg (161 lb)   05/25/17 72.6 kg (160 lb)   05/24/17 71.6 kg (157 lb 14.4 oz)   05/17/17 82.6 kg (182 lb)   05/15/17 77.6 kg (171 lb)   05/15/17 82.6 kg (182 lb)     ECOG performance status: 1  GENERAL APPEARANCE: Healthy, alert and in no acute distress. He looks pale  HEENT: Sclerae " anicteric. PERRLA. Oropharynx without ulcers, lesions, or thrush.  NECK: Supple. No asymmetry or masses.  LYMPHATICS: No palpable cervical, supraclavicular, axillary, or inguinal lymphadenopathy.  RESP: Lungs clear to auscultation bilaterally without rales, rhonchi or wheezes.  CARDIOVASCULAR: Regular rate and rhythm. Normal S1, S2; no S3 or S4. No murmur, gallop, or rub.  ABDOMEN: Soft, nontender. Bowel sounds normal. No palpable organomegaly or masses.  MUSCULOSKELETAL: Extremities without gross deformities noted. No edema of bilateral lower extremities.  SKIN: No suspicious lesions or rashes.  NEURO: Alert and oriented x 3. Cranial nerves II-XII grossly intact.  PSYCHIATRIC: Mentation and affect appear normal.    Laboratory/Imaging Studies:  Infusion Therapy Visit on 07/13/2017   Component Date Value Ref Range Status     WBC 07/13/2017 4.0  4.0 - 11.0 10e9/L Final     RBC Count 07/13/2017 2.63* 4.4 - 5.9 10e12/L Final     Hemoglobin 07/13/2017 6.3* 13.3 - 17.7 g/dL Final    Comment: This result has been called to LIEN VASQUEZ RN by Lillian Padron on 07 13 2017   at   0935, and has been read back.       Hematocrit 07/13/2017 19.3* 40.0 - 53.0 % Final     MCV 07/13/2017 73* 78 - 100 fl Final     MCH 07/13/2017 24.0* 26.5 - 33.0 pg Final     MCHC 07/13/2017 32.6  31.5 - 36.5 g/dL Final     RDW 07/13/2017 15.9* 10.0 - 15.0 % Final     Platelet Count 07/13/2017 59* 150 - 450 10e9/L Final     Diff Method 07/13/2017 Automated Method   Final     % Neutrophils 07/13/2017 67.6  % Final     % Lymphocytes 07/13/2017 21.3  % Final     % Monocytes 07/13/2017 10.3  % Final     % Eosinophils 07/13/2017 0.0  % Final     % Basophils 07/13/2017 0.0  % Final     % Immature Granulocytes 07/13/2017 0.8  % Final     Absolute Neutrophil 07/13/2017 2.7  1.6 - 8.3 10e9/L Final     Absolute Lymphocytes 07/13/2017 0.9  0.8 - 5.3 10e9/L Final     Absolute Monocytes 07/13/2017 0.4  0.0 - 1.3 10e9/L Final     Absolute Eosinophils 07/13/2017 0.0   0.0 - 0.7 10e9/L Final     Absolute Basophils 07/13/2017 0.0  0.0 - 0.2 10e9/L Final     Abs Immature Granulocytes 07/13/2017 0.0  0 - 0.4 10e9/L Final     Anisocytosis 07/13/2017 Slight   Final     RBC Fragments 07/13/2017 Slight   Final     Hypochromasia 07/13/2017 Present   Final     Platelet Estimate 07/13/2017 Decreased   Final     Sodium 07/13/2017 139  133 - 144 mmol/L Final     Potassium 07/13/2017 3.7  3.4 - 5.3 mmol/L Final     Chloride 07/13/2017 105  94 - 109 mmol/L Final     Carbon Dioxide 07/13/2017 26  20 - 32 mmol/L Final     Anion Gap 07/13/2017 8  3 - 14 mmol/L Final     Glucose 07/13/2017 116* 70 - 99 mg/dL Final     Urea Nitrogen 07/13/2017 13  7 - 30 mg/dL Final     Creatinine 07/13/2017 0.86  0.66 - 1.25 mg/dL Final     GFR Estimate 07/13/2017   >60 mL/min/1.7m2 Final                    Value:>90  Non  GFR Calc       GFR Estimate If Black 07/13/2017   >60 mL/min/1.7m2 Final                    Value:>90   GFR Calc       Calcium 07/13/2017 9.0  8.5 - 10.1 mg/dL Final     Bilirubin Total 07/13/2017 0.3  0.2 - 1.3 mg/dL Final     Albumin 07/13/2017 3.1* 3.4 - 5.0 g/dL Final     Protein Total 07/13/2017 6.9  6.8 - 8.8 g/dL Final     Alkaline Phosphatase 07/13/2017 247* 40 - 150 U/L Final     ALT 07/13/2017 23  0 - 70 U/L Final     AST 07/13/2017 35  0 - 45 U/L Final     Units Ordered 07/13/2017 2   Final     ABO 07/13/2017 O   Final     RH(D) 07/13/2017  Pos   Final     Antibody Screen 07/13/2017 Neg   Final     Test Valid Only At 07/13/2017 [facility]   Final     Specimen Expires 07/13/2017 07/16/2017   Final     Crossmatch 07/13/2017 Red Blood Cells   Final     Unit Number 07/13/2017 [id number]   Final     Blood Component Type 07/13/2017 Red Blood Cells Leukocyte Reduced   Final     Division Number 07/13/2017 00   Final     Status of Unit 07/13/2017 Released to care unit 07/13/2017 1243   Incomplete     Blood Product Code 07/13/2017 [id number]   Final      Unit Status 07/13/2017 ISS   Incomplete     Unit Number 07/13/2017 [id number]   Final     Blood Component Type 07/13/2017 Red Blood Cells Leukocyte Reduced   Final     Division Number 07/13/2017 00   Final     Status of Unit 07/13/2017 Released to care unit 07/13/2017 1245   Incomplete     Blood Product Code 07/13/2017 [id number]   Final     Unit Status 07/13/2017 ISS   Incomplete     Unit Number 07/13/2017 [id number]   Final     Blood Component Type 07/13/2017 Red Blood Cells Leukocyte Reduced   Final     Division Number 07/13/2017 00   Final     Blood Product Code 07/13/2017 [id number]   Final        No results found for this or any previous visit (from the past 744 hour(s)).    Assessment and plan:      (C34.90) Non-small cell lung cancer (NSCLC) (H)  Chemotherapy will be held today because of cytopenias. We will reschedule chemotherapy for next week. We will plan to arrange for imaging studies to assess response to treatment.    (G89.3) Cancer associated pain   Patient pain has been controlled on MS Contin 15 mg twice daily and oxycodone 5 mg every 4-6 hours for breakthrough pain.    (D63.8) Anemia, chronic disease  Hemoglobin today is 6.3. The patient is symptomatic. We will arrange for 2 units of packed RBCs. We will continue to monitor hemoglobin.    (D69.6) Thrombocytopenia (H)  Platelet count is 59 today. Patient has no bruising or bleeding. We will continue to monitor platelet count.    (R63.4) Weight loss  Patient's appetite has improved. His weight has been stable.    (F17.200) Tobacco use disorder  Patient continues to smoke at least 10 cigarettes a day. I strongly recommended to quit smoking.    (K59.03) Drug-induced constipation  Patient currently on Senokot twice daily and Dulcolax if needed.    The patient is ready to learn, no apparent learning barriers were identified.  Diagnosis and treatment plans were explained to the patient. The patient expressed understanding of the content. The  patient asked appropriate questions. The patient questions were answered to his satisfaction.    Chart documentation with Dragon Voice recognition Software. Although reviewed after completion, some words and grammatical errors may remain.

## 2017-07-13 NOTE — PROGRESS NOTES
Palliative Care Outpatient Consult  Visit Date: July 13, 2017  Location:  Infusion  Accompanied by:  No one; here alone  PCP: TAVON Hampton MD  Requested by: JASMIN Naylor MD, Oncology    HPI:  Wade Acevedo is a 57 year old year old  male who admitted to the hospital 4/20 through 4/27 of this year, presenting with a 2-3 month history of weakness and orthostatic hypotension.  CT done on admission showed a lung mass, mediastinal lymphadenopathy, and a destructive left lateral rib lesion and L scapula metastasis.  He was eventually diagnosed with NSCLC.  While still in rehab post discharge, he had his first visit with Dr Naylor on 5/4/17 at which time additional staging exams were ordered.  Chemo alone consisting of Carbo/Taxol was recommended, with C1D1 on 5/24/17.  On 6/22 Dr Naylor referred pt to me for pain management; this was the earliest date the patient was willing to meet with me.        Patient Active Problem List    Diagnosis Date Noted     Tobacco use disorder 05/31/2017     Priority: Medium     Major depressive disorder, recurrent episode, moderate (H) 05/25/2017     Priority: Medium     Sinusitis 05/12/2017     Priority: Medium     Pituitary macroadenoma (H) 05/10/2017     Priority: Medium     Cancer associated pain 05/06/2017     Priority: Medium     Anemia, chronic disease 05/06/2017     Priority: Medium     Weight loss 05/06/2017     Priority: Medium     Constipation      Priority: Medium     H/O ETOH abuse      Priority: Medium     Unsteady gait      Priority: Medium     Recurrent falls      Priority: Medium     Non-small cell lung cancer (NSCLC) (H) 05/04/2017     Priority: Medium     Anemia 04/20/2017     Priority: Medium     Hypotension 04/20/2017     Priority: Medium       Past Medical History:   Diagnosis Date     Brain cancer (H)      Constipation      H/O ETOH abuse      Hx of tobacco use, presenting hazards to health      Lung cancer (H)      Pain 5/6/2017     Recurrent falls       Unsteady gait      Weight loss        Past Surgical History:   Procedure Laterality Date     INSERT PORT VASCULAR ACCESS N/A 2017    Procedure: INSERT PORT VASCULAR ACCESS;  Port-a-Cath Insertion;  Surgeon: Pawan Collins MD;  Location: [facility] OR       No family history on file.    Social History     Social History Narrative   2017:  Was  11 years ago when his wife  while under hospice care.  His DIL Sarah Burch, who lives in [facility], is his health care agent and, per patient, his POA as well.  He lives alone in his own home in [place], which is a split entry with 3 levels.  He retired from his job as a  in  with back and joint pains.  He does have a h/o alcohol abuse, but states he has been abstinent since he was diagnosed with cancer.  He smoked 1 PPD x 45 years and still really enjoys sneaking a cigarette here and there but mostly tries to avoid them.      Spiritual History:  Identifies as a Taoist, but is not affiliated with a [employer].      Advance Care Planning:  Completed a health care directive on 3/14/07 which names Sarah as his 1o health care agent and his sister in law De Paz as the alternate.  Although he knows his cancer can't be cured, he also can't bring himself to change his code status from Full Code.  He was grateful for the support that hospice provided when his wife was enrolled in hospice.      Allergies   Allergen Reactions     Lubriderm Rash       Current Outpatient Prescriptions on File Prior to Visit:  oxyCODONE (ROXICODONE) 5 MG IR tablet Take 1 tablet (5 mg) by mouth every 4 hours as needed for pain maximum 8 tablet(s) per day   morphine (MS CONTIN) 15 MG 12 hr tablet Take 1 tablet (15 mg) by mouth every 12 hours   nicotine (NICODERM CQ) 21 MG/24HR 24 hr patch Place 1 patch onto the skin every 24 hours (Patient not taking: Reported on 2017)   nicotine (NICODERM CQ) 14 MG/24HR 24 hr patch Place 1 patch onto the skin every 24 hours (Patient not taking:  Reported on 7/13/2017)   nicotine (NICODERM CQ) 7 MG/24HR 24 hr patch Place 1 patch onto the skin every 24 hours (Patient not taking: Reported on 7/13/2017)   senna-docusate (SENOKOT-S;PERICOLACE) 8.6-50 MG per tablet Take 1 tablet by mouth 2 times daily Reported on 5/4/2017 (Patient taking differently: Take 1 tablet by mouth 2 times daily Reported on 5/4/2017 Taking 2 tablets in AM & 1 tablet at night.)   pantoprazole (PROTONIX) 40 MG EC tablet Take 1 tablet (40 mg) by mouth 2 times daily (before meals)   PARoxetine (PAXIL) 10 MG tablet Take 1 tablet (10 mg) by mouth At Bedtime   lidocaine-prilocaine (EMLA) cream Apply topically over port 45 - 60 minutes prior to port access.   dexamethasone (DECADRON) 4 MG tablet Take 5 tablets (20 mg) by mouth daily X 3 days each cycle. To be taken the day before, day of, and day after chemotherapy infusion.   LORazepam (ATIVAN) 0.5 MG tablet Take 1 tablet (0.5 mg) by mouth every 4 hours as needed (Anxiety, Nausea/Vomiting or Sleep) (Patient not taking: Reported on 7/13/2017)   prochlorperazine (COMPAZINE) 10 MG tablet Take 1 tablet (10 mg) by mouth every 6 hours as needed (Nausea/Vomiting) (Patient not taking: Reported on 7/13/2017)   ondansetron (ZOFRAN) 8 MG tablet Take 1 tablet (8 mg) by mouth every 8 hours as needed (Nausea/Vomiting) (Patient not taking: Reported on 7/13/2017)     No current facility-administered medications on file prior to visit.     Review of Systems:  Notes his pain is much better since being started on MS Contin q12 hours.  Prior to the intiation of MS Contin, he was using, per [place] Prescription Monitoring program, about 40 OxyIR tablets per week.  He has had problems with constipation, very hard stools, resorting to manually disimpacting himself on a too-frequent basis.  He has little appetite, and is further little motivated to do much of anything.  He primarily eats junk food or frozen meals; his baseline weight was 220 lbs but is now down to 156 lbs.   "Sarah will bring him \"real\" food when she visits, but that is infrequent.  He is relatively inactive at home, pretty much using the riding mower to cut his lawn.  Sarah also helps keep the house clean.  He does experience bladder urgency but attributes that to age.  No incontinence.  Has had acid reflux for which he takes Protonix.  Denies depression.  Sleep is good.    Performance Assessment:    Palliative Performance Scale, v.2     50%  Extensive disease. Normal or reduced intake; normal LOC or confusion; little ambulation (mainly sit/lie), ADLs w/much assistance, unable to do any work.      Exam:  T 97.2   P 65  /71  Wt 156 lb  07/13/17 156 lb 14.4 oz (71.2 kg)   07/13/17 156 lb 15.5 oz (71.2 kg)   06/22/17 157 lb (71.2 kg)   06/22/17 157 lb (71.2 kg)   06/14/17 155 lb 14.4 oz (70.7 kg)   05/31/17 152 lb 9.6 oz (69.2 kg)   05/25/17 161 lb (73 kg)   05/25/17 160 lb (72.6 kg)   05/24/17 157 lb 14.4 oz (71.6 kg)   05/17/17 182 lb (82.6 kg)   05/15/17 171 lb (77.6 kg)   Alert and oriented in no apparent distress; pleasant, calm  Normocephalic  Sclera anicteric, PERRL  OP pink, moist, no thrush/lesions  Neck supple, no masses  CV RRR  Lungs clear, breathing unlabored  Abd obese, NT, very normoactive bowel sounds   MSK: gait smooth, no ataxia  Neuro: CN II-XII grossly intact, no tremors  Ext warm, without edema   Psych: intact ST/LT memory, language fluent, full affect      TO ORIGINAL REPORT   Status: Signed Out   Date Ordered:5/1/2017   Date Complete:5/1/2017   Date Reported:5/1/2017 13:53   Signed Out By: Johann Taylor M.D.     INTERPRETATION:   ADDENDUM REPORT:   The case [id number], left lung needle biopsy was inadvertently signed out   prior to completion of the FINAL DIAGNOSIS.  The purpose of this   addendum report is to complete the Final Diagnosis.     ADDENDUM FINAL DIAGNOSIS:   Lung mass, medial left upper lobe, CT guided 20 gauge needle biopsies:   - Both poorly differentiated p40 positive carcinoma, " consistent with   poorly differentiated squamous cell carcinoma and moderately   differentiated adenocarcinoma.  See comment.     COMMENTS:   The dual patterned carcinoma may represent an adenosquamous cell   carcinoma, which may be primary or metastatic.  The adenocarcinoma   component is negative for TTF-1 and Napsin A, which does not exclude   primary adenocarcinoma of lung (Reference: Applied Immunohistochemistry   and Molecular Morphology 2011 Jul; 19(4):313-7, reporting 9.2% of 120   primary lung adenocarcinomas negative for both TTF-1 and Napsin A),     Intradepartmental consultation concurs with the diagnosis.     Results for JOHN SANCHEZ (MRN [id number]) as of 8/31/2017 16:29   Ref. Range 7/13/2017 08:05   Sodium Latest Ref Range: 133 - 144 mmol/L 139   Potassium Latest Ref Range: 3.4 - 5.3 mmol/L 3.7   Chloride Latest Ref Range: 94 - 109 mmol/L 105   Carbon Dioxide Latest Ref Range: 20 - 32 mmol/L 26   Urea Nitrogen Latest Ref Range: 7 - 30 mg/dL 13   Creatinine Latest Ref Range: 0.66 - 1.25 mg/dL 0.86   GFR Estimate Latest Ref Range: >60 mL/min/1.7m2 >90...   GFR Estimate If Black Latest Ref Range: >60 mL/min/1.7m2 >90...   Calcium Latest Ref Range: 8.5 - 10.1 mg/dL 9.0   Anion Gap Latest Ref Range: 3 - 14 mmol/L 8   Albumin Latest Ref Range: 3.4 - 5.0 g/dL 3.1 (L)   Protein Total Latest Ref Range: 6.8 - 8.8 g/dL 6.9   Bilirubin Total Latest Ref Range: 0.2 - 1.3 mg/dL 0.3   Alkaline Phosphatase Latest Ref Range: 40 - 150 U/L 247 (H)   ALT Latest Ref Range: 0 - 70 U/L 23   AST Latest Ref Range: 0 - 45 U/L 35   Glucose Latest Ref Range: 70 - 99 mg/dL 116 (H)   WBC Latest Ref Range: 4.0 - 11.0 10e9/L 4.0   Hemoglobin Latest Ref Range: 13.3 - 17.7 g/dL 6.3 (LL)   Hematocrit Latest Ref Range: 40.0 - 53.0 % 19.3 (L)   Platelet Count Latest Ref Range: 150 - 450 10e9/L 59 (L)   RBC Count Latest Ref Range: 4.4 - 5.9 10e12/L 2.63 (L)   MCV Latest Ref Range: 78 - 100 fl 73 (L)   MCH Latest Ref Range: 26.5 - 33.0  pg 24.0 (L)   MCHC Latest Ref Range: 31.5 - 36.5 g/dL 32.6   RDW Latest Ref Range: 10.0 - 15.0 % 15.9 (H)   Diff Method Unknown Automated Method   % Neutrophils Latest Units: % 67.6   % Lymphocytes Latest Units: % 21.3   % Monocytes Latest Units: % 10.3   % Eosinophils Latest Units: % 0.0   % Basophils Latest Units: % 0.0   % Immature Granulocytes Latest Units: % 0.8   Absolute Neutrophil Latest Ref Range: 1.6 - 8.3 10e9/L 2.7   Absolute Lymphocytes Latest Ref Range: 0.8 - 5.3 10e9/L 0.9   Absolute Monocytes Latest Ref Range: 0.0 - 1.3 10e9/L 0.4   Absolute Eosinophils Latest Ref Range: 0.0 - 0.7 10e9/L 0.0   Absolute Basophils Latest Ref Range: 0.0 - 0.2 10e9/L 0.0   Abs Immature Granulocytes Latest Ref Range: 0 - 0.4 10e9/L 0.0   Anisocytosis Unknown Slight   RBC Fragments Unknown Slight   Hypochromasia Unknown Present   Platelet Estimate Unknown Decreased     5/9/17 PET/CT:  IMPRESSION: In this patient with newly diagnosed non-small cell  carcinoma of the left upper lobe:  1. Hypermetabolic bilateral level 1B and right level 3 lymph nodes in  the neck highly suspicious for metastatic disease.  2. 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  3. 2.8 x 3.4 cm hypermetabolic left upper lobe mass consistent with  patient's known non-small cell lung carcinoma.   4. Hypermetabolic mediastinal and left hilar lymphadenopathy.   5. Metastatic disease including the left scapula and left fifth rib.  6. Increased moderate left pleural effusion and small right pleural  effusion. Left pneumothorax has almost totally resolved. No focal FDG  uptake to suggest that these are malignant, however thoracentesis  could be considered for further evaluation if clinically indicated.  7. Small amount of new nonspecific pelvic free fluid.      I have personally reviewed the examination and initial interpretation  and I agree with the findings.     AHMET BUITRAGO  MD        Assessment/Plan:  1. Metastatic NSCLC with bony mets; pain well controlled    2. Opioid induced constipation   >daily miralax   >PRN Bisacodyl suppositories if no BM x 2days    3. Advance Care Planning:    >full code for now    Thank you for the opportunity to be of service to this patient and family.      [id number] [phone] face to face, 60 min, > 50% spent obtaining detailed symptom history..      Mickey Bernard CNP ([facility])  Palliative Care  Private cell:  [phone]

## 2017-07-13 NOTE — PROGRESS NOTES
Infusion Nursing Note:  Wade Acevedo presents today for C3D1 Taxol/Carbo (held due to hgb = 6.3).    Patient seen by provider today: Yes: Dr. Naylor   present during visit today: Not Applicable.    Note: pt received 2 units of PRBC's today. R/S for chemo next week, if counts are WNL.    Intravenous Access:  Implanted Port.    Treatment Conditions:  Lab Results   Component Value Date    HGB 6.3 07/13/2017     Lab Results   Component Value Date    WBC 4.0 07/13/2017      Lab Results   Component Value Date    ANEU 2.7 07/13/2017     Lab Results   Component Value Date    PLT 59 07/13/2017      Lab Results   Component Value Date     07/13/2017                   Lab Results   Component Value Date    POTASSIUM 3.7 07/13/2017           Lab Results   Component Value Date    MAG 1.8 04/21/2017            Lab Results   Component Value Date    CR 0.86 07/13/2017                   Lab Results   Component Value Date    JOHANN 9.0 07/13/2017                Lab Results   Component Value Date    BILITOTAL 0.3 07/13/2017           Lab Results   Component Value Date    ALBUMIN 3.1 07/13/2017                    Lab Results   Component Value Date    ALT 23 07/13/2017           Lab Results   Component Value Date    AST 35 07/13/2017     Blood transfusion consent on file.      Post Infusion Assessment:  Patient tolerated transfusion without incident.  Blood return noted pre and post infusion.  Site patent and intact, free from redness, edema or discomfort.  No evidence of extravasations.  Access discontinued per protocol.    Discharge Plan:   Patient discharged in stable condition accompanied by: uncle.  Departure Mode: Ambulatory.    Homa Alvarez RN

## 2017-07-13 NOTE — PATIENT INSTRUCTIONS
We will be holding your chemotherapy today and reschedule it to next week.  You do not need to see the doctor prior to that appointment. We would like to see you back in clinic with Dr. Naylor in 3 weeks with labs and CT prior. Chemotherapy to be scheduled per treatment plan. Your prescriptions (Oxycodone, MS Contin) have been given to you to hand carry to the pharmacy of your choice. When you are in need of a refill, please call your pharmacy and they will send us a request.      Copy of appointments, and after visit summary (AVS) given to patient.  If you have any questions during business hours (M-F 8 AM- 4PM), please call Jillian Renee RN, BSN, OCN Oncology Hematology /Breast Cancer Navigator at [facility] [phone].   For questions after business hours, or on holidays/weekends, please call our after hours Nurse Triage line [phone]. Thank you.

## 2017-07-14 NOTE — TELEPHONE ENCOUNTER
Home Care nurse calling to see if she should set up patient's dexamethazone again for next week due to not getting treatment this week and rescheduled.  Chart review and patient is scheduled for chemotherapy next week and will need to restart Dexamethazone.    Blayne Torres RN, BSN, OCN  7/14/2017, 3:40 PM

## 2017-07-17 NOTE — PROGRESS NOTES
Clinic Care Coordination Contact  [facility]/Voicemail    Referral Source: Home Care Priority Patient  Clinical Data: Care Coordinator Outreach  Patient had been at TCU. Now open to [facility]. Is followed closely by oncology and pain.   Outreach attempted x 1.  Left message on voicemail with call back information and requested return call.  Plan: Care Coordinator will f/u in 2-3 weeks unless notified of HC DC prior..  José Miguel GAMEZ,RN- BC  Clinic Care Coordinator  [facility]  Phone: [phone]

## 2017-07-20 NOTE — PROGRESS NOTES
Infusion Nursing Note:  Wade Acevedo presents today for PAC labs and chemotherapy.      Patient seen by provider today: No   present during visit today: Not Applicable.    Note: Labs drawn via his port per [place] protocol. Labs WNL's. Premeds and chemotherapy infused without complications via his port. Port flushed per [facility] protocol. Pt. To return for labs, chemotherapy, and an MD appt.  Intravenous Access:  Labs drawn without difficulty.  Implanted Port.    Treatment Conditions:  Lab Results   Component Value Date    HGB 8.5 07/20/2017     Lab Results   Component Value Date    WBC 4.4 07/20/2017      Lab Results   Component Value Date    ANEU 3.8 07/20/2017     Lab Results   Component Value Date     07/20/2017      Lab Results   Component Value Date     07/20/2017                   Lab Results   Component Value Date    POTASSIUM 4.3 07/20/2017           Lab Results   Component Value Date    MAG 1.8 04/21/2017            Lab Results   Component Value Date    CR 0.79 07/20/2017                   Lab Results   Component Value Date    JOHANN 8.9 07/20/2017                Lab Results   Component Value Date    BILITOTAL 0.3 07/20/2017           Lab Results   Component Value Date    ALBUMIN 3.1 07/20/2017                    Lab Results   Component Value Date    ALT 28 07/20/2017           Lab Results   Component Value Date    AST 36 07/20/2017     Results reviewed, labs MET treatment parameters, ok to proceed with treatment.          Post Infusion Assessment:  Patient tolerated infusion without incident.  Blood return noted pre and post infusion.  Site patent and intact, free from redness, edema or discomfort.  No evidence of extravasations.  Access discontinued per protocol.    Discharge Plan:   Patient and/or family verbalized understanding of discharge instructions and all questions answered.  Patient discharged in stable condition accompanied by: self.  Departure Mode:  Ambulatory.    Greta Mendez RN

## 2017-07-20 NOTE — MR AVS SNAPSHOT
After Visit Summary   7/20/2017    Wade Acevedo    MRN: [id number]           Patient Information     Date Of Birth          1960        Visit Information        Provider Department      7/20/2017 8:00 AM ROOM 9 [facility]        Today's Diagnoses     Non-small cell lung cancer (NSCLC) (H)    -  1       Follow-ups after your visit        Your next 10 appointments already scheduled     Aug 03, 2017 11:00 AM CDT   CT CHEST/ABDOMEN/PELVIS W CONTRAST with [facility]CT1   [facility] ([facility])    [address] [facility] [address]   [phone]           Please bring any scans or X-rays taken at other hospitals, if similar tests were done. Also bring a list of your medicines, including vitamins, minerals and over-the-counter drugs. It is safest to leave personal items at home.  Be sure to tell your doctor:   If you have any allergies.   If there s any chance you are pregnant.   If you are breastfeeding.   If you have any special needs.  You may have contrast for this exam. To prepare:   Do not eat or drink for 2 hours before your exam. If you need to take medicine, you may take it with small sips of water. (We may ask you to take liquid medicine as well.)   The day before your exam, drink extra fluids at least six 8-ounce glasses (unless your doctor tells you to restrict your fluids).  Patients over 70 or patients with diabetes or kidney problems:   If you haven t had a blood test (creatinine test) within the last 30 days, go to your clinic or Diagnostic Imaging Department for this test.  If you have diabetes:   If your kidney function is normal, continue taking your metformin (Avandamet, Glucophage, Glucovance, Metaglip) on the day of your exam.   If your kidney function is abnormal, wait 48 hours before restarting this medicine.  You will have oral contrast for this exam:   You will drink the contrast at home. Get this from your clinic or Diagnostic Imaging  Department. Please follow the directions given.  Please wear loose clothing, such as a sweat suit or jogging clothes. Avoid snaps, zippers and other metal. We may ask you to undress and put on a hospital gown.  If you have any questions, please call the Imaging Department where you will have your exam.            Aug 10, 2017  7:55 AM CDT   [facility]    [address] [address]   [phone]            Aug 10, 2017  8:00 AM CDT   Level 6 with ROOM 3 [facility] ([facility])    [facility]  [address]  [facility] [address]   [phone]            Aug 10, 2017  9:00 AM CDT   Return Visit with Pasquale Naylor MD   [facility] ([facility])    [facility]  [address]  [facility] [address]   [phone]            Nov 14, 2017 11:30 AM CST   (Arrive by 11:15 AM)   RETURN ENDOCRINE with Padma Medley MD   [facility] ([facility])    [address] [place] [address] [phone]              Who to contact     If you have questions or need follow up information about today's clinic visit or your schedule please contact [facility] directly at [phone].  Normal or non-critical lab and imaging results will be communicated to you by MyChart, letter or phone within 4 business days after the clinic has received the results. If you do not hear from us within 7 days, please contact the clinic through MyChart or phone. If you have a critical or abnormal lab result, we will notify you by phone as soon as possible.  Submit refill requests through [employer] or call your pharmacy and they will forward the refill request to us. Please allow 3 business days for your refill to be completed.          Additional Information  "About Your Visit        P[employer]char[employer] Information     [employer] lets you send messages to your doctor, view your test results, renew your prescriptions, schedule appointments and more. To sign up, go to [email or web address] . Click on \"Log in\" on the left side of the screen, which will take you to the Welcome page. Then click on \"Sign up Now\" on the right side of the page.     You will be asked to enter the access code listed below, as well as some personal information. Please follow the directions to create your username and password.     Your access code is: [email or web address]  Expires: 2017  9:09 AM     Your access code will  in 90 days. If you need help or a new code, please call your [facility] clinic or [phone].        Care EveryWhere ID     This is your Care EveryWhere ID. This could be used by other organizations to access your [facility] medical records  [id number]        Your Vitals Were     Pulse Temperature Pulse Oximetry             68 96.9  F (36.1  C) (Oral) 100%          Blood Pressure from Last 3 Encounters:   17 108/64   17 111/72   17 114/71    Weight from Last 3 Encounters:   17 71.2 kg (156 lb 14.4 oz)   17 71.2 kg (156 lb 15.5 oz)   17 71.2 kg (157 lb)              We Performed the Following     CBC with platelets differential     Comprehensive metabolic panel          Today's Medication Changes          These changes are accurate as of: 17  3:18 PM.  If you have any questions, ask your nurse or doctor.               These medicines have changed or have updated prescriptions.        Dose/Directions    senna-docusate 8.6-50 MG per tablet   Commonly known as:  SENOKOT-S;PERICOLACE   This may have changed:  additional instructions   Used for:  Non-small cell lung cancer (NSCLC) (H)        Dose:  1 tablet   Take 1 tablet by mouth 2 times daily Reported on 2017   Quantity:  100 tablet   Refills:  3                Primary Care Provider    Doctor " Unknown, MD       No address on file        Equal Access to Services     [facility] : Hadii jaxon laurent moustapha Cooperali, [place] erikmaryha, thiago cecydenigena kuhn. So [facility] [phone].    ATENCIÓN: Si habla español, tiene a burns disposición servicios gratuitos de asistencia lingüística. Iraisame al [phone].    We comply with applicable federal civil rights laws and [place] laws. We do not discriminate on the basis of race, color, national origin, age, disability sex, sexual orientation or gender identity.            Thank you!     Thank you for choosing [facility]  for your care. Our goal is always to provide you with excellent care. Hearing back from our patients is one way we can continue to improve our services. Please take a few minutes to complete the written survey that you may receive in the mail after your visit with us. Thank you!             Your Updated Medication List - Protect others around you: Learn how to safely use, store and throw away your medicines at www.disposemymeds.org.          This list is accurate as of: 7/20/17  3:18 PM.  Always use your most recent med list.                   Brand Name Dispense Instructions for use Diagnosis    bisacodyl 10 MG Suppository    DULCOLAX    12 suppository    Place 1 suppository (10 mg) rectally daily as needed for constipation    Drug-induced constipation       dexamethasone 4 MG tablet    DECADRON    15 tablet    Take 5 tablets (20 mg) by mouth daily X 3 days each cycle. To be taken the day before, day of, and day after chemotherapy infusion.    Anemia, unspecified type, Non-small cell lung cancer (NSCLC) (H)       lidocaine-prilocaine cream    EMLA    30 g    Apply topically over port 45 - 60 minutes prior to port access.    Non-small cell lung cancer (NSCLC) (H)       LORazepam 0.5 MG tablet    ATIVAN    30 tablet    Take 1 tablet (0.5 mg) by mouth every 4 hours as needed (Anxiety, Nausea/Vomiting  or Sleep)    Non-small cell lung cancer (NSCLC) (H)       morphine 15 MG 12 hr tablet    MS CONTIN    60 tablet    Take 1 tablet (15 mg) by mouth every 12 hours    Non-small cell lung cancer (NSCLC) (H)       * nicotine 21 MG/24HR 24 hr patch    NICODERM CQ    30 patch    Place 1 patch onto the skin every 24 hours    Tobacco abuse       * nicotine 14 MG/24HR 24 hr patch    NICODERM CQ    30 patch    Place 1 patch onto the skin every 24 hours    Tobacco abuse       * nicotine 7 MG/24HR 24 hr patch    NICODERM CQ    30 patch    Place 1 patch onto the skin every 24 hours    Tobacco abuse       ondansetron 8 MG tablet    ZOFRAN    10 tablet    Take 1 tablet (8 mg) by mouth every 8 hours as needed (Nausea/Vomiting)    Non-small cell lung cancer (NSCLC) (H)       oxyCODONE 5 MG IR tablet    ROXICODONE    40 tablet    Take 1 tablet (5 mg) by mouth every 4 hours as needed for pain maximum 8 tablet(s) per day    Non-small cell lung cancer (NSCLC) (H)       pantoprazole 40 MG EC tablet    PROTONIX    30 tablet    Take 1 tablet (40 mg) by mouth 2 times daily (before meals)    Non-small cell lung cancer (NSCLC) (H)       PARoxetine 10 MG tablet    PAXIL    30 tablet    Take 1 tablet (10 mg) by mouth At Bedtime (Needs follow-up appointment for this medication)    Major depressive disorder, recurrent episode, moderate (H)       polyethylene glycol powder    MIRALAX    510 g    Take 17 g (1 capful) by mouth daily Mix in 4-8 oz of fluid of choice. For constipation    Drug-induced constipation       prochlorperazine 10 MG tablet    COMPAZINE    30 tablet    Take 1 tablet (10 mg) by mouth every 6 hours as needed (Nausea/Vomiting)    Non-small cell lung cancer (NSCLC) (H)       senna-docusate 8.6-50 MG per tablet    SENOKOT-S;PERICOLACE    100 tablet    Take 1 tablet by mouth 2 times daily Reported on 5/4/2017    Non-small cell lung cancer (NSCLC) (H)       * Notice:  This list has 3 medication(s) that are the same as other  medications prescribed for you. Read the directions carefully, and ask your doctor or other care provider to review them with you.

## 2017-08-04 NOTE — TELEPHONE ENCOUNTER
PROXETINE  Last Written Prescription Date: 7/17/2017  Last Fill Quantity: 30, # refills: 0  Last Office Visit with [facility] primary care provider:  5/25/2017   Next 5 appointments (look out 90 days)     Aug 10, 2017  7:55 AM CDT   Return Visit with SALINAS Raphael CNP   [facility] ([facility])    [address] [address]   [phone]            Aug 10, 2017  9:00 AM CDT   Return Visit with Pasquale Naylor MD   [facility] ([facility])    [facility]  [address]  [facility] [address] [phone]                   Last PHQ-9 score on record= No flowsheet data found.      Pantoprazole  Last Written Prescription Date: 5/256/2017  Last Fill Quantity: 30,  # refills: 11   Last Office Visit with [facility] or [facility] prescribing provider: 5/25/2017                                         Next 5 appointments (look out 90 days)     Aug 10, 2017  7:55 AM CDT   Return Visit with SALINAS Raphael CNP   [facility] ([facility])    [address] [address]   [phone]            Aug 10, 2017  9:00 AM CDT   Return Visit with Pasquale Naylor MD   [facility] ([facility])    [facility]  [address]  [facility] [id number] [phone]                    [facility] Nurse called asking if Efrain could get a month supply on his Pantoprazole?  He takes these BID and he only get #30 instead of #60.  She said that he has a hard time getting in because of his cancer and having to find a ride.  Please advise.

## 2017-08-04 NOTE — TELEPHONE ENCOUNTER
Pt is going thru treatment for lung cancer.  He was last seen 5/25/17 for major depressive disorder and began paroxetine, 10 mg.  Dr Hampton wanted to see pt again in 1 month.    Informed Sarah of this.  She is his daughter-in-law and POA.  There is consent to communicate.  Sarah assists with his cares at this time due to memory issues.  I advised that pt needs to be seen.  Routed paroxetine to provider for refill consideration.      Pantoprazole refilled with corrected quantity.  Linh Slaughter RN

## 2017-08-10 NOTE — MR AVS SNAPSHOT
After Visit Summary   8/10/2017    Wade Acevedo    MRN: [id number]           Patient Information     Date Of Birth          1960        Visit Information        Provider Department      8/10/2017 7:55 AM Hailey Bernard APRN [facility]        Today's Diagnoses     Multiple joint pain    -  1    Non-small cell lung cancer (NSCLC) (H)        Cancer associated pain          Care Instructions    From Mickey Bernard, Palliative Care NP, Cell [phone]    Call me if you need refills, anytime.      Start taking the morphine pill every 8 hours.    I increased the strength of the oxycodone to 10 mg    Try to cut back on your use of Ibuprofen as it can hurt your kidneys, but more important, I think it might be killing off your platelets as they are pretty low today.      One new medicine, a cream, is called Diclofenac and you should apply it 4x/day to your L knee.  Don't use more than you should as it can then cause the same problems that Ibuprofen causes.    The other new medicine, a cream, is called Capsaicin 0.025%.  Before you use it the first time, just apply a small TEST DOSE to anywhere on your body.  Wait 30 min.  If it burns TOO much, remove it by rubbing butter/oil/mayonnaise on it.  Then you can wash it off with soap and water.  Soap and water applied directly w/o the oil can make it burn worse.  Also be careful to not rub your penis or eye or bottom with the cream still on your hand or you'll make those body parts burn.      Once you've determined that you can tolerate the burning, you can apply it to the R butt and thigh as often as every 2 hours if needed.  If you use it at least 4x/day, it will eventually prevent the pain altogether, but this might take weeks.  It's up to you to decide if you want to use it only when in pain or on a schedule (4-6x/day).    Some people use gloves when applying the Capsaicin; either [facility] or the Infusion RNs can give you gloves.      It is  possible that with repeated use, the Capsaicin might not feel as strong as it did in the beginning.  If this happens to you, it comes in a stronger strength--0.075%.  You can switch the that.  I gave instructions to the pharmacy that they could do this if/when you ask for it.      Remember to go up on the number of Senna-docusate you use since I went up on the strength of your pain meds.          Follow-ups after your visit        Your next 10 appointments already scheduled     Aug 17, 2017  8:00 AM CDT   Level 6 with ROOM 10 [facility] ([facility])    [facility]  [address]  [facility] [address]   [phone]            Aug 17, 2017  9:15 AM CDT   Return Visit with Pasquale Naylor MD   [facility] ([facility])    [facility]  [address]  [facility] [address]   [phone]            Aug 24, 2017 11:00 AM CDT   Office Visit with Pawan Hampton MD   [facility]    [address] [facility] [address]   [phone]           Bring a current list of meds and any records pertaining to this visit. For Physicals, please bring immunization records and any forms needing to be filled out. Please arrive 10 minutes early to complete paperwork.            Nov 14, 2017 11:30 AM CST   (Arrive by 11:15 AM)   RETURN ENDOCRINE with Padma Medley MD   [facility] ([facility])    [address] [place] [id number] [phone]              Future tests that were ordered for you today     Open Standing Orders        Priority Remaining Interval Expires Ordered    Red blood cell prepare order unit Routine 98/100 CONDITIONAL (SPECIFY) BLOOD  8/10/2017          Open Future Orders        Priority Expected Expires Ordered    OCCULT BLOOD, STOOL (3 SPECS) Routine  9/9/2017 8/10/2017           "  Who to contact     If you have questions or need follow up information about today's clinic visit or your schedule please contact [facility] directly at [phone].  Normal or non-critical lab and imaging results will be communicated to you by MyChart, letter or phone within 4 business days after the clinic has received the results. If you do not hear from us within 7 days, please contact the clinic through [employer] or phone. If you have a critical or abnormal lab result, we will notify you by phone as soon as possible.  Submit refill requests through [employer] or call your pharmacy and they will forward the refill request to us. Please allow 3 business days for your refill to be completed.          Additional Information About Your Visit        [employer] Only Information     [employer] lets you send messages to your doctor, view your test results, renew your prescriptions, schedule appointments and more. To sign up, go to [email or web address] . Click on \"Log in\" on the left side of the screen, which will take you to the Welcome page. Then click on \"Sign up Now\" on the right side of the page.     You will be asked to enter the access code listed below, as well as some personal information. Please follow the directions to create your username and password.     Your access code is: [email or web address]  Expires: 2017  9:54 AM     Your access code will  in 90 days. If you need help or a new code, please call your [facility] clinic or [phone].        Care EveryWhere ID     This is your Care EveryWhere ID. This could be used by other organizations to access your [facility] medical records  [id number]        Your Vitals Were     Pulse Temperature Respirations Pulse Oximetry BMI (Body Mass Index)       80 98.6  F (37  C) 18 100% 22.23 kg/m2        Blood Pressure from Last 3 Encounters:   08/10/17 114/69   08/10/17 134/82   08/10/17 114/69    Weight from Last 3 Encounters:   08/10/17 160 lb 1.6 oz (72.6 kg) "   08/10/17 160 lb 0.9 oz (72.6 kg)   07/13/17 156 lb 14.4 oz (71.2 kg)              We Performed the Following     NON-STERILE GLOVES          Today's Medication Changes          These changes are accurate as of: 8/10/17 12:33 PM.  If you have any questions, ask your nurse or doctor.               Start taking these medicines.        Dose/Directions    diclofenac 1 % Gel topical gel   Commonly known as:  VOLTAREN   Used for:  Multiple joint pain   Started by:  Hailey Bernard APRN CNP        Apply 4 grams to knees or 2 grams to hands four times daily using enclosed dosing card.   Quantity:  200 g   Refills:  3         These medicines have changed or have updated prescriptions.        Dose/Directions    morphine 15 MG 12 hr tablet   Commonly known as:  MS CONTIN   This may have changed:  when to take this   Used for:  Non-small cell lung cancer (NSCLC) (H)   Changed by:  Hailey Bernard APRN CNP        Dose:  15 mg   Take 1 tablet (15 mg) by mouth every 8 hours   Quantity:  90 tablet   Refills:  0       oxyCODONE 10 MG IR tablet   Commonly known as:  ROXICODONE   This may have changed:    - medication strength  - how much to take  - reasons to take this  - additional instructions   Used for:  Non-small cell lung cancer (NSCLC) (H), Cancer associated pain   Changed by:  Hailey Bernard APRN CNP        Dose:  10 mg   Take 1 tablet (10 mg) by mouth every 4 hours as needed for moderate to severe pain   Quantity:  100 tablet   Refills:  0       senna-docusate 8.6-50 MG per tablet   Commonly known as:  SENOKOT-S;PERICOLACE   This may have changed:  additional instructions   Used for:  Non-small cell lung cancer (NSCLC) (H)        Dose:  1 tablet   Take 1 tablet by mouth 2 times daily Reported on 5/4/2017   Quantity:  100 tablet   Refills:  3            Where to get your medicines      These medications were [place] [facility] [place] - [address]  [address] [address]      Phone:  [phone]     diclofenac 1 % Gel topical gel         Some of these will need a paper prescription and others can be bought over the counter.  Ask your nurse if you have questions.     Bring a paper prescription for each of these medications     morphine 15 MG 12 hr tablet    oxyCODONE 10 MG IR tablet                Primary Care Provider Office Phone # Fax #    Pawan Hampton [phone] [address] [address]        Equal Access to Services     GONZALO SMITH : Hadii aad ku hadasho Soomaali, waaxda luqadaha, qaybta kaalmada adeegyada, waxay idiin hayaan puma evans. So [facility] [phone].    ATENCIÓN: Si habla español, tiene a burns disposición servicios gratuitos de asistencia lingüística. [facility] [phone].    We comply with applicable federal civil rights laws and [place] laws. We do not discriminate on the basis of race, color, national origin, age, disability sex, sexual orientation or gender identity.            Thank you!     Thank you for choosing [facility]  for your care. Our goal is always to provide you with excellent care. Hearing back from our patients is one way we can continue to improve our services. Please take a few minutes to complete the written survey that you may receive in the mail after your visit with us. Thank you!             Your Updated Medication List - Protect others around you: Learn how to safely use, store and throw away your medicines at www.disposemymeds.org.          This list is accurate as of: 8/10/17 12:33 PM.  Always use your most recent med list.                   Brand Name Dispense Instructions for use Diagnosis    bisacodyl 10 MG Suppository    DULCOLAX    12 suppository    Place 1 suppository (10 mg) rectally daily as needed for constipation    Drug-induced constipation       dexamethasone 4 MG tablet    DECADRON    15 tablet    Take 5 tablets (20 mg) by mouth daily X 3 days each cycle. To be  taken the day before, day of, and day after chemotherapy infusion.    Anemia, unspecified type, Non-small cell lung cancer (NSCLC) (H)       diclofenac 1 % Gel topical gel    VOLTAREN    200 g    Apply 4 grams to knees or 2 grams to hands four times daily using enclosed dosing card.    Multiple joint pain       lidocaine-prilocaine cream    EMLA    30 g    Apply topically over port 45 - 60 minutes prior to port access.    Non-small cell lung cancer (NSCLC) (H)       LORazepam 0.5 MG tablet    ATIVAN    30 tablet    Take 1 tablet (0.5 mg) by mouth every 4 hours as needed (Anxiety, Nausea/Vomiting or Sleep)    Non-small cell lung cancer (NSCLC) (H)       morphine 15 MG 12 hr tablet    MS CONTIN    90 tablet    Take 1 tablet (15 mg) by mouth every 8 hours    Non-small cell lung cancer (NSCLC) (H)       * nicotine 21 MG/24HR 24 hr patch    NICODERM CQ    30 patch    Place 1 patch onto the skin every 24 hours    Tobacco abuse       * nicotine 14 MG/24HR 24 hr patch    NICODERM CQ    30 patch    Place 1 patch onto the skin every 24 hours    Tobacco abuse       * nicotine 7 MG/24HR 24 hr patch    NICODERM CQ    30 patch    Place 1 patch onto the skin every 24 hours    Tobacco abuse       ondansetron 8 MG tablet    ZOFRAN    10 tablet    Take 1 tablet (8 mg) by mouth every 8 hours as needed (Nausea/Vomiting)    Non-small cell lung cancer (NSCLC) (H)       oxyCODONE 10 MG IR tablet    ROXICODONE    100 tablet    Take 1 tablet (10 mg) by mouth every 4 hours as needed for moderate to severe pain    Non-small cell lung cancer (NSCLC) (H), Cancer associated pain       pantoprazole 40 MG EC tablet    PROTONIX    60 tablet    Take 1 tablet (40 mg) by mouth 2 times daily (before meals)    Non-small cell lung cancer (NSCLC) (H)       PARoxetine 10 MG tablet    PAXIL    30 tablet    Take 1 tablet (10 mg) by mouth At Bedtime (Needs follow-up appointment for this medication)    Major depressive disorder, recurrent episode, moderate (H)        polyethylene glycol powder    MIRALAX    510 g    Take 17 g (1 capful) by mouth daily Mix in 4-8 oz of fluid of choice. For constipation    Drug-induced constipation       prochlorperazine 10 MG tablet    COMPAZINE    30 tablet    Take 1 tablet (10 mg) by mouth every 6 hours as needed (Nausea/Vomiting)    Non-small cell lung cancer (NSCLC) (H)       senna-docusate 8.6-50 MG per tablet    SENOKOT-S;PERICOLACE    100 tablet    Take 1 tablet by mouth 2 times daily Reported on 5/4/2017    Non-small cell lung cancer (NSCLC) (H)       * Notice:  This list has 3 medication(s) that are the same as other medications prescribed for you. Read the directions carefully, and ask your doctor or other care provider to review them with you.

## 2017-08-10 NOTE — NURSING NOTE
"Oncology Rooming Note    August 10, 2017 8:45 AM   Wade Acevedo is a 57 year old male who presents for:    Chief Complaint   Patient presents with     Oncology Clinic Visit     4 week recheck NSCLC, review CT, Labs /  Chemo today      Initial Vitals: /69 (BP Location: Right arm, Patient Position: Sitting, Cuff Size: Adult Regular)  Pulse 80  Temp 98.6  F (37  C) (Tympanic)  Resp 18  Ht 1.807 m (5' 11.14\")  Wt 72.6 kg (160 lb 1.6 oz)  SpO2 100%  BMI 22.24 kg/m2 Estimated body mass index is 22.24 kg/(m^2) as calculated from the following:    Height as of this encounter: 1.807 m (5' 11.14\").    Weight as of this encounter: 72.6 kg (160 lb 1.6 oz). Body surface area is 1.91 meters squared.  Worst Pain (10) Comment: right hip to knee    No LMP for male patient.  Allergies reviewed: Yes  Medications reviewed: Yes    Medications: MEDICATION REFILLS NEEDED TODAY. Provider was notified.  Pharmacy name entered into [facility]: [facility] [place], [place] - [address]    Clinical concerns:4 week recheck NSCLC, review CT, Labs /  Chemo today.     Patient unable to reconcile his home medications dure to his home care nurse manages his med's.     7  minutes for nursing intake (face to face time)     Reena Leon CMA              "

## 2017-08-10 NOTE — PROGRESS NOTES
"SPIRITUAL HEALTH SERVICES Progress Note  [facility]    Visited Wade Acevedo for introductory visit to his availability to emotional/spiritual support.      Illness Narrative:  Lung cancer - Efrain stated that he is usually here for chemo but occasionally, like today, he needs blood.      Distress: During our visit he mentioned the loss of his wife to cancer 12 years ago.  He stated that since he has been on chemo he dreams about her, and has other \"wierd dreams\" more often.        Coping: Efrain engaged in discussion around the role of a  and if it's different from a .  He stated he was doing better with his grief.  \"It's still there but I don't cry as much.\"      Meaning-Making: We didn't address this area in this first visit.      Plan: I will plan to visit with Efrain more in the future and explore some of the areas that we didn't touch on in this initial visit to determine his interest in on-going support.    Kvng Pinto M.A., [facility]  Staff   [facility]  Office [phone]  Cell [phone]  Pager [phone]    "

## 2017-08-10 NOTE — PATIENT INSTRUCTIONS
From iMckey Bernard, Palliative Care NP, Cell [phone]    Call me if you need refills, anytime.      Start taking the morphine pill every 8 hours.    I increased the strength of the oxycodone to 10 mg    Try to cut back on your use of Ibuprofen as it can hurt your kidneys, but more important, I think it might be killing off your platelets as they are pretty low today.      One new medicine, a cream, is called Diclofenac and you should apply it 4x/day to your L knee.  Don't use more than you should as it can then cause the same problems that Ibuprofen causes.    The other new medicine, a cream, is called Capsaicin 0.025%.  Before you use it the first time, just apply a small TEST DOSE to anywhere on your body.  Wait 30 min.  If it burns TOO much, remove it by rubbing butter/oil/mayonnaise on it.  Then you can wash it off with soap and water.  Soap and water applied directly w/o the oil can make it burn worse.  Also be careful to not rub your penis or eye or bottom with the cream still on your hand or you'll make those body parts burn.      Once you've determined that you can tolerate the burning, you can apply it to the R butt and thigh as often as every 2 hours if needed.  If you use it at least 4x/day, it will eventually prevent the pain altogether, but this might take weeks.  It's up to you to decide if you want to use it only when in pain or on a schedule (4-6x/day).    Some people use gloves when applying the Capsaicin; either [facility] or the Infusion RNs can give you gloves.      It is possible that with repeated use, the Capsaicin might not feel as strong as it did in the beginning.  If this happens to you, it comes in a stronger strength--0.075%.  You can switch the that.  I gave instructions to the pharmacy that they could do this if/when you ask for it.      Remember to go up on the number of Senna-docusate you use since I went up on the strength of your pain meds.

## 2017-08-10 NOTE — PROGRESS NOTES
Hematology/ Oncology Follow-up Visit:  Aug 10, 2017    Reason for Visit:   Chief Complaint   Patient presents with     Oncology Clinic Visit     4 week recheck NSCLC, review CT, Labs /  Chemo today        Oncologic History:  Non-small cell lung cancer (NSCLC) (H)  The patient presented with 2-3 months history of weakness and fainting episodes. He was seen in the emergency room further workup was done including a CT scan which showed a 3 cm mass in the medial left upper lobe. There was adjacent atelectasis at the prominent mediastinal lymph nodes. There is a destructive bone lesion involving left lateral fifth rib. Patient underwent transthoracic CT-guided biopsy in the hospital. The pathology report came back consistent with mixed tumor including adenocarcinoma and squamous histology. 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 He had a normal TSH, but he had low T3 and T4. He had a serum cortisol morning level 4.4. He underwent a cosyntropin stim test which was mildly abnormal. He had a cortisol level 14.8 at 30 minutes and 19.6 at 60 minutes. His 24-hour cortisol in his urine was normal. He was seen by endocrinology we will continue to monitor him in 6 months.   PET scan showed hypermetabolic bilateral level Ib and right level III lymph nodes in the neck suspicious for metastatic disease. There was also hypermetabolic activity seen in the sella consistent with pituitary macroadenoma. There is hypermetabolic mediastinal left hilar lymphadenopathy.  There is metastatic disease seen in the left scapula and left fifth rib. There is left pleural effusion and small right pleural effusion seen as well.  ALK 4 came back negative. EGFR was negative for mutation. . PDL 1 is less than 1%.       Interval History:  Patient returns today for follow-up. His been having increasing fatigue and exertional shortness of breath lately. Denies any chest pain. Denies any  fever or chills. Denies any nausea or vomiting or diarrhea. She has been tolerating chemotherapy without significant side effects. He denies any bruising or bleeding. He denies any black stool.    Review Of Systems:  Constitutional: Negative for fever, chills, and night sweats. Increasing fatigue.  Skin: negative.  Eyes: negative.  Ears/Nose/Throat: negative.  Respiratory: Shortness of breath on exertion.   Cardiovascular: negative.  Gastrointestinal: negative.  Genitourinary: negative.  Musculoskeletal: negative.  Neurologic: negative.  Psychiatric: negative.  Hematologic/Lymphatic/Immunologic: negative.  Endocrine: negative.    All other ROS negative unless mentioned in interval history.    Past medical, social, surgical, and family histories reviewed.    Allergies:  Allergies as of 08/10/2017 - Kvng as Reviewed 08/10/2017   Allergen Reaction Noted     Lubriderm Rash 04/20/2017       Current Medications:  Current Outpatient Prescriptions   Medication Sig Dispense Refill     diclofenac (VOLTAREN) 1 % GEL topical gel Apply 4 grams to knees or 2 grams to hands four times daily using enclosed dosing card. 200 g 3     morphine (MS CONTIN) 15 MG 12 hr tablet Take 1 tablet (15 mg) by mouth every 8 hours 90 tablet 0     oxyCODONE (ROXICODONE) 10 MG IR tablet Take 1 tablet (10 mg) by mouth every 4 hours as needed for moderate to severe pain 100 tablet 0     PARoxetine (PAXIL) 10 MG tablet Take 1 tablet (10 mg) by mouth At Bedtime (Needs follow-up appointment for this medication) 30 tablet 0     pantoprazole (PROTONIX) 40 MG EC tablet Take 1 tablet (40 mg) by mouth 2 times daily (before meals) 60 tablet 6     dexamethasone (DECADRON) 4 MG tablet Take 5 tablets (20 mg) by mouth daily X 3 days each cycle. To be taken the day before, day of, and day after chemotherapy infusion. 15 tablet 3     polyethylene glycol (MIRALAX) powder Take 17 g (1 capful) by mouth daily Mix in 4-8 oz of fluid of choice. For constipation 510 g 11  "    bisacodyl (DULCOLAX) 10 MG Suppository Place 1 suppository (10 mg) rectally daily as needed for constipation 12 suppository 11     nicotine (NICODERM CQ) 21 MG/24HR 24 hr patch Place 1 patch onto the skin every 24 hours (Patient not taking: Reported on 7/13/2017) 30 patch 0     nicotine (NICODERM CQ) 14 MG/24HR 24 hr patch Place 1 patch onto the skin every 24 hours (Patient not taking: Reported on 7/13/2017) 30 patch 0     nicotine (NICODERM CQ) 7 MG/24HR 24 hr patch Place 1 patch onto the skin every 24 hours (Patient not taking: Reported on 7/13/2017) 30 patch 0     senna-docusate (SENOKOT-S;PERICOLACE) 8.6-50 MG per tablet Take 1 tablet by mouth 2 times daily Reported on 5/4/2017 (Patient taking differently: Take 1 tablet by mouth 2 times daily Reported on 5/4/2017 Taking 2 tablets in AM & 1 tablet at night.) 100 tablet 3     lidocaine-prilocaine (EMLA) cream Apply topically over port 45 - 60 minutes prior to port access. 30 g 3     LORazepam (ATIVAN) 0.5 MG tablet Take 1 tablet (0.5 mg) by mouth every 4 hours as needed (Anxiety, Nausea/Vomiting or Sleep) (Patient not taking: Reported on 7/13/2017) 30 tablet 2     prochlorperazine (COMPAZINE) 10 MG tablet Take 1 tablet (10 mg) by mouth every 6 hours as needed (Nausea/Vomiting) (Patient not taking: Reported on 7/13/2017) 30 tablet 2     ondansetron (ZOFRAN) 8 MG tablet Take 1 tablet (8 mg) by mouth every 8 hours as needed (Nausea/Vomiting) (Patient not taking: Reported on 7/13/2017) 10 tablet 2        Physical Exam:  /69 (BP Location: Right arm, Patient Position: Sitting, Cuff Size: Adult Regular)  Pulse 80  Temp 98.6  F (37  C) (Tympanic)  Resp 18  Ht 1.807 m (5' 11.14\")  Wt 72.6 kg (160 lb 1.6 oz)  SpO2 100%  BMI 22.24 kg/m2  Wt Readings from Last 12 Encounters:   08/10/17 72.6 kg (160 lb 1.6 oz)   08/10/17 72.6 kg (160 lb 0.9 oz)   07/13/17 71.2 kg (156 lb 14.4 oz)   07/13/17 71.2 kg (156 lb 15.5 oz)   06/22/17 71.2 kg (157 lb)   06/22/17 71.2 " kg (157 lb)   06/14/17 70.7 kg (155 lb 14.4 oz)   05/31/17 69.2 kg (152 lb 9.6 oz)   05/25/17 73 kg (161 lb)   05/25/17 72.6 kg (160 lb)   05/24/17 71.6 kg (157 lb 14.4 oz)   05/17/17 82.6 kg (182 lb)     ECOG performance status: 1  GENERAL APPEARANCE: Healthy, alert and in no acute distress. He looks pale  HEENT: Sclerae anicteric. PERRLA. Oropharynx without ulcers, lesions, or thrush.  NECK: Supple. No asymmetry or masses.  LYMPHATICS: No palpable cervical, supraclavicular, axillary, or inguinal lymphadenopathy.  RESP: Lungs clear to auscultation bilaterally without rales, rhonchi or wheezes.  CARDIOVASCULAR: Regular rate and rhythm. Normal S1, S2; no S3 or S4. No murmur, gallop, or rub.  ABDOMEN: Soft, nontender. Bowel sounds normal. No palpable organomegaly or masses.  MUSCULOSKELETAL: Extremities without gross deformities noted. No edema of bilateral lower extremities.  SKIN: No suspicious lesions or rashes.  NEURO: Alert and oriented x 3. Cranial nerves II-XII grossly intact.  PSYCHIATRIC: Mentation and affect appear normal.    Laboratory/Imaging Studies:  Infusion Therapy Visit on 08/10/2017   Component Date Value Ref Range Status     WBC 08/10/2017 2.2* 4.0 - 11.0 10e9/L Final     RBC Count 08/10/2017 2.67* 4.4 - 5.9 10e12/L Final     Hemoglobin 08/10/2017 6.6* 13.3 - 17.7 g/dL Final    Comment: This result has been called to WARREN ORELLANA RN by Cristian Stallings on 08 10 2017 at   0925, and has been read back.       Hematocrit 08/10/2017 19.8* 40.0 - 53.0 % Final     MCV 08/10/2017 74* 78 - 100 fl Final     MCH 08/10/2017 24.7* 26.5 - 33.0 pg Final     MCHC 08/10/2017 33.3  31.5 - 36.5 g/dL Final     RDW 08/10/2017 17.8* 10.0 - 15.0 % Final     Platelet Count 08/10/2017 24* 150 - 450 10e9/L Final    Comment: This result has been called to WARREN ORELLANA RN by Cristian Stallings on 08 10 2017 at   0925, and has been read back.       Diff Method 08/10/2017 Manual Differential   Final     % Neutrophils 08/10/2017 74.0  %  Final     % Lymphocytes 08/10/2017 18.0  % Final     % Monocytes 08/10/2017 7.0  % Final     % Eosinophils 08/10/2017 0.0  % Final     % Basophils 08/10/2017 0.0  % Final     % Metamyelocytes 08/10/2017 1.0  % Final     Nucleated RBCs 08/10/2017 1* 0 /100 Final     Absolute Neutrophil 08/10/2017 1.6  1.6 - 8.3 10e9/L Final     Absolute Lymphocytes 08/10/2017 0.4* 0.8 - 5.3 10e9/L Final     Absolute Monocytes 08/10/2017 0.2  0.0 - 1.3 10e9/L Final     Absolute Eosinophils 08/10/2017 0.0  0.0 - 0.7 10e9/L Final     Absolute Basophils 08/10/2017 0.0  0.0 - 0.2 10e9/L Final     Absolute Metamyelocytes 08/10/2017 0.0  0 10e9/L Final     Absolute Nucleated RBC 08/10/2017 0.0   Final     Anisocytosis 08/10/2017 Slight   Final     Platelet Estimate 08/10/2017 Decreased   Final     Sodium 08/10/2017 141  133 - 144 mmol/L Final     Potassium 08/10/2017 3.9  3.4 - 5.3 mmol/L Final     Chloride 08/10/2017 108  94 - 109 mmol/L Final     Carbon Dioxide 08/10/2017 25  20 - 32 mmol/L Final     Anion Gap 08/10/2017 8  3 - 14 mmol/L Final     Glucose 08/10/2017 175* 70 - 99 mg/dL Final     Urea Nitrogen 08/10/2017 17  7 - 30 mg/dL Final     Creatinine 08/10/2017 0.77  0.66 - 1.25 mg/dL Final     GFR Estimate 08/10/2017   >60 mL/min/1.7m2 Final                    Value:>90  Non  GFR Calc       GFR Estimate If Black 08/10/2017   >60 mL/min/1.7m2 Final                    Value:>90   GFR Calc       Calcium 08/10/2017 8.9  8.5 - 10.1 mg/dL Final     Bilirubin Total 08/10/2017 0.2  0.2 - 1.3 mg/dL Final     Albumin 08/10/2017 3.2* 3.4 - 5.0 g/dL Final     Protein Total 08/10/2017 6.7* 6.8 - 8.8 g/dL Final     Alkaline Phosphatase 08/10/2017 237* 40 - 150 U/L Final     ALT 08/10/2017 23  0 - 70 U/L Final     AST 08/10/2017 31  0 - 45 U/L Final     Units Ordered 08/10/2017 2   Final     ABO 08/10/2017 O   Final     RH(D) 08/10/2017  Pos   Final     Antibody Screen 08/10/2017 Neg   Final     Test Valid Only At  08/10/2017 [facility]   Final     Specimen Expires 08/10/2017 08/13/2017   Final     Crossmatch 08/10/2017 Red Blood Cells   Final     Unit Number 08/10/2017 [id number]   Final     Blood Component Type 08/10/2017 Red Blood Cells Leukocyte Reduced   Final     Division Number 08/10/2017 00   Final     Status of Unit 08/10/2017 Released to care unit 08/10/2017 0958   Incomplete     Blood Product Code 08/10/2017 [id number]   Final     Unit Status 08/10/2017 ISS   Incomplete     Unit Number 08/10/2017 [id number]   Final     Blood Component Type 08/10/2017 Red Blood Cells Leukocyte Reduced   Final     Division Number 08/10/2017 00   Final     Status of Unit 08/10/2017 Released to care unit 08/10/2017 1149   Incomplete     Blood Product Code 08/10/2017 [id number]   Final     Unit Status 08/10/2017 ISS   Incomplete        Recent Results (from the past 744 hour(s))   CT Chest/Abdomen/Pelvis w Contrast    Narrative    CT CHEST/ABDOMEN/PELVIS W CONTRAST 8/3/2017 11:11 AM    HISTORY: Lung cancer. Follow-up.    CONTRAST:  77 mL Isovue-370.    TECHNIQUE: CT of the chest, abdomen, and pelvis is performed with IV  contrast.    Routine evaluated structures in the chest include the lungs,  mediastinal structures, pleura, and chest wall.    Routine assessed structures in the abdomen include the liver, spleen,  pancreas, adrenal glands, and kidneys. Also assessed are the  retroperitoneum, gastrointestinal tract, and the abdominal wall.    Intrapelvic anatomy is also assessed.    Radiation dose for this scan is reduced using automated exposure  control, adjustment of the mA and/or kV according to patient size, or  iterative reconstruction technique.    COMPARISON: 4/20/2017.    FINDINGS:    Chest: A spiculated mass like lesion in the left upper lobe with  adjacent bandlike parenchymal abnormality is again seen on image #23.  The spiculated mass is smaller. It measures 2.0 x 1.5 cm. Comparable  measurements on the prior  study are 3.0 x 2.1 cm. A 4 mm noncalcified  left lower lobe pulmonary nodule on image #49 is unchanged. A few tiny  subcentimeter noncalcified right lung nodules are stable.  Centrilobular emphysema and scattered areas of interstitial scarring  in each lung are again demonstrated. Mediastinal and left hilar  adenopathy are present. Some of this has improved compared to the  prior study and other areas are stable. A right paratracheal lymph  node on image #20 measures 1 cm compared to 0.9 cm on the prior study.  Adenopathy lateral to the aortic arch measures 1.4 cm compared to 2.2  cm on the prior study. A lymph node at the anterior margin of the left  hilum on image #27 measures 1.4 cm compared to 1.4 cm on the prior  study. A subcarinal lymph node on image #32 measures 1.5 cm compared  to 1.6 cm on the prior study. Another left hilar lymph node on image  #32 measures 1 cm compared to 1.1 cm on the prior study. A presumed  sebaceous cyst in the left axilla on image #13 is unchanged. An  expansile lesion involving the lateral arc of left rib #5 is again  seen on image #26 and has not significantly changed. Several healed  adjacent rib fracture are present on the right. A mild generalized  edematous edema is seen.    Abdomen: Fatty infiltration of the liver is present. A few a tiny  subcentimeter lymph nodes in the gastrohepatic ligament are stable and  not discretely enlarged by CT criteria.    Pelvis: There is a small amount of free pelvic fluid.      Impression    IMPRESSION:  1.  Neoplastic disease in the chest shows areas that are stable or  improved. No new neoplastic disease is demonstrated.  2. There is a new modest generalized edematous state.  3. There is a small amount of free pelvic fluid which may relate to  the patient's generalized edematous state.    AZALEA ART MD       Assessment and plan:    (D63.8) Anemia, chronic disease  (primary encounter diagnosis)  Hemoglobin today is 6. 6. We will arrange  for 2 units of packed RBCs. Today we will check Erythropoietin, Vitamin B12, Ferritin, Bld  morphology pathology review, ELP reflex to IELP, OCCULT BLOOD, STOOL (3 SPECS)    Non-small cell lung cancer.  I reviewed with the patient today most recent CT scan showing response to treatment. We will continue on the current regimen for 3 more cycles. Chemotherapy will be held today because of thrombocytopenia.    Cancer associated pain.  Patient will continue on the current regimen including MS Contin 30 mg twice daily. Oxycodone p.r.n. for breakthrough pain.    Constipation.  Patient is encouraged to continue with the current regimen including Senokot-S and MiraLAX.    History of alcohol abuse.  Patient reported no recent use of alcohol.    Tobacco use disorder.  Patient unfortunately continued to smoke about 10 cigarettes a day. We have advised the patient quit smoking.    Thrombocytopenia.   There is no evidence of active bleeding. We will continue to monitor the patient's symptoms. Chemotherapy will be held today.      The patient is ready to learn, no apparent learning barriers were identified.  Diagnosis and treatment plans were explained to the patient. The patient expressed understanding of the content. The patient asked appropriate questions. The patient questions were answered to his satisfaction.    Chart documentation with Dragon Voice recognition Software. Although reviewed after completion, some words and grammatical errors may remain.

## 2017-08-10 NOTE — PROGRESS NOTES
Palliative Care Follow-up Note  Date:  August 10, 2017  Location: [facility]  Accompanied by: no one; here alone      HPI:  57 year old y/o male with NSCLC, followed by palliative care for pain and symptom management.    Allergies   Allergen Reactions     Lubriderm Rash         Current Outpatient Prescriptions on File Prior to Visit:  PARoxetine (PAXIL) 10 MG tablet Take 1 tablet (10 mg) by mouth At Bedtime (Needs follow-up appointment for this medication)   pantoprazole (PROTONIX) 40 MG EC tablet Take 1 tablet (40 mg) by mouth 2 times daily (before meals)   dexamethasone (DECADRON) 4 MG tablet Take 5 tablets (20 mg) by mouth daily X 3 days each cycle. To be taken the day before, day of, and day after chemotherapy infusion.   polyethylene glycol (MIRALAX) powder Take 17 g (1 capful) by mouth daily Mix in 4-8 oz of fluid of choice. For constipation   bisacodyl (DULCOLAX) 10 MG Suppository Place 1 suppository (10 mg) rectally daily as needed for constipation   nicotine (NICODERM CQ) 21 MG/24HR 24 hr patch Place 1 patch onto the skin every 24 hours (Patient not taking: Reported on 7/13/2017)   nicotine (NICODERM CQ) 14 MG/24HR 24 hr patch Place 1 patch onto the skin every 24 hours (Patient not taking: Reported on 7/13/2017)   nicotine (NICODERM CQ) 7 MG/24HR 24 hr patch Place 1 patch onto the skin every 24 hours (Patient not taking: Reported on 7/13/2017)   senna-docusate (SENOKOT-S;PERICOLACE) 8.6-50 MG per tablet Take 1 tablet by mouth 2 times daily Reported on 5/4/2017 (Patient taking differently: Take 1 tablet by mouth 2 times daily Reported on 5/4/2017 Taking 2 tablets in AM & 1 tablet at night.)   lidocaine-prilocaine (EMLA) cream Apply topically over port 45 - 60 minutes prior to port access.   LORazepam (ATIVAN) 0.5 MG tablet Take 1 tablet (0.5 mg) by mouth every 4 hours as needed (Anxiety, Nausea/Vomiting or Sleep) (Patient not taking: Reported on 7/13/2017)    prochlorperazine (COMPAZINE) 10 MG tablet Take 1 tablet (10 mg) by mouth every 6 hours as needed (Nausea/Vomiting) (Patient not taking: Reported on 7/13/2017)   ondansetron (ZOFRAN) 8 MG tablet Take 1 tablet (8 mg) by mouth every 8 hours as needed (Nausea/Vomiting) (Patient not taking: Reported on 7/13/2017)   Morphine CR 15 mg One tab every 12 hours   Oxycodone IR 5 mg One tab every 4 hours as needed     Current Facility-Administered Medications on File Prior to Visit:  sodium chloride (PF) 0.9% PF flush 10-20 mL   heparin 100 UNIT/ML injection 5 mL         Subjective:  Chief Complaint   Patient presents with     Palliative     f/u symptoms   Pain:   Has R hip pain that radiates to knee, not new--had it occasionally even when he worked as a .  For cancer pain, he typically takes 2-4 doses of OxyIR daily--doesn't keep records on paper as requested.  No wife to help him. Also takes OTC Ibuprofen, often 800 mg at a time  Constipation:  Stools very hard--has had to dig stool out manually.  Noncompliant with regular bowel regimen  Fatigue:  Present; getting 2u PRBCs today instead of chemo due to Hgb 6.6  He is independent in all ADLs and his DIL typically does housework.  He will cut the lawn on his riding mower--nothing too strenuous.    Goals:  He is familiar with hospice--he cared for his wife with help from hospice prior to her death.  Not ready for that yet.    Tobacco:  Still smokes; a big struggle.     Objective:  /69  Pulse 80  Temp 98.6  F (37  C)  Resp 18  Wt 160 lb 0.9 oz (72.6 kg)  SpO2 100%  BMI 22.23 kg/m2  Wt Readings from Last 5 Encounters:   08/10/17 160 lb 1.6 oz (72.6 kg)   08/10/17 160 lb 0.9 oz (72.6 kg)   07/13/17 156 lb 14.4 oz (71.2 kg)   07/13/17 156 lb 15.5 oz (71.2 kg)   06/22/17 157 lb (71.2 kg)   Pleasant, awake and alert, in no apparent distress   Color very pale  OP pink, moist, no thrush  CV RRR  Lungs: CTA  MSK: gait smooth  Abd: soft, NT, hypoactive bowel sounds    Ext. warm, without edema     Results for JOHN SANCHEZ (MRN [id number]) as of 9/7/2017 07:56   Ref. Range 7/20/2017 08:00 8/10/2017 07:55   Sodium Latest Ref Range: 133 - 144 mmol/L 138 141   Potassium Latest Ref Range: 3.4 - 5.3 mmol/L 4.3 3.9   Chloride Latest Ref Range: 94 - 109 mmol/L 104 108   Carbon Dioxide Latest Ref Range: 20 - 32 mmol/L 26 25   Urea Nitrogen Latest Ref Range: 7 - 30 mg/dL 22 17   Creatinine Latest Ref Range: 0.66 - 1.25 mg/dL 0.79 0.77   GFR Estimate Latest Ref Range: >60 mL/min/1.7m2 >90... >90...   GFR Estimate If Black Latest Ref Range: >60 mL/min/1.7m2 >90... >90...   Calcium Latest Ref Range: 8.5 - 10.1 mg/dL 8.9 8.9   Anion Gap Latest Ref Range: 3 - 14 mmol/L 8 8   Albumin Latest Ref Range: 3.4 - 5.0 g/dL 3.1 (L) 3.2 (L)   Protein Total Latest Ref Range: 6.8 - 8.8 g/dL 6.8 6.7 (L)   Bilirubin Total Latest Ref Range: 0.2 - 1.3 mg/dL 0.3 0.2   Alkaline Phosphatase Latest Ref Range: 40 - 150 U/L 214 (H) 237 (H)   ALT Latest Ref Range: 0 - 70 U/L 28 23   AST Latest Ref Range: 0 - 45 U/L 36 31       Assessment/Plan:  1. Radicular R hip pain, intermittent, not new   >trial of diclofenac gel QID    >alternatively, trial of Capsaicin PRN     2. Cancer pain 2o NSCLC   >increase MS Contin to 15 mg q8h   >increase OxyIR to 10 mg    >advised to use less Ibuprofen   >coordinate with home care RN    2. Constiipation, opioid-induced, noncompliant with recommended use of stool softeners   >OTC Bisacodyl suppositories recommended   >increase SEnna   >coordinate with home care RN    Thank you for the opportunity to be of service to this patient and family.     1115 - 1140, face to face min, > 50% spent discussing  appropriate use of medications for symptom management and again requesting tracking of opioid use.  Also providing verbal and written instructions.    Mickey Joaquin) [facility]  Palliative Care  Private cell:  [phone]

## 2017-08-10 NOTE — PROGRESS NOTES
Infusion Nursing Note:  Wade Acevedo presents today for C4D1 Carbo/Taxol.    Patient seen by provider today: Yes: Dr. Naylor   present during visit today: Not Applicable.    Note: Chemo held today due to hemoglobin of 6.6.  2 units of PRBCs transfused as ordered, pt tolerated well.  Pt to return in one week for C4D1 Carbo/Taxol.       Intravenous Access:  Labs drawn without difficulty.  Implanted Port.    Treatment Conditions:  Lab Results   Component Value Date    HGB 6.6 08/10/2017     Lab Results   Component Value Date    WBC 2.2 08/10/2017      Lab Results   Component Value Date    ANEU 1.6 08/10/2017     Lab Results   Component Value Date    PLT 24 08/10/2017      Lab Results   Component Value Date     08/10/2017                   Lab Results   Component Value Date    POTASSIUM 3.9 08/10/2017           Lab Results   Component Value Date    MAG 1.8 04/21/2017            Lab Results   Component Value Date    CR 0.77 08/10/2017                   Lab Results   Component Value Date    JOHANN 8.9 08/10/2017                Lab Results   Component Value Date    BILITOTAL 0.2 08/10/2017           Lab Results   Component Value Date    ALBUMIN 3.2 08/10/2017                    Lab Results   Component Value Date    ALT 23 08/10/2017           Lab Results   Component Value Date    AST 31 08/10/2017     Results reviewed, labs did NOT meet treatment parameters: Hemoglobin 6.6.  Blood transfusion consent signed 6/14/17      Post Infusion Assessment:  Patient tolerated transfuion without incident.  Blood return noted pre and post infusion.  Site patent and intact, free from redness, edema or discomfort.  No evidence of extravasations.  Access discontinued per protocol.    Discharge Plan:   Patient discharged in stable condition accompanied by: self.  Departure Mode: Ambulatory.  Pt to return on 8/17/17 at 8:00 am for C4D1 Carbo/Taxol followed by MD appt with Dr. Naylor.    Mayte Moreira,  RN

## 2017-08-10 NOTE — MR AVS SNAPSHOT
After Visit Summary   8/10/2017    Wade Acevedo    MRN: [id number]           Patient Information     Date Of Birth          1960        Visit Information        Provider Department      8/10/2017 9:00 AM Pasquale Naylor MD [facility]      Today's Diagnoses     Anemia, chronic disease    -  1      Care Instructions    Your chemotherapy is being rescheduled to next week.  You will need to have labs drawn today. We would like to see you back in clinic with Dr. Naylor in 1 week. Copy of appointments, and after visit summary (AVS) given to patient.  If you have any questions during business hours (M-F 8 AM- 4PM), please call Jillian Renee RN, BSN, OCN Oncology Hematology /Breast Cancer Navigator at [facility] [phone].   For questions after business hours, or on holidays/weekends, please call our after hours Nurse Triage line [phone]. Thank you.            Follow-ups after your visit        Follow-up notes from your care team     Return in about 1 week (around 8/17/2017) for lab ordered today.      Your next 10 appointments already scheduled     Aug 17, 2017  8:00 AM CDT   Level 6 with ROOM 10 [facility] ([facility])    [facility]  [address]  [facility] [address]   [phone]            Aug 17, 2017  9:15 AM CDT   Return Visit with Pasquale Naylor MD   [facility] ([facility])    [facility]  [address]  [place] [address]   [phone]            Aug 24, 2017 11:00 AM CDT   Office Visit with Pawan Hampton MD   [facility]    [address] [place]  [facility] [address]   [phone]           Bring a current list of meds and any records pertaining to this visit. For Physicals, please bring immunization records and any forms needing to be  "filled out. Please arrive 10 minutes early to complete paperwork.            Nov 14, 2017 11:30 AM CST   (Arrive by 11:15 AM)   RETURN ENDOCRINE with Padma Medley MD   [facility] ([facility])    [address] [place] [id number] [phone]              Future tests that were ordered for you today     Open Standing Orders        Priority Remaining Interval Expires Ordered    Red blood cell prepare order unit Routine 98/100 CONDITIONAL (SPECIFY) BLOOD  8/10/2017    Transfuse red blood cell unit Routine 1/2 TRANSFUSE 2 UNITS  8/10/2017          Open Future Orders        Priority Expected Expires Ordered    OCCULT BLOOD, STOOL (3 SPECS) Routine  9/9/2017 8/10/2017            Who to contact     If you have questions or need follow up information about today's clinic visit or your schedule please contact [facility] directly at [phone].  Normal or non-critical lab and imaging results will be communicated to you by igobubblehart, letter or phone within 4 business days after the clinic has received the results. If you do not hear from us within 7 days, please contact the clinic through Accrediblet or phone. If you have a critical or abnormal lab result, we will notify you by phone as soon as possible.  Submit refill requests through Geekangels or call your pharmacy and they will forward the refill request to us. Please allow 3 business days for your refill to be completed.          Additional Information About Your Visit        igobubblehart Information     Geekangels lets you send messages to your doctor, view your test results, renew your prescriptions, schedule appointments and more. To sign up, go to [email or web address] . Click on \"Log in\" on the left side of the screen, which will take you to the Welcome page. Then click on \"Sign up Now\" on the right side of the page.     You will be asked to enter the access code listed below, as well as some personal " "information. Please follow the directions to create your username and password.     Your access code is: [email or web address]  Expires: 2017  9:54 AM     Your access code will  in 90 days. If you need help or a new code, please call your [place] clinic or [phone].        Care EveryWhere ID     This is your Care EveryWhere ID. This could be used by other organizations to access your [place] medical records  [id number]        Your Vitals Were     Pulse Temperature Respirations Height Pulse Oximetry BMI (Body Mass Index)    80 98.6  F (37  C) (Tympanic) 18 1.807 m (5' 11.14\") 100% 22.24 kg/m2       Blood Pressure from Last 3 Encounters:   08/10/17 114/69   17 108/64   17 111/72    Weight from Last 3 Encounters:   08/10/17 72.6 kg (160 lb 1.6 oz)   17 71.2 kg (156 lb 14.4 oz)   17 71.2 kg (156 lb 15.5 oz)              We Performed the Following     Bld morphology pathology review     ELP reflex to IELP     Erythropoietin     Ferritin     Vitamin B12          Today's Medication Changes          These changes are accurate as of: 8/10/17  9:54 AM.  If you have any questions, ask your nurse or doctor.               These medicines have changed or have updated prescriptions.        Dose/Directions    senna-docusate 8.6-50 MG per tablet   Commonly known as:  SENOKOT-S;PERICOLACE   This may have changed:  additional instructions   Used for:  Non-small cell lung cancer (NSCLC) (H)        Dose:  1 tablet   Take 1 tablet by mouth 2 times daily Reported on 2017   Quantity:  100 tablet   Refills:  3                Primary Care Provider Office Phone # Fax #    Pawan Hampton [phone] [address] [facility] [address]        Equal Access to Services     GONZALO SMITH : Saul Molina, roland apple, gena esqueda. [facility] [phone].    ATENCIÓN: Si claudia rodriguez, tiene a burns disposición " servicios gratuitos de asistencia lingüística. Lucero hebert [phone].    We comply with applicable federal civil rights laws and [place] laws. We do not discriminate on the basis of race, color, national origin, age, disability sex, sexual orientation or gender identity.            Thank you!     Thank you for choosing [facility]  for your care. Our goal is always to provide you with excellent care. Hearing back from our patients is one way we can continue to improve our services. Please take a few minutes to complete the written survey that you may receive in the mail after your visit with us. Thank you!             Your Updated Medication List - Protect others around you: Learn how to safely use, store and throw away your medicines at www.disposemymeds.org.          This list is accurate as of: 8/10/17  9:54 AM.  Always use your most recent med list.                   Brand Name Dispense Instructions for use Diagnosis    bisacodyl 10 MG Suppository    DULCOLAX    12 suppository    Place 1 suppository (10 mg) rectally daily as needed for constipation    Drug-induced constipation       dexamethasone 4 MG tablet    DECADRON    15 tablet    Take 5 tablets (20 mg) by mouth daily X 3 days each cycle. To be taken the day before, day of, and day after chemotherapy infusion.    Anemia, unspecified type, Non-small cell lung cancer (NSCLC) (H)       lidocaine-prilocaine cream    EMLA    30 g    Apply topically over port 45 - 60 minutes prior to port access.    Non-small cell lung cancer (NSCLC) (H)       LORazepam 0.5 MG tablet    ATIVAN    30 tablet    Take 1 tablet (0.5 mg) by mouth every 4 hours as needed (Anxiety, Nausea/Vomiting or Sleep)    Non-small cell lung cancer (NSCLC) (H)       morphine 15 MG 12 hr tablet    MS CONTIN    60 tablet    Take 1 tablet (15 mg) by mouth every 12 hours    Non-small cell lung cancer (NSCLC) (H)       * nicotine 21 MG/24HR 24 hr patch    NICODERM CQ    30 patch    Place 1  patch onto the skin every 24 hours    Tobacco abuse       * nicotine 14 MG/24HR 24 hr patch    NICODERM CQ    30 patch    Place 1 patch onto the skin every 24 hours    Tobacco abuse       * nicotine 7 MG/24HR 24 hr patch    NICODERM CQ    30 patch    Place 1 patch onto the skin every 24 hours    Tobacco abuse       ondansetron 8 MG tablet    ZOFRAN    10 tablet    Take 1 tablet (8 mg) by mouth every 8 hours as needed (Nausea/Vomiting)    Non-small cell lung cancer (NSCLC) (H)       oxyCODONE 5 MG IR tablet    ROXICODONE    40 tablet    Take 1 tablet (5 mg) by mouth every 4 hours as needed for pain maximum 8 tablet(s) per day    Non-small cell lung cancer (NSCLC) (H)       pantoprazole 40 MG EC tablet    PROTONIX    60 tablet    Take 1 tablet (40 mg) by mouth 2 times daily (before meals)    Non-small cell lung cancer (NSCLC) (H)       PARoxetine 10 MG tablet    PAXIL    30 tablet    Take 1 tablet (10 mg) by mouth At Bedtime (Needs follow-up appointment for this medication)    Major depressive disorder, recurrent episode, moderate (H)       polyethylene glycol powder    MIRALAX    510 g    Take 17 g (1 capful) by mouth daily Mix in 4-8 oz of fluid of choice. For constipation    Drug-induced constipation       prochlorperazine 10 MG tablet    COMPAZINE    30 tablet    Take 1 tablet (10 mg) by mouth every 6 hours as needed (Nausea/Vomiting)    Non-small cell lung cancer (NSCLC) (H)       senna-docusate 8.6-50 MG per tablet    SENOKOT-S;PERICOLACE    100 tablet    Take 1 tablet by mouth 2 times daily Reported on 5/4/2017    Non-small cell lung cancer (NSCLC) (H)       * Notice:  This list has 3 medication(s) that are the same as other medications prescribed for you. Read the directions carefully, and ask your doctor or other care provider to review them with you.

## 2017-08-10 NOTE — MR AVS SNAPSHOT
After Visit Summary   8/10/2017    Wade Acevedo    MRN: [id number]           Patient Information     Date Of Birth          1960        Visit Information        Provider Department      8/10/2017 8:00 AM ROOM 3 [facility]        Today's Diagnoses     Non-small cell lung cancer (NSCLC) (H)    -  1    Anemia, unspecified type           Follow-ups after your visit        Your next 10 appointments already scheduled     Aug 17, 2017  8:00 AM CDT   Level 6 with ROOM 10 [facility] ([facility])    [facility]  [address]  [facility] [address]   [phone]            Aug 17, 2017  9:15 AM CDT   Return Visit with Pasquale Naylor MD   [facility] ([facility])    [facility]  [address]  [facility] [address]   [phone]            Aug 24, 2017 11:00 AM CDT   Office Visit with Pawan Hampton MD   [facility]    [address] [facility] [address]   [phone]           Bring a current list of meds and any records pertaining to this visit. For Physicals, please bring immunization records and any forms needing to be filled out. Please arrive 10 minutes early to complete paperwork.            Nov 14, 2017 11:30 AM CST   (Arrive by 11:15 AM)   RETURN ENDOCRINE with Padma Medley MD   [facility] ([facility])    [address] [place] [id number] [phone]              Future tests that were ordered for you today     Open Standing Orders        Priority Remaining Interval Expires Ordered    Red blood cell prepare order unit Routine 98/100 CONDITIONAL (SPECIFY) BLOOD  8/10/2017          Open Future Orders        Priority Expected Expires Ordered    OCCULT BLOOD, STOOL (3 SPECS) Routine  9/9/2017 8/10/2017            Who to contact     If you have  "questions or need follow up information about today's clinic visit or your schedule please contact [facility] directly at [phone].  Normal or non-critical lab and imaging results will be communicated to you by Virtual Portshart, letter or phone within 4 business days after the clinic has received the results. If you do not hear from us within 7 days, please contact the clinic through Virtual Portshart or phone. If you have a critical or abnormal lab result, we will notify you by phone as soon as possible.  Submit refill requests through Xceleron (Chapter 11) or call your pharmacy and they will forward the refill request to us. Please allow 3 business days for your refill to be completed.          Additional Information About Your Visit        Virtual PortsharAk?Lex Information     Xceleron (Chapter 11) lets you send messages to your doctor, view your test results, renew your prescriptions, schedule appointments and more. To sign up, go to [email or web address] (Chapter 11) . Click on \"Log in\" on the left side of the screen, which will take you to the Welcome page. Then click on \"Sign up Now\" on the right side of the page.     You will be asked to enter the access code listed below, as well as some personal information. Please follow the directions to create your username and password.     Your access code is: [email or web address]  Expires: 2017  9:54 AM     Your access code will  in 90 days. If you need help or a new code, please call your [place] clinic or [phone].        Care EveryWhere ID     This is your Care EveryWhere ID. This could be used by other organizations to access your [place] medical records  [id number]        Your Vitals Were     Pulse Temperature Pulse Oximetry             55 97.4  F (36.3  C) (Oral) 99%          Blood Pressure from Last 3 Encounters:   08/10/17 114/69   08/10/17 134/82   08/10/17 114/69    Weight from Last 3 Encounters:   08/10/17 72.6 kg (160 lb 1.6 oz)   08/10/17 72.6 kg (160 lb 0.9 oz)   17 71.2 kg (156 lb 14.4 oz) "              We Performed the Following     ABO/Rh type and screen     Blood component     Blood component     CBC with platelets differential     Comprehensive metabolic panel     Transfuse red blood cell unit     Transfuse red blood cell unit          Today's Medication Changes          These changes are accurate as of: 8/10/17  3:19 PM.  If you have any questions, ask your nurse or doctor.               Start taking these medicines.        Dose/Directions    capsaicin 0.025 % Crea cream   Commonly known as:  ZOSTRIX   Used for:  Multiple joint pain, Cancer associated pain   Started by:  Hailey Bernard APRN CNP        Apply to R hip and thigh every 2 hours as needed for pain.   Quantity:  120 g   Refills:  3       diclofenac 1 % Gel topical gel   Commonly known as:  VOLTAREN   Used for:  Multiple joint pain   Started by:  Hailey Bernard APRN CNP        Apply 4 grams to knees or 2 grams to hands four times daily using enclosed dosing card.   Quantity:  200 g   Refills:  3         These medicines have changed or have updated prescriptions.        Dose/Directions    morphine 15 MG 12 hr tablet   Commonly known as:  MS CONTIN   This may have changed:  when to take this   Used for:  Non-small cell lung cancer (NSCLC) (H)   Changed by:  Hailey Bernard APRN CNP        Dose:  15 mg   Take 1 tablet (15 mg) by mouth every 8 hours   Quantity:  90 tablet   Refills:  0       oxyCODONE 10 MG IR tablet   Commonly known as:  ROXICODONE   This may have changed:    - medication strength  - how much to take  - reasons to take this  - additional instructions   Used for:  Non-small cell lung cancer (NSCLC) (H), Cancer associated pain   Changed by:  Hailey Bernard APRN CNP        Dose:  10 mg   Take 1 tablet (10 mg) by mouth every 4 hours as needed for moderate to severe pain   Quantity:  100 tablet   Refills:  0       senna-docusate 8.6-50 MG per tablet   Commonly known as:  SENOKOT-S;PERICOLACE   This may have changed:   additional instructions   Used for:  Non-small cell lung cancer (NSCLC) (H)        Dose:  1 tablet   Take 1 tablet by mouth 2 times daily Reported on 5/4/2017   Quantity:  100 tablet   Refills:  3            Where to get your medicines      These medications were sent to [facility] [place] [facility] [place], [place] - [address] [facility]  [address] [facility] [address]     Phone:  [phone]     capsaicin 0.025 % Crea cream    diclofenac 1 % Gel topical gel         Some of these will need a paper prescription and others can be bought over the counter.  Ask your nurse if you have questions.     Bring a paper prescription for each of these medications     morphine 15 MG 12 hr tablet    oxyCODONE 10 MG IR tablet                Primary Care Provider Office Phone # Fax #    Pawan Hampton [phone] [address] [facility] [address]        Equal Access to Services     GONZALO SMITH : Hadii jaxon gerard Sonilsa, waaxda luqadaha, qaybta kaalmada adejeredyafam, gena romero . So [facility] [phone].    ATENCIÓN: Si habla español, tiene a burns disposición servicios gratuitos de asistencia lingüística. Llame al [phone].    We comply with applicable federal civil rights laws and [place] laws. We do not discriminate on the basis of race, color, national origin, age, disability sex, sexual orientation or gender identity.            Thank you!     Thank you for choosing [facility]  for your care. Our goal is always to provide you with excellent care. Hearing back from our patients is one way we can continue to improve our services. Please take a few minutes to complete the written survey that you may receive in the mail after your visit with us. Thank you!             Your Updated Medication List - Protect others around you: Learn how to safely use, store and throw away your medicines at www.disposemymeds.org.          This list is accurate as of: 8/10/17   3:19 PM.  Always use your most recent med list.                   Brand Name Dispense Instructions for use Diagnosis    bisacodyl 10 MG Suppository    DULCOLAX    12 suppository    Place 1 suppository (10 mg) rectally daily as needed for constipation    Drug-induced constipation       capsaicin 0.025 % Crea cream    ZOSTRIX    120 g    Apply to R hip and thigh every 2 hours as needed for pain.    Multiple joint pain, Cancer associated pain       dexamethasone 4 MG tablet    DECADRON    15 tablet    Take 5 tablets (20 mg) by mouth daily X 3 days each cycle. To be taken the day before, day of, and day after chemotherapy infusion.    Anemia, unspecified type, Non-small cell lung cancer (NSCLC) (H)       diclofenac 1 % Gel topical gel    VOLTAREN    200 g    Apply 4 grams to knees or 2 grams to hands four times daily using enclosed dosing card.    Multiple joint pain       lidocaine-prilocaine cream    EMLA    30 g    Apply topically over port 45 - 60 minutes prior to port access.    Non-small cell lung cancer (NSCLC) (H)       LORazepam 0.5 MG tablet    ATIVAN    30 tablet    Take 1 tablet (0.5 mg) by mouth every 4 hours as needed (Anxiety, Nausea/Vomiting or Sleep)    Non-small cell lung cancer (NSCLC) (H)       morphine 15 MG 12 hr tablet    MS CONTIN    90 tablet    Take 1 tablet (15 mg) by mouth every 8 hours    Non-small cell lung cancer (NSCLC) (H)       * nicotine 21 MG/24HR 24 hr patch    NICODERM CQ    30 patch    Place 1 patch onto the skin every 24 hours    Tobacco abuse       * nicotine 14 MG/24HR 24 hr patch    NICODERM CQ    30 patch    Place 1 patch onto the skin every 24 hours    Tobacco abuse       * nicotine 7 MG/24HR 24 hr patch    NICODERM CQ    30 patch    Place 1 patch onto the skin every 24 hours    Tobacco abuse       ondansetron 8 MG tablet    ZOFRAN    10 tablet    Take 1 tablet (8 mg) by mouth every 8 hours as needed (Nausea/Vomiting)    Non-small cell lung cancer (NSCLC) (H)       oxyCODONE  10 MG IR tablet    ROXICODONE    100 tablet    Take 1 tablet (10 mg) by mouth every 4 hours as needed for moderate to severe pain    Non-small cell lung cancer (NSCLC) (H), Cancer associated pain       pantoprazole 40 MG EC tablet    PROTONIX    60 tablet    Take 1 tablet (40 mg) by mouth 2 times daily (before meals)    Non-small cell lung cancer (NSCLC) (H)       PARoxetine 10 MG tablet    PAXIL    30 tablet    Take 1 tablet (10 mg) by mouth At Bedtime (Needs follow-up appointment for this medication)    Major depressive disorder, recurrent episode, moderate (H)       polyethylene glycol powder    MIRALAX    510 g    Take 17 g (1 capful) by mouth daily Mix in 4-8 oz of fluid of choice. For constipation    Drug-induced constipation       prochlorperazine 10 MG tablet    COMPAZINE    30 tablet    Take 1 tablet (10 mg) by mouth every 6 hours as needed (Nausea/Vomiting)    Non-small cell lung cancer (NSCLC) (H)       senna-docusate 8.6-50 MG per tablet    SENOKOT-S;PERICOLACE    100 tablet    Take 1 tablet by mouth 2 times daily Reported on 5/4/2017    Non-small cell lung cancer (NSCLC) (H)       * Notice:  This list has 3 medication(s) that are the same as other medications prescribed for you. Read the directions carefully, and ask your doctor or other care provider to review them with you.

## 2017-08-10 NOTE — PATIENT INSTRUCTIONS
Your chemotherapy is being rescheduled to next week.  You will need to have labs drawn today. We would like to see you back in clinic with Dr. Naylor in 1 week. Copy of appointments, and after visit summary (AVS) given to patient.  If you have any questions during business hours (M-F 8 AM- 4PM), please call Jillian Renee RN, BSN, OCN Oncology Hematology /Breast Cancer Navigator at [facility] [phone].   For questions after business hours, or on holidays/weekends, please call our after hours Nurse Triage line [phone]. Thank you.

## 2017-08-11 PROBLEM — Z71.89 ACP (ADVANCE CARE PLANNING): Chronic | Status: ACTIVE | Noted: 2017-01-01

## 2017-08-17 NOTE — MR AVS SNAPSHOT
After Visit Summary   8/17/2017    Wade Acevedo    MRN: [id number]           Patient Information     Date Of Birth          1960        Visit Information        Provider Department      8/17/2017 8:00 AM ROOM 10 [facility]        Today's Diagnoses     Non-small cell lung cancer (NSCLC) (H)    -  1       Follow-ups after your visit        Your next 10 appointments already scheduled     Aug 24, 2017 11:00 AM CDT   Office Visit with Pawan Hampton MD   [facility] ([facility])    [address] [facility] [address]   [phone]           Bring a current list of meds and any records pertaining to this visit. For Physicals, please bring immunization records and any forms needing to be filled out. Please arrive 10 minutes early to complete paperwork.            Sep 07, 2017  8:30 AM CDT   Level 6 with ROOM 1 [facility] ([facility])    [facility]  [address]  [facility] [address]   [phone]            Sep 07, 2017  9:30 AM CDT   Return Visit with Pasquale Naylor MD   [facility] ([facility])    [facility]  [address]  [facility] [address]   [phone]            Nov 14, 2017 11:30 AM CST   (Arrive by 11:15 AM)   RETURN ENDOCRINE with Padma Medley MD   [facility] ([facility])    [address] [place] [address] [phone]              Who to contact     If you have questions or need follow up information about today's clinic visit or your schedule please contact [facility] directly at [phone].  Normal or non-critical lab and imaging results will be communicated to you by MyChart, letter or phone within 4 business days after the clinic has received the results. If you do not hear from us within 7 days, please contact the clinic  "through [employer] or phone. If you have a critical or abnormal lab result, we will notify you by phone as soon as possible.  Submit refill requests through [employer] or call your pharmacy and they will forward the refill request to us. Please allow 3 business days for your refill to be completed.          Additional Information About Your Visit        Perfect [employer] Information     [employer] lets you send messages to your doctor, view your test results, renew your prescriptions, schedule appointments and more. To sign up, go to [email or web address] . Click on \"Log in\" on the left side of the screen, which will take you to the Welcome page. Then click on \"Sign up Now\" on the right side of the page.     You will be asked to enter the access code listed below, as well as some personal information. Please follow the directions to create your username and password.     Your access code is: [email or web address]  Expires: 2017  9:54 AM     Your access code will  in 90 days. If you need help or a new code, please call your [place] clinic or [phone].        Care EveryWhere ID     This is your Care EveryWhere ID. This could be used by other organizations to access your [place] medical records  [id number]        Your Vitals Were     Pulse                   76            Blood Pressure from Last 3 Encounters:   17 115/74   17 123/74   08/10/17 114/69    Weight from Last 3 Encounters:   17 75.5 kg (166 lb 6.4 oz)   08/10/17 72.6 kg (160 lb 1.6 oz)   08/10/17 72.6 kg (160 lb 0.9 oz)              We Performed the Following     CBC with platelets differential     Comprehensive metabolic panel          Today's Medication Changes          These changes are accurate as of: 17  1:54 PM.  If you have any questions, ask your nurse or doctor.               These medicines have changed or have updated prescriptions.        Dose/Directions    senna-docusate 8.6-50 MG per tablet   Commonly known as:  " SENOKOT-S;PERICOLACE   This may have changed:  additional instructions   Used for:  Non-small cell lung cancer (NSCLC) (H)        Dose:  1 tablet   Take 1 tablet by mouth 2 times daily Reported on 5/4/2017   Quantity:  100 tablet   Refills:  3                Primary Care Provider Office Phone # Fax #    Pawan Hampton [phone] [address] [facility] [address]        Equal Access to Services     GONZALO SMITH : Hadii aad ku hadasho Soomaali, waaxda luqadaha, qaybta kaalmada adeegyada, waxay idiin hayaan adeeg soniaziaannie romero . So [facility] [phone].    ATENCIÓN: Si claudia rodriguez, tiene a burns disposición servicios gratuitos de asistencia lingüística. [facility] [phone].    We comply with applicable federal civil rights laws and [place] laws. We do not discriminate on the basis of race, color, national origin, age, disability sex, sexual orientation or gender identity.            Thank you!     Thank you for choosing [facility]  for your care. Our goal is always to provide you with excellent care. Hearing back from our patients is one way we can continue to improve our services. Please take a few minutes to complete the written survey that you may receive in the mail after your visit with us. Thank you!             Your Updated Medication List - Protect others around you: Learn how to safely use, store and throw away your medicines at www.disposemymeds.org.          This list is accurate as of: 8/17/17  1:54 PM.  Always use your most recent med list.                   Brand Name Dispense Instructions for use Diagnosis    bisacodyl 10 MG Suppository    DULCOLAX    12 suppository    Place 1 suppository (10 mg) rectally daily as needed for constipation    Drug-induced constipation       capsaicin 0.025 % Crea cream    ZOSTRIX    120 g    Apply to R hip and thigh every 2 hours as needed for pain.    Multiple joint pain, Cancer associated pain       dexamethasone 4 MG tablet     DECADRON    15 tablet    Take 5 tablets (20 mg) by mouth daily X 3 days each cycle. To be taken the day before, day of, and day after chemotherapy infusion.    Anemia, unspecified type, Non-small cell lung cancer (NSCLC) (H)       diclofenac 1 % Gel topical gel    VOLTAREN    200 g    Apply 4 grams to knees or 2 grams to hands four times daily using enclosed dosing card.    Multiple joint pain       lidocaine-prilocaine cream    EMLA    30 g    Apply topically over port 45 - 60 minutes prior to port access.    Non-small cell lung cancer (NSCLC) (H)       LORazepam 0.5 MG tablet    ATIVAN    30 tablet    Take 1 tablet (0.5 mg) by mouth every 4 hours as needed (Anxiety, Nausea/Vomiting or Sleep)    Non-small cell lung cancer (NSCLC) (H)       morphine 15 MG 12 hr tablet    MS CONTIN    90 tablet    Take 1 tablet (15 mg) by mouth every 8 hours    Non-small cell lung cancer (NSCLC) (H)       * nicotine 21 MG/24HR 24 hr patch    NICODERM CQ    30 patch    Place 1 patch onto the skin every 24 hours    Tobacco abuse       * nicotine 14 MG/24HR 24 hr patch    NICODERM CQ    30 patch    Place 1 patch onto the skin every 24 hours    Tobacco abuse       * nicotine 7 MG/24HR 24 hr patch    NICODERM CQ    30 patch    Place 1 patch onto the skin every 24 hours    Tobacco abuse       ondansetron 8 MG tablet    ZOFRAN    10 tablet    Take 1 tablet (8 mg) by mouth every 8 hours as needed (Nausea/Vomiting)    Non-small cell lung cancer (NSCLC) (H)       oxyCODONE 10 MG IR tablet    ROXICODONE    100 tablet    Take 1 tablet (10 mg) by mouth every 4 hours as needed for moderate to severe pain    Non-small cell lung cancer (NSCLC) (H), Cancer associated pain       pantoprazole 40 MG EC tablet    PROTONIX    60 tablet    Take 1 tablet (40 mg) by mouth 2 times daily (before meals)    Non-small cell lung cancer (NSCLC) (H)       PARoxetine 10 MG tablet    PAXIL    30 tablet    Take 1 tablet (10 mg) by mouth At Bedtime (Needs follow-up  appointment for this medication)    Major depressive disorder, recurrent episode, moderate (H)       polyethylene glycol powder    MIRALAX    510 g    Take 17 g (1 capful) by mouth daily Mix in 4-8 oz of fluid of choice. For constipation    Drug-induced constipation       prochlorperazine 10 MG tablet    COMPAZINE    30 tablet    Take 1 tablet (10 mg) by mouth every 6 hours as needed (Nausea/Vomiting)    Non-small cell lung cancer (NSCLC) (H)       senna-docusate 8.6-50 MG per tablet    SENOKOT-S;PERICOLACE    100 tablet    Take 1 tablet by mouth 2 times daily Reported on 5/4/2017    Non-small cell lung cancer (NSCLC) (H)       * Notice:  This list has 3 medication(s) that are the same as other medications prescribed for you. Read the directions carefully, and ask your doctor or other care provider to review them with you.

## 2017-08-17 NOTE — MR AVS SNAPSHOT
After Visit Summary   8/17/2017    Wade Acevedo    MRN: [id number]           Patient Information     Date Of Birth          1960        Visit Information        Provider Department      8/17/2017 9:15 AM Pasquale Naylor MD [facility]      Today's Diagnoses     Non-small cell lung cancer (NSCLC) (H)    -  1    Cancer associated pain        Anemia, chronic disease        Tobacco use disorder        Thrombocytopenia (H)        Drug-induced constipation           Follow-ups after your visit        Follow-up notes from your care team     Return in about 3 weeks (around 9/7/2017) for Schedule for chemotherapy as per treatment plan.      Your next 10 appointments already scheduled     Aug 24, 2017 11:00 AM CDT   Office Visit with Pawan Hampton MD   [facility]    [address] [facility] [address]   [phone]           Bring a current list of meds and any records pertaining to this visit. For Physicals, please bring immunization records and any forms needing to be filled out. Please arrive 10 minutes early to complete paperwork.            Sep 07, 2017  8:30 AM CDT   Level 6 with ROOM 1 [facility] ([facility])    [facility]  [address]  [facility] [address]   [phone]            Sep 07, 2017  9:30 AM CDT   Return Visit with Pasquale Naylor MD   [facility] ([facility])    [facility]  [address]  [facility] [address]   [phone]            Nov 14, 2017 11:30 AM CST   (Arrive by 11:15 AM)   RETURN ENDOCRINE with Padma Medley MD   [facility] ([facility])    [address] [facility]  [address] [facility] [address] [phone]              Who to contact     If you have questions or need follow up information about today's clinic visit or your  "schedule please contact [facility] directly at [phone].  Normal or non-critical lab and imaging results will be communicated to you by MyChart, letter or phone within 4 business days after the clinic has received the results. If you do not hear from us within 7 days, please contact the clinic through [employer] or phone. If you have a critical or abnormal lab result, we will notify you by phone as soon as possible.  Submit refill requests through [employer] or call your pharmacy and they will forward the refill request to us. Please allow 3 business days for your refill to be completed.          Additional Information About Your Visit        [employer]s Information     [employer]s lets you send messages to your doctor, view your test results, renew your prescriptions, schedule appointments and more. To sign up, go to [email or web address] . Click on \"Log in\" on the left side of the screen, which will take you to the Welcome page. Then click on \"Sign up Now\" on the right side of the page.     You will be asked to enter the access code listed below, as well as some personal information. Please follow the directions to create your username and password.     Your access code is: [email or web address]  Expires: 2017  9:54 AM     Your access code will  in 90 days. If you need help or a new code, please call your [place] clinic or [phone].        Care EveryWhere ID     This is your Care EveryWhere ID. This could be used by other organizations to access your [place] medical records  [id number]        Your Vitals Were     Pulse Temperature Respirations Height Pulse Oximetry BMI (Body Mass Index)    65 97.9  F (36.6  C) (Tympanic) 16 1.807 m (5' 11.14\") 98% 23.12 kg/m2       Blood Pressure from Last 3 Encounters:   17 115/74   08/10/17 114/69   08/10/17 134/82    Weight from Last 3 Encounters:   17 75.5 kg (166 lb 6.4 oz)   08/10/17 72.6 kg (160 lb 1.6 oz)   08/10/17 72.6 kg (160 lb 0.9 oz)            "   Today, you had the following     No orders found for display         Today's Medication Changes          These changes are accurate as of: 8/17/17 12:39 PM.  If you have any questions, ask your nurse or doctor.               These medicines have changed or have updated prescriptions.        Dose/Directions    senna-docusate 8.6-50 MG per tablet   Commonly known as:  SENOKOT-S;PERICOLACE   This may have changed:  additional instructions   Used for:  Non-small cell lung cancer (NSCLC) (H)        Dose:  1 tablet   Take 1 tablet by mouth 2 times daily Reported on 5/4/2017   Quantity:  100 tablet   Refills:  3                Primary Care Provider Office Phone # Fax #    Pawan Cristian Hampton [phone] [address] [address]        Equal Access to Services     GONZALO SMITH : Saul Molina, waaxfam luqadaha, qaybta kaalmada aderoxy, gena romero . So [facility] [phone].    ATENCIÓN: Si habla español, tiene a burns disposición servicios gratuitos de asistencia lingüística. [facility] [phone].    We comply with applicable federal civil rights laws and [place] laws. We do not discriminate on the basis of race, color, national origin, age, disability sex, sexual orientation or gender identity.            Thank you!     Thank you for choosing [facility]  for your care. Our goal is always to provide you with excellent care. Hearing back from our patients is one way we can continue to improve our services. Please take a few minutes to complete the written survey that you may receive in the mail after your visit with us. Thank you!             Your Updated Medication List - Protect others around you: Learn how to safely use, store and throw away your medicines at www.disposemymeds.org.          This list is accurate as of: 8/17/17 12:39 PM.  Always use your most recent med list.                   Brand Name Dispense Instructions for use  Diagnosis    bisacodyl 10 MG Suppository    DULCOLAX    12 suppository    Place 1 suppository (10 mg) rectally daily as needed for constipation    Drug-induced constipation       capsaicin 0.025 % Crea cream    ZOSTRIX    120 g    Apply to R hip and thigh every 2 hours as needed for pain.    Multiple joint pain, Cancer associated pain       dexamethasone 4 MG tablet    DECADRON    15 tablet    Take 5 tablets (20 mg) by mouth daily X 3 days each cycle. To be taken the day before, day of, and day after chemotherapy infusion.    Anemia, unspecified type, Non-small cell lung cancer (NSCLC) (H)       diclofenac 1 % Gel topical gel    VOLTAREN    200 g    Apply 4 grams to knees or 2 grams to hands four times daily using enclosed dosing card.    Multiple joint pain       lidocaine-prilocaine cream    EMLA    30 g    Apply topically over port 45 - 60 minutes prior to port access.    Non-small cell lung cancer (NSCLC) (H)       LORazepam 0.5 MG tablet    ATIVAN    30 tablet    Take 1 tablet (0.5 mg) by mouth every 4 hours as needed (Anxiety, Nausea/Vomiting or Sleep)    Non-small cell lung cancer (NSCLC) (H)       morphine 15 MG 12 hr tablet    MS CONTIN    90 tablet    Take 1 tablet (15 mg) by mouth every 8 hours    Non-small cell lung cancer (NSCLC) (H)       * nicotine 21 MG/24HR 24 hr patch    NICODERM CQ    30 patch    Place 1 patch onto the skin every 24 hours    Tobacco abuse       * nicotine 14 MG/24HR 24 hr patch    NICODERM CQ    30 patch    Place 1 patch onto the skin every 24 hours    Tobacco abuse       * nicotine 7 MG/24HR 24 hr patch    NICODERM CQ    30 patch    Place 1 patch onto the skin every 24 hours    Tobacco abuse       ondansetron 8 MG tablet    ZOFRAN    10 tablet    Take 1 tablet (8 mg) by mouth every 8 hours as needed (Nausea/Vomiting)    Non-small cell lung cancer (NSCLC) (H)       oxyCODONE 10 MG IR tablet    ROXICODONE    100 tablet    Take 1 tablet (10 mg) by mouth every 4 hours as needed for  moderate to severe pain    Non-small cell lung cancer (NSCLC) (H), Cancer associated pain       pantoprazole 40 MG EC tablet    PROTONIX    60 tablet    Take 1 tablet (40 mg) by mouth 2 times daily (before meals)    Non-small cell lung cancer (NSCLC) (H)       PARoxetine 10 MG tablet    PAXIL    30 tablet    Take 1 tablet (10 mg) by mouth At Bedtime (Needs follow-up appointment for this medication)    Major depressive disorder, recurrent episode, moderate (H)       polyethylene glycol powder    MIRALAX    510 g    Take 17 g (1 capful) by mouth daily Mix in 4-8 oz of fluid of choice. For constipation    Drug-induced constipation       prochlorperazine 10 MG tablet    COMPAZINE    30 tablet    Take 1 tablet (10 mg) by mouth every 6 hours as needed (Nausea/Vomiting)    Non-small cell lung cancer (NSCLC) (H)       senna-docusate 8.6-50 MG per tablet    SENOKOT-S;PERICOLACE    100 tablet    Take 1 tablet by mouth 2 times daily Reported on 5/4/2017    Non-small cell lung cancer (NSCLC) (H)       * Notice:  This list has 3 medication(s) that are the same as other medications prescribed for you. Read the directions carefully, and ask your doctor or other care provider to review them with you.

## 2017-08-17 NOTE — PROGRESS NOTES
Hematology/ Oncology Follow-up Visit:  Aug 17, 2017    Reason for Visit:   Chief Complaint   Patient presents with     Oncology Clinic Visit     1 kathy minoreck NSCLC, Labs & Chemo today        Oncologic History:  Non-small cell lung cancer (NSCLC) (H)  The patient presented with 2-3 months history of weakness and fainting episodes. He was seen in the emergency room further workup was done including a CT scan which showed a 3 cm mass in the medial left upper lobe. There was adjacent atelectasis at the prominent mediastinal lymph nodes. There is a destructive bone lesion involving left lateral fifth rib. Patient underwent transthoracic CT-guided biopsy in the hospital. The pathology report came back consistent with mixed tumor including adenocarcinoma and squamous histology. 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 He had a normal TSH, but he had low T3 and T4. He had a serum cortisol morning level 4.4. He underwent a cosyntropin stim test which was mildly abnormal. He had a cortisol level 14.8 at 30 minutes and 19.6 at 60 minutes. His 24-hour cortisol in his urine was normal. He was seen by endocrinology we will continue to monitor him in 6 months.   PET scan showed hypermetabolic bilateral level Ib and right level III lymph nodes in the neck suspicious for metastatic disease. There was also hypermetabolic activity seen in the sella consistent with pituitary macroadenoma. There is hypermetabolic mediastinal left hilar lymphadenopathy.  There is metastatic disease seen in the left scapula and left fifth rib. There is left pleural effusion and small right pleural effusion seen as well.  ALK 4 came back negative. EGFR was negative for mutation. . PDL 1 is less than 1%.       Interval History:  Patient is here today for follow-up. He has been feeling better since he had a blood transfusion last week. His fatigue has improved. He denies any bruising or  bleeding. He denies any fever or chills. Denies any shortness of breath or cough or wheezing. His pain is currently under control.    Review Of Systems:  Constitutional: Negative for fever, chills, and night sweats.  Skin: negative.  Eyes: negative.  Ears/Nose/Throat: negative.  Respiratory: No shortness of breath, dyspnea on exertion, cough, or hemoptysis.  Cardiovascular: negative.  Gastrointestinal: negative.  Genitourinary: negative.  Musculoskeletal: negative.  Neurologic: negative.  Psychiatric: negative.  Hematologic/Lymphatic/Immunologic: negative.  Endocrine: negative.    All other ROS negative unless mentioned in interval history.    Past medical, social, surgical, and family histories reviewed.    Allergies:  Allergies as of 08/17/2017 - Kvng as Reviewed 08/17/2017   Allergen Reaction Noted     Lubriderm Rash 04/20/2017       Current Medications:  Current Outpatient Prescriptions   Medication Sig Dispense Refill     dexamethasone (DECADRON) 4 MG tablet Take 5 tablets (20 mg) by mouth daily X 3 days each cycle. To be taken the day before, day of, and day after chemotherapy infusion. 15 tablet 3     diclofenac (VOLTAREN) 1 % GEL topical gel Apply 4 grams to knees or 2 grams to hands four times daily using enclosed dosing card. 200 g 3     morphine (MS CONTIN) 15 MG 12 hr tablet Take 1 tablet (15 mg) by mouth every 8 hours 90 tablet 0     oxyCODONE (ROXICODONE) 10 MG IR tablet Take 1 tablet (10 mg) by mouth every 4 hours as needed for moderate to severe pain 100 tablet 0     capsaicin (ZOSTRIX) 0.025 % CREA cream Apply to R hip and thigh every 2 hours as needed for pain. 120 g 3     PARoxetine (PAXIL) 10 MG tablet Take 1 tablet (10 mg) by mouth At Bedtime (Needs follow-up appointment for this medication) 30 tablet 0     pantoprazole (PROTONIX) 40 MG EC tablet Take 1 tablet (40 mg) by mouth 2 times daily (before meals) 60 tablet 6     polyethylene glycol (MIRALAX) powder Take 17 g (1 capful) by mouth daily  "Mix in 4-8 oz of fluid of choice. For constipation 510 g 11     bisacodyl (DULCOLAX) 10 MG Suppository Place 1 suppository (10 mg) rectally daily as needed for constipation 12 suppository 11     nicotine (NICODERM CQ) 21 MG/24HR 24 hr patch Place 1 patch onto the skin every 24 hours 30 patch 0     nicotine (NICODERM CQ) 14 MG/24HR 24 hr patch Place 1 patch onto the skin every 24 hours 30 patch 0     nicotine (NICODERM CQ) 7 MG/24HR 24 hr patch Place 1 patch onto the skin every 24 hours 30 patch 0     senna-docusate (SENOKOT-S;PERICOLACE) 8.6-50 MG per tablet Take 1 tablet by mouth 2 times daily Reported on 5/4/2017 (Patient taking differently: Take 1 tablet by mouth 2 times daily Reported on 5/4/2017 Taking 2 tablets in AM & 1 tablet at night.) 100 tablet 3     lidocaine-prilocaine (EMLA) cream Apply topically over port 45 - 60 minutes prior to port access. 30 g 3     LORazepam (ATIVAN) 0.5 MG tablet Take 1 tablet (0.5 mg) by mouth every 4 hours as needed (Anxiety, Nausea/Vomiting or Sleep) 30 tablet 2     prochlorperazine (COMPAZINE) 10 MG tablet Take 1 tablet (10 mg) by mouth every 6 hours as needed (Nausea/Vomiting) 30 tablet 2     ondansetron (ZOFRAN) 8 MG tablet Take 1 tablet (8 mg) by mouth every 8 hours as needed (Nausea/Vomiting) 10 tablet 2        Physical Exam:  /74 (BP Location: Right arm, Patient Position: Sitting, Cuff Size: Adult Regular)  Pulse 65  Temp 97.9  F (36.6  C) (Tympanic)  Resp 16  Ht 1.807 m (5' 11.14\")  Wt 75.5 kg (166 lb 6.4 oz)  SpO2 98%  BMI 23.12 kg/m2  Wt Readings from Last 12 Encounters:   08/17/17 75.5 kg (166 lb 6.4 oz)   08/10/17 72.6 kg (160 lb 1.6 oz)   08/10/17 72.6 kg (160 lb 0.9 oz)   07/13/17 71.2 kg (156 lb 14.4 oz)   07/13/17 71.2 kg (156 lb 15.5 oz)   06/22/17 71.2 kg (157 lb)   06/22/17 71.2 kg (157 lb)   06/14/17 70.7 kg (155 lb 14.4 oz)   05/31/17 69.2 kg (152 lb 9.6 oz)   05/25/17 73 kg (161 lb)   05/25/17 72.6 kg (160 lb)   05/24/17 71.6 kg (157 lb 14.4 " oz)     ECOG performance status:1  GENERAL APPEARANCE: Healthy, alert and in no acute distress.  HEENT: Sclerae anicteric. PERRLA. Oropharynx without ulcers, lesions, or thrush.  NECK: Supple. No asymmetry or masses.  LYMPHATICS: No palpable cervical, supraclavicular, axillary, or inguinal lymphadenopathy.  RESP: Lungs clear to auscultation bilaterally without rales, rhonchi or wheezes.  CARDIOVASCULAR: Regular rate and rhythm. Normal S1, S2; no S3 or S4. No murmur, gallop, or rub.  ABDOMEN: Soft, nontender. Bowel sounds normal. No palpable organomegaly or masses.  MUSCULOSKELETAL: Extremities without gross deformities noted. No edema of bilateral lower extremities.  SKIN: No suspicious lesions or rashes.  NEURO: Alert and oriented x 3. Cranial nerves II-XII grossly intact.  PSYCHIATRIC: Mentation and affect appear normal.    Laboratory/Imaging Studies:  Oncology Visit on 08/10/2017   Component Date Value Ref Range Status     Erythropoietin 08/10/2017 59*  Final    Comment: Reference range: 4 to 27  Unit: mU/mL  (Note)  INTERPRETIVE INFORMATION: Erythropoietin  Normal serum concentrations of erythropoietin for 95% of  individuals with normal hematocrits range from 4-27 mU/mL.  As the hematocrit is lowered by iron deficiency, aplastic,  or hemolytic anemia, the concentration of erythropoietin  increases as shown in the graph below. In the absence of  anemia, elevated concentrations are seen in renal tumors,  as a manifestation of renal transplant rejection, and in  secondary polycythemia. Low values may be observed in  hemochromatosis.       Expected Erythropoietin Concentrations in Patients                    with Uncomplicated Anemia    Erythropoietin (mU/mL)               100,000 - +                         +                10,000 - +.......                         + .......                 1,000 - +    .......                         +     ........                   100 - +       ........                          +        ........                    10                            - +          ........                         +---+---+---+---+---+---+                        10   20  30  40  50  60  70                               (Hematocrit %)           (Contributions To Nephrology 1988:66:54-62)  Decreased erythropoietin concentrations with an elevated  hematocrit are observed in patients with polycythemia rubra  vera, and with a decreased hematocrit in patients with HIV  infection who are receiving AZT.  Patients on AZT who have  anemia and erythropoietin concentrations of less than or  equal to 500 mU/mL may benefit from therapy with  recombinant EPO (:[id number],1990).  Performed by [facility],  [address] [place] [id number] [phone]  [email or web address], Pardeep Anthony MD, Lab. Director       Vitamin B12 08/10/2017 510  193 - 986 pg/mL Final     Ferritin 08/10/2017 687* 26 - 388 ng/mL Final     Copath Report 08/10/2017    Final                    Value:Patient Name: JOHN SANCHEZ  MR#: [id number]  Specimen #: [id number]  Collected: 8/10/2017  Received: 8/10/2017  Reported: 8/10/2017 16:59  Ordering Phy(s): TERESA SPEAR    For improved result formatting, select 'View Enhanced Report Format'  under Linked Documents section.    TEST(S):  Peripheral Smear Morphology    FINAL DIAGNOSIS:  Peripheral Smear Morphology:  - Pancytopenia with:     - Marked microcytic hypochromic anemia without evidence of hemolysis  or increase red cell regeneration.    -  Moderate leukopenia with moderate absolute lymphocytopenia.    -  Marked thrombocytopenia.    COMMENT:  The patient has a history of pulmonary adenocarcinoma from 4/26/17  (needle biopsy [id number]).  Recent CBCs show cyclic pancytopenia likely  related to chemotherapy infusion and chronic disease.  No evidence of a  hemolytic process is seen. Further clinical correlation is required.    Date      WBC  HGB   RBC  MCV   MCH  MCHC  RDW   PLT  5/04/17    7.5    8.7    3.61    75     24.1    32.                          3    23.2   314  5/24/17    6.7    9.2   3.89    73     23.7    32.6    18.7   382  6/14/17    2.3    6.6   2.80    70     23.6    33.5    16.5   64  6/22/17    4.6    9.0   3.70    75     24.3    32.6    18.2   372  7/13/17    4.0    6.3   2.63    73     24.0    32.6    15.9   59  7/20/17    4.4    8.5   3.37    77     25.2    32.7    18.6   276  8/10/17    2.2    6.6   2.67    74     24.7    33.3    17.8   24    Electronically signed out by:    TON Stout M.D.    CLINICAL HISTORY:  57 year old male.    PERIPHERAL BLOOD DATA:  PERIPHERAL BLOOD DATA (Date: 8/10/17)  Patient Value (Reference Range >18 year old male)  2.20   WBC   (4.0-11.0 x 10*9/L)  2.69   RBC   (4.4-5.9 x 10*12/L)  6.7    HGB   (13.3-17.7 g/dL)  19.9   HCT   (40.0-53.0 %)  74.0   MCV   (78-100fL)  24.9   MCH   (26.5-33.0 pg)  33.7   MCHC (31.5-36.5 g/dL)  17.6   RDW  (10.0-15.0 %)  28     PLT   (150-450 x 10*9/L)    PERIPHERAL BLOOD DIFFERENTIAL - Manual 200 cells.  (Reference ranges >18 year old)    Percent  73.                          0%  Neutrophils  22.0%  Lymphocytes  5.0%   Monocytes  0.0%   Eosinophils  0.0%   Basophils    Absolute  1.61   Neutrophils (Ref normal 1.6 - 8.3 x 10*9/L)  0.48   Lymphocytes  (Ref normal 0.8 - 5.3 x 10*9/L)  0.11   Monocytes  (Ref normal 0 -1.3 x 10*9/L)  0.00   Eosinophils  (Ref normal 0 - 0.7 x 10*9/L)  0.00   Basophils  (Ref normal 0 - 0.2 x 10*9/L)    PERIPHERAL BLOOD MORPHOLOGY:    ERYTHROCYTES:  The red cells are microcytic hypochromic and moderately  decreased in number for the patient's age and gender with a  Resulting  marked anemia.  Moderate anisopoikilocytosis is present consisting of  increased ovalocytes and elliptocytes in addition to the microcytic cell  population. No features of hemolysis or increased polychromasia are  identified.  No parasites are seen.    LEUKOCYTES:  The leukocytes are moderately decreased in number with a  low normal  absolute neutrophil count and absolute lymphocytopenia. No  immature precursors or evidence of neutrophilic dysplasia is seen. No                            atypical lymphoid cells are seen. No parasites or parasitic inclusions  are seen.    PLATELETS:  The platelets are morphologically normal and markedly  decreased in number. No appreciable platelet aggregates, platelet  clumping or platelet-leukocyte satellitism is seen.  (Dictated by: MINGO Stout MD 08/102017)    CPT Codes:  A: 50464-LYHZ    TESTING LAB LOCATION:  [address] [address] [phone]    COLLECTION SITE:  Client:  [facility]  Location:  [facility] ([facility])       Albumin Fraction 08/10/2017 3.6* 3.7 - 5.1 g/dL Final     Alpha 1 Fraction 08/10/2017 0.4  0.2 - 0.4 g/dL Final     Alpha 2 Fraction 08/10/2017 0.9  0.5 - 0.9 g/dL Final     Beta Fraction 08/10/2017 0.7  0.6 - 1.0 g/dL Final     Gamma Fraction 08/10/2017 0.9  0.7 - 1.6 g/dL Final     Monoclonal Peak 08/10/2017 0.0  0.0 g/dL Final     ELP Interpretation: 08/10/2017    Final                    Value:Essentially normal electrophoretic pattern. No monoclonal protein seen.   Pathologic significance requires clinical correlation. BENJI Adler M.D.,   Ph.D., Pathologist.       Immunofixation ELP 08/10/2017    Final                    Value:No monoclonal protein seen on immunofixation.  Pathological significance   requires clinical correlation.   BENJI Adler M.D., Ph.D.     Infusion Therapy Visit on 08/10/2017   Component Date Value Ref Range Status     WBC 08/10/2017 2.2* 4.0 - 11.0 10e9/L Final     RBC Count 08/10/2017 2.67* 4.4 - 5.9 10e12/L Final     Hemoglobin 08/10/2017 6.6* 13.3 - 17.7 g/dL Final    Comment: This result has been called to WARREN ORELLANA RN by Cristian Stallings on 08 10 2017 at   0925, and has been read back.       Hematocrit 08/10/2017 19.8* 40.0 - 53.0 % Final     MCV 08/10/2017  74* 78 - 100 fl Final     MCH 08/10/2017 24.7* 26.5 - 33.0 pg Final     MCHC 08/10/2017 33.3  31.5 - 36.5 g/dL Final     RDW 08/10/2017 17.8* 10.0 - 15.0 % Final     Platelet Count 08/10/2017 24* 150 - 450 10e9/L Final    Comment: This result has been called to WARREN ORELLANA RN by Cristian Stallings on 08 10 2017 at   0925, and has been read back.       Diff Method 08/10/2017 Manual Differential   Final     % Neutrophils 08/10/2017 74.0  % Final     % Lymphocytes 08/10/2017 18.0  % Final     % Monocytes 08/10/2017 7.0  % Final     % Eosinophils 08/10/2017 0.0  % Final     % Basophils 08/10/2017 0.0  % Final     % Metamyelocytes 08/10/2017 1.0  % Final     Nucleated RBCs 08/10/2017 1* 0 /100 Final     Absolute Neutrophil 08/10/2017 1.6  1.6 - 8.3 10e9/L Final     Absolute Lymphocytes 08/10/2017 0.4* 0.8 - 5.3 10e9/L Final     Absolute Monocytes 08/10/2017 0.2  0.0 - 1.3 10e9/L Final     Absolute Eosinophils 08/10/2017 0.0  0.0 - 0.7 10e9/L Final     Absolute Basophils 08/10/2017 0.0  0.0 - 0.2 10e9/L Final     Absolute Metamyelocytes 08/10/2017 0.0  0 10e9/L Final     Absolute Nucleated RBC 08/10/2017 0.0   Final     Anisocytosis 08/10/2017 Slight   Final     Platelet Estimate 08/10/2017 Decreased   Final     Sodium 08/10/2017 141  133 - 144 mmol/L Final     Potassium 08/10/2017 3.9  3.4 - 5.3 mmol/L Final     Chloride 08/10/2017 108  94 - 109 mmol/L Final     Carbon Dioxide 08/10/2017 25  20 - 32 mmol/L Final     Anion Gap 08/10/2017 8  3 - 14 mmol/L Final     Glucose 08/10/2017 175* 70 - 99 mg/dL Final     Urea Nitrogen 08/10/2017 17  7 - 30 mg/dL Final     Creatinine 08/10/2017 0.77  0.66 - 1.25 mg/dL Final     GFR Estimate 08/10/2017   >60 mL/min/1.7m2 Final                    Value:>90  Non  GFR Calc       GFR Estimate If Black 08/10/2017   >60 mL/min/1.7m2 Final                    Value:>90   GFR Calc       Calcium 08/10/2017 8.9  8.5 - 10.1 mg/dL Final     Bilirubin Total 08/10/2017 0.2   0.2 - 1.3 mg/dL Final     Albumin 08/10/2017 3.2* 3.4 - 5.0 g/dL Final     Protein Total 08/10/2017 6.7* 6.8 - 8.8 g/dL Final     Alkaline Phosphatase 08/10/2017 237* 40 - 150 U/L Final     ALT 08/10/2017 23  0 - 70 U/L Final     AST 08/10/2017 31  0 - 45 U/L Final     Units Ordered 08/10/2017 2   Final     ABO 08/10/2017 O   Final     RH(D) 08/10/2017  Pos   Final     Antibody Screen 08/10/2017 Neg   Final     Test Valid Only At 08/10/2017 [facility]   Final     Specimen Expires 08/10/2017 08/13/2017   Final     Crossmatch 08/10/2017 Red Blood Cells   Final     Unit Number 08/10/2017 [id number]   Final     Blood Component Type 08/10/2017 Red Blood Cells Leukocyte Reduced   Final     Division Number 08/10/2017 00   Final     Status of Unit 08/10/2017 Released to care unit   Final     Blood Product Code 08/10/2017 [id number]   Final     Unit Status 08/10/2017 ISS   Final     Unit Number 08/10/2017 [id number]   Final     Blood Component Type 08/10/2017 Red Blood Cells Leukocyte Reduced   Final     Division Number 08/10/2017 00   Final     Status of Unit 08/10/2017 Released to care unit   Final     Blood Product Code 08/10/2017 [id number]   Final     Unit Status 08/10/2017 ISS   Final        Recent Results (from the past 744 hour(s))   CT Chest/Abdomen/Pelvis w Contrast    Narrative    CT CHEST/ABDOMEN/PELVIS W CONTRAST 8/3/2017 11:11 AM    HISTORY: Lung cancer. Follow-up.    CONTRAST:  77 mL Isovue-370.    TECHNIQUE: CT of the chest, abdomen, and pelvis is performed with IV  contrast.    Routine evaluated structures in the chest include the lungs,  mediastinal structures, pleura, and chest wall.    Routine assessed structures in the abdomen include the liver, spleen,  pancreas, adrenal glands, and kidneys. Also assessed are the  retroperitoneum, gastrointestinal tract, and the abdominal wall.    Intrapelvic anatomy is also assessed.    Radiation dose for this scan is reduced using automated  exposure  control, adjustment of the mA and/or kV according to patient size, or  iterative reconstruction technique.    COMPARISON: 4/20/2017.    FINDINGS:    Chest: A spiculated mass like lesion in the left upper lobe with  adjacent bandlike parenchymal abnormality is again seen on image #23.  The spiculated mass is smaller. It measures 2.0 x 1.5 cm. Comparable  measurements on the prior study are 3.0 x 2.1 cm. A 4 mm noncalcified  left lower lobe pulmonary nodule on image #49 is unchanged. A few tiny  subcentimeter noncalcified right lung nodules are stable.  Centrilobular emphysema and scattered areas of interstitial scarring  in each lung are again demonstrated. Mediastinal and left hilar  adenopathy are present. Some of this has improved compared to the  prior study and other areas are stable. A right paratracheal lymph  node on image #20 measures 1 cm compared to 0.9 cm on the prior study.  Adenopathy lateral to the aortic arch measures 1.4 cm compared to 2.2  cm on the prior study. A lymph node at the anterior margin of the left  hilum on image #27 measures 1.4 cm compared to 1.4 cm on the prior  study. A subcarinal lymph node on image #32 measures 1.5 cm compared  to 1.6 cm on the prior study. Another left hilar lymph node on image  #32 measures 1 cm compared to 1.1 cm on the prior study. A presumed  sebaceous cyst in the left axilla on image #13 is unchanged. An  expansile lesion involving the lateral arc of left rib #5 is again  seen on image #26 and has not significantly changed. Several healed  adjacent rib fracture are present on the right. A mild generalized  edematous edema is seen.    Abdomen: Fatty infiltration of the liver is present. A few a tiny  subcentimeter lymph nodes in the gastrohepatic ligament are stable and  not discretely enlarged by CT criteria.    Pelvis: There is a small amount of free pelvic fluid.      Impression    IMPRESSION:  1.  Neoplastic disease in the chest shows areas that  are stable or  improved. No new neoplastic disease is demonstrated.  2. There is a new modest generalized edematous state.  3. There is a small amount of free pelvic fluid which may relate to  the patient's generalized edematous state.    AZALEA ART MD       Assessment and plan:  (C34.90) Non-small cell lung cancer (NSCLC) (H)  (primary encounter diagnosis)  We will proceed today with chemotherapy. We will use a dose of carboplatin to AUC of 5. Taxol 135 milligrams per meter squared.  I will see the patient again in 3 weeks or sooner if there is new developments or concerns.     (G89.3) Cancer associated pain  Patient pain is currently well controlled with oxycodone 10 mg if needed.    (D63.8) Anemia, chronic disease   related to chemotherapy. Carboplatin will be reduced to AUC of 5. Taxol will be reduced to 135 mg/Meter square.  We will monitor hemoglobin and off her blood transfusion if required. His     (F17.200) Tobacco use disorder  Patient continues to smoke about 10 cigarettes a day.    Thrombocytopenia  (H)   related to chemotherapy. Carboplatin will be reduced to AUC of 5. Taxol will be reduced to 1 35 mg/m .    (K59.03) Drug-induced constipation   patient currently on Senokot S he takes one tablet twice daily.     The patient is ready to learn, no apparent learning barriers were identified.  Diagnosis and treatment plans were explained to the patient. The patient expressed understanding of the content. The patient asked appropriate questions. The patient questions were answered to his satisfaction.    Chart documentation with Dragon Voice recognition Software. Although reviewed after completion, some words and grammatical errors may remain.

## 2017-08-17 NOTE — PROGRESS NOTES
Infusion Nursing Note:  Wade Acevedo presents today for C4D1 Taxol, Carboplatin.    Patient seen by provider today: Yes: Dr. Naylor   present during visit today: Not Applicable.    Note: N/A.    Intravenous Access:  Implanted Port.    Treatment Conditions:  Lab Results   Component Value Date    HGB 8.7 08/17/2017     Lab Results   Component Value Date    WBC 4.9 08/17/2017      Lab Results   Component Value Date    ANEU 3.6 08/17/2017     Lab Results   Component Value Date     08/17/2017      Lab Results   Component Value Date     08/17/2017                   Lab Results   Component Value Date    POTASSIUM 4.4 08/17/2017           Lab Results   Component Value Date    MAG 1.8 04/21/2017            Lab Results   Component Value Date    CR 0.85 08/17/2017                   Lab Results   Component Value Date    JOHANN 8.6 08/17/2017                Lab Results   Component Value Date    BILITOTAL 0.3 08/17/2017           Lab Results   Component Value Date    ALBUMIN 3.1 08/17/2017                    Lab Results   Component Value Date    ALT 20 08/17/2017           Lab Results   Component Value Date    AST 24 08/17/2017     Results reviewed, labs MET treatment parameters, ok to proceed with treatment.          Post Infusion Assessment:  Patient tolerated infusion without incident.  Blood return noted pre and post infusion.  Site patent and intact, free from redness, edema or discomfort.  No evidence of extravasations.  Access discontinued per protocol.    Discharge Plan:   Patient discharged in stable condition accompanied by: self.  Departure Mode: Ambulatory.    Homa Alvarez RN

## 2017-08-17 NOTE — NURSING NOTE
"Oncology Rooming Note    August 17, 2017 8:10 AM   Wade Acevedo is a 57 year old male who presents for:    Chief Complaint   Patient presents with     Oncology Clinic Visit     1 wee recheck NSCLC, Labs & Chemo today      Initial Vitals: /74 (BP Location: Right arm, Patient Position: Sitting, Cuff Size: Adult Regular)  Pulse 65  Temp 97.9  F (36.6  C) (Tympanic)  Resp 16  Ht 1.807 m (5' 11.14\")  Wt 75.5 kg (166 lb 6.4 oz)  SpO2 98%  BMI 23.12 kg/m2 Estimated body mass index is 23.12 kg/(m^2) as calculated from the following:    Height as of this encounter: 1.807 m (5' 11.14\").    Weight as of this encounter: 75.5 kg (166 lb 6.4 oz). Body surface area is 1.95 meters squared.  Mild Pain (2) Comment: left   No LMP for male patient.  Allergies reviewed: Yes  Medications reviewed: Yes    Medications: Medication refills not needed today.  Pharmacy name entered into [facility]: [facility] [place], [place] - [address]    Clinical concerns: 1 wee recheck NSCLC, Labs & Chemo today     7  minutes for nursing intake (face to face time)     Reena Leon [facility]              "

## 2017-08-18 NOTE — LETTER
Health Care Home - Access Care Plan  About Me  Patient Name:  Wade Sanchez  YOB: 1960  Age:                            57 year old   Luciana MRN:         [id number] Telephone Information:     Home Phone [phone]   Mobile [phone]       Address:    [address] [address] Email address:  No e-mail address on record      Emergency Contact(s)  Name Relationship Lgl Grd Work Phone Home Phone Mobile Phone   1.  BRYANNA URENA (* Relative No  [phone]   2. AMARIS SANCHEZ Relative No  [phone]    3. AVRIL BIRDIE* Relative No  [phone]         Health Maintenance: Routine Health maintenance Reviewed: Due/Overdue:Tetanus, Hep C, colon screen, Lipids)  My Access Plan  Medical Emergency 659   Questions or concerns during clinic hours Primary Clinic Line, I will call the clinic directly: Primary Clinic: [facility] [phone]   24 Hour Appointment Line [phone] or  [phone])  (toll free)   24 Hour Nurse Line [phone] (toll free)   Questions or concerns outside clinic hours 24 Hour Appointment Line, I will call the after-hours on-call line:   [facility] [phone] or [phone]) (toll-free)   Preferred Urgent Care Preferred Urgent Care: [facility] [phone]   Preferred Hospital Preferred Hospital: [place], [place]  [phone]   Preferred Pharmacy [facility], [place] - [address]     Behavioral Health Crisis Line The National Suicide Prevention Lifeline at 1-952.927.9353 or 844

## 2017-08-18 NOTE — LETTER
[facility]  [address] [place] [address]  [phone]        August 18, 2017      Wade Acevedo  [address] [facility] [address]    Dear Wade,  I am a clinic care coordinator who works with  's clinic. II have been trying to reach you recently.  also wanted to provide you with my contact information and information about care coordination.     The clinic care coordinator is a registered nurse and/or  who understand the health care system. The goal of clinic care coordination is to help you manage your health and improve access to the [facility] in the most efficient manner. The registered nurse can assist you in meeting your health care goals by providing education, coordinating services, and strengthening the communication among your providers. The  can assist you with financial, behavioral, psychosocial, and chemical dependency and counseling/psychiatric resources.    Please feel free to contact me at [phone] with any questions or concerns. We at [facility] are focused on providing you with the highest-quality healthcare experience possible and that all starts with you.       Sincerely,     José Miguel GAMEZ,RN- BC  Clinic Care Coordinator  [facility]  Phone: [phone]  Enclosed: I have enclosed a copy of a 24 Hour Access Plan. This has helpful phone numbers for you to call when needed. Please keep this in an easy to access place to use as needed. and I have enclosed helpful educational material. Please review and call me with any questions.

## 2017-08-18 NOTE — PROGRESS NOTES
Clinic Care Coordination Contact  [facility]/Voicemail    Referral Source: Home Care Priority Patient  Clinical Data: Care Coordinator Outreach  Outreach attempted x 2.  Left message on voicemail with call back information and requested return call.  Plan. Care Coordinator will f/u in 1-2 weeks. Will close if no response..  José Miguel GAMEZ,RN- BC  Clinic Care Coordinator  [facility]  Phone: [phone]

## 2017-08-24 NOTE — NURSING NOTE
"Chief Complaint   Patient presents with     Depression     Recheck on medication.       Initial /73  Pulse 68  Temp 97.1  F (36.2  C) (Tympanic)  Ht 5' 11.14\" (1.807 m)  Wt 155 lb 12.8 oz (70.7 kg)  SpO2 100%  BMI 21.64 kg/m2 Estimated body mass index is 21.64 kg/(m^2) as calculated from the following:    Height as of this encounter: 5' 11.14\" (1.807 m).    Weight as of this encounter: 155 lb 12.8 oz (70.7 kg).  Medication Reconciliation: complete  "

## 2017-08-24 NOTE — PROGRESS NOTES
SUBJECTIVE:   Wade Acevedo is a 57 year old male who presents to clinic today for the following health issues:      Medication Followup of Depression    Taking Medication as prescribed: yes    Side Effects:  Ringing in the ears.  Talked with his other doctor about this.  As long as there is outside noise it's fine.    Medication Helping Symptoms:  He states he feels the same.  He states he has some depression with his cancer diagnosis.      Last clinic visit was on 5-25-17 when the Paxil was started.  He was to recheck in 3-4 weeks.    PHQ-9 ([employer]) 8/24/2017   1.  Little interest or pleasure in doing things 2   2.  Feeling down, depressed, or hopeless 0   3.  Trouble falling or staying asleep, or sleeping too much 2   4.  Feeling tired or having little energy 2   5.  Poor appetite or overeating 0   6.  Feeling bad about yourself 0   7.  Trouble concentrating 0   8.  Moving slowly or restless 0   9.  Suicidal or self-harm thoughts 0   PHQ-9 Total Score 6     RAOUL-7   [employer], 2002; Used with Permission) 8/24/2017   1. Feeling nervous, anxious, or on edge 0   2. Not being able to stop or control worrying 0   3. Worrying too much about different things 0   4. Trouble relaxing 0   5. Being so restless that it is hard to sit still 0   6. Becoming easily annoyed or irritable 0   7. Feeling afraid, as if something awful might happen 0   RAOUL-7 Total Score 0     No previous PHQ-9 or RAOUL-7.         MEDICATION:  Questions about refills of the Decadron.  Ordered by Oncology on 8-16-17 with 3 refills.  Told patient this was sent to the [facility].  He states he uses [facility] as his usual pharmacy.      Current Outpatient Prescriptions:      dexamethasone (DECADRON) 4 MG tablet, Take 5 tablets (20 mg) by mouth daily X 3 days each cycle. To be taken the day before, day of, and day after chemotherapy infusion., Disp: 15 tablet, Rfl: 3     diclofenac (VOLTAREN) 1 % GEL topical gel, Apply 4 grams to knees or 2 grams  to hands four times daily using enclosed dosing card., Disp: 200 g, Rfl: 3     morphine (MS CONTIN) 15 MG 12 hr tablet, Take 1 tablet (15 mg) by mouth every 8 hours, Disp: 90 tablet, Rfl: 0     oxyCODONE (ROXICODONE) 10 MG IR tablet, Take 1 tablet (10 mg) by mouth every 4 hours as needed for moderate to severe pain, Disp: 100 tablet, Rfl: 0     capsaicin (ZOSTRIX) 0.025 % CREA cream, Apply to R hip and thigh every 2 hours as needed for pain., Disp: 120 g, Rfl: 3     PARoxetine (PAXIL) 10 MG tablet, Take 1 tablet (10 mg) by mouth At Bedtime (Needs follow-up appointment for this medication), Disp: 30 tablet, Rfl: 0     pantoprazole (PROTONIX) 40 MG EC tablet, Take 1 tablet (40 mg) by mouth 2 times daily (before meals), Disp: 60 tablet, Rfl: 6     polyethylene glycol (MIRALAX) powder, Take 17 g (1 capful) by mouth daily Mix in 4-8 oz of fluid of choice. For constipation, Disp: 510 g, Rfl: 11     bisacodyl (DULCOLAX) 10 MG Suppository, Place 1 suppository (10 mg) rectally daily as needed for constipation, Disp: 12 suppository, Rfl: 11     nicotine (NICODERM CQ) 21 MG/24HR 24 hr patch, Place 1 patch onto the skin every 24 hours, Disp: 30 patch, Rfl: 0     nicotine (NICODERM CQ) 14 MG/24HR 24 hr patch, Place 1 patch onto the skin every 24 hours, Disp: 30 patch, Rfl: 0     nicotine (NICODERM CQ) 7 MG/24HR 24 hr patch, Place 1 patch onto the skin every 24 hours, Disp: 30 patch, Rfl: 0     senna-docusate (SENOKOT-S;PERICOLACE) 8.6-50 MG per tablet, Take 1 tablet by mouth 2 times daily Reported on 5/4/2017 (Patient taking differently: Take 1 tablet by mouth 2 times daily Reported on 5/4/2017 Taking 2 tablets in AM & 1 tablet at night.), Disp: 100 tablet, Rfl: 3     lidocaine-prilocaine (EMLA) cream, Apply topically over port 45 - 60 minutes prior to port access., Disp: 30 g, Rfl: 3     LORazepam (ATIVAN) 0.5 MG tablet, Take 1 tablet (0.5 mg) by mouth every 4 hours as needed (Anxiety, Nausea/Vomiting or Sleep), Disp: 30  "tablet, Rfl: 2     prochlorperazine (COMPAZINE) 10 MG tablet, Take 1 tablet (10 mg) by mouth every 6 hours as needed (Nausea/Vomiting), Disp: 30 tablet, Rfl: 2     ondansetron (ZOFRAN) 8 MG tablet, Take 1 tablet (8 mg) by mouth every 8 hours as needed (Nausea/Vomiting), Disp: 10 tablet, Rfl: 2  No current facility-administered medications for this visit.     Facility-Administered Medications Ordered in Other Visits:      capsaicin (ZOSTRIX) cream, , Topical, Q2H PRN, Hailey Bernard, APRN CNP    Patient Active Problem List   Diagnosis     Anemia     Hypotension     Non-small cell lung cancer (NSCLC) (H)     Cancer associated pain     Anemia, chronic disease     Weight loss     Constipation     H/O ETOH abuse     Unsteady gait     Recurrent falls     Pituitary macroadenoma (H)     Sinusitis     Major depressive disorder, recurrent episode, moderate (H)     Tobacco use disorder     Thrombocytopenia (H)     ACP (advance care planning)       Blood pressure 124/73, pulse 68, temperature 97.1  F (36.2  C), temperature source Tympanic, height 5' 11.14\" (1.807 m), weight 155 lb 12.8 oz (70.7 kg), SpO2 100 %.    Exam:  GENERAL APPEARANCE: healthy, alert and no distress  PSYCH: mentation appears normal and affect normal/bright      (F33.1) Major depressive disorder, recurrent episode, moderate (H)  Comment:   Plan: PARoxetine (PAXIL) 10 MG tablet        You are dong well now with the depression and the Paxil is refilled and reordered for one year. Use the med and the non drug therapies as we discussed.   Exercise daily. If doing well then recheck annually. If worse in any way then recheck in the clinic.     (G89.3) Cancer associated pain    Comment:   Plan: We discussed the pain medications with the anti-inflammatory medications. Decadron that is taken before the chemotherapy is a steroid and this, along with Voltaren and Ibuprofen which are also for inflammation, can all irritate the lining of the stomach. You should only " take one of these three, at one time. The max dose of Advil or Ibuprofen is 800 mg at one time. Take with food and you may take one of the narcotic pain pills with it. The max times per day for the Advil is 4 times daily. Watch for stomach upset. When on this avoid alcohol or spicy food. You do not use alcohol.       Pawan Hampton

## 2017-08-24 NOTE — MR AVS SNAPSHOT
After Visit Summary   8/24/2017    Wade Acevedo    MRN: [id number]           Patient Information     Date Of Birth          1960        Visit Information        Provider Department      8/24/2017 11:00 AM Pawan Hampton MD [facility]        Today's Diagnoses     Cancer associated pain    -  1    Major depressive disorder, recurrent episode, moderate (H)          Care Instructions    (F33.1) Major depressive disorder, recurrent episode, moderate (H)  Comment:   Plan: PARoxetine (PAXIL) 10 MG tablet        You are dong well now with the depression and the Paxil is refilled and reordered for one year. Use the med and the non drug therapies as we discussed.   Exercise daily. If doing well then recheck annually. If worse in any way then recheck in the clinic.     (G89.3) Cancer associated pain    Comment:   Plan: We discussed the pain medications with the anti-inflammatory medications. Decadron that is taken before the chemotherapy is a steroid and this, along with Voltaren and Ibuprofen which are also for inflammation, can all irritate the lining of the stomach. You should only take one of these three, at one time. The max dose of Advil or Ibuprofen is 800 mg at one time. Take with food and you may take one of the narcotic pain pills with it. The max times per day for the Advil is 4 times daily. Watch for stomach upset. When on this avoid alcohol or spicy food. You do not use alcohol.           Follow-ups after your visit        Your next 10 appointments already scheduled     Sep 07, 2017  8:30 AM CDT   Level 6 with ROOM 1 [facility] ([facility])    [facility]  [address]  [facility] [address]   [phone]            Sep 07, 2017  9:30 AM CDT   Return Visit with Pasquale Naylor MD   [facility] ([facility])    [facility]  [address]  [facility]  "[address]   [phone]            2017 11:30 AM CST   (Arrive by 11:15 AM)   RETURN ENDOCRINE with Padma Medley MD   [facility] ([facility])    [address] [place] [address] [phone]              Who to contact     If you have questions or need follow up information about today's clinic visit or your schedule please contact [facility] directly at [phone].  Normal or non-critical lab and imaging results will be communicated to you by [employer], letter or phone within 4 business days after the clinic has received the results. If you do not hear from us within 7 days, please contact the clinic through [employer] or phone. If you have a critical or abnormal lab result, we will notify you by phone as soon as possible.  Submit refill requests through [employer] or call your pharmacy and they will forward the refill request to us. Please allow 3 business days for your refill to be completed.          Additional Information About Your Visit        [employer]ner Information     [employer] lets you send messages to your doctor, view your test results, renew your prescriptions, schedule appointments and more. To sign up, go to [email or web address] . Click on \"Log in\" on the left side of the screen, which will take you to the Welcome page. Then click on \"Sign up Now\" on the right side of the page.     You will be asked to enter the access code listed below, as well as some personal information. Please follow the directions to create your username and password.     Your access code is: [email or web address]  Expires: 2017  9:54 AM     Your access code will  in 90 days. If you need help or a new code, please call your [facility] or [phone].        Care EveryWhere ID     This is your Care EveryWhere ID. This could be used by other organizations to access your [place] medical records  [id number]        Your Vitals Were     Pulse " "Temperature Height Pulse Oximetry BMI (Body Mass Index)       68 97.1  F (36.2  C) (Tympanic) 5' 11.14\" (1.807 m) 100% 21.64 kg/m2        Blood Pressure from Last 3 Encounters:   08/24/17 124/73   08/17/17 115/74   08/17/17 123/74    Weight from Last 3 Encounters:   08/24/17 155 lb 12.8 oz (70.7 kg)   08/17/17 166 lb 6.4 oz (75.5 kg)   08/10/17 160 lb 1.6 oz (72.6 kg)              Today, you had the following     No orders found for display         Today's Medication Changes          These changes are accurate as of: 8/24/17 11:52 AM.  If you have any questions, ask your nurse or doctor.               These medicines have changed or have updated prescriptions.        Dose/Directions    senna-docusate 8.6-50 MG per tablet   Commonly known as:  SENOKOT-S;PERICOLACE   This may have changed:  additional instructions   Used for:  Non-small cell lung cancer (NSCLC) (H)        Dose:  1 tablet   Take 1 tablet by mouth 2 times daily Reported on 5/4/2017   Quantity:  100 tablet   Refills:  3            Where to get your medicines      These medications were sent to [facility] [address] [place] [address] AT [address]  [address] [facility] [address]     Phone:  [phone]     PARoxetine 10 MG tablet                Primary Care Provider Office Phone # Fax #    Pawan Hampton [phone] [address] [facility] [address]        Equal Access to Services     MARY LOU SMITH AH: Hadgaudencio Molina, waaxda luqadaha, qaybta kaalradhika fonseca, gena evans. So [facility] [phone].    ATENCIÓN: Si habla español, tiene a burns disposición servicios gratuitos de asistencia lingüística. Llame al [phone].    We comply with applicable federal civil rights laws and [place] laws. We do not discriminate on the basis of race, color, national origin, age, disability sex, sexual orientation or gender identity.          "   Thank you!     Thank you for choosing [facility]  for your care. Our goal is always to provide you with excellent care. Hearing back from our patients is one way we can continue to improve our services. Please take a few minutes to complete the written survey that you may receive in the mail after your visit with us. Thank you!             Your Updated Medication List - Protect others around you: Learn how to safely use, store and throw away your medicines at www.disposemymeds.org.          This list is accurate as of: 8/24/17 11:52 AM.  Always use your most recent med list.                   Brand Name Dispense Instructions for use Diagnosis    bisacodyl 10 MG Suppository    DULCOLAX    12 suppository    Place 1 suppository (10 mg) rectally daily as needed for constipation    Drug-induced constipation       capsaicin 0.025 % Crea cream    ZOSTRIX    120 g    Apply to R hip and thigh every 2 hours as needed for pain.    Multiple joint pain, Cancer associated pain       dexamethasone 4 MG tablet    DECADRON    15 tablet    Take 5 tablets (20 mg) by mouth daily X 3 days each cycle. To be taken the day before, day of, and day after chemotherapy infusion.    Anemia, unspecified type, Non-small cell lung cancer (NSCLC) (H)       diclofenac 1 % Gel topical gel    VOLTAREN    200 g    Apply 4 grams to knees or 2 grams to hands four times daily using enclosed dosing card.    Multiple joint pain       lidocaine-prilocaine cream    EMLA    30 g    Apply topically over port 45 - 60 minutes prior to port access.    Non-small cell lung cancer (NSCLC) (H)       LORazepam 0.5 MG tablet    ATIVAN    30 tablet    Take 1 tablet (0.5 mg) by mouth every 4 hours as needed (Anxiety, Nausea/Vomiting or Sleep)    Non-small cell lung cancer (NSCLC) (H)       morphine 15 MG 12 hr tablet    MS CONTIN    90 tablet    Take 1 tablet (15 mg) by mouth every 8 hours    Non-small cell lung cancer (NSCLC) (H)       * nicotine 21  MG/24HR 24 hr patch    NICODERM CQ    30 patch    Place 1 patch onto the skin every 24 hours    Tobacco abuse       * nicotine 14 MG/24HR 24 hr patch    NICODERM CQ    30 patch    Place 1 patch onto the skin every 24 hours    Tobacco abuse       * nicotine 7 MG/24HR 24 hr patch    NICODERM CQ    30 patch    Place 1 patch onto the skin every 24 hours    Tobacco abuse       ondansetron 8 MG tablet    ZOFRAN    10 tablet    Take 1 tablet (8 mg) by mouth every 8 hours as needed (Nausea/Vomiting)    Non-small cell lung cancer (NSCLC) (H)       oxyCODONE 10 MG IR tablet    ROXICODONE    100 tablet    Take 1 tablet (10 mg) by mouth every 4 hours as needed for moderate to severe pain    Non-small cell lung cancer (NSCLC) (H), Cancer associated pain       pantoprazole 40 MG EC tablet    PROTONIX    60 tablet    Take 1 tablet (40 mg) by mouth 2 times daily (before meals)    Non-small cell lung cancer (NSCLC) (H)       PARoxetine 10 MG tablet    PAXIL    30 tablet    Take 1 tablet (10 mg) by mouth At Bedtime (Needs follow-up appointment for this medication)    Major depressive disorder, recurrent episode, moderate (H)       polyethylene glycol powder    MIRALAX    510 g    Take 17 g (1 capful) by mouth daily Mix in 4-8 oz of fluid of choice. For constipation    Drug-induced constipation       prochlorperazine 10 MG tablet    COMPAZINE    30 tablet    Take 1 tablet (10 mg) by mouth every 6 hours as needed (Nausea/Vomiting)    Non-small cell lung cancer (NSCLC) (H)       senna-docusate 8.6-50 MG per tablet    SENOKOT-S;PERICOLACE    100 tablet    Take 1 tablet by mouth 2 times daily Reported on 5/4/2017    Non-small cell lung cancer (NSCLC) (H)       * Notice:  This list has 3 medication(s) that are the same as other medications prescribed for you. Read the directions carefully, and ask your doctor or other care provider to review them with you.

## 2017-08-24 NOTE — PATIENT INSTRUCTIONS
(F33.1) Major depressive disorder, recurrent episode, moderate (H)  Comment:   Plan: PARoxetine (PAXIL) 10 MG tablet        You are dong well now with the depression and the Paxil is refilled and reordered for one year. Use the med and the non drug therapies as we discussed.   Exercise daily. If doing well then recheck annually. If worse in any way then recheck in the clinic.     (G89.3) Cancer associated pain    Comment:   Plan: We discussed the pain medications with the anti-inflammatory medications. Decadron that is taken before the chemotherapy is a steroid and this, along with Voltaren and Ibuprofen which are also for inflammation, can all irritate the lining of the stomach. You should only take one of these three, at one time. The max dose of Advil or Ibuprofen is 800 mg at one time. Take with food and you may take one of the narcotic pain pills with it. The max times per day for the Advil is 4 times daily. Watch for stomach upset. When on this avoid alcohol or spicy food. You do not use alcohol.

## 2017-08-31 NOTE — PROGRESS NOTES
Clinic Care Coordination Contact  Care Team Conversations  D/I: This RN CC has been unable to reach patient in 3 attempts. Letter was sent 8/18. Original notification was from [facility] on 5/18,  after patient was DC'd from TCU ([place]) and was starting HC. Placed call to [facility] today and spoke with Melissa. She reports that he continues to receive HC (RN/HHA).   P: RN CC will close to active CC at this time as he continues to have HC and has not responded to CC outreaches.    José Miguel CLEMONSN,RN- BC  Clinic Care Coordinator  [facility]  Phone: [phone]

## 2017-08-31 NOTE — PROGRESS NOTES
Clinic Care Coordination Contact  [facility]/Voicemail    Referral Source: Home Care Priority Patient  Clinical Data: Care Coordinator Outreach  Outreach attempted x 3.  Left message on voicemail with call back information and requested return call.  Plan: Care Coordinator mailed out care coordination introduction letter on 8/18/17. Care Coordinator will do no further outreaches at this time.  José Miguel GAMEZ,RN- BC  Clinic Care Coordinator  [facility]  Phone: [phone]

## 2017-09-07 NOTE — MR AVS SNAPSHOT
After Visit Summary   9/7/2017    Wade Acevedo    MRN: [id number]           Patient Information     Date Of Birth          1960        Visit Information        Provider Department      9/7/2017 8:30 AM ROOM 1 [facility]        Today's Diagnoses     Non-small cell lung cancer (NSCLC) (H)    -  1    Anemia, unspecified type           Follow-ups after your visit        Your next 10 appointments already scheduled     Sep 14, 2017  8:30 AM CDT   Level 6 with ROOM 4 [facility] ([facility])    [facility]  [address]  [facility] [address]   [phone]            Sep 28, 2017 10:30 AM CDT   Return Visit with Pasquale Naylor MD   [facility] ([facility])    [facility]  [address]  [facility] [address]   [phone]            Oct 05, 2017  8:00 AM CDT   Level 6 with ROOM 7 [facility] ([facility])    [facility]  [address]  [facility] [address]   [phone]            Nov 14, 2017 11:30 AM CST   (Arrive by 11:15 AM)   RETURN ENDOCRINE with Padma Medley MD   [facility] ([facility])    [address] [place] [address] [phone]              Future tests that were ordered for you today     Open Standing Orders        Priority Remaining Interval Expires Ordered    Red blood cell prepare order unit Routine 98/100 CONDITIONAL (SPECIFY) BLOOD  9/7/2017            Who to contact     If you have questions or need follow up information about today's clinic visit or your schedule please contact [facility] directly at [phone].  Normal or non-critical lab and imaging results will be communicated to you by MyChart, letter or phone within 4 business days after the clinic has received the results. If you do not hear from  "us within 7 days, please contact the clinic through "[employer] or phone. If you have a critical or abnormal lab result, we will notify you by phone as soon as possible.  Submit refill requests through "[employer] or call your pharmacy and they will forward the refill request to us. Please allow 3 business days for your refill to be completed.          Additional Information About Your Visit        [employer] Information     "[employer] lets you send messages to your doctor, view your test results, renew your prescriptions, schedule appointments and more. To sign up, go to [email or web address] . Click on \"Log in\" on the left side of the screen, which will take you to the Welcome page. Then click on \"Sign up Now\" on the right side of the page.     You will be asked to enter the access code listed below, as well as some personal information. Please follow the directions to create your username and password.     Your access code is: [email or web address]  Expires: 2017  9:54 AM     Your access code will  in 90 days. If you need help or a new code, please call your [facility] clinic or [phone].        Care EveryWhere ID     This is your Care EveryWhere ID. This could be used by other organizations to access your [facility] medical records  [id number]        Your Vitals Were     Pulse Temperature Respirations Pulse Oximetry          59 96.7  F (35.9  C) 16 96%         Blood Pressure from Last 3 Encounters:   17 138/86   17 134/82   17 124/73    Weight from Last 3 Encounters:   17 71.2 kg (157 lb)   17 70.7 kg (155 lb 12.8 oz)   17 75.5 kg (166 lb 6.4 oz)              We Performed the Following     ABO/Rh type and screen     Blood component     Blood component     CBC with platelets differential     Comprehensive metabolic panel     Transfuse red blood cell unit     Transfuse red blood cell unit          Today's Medication Changes          These changes are accurate as of: 17  2:01 PM.  If " you have any questions, ask your nurse or doctor.               These medicines have changed or have updated prescriptions.        Dose/Directions    morphine 30 MG 12 hr tablet   Commonly known as:  MS CONTIN   This may have changed:    - medication strength  - how much to take  - when to take this   Used for:  Non-small cell lung cancer (NSCLC) (H)   Changed by:  Pasquale Naylor MD        Dose:  30 mg   Take 1 tablet (30 mg) by mouth 2 times daily   Quantity:  60 tablet   Refills:  0       senna-docusate 8.6-50 MG per tablet   Commonly known as:  SENOKOT-S;PERICOLACE   This may have changed:  additional instructions   Used for:  Non-small cell lung cancer (NSCLC) (H)        Dose:  1 tablet   Take 1 tablet by mouth 2 times daily Reported on 5/4/2017   Quantity:  100 tablet   Refills:  3            Where to get your medicines      Some of these will need a paper prescription and others can be bought over the counter.  Ask your nurse if you have questions.     Bring a paper prescription for each of these medications     morphine 30 MG 12 hr tablet    oxyCODONE 10 MG IR tablet                Primary Care Provider Office Phone # Fax #    Pawan Cristian Hampton [phone] [address] [address]        Equal Access to Services     [facility] AH: Saul Molina, waduane apple, qachristofer kaalmada raymundo, gena evans. So [facility] [phone].    ATENCIÓN: Si habla español, tiene a burns disposición servicios gratuitos de asistencia lingüística. Lucero al [phone].    We comply with applicable federal civil rights laws and [place] laws. We do not discriminate on the basis of race, color, national origin, age, disability sex, sexual orientation or gender identity.            Thank you!     Thank you for choosing [facility]  for your care. Our goal is always to provide you with excellent care. Hearing back from our patients is one  way we can continue to improve our services. Please take a few minutes to complete the written survey that you may receive in the mail after your visit with us. Thank you!             Your Updated Medication List - Protect others around you: Learn how to safely use, store and throw away your medicines at www.disposemymeds.org.          This list is accurate as of: 9/7/17  2:01 PM.  Always use your most recent med list.                   Brand Name Dispense Instructions for use Diagnosis    bisacodyl 10 MG Suppository    DULCOLAX    12 suppository    Place 1 suppository (10 mg) rectally daily as needed for constipation    Drug-induced constipation       capsaicin 0.025 % Crea cream    ZOSTRIX    120 g    Apply to R hip and thigh every 2 hours as needed for pain.    Multiple joint pain, Cancer associated pain       dexamethasone 4 MG tablet    DECADRON    15 tablet    Take 5 tablets (20 mg) by mouth daily X 3 days each cycle. To be taken the day before, day of, and day after chemotherapy infusion.    Anemia, unspecified type, Non-small cell lung cancer (NSCLC) (H)       diclofenac 1 % Gel topical gel    VOLTAREN    200 g    Apply 4 grams to knees or 2 grams to hands four times daily using enclosed dosing card.    Multiple joint pain       lidocaine-prilocaine cream    EMLA    30 g    Apply topically over port 45 - 60 minutes prior to port access.    Non-small cell lung cancer (NSCLC) (H)       LORazepam 0.5 MG tablet    ATIVAN    30 tablet    Take 1 tablet (0.5 mg) by mouth every 4 hours as needed (Anxiety, Nausea/Vomiting or Sleep)    Non-small cell lung cancer (NSCLC) (H)       morphine 30 MG 12 hr tablet    MS CONTIN    60 tablet    Take 1 tablet (30 mg) by mouth 2 times daily    Non-small cell lung cancer (NSCLC) (H)       * nicotine 21 MG/24HR 24 hr patch    NICODERM CQ    30 patch    Place 1 patch onto the skin every 24 hours    Tobacco abuse       * nicotine 14 MG/24HR 24 hr patch    NICODERM CQ    30 patch     Place 1 patch onto the skin every 24 hours    Tobacco abuse       * nicotine 7 MG/24HR 24 hr patch    NICODERM CQ    30 patch    Place 1 patch onto the skin every 24 hours    Tobacco abuse       ondansetron 8 MG tablet    ZOFRAN    10 tablet    Take 1 tablet (8 mg) by mouth every 8 hours as needed (Nausea/Vomiting)    Non-small cell lung cancer (NSCLC) (H)       oxyCODONE 10 MG IR tablet    ROXICODONE    100 tablet    Take 1 tablet (10 mg) by mouth every 4 hours as needed for moderate to severe pain    Non-small cell lung cancer (NSCLC) (H), Cancer associated pain       pantoprazole 40 MG EC tablet    PROTONIX    60 tablet    Take 1 tablet (40 mg) by mouth 2 times daily (before meals)    Non-small cell lung cancer (NSCLC) (H)       PARoxetine 10 MG tablet    PAXIL    30 tablet    Take 1 tablet (10 mg) by mouth At Bedtime (Needs follow-up appointment for this medication)    Major depressive disorder, recurrent episode, moderate (H)       polyethylene glycol powder    MIRALAX    510 g    Take 17 g (1 capful) by mouth daily Mix in 4-8 oz of fluid of choice. For constipation    Drug-induced constipation       prochlorperazine 10 MG tablet    COMPAZINE    30 tablet    Take 1 tablet (10 mg) by mouth every 6 hours as needed (Nausea/Vomiting)    Non-small cell lung cancer (NSCLC) (H)       senna-docusate 8.6-50 MG per tablet    SENOKOT-S;PERICOLACE    100 tablet    Take 1 tablet by mouth 2 times daily Reported on 5/4/2017    Non-small cell lung cancer (NSCLC) (H)       * Notice:  This list has 3 medication(s) that are the same as other medications prescribed for you. Read the directions carefully, and ask your doctor or other care provider to review them with you.

## 2017-09-07 NOTE — PROGRESS NOTES
Infusion Nursing Note:  Wade Acevedo presents today for chemo HELD      2U PRBCs.    Patient seen by provider today: Yes: Dr. Naylor   present during visit today: Not Applicable.    Note: N/A.    Intravenous Access:  Implanted Port.    Treatment Conditions:  Results reviewed, labs did NOT meet treatment parameters: Hgb: 7.3 & Plts 42.      Post Infusion Assessment:  Patient tolerated infusion without incident.    Discharge Plan:   Patient discharged in stable condition accompanied by: attendant.    Ra Rizvi RN

## 2017-09-07 NOTE — PATIENT INSTRUCTIONS
We would like to see you back in clinic with Dr. Naylor in 3 weeks with chemotherapy to be scheduled per treatment plan.  Your prescriptions (MS Contin, Oxycodone) have been given to you to hand carry to the pharmacy of your choice.   When you are in need of a refill, please call your pharmacy and they will send us a request.  Copy of appointments, and after visit summary (AVS) given to patient.  If you have any questions during business hours (M-F 8 AM- 4PM), please call Jillian Renee RN, BSN, OCN Oncology Hematology /Breast Cancer Navigator at [facility] [phone].   For questions after business hours, or on holidays/weekends, please call our after hours Nurse Triage line [phone]. Thank you.

## 2017-09-07 NOTE — MR AVS SNAPSHOT
After Visit Summary   9/7/2017    Wade Acevedo    MRN: [id number]           Patient Information     Date Of Birth          1960        Visit Information        Provider Department      9/7/2017 8:20 AM Hailey Bernard APRN [facility]        Today's Diagnoses     Non-small cell lung cancer (NSCLC) (H)    -  1    Radicular pain of lower extremity           Follow-ups after your visit        Your next 10 appointments already scheduled     Sep 14, 2017  8:30 AM CDT   Level 6 with ROOM 4 [facility] ([facility])    [facility]  [address]  [facility] [address]   [phone]            Sep 28, 2017 10:30 AM CDT   Return Visit with Pasquale Naylor MD   [facility] ([facility])    [facility]  [address]  [facility] [address]   [phone]            Oct 05, 2017  8:00 AM CDT   Level 6 with ROOM 7 [facility] ([facility])    [facility]  [address]  [facility] [address]   [phone]            Nov 14, 2017 11:30 AM CST   (Arrive by 11:15 AM)   RETURN ENDOCRINE with Padma Medley MD   [facility] ([facility])    [address] [place] [address] [phone]              Who to contact     If you have questions or need follow up information about today's clinic visit or your schedule please contact [facility] directly at [phone].  Normal or non-critical lab and imaging results will be communicated to you by MyChart, letter or phone within 4 business days after the clinic has received the results. If you do not hear from us within 7 days, please contact the clinic through MyChart or phone. If you have a critical or abnormal lab result, we will notify you by phone as soon as possible.  Submit refill requests through  "[employer] or call your pharmacy and they will forward the refill request to us. Please allow 3 business days for your refill to be completed.          Additional Information About Your Visit        MyChart Information     [employer] lets you send messages to your doctor, view your test results, renew your prescriptions, schedule appointments and more. To sign up, go to [email or web address] . Click on \"Log in\" on the left side of the screen, which will take you to the Welcome page. Then click on \"Sign up Now\" on the right side of the page.     You will be asked to enter the access code listed below, as well as some personal information. Please follow the directions to create your username and password.     Your access code is: [email or web address]  Expires: 2017  9:54 AM     Your access code will  in 90 days. If you need help or a new code, please call your [facility] clinic or [phone].        Care EveryWhere ID     This is your Care EveryWhere ID. This could be used by other organizations to access your [facility] medical records  [id number]        Your Vitals Were     Pulse Temperature Pulse Oximetry             60 96.9  F (36.1  C) 95%          Blood Pressure from Last 3 Encounters:   17 138/86   17 134/82   17 110/66    Weight from Last 3 Encounters:   17 157 lb (71.2 kg)   17 155 lb 12.8 oz (70.7 kg)   17 166 lb 6.4 oz (75.5 kg)              Today, you had the following     No orders found for display         Today's Medication Changes          These changes are accurate as of: 17 11:59 PM.  If you have any questions, ask your nurse or doctor.               These medicines have changed or have updated prescriptions.        Dose/Directions    morphine 30 MG 12 hr tablet   Commonly known as:  MS CONTIN   This may have changed:    - medication strength  - how much to take  - when to take this   Used for:  Non-small cell lung cancer (NSCLC) (H)   Changed by:  Dayday " Pasquale CAMILO MD        Dose:  30 mg   Take 1 tablet (30 mg) by mouth 2 times daily   Quantity:  60 tablet   Refills:  0       senna-docusate 8.6-50 MG per tablet   Commonly known as:  SENOKOT-S;PERICOLACE   This may have changed:  additional instructions   Used for:  Non-small cell lung cancer (NSCLC) (H)        Dose:  1 tablet   Take 1 tablet by mouth 2 times daily Reported on 5/4/2017   Quantity:  100 tablet   Refills:  3            Where to get your medicines      Some of these will need a paper prescription and others can be bought over the counter.  Ask your nurse if you have questions.     Bring a paper prescription for each of these medications     morphine 30 MG 12 hr tablet    oxyCODONE 10 MG IR tablet                Primary Care Provider Office Phone # Fax #    Pawan Cristian Hampton [phone] [address] [facility] [address]        Equal Access to Services     [facility]: Hadii jaxon gerard Sonilsa, waaxda luqjoana, qaybta kaalmada raymundo, gena romero . So [facility] [phone].    ATENCIÓN: Si habla español, tiene a burns disposición servicios gratuitos de asistencia lingüística. Llame al [phone].    We comply with applicable federal civil rights laws and [place] laws. We do not discriminate on the basis of race, color, national origin, age, disability sex, sexual orientation or gender identity.            Thank you!     Thank you for choosing [facility]  for your care. Our goal is always to provide you with excellent care. Hearing back from our patients is one way we can continue to improve our services. Please take a few minutes to complete the written survey that you may receive in the mail after your visit with us. Thank you!             Your Updated Medication List - Protect others around you: Learn how to safely use, store and throw away your medicines at www.disposemymeds.org.          This list is accurate as of: 9/7/17 11:59  PM.  Always use your most recent med list.                   Brand Name Dispense Instructions for use Diagnosis    bisacodyl 10 MG Suppository    DULCOLAX    12 suppository    Place 1 suppository (10 mg) rectally daily as needed for constipation    Drug-induced constipation       capsaicin 0.025 % Crea cream    ZOSTRIX    120 g    Apply to R hip and thigh every 2 hours as needed for pain.    Multiple joint pain, Cancer associated pain       dexamethasone 4 MG tablet    DECADRON    15 tablet    Take 5 tablets (20 mg) by mouth daily X 3 days each cycle. To be taken the day before, day of, and day after chemotherapy infusion.    Anemia, unspecified type, Non-small cell lung cancer (NSCLC) (H)       diclofenac 1 % Gel topical gel    VOLTAREN    200 g    Apply 4 grams to knees or 2 grams to hands four times daily using enclosed dosing card.    Multiple joint pain       lidocaine-prilocaine cream    EMLA    30 g    Apply topically over port 45 - 60 minutes prior to port access.    Non-small cell lung cancer (NSCLC) (H)       LORazepam 0.5 MG tablet    ATIVAN    30 tablet    Take 1 tablet (0.5 mg) by mouth every 4 hours as needed (Anxiety, Nausea/Vomiting or Sleep)    Non-small cell lung cancer (NSCLC) (H)       morphine 30 MG 12 hr tablet    MS CONTIN    60 tablet    Take 1 tablet (30 mg) by mouth 2 times daily    Non-small cell lung cancer (NSCLC) (H)       * nicotine 21 MG/24HR 24 hr patch    NICODERM CQ    30 patch    Place 1 patch onto the skin every 24 hours    Tobacco abuse       * nicotine 14 MG/24HR 24 hr patch    NICODERM CQ    30 patch    Place 1 patch onto the skin every 24 hours    Tobacco abuse       * nicotine 7 MG/24HR 24 hr patch    NICODERM CQ    30 patch    Place 1 patch onto the skin every 24 hours    Tobacco abuse       ondansetron 8 MG tablet    ZOFRAN    10 tablet    Take 1 tablet (8 mg) by mouth every 8 hours as needed (Nausea/Vomiting)    Non-small cell lung cancer (NSCLC) (H)       oxyCODONE 10 MG  IR tablet    ROXICODONE    100 tablet    Take 1 tablet (10 mg) by mouth every 4 hours as needed for moderate to severe pain    Non-small cell lung cancer (NSCLC) (H), Cancer associated pain       pantoprazole 40 MG EC tablet    PROTONIX    60 tablet    Take 1 tablet (40 mg) by mouth 2 times daily (before meals)    Non-small cell lung cancer (NSCLC) (H)       PARoxetine 10 MG tablet    PAXIL    30 tablet    Take 1 tablet (10 mg) by mouth At Bedtime (Needs follow-up appointment for this medication)    Major depressive disorder, recurrent episode, moderate (H)       polyethylene glycol powder    MIRALAX    510 g    Take 17 g (1 capful) by mouth daily Mix in 4-8 oz of fluid of choice. For constipation    Drug-induced constipation       prochlorperazine 10 MG tablet    COMPAZINE    30 tablet    Take 1 tablet (10 mg) by mouth every 6 hours as needed (Nausea/Vomiting)    Non-small cell lung cancer (NSCLC) (H)       senna-docusate 8.6-50 MG per tablet    SENOKOT-S;PERICOLACE    100 tablet    Take 1 tablet by mouth 2 times daily Reported on 5/4/2017    Non-small cell lung cancer (NSCLC) (H)       * Notice:  This list has 3 medication(s) that are the same as other medications prescribed for you. Read the directions carefully, and ask your doctor or other care provider to review them with you.

## 2017-09-07 NOTE — MR AVS SNAPSHOT
After Visit Summary   9/7/2017    Wade Acevedo    MRN: [id number]           Patient Information     Date Of Birth          1960        Visit Information        Provider Department      9/7/2017 9:30 AM Pasquale Naylor MD [facility] ONCOLOGY      Today's Diagnoses     Cancer associated pain    -  1    Non-small cell lung cancer (NSCLC) (H)        Thrombocytopenia (H)        Anemia, chronic disease        Tobacco use disorder          Care Instructions    We would like to see you back in clinic with Dr. Naylor in 3 weeks with chemotherapy to be scheduled per treatment plan.  Your prescriptions (MS Contin, Oxycodone) have been given to you to hand carry to the pharmacy of your choice.   When you are in need of a refill, please call your pharmacy and they will send us a request.  Copy of appointments, and after visit summary (AVS) given to patient.  If you have any questions during business hours (M-F 8 AM- 4PM), please call Jillian Renee RN, BSN, OCN Oncology Hematology /Breast Cancer Navigator at [facility] [phone].   For questions after business hours, or on holidays/weekends, please call our after hours Nurse Triage line [phone]. Thank you.            Follow-ups after your visit        Follow-up notes from your care team     Return in about 3 weeks (around 9/28/2017) for Schedule for chemotherapy as per treatment plan.      Your next 10 appointments already scheduled     Sep 07, 2017  9:30 AM CDT   Return Visit with Pasquale Naylor MD   [facility] ([facility])    [facility]  [address]  [facility] [address]   [phone]            Sep 14, 2017  8:30 AM CDT   Level 6 with ROOM 4 [facility] ([facility])    [facility]  [address]  [facility] [address]   [phone]            Sep 28, 2017 10:30  "AM CDT   Return Visit with Pasquale Naylor MD   [facility] ([facility])    [facility]  [address]  [facility] [address]   [phone]            Oct 05, 2017  8:00 AM CDT   Level 6 with ROOM 7 [facility] ([facility])    [facility]  [address]  [facility] [address]   [phone]            Nov 14, 2017 11:30 AM CST   (Arrive by 11:15 AM)   RETURN ENDOCRINE with Padma Medley MD   [facility] Endocrinology ([facility] [place])    [address] [facility]  [address] [facility] [address] [phone]              Future tests that were ordered for you today     Open Standing Orders        Priority Remaining Interval Expires Ordered    Red blood cell prepare order unit Routine 98/100 CONDITIONAL (SPECIFY) BLOOD  9/7/2017    Transfuse red blood cell unit Routine 1/2 TRANSFUSE 2 UNITS  9/7/2017            Who to contact     If you have questions or need follow up information about today's clinic visit or your schedule please contact [facility] directly at [phone].  Normal or non-critical lab and imaging results will be communicated to you by MyChart, letter or phone within 4 business days after the clinic has received the results. If you do not hear from us within 7 days, please contact the clinic through [email or web address] or phone. If you have a critical or abnormal lab result, we will notify you by phone as soon as possible.  Submit refill requests through [facility] or call your pharmacy and they will forward the refill request to us. Please allow 3 business days for your refill to be completed.          Additional Information About Your Visit        [facility] Information     [facility] lets you send messages to your doctor, view your test results, renew your prescriptions, schedule appointments and more. To sign up, go to [email or web address] . Click on \"Log in\" on " "the left side of the screen, which will take you to the Welcome page. Then click on \"Sign up Now\" on the right side of the page.     You will be asked to enter the access code listed below, as well as some personal information. Please follow the directions to create your username and password.     Your access code is: [email or web address]  Expires: 2017  9:54 AM     Your access code will  in 90 days. If you need help or a new code, please call your [facility] or [phone].        Care EveryWhere ID     This is your Care EveryWhere ID. This could be used by other organizations to access your [place] medical records  [id number]        Your Vitals Were     Pulse Temperature Respirations Height Pulse Oximetry BMI (Body Mass Index)    66 98.2  F (36.8  C) (Tympanic) 18 1.807 m (5' 11.14\") 100% 21.81 kg/m2       Blood Pressure from Last 3 Encounters:   17 138/86   17 124/73   17 115/74    Weight from Last 3 Encounters:   17 71.2 kg (157 lb)   17 70.7 kg (155 lb 12.8 oz)   17 75.5 kg (166 lb 6.4 oz)              Today, you had the following     No orders found for display         Today's Medication Changes          These changes are accurate as of: 17  9:16 AM.  If you have any questions, ask your nurse or doctor.               These medicines have changed or have updated prescriptions.        Dose/Directions    morphine 30 MG 12 hr tablet   Commonly known as:  MS CONTIN   This may have changed:    - medication strength  - how much to take  - when to take this   Used for:  Non-small cell lung cancer (NSCLC) (H)   Changed by:  Pasquale Naylor MD        Dose:  30 mg   Take 1 tablet (30 mg) by mouth 2 times daily   Quantity:  60 tablet   Refills:  0       senna-docusate 8.6-50 MG per tablet   Commonly known as:  SENOKOT-S;PERICOLACE   This may have changed:  additional instructions   Used for:  Non-small cell lung cancer (NSCLC) (H)        Dose:  1 tablet   Take 1 " tablet by mouth 2 times daily Reported on 5/4/2017   Quantity:  100 tablet   Refills:  3            Where to get your medicines      Some of these will need a paper prescription and others can be bought over the counter.  Ask your nurse if you have questions.     Bring a paper prescription for each of these medications     morphine 30 MG 12 hr tablet    oxyCODONE 10 MG IR tablet                Primary Care Provider Office Phone # Fax #    Pawan Cristian Hampton [phone] [address] [facility] [address]        Equal Access to Services     GONZALO SMITH : Hadii jaxon ku hadasho Soomaali, waaxda luqadaha, qaybta kaalmada adeegyafam, gena romero . So [facility] [phone].    ATENCIÓN: Si habla español, tiene a burns disposición servicios gratuitos de asistencia lingüística. [facility] [phone].    We comply with applicable federal civil rights laws and [place] laws. We do not discriminate on the basis of race, color, national origin, age, disability sex, sexual orientation or gender identity.            Thank you!     Thank you for choosing [facility]  for your care. Our goal is always to provide you with excellent care. Hearing back from our patients is one way we can continue to improve our services. Please take a few minutes to complete the written survey that you may receive in the mail after your visit with us. Thank you!             Your Updated Medication List - Protect others around you: Learn how to safely use, store and throw away your medicines at www.disposemymeds.org.          This list is accurate as of: 9/7/17  9:16 AM.  Always use your most recent med list.                   Brand Name Dispense Instructions for use Diagnosis    bisacodyl 10 MG Suppository    DULCOLAX    12 suppository    Place 1 suppository (10 mg) rectally daily as needed for constipation    Drug-induced constipation       capsaicin 0.025 % Crea cream    ZOSTRIX    120 g     Apply to R hip and thigh every 2 hours as needed for pain.    Multiple joint pain, Cancer associated pain       dexamethasone 4 MG tablet    DECADRON    15 tablet    Take 5 tablets (20 mg) by mouth daily X 3 days each cycle. To be taken the day before, day of, and day after chemotherapy infusion.    Anemia, unspecified type, Non-small cell lung cancer (NSCLC) (H)       diclofenac 1 % Gel topical gel    VOLTAREN    200 g    Apply 4 grams to knees or 2 grams to hands four times daily using enclosed dosing card.    Multiple joint pain       lidocaine-prilocaine cream    EMLA    30 g    Apply topically over port 45 - 60 minutes prior to port access.    Non-small cell lung cancer (NSCLC) (H)       LORazepam 0.5 MG tablet    ATIVAN    30 tablet    Take 1 tablet (0.5 mg) by mouth every 4 hours as needed (Anxiety, Nausea/Vomiting or Sleep)    Non-small cell lung cancer (NSCLC) (H)       morphine 30 MG 12 hr tablet    MS CONTIN    60 tablet    Take 1 tablet (30 mg) by mouth 2 times daily    Non-small cell lung cancer (NSCLC) (H)       * nicotine 21 MG/24HR 24 hr patch    NICODERM CQ    30 patch    Place 1 patch onto the skin every 24 hours    Tobacco abuse       * nicotine 14 MG/24HR 24 hr patch    NICODERM CQ    30 patch    Place 1 patch onto the skin every 24 hours    Tobacco abuse       * nicotine 7 MG/24HR 24 hr patch    NICODERM CQ    30 patch    Place 1 patch onto the skin every 24 hours    Tobacco abuse       ondansetron 8 MG tablet    ZOFRAN    10 tablet    Take 1 tablet (8 mg) by mouth every 8 hours as needed (Nausea/Vomiting)    Non-small cell lung cancer (NSCLC) (H)       oxyCODONE 10 MG IR tablet    ROXICODONE    100 tablet    Take 1 tablet (10 mg) by mouth every 4 hours as needed for moderate to severe pain    Non-small cell lung cancer (NSCLC) (H), Cancer associated pain       pantoprazole 40 MG EC tablet    PROTONIX    60 tablet    Take 1 tablet (40 mg) by mouth 2 times daily (before meals)    Non-small cell  lung cancer (NSCLC) (H)       PARoxetine 10 MG tablet    PAXIL    30 tablet    Take 1 tablet (10 mg) by mouth At Bedtime (Needs follow-up appointment for this medication)    Major depressive disorder, recurrent episode, moderate (H)       polyethylene glycol powder    MIRALAX    510 g    Take 17 g (1 capful) by mouth daily Mix in 4-8 oz of fluid of choice. For constipation    Drug-induced constipation       prochlorperazine 10 MG tablet    COMPAZINE    30 tablet    Take 1 tablet (10 mg) by mouth every 6 hours as needed (Nausea/Vomiting)    Non-small cell lung cancer (NSCLC) (H)       senna-docusate 8.6-50 MG per tablet    SENOKOT-S;PERICOLACE    100 tablet    Take 1 tablet by mouth 2 times daily Reported on 5/4/2017    Non-small cell lung cancer (NSCLC) (H)       * Notice:  This list has 3 medication(s) that are the same as other medications prescribed for you. Read the directions carefully, and ask your doctor or other care provider to review them with you.

## 2017-09-07 NOTE — PROGRESS NOTES
Hematology/ Oncology Follow-up Visit:  Sep 7, 2017    Reason for Visit:   Chief Complaint   Patient presents with     Oncology Clinic Visit     3 week recheck NSCLC, labs & chemo today       Oncologic History:  Non-small cell lung cancer (NSCLC) (H)  The patient presented with 2-3 months history of weakness and fainting episodes. He was seen in the emergency room further workup was done including a CT scan which showed a 3 cm mass in the medial left upper lobe. There was adjacent atelectasis at the prominent mediastinal lymph nodes. There is a destructive bone lesion involving left lateral fifth rib. Patient underwent transthoracic CT-guided biopsy in the hospital. The pathology report came back consistent with mixed tumor including adenocarcinoma and squamous histology. 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 He had a normal TSH, but he had low T3 and T4. He had a serum cortisol morning level 4.4. He underwent a cosyntropin stim test which was mildly abnormal. He had a cortisol level 14.8 at 30 minutes and 19.6 at 60 minutes. His 24-hour cortisol in his urine was normal. He was seen by endocrinology we will continue to monitor him in 6 months.   PET scan showed hypermetabolic bilateral level Ib and right level III lymph nodes in the neck suspicious for metastatic disease. There was also hypermetabolic activity seen in the sella consistent with pituitary macroadenoma. There is hypermetabolic mediastinal left hilar lymphadenopathy.  There is metastatic disease seen in the left scapula and left fifth rib. There is left pleural effusion and small right pleural effusion seen as well.  ALK 4 came back negative. EGFR was negative for mutation. . PDL 1 is less than 1%.       Interval History:  Patient is here today for follow-up and to receive his fifth cycle of chemotherapy with carboplatin and Taxol. His having more pain mostly in the left knee and lower  extremity.. His been having increasing shortness of breath and weakness. He denies any bruising or bleeding.     Review Of Systems:  Constitutional: Negative for fever, chills, and night sweats. Increasing fatigue  Skin: negative.  Eyes: negative.  Ears/Nose/Throat: negative.  Respiratory: There is shortness breath on exertion. He denies any cough, or hemoptysis.  Cardiovascular: negative.  Gastrointestinal: negative.  Genitourinary: negative.  Musculoskeletal: negative.  Neurologic: negative.  Psychiatric: negative.  Hematologic/Lymphatic/Immunologic: negative.  Endocrine: negative.    All other ROS negative unless mentioned in interval history.    Past medical, social, surgical, and family histories reviewed.    Allergies:  Allergies as of 09/07/2017 - Kvng as Reviewed 09/07/2017   Allergen Reaction Noted     Lubriderm Rash 04/20/2017       Current Medications:  Current Outpatient Prescriptions   Medication Sig Dispense Refill     morphine (MS CONTIN) 30 MG 12 hr tablet Take 1 tablet (30 mg) by mouth 2 times daily 60 tablet 0     oxyCODONE (ROXICODONE) 10 MG IR tablet Take 1 tablet (10 mg) by mouth every 4 hours as needed for moderate to severe pain 100 tablet 0     PARoxetine (PAXIL) 10 MG tablet Take 1 tablet (10 mg) by mouth At Bedtime (Needs follow-up appointment for this medication) 30 tablet 11     dexamethasone (DECADRON) 4 MG tablet Take 5 tablets (20 mg) by mouth daily X 3 days each cycle. To be taken the day before, day of, and day after chemotherapy infusion. 15 tablet 3     diclofenac (VOLTAREN) 1 % GEL topical gel Apply 4 grams to knees or 2 grams to hands four times daily using enclosed dosing card. 200 g 3     capsaicin (ZOSTRIX) 0.025 % CREA cream Apply to R hip and thigh every 2 hours as needed for pain. 120 g 3     pantoprazole (PROTONIX) 40 MG EC tablet Take 1 tablet (40 mg) by mouth 2 times daily (before meals) 60 tablet 6     polyethylene glycol (MIRALAX) powder Take 17 g (1 capful) by mouth  "daily Mix in 4-8 oz of fluid of choice. For constipation 510 g 11     bisacodyl (DULCOLAX) 10 MG Suppository Place 1 suppository (10 mg) rectally daily as needed for constipation 12 suppository 11     nicotine (NICODERM CQ) 21 MG/24HR 24 hr patch Place 1 patch onto the skin every 24 hours (Patient not taking: Reported on 9/7/2017) 30 patch 0     nicotine (NICODERM CQ) 14 MG/24HR 24 hr patch Place 1 patch onto the skin every 24 hours (Patient not taking: Reported on 9/7/2017) 30 patch 0     nicotine (NICODERM CQ) 7 MG/24HR 24 hr patch Place 1 patch onto the skin every 24 hours (Patient not taking: Reported on 9/7/2017) 30 patch 0     senna-docusate (SENOKOT-S;PERICOLACE) 8.6-50 MG per tablet Take 1 tablet by mouth 2 times daily Reported on 5/4/2017 (Patient taking differently: Take 1 tablet by mouth 2 times daily Reported on 5/4/2017 Taking 2 tablets in AM & 1 tablet at night.) 100 tablet 3     lidocaine-prilocaine (EMLA) cream Apply topically over port 45 - 60 minutes prior to port access. 30 g 3     LORazepam (ATIVAN) 0.5 MG tablet Take 1 tablet (0.5 mg) by mouth every 4 hours as needed (Anxiety, Nausea/Vomiting or Sleep) (Patient not taking: Reported on 9/7/2017) 30 tablet 2     prochlorperazine (COMPAZINE) 10 MG tablet Take 1 tablet (10 mg) by mouth every 6 hours as needed (Nausea/Vomiting) (Patient not taking: Reported on 9/7/2017) 30 tablet 2     ondansetron (ZOFRAN) 8 MG tablet Take 1 tablet (8 mg) by mouth every 8 hours as needed (Nausea/Vomiting) (Patient not taking: Reported on 9/7/2017) 10 tablet 2        Physical Exam:  /86 (BP Location: Right arm, Patient Position: Sitting, Cuff Size: Adult Regular)  Pulse 66  Temp 98.2  F (36.8  C) (Tympanic)  Resp 18  Ht 1.807 m (5' 11.14\")  Wt 71.2 kg (157 lb)  SpO2 100%  BMI 21.81 kg/m2  Wt Readings from Last 12 Encounters:   09/07/17 71.2 kg (157 lb)   08/24/17 70.7 kg (155 lb 12.8 oz)   08/17/17 75.5 kg (166 lb 6.4 oz)   08/10/17 72.6 kg (160 lb 1.6 " oz)   08/10/17 72.6 kg (160 lb 0.9 oz)   07/13/17 71.2 kg (156 lb 14.4 oz)   07/13/17 71.2 kg (156 lb 15.5 oz)   06/22/17 71.2 kg (157 lb)   06/22/17 71.2 kg (157 lb)   06/14/17 70.7 kg (155 lb 14.4 oz)   05/31/17 69.2 kg (152 lb 9.6 oz)   05/25/17 73 kg (161 lb)     ECOG performance status: 1  GENERAL APPEARANCE: Healthy, alert and in no acute distress. He looks pale.  HEENT: Sclerae anicteric. PERRLA. Oropharynx without ulcers, lesions, or thrush.  NECK: Supple. No asymmetry or masses.  LYMPHATICS: No palpable cervical, supraclavicular, axillary, or inguinal lymphadenopathy.  RESP: Lungs clear to auscultation bilaterally without rales, rhonchi or wheezes.  CARDIOVASCULAR: Regular rate and rhythm. Normal S1, S2; no S3 or S4. No murmur, gallop, or rub.  ABDOMEN: Soft, nontender. Bowel sounds normal. No palpable organomegaly or masses.  MUSCULOSKELETAL: Extremities without gross deformities noted. No edema of bilateral lower extremities.  SKIN: No suspicious lesions or rashes.  NEURO: Alert and oriented x 3. Cranial nerves II-XII grossly intact.  PSYCHIATRIC: Mentation and affect appear normal.    Laboratory/Imaging Studies:  Infusion Therapy Visit on 09/07/2017   Component Date Value Ref Range Status     WBC 09/07/2017 2.5* 4.0 - 11.0 10e9/L Final     RBC Count 09/07/2017 2.89* 4.4 - 5.9 10e12/L Final     Hemoglobin 09/07/2017 7.3* 13.3 - 17.7 g/dL Final     Hematocrit 09/07/2017 21.8* 40.0 - 53.0 % Final     MCV 09/07/2017 75* 78 - 100 fl Final     MCH 09/07/2017 25.3* 26.5 - 33.0 pg Final     MCHC 09/07/2017 33.5  31.5 - 36.5 g/dL Final     RDW 09/07/2017 17.7* 10.0 - 15.0 % Final     Platelet Count 09/07/2017 42* 150 - 450 10e9/L Final    Comment: This result has been called to TRACEY GHOTRA by Pilar Montanez on 09 07 2017 at 0854, and has been read back.        Diff Method 09/07/2017 Automated Method   Final     % Neutrophils 09/07/2017 60.4  % Final     % Lymphocytes 09/07/2017 31.6  % Final     %  Monocytes 09/07/2017 8.0  % Final     % Eosinophils 09/07/2017 0.0  % Final     % Basophils 09/07/2017 0.0  % Final     % Immature Granulocytes 09/07/2017 0.0  % Final     Absolute Neutrophil 09/07/2017 1.5* 1.6 - 8.3 10e9/L Final     Absolute Lymphocytes 09/07/2017 0.8  0.8 - 5.3 10e9/L Final     Absolute Monocytes 09/07/2017 0.2  0.0 - 1.3 10e9/L Final     Absolute Eosinophils 09/07/2017 0.0  0.0 - 0.7 10e9/L Final     Absolute Basophils 09/07/2017 0.0  0.0 - 0.2 10e9/L Final     Abs Immature Granulocytes 09/07/2017 0.0  0 - 0.4 10e9/L Final     Sodium 09/07/2017 138  133 - 144 mmol/L Final     Potassium 09/07/2017 3.9  3.4 - 5.3 mmol/L Final     Chloride 09/07/2017 105  94 - 109 mmol/L Final     Carbon Dioxide 09/07/2017 25  20 - 32 mmol/L Final     Anion Gap 09/07/2017 8  3 - 14 mmol/L Final     Glucose 09/07/2017 124* 70 - 99 mg/dL Final     Urea Nitrogen 09/07/2017 15  7 - 30 mg/dL Final     Creatinine 09/07/2017 0.73  0.66 - 1.25 mg/dL Final     GFR Estimate 09/07/2017 >90  >60 mL/min/1.7m2 Final    Non  GFR Calc     GFR Estimate If Black 09/07/2017 >90  >60 mL/min/1.7m2 Final    African American GFR Calc     Calcium 09/07/2017 8.8  8.5 - 10.1 mg/dL Final     Bilirubin Total 09/07/2017 0.3  0.2 - 1.3 mg/dL Final     Albumin 09/07/2017 3.5  3.4 - 5.0 g/dL Final     Protein Total 09/07/2017 6.9  6.8 - 8.8 g/dL Final     Alkaline Phosphatase 09/07/2017 157* 40 - 150 U/L Final     ALT 09/07/2017 22  0 - 70 U/L Final     AST 09/07/2017 32  0 - 45 U/L Final     Units Ordered 09/07/2017 2   Final     ABO 09/07/2017 O   Final     RH(D) 09/07/2017 Pos   Final     Antibody Screen 09/07/2017 Neg   Final     Test Valid Only At 09/07/2017 [facility]      Final     Specimen Expires 09/07/2017 09/10/2017   Final     Crossmatch 09/07/2017 Red Blood Cells   Final     Unit Number 09/07/2017 [id number]   Final     Blood Component Type 09/07/2017 Red Blood Cells Leukocyte Reduced   Final      Division Number 09/07/2017 00   Final     Status of Unit 09/07/2017 Released to care unit 09/07/2017 1007   Preliminary     Blood Product Code 09/07/2017 [id number]   Final     Unit Status 09/07/2017 ISS   Preliminary     Unit Number 09/07/2017 [id number]   Final     Blood Component Type 09/07/2017 Red Blood Cells Leukocyte Reduced   Final     Division Number 09/07/2017 00   Final     Status of Unit 09/07/2017 Released to care unit 09/07/2017 1200   Preliminary     Blood Product Code 09/07/2017 [id number]   Final     Unit Status 09/07/2017 ISS   Preliminary          Assessment and plan:    (C34.90) Non-small cell lung cancer (NSCLC) (H)  Because of cytopenia his chemotherapy will be held today. Because of cytopenias I would recommend stopping chemotherapy after the fourth cycle. We will consider immunotherapy with opdivo. I will see the patient again next week to discuss with him changing to immunotherapy.    (G89.3) Cancer associated pain  Plan: morphine (MS CONTIN) 30 MG 12 hr tablet, oxyCODONE (ROXICODONE) 10 MG IR tablet      (D69.6) Thrombocytopenia (H)  Platelet count today is 42,000. Patient denies any bruising or bleeding. We will continue to monitor.    (D63.8) Anemia, chronic disease  Patient will be receiving 2 units of packed RBCs today. We will continue to monitor hemoglobin.    (F17.200) Tobacco use disorder  We emphasized the importance of quitting smoking.    (K59.03) Drug-induced constipation  Patient will continue on Dulcolax and MiraLAX    The patient is ready to learn, no apparent learning barriers were identified.  Diagnosis and treatment plans were explained to the patient. The patient expressed understanding of the content. The patient asked appropriate questions. The patient questions were answered to his satisfaction.    Chart documentation with Dragon Voice recognition Software. Although reviewed after completion, some words and grammatical errors may remain.

## 2017-09-08 NOTE — TELEPHONE ENCOUNTER
Patient was contacted by phone( via left message for Sarah per patient request regarding how to contact him) due to reporting concerns about ability to eat on the Oncology Distress Screening tool. A voicemail message was left indicating the reason for the call and the number to use to contact dietitian.     Liudmila Porras RD,LD  Clinical Dietitian

## 2017-09-14 NOTE — PROGRESS NOTES
Hematology/ Oncology Follow-up Visit:  Sep 14, 2017    Reason for Visit:   Chief Complaint   Patient presents with     Oncology Clinic Visit     Recheck NSCLC, discuss treatment change       Oncologic History:  Non-small cell lung cancer (NSCLC) (H)  The patient presented with 2-3 months history of weakness and fainting episodes. He was seen in the emergency room further workup was done including a CT scan which showed a 3 cm mass in the medial left upper lobe. There was adjacent atelectasis at the prominent mediastinal lymph nodes. There is a destructive bone lesion involving left lateral fifth rib. Patient underwent transthoracic CT-guided biopsy in the hospital. The pathology report came back consistent with mixed tumor including adenocarcinoma and squamous histology. 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 He had a normal TSH, but he had low T3 and T4. He had a serum cortisol morning level 4.4. He underwent a cosyntropin stim test which was mildly abnormal. He had a cortisol level 14.8 at 30 minutes and 19.6 at 60 minutes. His 24-hour cortisol in his urine was normal. He was seen by endocrinology we will continue to monitor him in 6 months.   PET scan showed hypermetabolic bilateral level Ib and right level III lymph nodes in the neck suspicious for metastatic disease. There was also hypermetabolic activity seen in the sella consistent with pituitary macroadenoma. There is hypermetabolic mediastinal left hilar lymphadenopathy.  There is metastatic disease seen in the left scapula and left fifth rib. There is left pleural effusion and small right pleural effusion seen as well.  ALK 4 came back negative. EGFR was negative for mutation. . PDL 1 is less than 1%.       Interval History:  Patient is here today for follow-up. He has been having more pain recently. He denies any shortness of breath or cough or wheezing. He denies any fever or chills. His  been having fatigue. He received 2 units of packed RBCs last week. Symptoms of fatigue has improved. He also has increasing issues with his memory    Review Of Systems:  Constitutional: Negative for fever, chills, and night sweats.  Skin: negative.  Eyes: negative.  Ears/Nose/Throat: negative.  Respiratory: No shortness of breath, dyspnea on exertion, cough, or hemoptysis.  Cardiovascular: negative.  Gastrointestinal: negative.  Genitourinary: negative.  Musculoskeletal: negative.  Neurologic: negative.  Psychiatric: negative.  Hematologic/Lymphatic/Immunologic: negative.  Endocrine: negative.    All other ROS negative unless mentioned in interval history.    Past medical, social, surgical, and family histories reviewed.    Allergies:  Allergies as of 09/14/2017 - Kvng as Reviewed 09/14/2017   Allergen Reaction Noted     Lubriderm Rash 04/20/2017       Current Medications:  Current Outpatient Prescriptions   Medication Sig Dispense Refill     morphine (MS CONTIN) 30 MG 12 hr tablet Take 1 tablet (30 mg) by mouth 2 times daily 60 tablet 0     oxyCODONE (ROXICODONE) 10 MG IR tablet Take 1 tablet (10 mg) by mouth every 4 hours as needed for moderate to severe pain 100 tablet 0     PARoxetine (PAXIL) 10 MG tablet Take 1 tablet (10 mg) by mouth At Bedtime (Needs follow-up appointment for this medication) 30 tablet 11     dexamethasone (DECADRON) 4 MG tablet Take 5 tablets (20 mg) by mouth daily X 3 days each cycle. To be taken the day before, day of, and day after chemotherapy infusion. 15 tablet 3     diclofenac (VOLTAREN) 1 % GEL topical gel Apply 4 grams to knees or 2 grams to hands four times daily using enclosed dosing card. 200 g 3     capsaicin (ZOSTRIX) 0.025 % CREA cream Apply to R hip and thigh every 2 hours as needed for pain. 120 g 3     pantoprazole (PROTONIX) 40 MG EC tablet Take 1 tablet (40 mg) by mouth 2 times daily (before meals) 60 tablet 6     polyethylene glycol (MIRALAX) powder Take 17 g (1 capful)  "by mouth daily Mix in 4-8 oz of fluid of choice. For constipation 510 g 11     bisacodyl (DULCOLAX) 10 MG Suppository Place 1 suppository (10 mg) rectally daily as needed for constipation 12 suppository 11     nicotine (NICODERM CQ) 21 MG/24HR 24 hr patch Place 1 patch onto the skin every 24 hours 30 patch 0     nicotine (NICODERM CQ) 14 MG/24HR 24 hr patch Place 1 patch onto the skin every 24 hours 30 patch 0     nicotine (NICODERM CQ) 7 MG/24HR 24 hr patch Place 1 patch onto the skin every 24 hours 30 patch 0     senna-docusate (SENOKOT-S;PERICOLACE) 8.6-50 MG per tablet Take 1 tablet by mouth 2 times daily Reported on 5/4/2017 (Patient taking differently: Take 1 tablet by mouth 2 times daily Reported on 5/4/2017 Taking 2 tablets in AM & 1 tablet at night.) 100 tablet 3     lidocaine-prilocaine (EMLA) cream Apply topically over port 45 - 60 minutes prior to port access. 30 g 3        Physical Exam:  /82 (BP Location: Right arm, Patient Position: Sitting, Cuff Size: Adult Regular)  Pulse 72  Temp 98.9  F (37.2  C) (Tympanic)  Resp 18  Ht 1.807 m (5' 11.14\")  Wt 68.5 kg (151 lb)  SpO2 99%  BMI 20.98 kg/m2  Wt Readings from Last 12 Encounters:   09/14/17 68.5 kg (151 lb)   09/07/17 71.2 kg (157 lb)   08/24/17 70.7 kg (155 lb 12.8 oz)   08/17/17 75.5 kg (166 lb 6.4 oz)   08/10/17 72.6 kg (160 lb 1.6 oz)   08/10/17 72.6 kg (160 lb 0.9 oz)   07/13/17 71.2 kg (156 lb 14.4 oz)   07/13/17 71.2 kg (156 lb 15.5 oz)   06/22/17 71.2 kg (157 lb)   06/22/17 71.2 kg (157 lb)   06/14/17 70.7 kg (155 lb 14.4 oz)   05/31/17 69.2 kg (152 lb 9.6 oz)     ECOG performance status:1  GENERAL APPEARANCE: Healthy, alert and in no acute distress.  HEENT: Sclerae anicteric. PERRLA. Oropharynx without ulcers, lesions, or thrush.  NECK: Supple. No asymmetry or masses.  LYMPHATICS: No palpable cervical, supraclavicular, axillary, or inguinal lymphadenopathy.  RESP: Lungs clear to auscultation bilaterally without rales, rhonchi or " wheezes.  CARDIOVASCULAR: Regular rate and rhythm. Normal S1, S2; no S3 or S4. No murmur, gallop, or rub.  ABDOMEN: Soft, nontender. Bowel sounds normal. No palpable organomegaly or masses.  MUSCULOSKELETAL: Extremities without gross deformities noted. No edema of bilateral lower extremities.  SKIN: No suspicious lesions or rashes.  NEURO: Alert and oriented x 3. Cranial nerves II-XII grossly intact.  PSYCHIATRIC: Mentation and affect appear normal.    Laboratory/Imaging Studies:  Infusion Therapy Visit on 09/07/2017   Component Date Value Ref Range Status     WBC 09/07/2017 2.5* 4.0 - 11.0 10e9/L Final     RBC Count 09/07/2017 2.89* 4.4 - 5.9 10e12/L Final     Hemoglobin 09/07/2017 7.3* 13.3 - 17.7 g/dL Final     Hematocrit 09/07/2017 21.8* 40.0 - 53.0 % Final     MCV 09/07/2017 75* 78 - 100 fl Final     MCH 09/07/2017 25.3* 26.5 - 33.0 pg Final     MCHC 09/07/2017 33.5  31.5 - 36.5 g/dL Final     RDW 09/07/2017 17.7* 10.0 - 15.0 % Final     Platelet Count 09/07/2017 42* 150 - 450 10e9/L Final    Comment: This result has been called to TRACEY GHOTRA by Pilar Montanez on 09 07 2017 at 0854, and has been read back.        Diff Method 09/07/2017 Automated Method   Final     % Neutrophils 09/07/2017 60.4  % Final     % Lymphocytes 09/07/2017 31.6  % Final     % Monocytes 09/07/2017 8.0  % Final     % Eosinophils 09/07/2017 0.0  % Final     % Basophils 09/07/2017 0.0  % Final     % Immature Granulocytes 09/07/2017 0.0  % Final     Absolute Neutrophil 09/07/2017 1.5* 1.6 - 8.3 10e9/L Final     Absolute Lymphocytes 09/07/2017 0.8  0.8 - 5.3 10e9/L Final     Absolute Monocytes 09/07/2017 0.2  0.0 - 1.3 10e9/L Final     Absolute Eosinophils 09/07/2017 0.0  0.0 - 0.7 10e9/L Final     Absolute Basophils 09/07/2017 0.0  0.0 - 0.2 10e9/L Final     Abs Immature Granulocytes 09/07/2017 0.0  0 - 0.4 10e9/L Final     Sodium 09/07/2017 138  133 - 144 mmol/L Final     Potassium 09/07/2017 3.9  3.4 - 5.3 mmol/L Final      Chloride 09/07/2017 105  94 - 109 mmol/L Final     Carbon Dioxide 09/07/2017 25  20 - 32 mmol/L Final     Anion Gap 09/07/2017 8  3 - 14 mmol/L Final     Glucose 09/07/2017 124* 70 - 99 mg/dL Final     Urea Nitrogen 09/07/2017 15  7 - 30 mg/dL Final     Creatinine 09/07/2017 0.73  0.66 - 1.25 mg/dL Final     GFR Estimate 09/07/2017 >90  >60 mL/min/1.7m2 Final    Non  GFR Calc     GFR Estimate If Black 09/07/2017 >90  >60 mL/min/1.7m2 Final    African American GFR Calc     Calcium 09/07/2017 8.8  8.5 - 10.1 mg/dL Final     Bilirubin Total 09/07/2017 0.3  0.2 - 1.3 mg/dL Final     Albumin 09/07/2017 3.5  3.4 - 5.0 g/dL Final     Protein Total 09/07/2017 6.9  6.8 - 8.8 g/dL Final     Alkaline Phosphatase 09/07/2017 157* 40 - 150 U/L Final     ALT 09/07/2017 22  0 - 70 U/L Final     AST 09/07/2017 32  0 - 45 U/L Final     Units Ordered 09/07/2017 2   Final     ABO 09/07/2017 O   Final     RH(D) 09/07/2017 Pos   Final     Antibody Screen 09/07/2017 Neg   Final     Test Valid Only At 09/07/2017 [facility]      Final     Specimen Expires 09/07/2017 09/10/2017   Final     Crossmatch 09/07/2017 Red Blood Cells   Final     Unit Number 09/07/2017 [id number]   Final     Blood Component Type 09/07/2017 Red Blood Cells Leukocyte Reduced   Final     Division Number 09/07/2017 00   Final     Status of Unit 09/07/2017 Released to care unit   Final     Blood Product Code 09/07/2017 [id number]   Final     Unit Status 09/07/2017 ISS   Final     Unit Number 09/07/2017 [id number]   Final     Blood Component Type 09/07/2017 Red Blood Cells Leukocyte Reduced   Final     Division Number 09/07/2017 00   Final     Status of Unit 09/07/2017 Released to care unit   Final     Blood Product Code 09/07/2017 [id number]   Final     Unit Status 09/07/2017 ISS   Final        No results found for this or any previous visit (from the past 744 hour(s)).    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 Is also on OxyContin 10 mg as needed for breakthrough pain. His pain has been well-controlled.    (C34.90) Non-small cell lung cancer (NSCLC) (H)  Had a long discussion today with the patient about management plan. It appears that his been having increasing cytopenia with chemotherapy. He is requiring blood transfusion at least with each cycle. At this time I would recommend to switch patient to immune therapy. We talked about Nivolumab 240 mg intravenously every 2 weeks. I reviewed with the patient potential side effects in great details. Patient will be starting his first cycle of treatment today. We will plan to arrange for imaging studies after 4 cycles of treatment.    (D64.9) Anemia, unspecified type  Related to chemotherapy. We will continue to monitor.    (D69.6) Thrombocytopenia (H)  Related to chemotherapy. We will continue to monitor.    (F17.200) Tobacco use disorder  Strongly emphasized the importance of quitting smoking. Patient currently on nicotine patch.    (K59.03) Drug-induced constipation  Patient currently on MiraLAX bother. He is also taking Senokot-S and Dulcolax.      The patient is ready to learn, no apparent learning barriers were identified.  Diagnosis and treatment plans were explained to the patient. The patient expressed understanding of the content. The patient asked appropriate questions. The patient questions were answered to his satisfaction.    Chart documentation with Dragon Voice recognition Software. Although reviewed after completion, some words and grammatical errors may remain.

## 2017-09-14 NOTE — MR AVS SNAPSHOT
After Visit Summary   9/14/2017    Wade Acevedo    MRN: [id number]           Patient Information     Date Of Birth          1960        Visit Information        Provider Department      9/14/2017 8:00 AM Pasquale Naylor MD [facility]        Care Instructions    Dr. Naylor is changing your regimen to Opdivo.  You will receive education on this today. We would like to see you back in clinic with Dr. Naylor with Cycle 2 Opdivo.  Copy of appointments, and after visit summary (AVS) given to patient.  If you have any questions during business hours (M-F 8 AM- 4PM), please call Jillian Renee, RN, BSN, OCN Oncology Hematology /Breast Cancer Navigator at [facility] [phone].   For questions after business hours, or on holidays/weekends, please call our after hours Nurse Triage line [phone]. Thank you.            Follow-ups after your visit        Your next 10 appointments already scheduled     Sep 28, 2017  9:00 AM CDT   Level 2 with ROOM 10 [facility] ([facility])    [facility]  [address]  [facility] [address]   [phone]            Oct 12, 2017  9:00 AM CDT   Level 2 with ROOM 1 [facility] ([facility])    [facility]  [address]  [place] [address]   [phone]            Oct 12, 2017  9:30 AM CDT   Return Visit with Pasquale Naylor MD   [facility] ([facility])    [facility]  [address]  [place] [address]   [phone]            Oct 26, 2017 10:30 AM CDT   Level 2 with ROOM 4 [facility] ([facility])    [facility]  [address]  [place] [address]   [phone]            Nov 14, 2017 11:30 AM CST   (Arrive by 11:15 AM)   RETURN ENDOCRINE with Padma Medley,  "MD CASTELLANOS [facility]ology ([facility] [place])    [address]  [address] [facility] [address] [phone]              Who to contact     If you have questions or need follow up information about today's clinic visit or your schedule please contact [facility] directly at [phone].  Normal or non-critical lab and imaging results will be communicated to you by MyChart, letter or phone within 4 business days after the clinic has received the results. If you do not hear from us within 7 days, please contact the clinic through MyChart or phone. If you have a critical or abnormal lab result, we will notify you by phone as soon as possible.  Submit refill requests through [employer] or call your pharmacy and they will forward the refill request to us. Please allow 3 business days for your refill to be completed.          Additional Information About Your Visit        [employer] Information     [employer] lets you send messages to your doctor, view your test results, renew your prescriptions, schedule appointments and more. To sign up, go to [email or web address] . Click on \"Log in\" on the left side of the screen, which will take you to the Welcome page. Then click on \"Sign up Now\" on the right side of the page.     You will be asked to enter the access code listed below, as well as some personal information. Please follow the directions to create your username and password.     Your access code is: [email or web address]  Expires: 2017  9:54 AM     Your access code will  in 90 days. If you need help or a new code, please call your [place] clinic or [phone].        Care EveryWhere ID     This is your Care EveryWhere ID. This could be used by other organizations to access your [place] medical records  [id number]        Your Vitals Were     Pulse Temperature Respirations Height Pulse Oximetry BMI (Body Mass Index)    72 98.9  F (37.2  C) (Tympanic) 18 1.807 m (5' 11.14\") " 99% 20.98 kg/m2       Blood Pressure from Last 3 Encounters:   09/14/17 135/82   09/07/17 138/86   09/07/17 134/82    Weight from Last 3 Encounters:   09/14/17 68.5 kg (151 lb)   09/07/17 71.2 kg (157 lb)   08/24/17 70.7 kg (155 lb 12.8 oz)              Today, you had the following     No orders found for display         Today's Medication Changes          These changes are accurate as of: 9/14/17  9:12 AM.  If you have any questions, ask your nurse or doctor.               These medicines have changed or have updated prescriptions.        Dose/Directions    senna-docusate 8.6-50 MG per tablet   Commonly known as:  SENOKOT-S;PERICOLACE   This may have changed:  additional instructions   Used for:  Non-small cell lung cancer (NSCLC) (H)        Dose:  1 tablet   Take 1 tablet by mouth 2 times daily Reported on 5/4/2017   Quantity:  100 tablet   Refills:  3         Stop taking these medicines if you haven't already. Please contact your care team if you have questions.     LORazepam 0.5 MG tablet   Commonly known as:  ATIVAN           ondansetron 8 MG tablet   Commonly known as:  ZOFRAN           prochlorperazine 10 MG tablet   Commonly known as:  COMPAZINE                    Primary Care Provider Office Phone # Fax #    Pawan Cristian Hampton [phone] [address] [phone]        Equal Access to Services     [facility]: Hadii jaxon laurent hadasho Sonilsa, waaxda luqadaha, qaybta kaalmada adeegyada, gena romero . So [facility] [phone].    ATENCIÓN: Si habla español, tiene a burns disposición servicios gratuitos de asistencia lingüística. Lucero al [phone].    We comply with applicable federal civil rights laws and [place] laws. We do not discriminate on the basis of race, color, national origin, age, disability sex, sexual orientation or gender identity.            Thank you!     Thank you for choosing [facility]  for your care. Our goal is  always to provide you with excellent care. Hearing back from our patients is one way we can continue to improve our services. Please take a few minutes to complete the written survey that you may receive in the mail after your visit with us. Thank you!             Your Updated Medication List - Protect others around you: Learn how to safely use, store and throw away your medicines at www.disposemymeds.org.          This list is accurate as of: 9/14/17  9:12 AM.  Always use your most recent med list.                   Brand Name Dispense Instructions for use Diagnosis    bisacodyl 10 MG Suppository    DULCOLAX    12 suppository    Place 1 suppository (10 mg) rectally daily as needed for constipation    Drug-induced constipation       capsaicin 0.025 % Crea cream    ZOSTRIX    120 g    Apply to R hip and thigh every 2 hours as needed for pain.    Multiple joint pain, Cancer associated pain       dexamethasone 4 MG tablet    DECADRON    15 tablet    Take 5 tablets (20 mg) by mouth daily X 3 days each cycle. To be taken the day before, day of, and day after chemotherapy infusion.    Anemia, unspecified type, Non-small cell lung cancer (NSCLC) (H)       diclofenac 1 % Gel topical gel    VOLTAREN    200 g    Apply 4 grams to knees or 2 grams to hands four times daily using enclosed dosing card.    Multiple joint pain       lidocaine-prilocaine cream    EMLA    30 g    Apply topically over port 45 - 60 minutes prior to port access.    Non-small cell lung cancer (NSCLC) (H)       morphine 30 MG 12 hr tablet    MS CONTIN    60 tablet    Take 1 tablet (30 mg) by mouth 2 times daily    Non-small cell lung cancer (NSCLC) (H)       * nicotine 21 MG/24HR 24 hr patch    NICODERM CQ    30 patch    Place 1 patch onto the skin every 24 hours    Tobacco abuse       * nicotine 14 MG/24HR 24 hr patch    NICODERM CQ    30 patch    Place 1 patch onto the skin every 24 hours    Tobacco abuse       * nicotine 7 MG/24HR 24 hr patch     NICODERM CQ    30 patch    Place 1 patch onto the skin every 24 hours    Tobacco abuse       oxyCODONE 10 MG IR tablet    ROXICODONE    100 tablet    Take 1 tablet (10 mg) by mouth every 4 hours as needed for moderate to severe pain    Non-small cell lung cancer (NSCLC) (H), Cancer associated pain       pantoprazole 40 MG EC tablet    PROTONIX    60 tablet    Take 1 tablet (40 mg) by mouth 2 times daily (before meals)    Non-small cell lung cancer (NSCLC) (H)       PARoxetine 10 MG tablet    PAXIL    30 tablet    Take 1 tablet (10 mg) by mouth At Bedtime (Needs follow-up appointment for this medication)    Major depressive disorder, recurrent episode, moderate (H)       polyethylene glycol powder    MIRALAX    510 g    Take 17 g (1 capful) by mouth daily Mix in 4-8 oz of fluid of choice. For constipation    Drug-induced constipation       senna-docusate 8.6-50 MG per tablet    SENOKOT-S;PERICOLACE    100 tablet    Take 1 tablet by mouth 2 times daily Reported on 5/4/2017    Non-small cell lung cancer (NSCLC) (H)       * Notice:  This list has 3 medication(s) that are the same as other medications prescribed for you. Read the directions carefully, and ask your doctor or other care provider to review them with you.

## 2017-09-14 NOTE — PROGRESS NOTES
"Chemotherapy Education    Patient is a 57 year old male here today for chemotherapy education, accompanied by self and amirah on telephone conference call.  Pt has a cancer diagnosis of   Encounter Diagnoses   Name Primary?     Non-small cell lung cancer (NSCLC) (H)      Cancer associated pain Yes     Anemia, unspecified type      Thrombocytopenia (H)      Tobacco use disorder      Drug-induced constipation      and their main concern is \"i don't know.  Their Oncologist is Dr. SHAGGY Naylor, and PCP is Pawan Hampton.  Reviewed the following with the patient and their support person:  Treatment goal: palliative  Treatment regimen & duration: Opdivo day 1 every 14 days until progression and rationale for strict adherence to this schedule, including specific medication names including pre-treatment medications and at home scheduled or as needed medications, delivery methods,.  Potential side effects: Side effects and management; including skin changes/hand-foot syndrome, anemia, neutropenia, thrombocytopenia, diarrhea/constipation, hair loss syndrome, memory changes/ \"chemobrain\", mouth sores, taste changes, neuropathy, fatigue, myelosuppression, sexuality/infertility, and risk of extravasation or infiltration.  Infection prevention, and monitoring of lab values, what lab tests and what changes of these values meant, along with the possibility of hydration or blood product transfusion, or the need to defer or hold treatment.    Medicaation information, including ways it is excreted from the body and cleaning and containment of vomitus or other bodily fluid, use of the bathroom, sexual health and intimacy, what to do if needing to miss a treatment, when to call a provider and the need for staff to wear protective equipment.  Importance of Central line care (port) or IV site care.  Written information: Medication information as well as written information  on when to contact the provider, various programs offered at " [facility], and our business card with contact information given; Oncology Clinic, RN Case Manager, and the after hours Nurse Advise Line.  General orientation to the Medical Oncology department, Infusion Services department, Huc/scheduling, bathrooms and usual flow of the treatment day provided as well as introduction to the Infusion nurses.  No barriers to learning identified. Patient and family verbalized understanding of all written and verbal information. All questions answered to patients satisfaction.   Other concerns: dnies  Pt instructed to call with further questions or concerns.  Patient states understanding and is in agreement with this plan.  Copy of appointments, and after visit summary (AVS) given to patient. Patient discharged ambulatory.    Face to Face time with patient: 20 min    Jillian Renee RN, BSN, OCN  Oncology Hematology   Breast Cancer Navigator  [facility]  [email or web address]  Phone [phone]

## 2017-09-14 NOTE — PATIENT INSTRUCTIONS
Dr. Naylor is changing your regimen to Opdivo.  You will receive education on this today. We would like to see you back in clinic with Dr. Naylor with Cycle 2 Opdivo.  Copy of appointments, and after visit summary (AVS) given to patient.  If you have any questions during business hours (M-F 8 AM- 4PM), please call Jillian Renee RN, BSN, OCN Oncology Hematology /Breast Cancer Navigator at [facility] [phone].   For questions after business hours, or on holidays/weekends, please call our after hours Nurse Triage line [phone]. Thank you.

## 2017-09-14 NOTE — PROGRESS NOTES
Infusion Nursing Note:  Wade Acevedo presents today for C1D1 Opdivo.    Patient seen by provider today: Yes: Dr. Naylor   present during visit today: Not Applicable.    Note: Pt stated new treatment today.  Went over side effects with pt and answered pt's questions to his satisfaction.    Intravenous Access:  Labs drawn without difficulty.  Implanted Port.    Treatment Conditions:  Lab Results   Component Value Date    HGB 10.7 09/14/2017     Lab Results   Component Value Date    WBC 4.7 09/14/2017      Lab Results   Component Value Date    ANEU 4.0 09/14/2017     Lab Results   Component Value Date     09/14/2017      Lab Results   Component Value Date     09/14/2017                   Lab Results   Component Value Date    POTASSIUM 3.9 09/14/2017           Lab Results   Component Value Date    MAG 1.8 04/21/2017            Lab Results   Component Value Date    CR 0.82 09/14/2017                   Lab Results   Component Value Date    JOHANN 9.1 09/14/2017                Lab Results   Component Value Date    BILITOTAL 0.7 09/14/2017           Lab Results   Component Value Date    ALBUMIN 3.5 09/14/2017                    Lab Results   Component Value Date    ALT 17 09/14/2017           Lab Results   Component Value Date    AST 19 09/14/2017     Results reviewed, labs MET treatment parameters, ok to proceed with treatment.        Post Infusion Assessment:  Patient tolerated Opdivo infusion without incident.  Site patent and intact, free from redness, edema or discomfort.  No evidence of extravasations.  Access discontinued per protocol.    Discharge Plan:   Patient discharged in stable condition accompanied by: self.  Departure Mode: Ambulatory.  Pt toreturn on 9/28/17 at 9:00 am for C1D15 Opdivo.    Mayte Moreira RN

## 2017-09-14 NOTE — NURSING NOTE
"Oncology Rooming Note    September 14, 2017 8:26 AM   Wade Acevedo is a 57 year old male who presents for:    Chief Complaint   Patient presents with     Oncology Clinic Visit     Recheck NSCLC, discuss treatment change     Initial Vitals: /82 (BP Location: Right arm, Patient Position: Sitting, Cuff Size: Adult Regular)  Pulse 72  Temp 98.9  F (37.2  C) (Tympanic)  Resp 18  Ht 1.807 m (5' 11.14\")  Wt 68.5 kg (151 lb)  SpO2 99%  BMI 20.98 kg/m2 Estimated body mass index is 20.98 kg/(m^2) as calculated from the following:    Height as of this encounter: 1.807 m (5' 11.14\").    Weight as of this encounter: 68.5 kg (151 lb). Body surface area is 1.85 meters squared.  Extreme Pain (8) Comment: Hips lower extremities   No LMP for male patient.  Allergies reviewed: Yes  Medications reviewed: Yes    Medications: Medication refills not needed today.  Pharmacy name entered into [facility]: [facility] - [address] AT [address]    Clinical concerns Recheck NSCLC, discuss treatment change.     1. Patient is concerned about short term memory issues.  2. Didnt eat yesterday.   3. Constipation.     10 minutes for nursing intake (face to face time)     Reena Leon CMA          '    "

## 2017-09-14 NOTE — MR AVS SNAPSHOT
After Visit Summary   9/14/2017    Wade Acevedo    MRN: [id number]           Patient Information     Date Of Birth          1960        Visit Information        Provider Department      9/14/2017 8:30 AM ROOM 4 [facility]        Today's Diagnoses     Cancer associated pain    -  1    Non-small cell lung cancer (NSCLC) (H)           Follow-ups after your visit        Your next 10 appointments already scheduled     Sep 28, 2017  9:00 AM CDT   Level 2 with ROOM 10 [facility] ([facility])    [facility]  [address]  [facility] [address]   [phone]            Oct 12, 2017  9:00 AM CDT   Level 2 with ROOM 1 [facility] ([facility])    [facility]  [address]  [facility] [address]   [phone]            Oct 12, 2017  9:30 AM CDT   Return Visit with Pasquale Naylor MD   [facility] ([facility])    [facility]  [address]  [facility] [address]   [phone]            Oct 26, 2017  9:00 AM CDT   Level 2 with ROOM 4 [facility] ([facility])    [facility]  [address]  [facility] [address]   [phone]            Nov 14, 2017 11:30 AM CST   (Arrive by 11:15 AM)   RETURN ENDOCRINE with Padma Medley MD   [facility] ([facility])    [address] [place] [address] [phone]              Who to contact     If you have questions or need follow up information about today's clinic visit or your schedule please contact [facility] CANC[facility]ION directly at [phone].  Normal or non-critical lab and imaging results will be communicated to you by MyChart, letter or phone within 4 business days after the clinic has received the results. If you do not hear from us  "within 7 days, please contact the clinic through [employer] or phone. If you have a critical or abnormal lab result, we will notify you by phone as soon as possible.  Submit refill requests through Baboo or call your pharmacy and they will forward the refill request to us. Please allow 3 business days for your refill to be completed.          Additional Information About Your Visit        [employer]My[employer] Information     Baboo lets you send messages to your doctor, view your test results, renew your prescriptions, schedule appointments and more. To sign up, go to [email or web address] . Click on \"Log in\" on the left side of the screen, which will take you to the Welcome page. Then click on \"Sign up Now\" on the right side of the page.     You will be asked to enter the access code listed below, as well as some personal information. Please follow the directions to create your username and password.     Your access code is: [email or web address]  Expires: 2017  9:54 AM     Your access code will  in 90 days. If you need help or a new code, please call your [facility] clinic or [phone].        Care EveryWhere ID     This is your Care EveryWhere ID. This could be used by other organizations to access your [facility] medical records  [id number]        Your Vitals Were     Pulse                   62            Blood Pressure from Last 3 Encounters:   17 117/66   17 135/82   17 138/86    Weight from Last 3 Encounters:   17 68.5 kg (151 lb)   17 71.2 kg (157 lb)   17 70.7 kg (155 lb 12.8 oz)              We Performed the Following     CBC with platelets differential     Comprehensive metabolic panel     T4 free     TSH with free T4 reflex          Today's Medication Changes          These changes are accurate as of: 17  3:28 PM.  If you have any questions, ask your nurse or doctor.               These medicines have changed or have updated prescriptions.        Dose/Directions    " senna-docusate 8.6-50 MG per tablet   Commonly known as:  SENOKOT-S;PERICOLACE   This may have changed:  additional instructions   Used for:  Non-small cell lung cancer (NSCLC) (H)        Dose:  1 tablet   Take 1 tablet by mouth 2 times daily Reported on 5/4/2017   Quantity:  100 tablet   Refills:  3         Stop taking these medicines if you haven't already. Please contact your care team if you have questions.     LORazepam 0.5 MG tablet   Commonly known as:  ATIVAN           ondansetron 8 MG tablet   Commonly known as:  ZOFRAN           prochlorperazine 10 MG tablet   Commonly known as:  COMPAZINE                    Primary Care Provider Office Phone # Fax #    Pawan Cristian Hampton [phone] [address] [address]        Equal Access to Services     GONZALO SMITH : Saul Molina, waduane luqjoana, qaybbessy kaalmafam fonseca, gena romero . So [facility] [phone].    ATENCIÓN: Si habla español, tiene a burns disposición servicios gratuitos de asistencia lingüística. [facility] [phone].    We comply with applicable federal civil rights laws and [place] laws. We do not discriminate on the basis of race, color, national origin, age, disability sex, sexual orientation or gender identity.            Thank you!     Thank you for choosing [facility]  for your care. Our goal is always to provide you with excellent care. Hearing back from our patients is one way we can continue to improve our services. Please take a few minutes to complete the written survey that you may receive in the mail after your visit with us. Thank you!             Your Updated Medication List - Protect others around you: Learn how to safely use, store and throw away your medicines at www.disposemymeds.org.          This list is accurate as of: 9/14/17  3:28 PM.  Always use your most recent med list.                   Brand Name Dispense Instructions for use  Diagnosis    bisacodyl 10 MG Suppository    DULCOLAX    12 suppository    Place 1 suppository (10 mg) rectally daily as needed for constipation    Drug-induced constipation       capsaicin 0.025 % Crea cream    ZOSTRIX    120 g    Apply to R hip and thigh every 2 hours as needed for pain.    Multiple joint pain, Cancer associated pain       dexamethasone 4 MG tablet    DECADRON    15 tablet    Take 5 tablets (20 mg) by mouth daily X 3 days each cycle. To be taken the day before, day of, and day after chemotherapy infusion.    Anemia, unspecified type, Non-small cell lung cancer (NSCLC) (H)       diclofenac 1 % Gel topical gel    VOLTAREN    200 g    Apply 4 grams to knees or 2 grams to hands four times daily using enclosed dosing card.    Multiple joint pain       lidocaine-prilocaine cream    EMLA    30 g    Apply topically over port 45 - 60 minutes prior to port access.    Non-small cell lung cancer (NSCLC) (H)       morphine 30 MG 12 hr tablet    MS CONTIN    60 tablet    Take 1 tablet (30 mg) by mouth 2 times daily    Non-small cell lung cancer (NSCLC) (H)       * nicotine 21 MG/24HR 24 hr patch    NICODERM CQ    30 patch    Place 1 patch onto the skin every 24 hours    Tobacco abuse       * nicotine 14 MG/24HR 24 hr patch    NICODERM CQ    30 patch    Place 1 patch onto the skin every 24 hours    Tobacco abuse       * nicotine 7 MG/24HR 24 hr patch    NICODERM CQ    30 patch    Place 1 patch onto the skin every 24 hours    Tobacco abuse       oxyCODONE 10 MG IR tablet    ROXICODONE    100 tablet    Take 1 tablet (10 mg) by mouth every 4 hours as needed for moderate to severe pain    Non-small cell lung cancer (NSCLC) (H), Cancer associated pain       pantoprazole 40 MG EC tablet    PROTONIX    60 tablet    Take 1 tablet (40 mg) by mouth 2 times daily (before meals)    Non-small cell lung cancer (NSCLC) (H)       PARoxetine 10 MG tablet    PAXIL    30 tablet    Take 1 tablet (10 mg) by mouth At Bedtime (Needs  follow-up appointment for this medication)    Major depressive disorder, recurrent episode, moderate (H)       polyethylene glycol powder    MIRALAX    510 g    Take 17 g (1 capful) by mouth daily Mix in 4-8 oz of fluid of choice. For constipation    Drug-induced constipation       senna-docusate 8.6-50 MG per tablet    SENOKOT-S;PERICOLACE    100 tablet    Take 1 tablet by mouth 2 times daily Reported on 5/4/2017    Non-small cell lung cancer (NSCLC) (H)       * Notice:  This list has 3 medication(s) that are the same as other medications prescribed for you. Read the directions carefully, and ask your doctor or other care provider to review them with you.

## 2017-09-18 NOTE — TELEPHONE ENCOUNTER
"Status Check:    Pt is a 57 year old male, recently in clinic for C1D1 Opdivo.  Call placed for status check. Spoke with KARYNA Smith, as patient yelled out \"I'm busy\".    Primary care provider is: Pawan Hampton    Diagnosis:   Encounter Diagnosis   Name Primary?     Non-small cell lung cancer (NSCLC) (H) Yes     Oncology provider is:  Dr. SHAGGY Naylor    How are you doing/feeling?: Per Sarah, patient is doing very well. \"No issues\".  Pt is eating and drinking, voiding and stooling without issues.  Denies fever nor chills, pain nor discomfort.  Any further issues?: none  Next Follow up/Recommendations: Advised patient to utilize PRN medications as needed, eat nutritious meals, drink plenty of fluids, and keep scheduled appointments. If symptoms or other concerns develop after hours, encouraged patient to call our after hours number: [phone].  Patient instructed to call with any questions or concerns.  Patient states understanding and is in agreement with this plan.      Patient instructed to call with any questions or concerns.  Patient states understanding and is in agreement with this plan.    Approximately 5 minutes spent on telephone with patient reviewing assessment and symptom(s) and providing symptom management.    Jillian Renee RN, BSN, OCN  Oncology Hematology   Breast Cancer Navigator  [facility]  [email or web address]  [phone]    "

## 2017-09-20 NOTE — TELEPHONE ENCOUNTER
"Chief Complaint   Patient presents with     Misuse of opioids     Home care RN reports that patient's pain is poorly controlled; he takes MS Contin as if short acting, taking as many as 5 over the course of one day.  He doesn't keep track of his use of OxyIR for breakthrough pain     She adds that he says the Oxycodone does nothing for pain--like \"eating candy.\"  She has repeatedly explained that MS contin is slow release.  I reviewed the [place] Prescription Monitoring program and found no early refills, no red flags, so could this be an isolated event?    Plan:  Informed her I signed off on his case as he had told me his pain was well controlled.  Not a good candidate for methadone due to non compliance  Suggested that if his opioids were to be changed, MINIMUM dose alternatives based on currently prescribed opioids would be:  >Fentanyl 25 mcg TD q72 hrs  >morphine immediate release 15mg    Mickey Bernard CNP ([place])  Palliative Care  Private cell:  [phone]   "

## 2017-09-22 NOTE — TELEPHONE ENCOUNTER
Received a call from MICHAEL Michelle homecare nurse today, reporting patient continue to misuse extended release morphine.  Reports patient told her he took 5 MS Contin on Sunday for bone and muscle pain.  Viviana expresses worry that patient will overdose if not watched.  She is requesting a Fentanyl patch.  Per Dr. Naylor, refer patient back to Mickey Bernard NP.

## 2017-09-25 NOTE — IP AVS SNAPSHOT
Wade Acevedo #[id number] (CSN: [id number])  (57 year old M)  (Adm: 17)     [id number]               [facility] SURGICAL: [phone]            Patient Demographics     Patient Name Sex          Age SSN Address Phone    Wade Acevedo Male 1960 (57 year old) [id number] [address] [facility] [id number] [phone] (Home)  [phone] (Mobile) *Preferred*      Emergency Contact(s)     Name Relation Home Work Mobile     Sarah Burch (daughter-in-law) Relative [phone]    Zandra Espino (sister-in-law) Relative [phone]      Cristian Acevedo Relative [phone]        Admission Information     Attending Provider Admitting Provider Admission Type Admission Date/Time    Cristian Pino MD Eikens, John Patrick, MD Emergency 17    Discharge Date Hospital Service Auth/Cert Status Service Area     General Medicine Incomplete [facility]    Unit Room/Bed Admission Status       [facility]  Admission (Confirmed)       Admission     Complaint    Altered level of consciousness, Altered mental status, sedation for MRI      Hospital Account     Name Acct ID Class Status Primary Coverage    Wade Acevedo [id number] Inpatient Open MARTHA  MARTHA GARCIA            Guarantor Account (for Hospital Account #[id number])     Name Relation to Pt Service Area Active? Acct Type    Wade Acevedo  FCS Yes Personal/Family    Address Phone          [address] [place], [place] [address] [phone](H)              Coverage Information (for Hospital Account #[id number])     F/O Payor/Plan Precert #    MARTHA/MARTHA GARCIA     Subscriber Subscriber #    Wade Acevedo [id number]    Address Phone    [address]  [place] [id number] [phone]                                                INTERAGENCY TRANSFER FORM - PHYSICIAN ORDERS   2017                       [facility] SURGICAL: [phone]            Attending  "Provider: Cristian Pino MD     Allergies:  Lubriderm    Infection:  None   Service:  GENERAL [facility]    Ht:  1.807 m (5' 11.14\")   Wt:  66.9 kg (147 lb 7.8 oz)   Admission Wt:  65.7 kg (144 lb 13.5 oz)    BMI:  20.49 kg/m 2   BSA:  1.83 m 2            ED Clinical Impression     Diagnosis Description Comment Added By Time Added    Non-small cell lung cancer, unspecified laterality (H) [C34.90] Non-small cell lung cancer, unspecified laterality (H) [C34.90]  Capo Fuentes MD 9/25/2017 11:07 PM    Metastatic cancer (H) [C79.9] Metastatic cancer (H) [C79.9]  Capo Fuentes MD 9/25/2017 11:07 PM    Confusion [R41.0] Confusion [R41.0]  Capo Fuentes MD 9/25/2017 11:08 PM      Hospital Problems as of 9/29/2017              Priority Class Noted POA    Anemia Medium  4/20/2017 Yes    Non-small cell lung cancer (NSCLC) (H) Medium  5/4/2017 Yes    Cancer associated pain Medium  5/6/2017 Yes    Constipation, chronic Medium  Unknown Yes    Major depressive disorder, recurrent episode, moderate (H) Medium  5/25/2017 Yes    Tobacco use disorder Medium  5/31/2017 Yes    * (Principal)Altered level of consciousness Medium  9/26/2017 Yes    Altered mental status Medium  9/26/2017 Yes      Non-Hospital Problems as of 9/29/2017              Priority Class Noted    Weight loss Medium  5/6/2017    H/O ETOH abuse Medium  Unknown    Unsteady gait Medium  Unknown    Recurrent falls Medium  Unknown    Pituitary macroadenoma (H) Medium  5/10/2017    Sinusitis Medium  5/12/2017    Thrombocytopenia (H) Medium  7/13/2017    ACP (advance care planning) Medium  8/11/2017      Code Status History     Date Active Date Inactive Code Status Order ID Comments User Context    9/29/2017 12:52 PM  DNR/DNI [id number]  Cristian Pino MD Outpatient    9/27/2017  2:17 PM 9/29/2017 12:52 PM DNR/DNI [id number]  Cristian Batista MD Inpatient    9/26/2017  8:58 AM 9/27/2017  2:17 PM Full Code [id number]  Morena Byrd PA-C Inpatient    4/20/2017  " 5:31 PM 4/27/2017  3:26 PM Full Code [id number]  Patel Schrader MD Inpatient      Current Code Status     Date Active Code Status Order ID Comments User Context       Prior         Medication Review      START taking        Dose / Directions Comments    acetaminophen 325 MG tablet   Commonly known as:  TYLENOL        Dose:  650 mg   Take 2 tablets (650 mg) by mouth every 4 hours as needed for mild pain   Quantity:  100 tablet   Refills:  0        folic acid 1 MG tablet   Commonly known as:  FOLVITE        Dose:  1 mg   Take 1 tablet (1 mg) by mouth daily   Quantity:  30 tablet   Refills:  0        metroNIDAZOLE 500 MG tablet   Commonly known as:  FLAGYL   Indication:  Clostridium difficile Bacteria        Dose:  500 mg   Take 1 tablet (500 mg) by mouth 3 times daily   Quantity:  21 tablet   Refills:  0        tamsulosin 0.4 MG capsule   Commonly known as:  FLOMAX   Used for:  Urinary retention with incomplete bladder emptying        Dose:  0.4 mg   Take 1 capsule (0.4 mg) by mouth daily   Quantity:  30 capsule   Refills:  0          CONTINUE these medications which may have CHANGED, or have new prescriptions. If we are uncertain of the size of tablets/capsules you have at home, strength may be listed as something that might have changed.        Dose / Directions Comments    senna-docusate 8.6-50 MG per tablet   Commonly known as:  SENOKOT-S;PERICOLACE   This may have changed:  additional instructions   Used for:  Non-small cell lung cancer (NSCLC) (H)        Dose:  1 tablet   Take 1 tablet by mouth 2 times daily Reported on 5/4/2017   Quantity:  100 tablet   Refills:  3          CONTINUE these medications which have NOT CHANGED        Dose / Directions Comments    bisacodyl 10 MG Suppository   Commonly known as:  DULCOLAX        Dose:  10 mg   Place 1 suppository (10 mg) rectally daily as needed for constipation   Quantity:  12 suppository   Refills:  11        capsaicin 0.025 % Crea cream   Commonly known as:   ZOSTRIX   Used for:  Multiple joint pain, Cancer associated pain        Apply to R hip and thigh every 2 hours as needed for pain.   Quantity:  120 g   Refills:  3    Just dispnese 60 if not covered by insurance plz       diclofenac 1 % Gel topical gel   Commonly known as:  VOLTAREN   Used for:  Multiple joint pain        Apply 4 grams to knees or 2 grams to hands four times daily using enclosed dosing card.   Quantity:  200 g   Refills:  3        lidocaine-prilocaine cream   Commonly known as:  EMLA   Used for:  Non-small cell lung cancer (NSCLC) (H)        Apply topically over port 45 - 60 minutes prior to port access.   Quantity:  30 g   Refills:  3        morphine 30 MG 12 hr tablet   Commonly known as:  MS CONTIN   Used for:  Non-small cell lung cancer (NSCLC) (H)        Dose:  30 mg   Take 1 tablet (30 mg) by mouth 2 times daily   Quantity:  60 tablet   Refills:  0        * nicotine 21 MG/24HR 24 hr patch   Commonly known as:  NICODERM CQ   Used for:  Tobacco abuse        Dose:  1 patch   Place 1 patch onto the skin every 24 hours   Quantity:  30 patch   Refills:  0        * nicotine 14 MG/24HR 24 hr patch   Commonly known as:  NICODERM CQ   Used for:  Tobacco abuse        Dose:  1 patch   Place 1 patch onto the skin every 24 hours   Quantity:  30 patch   Refills:  0        * nicotine 7 MG/24HR 24 hr patch   Commonly known as:  NICODERM CQ   Used for:  Tobacco abuse        Dose:  1 patch   Place 1 patch onto the skin every 24 hours   Quantity:  30 patch   Refills:  0        oxyCODONE 10 MG IR tablet   Commonly known as:  ROXICODONE   Used for:  Non-small cell lung cancer (NSCLC) (H), Cancer associated pain        Dose:  10 mg   Take 1 tablet (10 mg) by mouth every 4 hours as needed for moderate to severe pain   Quantity:  60 tablet   Refills:  0        pantoprazole 40 MG EC tablet   Commonly known as:  PROTONIX   Used for:  Non-small cell lung cancer (NSCLC) (H)        Dose:  40 mg   Take 1 tablet (40 mg) by  mouth 2 times daily (before meals)   Quantity:  60 tablet   Refills:  6        PARoxetine 10 MG tablet   Commonly known as:  PAXIL   Used for:  Major depressive disorder, recurrent episode, moderate (H)        Dose:  10 mg   Take 1 tablet (10 mg) by mouth At Bedtime (Needs follow-up appointment for this medication)   Quantity:  30 tablet   Refills:  11        polyethylene glycol powder   Commonly known as:  MIRALAX        Dose:  1 capful   Take 17 g (1 capful) by mouth daily Mix in 4-8 oz of fluid of choice. For constipation   Quantity:  510 g   Refills:  11        * Notice:  This list has 3 medication(s) that are the same as other medications prescribed for you. Read the directions carefully, and ask your doctor or other care provider to review them with you.      STOP taking     dexamethasone 4 MG tablet   Commonly known as:  DECADRON                   After Care     General info for SNF       Length of Stay Estimate: Short Term Care: Estimated # of Days 31-90  Condition at Discharge: Improving  Level of care:skilled   Rehabilitation Potential: Good  Admission H&P remains valid and up-to-date: Yes  Recent Chemotherapy: Date: (/7/17.  Has switched to Immunotherapy sice           Use Nursing Home Standing Orders: Yes       Mantoux instructions       Give two-step Mantoux (PPD) Per Facility Policy Yes               Further instructions from your care team       Urology consult to eval urinary retetion    [facility] Discharge Instructions     Discharge disposition:  Discharged to rehabilitation facility       Diet:  Regular               Follow-up: Follow up with primary care provider in within 7 days       Additional instructions: Follow up with oncology, and follow up with infusion therapy for immunotherapy later today         Referrals     Occupational Therapy Adult Consult       Evaluate and treat as clinically indicated.    Reason:  Generalized weakess, cdelirum       Physical Therapy Adult  "Consult       Evaluate and treat as clinically indicated.    Reason:  generalized weakness             Your next 10 appointments already scheduled     Sep 29, 2017  1:45 PM CDT   Return Visit with Pasquale Naylor MD   [facility] ([facility])    [facility]  [address]  [facility] [address]   [phone]            Sep 29, 2017  2:00 PM CDT   Level 2 with ROOM 8 [facility] ([facility])    [facility]  [address]  [facility] [address]   [phone]            Nov 14, 2017 11:30 AM CST   (Arrive by 11:15 AM)   RETURN ENDOCRINE with Padma Medley MD   [facility] ([facility])    [address] [place] [address]   [phone]              Statement of Approval     Ordered          09/29/17 1254  I have reviewed and agree with all the recommendations and orders detailed in this document.  EFFECTIVE NOW     Approved and electronically signed by:  Cristian Pino MD                                                 INTERAGENCY TRANSFER FORM - NURSING   9/25/2017                       [facility] SURGICAL: [phone]            Attending Provider: Cristian Pino MD     Allergies:  Lubriderm    Infection:  None   Service:  GENERAL MEDI    Ht:  1.807 m (5' 11.14\")   Wt:  66.9 kg (147 lb 7.8 oz)   Admission Wt:  65.7 kg (144 lb 13.5 oz)    BMI:  20.49 kg/m 2   BSA:  1.83 m 2            Advance Directives        Does patient have a scanned Advance Directive/ACP document in EPIC?           Yes        Immunizations     Name Date      Influenza Vaccine IM 3yrs+ 4 Valent IIV4 09/29/17     Pneumococcal 23 valent 04/21/17       ASSESSMENT     Discharge Profile Flowsheet     EXPECTED DISCHARGE     Last Bowel Movement  09/28/17 09/29/17 0838    Expected Discharge Date  09/28/17 09/26/17 1256   Passing flatus  yes 09/29/17 0210    " DISCHARGE NEEDS ASSESSMENT     COMMUNICATION ASSESSMENT      Concerns To Be Addressed  adjustment to diagnosis/illness concerns;medication concerns 09/26/17 1256   Patient's communication style  spoken language (English or Bilingual) 09/26/17 0056    Concerns Comments  unable to reach-followed by HC 08/31/17 1000   FINAL RESOURCES      Patient/family verbalizes understanding of discharge plan recommendations?  Yes (although patient remains confused at times) 09/26/17 1256   Resources List  Transitional Care 09/26/17 1256    Medical Team notified of plan?  yes 09/26/17 1256   Other Resources  Chemical Dependency Services 08/31/17 0956    Readmission Within The Last 30 Days  no previous admission in last 30 days 09/26/17 1256   Senior Linkage Line Referral Placed  -- (n/a) 09/26/17 1256    Anticipated Changes Related to Illness  inability to care for self 09/26/17 1256   F/U Appointment Brochure Provided  -- (discussed importance) 09/26/17 1256    Equipment Currently Used at Home  none 09/27/17 1059   SKIN      Transportation Available  family or friend will provide 09/27/17 1157   Inspection of bony prominences  Full 09/29/17 0838    Current Discharge Risk  cognitively impaired 09/26/17 1256   Skin WDL  ex 09/29/17 0838    # of Referrals Placed by CTS  Post Acute Facilities;MTM 09/26/17 1256   Skin Color/Characteristics  yellow 09/29/17 0210    Key Recommendations  Patient will need more supervision related to medication usage and close follow-up with PCP, Clinic Care Coordinator. May need TCU as there are no family members that are able to provide 24/7 supervision at this time. 09/26/17 1256   Skin Temperature  cool 09/29/17 0210    Does Patient Need a Referral for Clinic CC  Yes 09/26/17 1256   Skin Moisture  dry 09/29/17 0210    Coordination Referral Criteria  Fragility;Multiple Providers/Specialties 09/26/17 1256   Skin Elasticity  quick return to original state 09/29/17 0210    Confirm patient is eligible for  "[employer] telemonitoring  No - b/c of DX 09/26/17 1256   Skin Integrity  bruise(s) 09/29/17 0210    Is patient in another telemonitoring program  No 09/26/17 1256   SAFETY      GASTROINTESTINAL (ADULT,PEDIATRIC,OB)     Safety WDL  WDL 09/29/17 0838    GI WDL  WDL 09/29/17 0838   All Alarms  none present (daughter in law in room, VPM not in use now) 09/29/17 0838                 Assessment WDL (Within Defined Limits) Definitions           Safety WDL     Effective: 09/28/15    Row Information: <b>WDL Definition:</b> Bed in low position, wheels locked; call light in reach; upper side rails up x 2; ID band on<br> <font color=\"gray\"><i>Item=AS safety wdl>>List=AS safety wdl>>Version=F14</i></font>      Skin WDL     Effective: 09/28/15    Row Information: <b>WDL Definition:</b> Warm; dry; intact; elastic; without discoloration; pressure points without redness<br> <font color=\"gray\"><i>Item=AS skin wdl>>List=AS skin wdl>>Version=F14</i></font>      Vitals     Vital Signs Flowsheet     VITAL SIGNS     Change in Pain  getting better 09/27/17 1621    Temp  99  F (37.2  C) 09/29/17 1255   Pain Control  partially effective 09/27/17 1621    Temp src  Oral 09/29/17 1255   Functioning  can do most things, but pain gets in the way of some 09/27/17 1621    Resp  18 09/29/17 1255   Sleep  normal sleep 09/27/17 1621    Pulse  105 09/29/17 0348   ANALGESIA SIDE EFFECTS MONITORING      Heart Rate  107 09/29/17 1255   Side Effects Monitoring: Respiratory Quality  R 09/26/17 0354    Pulse/Heart Rate Source  Monitor 09/29/17 1255   Side Effects Monitoring: Respiratory Depth  N 09/26/17 0354    BP  102/76 09/29/17 1254   Side Effects Monitoring: Sedation Level  1 09/27/17 1656    BP Location  Left arm 09/29/17 0853   HEIGHT AND WEIGHT      OXYGEN THERAPY     Weight  66.9 kg (147 lb 7.8 oz) 09/29/17 0621    SpO2  93 % 09/29/17 1254   Weight Method  Bed scale 09/27/17 0606    O2 Device  None (Room air) 09/29/17 0853   Bed Scale  Pillow (add " comment for number);Fitted sheet;Draw sheet/ pad (add comment for number);Cover/flat sheet (add comment for number);Emblem (add comment for number) 09/27/17 0606    Oxygen Delivery  3 LPM 09/27/17 1632   POSITIONING      PAIN/COMFORT     Body Position  supine 09/29/17 0853    Patient Currently in Pain  sleeping: patient not able to self report 09/29/17 0216   Head of Bed (HOB)  HOB at 30-45 degrees 09/29/17 0853    Preferred Pain Scale  CAPA (Clinically Aligned Pain Assessment) ([facility] Adults Only) 09/29/17 0216   Chair  Recline and up in chair 09/29/17 0853    Pain Location  Abdomen 09/28/17 0503   DAILY CARE      Pain Orientation  Right;Left 09/27/17 0405   Activity Management  activity adjusted per tolerance 09/29/17 0838    Pain Descriptors  Aching 09/29/17 0216   Activity Assistance Provided  assistance, stand-by 09/29/17 0838    Pain Intervention(s)  Medication (See eMAR) 09/29/17 0216   ECG      CLINICALLY ALIGNED PAIN ASSESSMENT (CAPA) ([facility] ADULTS ONLY)     ECG Rhythm  Sinus tachycardia 09/27/17 1646    Comfort  comfortably manageable 09/29/17 0216   Ectopy  None 09/27/17 1646            Patient Lines/Drains/Airways Status    Active LINES/DRAINS/AIRWAYS     Name: Placement date: Placement time: Site: Days: Last dressing change:    ETT (adult) 09/27/17   1452    1             Patient Lines/Drains/Airways Status    Active PICC/CVC     Name: Placement date: Placement time: Site: Days: Additional Info Last dressing change:    Port A Cath Single 05/17/17 Left Chest wall 05/17/17   1226   Chest wall   135 Orientation: Left            Power Port: Yes            Inserted by: RAYRAY            Line Flushed (See MAR): Yes            Diameter Frisian Size: 8 Fr            Lot #: [id number]               Intake/Output Detail Report     Date Intake     Output   Net    Shift P.O. I.V. IV Piggyback Total Urine Other Total       Noc 09/27/17 2300 - 09/28/17 0659 -- 662 -- 662 --  -- -- 662    Day 09/28/17 0700 - 09/28/17 1459 480 395 -- 875 600 -- 600 275    [place] 09/28/17 1500 - 09/28/17 2259 240 -- -- 240 600 -- 600 -360    Noc 09/28/17 2300 - 09/29/17 0659 650 445 -- 1095 1400 -- 1400 -305    Day 09/29/17 0700 - 09/29/17 1459 400 -- -- 400 0 -- -- 400      Last Void/BM       Most Recent Value    Urine Occurrence 1 at 09/28/2017 2301    Stool Occurrence 1 at 09/27/2017 0300      Case Management/Discharge Planning     Case Management/Discharge Planning Flowsheet     REFERRAL INFORMATION     ASSESSMENT/CONCERNS TO BE ADDRESSED      Did the Initial Social Work Assessment result in a Social Work Case?  Yes 09/26/17 1244   Concerns To Be Addressed  adjustment to diagnosis/illness concerns;medication concerns 09/26/17 1256    Admission Type  inpatient 09/26/17 1244   Concerns Comments  unable to reach-followed by HC 08/31/17 1000    Arrived From  home or self-care 09/26/17 1244   DISCHARGE PLANNING      Referral Source  physician 09/26/17 1244   Patient/family verbalizes understanding of discharge plan recommendations?  Yes (although patient remains confused at times) 09/26/17 1256    # of Referrals Placed by [facility]  Post Acute Facilities;[facility] 09/26/17 1256   Medical Team notified of plan?  yes 09/26/17 1256    Reason For Consult  discharge planning 09/26/17 1244   Readmission Within The Last 30 Days  no previous admission in last 30 days 09/26/17 1256    Record Reviewed  clinical discipline documentation;history and physical;medical record;patient profile;plan of care 09/26/17 1244   Anticipated Changes Related to Illness  inability to care for self 09/26/17 1256    Referral Information Comments  jesusita Devendra 09/26/17 1244   Transportation Available  family or friend will provide 09/27/17 1157    Primary Care Clinic Name  [facility] 09/26/17 1256   Current Discharge Risk  cognitively impaired 09/26/17 1256    Primary Care MD Name  Memo 09/26/17 1256   Key Recommendations  Patient will need more  supervision related to medication usage and close follow-up with PCP, Clinic Care Coordinator. May need TCU as there are no family members that are able to provide 24/7 supervision at this time. 09/26/17 1256    LIVING ENVIRONMENT     Does Patient Need a Referral for Clinic CC  Yes 09/26/17 1256    Lives With  alone 09/27/17 1157   Coordination Referral Criteria  Fragility;Multiple Providers/Specialties 09/26/17 1256    Living Arrangements  house 09/27/17 1157   FINAL RESOURCES      Provides Primary Care For  no one 09/26/17 1256   Equipment Currently Used at Home  none 09/27/17 1059    Able to Return to Prior Living Arrangements  no 09/26/17 1256   Resources List  Transitional Care 09/26/17 1256    ASSESSMENT OF FAMILY/SOCIAL SUPPORT     Other Resources  Chemical Dependency Services 08/31/17 0956    Marital Status   09/26/17 1256   Senior Linkage Line Referral Placed  -- (n/a) 09/26/17 1256    Who is your support system?  Other (specify);Sibling(s) (Daughter in law Sarah, Uncle) 09/26/17 1256   F/U Appointment Brochure Provided  -- (discussed importance) 09/26/17 1256    Description of Support System  Supportive;Involved 09/26/17 1256   ABUSE RISK SCREEN      Support Assessment  Lacks necessary supervision and assistance 09/26/17 1256   QUESTION TO PATIENT:  Has a member of your family or a partner(now or in the past) intimidated, hurt, manipulated, or controlled you in any way?  patient declined to answer or is unable to answer 09/26/17 0158    COPING/STRESS     QUESTION TO PATIENT: Do you feel safe going back to the place where you are living?  patient declined to answer or is unable to answer 09/26/17 0158    Major Change/Loss/Stressor  illness 09/26/17 0120   OBSERVATION: Is there reason to believe there has been maltreatment of a vulnerable adult (ie. Physical/Sexual/Emotional abuse, self neglect, lack of adequate food, shelter, medical care, or financial exploitation)?  no 09/26/17 0158    EXPECTED  DISCHARGE     (R) MENTAL HEALTH SUICIDE RISK      Expected Discharge Date  09/28/17 09/26/17 1256   Are you depressed or being treated for depression?  Yes 09/26/17 0120                  [facility] SURGICAL: [phone]            Medication Administration Report for Wade Acevedo as of 09/29/17 1335   Legend:    Given Hold Not Given Due Canceled Entry Other Actions    Time Time (Time) Time  Time-Action       Inactive    Active    Linked        Medications 09/23/17 09/24/17 09/25/17 09/26/17 09/27/17 09/28/17 09/29/17    0.9% sodium chloride infusion  Rate: 50 mL/hr Freq: CONTINUOUS Route: IV  Start: 09/26/17 0100       0125-ED Infusing on Admission/transfer       0937 ( )-Rate/Dose Change       1602 ( )-New Bag       2228 ( )-Rate/Dose Verify        0006 ( )-Rate/Dose Verify       1411 ( )-New Bag       1729-ED Infusing on Admission/transfer        0051 ( )-New Bag            acetaminophen (TYLENOL) tablet 650 mg  Dose: 650 mg Freq: EVERY 4 HOURS PRN Route: PO  PRN Reason: mild pain  Start: 09/26/17 0858   Admin Instructions: Alternate ibuprofen (if ordered) with acetaminophen.  Maximum acetaminophen dose from all sources = 75 mg/kg/day not to exceed 4 grams/day.        1731 (650 mg)-Given        1630-Auto Hold       1708-Unhold             capsaicin (ZOSTRIX) cream  Freq: 3 TIMES DAILY PRN Route: Top  PRN Comment: when requested  Start: 09/26/17 0610   Admin Instructions: Apply to right hip         1630-Auto Hold       1708-Unhold             folic acid (FOLVITE) tablet 1 mg  Dose: 1 mg Freq: DAILY Route: PO  Start: 09/26/17 0800   Admin Instructions: If patient is unable to tolerate oral folic acid, an intravenous dose of folic acid should be considered.        0926 (1 mg)-Given        0845 (1 mg)-Given       1630-Auto Hold       1708-Unhold        0831 (1 mg)-Given        0808 (1 mg)-Given           heparin 100 UNIT/ML injection 5 mL  Dose: 5 mL Freq: EVERY 28 DAYS Route: IK  Start: 09/29/17 1030  "  Admin Instructions: ONLY to de-access each port in dual implanted port.  Flush with 10 mL NS followed by 5 mL heparin (100 units/mL) at discharge and at least every 28 days.  MAX: 5 mL per port           (1258)-Not Given           heparin lock flush 10 UNIT/ML injection 5-10 mL  Dose: 5-10 mL Freq: EVERY 24 HOURS Route: IK  Start: 09/29/17 1045   Admin Instructions: To lock each dormant port in dual implanted port.  Check PRN heparin flush order to see when last dose of PRN heparin was given before administering.   MAX: 5 mL per port.           (1258)-Not Given           lidocaine (LMX4) kit  Freq: EVERY 1 HOUR PRN Route: Top  PRN Reason: moderate pain  PRN Comment: with VAD insertion or accessing implanted port  Start: 09/29/17 1028   Admin Instructions: Do NOT give if patient has a history of allergy to any local anesthetic or any \"shaka\" product.   Apply 30 minutes prior to VAD insertion or port access.  MAX Dose:  2.5 g (  of 5 g tube)               lidocaine (LMX4) kit  Freq: EVERY 1 HOUR PRN Route: Top  PRN Reason: moderate pain  PRN Comment: with VAD insertion or accessing implanted port  Start: 09/29/17 1025   Admin Instructions: Do NOT give if patient has a history of allergy to any local anesthetic or any \"shaka\" product.   Apply 30 minutes prior to VAD insertion or port access.  MAX Dose:  2.5 g (  of 5 g tube)               lidocaine (LMX4) kit  Freq: DAILY PRN Route: Top  PRN Reason: moderate pain  Start: 09/26/17 0612   Admin Instructions: Apply to port 30-45 minutes before access         1630-Auto Hold       1708-Unhold             lidocaine 1 % 1 mL  Dose: 1 mL Freq: EVERY 1 HOUR PRN Route: OTHER  PRN Comment: mild pain with VAD insertion or accessing implanted port  Start: 09/29/17 1028   Admin Instructions: Do NOT give if patient has a history of allergy to any local anesthetic or any \"shaka\" product. MAX dose 1 mL subcutaneous OR intradermal in divided doses.               lidocaine 1 % 1 " "mL  Dose: 1 mL Freq: EVERY 1 HOUR PRN Route: OTHER  PRN Comment: mild pain with VAD insertion or accessing implanted port  Start: 09/29/17 1025   Admin Instructions: Do NOT give if patient has a history of allergy to any local anesthetic or any \"shaka\" product. MAX dose 1 mL subcutaneous OR intradermal in divided doses.               magnesium citrate solution 148 mL  Dose: 148 mL Freq: ONCE PRN Route: PO  PRN Reason: constipation  Start: 09/29/17 0853   Admin Instructions: May repeat in one hour x 1 if insufficient results. Avoid in severe renal disease. Hold for loose stools.  This is the third step of a three step constipation treatment protocol.               magnesium sulfate 4 g in 100 mL sterile water (premade)  Dose: 4 g Freq: EVERY 4 HOURS PRN Route: IV  PRN Reason: magnesium supplementation  Start: 09/26/17 1225   Admin Instructions: For serum Mg++ less than 1.6 mg/dL  Give 4 g and recheck magnesium level 2 hours after dose, and next AM.        1603 (4 g)-New Bag        1630-Auto Hold       1708-Unhold             metroNIDAZOLE (FLAGYL) tablet 500 mg  Dose: 500 mg Freq: EVERY 8 HOURS SCHEDULED Route: PO  Indications of Use: CLOSTRIDIUM DIFFICILE  Start: 09/26/17 1030       1234 (500 mg)-Given       1609 (500 mg)-Given       2136 (500 mg)-Given        0625 (500 mg)-Given       (1406)-Not Given       1630-Auto Hold       1708-Unhold       2125 (500 mg)-Given        0555 (500 mg)-Given       1356 (500 mg)-Given       2133 (500 mg)-Given        0628 (500 mg)-Given       [ ] 1400       [ ] 2200           morphine (MS CONTIN) 12 hr tablet 30 mg  Dose: 30 mg Freq: 2 TIMES DAILY Route: PO  Start: 09/26/17 0800   Admin Instructions: DO NOT CRUSH.        0925 (30 mg)-Given       2005 (30 mg)-Given [C]        0845 (30 mg)-Given       1630-Auto Hold       1708-Unhold       2126 (30 mg)-Given        0831 (30 mg)-Given       2003 (30 mg)-Given        0808 (30 mg)-Given       [ ] 2000           multivitamin, therapeutic " with minerals (THERA-VIT-M) tablet 1 tablet  Dose: 1 tablet Freq: DAILY Route: PO  Start: 09/26/17 0800   Admin Instructions: If patient is unable to tolerate oral multivitamins an intravenous dose of multivitamins should be considered.        0925 (1 tablet)-Given        0845 (1 tablet)-Given       1630-Auto Hold       1708-Unhold        0831 (1 tablet)-Given        0808 (1 tablet)-Given           naloxone (NARCAN) injection 0.1-0.4 mg  Dose: 0.1-0.4 mg Freq: EVERY 2 MIN PRN Route: IV  PRN Reason: opioid reversal  Start: 09/26/17 0856   Admin Instructions: For respiratory rate LESS than or EQUAL to 8.  Partial reversal dose:  0.1 mg titrated q 2 minutes for Analgesia Side Effects Monitoring Sedation Level of 3 (frequently drowsy, arousable, drifts to sleep during conversation).Full reversal dose:  0.4 mg bolus for Analgesia Side Effects Monitoring Sedation Level of 4 (somnolent, minimal or no response to stimulation).         1630-Auto Hold       1708-Unhold             nicotine (NICODERM CQ) 14 MG/24HR 24 hr patch 1 patch  Dose: 1 patch Freq: DAILY Route: TD  Start: 09/26/17 0800       0926 (1 patch)-Given        (0847)-Not Given       1630-Auto Hold       1708-Unhold        0833 (1 patch)-Given        0809 (1 patch)-Given           nicotine Patch in Place  Freq: EVERY 8 HOURS Route: TD  Start: 09/26/17 0730   Admin Instructions: Chart every shift, confirming that patch is still in place on patient (no barcode scan needed). See patch order for dose information.               1609 ( )-Patch in Place        0117 ( )-Patch in Place [C]       0847 ( )-Patch Removed       1630-Auto Hold       1708-Unhold       1728 ( )-Patch in Place [C]        0051 ( )-Patch in Place       0835 ( )-Given       1439 ( )-Patch in Place        0050 ( )-Patch in Place       0810 ( )-Patch Removed       [ ] 1530       [ ] 2330           nicotine patch REMOVAL  Freq: DAILY Route: TD  Start: 09/27/17 0800   Admin Instructions: Remove patch  when new patch is applied or patch is discontinued.         0847 ( )-Patch Removed       1630-Auto Hold       1708-Unhold        0835 ( )-Patch Removed        0811 ( )-Patch Removed           oxyCODONE (ROXICODONE) IR tablet 10 mg  Dose: 10 mg Freq: EVERY 4 HOURS PRN Route: PO  PRN Reason: moderate to severe pain  Start: 09/26/17 0612        0402 (10 mg)-Given       0845 (10 mg)-Given       1406 (10 mg)-Given       1630-Auto Hold       1708-Unhold       1822 (10 mg)-Given        0458 (10 mg)-Given       1625 (10 mg)-Given        0143 (10 mg)-Given       1305 (10 mg)-Given           pantoprazole (PROTONIX) EC tablet 40 mg  Dose: 40 mg Freq: 2 TIMES DAILY BEFORE MEALS Route: PO  Start: 09/26/17 0630   Admin Instructions: DO NOT CRUSH.        0926 (40 mg)-Given       1609 (40 mg)-Given        0625 (40 mg)-Given       1630-Auto Hold       1708-Unhold       1822 (40 mg)-Given        0555 (40 mg)-Given       1545 (40 mg)-Given        0628 (40 mg)-Given       [ ] 1630           PARoxetine (PAXIL) tablet 10 mg  Dose: 10 mg Freq: AT BEDTIME Route: PO  Start: 09/26/17 2200       2135 (10 mg)-Given        1630-Auto Hold       1708-Unhold       2125 (10 mg)-Given        2133 (10 mg)-Given        [ ] 2200           polyethylene glycol (MIRALAX/GLYCOLAX) Packet 17 g  Dose: 17 g Freq: DAILY PRN Route: PO  PRN Reason: constipation  Start: 09/29/17 0853   Admin Instructions: Give in 8oz of  water, juice, or soda. Hold for loose stools.  This is the second step of a three step constipation treatment protocol.  1 Packet = 17 grams. Mixed prescribed dose in 8 ounces of water. Follow with 8 oz. of water.               potassium chloride (KLOR-CON) Packet 20-40 mEq  Dose: 20-40 mEq Freq: EVERY 2 HOURS PRN Route: ORAL OR FEED  PRN Reason: potassium supplementation  Start: 09/27/17 0926   Admin Instructions: Use if unable to tolerate tablets.  If Serum K+ 3.0-3.3, dose = 60 mEq po total dose (40 mEq x1 followed in 2 hours by 20 mEq x1).  Recheck K+ level 4 hours after dose and the next AM.  If Serum K+ 2.5-2.9, dose = 80 mEq po total dose (40 mEq Q2H x2). Recheck K+ level 4 hours after dose and the next AM.  If Serum K+ less than 2.5, See IV order.  Dissolve packet contents in 4-8 ounces of cold water or juice.         1630-Auto Hold       1708-Unhold             potassium chloride 10 mEq in 100 mL intermittent infusion  Dose: 10 mEq Freq: EVERY 1 HOUR PRN Route: IV  PRN Reason: potassium supplementation  Start: 09/27/17 0926   Admin Instructions: Infuse via PERIPHERAL LINE or CENTRAL LINE. Use for central line replacement if patient weight less than 65 kg, if patient is on TPN with high potassium content or if unit does not stock 20 mEq bags.   If Serum K+ 3.0-3.3, dose = 10 mEq/hr x4 doses (40 mEq IV total dose). Recheck K+ level 2 hours after dose and the next AM.   If Serum K+ less than 3.0, dose = 10 mEq/hr x6 doses (60 mEq IV total dose). Recheck K+ level 2 hours after dose and the next AM.         1630-Auto Hold       1708-Unhold             potassium chloride 10 mEq in 100 mL intermittent infusion with 10 mg lidocaine  Dose: 10 mEq Freq: EVERY 1 HOUR PRN Route: IV  PRN Reason: potassium supplementation  Start: 09/27/17 0926   Admin Instructions: Infuse via PERIPHERAL LINE. Use potassium with lidocaine for pain with peripheral administration.  If Serum K+ 3.0-3.3, dose = 10 mEq/hr x4 doses (40 mEq IV total dose). Recheck K+ level 2 hours after dose and the next AM.  If Serum K+ less than 3.0, dose = 10 mEq/hr x6 doses (60 mEq IV total dose). Recheck K+ level 2 hours after dose and the next AM.         1630-Auto Hold       1708-Unhold             potassium chloride 20 mEq in 50 mL intermittent infusion  Dose: 20 mEq Freq: EVERY 1 HOUR PRN Route: IV  PRN Reason: potassium supplementation  Start: 09/27/17 0926   Admin Instructions: Infuse via CENTRAL LINE Only. May need EKG if less than 65 kg or on TPN - Max rate is 0.3 mEq/kg/hr for patients  not on EKG monitoring.   If Serum K+ 3.0-3.3, dose = 20 mEq/hr x2 doses (40 mEq IV total dose). Recheck K+ level 2 hours after dose and the next AM.  If Serum K+ less than 3.0, dose = 20 mEq/hr x3 doses (60 mEq IV total dose). Recheck K+ level 2 hours after dose and the next AM.         1630-Auto Hold       1708-Unhold             potassium chloride SA (K-DUR/KLOR-CON M) CR tablet 20-40 mEq  Dose: 20-40 mEq Freq: EVERY 2 HOURS PRN Route: PO  PRN Reason: potassium supplementation  Start: 09/27/17 0926   Admin Instructions: Use if able to take PO.   If Serum K+ 3.0-3.3, dose = 60 mEq po total dose (40 mEq x1 followed in 2 hours by 20 mEq x1). Recheck K+ level 4 hours after dose and the next AM.  If Serum K+ 2.5-2.9, dose = 80 mEq po total dose (40 mEq Q2H x2). Recheck K+ level 4 hours after dose and the next AM.  If Serum K+ less than 2.5, See IV order.  DO NOT CRUSH         1630-Auto Hold       1708-Unhold       1836 (40 mEq)-Given       2126 (20 mEq)-Given             prochlorperazine (COMPAZINE) injection 5-10 mg  Dose: 5-10 mg Freq: EVERY 6 HOURS PRN Route: IV  PRN Reasons: nausea,vomiting  Start: 09/26/17 0858   Admin Instructions: This is Step 2 of nausea and vomiting management.   If nausea not resolved in 15 minutes, give metoclopramide (REGLAN) if ordered (step 3 of nausea and vomiting management)         1630-Auto Hold       1708-Unhold            Or  prochlorperazine (COMPAZINE) tablet 5-10 mg  Dose: 5-10 mg Freq: EVERY 6 HOURS PRN Route: PO  PRN Reason: vomiting  Start: 09/26/17 0858   Admin Instructions: This is Step 2 of nausea and vomiting management.   If nausea not resolved in 15 minutes, give metoclopramide (REGLANI) if ordered (step 3 of nausea and vomiting management)         1630-Auto Hold       1708-Unhold            Or  prochlorperazine (COMPAZINE) Suppository 25 mg  Dose: 25 mg Freq: EVERY 12 HOURS PRN Route: RE  PRN Reasons: nausea,vomiting  Start: 09/26/17 0858   Admin Instructions: This is  Step 2 of nausea and vomiting management.   If nausea not resolved in 15 minutes, give metoclopramide (REGLAN) if ordered (step 3 of nausea and vomiting management)         1630-Auto Hold       1708-Unhold             senna-docusate (SENOKOT-S;PERICOLACE) 8.6-50 MG per tablet 1-2 tablet  Dose: 1-2 tablet Freq: 2 TIMES DAILY Route: PO  Start: 09/29/17 0900   Admin Instructions: Start with 1 tablet PO BID, If no bowel movement in 24 hours, increase to 2 tablets PO BID.  Hold for loose stools.           0950 (2 tablet)-Given       [ ] 2000           sodium chloride (PF) 0.9% PF flush 10-20 mL  Dose: 10-20 mL Freq: EVERY 1 HOUR PRN Route: IK  PRN Reasons: line flush,post meds or blood draw  PRN Comment: To flush each CVC Implanted port.  Start: 09/29/17 1029   Admin Instructions: 10 mL post IV meds;   20 mL post blood draw.               sodium chloride (PF) 0.9% PF flush 10-20 mL  Dose: 10-20 mL Freq: EVERY 1 HOUR PRN Route: IK  PRN Reasons: line flush,post meds or blood draw  PRN Comment: To flush each CVC Implanted port.  Start: 09/29/17 1025   Admin Instructions: 10 mL post IV meds;   20 mL post blood draw.               tamsulosin (FLOMAX) capsule 0.4 mg  Dose: 0.4 mg Freq: DAILY Route: PO  Start: 09/28/17 0845   Admin Instructions: Administer 30 minutes after the same meal each day.  Capsules should be swallowed whole; do not crush chew or open.          0918 (0.4 mg)-Given        0808 (0.4 mg)-Given           thiamine tablet 100 mg  Dose: 100 mg Freq: DAILY Route: PO  Start: 09/26/17 0800   End: 10/01/17 0759   Admin Instructions: Administer first dose as soon as order set is initiated unless given in ED.   If patient is unable to tolerate oral thiamine, an intravenous dose of thiamine should be considered.        0926 (100 mg)-Given        0845 (100 mg)-Given       1630-Auto Hold       1708-Unhold        0831 (100 mg)-Given        0808 (100 mg)-Given          Completed Medications  Medications 09/23/17 09/24/17  09/25/17 09/26/17 09/27/17 09/28/17 09/29/17         Dose: 6 mL Freq: ONCE Route: IV  Start: 09/27/17 1500   End: 09/27/17 1508        1508 (6 mL)-Given               Dose: 0.5 mL Freq: PRIOR TO DISCHARGE Route: IM  Start: 09/27/17 1000   End: 09/29/17 1135   Admin Instructions: Administer when afebrile (less than 100.4 F) x 24 hours, or Prior to Discharge. If not administering when scheduled , change the due time by following the instructions in the reference link below. If patient refuses vaccine, chart as Vaccine Refused.         1147-Hold         1135 (0.5 mL)-Given             Dose: 10 mL Freq: ONCE Route: IV  Start: 09/27/17 1500   End: 09/27/17 1508        1508 (10 mL)-Given            Discontinued Medications  Medications 09/23/17 09/24/17 09/25/17 09/26/17 09/27/17 09/28/17 09/29/17         Dose: 2.5 mg Freq: EVERY 4 HOURS PRN Route: NEBULIZATION  PRN Reason: shortness of breath / dyspnea  Start: 09/27/17 1641   End: 09/27/17 1708        1708-Med Discontinued           Dose: 4 mg Freq: ONCE PRN Route: IV  PRN Reason: nausea  PRN Comment: nausea  (If not administered in the OR and Post Op Nausea and Vomiting persists after droperidol dosing if ordered)  Start: 09/27/17 1641   End: 09/27/17 1708        1708-Med Discontinued           Dose: 4 mg Freq: ONCE PRN Route: IV  PRN Reason: other  PRN Comment: nausea  (If not administered in the OR and Post Op Nausea and Vomiting persists after droperidol dosing if ordered)  Start: 09/27/17 1641   End: 09/27/17 1708   Admin Instructions: May repeat times 1         1708-Med Discontinued           Dose: 2 g Freq: 4 TIMES DAILY Route: Top  Start: 09/26/17 0800   End: 09/27/17 1824   Admin Instructions: Apply to hands. Use dosing card.   Send dosing card with product.        (1235)-Not Given       (1236)-Not Given       (1826)-Not Given       2139 (2 g)-Given        0626 (2 g)-Given              (1241)-Not Given       1630-Auto Hold       1708-Unhold       (1824)-Not  Given       1824-Med Discontinued           Dose: 25-50 mcg Freq: EVERY 2 MIN PRN Route: IV  PRN Reason: other  PRN Comment: acute pain  Start: 09/27/17 1641   End: 09/27/17 1708   Admin Instructions: MAX cumulative dose = 250 mcg.    Use Fentanyl initially, as a short acting agent for acute pain control.  If insufficient, or a longer acting agent is needed, begin Morphine or Hydromorphone if ordered.         1708-Med Discontinued           Dose: 0.3-0.5 mg Freq: EVERY 5 MIN PRN Route: IV  PRN Reason: other  PRN Comment: acute pain.  May administer if Respiratory Rate is greater than 10  Start: 09/27/17 1641   End: 09/27/17 1708   Admin Instructions: If fentanyl is also ordered, use HYDROmorphone if pain control insufficient with fentanyl or a longer acting agent is needed.   Max cumulative dose = 2 mg         1708-Med Discontinued           Dose: 25 mg Freq: EVERY 6 HOURS PRN Route: PO  PRN Reason: other  PRN Comment: adjuvant pain  Start: 09/27/17 1642   End: 09/27/17 1708        1708-Med Discontinued        Or    Dose: 50 mg Freq: EVERY 6 HOURS PRN Route: PO  PRN Reason: other  PRN Comment: adjuvant pain  Start: 09/27/17 1642   End: 09/27/17 1708        1708-Med Discontinued           Rate: 100 mL/hr Freq: CONTINUOUS Route: IV  Start: 09/27/17 1645   End: 09/27/17 1708   Admin Instructions: Continue until IV catheter is weaned                1708-Med Discontinued           Dose: 1-2 mg Freq: EVERY 5 MIN PRN Route: IV  PRN Reason: high blood pressure  PRN Comment: for Systemic Blood Pressure greater than 160 and Heart Rate greater than 60 bpm.    Start: 09/27/17 1642   End: 09/27/17 1708   Admin Instructions: Max cumulative dose = 10 mg.  For PACU USE ONLY.  DC WHEN TRANSFERRED TO FLOOR.         1708-Med Discontinued           Dose: 0.5-1 mg Freq: EVERY 5 MIN PRN Route: IV  PRN Reason: muscle spasms  Start: 09/27/17 1641   End: 09/27/17 1708   Admin Instructions: Max cumulative dose = 2 mg         1708-Med  Discontinued           Dose: 4 mg Freq: EVERY 30 MIN PRN Route: PO  PRN Reason: nausea  Start: 09/27/17 1641   End: 09/27/17 1708   Admin Instructions: MAX total dose = 8 mg, including OR dosing. If not resolved in 15 minutes, then go to step 2 (Prochlorperazine if ordered).         1708-Med Discontinued        Or    Dose: 4 mg Freq: EVERY 30 MIN PRN Route: IV  PRN Reason: nausea  Start: 09/27/17 1641   End: 09/27/17 1708   Admin Instructions: MAX total dose = 8 mg, including OR dosing. If not resolved in 15 minutes, then go to step 2 (Prochlorperazine if ordered).  Irritant.         1708-Med Discontinued      Medications 09/23/17 09/24/17 09/25/17 09/26/17 09/27/17 09/28/17 09/29/17               INTERAGENCY TRANSFER FORM - NOTES (H&P, Discharge Summary, Consults, Procedures, Therapies)   9/25/2017                       [facility] SURGICAL: [phone]               History & Physicals      Interval H&P Note by Trish Hyde APRN CRNA at 9/27/2017  3:15 PM     Author:  Trish Hyde APRN CRNA Service:  Anesthesiology Author Type:  Nurse Anesthetist    Filed:  9/27/2017  4:50 PM Date of Service:  9/27/2017  3:15 PM Creation Time:  9/27/2017  4:50 PM    Status:  Signed :  Trish Hyde APRN CRNA (Nurse Anesthetist)         The causes and treatment of seasonal allergic rhinitis are discussed in detail. Allergen avoidance, use and side effects of OTC and prescription antihistamine decongestant products, and the use and side effects of inhaled nasal corticosteroids is reviewed. Allergy desensitization shots are reserved for severe and refractory cases.[TW1.1]     Revision History        User Key Date/Time User Provider Type Action    > TW1.1 9/27/2017  4:50 PM Trish Hyde APRN CRNA Nurse Anesthetist Sign          Source Note     Author:  Cristian Batista MD Service:  Hospitalist Author Type:  Physician    Filed:  9/27/2017 12:20 PM Date of Service:  9/27/2017  8:32 AM Creation  Time:  9/27/2017  4:48 PM    Status:  Signed :  Cristian Batista MD (Physician)            [facility]      Subjective:[JE1.1]  Continue confusion  No pain  No fever  No ha  No neck pain[JE1.2]    Review of Systems:[JE1.1]  C: NEGATIVE for fever, chills, change in weight  I: NEGATIVE for worrisome rashes, moles or lesions  E: NEGATIVE for vision changes or irritation  E/M: NEGATIVE for ear, mouth and throat problems  R: NEGATIVE for significant cough or SOB  B: NEGATIVE for masses, tenderness or discharge  CV: NEGATIVE for chest pain, palpitations or peripheral edema  GI: NEGATIVE for nausea, abdominal pain, heartburn, or change in bowel habits  : NEGATIVE for frequency, dysuria, or hematuria  MUSCULOSKELETAL:chronic pain buttox and lower extremities  N: NEGATIVE for weakness, dizziness or paresthesias  E: NEGATIVE for temperature intolerance, skin/hair changes  H: NEGATIVE for bleeding problems  P: NEGATIVE for changes in mood or affect    Physical Ex[JE1.2]am:  Vitals Were Reviewed    Patient Vitals for the past 16 hrs:   BP Temp Temp src Pulse Heart Rate Resp SpO2 Weight   09/27/17 0756 (!) 150/93 98.1  F (36.7  C) Oral 93 - 18 99 % -   09/27/17 0605 - - - - - - - 67.3 kg (148 lb 5.9 oz)   09/27/17 0403 155/90 98.3  F (36.8  C) Oral 89 - 18 97 % -   09/26/17 2359 139/88 97.6  F (36.4  C) Oral - 96 18 99 % -   09/26/17 1941 114/74 - - 86 - - 97 % -         Intake/Output Summary (Last 24 hours) at 09/27/17 0832  Last data filed at 09/27/17 0657   Gross per 24 hour   Intake             2070 ml   Output             4700 ml   Net            -2630 ml[JE1.1]       GENERAL APPEARANCE: confused, speaks in confused fashion, ambulates  EYES: conjunctiva clear, eyes grossly normal  RESP: lungs clear to auscultation - no rales, rhonchi or wheezes  CV: regular rate and rhythm, normal S1 S2, no S3 or S4 and no murmur, click or rub   ABDOMEN: soft, nontender, no HSM or masses and bowel sounds  normal  MS: no clubbing, cyanosis; no edema  SKIN: clear without significant rashes or lesions  NEURO: alert, disoriented, speech is clear but doesn't make sense, exam otherwise nonfocal    Lab:[JE1.2]  Recent Labs   Lab Test  09/27/17   0800  09/26/17   0640   NA  145*  139   POTASSIUM  3.3*  3.3*   CHLORIDE  114*  109   CO2  24  21   ANIONGAP  7  9   GLC  105*  85   BUN  9  12   CR  0.71  0.69   JOHANN  8.8  8.4*     CBC RESULTS:   Recent Labs   Lab Test  09/27/17   0800  09/26/17   0640   WBC  6.1  6.5   RBC  3.44*  3.22*   HGB  9.0*  8.6*   HCT  26.6*  25.1*   PLT  154  143*       Results for orders placed or performed during the hospital encounter of 09/25/17 (from the past 24 hour(s))   Ammonia   Result Value Ref Range    Ammonia 17 10 - 50 umol/L   TSH   Result Value Ref Range    TSH 0.17 (L) 0.40 - 4.00 mU/L   Magnesium   Result Value Ref Range    Magnesium 1.5 (L) 1.6 - 2.3 mg/dL   Folate   Result Value Ref Range    Folate 32.0 >5.4 ng/mL   Cortisol   Result Value Ref Range    Cortisol Serum 13.0 4 - 22 ug/dL   T4 free   Result Value Ref Range    T4 Free 0.80 0.76 - 1.46 ng/dL   Care Transition RN/SW IP Consult    Narrative    Barb Ramsay RN     9/26/2017  4:00 PM  Care Transition Initial Assessment - RN    Reason For Consult: discharge planning   Met with: Patient and Family.    DATA:   Principal Problem:    Altered level of consciousness  Active Problems:    Anemia    Non-small cell lung cancer (NSCLC) (H)    Cancer associated pain    Constipation, chronic    Major depressive disorder, recurrent episode, moderate (H)    Tobacco use disorder    Altered mental status       Cognitive Status: awake, alert and confused.  [facility]  Primary Care MD Name: Memo  Contact information and PCP information verified: Yes  Lives With: alone   Living Arrangements: house. Has to go up and down stairs to get   to kitchen. Sarah is concerned that he has not been eating good   since last  Wednesday.     Description of Support System: Supportive, Involved   Who is your support system?: Other (specify), Sibling(s)   (Daughter in law Sarah, Uncle)    Support Assessment: Lacks necessary supervision and assistance   Insurance concerns: No Insurance issues identified    ASSESSMENT:  Patient currently receives the following services:  [facility]   Palliative Care ([phone] Fax: [phone])       Identified issues/concerns regarding health management: new   confusion, medication compliance: was taking both MS Contin and   Oxycodone as PRN meds. Also, patient's home care nurse told Sarah   that he hadn't taken his routine meds from Wednesday of last   week. Just diagnosed with cancer earlier this year. Did not   tolerate first chemotherapy so was started on Opdiva 9/14/17 and   he is due for second dose on 9/28.[facility]   + C. Diff. New hoarse voice since Monday.  Transportation concerns: family may be able to provide, depending   on work schedule    PLAN:  Financial costs for the patient include: none noted at this time.  Patient given options and choices for discharge: This writer met   with pt and his daughter in law Keith, introduced self and role.   Discussed medicare coverage in regards to home care, TCU and LTC.     Patient/family is agreeable to the plan?  Yes, patient doesn't   like the idea of going back to TCU, but knows he may have to   because of his continued confusion.  Patient anticipates discharging to: TCU. Patient was provided   with Medicare certified nursing home list. Pts choices are as   follows 1) [place] [facility] (Phone: [phone] Fax:   [phone]) 2) [facility] Phone:   ([phone]) Fax: ([phone])  3) [facility]   (Phone: [phone]) Fax: ([phone])     Resources List: Transitional Care  Patient anticipates needs for home equipment: No       Discharge Planner   Discharge Plans in progress: Referrals out to  TCUs  Barriers to discharge plan: medical stability, may need to put   chemotherapy treatments on hold  Plan/Disposition: TCU   Care  (CTS) will   continue to follow as needed.    1500: Discussed with Sarah, that TCUs do not take patients that   are currently receiving chemotherapy. Sarah states that patient's   MD did say that they were concerned that patient's confusion   hadn't improved greatly. They are awaiting MRI results and now   patient was to get an echocardiogram. Writer will continue to   assist as needed.    1540: [facility] If patient puts his   chemotherapy on hold, they still would not be able to take   patient until Friday at the earliest (no private bathroom until   then) and are still waiting for their business office to approve.   MR Brain w/o & w Contrast    Narrative    MRI BRAIN WITHOUT AND WITH CONTRAST  9/26/2017 2:19 PM    HISTORY:  Alerted mental status. Rule out brain metastasis.    TECHNIQUE:  Multiplanar, multisequence MRI of the brain without and  with 6 mL Gadavist.    COMPARISON: Head CT 9/25/2017. Brain MR 4/21/2017.    FINDINGS: Images are severely degraded by motion artifact. No definite  large intracranial mass is identified noting limitations. There is no  mass effect or midline shift. The ventricles are not enlarged out of  proportion to the cerebral sulci. Mild diffuse parenchymal volume  loss. Patchy deep and subcortical white matter T2 hyperintensities are  nonspecific.    Enhancing sellar mass extends superiorly into the suprasellar cistern  and likely abuts the optic chiasm. No definite other intracranial  enhancing mass is identified noting marked limitations given motion  artifact.    Polypoid mucosal thickening in the left maxillary sinus.       Impression    IMPRESSION:      1. Images are severely degraded by motion artifact.  2. Evaluation for metastatic disease is limited given the motion. No  definite large intracranial mass  is identified, but smaller lesions as  well as possible leptomeningeal disease cannot be excluded.  3. Enhancing expansile mass in the sella extending into the  suprasellar cistern. This may represent a pituitary macroadenoma,  although it is poorly characterized on this motion degraded study.  Metastatic lesion could also be considered.  4. Patchy white matter T2 hyperintense lesions. These are  indeterminate and could be due to chronic microvascular ischemic  disease; however, given the motion artifact, evaluation for associated  enhancement is limited, and these could potentially represent small  metastatic lesions. Recommend repeat imaging with sedation.    LUCAS RAMIREZ MD   Echocardiogram Complete    Narrative    [id number]  [facility]  [id number]  [id number]^ALEX^AMARIS^KATIE           [facility]  Echocardiography Laboratory  [address] [place] [address]        Name: JOHN SANCHEZ  MRN: [id number]  : 1960  Study Date: 2017 03:08 PM  Age: 57 yrs  Gender: Male  Patient [facility]: [facility]  Reason For Study: Abn EKG  Ordering Physician: AMARIS JOHNSON  Referring Physician: TUSHAR WHITTEN  Performed By: Hilary Garcia RDCS     BSA: 1.8 m2  Height: 71 in  Weight: 144 lb  HR: 110  BP: 110/63 mmHg  _____________________________________________________________________________  __        Procedure  Complete Portable Echo Adult.  _____________________________________________________________________________  __        Interpretation Summary     Technically difficult study due to patient's agitation. Limited views  obtained.     Grossly normal left ventricular and right ventricular systolic function in  subcostal views.  No significant valvular stenosis or regurgitation noted on doppler  interrogation.  _____________________________________________________________________________  __        Left Ventricle  The left ventricle is normal in size. There is normal left ventricular  wall  thickness. Left ventricular systolic function is normal. The visual ejection  fraction is estimated at 55-60%. Regional wall motion abnormalities cannot be  excluded due to limited visualization.     Right Ventricle  The right ventricle is normal size. The right ventricular systolic function is  normal.     Atria  The left atrium is mildly dilated. Right atrial size is normal.     Mitral Valve  There is trace mitral regurgitation.        Tricuspid Valve  Right ventricular systolic pressure could not be approximated due to  inadequate tricuspid regurgitation. There is trace tricuspid regurgitation.     Aortic Valve  The aortic valve is not well visualized. The aortic valve is trileaflet. No  aortic regurgitation is present.     Pulmonic Valve  There is no pulmonic valvular stenosis.     Vessels  The aortic root is normal size. The IVC is normal in size and reactivity with  respiration, suggesting normal central venous pressure.     Pericardium  There is no pericardial effusion.        Rhythm  The rhythm was undetermined.  _____________________________________________________________________________  __  MMode/2D Measurements & Calculations  IVSd: 1.1 cm     LVIDd: 4.5 cm  LVIDs: 4.0 cm  LVPWd: 1.0 cm  FS: 11.1 %  EDV(Teich): 94.3 ml  ESV(Teich): 71.5 ml  LV mass(C)d: 168.5 grams  LV mass(C)dI: 91.8 grams/m2  Ao root diam: 3.5 cm  LA dimension: 4.0 cm  LA/Ao: 1.2                 _____________________________________________________________________________  __           Report approved by: Misael Cohn 09/26/2017 03:42 PM      Magnesium   Result Value Ref Range    Magnesium 2.7 (H) 1.6 - 2.3 mg/dL   Troponin I   Result Value Ref Range    Troponin I ES <0.015 0.000 - 0.045 ug/L   Comprehensive metabolic panel   Result Value Ref Range    Sodium 145 (H) 133 - 144 mmol/L    Potassium 3.3 (L) 3.4 - 5.3 mmol/L    Chloride 114 (H) 94 - 109 mmol/L    Carbon Dioxide 24 20 - 32 mmol/L    Anion Gap 7 3 - 14 mmol/L     Glucose 105 (H) 70 - 99 mg/dL    Urea Nitrogen 9 7 - 30 mg/dL    Creatinine 0.71 0.66 - 1.25 mg/dL    GFR Estimate >90 >60 mL/min/1.7m2    GFR Estimate If Black >90 >60 mL/min/1.7m2    Calcium 8.8 8.5 - 10.1 mg/dL    Bilirubin Total 0.6 0.2 - 1.3 mg/dL    Albumin 3.3 (L) 3.4 - 5.0 g/dL    Protein Total 6.8 6.8 - 8.8 g/dL    Alkaline Phosphatase 111 40 - 150 U/L    ALT 18 0 - 70 U/L    AST 21 0 - 45 U/L   CBC with platelets differential   Result Value Ref Range    WBC 6.1 4.0 - 11.0 10e9/L    RBC Count 3.44 (L) 4.4 - 5.9 10e12/L    Hemoglobin 9.0 (L) 13.3 - 17.7 g/dL    Hematocrit 26.6 (L) 40.0 - 53.0 %    MCV 77 (L) 78 - 100 fl    MCH 26.2 (L) 26.5 - 33.0 pg    MCHC 33.8 31.5 - 36.5 g/dL    RDW 15.6 (H) 10.0 - 15.0 %    Platelet Count 154 150 - 450 10e9/L    Diff Method Automated Method     % Neutrophils 64.7 %    % Lymphocytes 24.5 %    % Monocytes 8.4 %    % Eosinophils 2.0 %    % Basophils 0.2 %    % Immature Granulocytes 0.2 %    Absolute Neutrophil 3.9 1.6 - 8.3 10e9/L    Absolute Lymphocytes 1.5 0.8 - 5.3 10e9/L    Absolute Monocytes 0.5 0.0 - 1.3 10e9/L    Absolute Eosinophils 0.1 0.0 - 0.7 10e9/L    Absolute Basophils 0.0 0.0 - 0.2 10e9/L    Abs Immature Granulocytes 0.0 0 - 0.4 10e9/L       Assessment and Plan:    Wade Acevedo is a 57 year old male with PMH significant for chronic anemia, cancer associated pain, NSCLC current on Opdivo therapy, depression, tobacco use disorder, hx of EtOH abuse, and hx of thrombocytopenia who now presents with altered mental status.        Acute Encephalopathy   Daughter-in law complains of confusion for at least the last 24 hours. VSS. Drug screen positive only for opiates.  UA negative.  CXR negative.  CT head again shows pituitary mass. EtOH level was 0.00.  Acetaminophen was less than 2 and salicylate level was 5.  DDx: Medications (hx of overuse of scheduled MS Contin as recent at 09/20/17) versus infection versus metabolic derangements (changes in magnesium, thyroid,  Wernicke's)[JE1.1]  No sign of infectious cause, normal ammonia, T4 and cortisol normal, MRI of poor quality due to motion--repeat with sedation recommended[JE1.3]    Urinary Retention[JE1.1]-probably secondary to AMS[JE1.2]  Straight cathed for 800cc of urine overnight.  No history of urinary retention.  - straight cath, PRN for urine greater than 350cc on bladder scan  - instruct RN to inform MD if straight cath x3; will likely need to place lion     Non-small cell lung cancer (NSCLC) (H) with metastasis to bone (5th lateral rib, scapula)  Follows with Dr. Naylor. Diagnosed by CT guided biopsy. PET Scan confirms tumor with hypermetabolic lymph nodes and hypermetabolic lesions to the scapula and 5th lateral rib. Previously on carbo/taxol - this was stopped due pancytopenia. Received first dose of Opdivo chemotherapy (immune therapy) 09/14/2017.  Next scheduled chemotherapy infusion of Opdivo 09/28/2017.     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  Normal TSH with low normal T4/T3.  Mildly abnormal cosyntropin stim testing. Seen by [facility] (Dr. Padma Medley) on 05/10/2017.  Most likely thought to be non-functioning pituitary macroadenoma.  Was supposed to have repeat MRI 08/2017 - did not do so.  Has follow up with endocrinology 11/14/2017.  Most recently had mildly low TSH with normal T4 09/14/2017.[JE1.1]    Abnormal EKG  New t inversions[JE1.4] since April 2017, no wall motion abnormalities, trops normal, no chest pain--given metastatic cancer and no sign of ACS, will simply observe at this time, doubt implicated in his AMS.[JE1.5]    Positive Clostridia difficile test-no diarrha-doubt clinical ds  Test ordered by Dr Fuentes-no diarrhea, no notation regarding why test sent.  Test was positive but I doubt it represents clinical ds--on empiric oral flagyl while work up for AMS is on going. Would probably dc flagyl at some point if no  diarrhea.[JE1.6]     [JE1.5]  Anemia  Has received transfusions in the past for low hemoglobin - last received 2 units approximately 09/07/2017.  This was thought to be secondary to chemotherapy (carbo/taxol).  Recently switched from this to immune chemotherapy.  Hemoglobin on arrival 10.7 on arrival.  8.6 on admission day 1. VSS.  - AM CBC with platelets, will transfuse if hbg less than 7.0     Hx of Thrombocytopenia  Platelets as low as 42k approximately 3 weeks ago.  Received 2u of PRBCs WITHOUT platelets, seemed to respond  - continue to monitor      Cancer associated pain  - continue PTA scheduled MS Contin  - continue PTA PRN oxycodone  - continue PTA dermal capsicin cream Q2 PRN  - continue PTA Voltaren gel 4x daily  - continue PTA Emla cream with access to port     Constipation, chronic  - continue PTA scheduled miralax, senna-ducosate     Major depressive disorder, recurrent episode, moderate (H)  - continue PTA Paxil      Tobacco use disorder  Nicotine patch in place     GERD  - continue PTA pantoprazole[JE1.1]     Discussion  Source of AMS still unclear, no overt infection, Still worried about metastatic ds.--will attempt to repeat MR with anesthesia sedation. Will discuss the possibility of Opdivo causing this[JE1.5]with Dr Naylor.[JE1.2]  I did talk to the POA yesterday and asked her to re-address code status.[JE1.5]  If MR is unremarkable-might consider LP.[JE1.2]      12:18 PM[JE1.7] discussed with Dr Naylor  He felt that AMS could be from Opdivo-but is unlikely  If MR is neg-he thought LP would be of little use-would suggest holding Opdivo until situation is clearer.[JE1.8]      Revision History        User Key Date/Time User Provider Type Action    > JE1.7 9/27/2017  4:48 PM Cristian Batista MD Physician Sign     JE1.8 9/27/2017 12:17 PM Cristian Batista MD Physician      JE1.2 9/27/2017  9:18 AM Cristian Batista MD Physician      JE1.6 9/27/2017  8:41 AM Cristian Batista MD  Physician      JE1.5 9/27/2017  8:37 AM Cristian Batista MD Physician      JE1.4 9/27/2017  8:36 AM Cristian Batista MD Physician      JE1.3 9/27/2017  8:34 AM Cristian Batista MD Physician      JE1.1 9/27/2017  8:32 AM Cristian Batista MD Physician                   Interval H&P Note by Trish Hyde APRN CRNA at 9/27/2017  3:15 PM     Author:  Trish Hyde APRN CRNA Service:  Anesthesiology Author Type:  Nurse Anesthetist    Filed:  9/27/2017  4:48 PM Date of Service:  9/27/2017  3:15 PM Creation Time:  9/27/2017  4:47 PM    Status:  Signed :  Trish Hyde APRN CRNA (Nurse Anesthetist)         The causes and treatment of seasonal allergic rhinitis are discussed in detail. Allergen avoidance, use and side effects of OTC and prescription antihistamine decongestant products, and the use and side effects of inhaled nasal corticosteroids is reviewed. Allergy desensitization shots are reserved for severe and refractory cases.[TW1.1]     Revision History        User Key Date/Time User Provider Type Action    > TW1.1 9/27/2017  4:48 PM Trish Hyde APRN CRNA Nurse Anesthetist Sign          Source Note     Author:  Cristian Batista MD Service:  Hospitalist Author Type:  Physician    Filed:  9/27/2017 12:20 PM Date of Service:  9/27/2017  8:32 AM Creation Time:  9/27/2017  4:48 PM    Status:  Signed :  Cristian Batista MD (Physician)            [facility] Hospitalist Service      Subjective:[JE1.1]  Continue confusion  No pain  No fever  No ha  No neck pain[JE1.2]    Review of Systems:[JE1.1]  C: NEGATIVE for fever, chills, change in weight  I: NEGATIVE for worrisome rashes, moles or lesions  E: NEGATIVE for vision changes or irritation  E/M: NEGATIVE for ear, mouth and throat problems  R: NEGATIVE for significant cough or SOB  B: NEGATIVE for masses, tenderness or discharge  CV: NEGATIVE for chest pain, palpitations or peripheral edema  GI: NEGATIVE for  nausea, abdominal pain, heartburn, or change in bowel habits  : NEGATIVE for frequency, dysuria, or hematuria  MUSCULOSKELETAL:chronic pain buttox and lower extremities  N: NEGATIVE for weakness, dizziness or paresthesias  E: NEGATIVE for temperature intolerance, skin/hair changes  H: NEGATIVE for bleeding problems  P: NEGATIVE for changes in mood or affect    Physical Ex[JE1.2]am:  Vitals Were Reviewed    Patient Vitals for the past 16 hrs:   BP Temp Temp src Pulse Heart Rate Resp SpO2 Weight   09/27/17 0756 (!) 150/93 98.1  F (36.7  C) Oral 93 - 18 99 % -   09/27/17 0605 - - - - - - - 67.3 kg (148 lb 5.9 oz)   09/27/17 0403 155/90 98.3  F (36.8  C) Oral 89 - 18 97 % -   09/26/17 2359 139/88 97.6  F (36.4  C) Oral - 96 18 99 % -   09/26/17 1941 114/74 - - 86 - - 97 % -         Intake/Output Summary (Last 24 hours) at 09/27/17 0832  Last data filed at 09/27/17 0657   Gross per 24 hour   Intake             2070 ml   Output             4700 ml   Net            -2630 ml[JE1.1]       GENERAL APPEARANCE: confused, speaks in confused fashion, ambulates  EYES: conjunctiva clear, eyes grossly normal  RESP: lungs clear to auscultation - no rales, rhonchi or wheezes  CV: regular rate and rhythm, normal S1 S2, no S3 or S4 and no murmur, click or rub   ABDOMEN: soft, nontender, no HSM or masses and bowel sounds normal  MS: no clubbing, cyanosis; no edema  SKIN: clear without significant rashes or lesions  NEURO: alert, disoriented, speech is clear but doesn't make sense, exam otherwise nonfocal    Lab:[JE1.2]  Recent Labs   Lab Test  09/27/17   0800  09/26/17   0640   NA  145*  139   POTASSIUM  3.3*  3.3*   CHLORIDE  114*  109   CO2  24  21   ANIONGAP  7  9   GLC  105*  85   BUN  9  12   CR  0.71  0.69   JOHANN  8.8  8.4*     CBC RESULTS:   Recent Labs   Lab Test  09/27/17   0800  09/26/17   0640   WBC  6.1  6.5   RBC  3.44*  3.22*   HGB  9.0*  8.6*   HCT  26.6*  25.1*   PLT  154  143*       Results for orders placed or  performed during the hospital encounter of 09/25/17 (from the past 24 hour(s))   Ammonia   Result Value Ref Range    Ammonia 17 10 - 50 umol/L   TSH   Result Value Ref Range    TSH 0.17 (L) 0.40 - 4.00 mU/L   Magnesium   Result Value Ref Range    Magnesium 1.5 (L) 1.6 - 2.3 mg/dL   Folate   Result Value Ref Range    Folate 32.0 >5.4 ng/mL   Cortisol   Result Value Ref Range    Cortisol Serum 13.0 4 - 22 ug/dL   T4 free   Result Value Ref Range    T4 Free 0.80 0.76 - 1.46 ng/dL   Care Transition RN/SW IP Consult    Narrative    Barb Ramsay RN     9/26/2017  4:00 PM  Care Transition Initial Assessment - RN    Reason For Consult: discharge planning   Met with: Patient and Family.    DATA:   Principal Problem:    Altered level of consciousness  Active Problems:    Anemia    Non-small cell lung cancer (NSCLC) (H)    Cancer associated pain    Constipation, chronic    Major depressive disorder, recurrent episode, moderate (H)    Tobacco use disorder    Altered mental status       Cognitive Status: awake, alert and confused.  [facility]  Primary Care MD Name: Memo  Contact information and PCP information verified: Yes  Lives With: alone   Living Arrangements: house. Has to go up and down stairs to get   to kitchen. Sarah is concerned that he has not been eating good   since last Wednesday.     Description of Support System: Supportive, Involved   Who is your support system?: Other (specify), Sibling(s)   (Daughter in law Sarah, Uncle)    Support Assessment: Lacks necessary supervision and assistance   Insurance concerns: No Insurance issues identified    ASSESSMENT:  Patient currently receives the following services:  [facility]   Palliative Care ([phone] Fax: [phone])       Identified issues/concerns regarding health management: new   confusion, medication compliance: was taking both MS Contin and   Oxycodone as PRN meds. Also, patient's home care nurse told Sarah   that he hadn't taken his  routine meds from Wednesday of last   week. Just diagnosed with cancer earlier this year. Did not   tolerate first chemotherapy so was started on Opdiva 9/14/17 and   he is due for second dose on 9/28.Was in [facility] in April.   + C. Diff. New hoarse voice since Monday.  Transportation concerns: family may be able to provide, depending   on work schedule    PLAN:  Financial costs for the patient include: none noted at this time.  Patient given options and choices for discharge: This writer met   with pt and his daughter in law Inna, introduced self and role.   Discussed medicare coverage in regards to home care, TCU and LTC.     Patient/family is agreeable to the plan?  Yes, patient doesn't   like the idea of going back to TCU, but knows he may have to   because of his continued confusion.  Patient anticipates discharging to: TCU. Patient was provided   with Medicare certified nursing home list. Pts choices are as   follows 1) [place] on [facility] (Phone: [phone] Fax:   [phone]) 2) [facility] Phone:   ([phone]) Fax: ([phone])  3) [facility]   (Phone: [phone]) Fax: ([phone])     Resources List: Transitional Care  Patient anticipates needs for home equipment: No       Discharge Planner   Discharge Plans in progress: Referrals out to TCUs  Barriers to discharge plan: medical stability, may need to put   chemotherapy treatments on hold  Plan/Disposition: TCU   Care  (CTS) will   continue to follow as needed.    1500: Discussed with Sarah, that TCUs do not take patients that   are currently receiving chemotherapy. Sarah states that patient's   MD did say that they were concerned that patient's confusion   hadn't improved greatly. They are awaiting MRI results and now   patient was to get an echocardiogram. Writer will continue to   assist as needed.    1540: Call received from [facility]. If patient puts his   chemotherapy on  hold, they still would not be able to take   patient until Friday at the earliest (no private bathroom until   then) and are still waiting for their business office to approve.   MR Brain w/o & w Contrast    Narrative    MRI BRAIN WITHOUT AND WITH CONTRAST  9/26/2017 2:19 PM    HISTORY:  Alerted mental status. Rule out brain metastasis.    TECHNIQUE:  Multiplanar, multisequence MRI of the brain without and  with 6 mL Gadavist.    COMPARISON: Head CT 9/25/2017. Brain MR 4/21/2017.    FINDINGS: Images are severely degraded by motion artifact. No definite  large intracranial mass is identified noting limitations. There is no  mass effect or midline shift. The ventricles are not enlarged out of  proportion to the cerebral sulci. Mild diffuse parenchymal volume  loss. Patchy deep and subcortical white matter T2 hyperintensities are  nonspecific.    Enhancing sellar mass extends superiorly into the suprasellar cistern  and likely abuts the optic chiasm. No definite other intracranial  enhancing mass is identified noting marked limitations given motion  artifact.    Polypoid mucosal thickening in the left maxillary sinus.       Impression    IMPRESSION:      1. Images are severely degraded by motion artifact.  2. Evaluation for metastatic disease is limited given the motion. No  definite large intracranial mass is identified, but smaller lesions as  well as possible leptomeningeal disease cannot be excluded.  3. Enhancing expansile mass in the sella extending into the  suprasellar cistern. This may represent a pituitary macroadenoma,  although it is poorly characterized on this motion degraded study.  Metastatic lesion could also be considered.  4. Patchy white matter T2 hyperintense lesions. These are  indeterminate and could be due to chronic microvascular ischemic  disease; however, given the motion artifact, evaluation for associated  enhancement is limited, and these could potentially represent small  metastatic  lesions. Recommend repeat imaging with sedation.    LUCAS RAMIREZ MD   Echocardiogram Complete    Narrative    [id number]  On license of [facility]  [id number]  [id number]^KHANHS^AMARIS^KATIE           [facility]  Echocardiography Laboratory  [address]  [place] [address]        Name: JOHN SANCHEZ  MRN: [id number]  : 1960  Study Date: 2017 03:08 PM  Age: 57 yrs  Gender: Male  Patient Location: [facility]  Reason For Study: Abn EKG  Ordering Physician: AMARIS JOHNSON  Referring Physician: TUSHAR WHITTEN  Performed By: Hilary Garcia RDCS     BSA: 1.8 m2  Height: 71 in  Weight: 144 lb  HR: 110  BP: 110/63 mmHg  _____________________________________________________________________________  __        Procedure  Complete Portable Echo Adult.  _____________________________________________________________________________  __        Interpretation Summary     Technically difficult study due to patient's agitation. Limited views  obtained.     Grossly normal left ventricular and right ventricular systolic function in  subcostal views.  No significant valvular stenosis or regurgitation noted on doppler  interrogation.  _____________________________________________________________________________  __        Left Ventricle  The left ventricle is normal in size. There is normal left ventricular wall  thickness. Left ventricular systolic function is normal. The visual ejection  fraction is estimated at 55-60%. Regional wall motion abnormalities cannot be  excluded due to limited visualization.     Right Ventricle  The right ventricle is normal size. The right ventricular systolic function is  normal.     Atria  The left atrium is mildly dilated. Right atrial size is normal.     Mitral Valve  There is trace mitral regurgitation.        Tricuspid Valve  Right ventricular systolic pressure could not be approximated due to  inadequate tricuspid regurgitation. There is trace tricuspid regurgitation.     Aortic  Valve  The aortic valve is not well visualized. The aortic valve is trileaflet. No  aortic regurgitation is present.     Pulmonic Valve  There is no pulmonic valvular stenosis.     Vessels  The aortic root is normal size. The IVC is normal in size and reactivity with  respiration, suggesting normal central venous pressure.     Pericardium  There is no pericardial effusion.        Rhythm  The rhythm was undetermined.  _____________________________________________________________________________  __  MMode/2D Measurements & Calculations  IVSd: 1.1 cm     LVIDd: 4.5 cm  LVIDs: 4.0 cm  LVPWd: 1.0 cm  FS: 11.1 %  EDV(Teich): 94.3 ml  ESV(Teich): 71.5 ml  LV mass(C)d: 168.5 grams  LV mass(C)dI: 91.8 grams/m2  Ao root diam: 3.5 cm  LA dimension: 4.0 cm  LA/Ao: 1.2                 _____________________________________________________________________________  __           Report approved by: Misael Cohn 09/26/2017 03:42 PM      Magnesium   Result Value Ref Range    Magnesium 2.7 (H) 1.6 - 2.3 mg/dL   Troponin I   Result Value Ref Range    Troponin I ES <0.015 0.000 - 0.045 ug/L   Comprehensive metabolic panel   Result Value Ref Range    Sodium 145 (H) 133 - 144 mmol/L    Potassium 3.3 (L) 3.4 - 5.3 mmol/L    Chloride 114 (H) 94 - 109 mmol/L    Carbon Dioxide 24 20 - 32 mmol/L    Anion Gap 7 3 - 14 mmol/L    Glucose 105 (H) 70 - 99 mg/dL    Urea Nitrogen 9 7 - 30 mg/dL    Creatinine 0.71 0.66 - 1.25 mg/dL    GFR Estimate >90 >60 mL/min/1.7m2    GFR Estimate If Black >90 >60 mL/min/1.7m2    Calcium 8.8 8.5 - 10.1 mg/dL    Bilirubin Total 0.6 0.2 - 1.3 mg/dL    Albumin 3.3 (L) 3.4 - 5.0 g/dL    Protein Total 6.8 6.8 - 8.8 g/dL    Alkaline Phosphatase 111 40 - 150 U/L    ALT 18 0 - 70 U/L    AST 21 0 - 45 U/L   CBC with platelets differential   Result Value Ref Range    WBC 6.1 4.0 - 11.0 10e9/L    RBC Count 3.44 (L) 4.4 - 5.9 10e12/L    Hemoglobin 9.0 (L) 13.3 - 17.7 g/dL    Hematocrit 26.6 (L) 40.0 - 53.0 %    MCV 77  (L) 78 - 100 fl    MCH 26.2 (L) 26.5 - 33.0 pg    MCHC 33.8 31.5 - 36.5 g/dL    RDW 15.6 (H) 10.0 - 15.0 %    Platelet Count 154 150 - 450 10e9/L    Diff Method Automated Method     % Neutrophils 64.7 %    % Lymphocytes 24.5 %    % Monocytes 8.4 %    % Eosinophils 2.0 %    % Basophils 0.2 %    % Immature Granulocytes 0.2 %    Absolute Neutrophil 3.9 1.6 - 8.3 10e9/L    Absolute Lymphocytes 1.5 0.8 - 5.3 10e9/L    Absolute Monocytes 0.5 0.0 - 1.3 10e9/L    Absolute Eosinophils 0.1 0.0 - 0.7 10e9/L    Absolute Basophils 0.0 0.0 - 0.2 10e9/L    Abs Immature Granulocytes 0.0 0 - 0.4 10e9/L       Assessment and Plan:    Wade Acevedo is a 57 year old male with PMH significant for chronic anemia, cancer associated pain, NSCLC current on Opdivo therapy, depression, tobacco use disorder, hx of EtOH abuse, and hx of thrombocytopenia who now presents with altered mental status.        Acute Encephalopathy   Daughter-in law complains of confusion for at least the last 24 hours. VSS. Drug screen positive only for opiates.  UA negative.  CXR negative.  CT head again shows pituitary mass. EtOH level was 0.00.  Acetaminophen was less than 2 and salicylate level was 5.  DDx: Medications (hx of overuse of scheduled MS Contin as recent at 09/20/17) versus infection versus metabolic derangements (changes in magnesium, thyroid, Wernicke's)[JE1.1]  No sign of infectious cause, normal ammonia, T4 and cortisol normal, MRI of poor quality due to motion--repeat with sedation recommended[JE1.3]    Urinary Retention[JE1.1]-probably secondary to AMS[JE1.2]  Straight cathed for 800cc of urine overnight.  No history of urinary retention.  - straight cath, PRN for urine greater than 350cc on bladder scan  - instruct RN to inform MD if straight cath x3; will likely need to place lion     Non-small cell lung cancer (NSCLC) (H) with metastasis to bone (5th lateral rib, scapula)  Follows with Dr. Naylor. Diagnosed by CT guided biopsy. PET Scan  confirms tumor with hypermetabolic lymph nodes and hypermetabolic lesions to the scapula and 5th lateral rib. Previously on carbo/taxol - this was stopped due pancytopenia. Received first dose of Opdivo chemotherapy (immune therapy) 09/14/2017.  Next scheduled chemotherapy infusion of Opdivo 09/28/2017.     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  Normal TSH with low normal T4/T3.  Mildly abnormal cosyntropin stim testing. Seen by [facility] (Dr. Padma Medley) on 05/10/2017.  Most likely thought to be non-functioning pituitary macroadenoma.  Was supposed to have repeat MRI 08/2017 - did not do so.  Has follow up with endocrinology 11/14/2017.  Most recently had mildly low TSH with normal T4 09/14/2017.[JE1.1]    Abnormal EKG  New t inversions[JE1.4] since April 2017, no wall motion abnormalities, trops normal, no chest pain--given metastatic cancer and no sign of ACS, will simply observe at this time, doubt implicated in his AMS.[JE1.5]    Positive Clostridia difficile test-no diarrha-doubt clinical ds  Test ordered by Dr Fuentes-no diarrhea, no notation regarding why test sent.  Test was positive but I doubt it represents clinical ds--on empiric oral flagyl while work up for AMS is on going. Would probably dc flagyl at some point if no diarrhea.[JE1.6]     [JE1.5]  Anemia  Has received transfusions in the past for low hemoglobin - last received 2 units approximately 09/07/2017.  This was thought to be secondary to chemotherapy (carbo/taxol).  Recently switched from this to immune chemotherapy.  Hemoglobin on arrival 10.7 on arrival.  8.6 on admission day 1. VSS.  - AM CBC with platelets, will transfuse if hbg less than 7.0     Hx of Thrombocytopenia  Platelets as low as 42k approximately 3 weeks ago.  Received 2u of PRBCs WITHOUT platelets, seemed to respond  - continue to monitor      Cancer associated pain  - continue PTA scheduled MS Contin  - continue PTA PRN  oxycodone  - continue PTA dermal capsicin cream Q2 PRN  - continue PTA Voltaren gel 4x daily  - continue PTA Emla cream with access to port     Constipation, chronic  - continue PTA scheduled miralax, senna-ducosate     Major depressive disorder, recurrent episode, moderate (H)  - continue PTA Paxil      Tobacco use disorder  Nicotine patch in place     GERD  - continue PTA pantoprazole[JE1.1]     Discussion  Source of AMS still unclear, no overt infection, Still worried about metastatic ds.--will attempt to repeat MR with anesthesia sedation. Will discuss the possibility of Opdivo causing this[JE1.5]with Dr Naylor.[JE1.2]  I did talk to the POA yesterday and asked her to re-address code status.[JE1.5]  If MR is unremarkable-might consider LP.[JE1.2]      12:18 PM[JE1.7] discussed with Dr Naylor  He felt that AMS could be from Opdivo-but is unlikely  If MR is neg-he thought LP would be of little use-would suggest holding Opdivo until situation is clearer.[JE1.8]      Revision History        User Key Date/Time User Provider Type Action    > JE1.7 9/27/2017  4:48 PM Cristian Batista MD Physician Sign     JE1.8 9/27/2017 12:17 PM Cristian Batista MD Physician      JE1.2 9/27/2017  9:18 AM Cristian Batista MD Physician      JE1.6 9/27/2017  8:41 AM Cristian Batista MD Physician      JE1.5 9/27/2017  8:37 AM Cristian Batista MD Physician      JE1.4 9/27/2017  8:36 AM Cristian Batista MD Physician      JE1.3 9/27/2017  8:34 AM Cristian Batista MD Physician      JE1.1 9/27/2017  8:32 AM Cristian Batista MD Physician                   H&P (View-Only) by Cristian Batista MD at 9/27/2017  8:32 AM     Author:  Cristian Batista MD Service:  Hospitalist Author Type:  Physician    Filed:  9/27/2017 12:20 PM Date of Service:  9/27/2017  8:32 AM Creation Time:  9/27/2017  4:48 PM    Status:  Signed :  Cristian Batista MD (Physician)         [facility]  Service      Subjective:[JE1.1]  Continue confusion  No pain  No fever  No ha  No neck pain[JE1.2]    Review of Systems:[JE1.1]  C: NEGATIVE for fever, chills, change in weight  I: NEGATIVE for worrisome rashes, moles or lesions  E: NEGATIVE for vision changes or irritation  E/M: NEGATIVE for ear, mouth and throat problems  R: NEGATIVE for significant cough or SOB  B: NEGATIVE for masses, tenderness or discharge  CV: NEGATIVE for chest pain, palpitations or peripheral edema  GI: NEGATIVE for nausea, abdominal pain, heartburn, or change in bowel habits  : NEGATIVE for frequency, dysuria, or hematuria  MUSCULOSKELETAL:chronic pain buttox and lower extremities  N: NEGATIVE for weakness, dizziness or paresthesias  E: NEGATIVE for temperature intolerance, skin/hair changes  H: NEGATIVE for bleeding problems  P: NEGATIVE for changes in mood or affect    Physical Ex[JE1.2]am:  Vitals Were Reviewed    Patient Vitals for the past 16 hrs:   BP Temp Temp src Pulse Heart Rate Resp SpO2 Weight   09/27/17 0756 (!) 150/93 98.1  F (36.7  C) Oral 93 - 18 99 % -   09/27/17 0605 - - - - - - - 67.3 kg (148 lb 5.9 oz)   09/27/17 0403 155/90 98.3  F (36.8  C) Oral 89 - 18 97 % -   09/26/17 2359 139/88 97.6  F (36.4  C) Oral - 96 18 99 % -   09/26/17 1941 114/74 - - 86 - - 97 % -         Intake/Output Summary (Last 24 hours) at 09/27/17 0832  Last data filed at 09/27/17 0657   Gross per 24 hour   Intake             2070 ml   Output             4700 ml   Net            -2630 ml[JE1.1]       GENERAL APPEARANCE: confused, speaks in confused fashion, ambulates  EYES: conjunctiva clear, eyes grossly normal  RESP: lungs clear to auscultation - no rales, rhonchi or wheezes  CV: regular rate and rhythm, normal S1 S2, no S3 or S4 and no murmur, click or rub   ABDOMEN: soft, nontender, no HSM or masses and bowel sounds normal  MS: no clubbing, cyanosis; no edema  SKIN: clear without significant rashes or lesions  NEURO: alert, disoriented, speech  is clear but doesn't make sense, exam otherwise nonfocal    Lab:[JE1.2]  Recent Labs   Lab Test  09/27/17   0800  09/26/17   0640   NA  145*  139   POTASSIUM  3.3*  3.3*   CHLORIDE  114*  109   CO2  24  21   ANIONGAP  7  9   GLC  105*  85   BUN  9  12   CR  0.71  0.69   JOHANN  8.8  8.4*     CBC RESULTS:   Recent Labs   Lab Test  09/27/17   0800  09/26/17   0640   WBC  6.1  6.5   RBC  3.44*  3.22*   HGB  9.0*  8.6*   HCT  26.6*  25.1*   PLT  154  143*       Results for orders placed or performed during the hospital encounter of 09/25/17 (from the past 24 hour(s))   Ammonia   Result Value Ref Range    Ammonia 17 10 - 50 umol/L   TSH   Result Value Ref Range    TSH 0.17 (L) 0.40 - 4.00 mU/L   Magnesium   Result Value Ref Range    Magnesium 1.5 (L) 1.6 - 2.3 mg/dL   Folate   Result Value Ref Range    Folate 32.0 >5.4 ng/mL   Cortisol   Result Value Ref Range    Cortisol Serum 13.0 4 - 22 ug/dL   T4 free   Result Value Ref Range    T4 Free 0.80 0.76 - 1.46 ng/dL   Care Transition RN/SW IP Consult    Narrative    Barb Ramsay RN     9/26/2017  4:00 PM  Care Transition Initial Assessment - RN    Reason For Consult: discharge planning   Met with: Patient and Family.    DATA:   Principal Problem:    Altered level of consciousness  Active Problems:    Anemia    Non-small cell lung cancer (NSCLC) (H)    Cancer associated pain    Constipation, chronic    Major depressive disorder, recurrent episode, moderate (H)    Tobacco use disorder    Altered mental status       Cognitive Status: awake, alert and confused.  [facility]  Primary Care MD Name: Memo  Contact information and PCP information verified: Yes  Lives With: alone   Living Arrangements: house. Has to go up and down stairs to get   to kitchen. Sarah is concerned that he has not been eating good   since last Wednesday.     Description of Support System: Supportive, Involved   Who is your support system?: Other (specify), Sibling(s)   (Daughter  in law Smith, Uncle)    Support Assessment: Lacks necessary supervision and assistance   Insurance concerns: No Insurance issues identified    ASSESSMENT:  Patient currently receives the following services:  [facility]   Palliative Care ([phone] Fax: [phone])       Identified issues/concerns regarding health management: new   confusion, medication compliance: was taking both MS Contin and   Oxycodone as PRN meds. Also, patient's home care nurse told Sarah   that he hadn't taken his routine meds from Wednesday of last   week. Just diagnosed with cancer earlier this year. Did not   tolerate first chemotherapy so was started on Opdiva 9/14/17 and   he is due for second dose on 9/28.Was in [facility] in April.   + C. Diff. New hoarse voice since Monday.  Transportation concerns: family may be able to provide, depending   on work schedule    PLAN:  Financial costs for the patient include: none noted at this time.  Patient given options and choices for discharge: This writer met   with pt and his daughter in law Keith, introduced self and role.   Discussed medicare coverage in regards to home care, TCU and LTC.     Patient/family is agreeable to the plan?  Yes, patient doesn't   like the idea of going back to TCU, but knows he may have to   because of his continued confusion.  Patient anticipates discharging to: TCU. Patient was provided   with Medicare certified nursing home list. Pts choices are as   follows 1) [place] (Phone: [phone] Fax:   [phone]) 2) [facility] Phone:   ([phone]) Fax: ([phone])  3) [facility]   (Phone: [phone]) Fax: ([phone])     Resources List: Transitional Care  Patient anticipates needs for home equipment: No       Discharge Planner   Discharge Plans in progress: Referrals out to TCUs  Barriers to discharge plan: medical stability, may need to put   chemotherapy treatments on hold  Plan/Disposition: TCU   Care   (CTS) will   continue to follow as needed.    1500: Discussed with Sarah, that TCUs do not take patients that   are currently receiving chemotherapy. Sarah states that patient's   MD did say that they were concerned that patient's confusion   hadn't improved greatly. They are awaiting MRI results and now   patient was to get an echocardiogram. Writer will continue to   assist as needed.    1540: Call received from [place] [facility]. If patient puts his   chemotherapy on hold, they still would not be able to take   patient until Friday at the earliest (no private bathroom until   then) and are still waiting for their business office to approve.   MR Brain w/o & w Contrast    Narrative    MRI BRAIN WITHOUT AND WITH CONTRAST  9/26/2017 2:19 PM    HISTORY:  Alerted mental status. Rule out brain metastasis.    TECHNIQUE:  Multiplanar, multisequence MRI of the brain without and  with 6 mL Gadavist.    COMPARISON: Head CT 9/25/2017. Brain MR 4/21/2017.    FINDINGS: Images are severely degraded by motion artifact. No definite  large intracranial mass is identified noting limitations. There is no  mass effect or midline shift. The ventricles are not enlarged out of  proportion to the cerebral sulci. Mild diffuse parenchymal volume  loss. Patchy deep and subcortical white matter T2 hyperintensities are  nonspecific.    Enhancing sellar mass extends superiorly into the suprasellar cistern  and likely abuts the optic chiasm. No definite other intracranial  enhancing mass is identified noting marked limitations given motion  artifact.    Polypoid mucosal thickening in the left maxillary sinus.       Impression    IMPRESSION:      1. Images are severely degraded by motion artifact.  2. Evaluation for metastatic disease is limited given the motion. No  definite large intracranial mass is identified, but smaller lesions as  well as possible leptomeningeal disease cannot be excluded.  3. Enhancing expansile mass in  the sella extending into the  suprasellar cistern. This may represent a pituitary macroadenoma,  although it is poorly characterized on this motion degraded study.  Metastatic lesion could also be considered.  4. Patchy white matter T2 hyperintense lesions. These are  indeterminate and could be due to chronic microvascular ischemic  disease; however, given the motion artifact, evaluation for associated  enhancement is limited, and these could potentially represent small  metastatic lesions. Recommend repeat imaging with sedation.    LUCAS RAMIREZ MD   Echocardiogram Complete    Narrative    [id number]  Formerly [facility], 1928–[id number]  [id number]  [id number]^ALEX^AMARIS^KATIE           [facility]  Echocardiography Laboratory  [address] [place] [address]        Name: JOHN SANCHEZ  MRN: [id number]  : 1960  Study Date: 2017 03:08 PM  Age: 57 yrs  Gender: Male  Patient Location: [facility]  Reason For Study: Abn EKG  Ordering Physician: AMARIS JOHNSON  Referring Physician: TUSHAR WHITTEN  Performed By: Hilary Garcia RDCS     BSA: 1.8 m2  Height: 71 in  Weight: 144 lb  HR: 110  BP: 110/63 mmHg  _____________________________________________________________________________  __        Procedure  Complete Portable Echo Adult.  _____________________________________________________________________________  __        Interpretation Summary     Technically difficult study due to patient's agitation. Limited views  obtained.     Grossly normal left ventricular and right ventricular systolic function in  subcostal views.  No significant valvular stenosis or regurgitation noted on doppler  interrogation.  _____________________________________________________________________________  __        Left Ventricle  The left ventricle is normal in size. There is normal left ventricular wall  thickness. Left ventricular systolic function is normal. The visual ejection  fraction is estimated at 55-60%. Regional wall motion  abnormalities cannot be  excluded due to limited visualization.     Right Ventricle  The right ventricle is normal size. The right ventricular systolic function is  normal.     Atria  The left atrium is mildly dilated. Right atrial size is normal.     Mitral Valve  There is trace mitral regurgitation.        Tricuspid Valve  Right ventricular systolic pressure could not be approximated due to  inadequate tricuspid regurgitation. There is trace tricuspid regurgitation.     Aortic Valve  The aortic valve is not well visualized. The aortic valve is trileaflet. No  aortic regurgitation is present.     Pulmonic Valve  There is no pulmonic valvular stenosis.     Vessels  The aortic root is normal size. The IVC is normal in size and reactivity with  respiration, suggesting normal central venous pressure.     Pericardium  There is no pericardial effusion.        Rhythm  The rhythm was undetermined.  _____________________________________________________________________________  __  MMode/2D Measurements & Calculations  IVSd: 1.1 cm     LVIDd: 4.5 cm  LVIDs: 4.0 cm  LVPWd: 1.0 cm  FS: 11.1 %  EDV(Teich): 94.3 ml  ESV(Teich): 71.5 ml  LV mass(C)d: 168.5 grams  LV mass(C)dI: 91.8 grams/m2  Ao root diam: 3.5 cm  LA dimension: 4.0 cm  LA/Ao: 1.2                 _____________________________________________________________________________  __           Report approved by: Misael Cohn 09/26/2017 03:42 PM      Magnesium   Result Value Ref Range    Magnesium 2.7 (H) 1.6 - 2.3 mg/dL   Troponin I   Result Value Ref Range    Troponin I ES <0.015 0.000 - 0.045 ug/L   Comprehensive metabolic panel   Result Value Ref Range    Sodium 145 (H) 133 - 144 mmol/L    Potassium 3.3 (L) 3.4 - 5.3 mmol/L    Chloride 114 (H) 94 - 109 mmol/L    Carbon Dioxide 24 20 - 32 mmol/L    Anion Gap 7 3 - 14 mmol/L    Glucose 105 (H) 70 - 99 mg/dL    Urea Nitrogen 9 7 - 30 mg/dL    Creatinine 0.71 0.66 - 1.25 mg/dL    GFR Estimate >90 >60 mL/min/1.7m2     GFR Estimate If Black >90 >60 mL/min/1.7m2    Calcium 8.8 8.5 - 10.1 mg/dL    Bilirubin Total 0.6 0.2 - 1.3 mg/dL    Albumin 3.3 (L) 3.4 - 5.0 g/dL    Protein Total 6.8 6.8 - 8.8 g/dL    Alkaline Phosphatase 111 40 - 150 U/L    ALT 18 0 - 70 U/L    AST 21 0 - 45 U/L   CBC with platelets differential   Result Value Ref Range    WBC 6.1 4.0 - 11.0 10e9/L    RBC Count 3.44 (L) 4.4 - 5.9 10e12/L    Hemoglobin 9.0 (L) 13.3 - 17.7 g/dL    Hematocrit 26.6 (L) 40.0 - 53.0 %    MCV 77 (L) 78 - 100 fl    MCH 26.2 (L) 26.5 - 33.0 pg    MCHC 33.8 31.5 - 36.5 g/dL    RDW 15.6 (H) 10.0 - 15.0 %    Platelet Count 154 150 - 450 10e9/L    Diff Method Automated Method     % Neutrophils 64.7 %    % Lymphocytes 24.5 %    % Monocytes 8.4 %    % Eosinophils 2.0 %    % Basophils 0.2 %    % Immature Granulocytes 0.2 %    Absolute Neutrophil 3.9 1.6 - 8.3 10e9/L    Absolute Lymphocytes 1.5 0.8 - 5.3 10e9/L    Absolute Monocytes 0.5 0.0 - 1.3 10e9/L    Absolute Eosinophils 0.1 0.0 - 0.7 10e9/L    Absolute Basophils 0.0 0.0 - 0.2 10e9/L    Abs Immature Granulocytes 0.0 0 - 0.4 10e9/L       Assessment and Plan:    Wade Acevedo is a 57 year old male with PMH significant for chronic anemia, cancer associated pain, NSCLC current on Opdivo therapy, depression, tobacco use disorder, hx of EtOH abuse, and hx of thrombocytopenia who now presents with altered mental status.        Acute Encephalopathy   Daughter-in law complains of confusion for at least the last 24 hours. VSS. Drug screen positive only for opiates.  UA negative.  CXR negative.  CT head again shows pituitary mass. EtOH level was 0.00.  Acetaminophen was less than 2 and salicylate level was 5.  DDx: Medications (hx of overuse of scheduled MS Contin as recent at 09/20/17) versus infection versus metabolic derangements (changes in magnesium, thyroid, Wernicke's)[JE1.1]  No sign of infectious cause, normal ammonia, T4 and cortisol normal, MRI of poor quality due to motion--repeat with  sedation recommended[JE1.3]    Urinary Retention[JE1.1]-probably secondary to AMS[JE1.2]  Straight cathed for 800cc of urine overnight.  No history of urinary retention.  - straight cath, PRN for urine greater than 350cc on bladder scan  - instruct RN to inform MD if straight cath x3; will likely need to place lion     Non-small cell lung cancer (NSCLC) (H) with metastasis to bone (5th lateral rib, scapula)  Follows with Dr. Naylor. Diagnosed by CT guided biopsy. PET Scan confirms tumor with hypermetabolic lymph nodes and hypermetabolic lesions to the scapula and 5th lateral rib. Previously on carbo/taxol - this was stopped due pancytopenia. Received first dose of Opdivo chemotherapy (immune therapy) 09/14/2017.  Next scheduled chemotherapy infusion of Opdivo 09/28/2017.     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  Normal TSH with low normal T4/T3.  Mildly abnormal cosyntropin stim testing. Seen by [facility] (Dr. Padma Medley) on 05/10/2017.  Most likely thought to be non-functioning pituitary macroadenoma.  Was supposed to have repeat MRI 08/2017 - did not do so.  Has follow up with endocrinology 11/14/2017.  Most recently had mildly low TSH with normal T4 09/14/2017.[JE1.1]    Abnormal EKG  New t inversions[JE1.4] since April 2017, no wall motion abnormalities, trops normal, no chest pain--given metastatic cancer and no sign of ACS, will simply observe at this time, doubt implicated in his AMS.[JE1.5]    Positive Clostridia difficile test-no diarrha-doubt clinical ds  Test ordered by Dr Fuentes-no diarrhea, no notation regarding why test sent.  Test was positive but I doubt it represents clinical ds--on empiric oral flagyl while work up for AMS is on going. Would probably dc flagyl at some point if no diarrhea.[JE1.6]     [JE1.5]  Anemia  Has received transfusions in the past for low hemoglobin - last received 2 units approximately 09/07/2017.  This was thought to be  secondary to chemotherapy (carbo/taxol).  Recently switched from this to immune chemotherapy.  Hemoglobin on arrival 10.7 on arrival.  8.6 on admission day 1. VSS.  - AM CBC with platelets, will transfuse if hbg less than 7.0     Hx of Thrombocytopenia  Platelets as low as 42k approximately 3 weeks ago.  Received 2u of PRBCs WITHOUT platelets, seemed to respond  - continue to monitor      Cancer associated pain  - continue PTA scheduled MS Contin  - continue PTA PRN oxycodone  - continue PTA dermal capsicin cream Q2 PRN  - continue PTA Voltaren gel 4x daily  - continue PTA Emla cream with access to port     Constipation, chronic  - continue PTA scheduled miralax, senna-ducosate     Major depressive disorder, recurrent episode, moderate (H)  - continue PTA Paxil      Tobacco use disorder  Nicotine patch in place     GERD  - continue PTA pantoprazole[JE1.1]     Discussion  Source of AMS still unclear, no overt infection, Still worried about metastatic ds.--will attempt to repeat MR with anesthesia sedation. Will discuss the possibility of Opdivo causing this[JE1.5]with Dr Naylor.[JE1.2]  I did talk to the POA yesterday and asked her to re-address code status.[JE1.5]  If MR is unremarkable-might consider LP.[JE1.2]      12:18 PM[JE1.7] discussed with Dr Naylor  He felt that AMS could be from Opdivo-but is unlikely  If MR is neg-he thought LP would be of little use-would suggest holding Opdivo until situation is clearer.[JE1.8]     Revision History        User Key Date/Time User Provider Type Action    > JE1.7 9/27/2017  4:48 PM Cristian Batista MD Physician Sign     JE1.8 9/27/2017 12:17 PM Cristian Batista MD Physician      JE1.2 9/27/2017  9:18 AM Cristian Batista MD Physician      JE1.6 9/27/2017  8:41 AM Cristian Batista MD Physician      JE1.5 9/27/2017  8:37 AM Cristian Batista MD Physician      JE1.4 9/27/2017  8:36 AM Cristian Batista MD Physician      JE1.3 9/27/2017  8:34 AM  Cristian Batista MD Physician      JE1.1 9/27/2017  8:32 AM Cristian Batista MD Physician             H&P by Morena Byrd PA-C at 9/26/2017  7:46 AM     Author:  Morena Byrd PA-C Service:  Internal Medicine Author Type:  Physician Assistant - C    Filed:  9/26/2017  9:02 AM Date of Service:  9/26/2017  7:46 AM Creation Time:  9/26/2017  7:12 AM    Status:  Attested :  Morena Byrd PA-C (Physician Assistant - C)    Cosigner:  Cristian Batista MD at 9/26/2017  9:20 AM        Attestation signed by Cristian Batista MD at 9/26/2017  9:20 AM        Physician Attestation   ICristian MD, saw and evaluated Wade Acevedo as part of a shared visit.  I have reviewed and discussed with the advanced practice provider their history, physical and plan.    I personally reviewed the vital signs, medications, labs and imaging.    My key history or physical exam findings: see separate note    Key management decisions made by me: see separate note    Cristian Batista MD  Date of Service (when I saw the patient): 09/26/17                               [facility]    History and Physical  Hospital Medicine       Date of Admission:  9/25/2017  Date of Service: 9/26/2017[CH1.1]     Assessment & Plan[CH1.2]   Wade Acevedo is a 57 year old male with PMH significant for chronic anemia, cancer associated pain, NSCLC current on Opdivo therapy, depression, tobacco use disorder, hx of EtOH abuse, and hx of thrombocytopenia who now presents with altered mental status.      Acute Encephalopathy[CH1.1]   Daughter-in law complains of confusion for at least the last 24 hours.[CH1.3] VSS. Drug screen positive only for opiates.  UA negative.  CXR negative.  CT head again shows pituitary mass. EtOH level was 0.00.  Acetaminophen was less than 2 and salicylate level was 5.  DDx: Medications (hx of overuse of scheduled MS Contin as recent at 09/20/17) versus  infection versus metabolic derangements (changes in magnesium, thyroid, Wernicke's)  - given previous EtOH abuse, will start multivitamins now.  Reportedly sober[CH1.1] since April of 2017[CH1.3]  - order ammonia, TSH, magnesium, folate  - urine culture ordered, pending  - IP Palliative Care Consult placed given ongoing pan management issues and possible misuse of pain medication due to ineffective pain control[CH1.1]    Urinary Retention  Straight cathed for 800cc of urine overnight.  No history of urinary retention.  - straight cath, PRN for urine greater than 350cc on bladder scan  - instruct RN to inform MD if straight cath x3; will likely need to place lion[CH1.3]    Non-small cell lung cancer (NSCLC) (H) with metastasis to bone (5th lateral rib, scapula)  Follows with Dr. Naylor. Diagnosed by CT guided biopsy. PET Scan confirms tumor with hypermetabolic lymph nodes and hypermetabolic lesions to the scapula and 5th lateral rib. Previously on carbo/taxol - this was stopped due pancytopenia. Received first dose of Opdivo chemotherapy (immune therapy) 09/14/2017.  Next scheduled chemotherapy infusion of Opdivo 09/28/2017.    Pituitary Macroadenoma  Diagnosed 04/21/2017 by MRI.  Normal TSH with low normal T4/T3.  Mildly abnormal cosyntropin stim testing. Seen by [facility] (Dr. Padma Medley) on 05/10/2017.  Most likely thought to be non-functioning pituitary macroadenoma.  Was supposed to have repeat MRI 08/2017 - did not do so.  Has follow up with endocrinology 11/14/2017.  Most recently had mildly low TSH with normal T4 09/14/2017.    Anemia  Has received transfusions in the past for low hemoglobin - last received 2 units approximately 09/07/2017.  This was thought to be secondary to chemotherapy (carbo/taxol).  Recently switched from this to immune chemotherapy.  Hemoglobin on arrival 10.7 on arrival.  8.6 on admission day 1. VSS.  - AM CBC with platelets, will transfuse if hbg less than 7.0    Hx  of Thrombocytopenia  Platelets as low as 42k approximately 3 weeks ago.  Received 2u of PRBCs WITHOUT platelets, seemed to respond  - continue to monitor      Cancer associated pain  - continue PTA scheduled MS Contin  - continue PTA PRN oxycodone  - continue PTA dermal capsicin cream Q2 PRN  - continue PTA Voltaren gel 4x daily  - continue PTA Emla cream with access to port    Constipation, chronic  - continue PTA scheduled miralax, senna-ducosate    Major depressive disorder, recurrent episode, moderate (H)  - continue PTA Paxil      Tobacco use disorder  Nicotine patch in place    GERD  - continue PTA pantoprazole       F:[CH1.1] 50[CH1.3]cc/hr 0.9NS  E: BMP in AM  N: regular diet  DVT Prophylaxis: not indicated, ambulatory    Code Status:[CH1.1] Full Code[CH1.3]    Disposition: Anticipate discharge in 2-3 days. Appropriate for inpatient care.    Case discussed with Dr. Cristian Batista.  Assessment and plan as written above.    Morena Byrd PA-C  [facility]    History is obtained from the patient and review of the EMR.[CH1.1]    Past Medical History    Past Medical History:   Diagnosis Date     Brain cancer (H)      Constipation      H/O ETOH abuse      Hx of tobacco use, presenting hazards to health      Lung cancer (H)      Pain 5/6/2017     Recurrent falls      Unsteady gait      Weight loss        Past Surgical History   Past Surgical History:   Procedure Laterality Date     INSERT PORT VASCULAR ACCESS N/A 5/17/2017    Procedure: INSERT PORT VASCULAR ACCESS;  Port-a-Cath Insertion;  Surgeon: Pawan Collins MD;  Location: [facility] OR       Family History    History reviewed. No pertinent family history.    History of Present Illness[CH1.2]   Wade Acevedo is a 57 year old male with PMH significant for chronic anemia, cancer associated pain, NSCLC current on Opdivo therapy, depression, tobacco use disorder, hx of EtOH abuse, and hx of thrombocytopenia who now presents with altered mental  "status.[CH1.1]    The patient is obviously confused.  As such, history is provided by his daughter in-law Sarah whom is his POA.  She  spoke with him on Friday and noted that while alert and oriented, he was rather agitated.  He seemed to fixate upon his pain and stated \"no one believes me\" and reported that his pain was not adequately controlled.  She did not speak with him over the weekend.  She phoned him early Monday morning and thought he seemed a bit \"confused\" and \"tangential\", but attributed this to it being early morning.  The patient requested that she call him back later in the day.  His uncle then contacted him mid-morning and again, felt that he was both confused and disoriented.  As such, his daughter-in-law was again contacted.  She requested that Homecare visit the patient.  Upon arrival, his homecare RN felt he was altered.  In addition, upon inspection of his medication, she found that 4 doses of 30mg MS Contin were \"missing\" and that only 44/100 10mg oxycodone pills were left; this prescription was filled 09/07/2017. This implies that the patient has taken an average of approximately 3 oxycodone per day (56 pills in 19 days).  The patient himself states he takes approximately 3 MS contin and 3 oxycodone per day.  Given altered mental status, the patient's homecare RN suggested he present to the ER for further evaluation.    The patient has no subjective complaints of fever, chills, loss of appetite, CP, SOB, cough, abdominal pain, dysuria, lightheadedness, dizziness, acute onset of changes to hearing/vision, new LE swelling, new rashes.  At present, he complains of pain only; this is his choric pain that is reportedly associated with his cancer.  This is located in his buttocks, thighs, calves, feet and toes.  He states that this is chronically an 8/10 and can not ascribe a characteristic to his pain.  Nothing makes the pain better or worse.    The patient has abstained from all alcohol as of " 04/20/2017.  He continues to smoke the occasional cigarette.[CH1.3]    Prior to Admission Medications   Prior to Admission Medications   Prescriptions Last Dose Informant Patient Reported? Taking?   PARoxetine (PAXIL) 10 MG tablet Past Week at Unknown time Self No Yes   Sig: Take 1 tablet (10 mg) by mouth At Bedtime (Needs follow-up appointment for this medication)   bisacodyl (DULCOLAX) 10 MG Suppository Unknown at Unknown time Self No No   Sig: Place 1 suppository (10 mg) rectally daily as needed for constipation   capsaicin (ZOSTRIX) 0.025 % CREA cream Unknown at Unknown time Self No No   Sig: Apply to R hip and thigh every 2 hours as needed for pain.   dexamethasone (DECADRON) 4 MG tablet Past Week at Unknown time Self No Yes   Sig: Take 5 tablets (20 mg) by mouth daily X 3 days each cycle. To be taken the day before, day of, and day after chemotherapy infusion.   diclofenac (VOLTAREN) 1 % GEL topical gel Unknown at Unknown time Self No No   Sig: Apply 4 grams to knees or 2 grams to hands four times daily using enclosed dosing card.   lidocaine-prilocaine (EMLA) cream Unknown at Unknown time Self No No   Sig: Apply topically over port 45 - 60 minutes prior to port access.   morphine (MS CONTIN) 30 MG 12 hr tablet 9/25/2017 at Unknown time Self No Yes   Sig: Take 1 tablet (30 mg) by mouth 2 times daily   nicotine (NICODERM CQ) 14 MG/24HR 24 hr patch Unknown at Unknown time Self No No   Sig: Place 1 patch onto the skin every 24 hours   nicotine (NICODERM CQ) 21 MG/24HR 24 hr patch Unknown at Unknown time Self No No   Sig: Place 1 patch onto the skin every 24 hours   nicotine (NICODERM CQ) 7 MG/24HR 24 hr patch Unknown at Unknown time Self No No   Sig: Place 1 patch onto the skin every 24 hours   oxyCODONE (ROXICODONE) 10 MG IR tablet 9/25/2017 at Unknown time Self No Yes   Sig: Take 1 tablet (10 mg) by mouth every 4 hours as needed for moderate to severe pain   pantoprazole (PROTONIX) 40 MG EC tablet Past Week at  Unknown time Self No Yes   Sig: Take 1 tablet (40 mg) by mouth 2 times daily (before meals)   polyethylene glycol (MIRALAX) powder Past Week at Unknown time Self No Yes   Sig: Take 17 g (1 capful) by mouth daily Mix in 4-8 oz of fluid of choice. For constipation   senna-docusate (SENOKOT-S;PERICOLACE) 8.6-50 MG per tablet Past Week at Unknown time Self No Yes   Sig: Take 1 tablet by mouth 2 times daily Reported on 2017   Patient taking differently: Take 1 tablet by mouth 2 times daily Reported on 2017 Taking 2 tablets in AM & 1 tablet at night.      Facility-Administered Medications: None       Allergies   Allergies   Allergen Reactions     Lubriderm Rash       Social History   Social History     Social History     Marital status:      Spouse name: N/A     Number of children: N/A     Years of education: N/A     Occupational History     Not on file.     Social History Main Topics     Smoking status: Current Every Day Smoker     Packs/day: 1.00     Years: 45.00     Types: Cigarettes     Smokeless tobacco: Never Used      Comment: 10 cig per day.  Will be quiting soon.     Alcohol use No      Comment: hx ETOH  quit 2017     Drug use: Not on file     Sexual activity: Not on file     Other Topics Concern     Not on file     Social History Narrative    2017:  Was  11 years ago when his wife  while under hospice care.  His DIL Sarah Burch, who lives in [facility], is his health care agent and, per patient, his POA as well.  He lives alone in his own home in [place], which is a split entry with 3 levels.  He retired from his job as a  in  with back and joint pains.  He does have a h/o alcohol abuse, but states he has been abstinent since he was diagnosed with cancer.  He smoked 1 PPD x 45 years and still really enjoys sneaking a cigarette here and there but mostly tries to avoid them.      Mickey Bernard CNP ([place])    Palliative Care    Private cell:  [phone][CH1.2]          ROS: 10 point ROS neg other than the symptoms noted above in the HPI.[CH1.1]    Physical Exam     BP (!) 145/91  Pulse 84  Temp 99.2  F (37.3  C) (Oral)  Resp 18  Wt 65.7 kg (144 lb 13.5 oz)  SpO2 98%  BMI 20.12 kg/m2[CH1.2]     Weight:[CH1.1] 144 lbs 13.48 oz Body mass index is 20.12 kg/(m^2).[CH1.2]     Constitutional: Alert and oriented x[CH1.1]2 (self, year.  Does NOT recall month, president)[CH1.3].  Cooperative.  Appears[CH1.1] older than[CH1.3] stated age.  Appears in[CH1.1] no acute[CH1.3] distress.[CH1.1]  Tangential thought process.  Somewhat agitated.[CH1.3]  HEENT: Oropharynx is clear and moist. No evidence of cranial trauma.  Lymph/Hematologic: No occipital, submental, submandibular, anterior or posterior cervical, or supraclavicular lymphadenopathy is appreciated.  Cardiovascular: Regular rate/ rhythm.  S1 and S2 grossly normal.  No appreciable murmur, rub, gallop.[CH1.1] No[CH1.3] lower extremity edema.  Respiratory: Clear to auscultation bilaterally. Equal chest expansion.  GI: Soft, non-tender, normal bowel sounds, no hepatosplenomegaly.[CH1.1] No asterixis[CH1.3]  Genitourinary: Deferred  Musculoskeletal: Normal muscle bulk and tone.  Skin: Warm and dry, no rashes.   Neurologic: Neck supple. Cranial nerves 3-12 are grossly intact.  is symmetric.[CH1.1] No pronator drift.  Upper and lower extremity strength is grossly intact.  Normal rapid alternating motion.  Normal finger to nose testing.  Gait is unsteady; this is chronic.[CH1.3]    Data[CH1.2]   Data reviewed today:[CH1.1]     Recent Labs  Lab 09/26/17  0640 09/25/17 2035   WBC 6.5 8.3   HGB 8.6* 10.7*   MCV 78 78   * 196    138   POTASSIUM 3.3* 3.4   CHLORIDE 109 107   CO2 21 22   BUN 12 15   CR 0.69 0.88   ANIONGAP 9 9   JOHANN 8.4* 9.4   GLC 85 97   ALBUMIN  --  3.7   PROTTOTAL  --  7.6   BILITOTAL  --  1.1   ALKPHOS  --  124   ALT  --  17   AST  --  22   TROPI <0.015 <0.015       Recent Results (from the past 24  hour(s))   XR Chest 2 Views    Narrative    CHEST TWO VIEWS  9/25/2017  9:54 PM     COMPARISON: Frontal chest x-ray 5/17/2017    HISTORY: Weakness.    FINDINGS: A left subclavian central venous Port-A-Cath is again noted.  The cardiac silhouette, pulmonary vasculature, lungs and pleural  spaces are within normal limits.      Impression    IMPRESSION: Clear lungs.    MIRACLE VINCENT MD   CT Head w/o Contrast    Narrative    CT HEAD W/O CONTRAST  9/25/2017 11:14 PM      HISTORY: Altered mental status. Metastatic non-small cell lung cancer.    TECHNIQUE: Routine noncontrast head CT. Radiation dose for this scan  was reduced using automated exposure control, adjustment of the mA  and/or kV according to patient size, or iterative reconstruction  technique.    COMPARISON: MRI 4/21/2017.    FINDINGS: Brain volume is within normal limits for age. There is a  pituitary mass as seen on the previous MRI. No other mass, mass effect  or intracranial hemorrhage. No evidence of acute infarct.  Periventricular low attenuation is consistent with chronic small  vessel ischemic disease. The visualized paranasal sinuses are clear.      Impression    IMPRESSION: No acute abnormality.[CH1.2]       Morena Byrd PA-C  [facility] Medicine[CH1.1]               Revision History        User Key Date/Time User Provider Type Action    > CH1.2 9/26/2017  9:02 AM Morena Byrd PA-C Physician Assistant - C Sign     CH1.3 9/26/2017  8:42 AM Morena Byrd PA-C Physician Assistant - C      CH1.1 9/26/2017  7:12 AM Morena Byrd PA-C Physician Assistant - C                   Discharge Summaries     No notes of this type exist for this encounter.         Consult Notes      Consults by Barb Ramsay RN at 9/26/2017  1:11 PM     Author:  Barb Ramsay RN Service:  Care Coordinator Author Type:      Filed:  9/26/2017  4:00 PM Date of Service:  9/26/2017  1:11 PM Creation Time:  9/26/2017 12:56 PM    Status:  Addendum  :  Barb Ramsay RN ()     Consult Orders:    1. Care Transition RN/SW IP Consult [[id number]] ordered by Morena Byrd PA-C at 09/26/17 0931                Care Transition Initial Assessment - RN    Reason For Consult: discharge planning   Met with: Patient and Family.    DATA:[LM1.1]   Principal Problem:    Altered level of consciousness  Active Problems:    Anemia    Non-small cell lung cancer (NSCLC) (H)    Cancer associated pain    Constipation, chronic    Major depressive disorder, recurrent episode, moderate (H)    Tobacco use disorder    Altered mental status[LM1.2]       Cognitive Status: awake, alert and confused.  Primary Care Clinic Name: [facility]  Primary Care MD Name: Memo  Contact information and PCP information verified: Yes  Lives With: alone   Living Arrangements: house[LM1.1]. Has to go up and down stairs to get to kitchen. Sarah is concerned that he has not been eating good since last Wednesday.[LM1.3]     Description of Support System: Supportive, Involved   Who is your support system?: Other (specify), Sibling(s) (Daughter in law Sarah, Uncle)    Support Assessment: Lacks necessary supervision and assistance   Insurance concerns: No Insurance issues identified    ASSESSMENT:  Patient currently receives the following services:  [facility] ([phone] Fax: [phone])       Identified issues/concerns regarding health management: new confusion, medication compliance: was taking both MS Contin and Oxycodone as PRN meds.[LM1.1] Also, patient's home care nurse told Sarah that he hadn't taken his routine meds from Wednesday of last week. Just diagnosed with cancer earlier this year. Did not tolerate first chemotherapy so was started on Opdiva 9/14/17 and he is due for second dose on 9/28.[facility] + [facility]. [facility]. New hoarse voice since Monday.[LM1.3]  Transportation concerns: family may be able to provide, depending on work  schedule    PLAN:  Financial costs for the patient include: none noted at this time.  Patient given options and choices for discharge: This writer met with pt and his daughter in law Inna, introduced self and role. Discussed medicare coverage in regards to home care, TCU and LTC.   Patient/family is agreeable to the plan?  Yes, patient doesn't like the idea of going back to TCU, but knows he may have to because of his continued confusion[LM1.1].[LM1.3]  Patient anticipates discharging to: TCU. Patient was provided with Medicare certified nursing home list. Pts choices are as follows 1) [facility] (Phone: [phone] Fax: [phone]) 2) [facility] Phone: ([phone]) Fax: ([phone])  3) [facility] (Phone: [phone]) Fax: ([phone])     Resources List: Transitional Care  Patient anticipates needs for home equipment: No[LM1.1]       Discharge Planner[LM1.2]   Discharge Plans in progress: Referrals out to TCUs  Barriers to discharge plan: medical stability, may need to put chemotherapy treatments on hold  Plan/Disposition: TCU   Care  (CTS) will continue to follow as needed.[LM1.1]    1500: Discussed with Sarah, that TCUs do not take patients that are currently receiving chemotherapy. Sarah states that patient's MD did say that they were concerned that patient's confusion hadn't improved greatly. They are awaiting MRI results and now patient was to get an echocardiogram. Writer will continue to assist as needed.[LM1.4]    1540: Call received from [facility]. If patient puts his chemotherapy on hold, they still would not be able to take patient until Friday at the earliest (no private bathroom until then) and are still waiting for their business office to approve.[LM1.5]     Revision History        User Key Date/Time User Provider Type Action    > LM1.5 9/26/2017  4:00 PM Barb Ramsay RN Case Manager Addend     LM1.4 9/26/2017  3:04 PM  Barb Ramsay, RN Case Manager Addend     LM1.3 9/26/2017  1:47 PM Barb Ramsay RN Case Manager Addend     LM1.2 9/26/2017  1:12 PM Barb Ramsay RN Case Manager Sign     LM1.1 9/26/2017 12:56 PM Barb Ramsay RN Case Manager                      Progress Notes - Physician (Notes for yesterday and today)      Progress Notes by Cristian Pino MD at 9/28/2017  4:35 PM     Author:  Cristian Pino MD Service:  Family Medicine Author Type:  Physician    Filed:  9/28/2017  4:40 PM Date of Service:  9/28/2017  4:35 PM Creation Time:  9/28/2017  4:35 PM    Status:  Signed :  Cristian Pino MD (Physician)         [facility]      Subjective:  Less confused. knows date, time of year.  Had significant urinary retention after 800cc removed via cath he has been less agitated and confused.     Review of Systems:  C: NEGATIVE for fever, chills, change in weight  I: NEGATIVE for worrisome rashes, moles or lesions  E: NEGATIVE for vision changes or irritation  E/M: NEGATIVE for ear, mouth and throat problems  R: NEGATIVE for significant cough or SOB  B: NEGATIVE for masses, tenderness or discharge  CV: NEGATIVE for chest pain, palpitations or peripheral edema  GI: NEGATIVE for nausea, abdominal pain, heartburn, or change in bowel habits  : NEGATIVE for frequency, dysuria, or hematuria  MUSCULOSKELETAL:chronic pain buttox and lower extremities  N: NEGATIVE for weakness, dizziness or paresthesias  E: NEGATIVE for temperature intolerance, skin/hair changes  H: NEGATIVE for bleeding problems  P: NEGATIVE for changes in mood or affect    Physical Exam:  Vitals Were Reviewed    Patient Vitals for the past 16 hrs:   BP Temp Temp src Pulse Heart Rate Resp SpO2 Weight   09/28/17 1500 111/75 98.4  F (36.9  C) Oral - 111 18 96 % -   09/28/17 0815 136/87 99.4  F (37.4  C) Oral - 120 18 99 % -   09/28/17 0337 (!) 144/96 98.1  F (36.7  C) Oral 117 - 18 99 % 143 lb 15.4 oz (65.3 kg)         Intake/Output  Summary (Last 24 hours) at 09/27/17 0832  Last data filed at 09/27/17 0657   Gross per 24 hour   Intake             2070 ml   Output             4700 ml   Net            -2630 ml       GENERAL APPEARANCE: confused, speaks in confused fashion, ambulates  EYES: conjunctiva clear, eyes grossly normal  RESP: lungs clear to auscultation - no rales, rhonchi or wheezes  CV: regular rate and rhythm, normal S1 S2, no S3 or S4 and no murmur, click or rub   ABDOMEN: soft, nontender, no HSM or masses and bowel sounds normal  MS: no clubbing, cyanosis; no edema  SKIN: clear without significant rashes or lesions  NEURO: alert, disoriented, speech is clear but doesn't make sense, exam otherwise nonfocal    Lab:  Recent Labs   Lab Test  09/27/17   0800  09/26/17   0640   NA  145*  139   POTASSIUM  3.3*  3.3*   CHLORIDE  114*  109   CO2  24  21   ANIONGAP  7  9   GLC  105*  85   BUN  9  12   CR  0.71  0.69   JOHANN  8.8  8.4*     CBC RESULTS:   Recent Labs   Lab Test  09/27/17   0800  09/26/17   0640   WBC  6.1  6.5   RBC  3.44*  3.22*   HGB  9.0*  8.6*   HCT  26.6*  25.1*   PLT  154  143*       Results for orders placed or performed during the hospital encounter of 09/25/17 (from the past 24 hour(s))   Potassium   Result Value Ref Range    Potassium 4.0 3.4 - 5.3 mmol/L   CBC with platelets   Result Value Ref Range    WBC 7.2 4.0 - 11.0 10e9/L    RBC Count 3.59 (L) 4.4 - 5.9 10e12/L    Hemoglobin 9.3 (L) 13.3 - 17.7 g/dL    Hematocrit 28.1 (L) 40.0 - 53.0 %    MCV 78 78 - 100 fl    MCH 25.9 (L) 26.5 - 33.0 pg    MCHC 33.1 31.5 - 36.5 g/dL    RDW 16.1 (H) 10.0 - 15.0 %    Platelet Count 173 150 - 450 10e9/L   Comprehensive metabolic panel   Result Value Ref Range    Sodium 145 (H) 133 - 144 mmol/L    Potassium 3.8 3.4 - 5.3 mmol/L    Chloride 116 (H) 94 - 109 mmol/L    Carbon Dioxide 24 20 - 32 mmol/L    Anion Gap 5 3 - 14 mmol/L    Glucose 112 (H) 70 - 99 mg/dL    Urea Nitrogen 9 7 - 30 mg/dL    Creatinine 0.80 0.66 - 1.25 mg/dL     GFR Estimate >90 >60 mL/min/1.7m2    GFR Estimate If Black >90 >60 mL/min/1.7m2    Calcium 8.9 8.5 - 10.1 mg/dL    Bilirubin Total 0.6 0.2 - 1.3 mg/dL    Albumin 3.3 (L) 3.4 - 5.0 g/dL    Protein Total 6.8 6.8 - 8.8 g/dL    Alkaline Phosphatase 110 40 - 150 U/L    ALT 17 0 - 70 U/L    AST 25 0 - 45 U/L   Phosphorus   Result Value Ref Range    Phosphorus 3.1 2.5 - 4.5 mg/dL       Assessment and Plan:    Wade Acevedo is a 57 year old male with PMH significant for chronic anemia, cancer associated pain, NSCLC current on Opdivo therapy, depression, tobacco use disorder, hx of EtOH abuse, and hx of thrombocytopenia who now presents with altered mental status.        Acute Encephalopathy   Daughter-in law complains of confusion for at least the last 24 hours. VSS. Drug screen positive only for opiates.  UA negative.  CXR negative.  CT head again shows pituitary mass. EtOH level was 0.00.  Acetaminophen was less than 2 and salicylate level was 5.  DDx: Medications (hx of overuse of scheduled MS Contin as recent at 09/20/17) versus infection versus metabolic derangements (changes in magnesium, thyroid, Wernicke's)  No sign of infectious cause, normal ammonia, T4 and cortisol normal, MRI of poor quality due to motion--repeat with sedation recommended  9/28/17 improved.  MRi  Shows increase in pituitary mass.  Will continue to see if he clears.  Either way daughter in law does not feel he is safe at home,  Will need TCU on dc but if in tcU  WON'T BE ABLE TO GET cHEMO.  WILL DISCUSS WITH ONCOLOGY.      Urinary Retention-probably secondary to AMS  Straight cathed for 800cc of urine overnight.  No history of urinary retention.  - straight cath, PRN for urine greater than 350cc on bladder scan  - instruct RN to inform MD if straight cath x3; will likely need to place lion  IMPROVED  HAS HAD BETTER OUTPUT WITH FOMAX     Non-small cell lung cancer (NSCLC) (H) with metastasis to bone (5th lateral rib, scapula)  Follows with   [facility]. Diagnosed by CT guided biopsy. PET Scan confirms tumor with hypermetabolic lymph nodes and hypermetabolic lesions to the scapula and 5th lateral rib. Previously on carbo/taxol - this was stopped due pancytopenia. Received first dose of Opdivo chemotherapy (immune therapy) 09/14/2017.  Next scheduled chemotherapy infusion of Opdivo 09/28/2017.     Pituitary Macroadenoma/mass  Diagnosed 04/21/2017 by MRI.  Normal TSH with low normal T4/T3.  Mildly abnormal cosyntropin stim testing. Seen by [facility] (Dr. Padma Medley) on 05/10/2017.  Most likely thought to be non-functioning pituitary macroadenoma.  Was supposed to have repeat MRI 08/2017 - did not do so.  Has follow up with endocrinology 11/14/2017.  Most recently had mildly low TSH with normal T4 09/14/2017.    Abnormal EKG  New t inversions since April 2017, no wall motion abnormalities, trops normal, no chest pain--given metastatic cancer and no sign of ACS, will simply observe at this time, doubt implicated in his AMS.    Positive Clostridia difficile test-no diarrha-doubt clinical ds  Test ordered by Dr Fuentes-no diarrhea, no notation regarding why test sent.  Test was positive but I doubt it represents clinical ds--on empiric oral flagyl while work up for AMS is on going. Would probably dc flagyl at some point if no diarrhea.       Anemia  Has received transfusions in the past for low hemoglobin - last received 2 units approximately 09/07/2017.  This was thought to be secondary to chemotherapy (carbo/taxol).  Recently switched from this to immune chemotherapy.  Hemoglobin on arrival 10.7 on arrival.  8.6 on admission day 1. VSS.  - AM CBC with platelets, will transfuse if hbg less than 7.0     Hx of Thrombocytopenia  Platelets as low as 42k approximately 3 weeks ago.  Received 2u of PRBCs WITHOUT platelets, seemed to respond  - continue to monitor      Cancer associated pain  - continue PTA scheduled MS Contin  - continue PTA PRN  oxycodone  - continue PTA dermal capsicin cream Q2 PRN  - continue PTA Voltaren gel 4x daily  - continue PTA Emla cream with access to port     Constipation, chronic  - continue PTA scheduled miralax, senna-ducosate     Major depressive disorder, recurrent episode, moderate (H)  - continue PTA Paxil      Tobacco use disorder  Nicotine patch in place     GERD  - continue PTA pantoprazole     Discussion  Source of AMS still unclear, no overt infection, Still worried about metastatic ds.--will attempt to repeat MR with anesthesia sedation. Will discuss the possibility of Opdivo causing thiswith Dr Naylor.  I did talk to the POA yesterday and asked her to re-address code status.  If MR is unremarkable-might consider LP.  9/28/17  IMPROVING  WILL CONTINUE WITH CURRENT TREATMENT  JOSÉ LUIS POSSIBLE CHEMO ALEXANDR? THEN TO tcu        He felt that AMS could be from Opdivo-but is unlikely  If MR is neg-he thought LP would be of little use-would suggest holding Opdivo until situation is clearer.[JN1.1]     Revision History        User Key Date/Time User Provider Type Action    > JN1.1 9/28/2017  4:40 PM Cristian Pino MD Physician Sign                  Procedure Notes     No notes of this type exist for this encounter.         Progress Notes - Therapies (Notes from 09/26/17 through 09/29/17)      Progress Notes by Patricia Larson PT at 9/28/2017  2:12 PM     Author:  Patricia Larson PT Service:  (none) Author Type:  Physical Therapist    Filed:  9/28/2017  2:13 PM Date of Service:  9/28/2017  2:12 PM Creation Time:  9/28/2017  2:12 PM    Status:  Signed :  Patricia Larson PT (Physical Therapist)            09/28/17 1400   Signing Clinician's Name / Credentials   Signing clinician's name / credentials Patricia Larson PT   Quick Adds   Rehab Discipline PT   Additional Documentation   OT Plan Cancel- Pt declined therapy today, visiting- agreeable to therapy tomorrow[CS1.1]         Revision History        User Key Date/Time User Provider  Type Action    > CS1.1 9/28/2017  2:13 PM Patricia Larson PT Physical Therapist Sign            Progress Notes by Patricia Larson PT at 9/27/2017 11:58 AM     Author:  Patricia Larson PT Service:  (none) Author Type:  Physical Therapist    Filed:  9/27/2017 12:02 PM Date of Service:  9/27/2017 11:58 AM Creation Time:  9/27/2017 11:58 AM    Status:  Signed :  Patricia Larson PT (Physical Therapist)            09/27/17 1100   Quick Adds   Type of Visit Initial PT Evaluation   Living Environment   Lives With alone   Living Arrangements house   Home Accessibility stairs to enter home;stairs within home   Number of Stairs to Enter Home 3   Transportation Available family or friend will provide   Living Environment Comment Pt resides in a multilevel  home; ambulating steps inside of his home prior to hospital admission w/o difficulty    Functional Level Prior   Ambulation 0-->independent   Transferring 0-->independent   Toileting 0-->independent   Bathing 0-->independent   Dressing 0-->independent   Eating 0-->independent   Communication 0-->understands/communicates without difficulty   Swallowing 0-->swallows foods/liquids without difficulty   Cognition 0 - no cognition issues reported   Fall history within last six months no   General Information   Onset of Illness/Injury or Date of Surgery - Date 09/25/17   Referring Physician Lester   Patient/Family Goals Statement Pt unable to state goal. Per family member  goals of care undecided depending  on results of MRI. Possible TCU vs LTC/ with assistance    Pertinent History of Current Problem (include personal factors and/or comorbidities that impact the POC) 57 year old male with PMH significant for chronic anemia, cancer associated pain, NSCLC current on Opdivo therapy, depression, tobacco use disorder, hx of EtOH abuse, and hx of thrombocytopenia who now presents with altered mental status. Head MRI pending   Precautions/Limitations fall precautions   General Observations  Pt alert, confused.    Cognitive Status Examination   Orientation (NT- states name, . slow to respond, difficulty recalling last name )   Level of Consciousness alert   Follows Commands and Answers Questions able to follow multistep instructions   Personal Safety and Judgment impaired  (at times moves impulsicely, not cognizant of lines)   Pain Assessment   Patient Currently in Pain No  (Pt reporting no pain presently. Family member reports hx of LE ede, possible due to chemo. )   Posture    Posture Not impaired   Range of Motion (ROM)   ROM Comment WFL    Strength   Strength Comments Unable to test fully due to  cognition. returns to bed to rest. Does appear mildy weak w/ ambulation   Bed Mobility   Bed Mobility Comments Min. assistance supine> sitting.; returns to bed following amb w/ SBA- crawls foward into bed   Transfer Skills   Transfer Comments SBA sit>stand  w/ no device.; does uses wide TALIA for inital standig balance   Gait   Gait Comments Pt completes in room mobility only. Ambulated to from BR w/ use of one UE on IVpole for support    Balance   Balance Comments fair/ good    Sensory Examination   Sensory Perception no deficits were identified   General Therapy Interventions   Planned Therapy Interventions bed mobility training;gait training;strengthening   Clinical Impression   Criteria for Skilled Therapeutic Intervention yes, treatment indicated   PT Diagnosis Impaired  Mobility. Generalized weakness   Influenced by the following impairments Decreased strength. Balance. Cogntion   Functional limitations due to impairments Decreased ambulatory  status- need for asssistance, unlilateral support, decreased indep. w/ bed mobility and transfers   Clinical Presentation Stable/Uncomplicated   Clinical Presentation Rationale clinical judgement   Clinical Decision Making (Complexity) Low complexity   Therapy Frequency` daily   Predicted Duration of Therapy Intervention (days/wks) 3 days   Anticipated  "Equipment Needs at Discharge (if needing AD- ie SEC for gait, question pt's ability to use correctly )   Anticipated Discharge Disposition Transitional Care Facility;Long Term Care Facility   Risk & Benefits of therapy have been explained Yes   Patient, Family & other staff in agreement with plan of care Yes   Clinical Impression Comments Pt would benefit from continued PT to address strength/ safety w/ mobility, depending on pt and family's goals  of care. See POC for PT goals    [facility] AM-PAC  \"6 Clicks\" V.2 Basic Mobility Inpatient Short Form   1. Turning from your back to your side while in a flat bed without using bedrails? 4 - None   2. Moving from lying on your back to sitting on the side of a flat bed without using bedrails? 3 - A Little   3. Moving to and from a bed to a chair (including a wheelchair)? 3 - A Little   4. Standing up from a chair using your arms (e.g., wheelchair, or bedside chair)? 3 - A Little   5. To walk in hospital room? 3 - A Little   6. Climbing 3-5 steps with a railing? 3 - A Little   Basic Mobility Raw Score (Score out of 24.Lower scores equate to lower levels of function) 19[CS1.1]        Revision History        User Key Date/Time User Provider Type Action    > CS1.1 9/27/2017 12:02 PM Patricia Larson, PT Physical Therapist Sign            Progress Notes by Kiana Rojas OT at 9/27/2017 11:07 AM     Author:  Kiana Rojas OT Service:  (none) Author Type:  Occupational Therapist    Filed:  9/27/2017 11:07 AM Date of Service:  9/27/2017 11:07 AM Creation Time:  9/27/2017 11:07 AM    Status:  Signed :  Kiana Rojas OT (Occupational Therapist)          09/27/17 1000   Quick Adds   Type of Visit Initial Occupational Therapy Evaluation   Living Environment   Lives With alone   Living Arrangements house   Home Accessibility tub/shower is not walk in   Number of Stairs Within Home (multi-level home. )   Transportation Available family or friend will provide "   Self-Care   Equipment Currently Used at Home none   Functional Level Prior   Ambulation 0-->independent   Transferring 0-->independent   Toileting 0-->independent   Bathing 0-->independent   Dressing 0-->independent   Eating 0-->independent   Communication 0-->understands/communicates without difficulty   Swallowing 0-->swallows foods/liquids without difficulty   Cognition 0 - no cognition issues reported   General Information   Onset of Illness/Injury or Date of Surgery - Date 09/25/17   Referring Physician Morena Byrd PA-C   Patient/Family Goals Statement Pt unable to state d/t AMS. Daughter would like pt to participate in therapy, depending on MRI results TCU vs. LTC   Additional Occupational Profile Info/Pertinent History of Current Problem Wade Acevedo is a 57 year old male with PMH significant for chronic anemia, cancer associated pain, NSCLC current on Opdivo therapy, depression, tobacco use disorder, hx of EtOH abuse, and hx of thrombocytopenia who now presents with altered mental status.   Cognitive Status Examination   Orientation person   Level of Consciousness confused   Able to Follow Commands moderate impairment;success, 1-step commands   Personal Safety (Cognitive) severe impairment;at risk behaviors demonstrated;decreased awareness, need for assist;decreased awareness, need for safety;decreased insight to deficits   Memory impaired   Attention Distractible during evaluation;Quiet environment required;Sustained attention impaired   Organization/Problem Solving Sequencing impaired;Problem solving impaired   Executive Function Impulsive;Working memory impaired, decreased storage of information for performing tasks;Cognitive flexibility impaired;Planning ability impaired;Self awareness/monitoring impaired   Cognitive Comment Year: 2018 - unable to correct with 3 VCs  Month: Unable to answer with any month, when given 2 cues pt states October- unable to correct answer with 2 VC's. Unable to  recall month or year 5 minutes later. Pt is impulsive at times during therapy session, laying back down quickly with head towards foot of the bed.    Pain Assessment   Patient Currently in Pain Yes, see Vital Sign flowsheet  (Daughter-in-law states B LE's give in the most pain. )   Range of Motion (ROM)   ROM Comment B UE ROM: Unable to assess d/t pt's cognition, however daughter reports ROM is limited by pain. If pain is controlled able to complete full ROM with B UE's.    Strength   Strength Comments Not formally assessed d/t pt's cognition.    Hand Strength   Hand Strength Comments B  strength: WFL to hold IV pole.    Mobility   Bed Mobility Comments Min A for supine <> sit   Transfer Skill: Bed to Chair/Chair to Bed   Level of [place]: Bed to Chair contact guard   Physical Assist/Nonphysical Assist: Bed to Chair 1 person assist   Weight-Bearing Restrictions full weight-bearing   Assistive Device - Transfer Skill Bed to Chair Chair to Bed Rehab Eval (IV pole. )   Transfer Skill: Sit to Stand   Level of [place]: Sit/Stand contact guard   Physical Assist/Nonphysical Assist: Sit/Stand 1 person assist   Transfer Skill: Sit to Stand full weight-bearing   Assistive Device for Transfer: Sit/Stand (IV pole)   Transfer Skill: Toilet Transfer   Level of [place]: Toilet contact guard   Physical Assist/Nonphysical Assist: Toilet 1 person assist   Weight-Bearing Restrictions: Toilet full weight-bearing   Assistive Device (IV pole)   Upper Body Dressing   Level of [place]: Dress Upper Body independent   Lower Body Dressing   Level of [place]: Dress Lower Body contact guard   Instrumental Activities of Daily Living (IADL)   Previous Responsibilities meal prep;housekeeping;laundry;medication management   Activities of Daily Living Analysis   Impairments Contributing to Impaired Activities of Daily Living balance impaired;cognition impaired;pain;strength decreased   General Therapy Interventions  "  Planned Therapy Interventions ADL retraining;cognition;strengthening;progressive activity/exercise   Clinical Impression   Criteria for Skilled Therapeutic Interventions Met yes, treatment indicated   OT Diagnosis decreased independence with ADLs and functional mobility   Influenced by the following impairments impaired cognition, weakness, pain.    Assessment of Occupational Performance 5 or more Performance Deficits   Identified Performance Deficits dressing, toileting, bathing, functional mobility, IADL tasks (cooking, laundry,  home management tasks, medication management).    Clinical Decision Making (Complexity) Low complexity   Therapy Frequency daily   Predicted Duration of Therapy Intervention (days/wks) 2-3 days   Anticipated Equipment Needs at Discharge (TBD at TCU)   Anticipated Discharge Disposition Transitional Care Facility  (pending pt/family goals of care. )   Risks and Benefits of Treatment have been explained. Yes   Patient, Family & other staff in agreement with plan of care Yes   [facility] AM-PAC  \"6 Clicks\" Daily Activity Inpatient Short Form   1. Putting on and taking off regular lower body clothing? 3 - A Little   2. Bathing (including washing, rinsing, drying)? 2 - A Lot   3. Toileting, which includes using toilet, bedpan or urinal? 3 - A Little   4. Putting on and taking off regular upper body clothing? 3 - A Little   5. Taking care of personal grooming such as brushing teeth? 3 - A Little   6. Eating meals? 4 - None   Daily Activity Raw Score (Score out of 24.Lower scores equate to lower levels of function) 18   Total Evaluation Time   Total Evaluation Time (Minutes) 10[LC1.1]        Revision History        User Key Date/Time User Provider Type Action    > LC1.1 9/27/2017 11:07 AM Kiana Rojas OT Occupational Therapist Sign            Progress Notes by Patricia Larson, PT at 9/26/2017  3:44 PM     Author:  Patricia Larson, PT Service:  (none) Author Type:  Physical Therapist    " "Filed:  9/26/2017  3:45 PM Date of Service:  9/26/2017  3:44 PM Creation Time:  9/26/2017  3:44 PM    Status:  Signed :  Patricia Larson PT (Physical Therapist)            09/26/17 1500   Signing Clinician's Name / Credentials   Signing clinician's name / credentials Patricia Larson PT   Additional Documentation   PT Plan cancel- Order received, pt not seen. Spoke w/ family member  and decided to defer PT today .  Family reporting pt fatigued  and w/ increased confusion; will attempt again 9/27[CS1.1]        Revision History        User Key Date/Time User Provider Type Action    > CS1.1 9/26/2017  3:45 PM Patricia Larson, PT Physical Therapist Sign                                                      INTERAGENCY TRANSFER FORM - LAB / IMAGING / EKG / EMG RESULTS   9/25/2017                       [facility] SURGICAL: [phone]            Unresulted Labs (24h ago through future)    Start       Ordered    Unscheduled  Magnesium  (Magnesium Replacement -  Adult - \"Standard\" - Replacement for all levels less than 1.6 mg/dL )  CONDITIONAL (SPECIFY),   Routine     Comments:  Obtain Magnesium Level for these conditions:  *IF no magnesium result within 24 hrs before initiation of order set, draw magnesium level with next lab collect.    *2 HOURS AFTER last magnesium replacement dose when magnesium replacement given for level less than 1.6   *Next morning after magnesium dose.     Repeat Magnesium Replacement if necessary.    09/26/17 1225    Unscheduled  Potassium  (Potassium Replacement - \"Standard\" - For K levels less than 3.4 mmol/L - UU,UR,UA,RH,SH,PH,WY )  CONDITIONAL (SPECIFY),   Routine     Comments:  Obtain Potassium Level for these conditions:  *IF no potassium result within 24 hours before initiation of order set, draw potassium level with next lab collect.    *2 HOURS AFTER last IV potassium replacement dose and 4 hours after an oral replacement dose.  *Next morning after potassium dose.     Repeat " Potassium Replacement if necessary.    09/27/17 0926         Lab Results - 3 Days      Phosphorus [[id number]]  Resulted: 09/28/17 0709, Result status: Final result    Ordering provider: Cristian Batista MD  09/28/17 0000 Resulting lab: [facility]    Specimen Information    Type Source Collected On   Blood  09/28/17 0608          Components       Value Reference Range Flag Lab   Phosphorus 3.1 2.5 - 4.5 mg/dL  59            Comprehensive metabolic panel [[id number]] (Abnormal)  Resulted: 09/28/17 0638, Result status: Final result    Ordering provider: Cristian Batista MD  09/28/17 0000 Resulting lab: [facility]    Specimen Information    Type Source Collected On   Blood  09/28/17 0608          Components       Value Reference Range Flag Lab   Sodium 145 133 - 144 mmol/L H 59   Potassium 3.8 3.4 - 5.3 mmol/L  59   Chloride 116 94 - 109 mmol/L H 59   Carbon Dioxide 24 20 - 32 mmol/L  59   Anion Gap 5 3 - 14 mmol/L  59   Glucose 112 70 - 99 mg/dL H 59   Urea Nitrogen 9 7 - 30 mg/dL  59   Creatinine 0.80 0.66 - 1.25 mg/dL  59   GFR Estimate >90 >60 mL/min/1.7m2  59   Comment:  Non  GFR Calc   GFR Estimate If Black >90 >60 mL/min/1.7m2  59   Comment:  African American GFR Calc   Calcium 8.9 8.5 - 10.1 mg/dL  59   Bilirubin Total 0.6 0.2 - 1.3 mg/dL  59   Albumin 3.3 3.4 - 5.0 g/dL L 59   Protein Total 6.8 6.8 - 8.8 g/dL  59   Alkaline Phosphatase 110 40 - 150 U/L  59   ALT 17 0 - 70 U/L  59   AST 25 0 - 45 U/L  59            CBC with platelets [[id number]] (Abnormal)  Resulted: 09/28/17 0622, Result status: Final result    Ordering provider: Cristian Batista MD  09/28/17 0000 Resulting lab: [facility]    Specimen Information    Type Source Collected On   Blood  09/28/17 0608          Components       Value Reference Range Flag Lab   WBC 7.2 4.0 - 11.0 10e9/L  59   RBC Count 3.59 4.4 - 5.9 10e12/L L 59   Hemoglobin 9.3 13.3 - 17.7 g/dL L  59   Hematocrit 28.1 40.0 - 53.0 % L 59   MCV 78 78 - 100 fl  59   MCH 25.9 26.5 - 33.0 pg L 59   MCHC 33.1 31.5 - 36.5 g/dL  59   RDW 16.1 10.0 - 15.0 % H 59   Platelet Count 173 150 - 450 10e9/L  59            Blood culture [[id number]]  Resulted: 09/28/17 0607, Result status: Preliminary result    Ordering provider: Capo Fuentes MD  09/25/17 2109 Resulting lab: [facility]    Specimen Information    Type Source Collected On   Blood Portacath 09/25/17 2035          Components       Value Reference Range Flag Lab   Specimen Description Blood      Special Requests PORT   59   Culture Micro No growth after 3 days   59            Blood culture [[id number]]  Resulted: 09/28/17 0607, Result status: Preliminary result    Ordering provider: Capo Fuentes MD  09/25/17 2109 Resulting lab: [facility]    Specimen Information    Type Source Collected On   Blood Arm, Forearm Only, Left 09/25/17 2135          Components       Value Reference Range Flag Lab   Specimen Description Blood      Special Requests Left Arm   59   Culture Micro No growth after 3 days   59            Potassium [[id number]]  Resulted: 09/28/17 0204, Result status: Final result    Ordering provider: Cristian Batista MD  09/27/17 2150 Resulting lab: [facility]    Specimen Information    Type Source Collected On   Blood  09/28/17 0150          Components       Value Reference Range Flag Lab   Potassium 4.0 3.4 - 5.3 mmol/L  59            Urine Culture [[id number]]  Resulted: 09/27/17 1200, Result status: Final result    Ordering provider: Capo Fuentes MD  09/25/17 2109 Resulting lab: [facility]    Specimen Information    Type Source Collected On   Catheterized Urine Urine catheter 09/26/17 0525          Components       Value Reference Range Flag Lab   Specimen Description Catheterized Urine      Special Requests Specimen received in preservative   75   Culture  Micro No growth   225            Comprehensive metabolic panel [[id number]] (Abnormal)  Resulted: 09/27/17 0831, Result status: Final result    Ordering provider: Morena Byrd PA-C  09/27/17 0000 Resulting lab: [facility]    Specimen Information    Type Source Collected On   Blood  09/27/17 0800          Components       Value Reference Range Flag Lab   Sodium 145 133 - 144 mmol/L H 59   Potassium 3.3 3.4 - 5.3 mmol/L L 59   Chloride 114 94 - 109 mmol/L H 59   Carbon Dioxide 24 20 - 32 mmol/L  59   Anion Gap 7 3 - 14 mmol/L  59   Glucose 105 70 - 99 mg/dL H 59   Urea Nitrogen 9 7 - 30 mg/dL  59   Creatinine 0.71 0.66 - 1.25 mg/dL  59   GFR Estimate >90 >60 mL/min/1.7m2  59   Comment:  Non  GFR Calc   GFR Estimate If Black >90 >60 mL/min/1.7m2  59   Comment:  African American GFR Calc   Calcium 8.8 8.5 - 10.1 mg/dL  59   Bilirubin Total 0.6 0.2 - 1.3 mg/dL  59   Albumin 3.3 3.4 - 5.0 g/dL L 59   Protein Total 6.8 6.8 - 8.8 g/dL  59   Alkaline Phosphatase 111 40 - 150 U/L  59   ALT 18 0 - 70 U/L  59   AST 21 0 - 45 U/L  59            CBC with platelets differential [[id number]] (Abnormal)  Resulted: 09/27/17 0819, Result status: Final result    Ordering provider: Morena Byrd PA-C  09/27/17 0000 Resulting lab: [facility]    Specimen Information    Type Source Collected On   Blood  09/27/17 0800          Components       Value Reference Range Flag Lab   WBC 6.1 4.0 - 11.0 10e9/L  59   RBC Count 3.44 4.4 - 5.9 10e12/L L 59   Hemoglobin 9.0 13.3 - 17.7 g/dL L 59   Hematocrit 26.6 40.0 - 53.0 % L 59   MCV 77 78 - 100 fl L 59   MCH 26.2 26.5 - 33.0 pg L 59   MCHC 33.8 31.5 - 36.5 g/dL  59   RDW 15.6 10.0 - 15.0 % H 59   Platelet Count 154 150 - 450 10e9/L  59   Diff Method Automated Method   59   % Neutrophils 64.7 %  59   % Lymphocytes 24.5 %  59   % Monocytes 8.4 %  59   % Eosinophils 2.0 %  59   % Basophils 0.2 %  59   % Immature Granulocytes 0.2 %  59    Absolute Neutrophil 3.9 1.6 - 8.3 10e9/L  59   Absolute Lymphocytes 1.5 0.8 - 5.3 10e9/L  59   Absolute Monocytes 0.5 0.0 - 1.3 10e9/L  59   Absolute Eosinophils 0.1 0.0 - 0.7 10e9/L  59   Absolute Basophils 0.0 0.0 - 0.2 10e9/L  59   Abs Immature Granulocytes 0.0 0 - 0.4 10e9/L  59            Troponin I [[id number]]  Resulted: 09/27/17 0714, Result status: Final result    Ordering provider: Cristian Batista MD  09/27/17 0000 Resulting lab: [facility]    Specimen Information    Type Source Collected On   Blood  09/27/17 0620          Components       Value Reference Range Flag Lab   Troponin I ES <0.015 0.000 - 0.045 ug/L  59   Comment:         The 99th percentile for upper reference range is 0.045 ug/L.  Troponin values   in the range of 0.045 - 0.120 ug/L may be associated with risks of adverse   clinical events.              Magnesium [[id number]] (Abnormal)  Resulted: 09/26/17 2138, Result status: Final result    Ordering provider: Cristian Batista MD  09/26/17 2106 Resulting lab: [facility]    Specimen Information    Type Source Collected On   Blood  09/26/17 2107          Components       Value Reference Range Flag Lab   Magnesium 2.7 1.6 - 2.3 mg/dL H 59            Folate [[id number]]  Resulted: 09/26/17 1421, Result status: Final result    Ordering provider: Morena Byrd PA-C  09/26/17 0732 Resulting lab: [facility]    Specimen Information    Type Source Collected On   Blood  09/26/17 0900          Components       Value Reference Range Flag Lab   Folate 32.0 >5.4 ng/mL  51            Cortisol [[id number]]  Resulted: 09/26/17 1325, Result status: Final result    Ordering provider: Morena Byrd PA-C  09/26/17 0929 Resulting lab: [facility]    Specimen Information    Type Source Collected On   Blood  09/26/17 0900          Components       Value Reference Range Flag Lab   Cortisol  Serum 13.0 4 - 22 ug/dL  51   Comment:         8 AM Cortisol Reference Range = 4-22 ug/dL  4 PM Cortisol Reference Range = 3-17 ug/dL              T4 free [[id number]]  Resulted: 09/26/17 1016, Result status: Final result    Ordering provider: Morena Byrd PA-C  09/26/17 0900 Resulting lab: [facility]    Specimen Information    Type Source Collected On     09/26/17 0900          Components       Value Reference Range Flag Lab   T4 Free 0.80 0.76 - 1.46 ng/dL  59            Clostridium difficile toxin B PCR [[id number]] (Abnormal)  Resulted: 09/26/17 1002, Result status: Final result    Ordering provider: Capo Fuentes MD  09/25/17 2219 Resulting lab: [facility]    Specimen Information    Type Source Collected On   Feces  09/25/17 2213          Components       Value Reference Range Flag Lab   Specimen Description Feces   59   C Diff Toxin B PCR Positive NEG^Negative A 75   Comment:         Positive: Toxin producing Clostridium difficile target DNA sequences detected,   presumed positive for Clostridium difficile toxin B.  Clostridium difficile (Requires Enteric Isolation)  FDA approved assay performed using [employer] GeneXpert real-time PCR.              TSH [[id number]] (Abnormal)  Resulted: 09/26/17 0938, Result status: Final result    Ordering provider: Morena Byrd PA-C  09/26/17 0732 Resulting lab: [facility]    Specimen Information    Type Source Collected On   Blood  09/26/17 0900          Components       Value Reference Range Flag Lab   TSH 0.17 0.40 - 4.00 mU/L L 59            Magnesium [[id number]] (Abnormal)  Resulted: 09/26/17 0934, Result status: Final result    Ordering provider: Morena Byrd PA-C  09/26/17 0732 Resulting lab: [facility]    Specimen Information    Type Source Collected On   Blood  09/26/17 0900          Components       Value Reference Range Flag Lab   Magnesium 1.5 1.6 - 2.3  mg/dL L 59            Ammonia [[id number]]  Resulted: 09/26/17 0925, Result status: Final result    Ordering provider: Morena Byrd PA-C  09/26/17 0732 Resulting lab: [facility]    Specimen Information    Type Source Collected On   Blood  09/26/17 0900          Components       Value Reference Range Flag Lab   Ammonia 17 10 - 50 umol/L  59            Troponin I [[id number]]  Resulted: 09/26/17 0712, Result status: Final result    Ordering provider: Capo Fuentes MD  09/26/17 0600 Resulting lab: [facility]    Specimen Information    Type Source Collected On   Blood  09/26/17 0640          Components       Value Reference Range Flag Lab   Troponin I ES <0.015 0.000 - 0.045 ug/L  59   Comment:         The 99th percentile for upper reference range is 0.045 ug/L.  Troponin values   in the range of 0.045 - 0.120 ug/L may be associated with risks of adverse   clinical events.              Basic metabolic panel [[id number]] (Abnormal)  Resulted: 09/26/17 0712, Result status: Final result    Ordering provider: Chris Persaud MD  09/26/17 0634 Resulting lab: [facility]    Specimen Information    Type Source Collected On   Blood  09/26/17 0640          Components       Value Reference Range Flag Lab   Sodium 139 133 - 144 mmol/L  59   Potassium 3.3 3.4 - 5.3 mmol/L L 59   Chloride 109 94 - 109 mmol/L  59   Carbon Dioxide 21 20 - 32 mmol/L  59   Anion Gap 9 3 - 14 mmol/L  59   Glucose 85 70 - 99 mg/dL  59   Urea Nitrogen 12 7 - 30 mg/dL  59   Creatinine 0.69 0.66 - 1.25 mg/dL  59   GFR Estimate >90 >60 mL/min/1.7m2  59   Comment:  Non  GFR Calc   GFR Estimate If Black >90 >60 mL/min/1.7m2  59   Comment:  African American GFR Calc   Calcium 8.4 8.5 - 10.1 mg/dL L 59            CBC with platelets differential [[id number]] (Abnormal)  Resulted: 09/26/17 0653, Result status: Final result    Ordering provider: Chris Persaud MD  09/26/17 0634  Resulting lab: [facility]    Specimen Information    Type Source Collected On   Blood  09/26/17 0640          Components       Value Reference Range Flag Lab   WBC 6.5 4.0 - 11.0 10e9/L  59   RBC Count 3.22 4.4 - 5.9 10e12/L L 59   Hemoglobin 8.6 13.3 - 17.7 g/dL L 59   Hematocrit 25.1 40.0 - 53.0 % L 59   MCV 78 78 - 100 fl  59   MCH 26.7 26.5 - 33.0 pg  59   MCHC 34.3 31.5 - 36.5 g/dL  59   RDW 15.8 10.0 - 15.0 % H 59   Platelet Count 143 150 - 450 10e9/L L 59   Diff Method Automated Method   59   % Neutrophils 57.7 %  59   % Lymphocytes 31.1 %  59   % Monocytes 9.3 %  59   % Eosinophils 1.4 %  59   % Basophils 0.3 %  59   % Immature Granulocytes 0.2 %  59   Absolute Neutrophil 3.7 1.6 - 8.3 10e9/L  59   Absolute Lymphocytes 2.0 0.8 - 5.3 10e9/L  59   Absolute Monocytes 0.6 0.0 - 1.3 10e9/L  59   Absolute Eosinophils 0.1 0.0 - 0.7 10e9/L  59   Absolute Basophils 0.0 0.0 - 0.2 10e9/L  59   Abs Immature Granulocytes 0.0 0 - 0.4 10e9/L  59            Drug Screen Urine [[id number]] (Abnormal)  Resulted: 09/26/17 0603, Result status: Final result    Ordering provider: Capo Fuentes MD  09/25/17 3608 Resulting lab: [facility]    Specimen Information    Type Source Collected On   Urine Urine catheter 09/26/17 0525          Components       Value Reference Range Flag Lab   Amphetamine Qual Urine Negative NEG^Negative  59   Comment:  Cutoff for a negative amphetamine is 500 ng/mL or less.   Barbiturates Qual Urine Negative NEG^Negative  59   Comment:  Cutoff for a negative barbiturate is 200 ng/mL or less.   Benzodiazepine Qual Urine Negative NEG^Negative  59   Comment:  Cutoff for a negative benzodiazepine is 200 ng/mL or less.   Cannabinoids Qual Urine Negative NEG^Negative  59   Comment:  Cutoff for a negative cannabinoid is 50 ng/mL or less.   Cocaine Qual Urine Negative NEG^Negative  59   Comment:  Cutoff for a negative cocaine is 300 ng/mL or less.   Opiates Qualitative Urine  Positive NEG^Negative A 59   Comment:         Cutoff for a positive opiate is greater than 300 ng/mL. This is an unconfirmed   screening result to be used for medical purposes only.     PCP Qual Urine Negative NEG^Negative  59   Comment:  Cutoff for a negative PCP is 25 ng/mL or less.            UA with Microscopic [[id number]]  Resulted: 09/26/17 0547, Result status: Final result    Ordering provider: Capo Fuentes MD  09/25/17 2109 Resulting lab: [facility]    Specimen Information    Type Source Collected On   Midstream Urine Urine catheter 09/26/17 0525          Components       Value Reference Range Flag Lab   Color Urine Light Yellow   59   Appearance Urine Clear   59   Glucose Urine Negative NEG^Negative mg/dL  59   Bilirubin Urine Negative NEG^Negative  59   Ketones Urine Negative NEG^Negative mg/dL  59   Specific Gravity Urine 1.003 1.003 - 1.035  59   Blood Urine Negative NEG^Negative  59   pH Urine 7.0 5.0 - 7.0 pH  59   Protein Albumin Urine Negative NEG^Negative mg/dL  59   Urobilinogen mg/dL Normal 0.0 - 2.0 mg/dL  59   Nitrite Urine Negative NEG^Negative  59   Leukocyte Esterase Urine Negative NEG^Negative  59   Source Midstream Urine   59   WBC Urine <1 0 - 2 /HPF  59   RBC Urine <1 0 - 2 /HPF  59            Testing Performed By     Lab - Abbreviation Name Director Address Valid Date Range    51 - Unknown [facility] Unknown [address] [address] 12/31/14 1010 - Present    59 - Unknown [facility] Unknown [address] [facility] [address] 12/31/14 1006 - Present    75 - Unknown [facility] Unknown [address] [facility] [address] 01/15/15 1019 - Present    225 - Unknown [facility] Unknown [address] [facility] [address] 12/19/14 0954 - Present               Imaging Results - 3 Days      MR Brain w/o & w Contrast [[id number]]  Resulted: 09/27/17  1631, Result status: Final result    Ordering provider: Cristian Batista MD  09/27/17 0918 Resulted by: Mark De Los Santos MD    Performed: 09/27/17 1447 - 09/27/17 1615 Resulting lab: RADIOLOGY RESULTS    Narrative:       MRI OF THE BRAIN WITHOUT AND WITH CONTRAST 9/27/2017 4:15 PM     COMPARISON: Brain MRI one day prior (limited by motion). Brain MRI  4/21/2017.    HISTORY: Altered mental status. Lung cancer, rule out metastasis.      TECHNIQUE: Axial diffusion-weighted with ADC map, axial T2-weighted  with fat saturation, axial T1-weighted, axial turboFLAIR and coronal  T1-weighted images of the brain were acquired without intravenous  contrast.  Following intravenous administration of gadolinium (6 mL  Gadavist), axial T1-weighted images of the brain were acquired.     FINDINGS: There is mild diffuse cerebral volume loss. There are a few  tiny scattered focal areas of abnormal T2 signal hyperintensity in the  cerebral white matter bilaterally that are consistent with sequela of  chronic small vessel ischemic disease.    The ventricles and basal cisterns are within normal limits in  configuration given the degree of cerebral volume loss. There is no  midline shift. There are no extra-axial fluid collections. There is no  evidence for stroke or acute intracranial hemorrhage. Again noted is  an irregularly-shaped, enhancing lesion within the sella extending to  the suprasellar cistern consistent with a pituitary adenoma. This  lesion measures 1.9 x 1.7 x 1.6 cm in the AP, transverse and  cephalocaudad dimensions respectively which is increased in size from  1.5 x 1.5 x 1.3 cm in the same dimensions on the comparison study.  This mass is again noted to abut and likely displace the optic chiasm.  There is no other abnormal contrast enhancement in the brain or its  coverings.    There is no sinusitis or mastoiditis.      Impression:       IMPRESSION:   1. Interval increase in size of the enhancing mass within  the sella  consistent with a pituitary adenoma; however, a metastatic lesion from  the patient's lung cancer cannot be completely excluded. No additional  enhancing intracranial lesions are identified.  2. Diffuse cerebral volume loss and cerebral white matter changes  consistent with chronic small vessel ischemic disease. No evidence for  acute intracranial pathology.    MIRACLE VINCENT MD      CT Head w/o Contrast [[id number]]  Resulted: 09/26/17 2301, Result status: Final result    Ordering provider: Capo Fuentes MD  09/25/17 2301 Resulted by: Jeff Griffin MD    Performed: 09/25/17 2308 - 09/25/17 2314 Resulting lab: RADIOLOGY RESULTS    Narrative:       CT HEAD W/O CONTRAST  9/25/2017 11:14 PM      HISTORY: Altered mental status. Metastatic non-small cell lung cancer.    TECHNIQUE: Routine noncontrast head CT. Radiation dose for this scan  was reduced using automated exposure control, adjustment of the mA  and/or kV according to patient size, or iterative reconstruction  technique.    COMPARISON: MRI 4/21/2017.    FINDINGS: Brain volume is within normal limits for age. There is a  pituitary mass as seen on the previous MRI. No other mass, mass effect  or intracranial hemorrhage. No evidence of acute infarct.  Periventricular low attenuation is consistent with chronic small  vessel ischemic disease. The visualized paranasal sinuses are clear.      Impression:       IMPRESSION: No acute abnormality.    JEFF GRIFFIN MD      MR Brain w/o & w Contrast [[id number]]  Resulted: 09/26/17 1512, Result status: Final result    Ordering provider: Morena Byrd PA-C  09/26/17 0969 Resulted by: Rohan Garibay MD    Performed: 09/26/17 1000 - 09/26/17 1419 Resulting lab: RADIOLOGY RESULTS    Narrative:       MRI BRAIN WITHOUT AND WITH CONTRAST  9/26/2017 2:19 PM    HISTORY:  Alerted mental status. Rule out brain metastasis.    TECHNIQUE:  Multiplanar, multisequence MRI of the brain without and  with 6 mL  Gadavist.    COMPARISON: Head CT 9/25/2017. Brain MR 4/21/2017.    FINDINGS: Images are severely degraded by motion artifact. No definite  large intracranial mass is identified noting limitations. There is no  mass effect or midline shift. The ventricles are not enlarged out of  proportion to the cerebral sulci. Mild diffuse parenchymal volume  loss. Patchy deep and subcortical white matter T2 hyperintensities are  nonspecific.    Enhancing sellar mass extends superiorly into the suprasellar cistern  and likely abuts the optic chiasm. No definite other intracranial  enhancing mass is identified noting marked limitations given motion  artifact.    Polypoid mucosal thickening in the left maxillary sinus.       Impression:       IMPRESSION:      1. Images are severely degraded by motion artifact.  2. Evaluation for metastatic disease is limited given the motion. No  definite large intracranial mass is identified, but smaller lesions as  well as possible leptomeningeal disease cannot be excluded.  3. Enhancing expansile mass in the sella extending into the  suprasellar cistern. This may represent a pituitary macroadenoma,  although it is poorly characterized on this motion degraded study.  Metastatic lesion could also be considered.  4. Patchy white matter T2 hyperintense lesions. These are  indeterminate and could be due to chronic microvascular ischemic  disease; however, given the motion artifact, evaluation for associated  enhancement is limited, and these could potentially represent small  metastatic lesions. Recommend repeat imaging with sedation.    LUCAS RAMIREZ MD      Testing Performed By     Lab - Abbreviation Name Director Address Valid Date Range    104 - Rad Rslts RADIOLOGY RESULTS Unknown Unknown 02/16/05 1553 - Present               ECG/EMG Results      Echocardiogram Complete [[id number]]  Resulted: 09/26/17 1508, Result status: Edited Result - FINAL    Ordering provider: Cristian Batista MD   17 1229 Resulted by: Simon Zee MD    Performed: 17 1527 - 17 1527 Resulting lab: RADIOLOGY RESULTS    Narrative:       [id number]  [facility]  [id number]  [id number]^ALEX^AMARIS^KATIE           [facility]  Echocardiography Laboratory  [address]  [place] [address]        Name: JOHN SANCHEZ  MRN: [id number]  : 1960  Study Date: 2017 03:08 PM  Age: 57 yrs  Gender: Male  Patient Location: [facility]  Reason For Study: Abn EKG  Ordering Physician: AMARIS JOHNSON  Referring Physician: TUSHAR WHITTEN  Performed By: Hilary Garcia RDCS     BSA: 1.8 m2  Height: 71 in  Weight: 144 lb  HR: 110  BP: 110/63 mmHg  _____________________________________________________________________________  __        Procedure  Complete Portable Echo Adult.  _____________________________________________________________________________  __        Interpretation Summary     Technically difficult study due to patient's agitation. Limited views  obtained.     Grossly normal left ventricular and right ventricular systolic function in  subcostal views.  No significant valvular stenosis or regurgitation noted on doppler  interrogation.  _____________________________________________________________________________  __        Left Ventricle  The left ventricle is normal in size. There is normal left ventricular wall  thickness. Left ventricular systolic function is normal. The visual ejection  fraction is estimated at 55-60%. Regional wall motion abnormalities cannot be  excluded due to limited visualization.     Right Ventricle  The right ventricle is normal size. The right ventricular systolic function is  normal.     Atria  The left atrium is mildly dilated. Right atrial size is normal.     Mitral Valve  There is trace mitral regurgitation.        Tricuspid Valve  Right ventricular systolic pressure could not be approximated due to  inadequate tricuspid regurgitation. There is trace tricuspid  regurgitation.     Aortic Valve  The aortic valve is not well visualized. The aortic valve is trileaflet. No  aortic regurgitation is present.     Pulmonic Valve  There is no pulmonic valvular stenosis.     Vessels  The aortic root is normal size. The IVC is normal in size and reactivity with  respiration, suggesting normal central venous pressure.     Pericardium  There is no pericardial effusion.        Rhythm  The rhythm was undetermined.  _____________________________________________________________________________  __  MMode/2D Measurements & Calculations  IVSd: 1.1 cm     LVIDd: 4.5 cm  LVIDs: 4.0 cm  LVPWd: 1.0 cm  FS: 11.1 %  EDV(Teich): 94.3 ml  ESV(Teich): 71.5 ml  LV mass(C)d: 168.5 grams  LV mass(C)dI: 91.8 grams/m2  Ao root diam: 3.5 cm  LA dimension: 4.0 cm  LA/Ao: 1.2                 _____________________________________________________________________________  __           Report approved by: Misael Cohn 09/26/2017 03:42 PM       1    Type Source Collected On     09/26/17 1508          View Image (below)              Encounter-Level Documents:     There are no encounter-level documents.      Order-Level Documents:     There are no order-level documents.

## 2017-09-25 NOTE — IP AVS SNAPSHOT
` ` Patient Information     Patient Name Sex     Wade Acevedo ([id number]) Male 1960       Room Bed    2309 2309-05      Patient Demographics     Address Phone    [address] [address] [phone] (Home)  [phone] (Mobile) *Preferred*      Patient Ethnicity & Race     Ethnic Group Patient Race    American White      Emergency Contact(s)     Name Relation Home Work Mobile     Sarah Burch (daughter-in-law) Relative [phone]    Zandra Espino (sister-in-law) Relative [phone]      Cristian Acevedo Relative [phone]        Documents on File        Status Date Received Description       Documents for the Patient    Privacy Notice - [place]  04     Insurance Card  10/25/06     Consent Form  10/25/06     Face Sheet  10/25/06     Consent Form  10/31/06     Affiliate Privacy placeholder   phase3    Consent for EHR Access Received 17     External Medication Information Consent Patient Refused 17     Patient ID Received 17     [facility] Specified Other       Consent for Services/Privacy Notice - Hospital/Clinic Received 17     Privacy Notice - [place] Received 17     Consent for Services - [facility]       Power of  Not Received  Statutory Short Form Power of  07    Advance Directives and Living Will Received 17 Health Care Directive 07    Advance Directives and Living Will Not Received  Validation of AD 07    Insurance Card Received 17 MA card    Consent to Communicate Received 17     Insurance Card Received 08/10/17 [facility]    Advance Directives and Living Will Not Received (Deleted)  Validation of AD 07    Advance Directives and Living Will Not Received (Deleted)      Consent to Communicate Received (Deleted) 17     Patient Photo   Photo of Patient       Documents for the Encounter    CMS IM for Patient Signature       Assessment/Questionnaire  17 MRI HISTORY QUESTIONNAIRE AND  CONTRAST NOTICE    ECG   ECG Report    ECG   ECG Report      Admission Information     Attending Provider Admitting Provider Admission Type Admission Date/Time    Cristian Pino MD Eikens, Cristian Knapp MD Emergency 09/25/17 2026    Discharge Date Hospital Service Auth/Cert Status Service Area     General Medicine [facility]    Unit Room/Bed Admission Status       [facility] 2309/2309-01 Admission (Confirmed)       Admission     Complaint    Altered level of consciousness, Altered mental status, sedation for MRI      Hospital Account     Name Acct ID Class Status Primary Coverage    Wade Acevedo [id number] Inpatient Open MARTHA - MARTHA GARCIA            Guarantor Account (for Hospital Account #[id number])     Name Relation to Pt Service Area Active? Acct Type    Wade Acevedo  FCS Yes Personal/Family    Address Phone          [address] [place] [address] [phone](H)              Coverage Information (for Hospital Account #[id number])     F/O Payor/Plan Precert #    MARTHA/MARTHA GARCIA     Subscriber Subscriber #    Wade Acevedo [id number]    Address Phone    [address]  [place] [address] [phone]

## 2017-09-25 NOTE — IP AVS SNAPSHOT
[facility]    [address] [address]    Phone:  [phone]    Fax:  [phone]                                       After Visit Summary   9/25/2017    Wade Acevedo    MRN: [id number]           After Visit Summary Signature Page     I have received my discharge instructions, and my questions have been answered. I have discussed any challenges I see with this plan with the nurse or doctor.    ..........................................................................................................................................  Patient/Patient Representative Signature      ..........................................................................................................................................  Patient Representative Print Name and Relationship to Patient    ..................................................               ................................................  Date                                            Time    ..........................................................................................................................................  Reviewed by Signature/Title    ...................................................              ..............................................  Date                                                            Time

## 2017-09-25 NOTE — IP AVS SNAPSHOT
MRN:[id number]                      After Visit Summary   9/25/2017    Wade Acevedo    MRN: [id number]           Thank you!     Thank you for choosing [facility] for your care. Our goal is always to provide you with excellent care. Hearing back from our patients is one way we can continue to improve our services. Please take a few minutes to complete the written survey that you may receive in the mail after you visit with us. Thank you!        Patient Information     Date Of Birth          1960        Designated Caregiver       Most Recent Value    Caregiver    Will someone help with your care after discharge? yes    Name of designated caregiver Charlotte [Home care RN]    Phone number of caregiver [phone]    Caregiver address unknown      About your hospital stay     You were admitted on:  September 25, 2017 You last received care in the:  [facility]    You were discharged on:  September 29, 2017       Who to Call     For medical emergencies, please call 911.  For non-urgent questions about your medical care, please call your primary care provider or clinic, [phone]  For questions related to your surgery, please call your surgery clinic        Attending Provider     Provider Specialty    Capo Fuentes MD [facility], Chris OLIVEIRA MD [facility]    Lester, Cristian Knapp MD [facility]    Odette, MD Cristian [facility]       Primary Care Provider Office Phone # Fax #    Pawan Cristian Hampton [phone]      After Care Instructions     General info for SNF       Length of Stay Estimate: Short Term Care: Estimated # of Days 31-90  Condition at Discharge: Improving  Level of care:skilled   Rehabilitation Potential: Good  Admission H&P remains valid and up-to-date: Yes  Recent Chemotherapy: Date: (/7/17.  Has switched to Immunotherapy sice           Use Nursing Home Standing Orders: Yes            Mantoux instructions        Give two-step Mantoux (PPD) Per Facility Policy Yes                  Your next 10 appointments already scheduled     Sep 29, 2017  1:45 PM CDT   Return Visit with Pasquale Naylor MD   [facility] ([facility])    [facility]  [address]  [facility] [place] [place] [address]   [phone]            Sep 29, 2017  2:00 PM CDT   Level 2 with ROOM 8 [facility] ([facility])    [facility]  [address]  [facility] [place] [place] [address]   [phone]            Nov 14, 2017 11:30 AM CST   (Arrive by 11:15 AM)   RETURN ENDOCRINE with Padma Medley MD   [facility] ([facility])    [address] [place] [id number] [phone]              Additional Services     Occupational Therapy Adult Consult       Evaluate and treat as clinically indicated.    Reason:  Generalized weakess, cdelirum            Physical Therapy Adult Consult       Evaluate and treat as clinically indicated.    Reason:  generalized weakness                  Further instructions from your care team       Urology consult to eval urinary retetion    [facility] Discharge Instructions     Discharge disposition:  Discharged to rehabilitation facility       Diet:  Regular               Follow-up: Follow up with primary care provider in within 7 days       Additional instructions: Follow up with oncology, and follow up with infusion therapy for immunotherapy later today         Pending Results     Date and Time Order Name Status Description    9/25/2017 2109 Blood culture Preliminary     9/25/2017 2109 Blood culture Preliminary             Statement of Approval     Ordered          09/29/17 1254  I have reviewed and agree with all the recommendations and orders detailed in this document.  EFFECTIVE NOW     Approved and electronically signed by:  Cristian Pino MD            "  Admission Information     Date & Time Provider Department Dept. Phone    2017 Cristian Pino MD [facility] [phone]      Your Vitals Were     Blood Pressure Pulse Temperature Respirations Weight Pulse Oximetry    102/76 105 99  F (37.2  C) (Oral) 18 66.9 kg (147 lb 7.8 oz) 93%    BMI (Body Mass Index)                   20.49 kg/m2           MyChart Information     [facility] lets you send messages to your doctor, view your test results, renew your prescriptions, schedule appointments and more. To sign up, go to [email or web address] . Click on \"Log in\" on the left side of the screen, which will take you to the Welcome page. Then click on \"Sign up Now\" on the right side of the page.     You will be asked to enter the access code listed below, as well as some personal information. Please follow the directions to create your username and password.     Your access code is: [email or web address]  Expires: 2017  9:54 AM     Your access code will  in 90 days. If you need help or a new code, please call your [facility] clinic or [phone].        Care EveryWhere ID     This is your Care EveryWhere ID. This could be used by other organizations to access your [facility] medical records  [id number]        Equal Access to Services     GONZALO SMITH : Hadii jaxon laurent hadasho Sopabloali, waaxda luqadaha, qaybta kaalmada aderoxy, gena evans. So [facility] [phone].    ATENCIÓN: Si habla español, tiene a burns disposición servicios gratuitos de asistencia lingüística. Lucero al [phone].    We comply with applicable federal civil rights and [place] laws. We do not discriminate on the basis of race, color, national origin, age, disability, sex, sexual orientation or gender identity.                             Review of your medicines      START taking        Dose / Directions    acetaminophen 325 MG tablet   Commonly known as:  TYLENOL        Dose:  650 mg   Take 2 " tablets (650 mg) by mouth every 4 hours as needed for mild pain   Quantity:  100 tablet   Refills:  0       folic acid 1 MG tablet   Commonly known as:  FOLVITE        Dose:  1 mg   Take 1 tablet (1 mg) by mouth daily   Quantity:  30 tablet   Refills:  0       metroNIDAZOLE 500 MG tablet   Commonly known as:  FLAGYL   Indication:  Clostridium difficile Bacteria        Dose:  500 mg   Take 1 tablet (500 mg) by mouth 3 times daily   Quantity:  21 tablet   Refills:  0       tamsulosin 0.4 MG capsule   Commonly known as:  FLOMAX   Used for:  Urinary retention with incomplete bladder emptying        Dose:  0.4 mg   Take 1 capsule (0.4 mg) by mouth daily   Quantity:  30 capsule   Refills:  0         CONTINUE these medicines which may have CHANGED, or have new prescriptions. If we are uncertain of the size of tablets/capsules you have at home, strength may be listed as something that might have changed.        Dose / Directions    senna-docusate 8.6-50 MG per tablet   Commonly known as:  SENOKOT-S;PERICOLACE   This may have changed:  additional instructions   Used for:  Non-small cell lung cancer (NSCLC) (H)        Dose:  1 tablet   Take 1 tablet by mouth 2 times daily Reported on 5/4/2017   Quantity:  100 tablet   Refills:  3         CONTINUE these medicines which have NOT CHANGED        Dose / Directions    bisacodyl 10 MG Suppository   Commonly known as:  DULCOLAX        Dose:  10 mg   Place 1 suppository (10 mg) rectally daily as needed for constipation   Quantity:  12 suppository   Refills:  11       capsaicin 0.025 % Crea cream   Commonly known as:  ZOSTRIX   Used for:  Multiple joint pain, Cancer associated pain        Apply to R hip and thigh every 2 hours as needed for pain.   Quantity:  120 g   Refills:  3       diclofenac 1 % Gel topical gel   Commonly known as:  VOLTAREN   Used for:  Multiple joint pain        Apply 4 grams to knees or 2 grams to hands four times daily using enclosed dosing card.   Quantity:   200 g   Refills:  3       lidocaine-prilocaine cream   Commonly known as:  EMLA   Used for:  Non-small cell lung cancer (NSCLC) (H)        Apply topically over port 45 - 60 minutes prior to port access.   Quantity:  30 g   Refills:  3       morphine 30 MG 12 hr tablet   Commonly known as:  MS CONTIN   Used for:  Non-small cell lung cancer (NSCLC) (H)        Dose:  30 mg   Take 1 tablet (30 mg) by mouth 2 times daily   Quantity:  60 tablet   Refills:  0       * nicotine 21 MG/24HR 24 hr patch   Commonly known as:  NICODERM CQ   Used for:  Tobacco abuse        Dose:  1 patch   Place 1 patch onto the skin every 24 hours   Quantity:  30 patch   Refills:  0       * nicotine 14 MG/24HR 24 hr patch   Commonly known as:  NICODERM CQ   Used for:  Tobacco abuse        Dose:  1 patch   Place 1 patch onto the skin every 24 hours   Quantity:  30 patch   Refills:  0       * nicotine 7 MG/24HR 24 hr patch   Commonly known as:  NICODERM CQ   Used for:  Tobacco abuse        Dose:  1 patch   Place 1 patch onto the skin every 24 hours   Quantity:  30 patch   Refills:  0       oxyCODONE 10 MG IR tablet   Commonly known as:  ROXICODONE   Used for:  Non-small cell lung cancer (NSCLC) (H), Cancer associated pain        Dose:  10 mg   Take 1 tablet (10 mg) by mouth every 4 hours as needed for moderate to severe pain   Quantity:  60 tablet   Refills:  0       pantoprazole 40 MG EC tablet   Commonly known as:  PROTONIX   Used for:  Non-small cell lung cancer (NSCLC) (H)        Dose:  40 mg   Take 1 tablet (40 mg) by mouth 2 times daily (before meals)   Quantity:  60 tablet   Refills:  6       PARoxetine 10 MG tablet   Commonly known as:  PAXIL   Used for:  Major depressive disorder, recurrent episode, moderate (H)        Dose:  10 mg   Take 1 tablet (10 mg) by mouth At Bedtime (Needs follow-up appointment for this medication)   Quantity:  30 tablet   Refills:  11       polyethylene glycol powder   Commonly known as:  MIRALAX        Dose:  1  capful   Take 17 g (1 capful) by mouth daily Mix in 4-8 oz of fluid of choice. For constipation   Quantity:  510 g   Refills:  11       * Notice:  This list has 3 medication(s) that are the same as other medications prescribed for you. Read the directions carefully, and ask your doctor or other care provider to review them with you.      STOP taking     dexamethasone 4 MG tablet   Commonly known as:  DECADRON                Where to get your medicines      These medications were sent to "[facility] [address] [place] [address] AT [address]  [address] [facility] [address]     Phone:  [phone]     folic acid 1 MG tablet    metroNIDAZOLE 500 MG tablet    tamsulosin 0.4 MG capsule         Some of these will need a paper prescription and others can be bought over the counter. Ask your nurse if you have questions.     Bring a paper prescription for each of these medications     morphine 30 MG 12 hr tablet    oxyCODONE 10 MG IR tablet       You don't need a prescription for these medications     acetaminophen 325 MG tablet               ANTIBIOTIC INSTRUCTION     You've Been Prescribed an Antibiotic - Now What?  Your healthcare team thinks that you or your loved one might have an infection. Some infections can be treated with antibiotics, which are powerful, life-saving drugs. Like all medications, antibiotics have side effects and should only be used when necessary. There are some important things you should know about your antibiotic treatment.      Your healthcare team may run tests before you start taking an antibiotic.    Your team may take samples (e.g., from your blood, urine or other areas) to run tests to look for bacteria. These test can be important to determine if you need an antibiotic at all and, if you do, which antibiotic will work best.      Within a few days, your healthcare team might change or even stop your antibiotic.    Your team may start  you on an antibiotic while they are working to find out what is making you sick.    Your team might change your antibiotic because test results show that a different antibiotic would be better to treat your infection.    In some cases, once your team has more information, they learn that you do not need an antibiotic at all. They may find out that you don't have an infection, or that the antibiotic you're taking won't work against your infection. For example, an infection caused by a virus can't be treated with antibiotics. Staying on an antibiotic when you don't need it is more likely to be harmful than helpful.      You may experience side effects from your antibiotic.    Like all medications, antibiotics have side effects. Some of these can be serious.    Let you healthcare team know if you have any known allergies when you are admitted to the hospital.    One significant side effect of nearly all antibiotics is the risk of severe and sometimes deadly diarrhea caused by Clostridium difficile (C. Difficile). This occurs when a person takes antibiotics because some good germs are destroyed. Antibiotic use allows C. diificile to take over, putting patients at high risk for this serious infection.    As a patient or caregiver, it is important to understand your or your loved one's antibiotic treatment. It is especially important for caregivers to speak up when patients can't speak for themselves. Here are some important questions to ask your healthcare team.    What infection is this antibiotic treating and how do you know I have that infection?    What side effects might occur from this antibiotic?    How long will I need to take this antibiotic?    Is it safe to take this antibiotic with other medications or supplements (e.g., vitamins) that I am taking?     Are there any special directions I need to know about taking this antibiotic? For example, should I take it with food?    How will I be monitored to know  whether my infection is responding to the antibiotic?    What tests may help to make sure the right antibiotic is prescribed for me?      Information provided by:  www.cdc.gov/getsmart  U.S. Department of Health and Human Services  Centers for disease Control and Prevention  National Center for Emerging and Zoonotic Infectious Diseases  Division of Healthcare Quality Promotion         Protect others around you: Learn how to safely use, store and throw away your medicines at www.disposemymeds.org.             Medication List: This is a list of all your medications and when to take them. Check marks below indicate your daily home schedule. Keep this list as a reference.      Medications           Morning Afternoon Evening Bedtime As Needed    acetaminophen 325 MG tablet   Commonly known as:  TYLENOL   Take 2 tablets (650 mg) by mouth every 4 hours as needed for mild pain   Last time this was given:  650 mg on 9/26/2017  5:31 PM                                bisacodyl 10 MG Suppository   Commonly known as:  DULCOLAX   Place 1 suppository (10 mg) rectally daily as needed for constipation                                capsaicin 0.025 % Crea cream   Commonly known as:  ZOSTRIX   Apply to R hip and thigh every 2 hours as needed for pain.                                diclofenac 1 % Gel topical gel   Commonly known as:  VOLTAREN   Apply 4 grams to knees or 2 grams to hands four times daily using enclosed dosing card.   Last time this was given:  2 g on 9/27/2017  6:26 AM                                folic acid 1 MG tablet   Commonly known as:  FOLVITE   Take 1 tablet (1 mg) by mouth daily   Last time this was given:  1 mg on 9/29/2017  8:08 AM                                lidocaine-prilocaine cream   Commonly known as:  EMLA   Apply topically over port 45 - 60 minutes prior to port access.                                metroNIDAZOLE 500 MG tablet   Commonly known as:  FLAGYL   Take 1 tablet (500 mg) by mouth 3 times  daily   Last time this was given:  500 mg on 9/29/2017  6:28 AM                                morphine 30 MG 12 hr tablet   Commonly known as:  MS CONTIN   Take 1 tablet (30 mg) by mouth 2 times daily   Last time this was given:  30 mg on 9/29/2017  8:08 AM                                * nicotine 21 MG/24HR 24 hr patch   Commonly known as:  NICODERM CQ   Place 1 patch onto the skin every 24 hours                                * nicotine 14 MG/24HR 24 hr patch   Commonly known as:  NICODERM CQ   Place 1 patch onto the skin every 24 hours   Last time this was given:  1 patch on 9/29/2017  8:09 AM                                * nicotine 7 MG/24HR 24 hr patch   Commonly known as:  NICODERM CQ   Place 1 patch onto the skin every 24 hours                                oxyCODONE 10 MG IR tablet   Commonly known as:  ROXICODONE   Take 1 tablet (10 mg) by mouth every 4 hours as needed for moderate to severe pain   Last time this was given:  10 mg on 9/29/2017  1:05 PM                                pantoprazole 40 MG EC tablet   Commonly known as:  PROTONIX   Take 1 tablet (40 mg) by mouth 2 times daily (before meals)   Last time this was given:  40 mg on 9/29/2017  6:28 AM                                PARoxetine 10 MG tablet   Commonly known as:  PAXIL   Take 1 tablet (10 mg) by mouth At Bedtime (Needs follow-up appointment for this medication)   Last time this was given:  10 mg on 9/28/2017  9:33 PM                                polyethylene glycol powder   Commonly known as:  MIRALAX   Take 17 g (1 capful) by mouth daily Mix in 4-8 oz of fluid of choice. For constipation                                senna-docusate 8.6-50 MG per tablet   Commonly known as:  SENOKOT-S;PERICOLACE   Take 1 tablet by mouth 2 times daily Reported on 5/4/2017   Last time this was given:  2 tablets on 9/29/2017  9:50 AM                                tamsulosin 0.4 MG capsule   Commonly known as:  FLOMAX   Take 1 capsule (0.4 mg) by  mouth daily   Last time this was given:  0.4 mg on 9/29/2017  8:08 AM                                * Notice:  This list has 3 medication(s) that are the same as other medications prescribed for you. Read the directions carefully, and ask your doctor or other care provider to review them with you.

## 2017-09-25 NOTE — IP AVS SNAPSHOT
MRN:[id number]                      After Visit Summary   9/25/2017    Wade Acevedo    MRN: [id number]           Thank you!     Thank you for choosing [facility] for your care. Our goal is always to provide you with excellent care. Hearing back from our patients is one way we can continue to improve our services. Please take a few minutes to complete the written survey that you may receive in the mail after you visit with us. Thank you!        Patient Information     Date Of Birth          1960        Designated Caregiver       Most Recent Value    Caregiver    Will someone help with your care after discharge? yes    Name of designated caregiver Charlotte [Home care RN]    Phone number of caregiver [phone]    Caregiver address unknown      About your hospital stay     You were admitted on:  September 25, 2017 You last received care in the:  [facility]    You were discharged on:  September 29, 2017       Who to Call     For medical emergencies, please call 911.  For non-urgent questions about your medical care, please call your primary care provider or clinic, [phone]  For questions related to your surgery, please call your surgery clinic        Attending Provider     Provider Specialty    Capo Fuentes MD [facility], Chris OLIVEIRA MD [facility]    Lester, Cristian Knapp MD [facility]    Odette, MD Cristian [facility]       Primary Care Provider Office Phone # Fax #    Pawan Cristian Hampton [phone]      After Care Instructions     General info for SNF       Length of Stay Estimate: Short Term Care: Estimated # of Days 31-90  Condition at Discharge: Improving  Level of care:skilled   Rehabilitation Potential: Good  Admission H&P remains valid and up-to-date: Yes  Recent Chemotherapy: Date: (/7/17.  Has switched to Immunotherapy sice           Use Nursing Home Standing Orders: Yes            Mantoux instructions       Give  two-step Mantoux (PPD) Per Facility Policy Yes                  Your next 10 appointments already scheduled     Sep 29, 2017  1:45 PM CDT   Return Visit with Pasquale Naylor MD   [facility] ([facility])    [facility]  [address]  [facility] [address]   [phone]            Sep 29, 2017  2:00 PM CDT   Level 2 with ROOM 8 [facility] ([facility])    [facility]  [address]  [facility] [address]   [phone]            Nov 14, 2017 11:30 AM CST   (Arrive by 11:15 AM)   RETURN ENDOCRINE with Padma Medley MD   [facility] ([facility])    [address] [place] [address] [phone]              Additional Services     Occupational Therapy Adult Consult       Evaluate and treat as clinically indicated.    Reason:  Generalized weakess, cdelirum            Physical Therapy Adult Consult       Evaluate and treat as clinically indicated.    Reason:  generalized weakness                  Further instructions from your care team       Urology consult to eval urinary retetion    [facility] Discharge Instructions     Discharge disposition:  Discharged to rehabilitation facility       Diet:  Regular               Follow-up: Follow up with primary care provider in within 7 days       Additional instructions: Follow up with oncology, and follow up with infusion therapy for immunotherapy later today         Pending Results     Date and Time Order Name Status Description    9/25/2017 2109 Blood culture Preliminary     9/25/2017 2109 Blood culture Preliminary             Statement of Approval     Ordered          09/29/17 1254  I have reviewed and agree with all the recommendations and orders detailed in this document.  EFFECTIVE NOW     Approved and electronically signed by:  Cristian Pino MD             Admission  "Information     Date & Time Provider Department Dept. Phone    2017 Cristian Pino MD [facility] [phone]      Your Vitals Were     Blood Pressure Pulse Temperature Respirations Weight Pulse Oximetry    102/76 105 99  F (37.2  C) (Oral) 18 66.9 kg (147 lb 7.8 oz) 93%    BMI (Body Mass Index)                   20.49 kg/m2           MyChart Information     [facility] lets you send messages to your doctor, view your test results, renew your prescriptions, schedule appointments and more. To sign up, go to [email or web address] . Click on \"Log in\" on the left side of the screen, which will take you to the Welcome page. Then click on \"Sign up Now\" on the right side of the page.     You will be asked to enter the access code listed below, as well as some personal information. Please follow the directions to create your username and password.     Your access code is: [email or web address]  Expires: 2017  9:54 AM     Your access code will  in 90 days. If you need help or a new code, please call your [place] clinic or [phone].        Care EveryWhere ID     This is your Care EveryWhere ID. This could be used by other organizations to access your [place] medical records  [id number]        Equal Access to Services     GONZALO SMITH AH: Hadgaudencio bergmano Sonilsa, waaxda luqadaha, qaybta kaalmada raymundo, gena evans. So [facility] [phone].    ATENCIÓN: Si habla español, tiene a burns disposición servicios gratuitos de asistencia lingüística. Lucero hebert [phone].    We comply with applicable federal civil rights laws and [place] laws. We do not discriminate on the basis of race, color, national origin, age, disability, sex, sexual orientation, or gender identity.               Review of your medicines      START taking        Dose / Directions    acetaminophen 325 MG tablet   Commonly known as:  TYLENOL        Dose:  650 mg   Take 2 tablets (650 mg) by " mouth every 4 hours as needed for mild pain   Quantity:  100 tablet   Refills:  0       folic acid 1 MG tablet   Commonly known as:  FOLVITE        Dose:  1 mg   Take 1 tablet (1 mg) by mouth daily   Quantity:  30 tablet   Refills:  0       metroNIDAZOLE 500 MG tablet   Commonly known as:  FLAGYL   Indication:  Clostridium difficile Bacteria        Dose:  500 mg   Take 1 tablet (500 mg) by mouth 3 times daily   Quantity:  21 tablet   Refills:  0       tamsulosin 0.4 MG capsule   Commonly known as:  FLOMAX   Used for:  Urinary retention with incomplete bladder emptying        Dose:  0.4 mg   Take 1 capsule (0.4 mg) by mouth daily   Quantity:  30 capsule   Refills:  0         CONTINUE these medicines which may have CHANGED, or have new prescriptions. If we are uncertain of the size of tablets/capsules you have at home, strength may be listed as something that might have changed.        Dose / Directions    senna-docusate 8.6-50 MG per tablet   Commonly known as:  SENOKOT-S;PERICOLACE   This may have changed:  additional instructions   Used for:  Non-small cell lung cancer (NSCLC) (H)        Dose:  1 tablet   Take 1 tablet by mouth 2 times daily Reported on 5/4/2017   Quantity:  100 tablet   Refills:  3         CONTINUE these medicines which have NOT CHANGED        Dose / Directions    bisacodyl 10 MG Suppository   Commonly known as:  DULCOLAX        Dose:  10 mg   Place 1 suppository (10 mg) rectally daily as needed for constipation   Quantity:  12 suppository   Refills:  11       capsaicin 0.025 % Crea cream   Commonly known as:  ZOSTRIX   Used for:  Multiple joint pain, Cancer associated pain        Apply to R hip and thigh every 2 hours as needed for pain.   Quantity:  120 g   Refills:  3       diclofenac 1 % Gel topical gel   Commonly known as:  VOLTAREN   Used for:  Multiple joint pain        Apply 4 grams to knees or 2 grams to hands four times daily using enclosed dosing card.   Quantity:  200 g   Refills:  3        lidocaine-prilocaine cream   Commonly known as:  EMLA   Used for:  Non-small cell lung cancer (NSCLC) (H)        Apply topically over port 45 - 60 minutes prior to port access.   Quantity:  30 g   Refills:  3       morphine 30 MG 12 hr tablet   Commonly known as:  MS CONTIN   Used for:  Non-small cell lung cancer (NSCLC) (H)        Dose:  30 mg   Take 1 tablet (30 mg) by mouth 2 times daily   Quantity:  60 tablet   Refills:  0       * nicotine 21 MG/24HR 24 hr patch   Commonly known as:  NICODERM CQ   Used for:  Tobacco abuse        Dose:  1 patch   Place 1 patch onto the skin every 24 hours   Quantity:  30 patch   Refills:  0       * nicotine 14 MG/24HR 24 hr patch   Commonly known as:  NICODERM CQ   Used for:  Tobacco abuse        Dose:  1 patch   Place 1 patch onto the skin every 24 hours   Quantity:  30 patch   Refills:  0       * nicotine 7 MG/24HR 24 hr patch   Commonly known as:  NICODERM CQ   Used for:  Tobacco abuse        Dose:  1 patch   Place 1 patch onto the skin every 24 hours   Quantity:  30 patch   Refills:  0       oxyCODONE 10 MG IR tablet   Commonly known as:  ROXICODONE   Used for:  Non-small cell lung cancer (NSCLC) (H), Cancer associated pain        Dose:  10 mg   Take 1 tablet (10 mg) by mouth every 4 hours as needed for moderate to severe pain   Quantity:  60 tablet   Refills:  0       pantoprazole 40 MG EC tablet   Commonly known as:  PROTONIX   Used for:  Non-small cell lung cancer (NSCLC) (H)        Dose:  40 mg   Take 1 tablet (40 mg) by mouth 2 times daily (before meals)   Quantity:  60 tablet   Refills:  6       PARoxetine 10 MG tablet   Commonly known as:  PAXIL   Used for:  Major depressive disorder, recurrent episode, moderate (H)        Dose:  10 mg   Take 1 tablet (10 mg) by mouth At Bedtime (Needs follow-up appointment for this medication)   Quantity:  30 tablet   Refills:  11       polyethylene glycol powder   Commonly known as:  MIRALAX        Dose:  1 capful   Take 17 g  (1 capful) by mouth daily Mix in 4-8 oz of fluid of choice. For constipation   Quantity:  510 g   Refills:  11       * Notice:  This list has 3 medication(s) that are the same as other medications prescribed for you. Read the directions carefully, and ask your doctor or other care provider to review them with you.      STOP taking     dexamethasone 4 MG tablet   Commonly known as:  DECADRON                Where to get your medicines      These medications were sent to [facility] [address] [place] [address] AT [address]  [address] [facility] [address]     Phone:  [phone]     folic acid 1 MG tablet    metroNIDAZOLE 500 MG tablet    tamsulosin 0.4 MG capsule         Some of these will need a paper prescription and others can be bought over the counter. Ask your nurse if you have questions.     Bring a paper prescription for each of these medications     morphine 30 MG 12 hr tablet    oxyCODONE 10 MG IR tablet       You don't need a prescription for these medications     acetaminophen 325 MG tablet               ANTIBIOTIC INSTRUCTION     You've Been Prescribed an Antibiotic - Now What?  Your healthcare team thinks that you or your loved one might have an infection. Some infections can be treated with antibiotics, which are powerful, life-saving drugs. Like all medications, antibiotics have side effects and should only be used when necessary. There are some important things you should know about your antibiotic treatment.      Your healthcare team may run tests before you start taking an antibiotic.    Your team may take samples (e.g., from your blood, urine or other areas) to run tests to look for bacteria. These test can be important to determine if you need an antibiotic at all and, if you do, which antibiotic will work best.      Within a few days, your healthcare team might change or even stop your antibiotic.    Your team may start you on an antibiotic  while they are working to find out what is making you sick.    Your team might change your antibiotic because test results show that a different antibiotic would be better to treat your infection.    In some cases, once your team has more information, they learn that you do not need an antibiotic at all. They may find out that you don't have an infection, or that the antibiotic you're taking won't work against your infection. For example, an infection caused by a virus can't be treated with antibiotics. Staying on an antibiotic when you don't need it is more likely to be harmful than helpful.      You may experience side effects from your antibiotic.    Like all medications, antibiotics have side effects. Some of these can be serious.    Let you healthcare team know if you have any known allergies when you are admitted to the hospital.    One significant side effect of nearly all antibiotics is the risk of severe and sometimes deadly diarrhea caused by Clostridium difficile (C. Difficile). This occurs when a person takes antibiotics because some good germs are destroyed. Antibiotic use allows C. diificile to take over, putting patients at high risk for this serious infection.    As a patient or caregiver, it is important to understand your or your loved one's antibiotic treatment. It is especially important for caregivers to speak up when patients can't speak for themselves. Here are some important questions to ask your healthcare team.    What infection is this antibiotic treating and how do you know I have that infection?    What side effects might occur from this antibiotic?    How long will I need to take this antibiotic?    Is it safe to take this antibiotic with other medications or supplements (e.g., vitamins) that I am taking?     Are there any special directions I need to know about taking this antibiotic? For example, should I take it with food?    How will I be monitored to know whether my infection is  responding to the antibiotic?    What tests may help to make sure the right antibiotic is prescribed for me?      Information provided by:  www.cdc.gov/getsmart  U.S. Department of Health and Human Services  Centers for disease Control and Prevention  National Center for Emerging and Zoonotic Infectious Diseases  Division of Healthcare Quality Promotion         Protect others around you: Learn how to safely use, store and throw away your medicines at www.disposemymeds.org.             Medication List: This is a list of all your medications and when to take them. Check marks below indicate your daily home schedule. Keep this list as a reference.      Medications           Morning Afternoon Evening Bedtime As Needed    acetaminophen 325 MG tablet   Commonly known as:  TYLENOL   Take 2 tablets (650 mg) by mouth every 4 hours as needed for mild pain   Last time this was given:  650 mg on 9/26/2017  5:31 PM                                bisacodyl 10 MG Suppository   Commonly known as:  DULCOLAX   Place 1 suppository (10 mg) rectally daily as needed for constipation                                capsaicin 0.025 % Crea cream   Commonly known as:  ZOSTRIX   Apply to R hip and thigh every 2 hours as needed for pain.                                diclofenac 1 % Gel topical gel   Commonly known as:  VOLTAREN   Apply 4 grams to knees or 2 grams to hands four times daily using enclosed dosing card.   Last time this was given:  2 g on 9/27/2017  6:26 AM                                folic acid 1 MG tablet   Commonly known as:  FOLVITE   Take 1 tablet (1 mg) by mouth daily   Last time this was given:  1 mg on 9/29/2017  8:08 AM                                lidocaine-prilocaine cream   Commonly known as:  EMLA   Apply topically over port 45 - 60 minutes prior to port access.                                metroNIDAZOLE 500 MG tablet   Commonly known as:  FLAGYL   Take 1 tablet (500 mg) by mouth 3 times daily   Last time this  was given:  500 mg on 9/29/2017  6:28 AM                                morphine 30 MG 12 hr tablet   Commonly known as:  MS CONTIN   Take 1 tablet (30 mg) by mouth 2 times daily   Last time this was given:  30 mg on 9/29/2017  8:08 AM                                * nicotine 21 MG/24HR 24 hr patch   Commonly known as:  NICODERM CQ   Place 1 patch onto the skin every 24 hours                                * nicotine 14 MG/24HR 24 hr patch   Commonly known as:  NICODERM CQ   Place 1 patch onto the skin every 24 hours   Last time this was given:  1 patch on 9/29/2017  8:09 AM                                * nicotine 7 MG/24HR 24 hr patch   Commonly known as:  NICODERM CQ   Place 1 patch onto the skin every 24 hours                                oxyCODONE 10 MG IR tablet   Commonly known as:  ROXICODONE   Take 1 tablet (10 mg) by mouth every 4 hours as needed for moderate to severe pain   Last time this was given:  10 mg on 9/29/2017  1:05 PM                                pantoprazole 40 MG EC tablet   Commonly known as:  PROTONIX   Take 1 tablet (40 mg) by mouth 2 times daily (before meals)   Last time this was given:  40 mg on 9/29/2017  6:28 AM                                PARoxetine 10 MG tablet   Commonly known as:  PAXIL   Take 1 tablet (10 mg) by mouth At Bedtime (Needs follow-up appointment for this medication)   Last time this was given:  10 mg on 9/28/2017  9:33 PM                                polyethylene glycol powder   Commonly known as:  MIRALAX   Take 17 g (1 capful) by mouth daily Mix in 4-8 oz of fluid of choice. For constipation                                senna-docusate 8.6-50 MG per tablet   Commonly known as:  SENOKOT-S;PERICOLACE   Take 1 tablet by mouth 2 times daily Reported on 5/4/2017   Last time this was given:  2 tablets on 9/29/2017  9:50 AM                                tamsulosin 0.4 MG capsule   Commonly known as:  FLOMAX   Take 1 capsule (0.4 mg) by mouth daily   Last time  this was given:  0.4 mg on 9/29/2017  8:08 AM                                * Notice:  This list has 3 medication(s) that are the same as other medications prescribed for you. Read the directions carefully, and ask your doctor or other care provider to review them with you.

## 2017-09-25 NOTE — LETTER
Transition Communication Hand-off for Care Transitions to Next Level of Care Provider    Name: Wade Acevedo  MRN #: [id number]  Primary Care Provider: Pawan Hampton  Primary Care MD Name: Memo  Primary Clinic: [address] [place] [address]  Primary Care Clinic Name: [facility]  Reason for Hospitalization:  Confusion [R41.0]  Metastatic cancer (H) [C79.9]  Non-small cell lung cancer, unspecified laterality (H) [C34.90]  Admit Date/Time: 9/25/2017  8:26 PM  Discharge Date: 9/26/17  Payor Source: Payor: [employer] / Plan: UCARE [place] / Product Type: HMO /     Readmission Assessment Measure (VALENTINO) Risk Score/category: average           Reason for Communication Hand-off Referral: Fragility    Discharge Plan:  Discharge Plan:       Most Recent Value    Concerns To Be Addressed adjustment to diagnosis/illness concerns, medication concerns           Concern for non-adherence with plan of care:   Y/N no  Discharge Needs Assessment:  Needs       Most Recent Value    Anticipated Changes Related to Illness inability to care for self    Equipment Currently Used at Home none    Transportation Available family or friend will provide    Current Discharge Risk cognitively impaired    # of Referrals Placed by CTS Post Acute Facilities, [place]    Senior Linkage Line Referral Placed -- [n/a]    F/U Appointment Brochure Provided -- [discussed importance]          Already enrolled in Tele-monitoring program and name of program:  no  Follow-up specialty is recommended: No    Follow-up plan:  Future Appointments  Date Time Provider Department Center   9/29/2017 1:45 PM Pasquale Naylor MD [facility]   9/29/2017 2:00 PM ROOM 8 [facility]   11/14/2017 11:30 AM Padma Medley MD [facility]       Any outstanding tests or procedures:        Referrals     Future Labs/Procedures    Occupational Therapy Adult Consult     Comments:    Evaluate and treat as clinically indicated.    Reason:  Generalized weakess, cdelirum     Physical Therapy Adult Consult     Comments:    Evaluate and treat as clinically indicated.    Reason:  generalized weakness            Key Recommendations: Pt will dc to  [facility] Phone: ([phone]) Fax: ([phone]) today at 1630 following an infusion in the cancer center. Pt and pts dtr in law are hoping that pt can eventually move to [facility] and [facility] (Phone: [phone] Fax: [phone]) if he needs long term care. He currently lives alone in the [facility] and his dtr in law, health care agent, lives in [place].     Micki CAPONE, [facility], [facility] [phone]      AVS/Discharge Summary is the source of truth; this is a helpful guide for improved communication of patient story

## 2017-09-25 NOTE — IP AVS SNAPSHOT
"    [facility] SURGICAL: [phone]                                              INTERAGENCY TRANSFER FORM - PHYSICIAN ORDERS   2017                    Hospital Admission Date: 2017  JOHN SANCHEZ   : 1960  Sex: Male        Attending Provider: Cristian Pino MD     Allergies:  Lubriderm    Infection:  None   Service:  GENERAL MEDI    Ht:  1.807 m (5' 11.14\")   Wt:  66.9 kg (147 lb 7.8 oz)   Admission Wt:  65.7 kg (144 lb 13.5 oz)    BMI:  20.49 kg/m 2   BSA:  1.83 m 2            Patient PCP Information     Provider PCP Type    Pawan Hampton MD General      ED Clinical Impression     Diagnosis Description Comment Added By Time Added    Non-small cell lung cancer, unspecified laterality (H) [C34.90] Non-small cell lung cancer, unspecified laterality (H) [C34.90]  Capo Fuentes MD 2017 11:07 PM    Metastatic cancer (H) [C79.9] Metastatic cancer (H) [C79.9]  Capo Fuentes MD 2017 11:07 PM    Confusion [R41.0] Confusion [R41.0]  Capo Fuentes MD 2017 11:08 PM      Hospital Problems as of 2017              Priority Class Noted POA    Anemia Medium  2017 Yes    Non-small cell lung cancer (NSCLC) (H) Medium  2017 Yes    Cancer associated pain Medium  2017 Yes    Constipation, chronic Medium  Unknown Yes    Major depressive disorder, recurrent episode, moderate (H) Medium  2017 Yes    Tobacco use disorder Medium  2017 Yes    * (Principal)Altered level of consciousness Medium  2017 Yes    Altered mental status Medium  2017 Yes      Non-Hospital Problems as of 2017              Priority Class Noted    Weight loss Medium  2017    H/O ETOH abuse Medium  Unknown    Unsteady gait Medium  Unknown    Recurrent falls Medium  Unknown    Pituitary macroadenoma (H) Medium  5/10/2017    Sinusitis Medium  2017    Thrombocytopenia (H) Medium  2017    ACP (advance care planning) Medium  2017      Code Status History  "    Date Active Date Inactive Code Status Order ID Comments User Context    9/29/2017 12:52 PM  DNR/DNI [id number]  Cristian Pino MD Outpatient    9/27/2017  2:17 PM 9/29/2017 12:52 PM DNR/DNI [id number]  Cristian Batista MD Inpatient    9/26/2017  8:58 AM 9/27/2017  2:17 PM Full Code [id number]  Morena Byrd PA-C Inpatient    4/20/2017  5:31 PM 4/27/2017  3:26 PM Full Code [id number]  Patel Scrhader MD Inpatient         Medication Review      START taking        Dose / Directions Comments    acetaminophen 325 MG tablet   Commonly known as:  TYLENOL        Dose:  650 mg   Take 2 tablets (650 mg) by mouth every 4 hours as needed for mild pain   Quantity:  100 tablet   Refills:  0        folic acid 1 MG tablet   Commonly known as:  FOLVITE        Dose:  1 mg   Take 1 tablet (1 mg) by mouth daily   Quantity:  30 tablet   Refills:  0        metroNIDAZOLE 500 MG tablet   Commonly known as:  FLAGYL   Indication:  Clostridium difficile Bacteria        Dose:  500 mg   Take 1 tablet (500 mg) by mouth 3 times daily   Quantity:  21 tablet   Refills:  0        tamsulosin 0.4 MG capsule   Commonly known as:  FLOMAX   Used for:  Urinary retention with incomplete bladder emptying        Dose:  0.4 mg   Take 1 capsule (0.4 mg) by mouth daily   Quantity:  30 capsule   Refills:  0          CONTINUE these medications which may have CHANGED, or have new prescriptions. If we are uncertain of the size of tablets/capsules you have at home, strength may be listed as something that might have changed.        Dose / Directions Comments    senna-docusate 8.6-50 MG per tablet   Commonly known as:  SENOKOT-S;PERICOLACE   This may have changed:  additional instructions   Used for:  Non-small cell lung cancer (NSCLC) (H)        Dose:  1 tablet   Take 1 tablet by mouth 2 times daily Reported on 5/4/2017   Quantity:  100 tablet   Refills:  3          CONTINUE these medications which have NOT CHANGED        Dose / Directions Comments     bisacodyl 10 MG Suppository   Commonly known as:  DULCOLAX        Dose:  10 mg   Place 1 suppository (10 mg) rectally daily as needed for constipation   Quantity:  12 suppository   Refills:  11        capsaicin 0.025 % Crea cream   Commonly known as:  ZOSTRIX   Used for:  Multiple joint pain, Cancer associated pain        Apply to R hip and thigh every 2 hours as needed for pain.   Quantity:  120 g   Refills:  3    Just dispnese 60 if not covered by insurance plz       diclofenac 1 % Gel topical gel   Commonly known as:  VOLTAREN   Used for:  Multiple joint pain        Apply 4 grams to knees or 2 grams to hands four times daily using enclosed dosing card.   Quantity:  200 g   Refills:  3        lidocaine-prilocaine cream   Commonly known as:  EMLA   Used for:  Non-small cell lung cancer (NSCLC) (H)        Apply topically over port 45 - 60 minutes prior to port access.   Quantity:  30 g   Refills:  3        morphine 30 MG 12 hr tablet   Commonly known as:  MS CONTIN   Used for:  Non-small cell lung cancer (NSCLC) (H)        Dose:  30 mg   Take 1 tablet (30 mg) by mouth 2 times daily   Quantity:  60 tablet   Refills:  0        * nicotine 21 MG/24HR 24 hr patch   Commonly known as:  NICODERM CQ   Used for:  Tobacco abuse        Dose:  1 patch   Place 1 patch onto the skin every 24 hours   Quantity:  30 patch   Refills:  0        * nicotine 14 MG/24HR 24 hr patch   Commonly known as:  NICODERM CQ   Used for:  Tobacco abuse        Dose:  1 patch   Place 1 patch onto the skin every 24 hours   Quantity:  30 patch   Refills:  0        * nicotine 7 MG/24HR 24 hr patch   Commonly known as:  NICODERM CQ   Used for:  Tobacco abuse        Dose:  1 patch   Place 1 patch onto the skin every 24 hours   Quantity:  30 patch   Refills:  0        oxyCODONE 10 MG IR tablet   Commonly known as:  ROXICODONE   Used for:  Non-small cell lung cancer (NSCLC) (H), Cancer associated pain        Dose:  10 mg   Take 1 tablet (10 mg) by mouth  every 4 hours as needed for moderate to severe pain   Quantity:  60 tablet   Refills:  0        pantoprazole 40 MG EC tablet   Commonly known as:  PROTONIX   Used for:  Non-small cell lung cancer (NSCLC) (H)        Dose:  40 mg   Take 1 tablet (40 mg) by mouth 2 times daily (before meals)   Quantity:  60 tablet   Refills:  6        PARoxetine 10 MG tablet   Commonly known as:  PAXIL   Used for:  Major depressive disorder, recurrent episode, moderate (H)        Dose:  10 mg   Take 1 tablet (10 mg) by mouth At Bedtime (Needs follow-up appointment for this medication)   Quantity:  30 tablet   Refills:  11        polyethylene glycol powder   Commonly known as:  MIRALAX        Dose:  1 capful   Take 17 g (1 capful) by mouth daily Mix in 4-8 oz of fluid of choice. For constipation   Quantity:  510 g   Refills:  11        * Notice:  This list has 3 medication(s) that are the same as other medications prescribed for you. Read the directions carefully, and ask your doctor or other care provider to review them with you.      STOP taking     dexamethasone 4 MG tablet   Commonly known as:  DECADRON                     Further instructions from your care team       Urology consult to tejaal urinary retetion    [facility] Discharge Instructions     Discharge disposition:  Discharged to rehabilitation facility       Diet:  Regular               Follow-up: Follow up with primary care provider in within 7 days       Additional instructions: Follow up with oncology, and follow up with infusion therapy for immunotherapy later today         After Care     General info for SNF       Length of Stay Estimate: Short Term Care: Estimated # of Days 31-90  Condition at Discharge: Improving  Level of care:skilled   Rehabilitation Potential: Good  Admission H&P remains valid and up-to-date: Yes  Recent Chemotherapy: Date: (/7/17.  Has switched to Immunotherapy sice           Use Nursing Home Standing Orders: Yes       [facility]  instructions       Give two-step Mantoux (PPD) Per Facility Policy Yes             Referrals     Occupational Therapy Adult Consult       Evaluate and treat as clinically indicated.    Reason:  Generalized weakess, cdelirum       Physical Therapy Adult Consult       Evaluate and treat as clinically indicated.    Reason:  generalized weakness             Your next 10 appointments already scheduled     Sep 29, 2017  1:45 PM CDT   Return Visit with Pasquale Naylor MD   [facility] ([facility])    [facility]  [address]  [facility] [address]   [phone]            Sep 29, 2017  2:00 PM CDT   Level 2 with ROOM 8 [facility] ([facility])    [facility]  [address]  [facility] [address]   [phone]            Nov 14, 2017 11:30 AM CST   (Arrive by 11:15 AM)   RETURN ENDOCRINE with Padma Medley MD   [facility] ([facility])    [address] [place] [address]  3rd [facility] [id number] [phone]              Statement of Approval     Ordered          09/29/17 1254  I have reviewed and agree with all the recommendations and orders detailed in this document.  EFFECTIVE NOW     Approved and electronically signed by:  Cristian Pino MD

## 2017-09-25 NOTE — IP AVS SNAPSHOT
` `     [facility] SURGICAL: [phone]                 INTERAGENCY TRANSFER FORM - NOTES (H&P, Discharge Summary, Consults, Procedures, Therapies)   2017                    Hospital Admission Date: 2017  JOHN SANCHEZ   : 1960  Sex: Male        Patient PCP Information     Provider PCP Type    Pawan Hampton MD General         History & Physicals      Interval H&P Note by Trish Hyde APRN CRNA at 2017  3:15 PM     Author:  Trish Hyde APRN CRNA Service:  Anesthesiology Author Type:  Nurse Anesthetist    Filed:  2017  4:50 PM Date of Service:  2017  3:15 PM Creation Time:  2017  4:50 PM    Status:  Signed :  Trish Hyde APRN CRNA (Nurse Anesthetist)         The causes and treatment of seasonal allergic rhinitis are discussed in detail. Allergen avoidance, use and side effects of OTC and prescription antihistamine decongestant products, and the use and side effects of inhaled nasal corticosteroids is reviewed. Allergy desensitization shots are reserved for severe and refractory cases.[TW1.1]     Revision History        User Key Date/Time User Provider Type Action    > TW1.1 2017  4:50 PM Trish Hyde APRN CRNA Nurse Anesthetist Sign          Source Note     Author:  Cristian Batista MD Service:  Hospitalist Author Type:  Physician    Filed:  2017 12:20 PM Date of Service:  2017  8:32 AM Creation Time:  2017  4:48 PM    Status:  Signed :  Cristian Batista MD (Physician)            [facility] Hospitalist Service      Subjective:[JE1.1]  Continue confusion  No pain  No fever  No ha  No neck pain[JE1.2]    Review of Systems:[JE1.1]  C: NEGATIVE for fever, chills, change in weight  I: NEGATIVE for worrisome rashes, moles or lesions  E: NEGATIVE for vision changes or irritation  E/M: NEGATIVE for ear, mouth and throat problems  R: NEGATIVE for significant cough or SOB  B: NEGATIVE for masses,  tenderness or discharge  CV: NEGATIVE for chest pain, palpitations or peripheral edema  GI: NEGATIVE for nausea, abdominal pain, heartburn, or change in bowel habits  : NEGATIVE for frequency, dysuria, or hematuria  MUSCULOSKELETAL:chronic pain buttox and lower extremities  N: NEGATIVE for weakness, dizziness or paresthesias  E: NEGATIVE for temperature intolerance, skin/hair changes  H: NEGATIVE for bleeding problems  P: NEGATIVE for changes in mood or affect    Physical Ex[JE1.2]am:  Vitals Were Reviewed    Patient Vitals for the past 16 hrs:   BP Temp Temp src Pulse Heart Rate Resp SpO2 Weight   09/27/17 0756 (!) 150/93 98.1  F (36.7  C) Oral 93 - 18 99 % -   09/27/17 0605 - - - - - - - 67.3 kg (148 lb 5.9 oz)   09/27/17 0403 155/90 98.3  F (36.8  C) Oral 89 - 18 97 % -   09/26/17 2359 139/88 97.6  F (36.4  C) Oral - 96 18 99 % -   09/26/17 1941 114/74 - - 86 - - 97 % -         Intake/Output Summary (Last 24 hours) at 09/27/17 0832  Last data filed at 09/27/17 0657   Gross per 24 hour   Intake             2070 ml   Output             4700 ml   Net            -2630 ml[JE1.1]       GENERAL APPEARANCE: confused, speaks in confused fashion, ambulates  EYES: conjunctiva clear, eyes grossly normal  RESP: lungs clear to auscultation - no rales, rhonchi or wheezes  CV: regular rate and rhythm, normal S1 S2, no S3 or S4 and no murmur, click or rub   ABDOMEN: soft, nontender, no HSM or masses and bowel sounds normal  MS: no clubbing, cyanosis; no edema  SKIN: clear without significant rashes or lesions  NEURO: alert, disoriented, speech is clear but doesn't make sense, exam otherwise nonfocal    Lab:[JE1.2]  Recent Labs   Lab Test  09/27/17   0800  09/26/17   0640   NA  145*  139   POTASSIUM  3.3*  3.3*   CHLORIDE  114*  109   CO2  24  21   ANIONGAP  7  9   GLC  105*  85   BUN  9  12   CR  0.71  0.69   JOHANN  8.8  8.4*     CBC RESULTS:   Recent Labs   Lab Test  09/27/17   0800  09/26/17   0640   WBC  6.1  6.5   RBC  3.44*   3.22*   HGB  9.0*  8.6*   HCT  26.6*  25.1*   PLT  154  143*       Results for orders placed or performed during the hospital encounter of 09/25/17 (from the past 24 hour(s))   Ammonia   Result Value Ref Range    Ammonia 17 10 - 50 umol/L   TSH   Result Value Ref Range    TSH 0.17 (L) 0.40 - 4.00 mU/L   Magnesium   Result Value Ref Range    Magnesium 1.5 (L) 1.6 - 2.3 mg/dL   Folate   Result Value Ref Range    Folate 32.0 >5.4 ng/mL   Cortisol   Result Value Ref Range    Cortisol Serum 13.0 4 - 22 ug/dL   T4 free   Result Value Ref Range    T4 Free 0.80 0.76 - 1.46 ng/dL   Care Transition RN/SW IP Consult    Narrative    Barb Ramsay RN     9/26/2017  4:00 PM  Care Transition Initial Assessment - RN    Reason For Consult: discharge planning   Met with: Patient and Family.    DATA:   Principal Problem:    Altered level of consciousness  Active Problems:    Anemia    Non-small cell lung cancer (NSCLC) (H)    Cancer associated pain    Constipation, chronic    Major depressive disorder, recurrent episode, moderate (H)    Tobacco use disorder    Altered mental status       Cognitive Status: awake, alert and confused.  [facility]  Primary Care MD Name: Memo  Contact information and PCP information verified: Yes  Lives With: alone   Living Arrangements: house. Has to go up and down stairs to get   to kitchen. Sarah is concerned that he has not been eating good   since last Wednesday.     Description of Support System: Supportive, Involved   Who is your support system?: Other (specify), Sibling(s)   (Daughter in law Sarah, Uncle)    Support Assessment: Lacks necessary supervision and assistance   Insurance concerns: No Insurance issues identified    ASSESSMENT:  Patient currently receives the following services:  [facility]   Palliative Care ([phone] Fax: [phone])       Identified issues/concerns regarding health management: new   confusion, medication compliance: was taking both MS Contin  and   Oxycodone as PRN meds. Also, patient's home care nurse told Sarah   that he hadn't taken his routine meds from Wednesday of last   week. Just diagnosed with cancer earlier this year. Did not   tolerate first chemotherapy so was started on Opdiva 9/14/17 and   he is due for second dose on 9/28.[facility]   + C. Diff. New hoarse voice since Monday.  Transportation concerns: family may be able to provide, depending   on work schedule    PLAN:  Financial costs for the patient include: none noted at this time.  Patient given options and choices for discharge: This writer met   with pt and his daughter in law Inna, introduced self and role.   Discussed medicare coverage in regards to home care, TCU and LTC.     Patient/family is agreeable to the plan?  Yes, patient doesn't   like the idea of going back to TCU, but knows he may have to   because of his continued confusion.  Patient anticipates discharging to: TCU. Patient was provided   with Medicare certified nursing home list. Pts choices are as   follows 1) [facility] (Phone: [phone] Fax:   [phone]) 2) [facility] Phone:   ([phone]) Fax: ([phone])  3) [facility]   (Phone: [phone]) Fax: ([phone])     Resources List: Transitional Care  Patient anticipates needs for home equipment: No       Discharge Planner   Discharge Plans in progress: Referrals out to TCUs  Barriers to discharge plan: medical stability, may need to put   chemotherapy treatments on hold  Plan/Disposition: TCU   Care  (CTS) will   continue to follow as needed.    1500: Discussed with Sarah, that TCUs do not take patients that   are currently receiving chemotherapy. Sarah states that patient's   MD did say that they were concerned that patient's confusion   hadn't improved greatly. They are awaiting MRI results and now   patient was to get an echocardiogram. Writer will continue to    assist as needed.    1540: Call received from [facility]. If patient puts his   chemotherapy on hold, they still would not be able to take   patient until Friday at the earliest (no private bathroom until   then) and are still waiting for their business office to approve.   MR Brain w/o & w Contrast    Narrative    MRI BRAIN WITHOUT AND WITH CONTRAST  9/26/2017 2:19 PM    HISTORY:  Alerted mental status. Rule out brain metastasis.    TECHNIQUE:  Multiplanar, multisequence MRI of the brain without and  with 6 mL Gadavist.    COMPARISON: Head CT 9/25/2017. Brain MR 4/21/2017.    FINDINGS: Images are severely degraded by motion artifact. No definite  large intracranial mass is identified noting limitations. There is no  mass effect or midline shift. The ventricles are not enlarged out of  proportion to the cerebral sulci. Mild diffuse parenchymal volume  loss. Patchy deep and subcortical white matter T2 hyperintensities are  nonspecific.    Enhancing sellar mass extends superiorly into the suprasellar cistern  and likely abuts the optic chiasm. No definite other intracranial  enhancing mass is identified noting marked limitations given motion  artifact.    Polypoid mucosal thickening in the left maxillary sinus.       Impression    IMPRESSION:      1. Images are severely degraded by motion artifact.  2. Evaluation for metastatic disease is limited given the motion. No  definite large intracranial mass is identified, but smaller lesions as  well as possible leptomeningeal disease cannot be excluded.  3. Enhancing expansile mass in the sella extending into the  suprasellar cistern. This may represent a pituitary macroadenoma,  although it is poorly characterized on this motion degraded study.  Metastatic lesion could also be considered.  4. Patchy white matter T2 hyperintense lesions. These are  indeterminate and could be due to chronic microvascular ischemic  disease; however, given the motion artifact, evaluation  for associated  enhancement is limited, and these could potentially represent small  metastatic lesions. Recommend repeat imaging with sedation.    LUCAS RAMIREZ MD   Echocardiogram Complete    Narrative    [id number]  [facility]  [id number]  [id number]^KHANHS^AMARIS^KATIE           [facility]  Echocardiography Laboratory  [address]  [place] [id number]        Name: JOHN SANCHEZ  MRN: [id number]  : 1960  Study Date: 2017 03:08 PM  Age: 57 yrs  Gender: Male  Patient Location: [facility]  Reason For Study: Abn EKG  Ordering Physician: AMARIS JOHNSON  Referring Physician: TUSHAR WHITTEN  Performed By: Hilary Garcia RDCS     BSA: 1.8 m2  Height: 71 in  Weight: 144 lb  HR: 110  BP: 110/63 mmHg  _____________________________________________________________________________  __        Procedure  Complete Portable Echo Adult.  _____________________________________________________________________________  __        Interpretation Summary     Technically difficult study due to patient's agitation. Limited views  obtained.     Grossly normal left ventricular and right ventricular systolic function in  subcostal views.  No significant valvular stenosis or regurgitation noted on doppler  interrogation.  _____________________________________________________________________________  __        Left Ventricle  The left ventricle is normal in size. There is normal left ventricular wall  thickness. Left ventricular systolic function is normal. The visual ejection  fraction is estimated at 55-60%. Regional wall motion abnormalities cannot be  excluded due to limited visualization.     Right Ventricle  The right ventricle is normal size. The right ventricular systolic function is  normal.     Atria  The left atrium is mildly dilated. Right atrial size is normal.     Mitral Valve  There is trace mitral regurgitation.        Tricuspid Valve  Right ventricular systolic pressure could not be approximated due  to  inadequate tricuspid regurgitation. There is trace tricuspid regurgitation.     Aortic Valve  The aortic valve is not well visualized. The aortic valve is trileaflet. No  aortic regurgitation is present.     Pulmonic Valve  There is no pulmonic valvular stenosis.     Vessels  The aortic root is normal size. The IVC is normal in size and reactivity with  respiration, suggesting normal central venous pressure.     Pericardium  There is no pericardial effusion.        Rhythm  The rhythm was undetermined.  _____________________________________________________________________________  __  MMode/2D Measurements & Calculations  IVSd: 1.1 cm     LVIDd: 4.5 cm  LVIDs: 4.0 cm  LVPWd: 1.0 cm  FS: 11.1 %  EDV(Teich): 94.3 ml  ESV(Teich): 71.5 ml  LV mass(C)d: 168.5 grams  LV mass(C)dI: 91.8 grams/m2  Ao root diam: 3.5 cm  LA dimension: 4.0 cm  LA/Ao: 1.2                 _____________________________________________________________________________  __           Report approved by: Misael Cohn 09/26/2017 03:42 PM      Magnesium   Result Value Ref Range    Magnesium 2.7 (H) 1.6 - 2.3 mg/dL   Troponin I   Result Value Ref Range    Troponin I ES <0.015 0.000 - 0.045 ug/L   Comprehensive metabolic panel   Result Value Ref Range    Sodium 145 (H) 133 - 144 mmol/L    Potassium 3.3 (L) 3.4 - 5.3 mmol/L    Chloride 114 (H) 94 - 109 mmol/L    Carbon Dioxide 24 20 - 32 mmol/L    Anion Gap 7 3 - 14 mmol/L    Glucose 105 (H) 70 - 99 mg/dL    Urea Nitrogen 9 7 - 30 mg/dL    Creatinine 0.71 0.66 - 1.25 mg/dL    GFR Estimate >90 >60 mL/min/1.7m2    GFR Estimate If Black >90 >60 mL/min/1.7m2    Calcium 8.8 8.5 - 10.1 mg/dL    Bilirubin Total 0.6 0.2 - 1.3 mg/dL    Albumin 3.3 (L) 3.4 - 5.0 g/dL    Protein Total 6.8 6.8 - 8.8 g/dL    Alkaline Phosphatase 111 40 - 150 U/L    ALT 18 0 - 70 U/L    AST 21 0 - 45 U/L   CBC with platelets differential   Result Value Ref Range    WBC 6.1 4.0 - 11.0 10e9/L    RBC Count 3.44 (L) 4.4 - 5.9  10e12/L    Hemoglobin 9.0 (L) 13.3 - 17.7 g/dL    Hematocrit 26.6 (L) 40.0 - 53.0 %    MCV 77 (L) 78 - 100 fl    MCH 26.2 (L) 26.5 - 33.0 pg    MCHC 33.8 31.5 - 36.5 g/dL    RDW 15.6 (H) 10.0 - 15.0 %    Platelet Count 154 150 - 450 10e9/L    Diff Method Automated Method     % Neutrophils 64.7 %    % Lymphocytes 24.5 %    % Monocytes 8.4 %    % Eosinophils 2.0 %    % Basophils 0.2 %    % Immature Granulocytes 0.2 %    Absolute Neutrophil 3.9 1.6 - 8.3 10e9/L    Absolute Lymphocytes 1.5 0.8 - 5.3 10e9/L    Absolute Monocytes 0.5 0.0 - 1.3 10e9/L    Absolute Eosinophils 0.1 0.0 - 0.7 10e9/L    Absolute Basophils 0.0 0.0 - 0.2 10e9/L    Abs Immature Granulocytes 0.0 0 - 0.4 10e9/L       Assessment and Plan:    Wade Acevedo is a 57 year old male with PMH significant for chronic anemia, cancer associated pain, NSCLC current on Opdivo therapy, depression, tobacco use disorder, hx of EtOH abuse, and hx of thrombocytopenia who now presents with altered mental status.        Acute Encephalopathy   Daughter-in law complains of confusion for at least the last 24 hours. VSS. Drug screen positive only for opiates.  UA negative.  CXR negative.  CT head again shows pituitary mass. EtOH level was 0.00.  Acetaminophen was less than 2 and salicylate level was 5.  DDx: Medications (hx of overuse of scheduled MS Contin as recent at 09/20/17) versus infection versus metabolic derangements (changes in magnesium, thyroid, Wernicke's)[JE1.1]  No sign of infectious cause, normal ammonia, T4 and cortisol normal, MRI of poor quality due to motion--repeat with sedation recommended[JE1.3]    Urinary Retention[JE1.1]-probably secondary to AMS[JE1.2]  Straight cathed for 800cc of urine overnight.  No history of urinary retention.  - straight cath, PRN for urine greater than 350cc on bladder scan  - instruct RN to inform MD if straight cath x3; will likely need to place lion     Non-small cell lung cancer (NSCLC) (H) with metastasis to bone (5th  lateral rib, scapula)  Follows with Dr. Naylor. Diagnosed by CT guided biopsy. PET Scan confirms tumor with hypermetabolic lymph nodes and hypermetabolic lesions to the scapula and 5th lateral rib. Previously on carbo/taxol - this was stopped due pancytopenia. Received first dose of Opdivo chemotherapy (immune therapy) 09/14/2017.  Next scheduled chemotherapy infusion of Opdivo 09/28/2017.     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  Normal TSH with low normal T4/T3.  Mildly abnormal cosyntropin stim testing. Seen by [facility] (Dr. Padma Medley) on 05/10/2017.  Most likely thought to be non-functioning pituitary macroadenoma.  Was supposed to have repeat MRI 08/2017 - did not do so.  Has follow up with endocrinology 11/14/2017.  Most recently had mildly low TSH with normal T4 09/14/2017.[JE1.1]    Abnormal EKG  New t inversions[JE1.4] since April 2017, no wall motion abnormalities, trops normal, no chest pain--given metastatic cancer and no sign of ACS, will simply observe at this time, doubt implicated in his AMS.[JE1.5]    Positive Clostridia difficile test-no diarrha-doubt clinical ds  Test ordered by Dr Fuentes-no diarrhea, no notation regarding why test sent.  Test was positive but I doubt it represents clinical ds--on empiric oral flagyl while work up for AMS is on going. Would probably dc flagyl at some point if no diarrhea.[JE1.6]     [JE1.5]  Anemia  Has received transfusions in the past for low hemoglobin - last received 2 units approximately 09/07/2017.  This was thought to be secondary to chemotherapy (carbo/taxol).  Recently switched from this to immune chemotherapy.  Hemoglobin on arrival 10.7 on arrival.  8.6 on admission day 1. VSS.  - AM CBC with platelets, will transfuse if hbg less than 7.0     Hx of Thrombocytopenia  Platelets as low as 42k approximately 3 weeks ago.  Received 2u of PRBCs WITHOUT platelets, seemed to respond  - continue to monitor       Cancer associated pain  - continue PTA scheduled MS Contin  - continue PTA PRN oxycodone  - continue PTA dermal capsicin cream Q2 PRN  - continue PTA Voltaren gel 4x daily  - continue PTA Emla cream with access to port     Constipation, chronic  - continue PTA scheduled miralax, senna-ducosate     Major depressive disorder, recurrent episode, moderate (H)  - continue PTA Paxil      Tobacco use disorder  Nicotine patch in place     GERD  - continue PTA pantoprazole[JE1.1]     Discussion  Source of AMS still unclear, no overt infection, Still worried about metastatic ds.--will attempt to repeat MR with anesthesia sedation. Will discuss the possibility of Opdivo causing this[JE1.5]with Dr Naylor.[JE1.2]  I did talk to the POA yesterday and asked her to re-address code status.[JE1.5]  If MR is unremarkable-might consider LP.[JE1.2]      12:18 PM[JE1.7] discussed with Dr Naylor  He felt that AMS could be from Opdivo-but is unlikely  If MR is neg-he thought LP would be of little use-would suggest holding Opdivo until situation is clearer.[JE1.8]      Revision History        User Key Date/Time User Provider Type Action    > JE1.7 9/27/2017  4:48 PM Cristian Batista MD Physician Sign     JE1.8 9/27/2017 12:17 PM Cristian Batista MD Physician      JE1.2 9/27/2017  9:18 AM Cristian Batista MD Physician      JE1.6 9/27/2017  8:41 AM Cristian Batista MD Physician      JE1.5 9/27/2017  8:37 AM Cristian Batista MD Physician      JE1.4 9/27/2017  8:36 AM Cristian Batista MD Physician      JE1.3 9/27/2017  8:34 AM Cristian Batista MD Physician      JE1.1 9/27/2017  8:32 AM Cristian Batista MD Physician                   Interval H&P Note by Trish Hyde APRN CRNA at 9/27/2017  3:15 PM     Author:  Trish Hyde APRN CRNA Service:  Anesthesiology Author Type:  Nurse Anesthetist    Filed:  9/27/2017  4:48 PM Date of Service:  9/27/2017  3:15 PM Creation Time:  9/27/2017  4:47 PM     Status:  Signed :  Trish Hyde APRN CRNA (Nurse Anesthetist)         The causes and treatment of seasonal allergic rhinitis are discussed in detail. Allergen avoidance, use and side effects of OTC and prescription antihistamine decongestant products, and the use and side effects of inhaled nasal corticosteroids is reviewed. Allergy desensitization shots are reserved for severe and refractory cases.[TW1.1]     Revision History        User Key Date/Time User Provider Type Action    > TW1.1 9/27/2017  4:48 PM Trish Hyde APRN CRNA Nurse Anesthetist Sign          Source Note     Author:  Cristian Batista MD Service:  Hospitalist Author Type:  Physician    Filed:  9/27/2017 12:20 PM Date of Service:  9/27/2017  8:32 AM Creation Time:  9/27/2017  4:48 PM    Status:  Signed :  Cristian Batista MD (Physician)            [facility] Hospitalist Service      Subjective:[JE1.1]  Continue confusion  No pain  No fever  No ha  No neck pain[JE1.2]    Review of Systems:[JE1.1]  C: NEGATIVE for fever, chills, change in weight  I: NEGATIVE for worrisome rashes, moles or lesions  E: NEGATIVE for vision changes or irritation  E/M: NEGATIVE for ear, mouth and throat problems  R: NEGATIVE for significant cough or SOB  B: NEGATIVE for masses, tenderness or discharge  CV: NEGATIVE for chest pain, palpitations or peripheral edema  GI: NEGATIVE for nausea, abdominal pain, heartburn, or change in bowel habits  : NEGATIVE for frequency, dysuria, or hematuria  MUSCULOSKELETAL:chronic pain buttox and lower extremities  N: NEGATIVE for weakness, dizziness or paresthesias  E: NEGATIVE for temperature intolerance, skin/hair changes  H: NEGATIVE for bleeding problems  P: NEGATIVE for changes in mood or affect    Physical Ex[JE1.2]am:  Vitals Were Reviewed    Patient Vitals for the past 16 hrs:   BP Temp Temp src Pulse Heart Rate Resp SpO2 Weight   09/27/17 0756 (!) 150/93 98.1  F (36.7  C) Oral 93 - 18 99 % -    09/27/17 0605 - - - - - - - 67.3 kg (148 lb 5.9 oz)   09/27/17 0403 155/90 98.3  F (36.8  C) Oral 89 - 18 97 % -   09/26/17 2359 139/88 97.6  F (36.4  C) Oral - 96 18 99 % -   09/26/17 1941 114/74 - - 86 - - 97 % -         Intake/Output Summary (Last 24 hours) at 09/27/17 0832  Last data filed at 09/27/17 0657   Gross per 24 hour   Intake             2070 ml   Output             4700 ml   Net            -2630 ml[JE1.1]       GENERAL APPEARANCE: confused, speaks in confused fashion, ambulates  EYES: conjunctiva clear, eyes grossly normal  RESP: lungs clear to auscultation - no rales, rhonchi or wheezes  CV: regular rate and rhythm, normal S1 S2, no S3 or S4 and no murmur, click or rub   ABDOMEN: soft, nontender, no HSM or masses and bowel sounds normal  MS: no clubbing, cyanosis; no edema  SKIN: clear without significant rashes or lesions  NEURO: alert, disoriented, speech is clear but doesn't make sense, exam otherwise nonfocal    Lab:[JE1.2]  Recent Labs   Lab Test  09/27/17   0800  09/26/17   0640   NA  145*  139   POTASSIUM  3.3*  3.3*   CHLORIDE  114*  109   CO2  24  21   ANIONGAP  7  9   GLC  105*  85   BUN  9  12   CR  0.71  0.69   JOHANN  8.8  8.4*     CBC RESULTS:   Recent Labs   Lab Test  09/27/17   0800  09/26/17   0640   WBC  6.1  6.5   RBC  3.44*  3.22*   HGB  9.0*  8.6*   HCT  26.6*  25.1*   PLT  154  143*       Results for orders placed or performed during the hospital encounter of 09/25/17 (from the past 24 hour(s))   Ammonia   Result Value Ref Range    Ammonia 17 10 - 50 umol/L   TSH   Result Value Ref Range    TSH 0.17 (L) 0.40 - 4.00 mU/L   Magnesium   Result Value Ref Range    Magnesium 1.5 (L) 1.6 - 2.3 mg/dL   Folate   Result Value Ref Range    Folate 32.0 >5.4 ng/mL   Cortisol   Result Value Ref Range    Cortisol Serum 13.0 4 - 22 ug/dL   T4 free   Result Value Ref Range    T4 Free 0.80 0.76 - 1.46 ng/dL   Care Transition RN/SW IP Consult    Barb Arrieta RN     9/26/2017  4:00  PM  Care Transition Initial Assessment - RN    Reason For Consult: discharge planning   Met with: Patient and Family.    DATA:   Principal Problem:    Altered level of consciousness  Active Problems:    Anemia    Non-small cell lung cancer (NSCLC) (H)    Cancer associated pain    Constipation, chronic    Major depressive disorder, recurrent episode, moderate (H)    Tobacco use disorder    Altered mental status       Cognitive Status: awake, alert and confused.  Primary Care Clinic Name: [facility]  Primary Care MD Name: Connerteson  Contact information and PCP information verified: Yes  Lives With: alone   Living Arrangements: house. Has to go up and down stairs to get   to kitchen. Sarah is concerned that he has not been eating good   since last Wednesday.     Description of Support System: Supportive, Involved   Who is your support system?: Other (specify), Sibling(s)   (Daughter in law Sarah, Uncle)    Support Assessment: Lacks necessary supervision and assistance   Insurance concerns: No Insurance issues identified    ASSESSMENT:  Patient currently receives the following services:  [facility]   Palliative Care ([phone] Fax: [phone])       Identified issues/concerns regarding health management: new   confusion, medication compliance: was taking both MS Contin and   Oxycodone as PRN meds. Also, patient's home care nurse told Sarah   that he hadn't taken his routine meds from Wednesday of last   week. Just diagnosed with cancer earlier this year. Did not   tolerate first chemotherapy so was started on Opdiva 9/14/17 and   he is due for second dose on 9/28.[facility]   + C. Diff. New hoarse voice since Monday.  Transportation concerns: family may be able to provide, depending   on work schedule    PLAN:  Financial costs for the patient include: none noted at this time.  Patient given options and choices for discharge: This writer met   with pt and his daughter in law Inna, introduced self and role.    Discussed medicare coverage in regards to home care, TCU and LTC.     Patient/family is agreeable to the plan?  Yes, patient doesn't   like the idea of going back to TCU, but knows he may have to   because of his continued confusion.  Patient anticipates discharging to: TCU. Patient was provided   with Medicare certified nursing home list. Pts choices are as   follows 1) [place] [facility] (Phone: [phone] Fax:   [phone]) 2) [facility] Phone:   ([phone]) Fax: ([phone])  3) [facility]   (Phone: [phone]) Fax: ([phone])     Resources List: Transitional Care  Patient anticipates needs for home equipment: No       Discharge Planner   Discharge Plans in progress: Referrals out to TCUs  Barriers to discharge plan: medical stability, may need to put   chemotherapy treatments on hold  Plan/Disposition: TCU   Care  (CTS) will   continue to follow as needed.    1500: Discussed with Sarah, that TCUs do not take patients that   are currently receiving chemotherapy. Sarah states that patient's   MD did say that they were concerned that patient's confusion   hadn't improved greatly. They are awaiting MRI results and now   patient was to get an echocardiogram. Writer will continue to   assist as needed.    1540: Call received from [facility]. If patient puts his   chemotherapy on hold, they still would not be able to take   patient until Friday at the earliest (no private bathroom until   then) and are still waiting for their business office to approve.   MR Brain w/o & w Contrast    Narrative    MRI BRAIN WITHOUT AND WITH CONTRAST  9/26/2017 2:19 PM    HISTORY:  Alerted mental status. Rule out brain metastasis.    TECHNIQUE:  Multiplanar, multisequence MRI of the brain without and  with 6 mL Gadavist.    COMPARISON: Head CT 9/25/2017. Brain MR 4/21/2017.    FINDINGS: Images are severely degraded by motion artifact. No definite  large  intracranial mass is identified noting limitations. There is no  mass effect or midline shift. The ventricles are not enlarged out of  proportion to the cerebral sulci. Mild diffuse parenchymal volume  loss. Patchy deep and subcortical white matter T2 hyperintensities are  nonspecific.    Enhancing sellar mass extends superiorly into the suprasellar cistern  and likely abuts the optic chiasm. No definite other intracranial  enhancing mass is identified noting marked limitations given motion  artifact.    Polypoid mucosal thickening in the left maxillary sinus.       Impression    IMPRESSION:      1. Images are severely degraded by motion artifact.  2. Evaluation for metastatic disease is limited given the motion. No  definite large intracranial mass is identified, but smaller lesions as  well as possible leptomeningeal disease cannot be excluded.  3. Enhancing expansile mass in the sella extending into the  suprasellar cistern. This may represent a pituitary macroadenoma,  although it is poorly characterized on this motion degraded study.  Metastatic lesion could also be considered.  4. Patchy white matter T2 hyperintense lesions. These are  indeterminate and could be due to chronic microvascular ischemic  disease; however, given the motion artifact, evaluation for associated  enhancement is limited, and these could potentially represent small  metastatic lesions. Recommend repeat imaging with sedation.    LUCAS RAMIREZ MD   Echocardiogram Complete    Narrative    [id number]  [id number]  [id number]  [id number]^ALEX^AMARIS^KATIE           [facility]  Echocardiography Laboratory  [address] [place] [address]        Name: JOHN SANCHEZ  MRN: [id number]  : 1960  Study Date: 2017 03:08 PM  Age: 57 yrs  Gender: Male  Patient Location: [facility]  Reason For Study: Abn EKG  Ordering Physician: AMARIS JOHNSON  Referring Physician: TUSHAR WHITTEN  Performed By: Hilary Garcia RDCS     BSA: 1.8  m2  Height: 71 in  Weight: 144 lb  HR: 110  BP: 110/63 mmHg  _____________________________________________________________________________  __        Procedure  Complete Portable Echo Adult.  _____________________________________________________________________________  __        Interpretation Summary     Technically difficult study due to patient's agitation. Limited views  obtained.     Grossly normal left ventricular and right ventricular systolic function in  subcostal views.  No significant valvular stenosis or regurgitation noted on doppler  interrogation.  _____________________________________________________________________________  __        Left Ventricle  The left ventricle is normal in size. There is normal left ventricular wall  thickness. Left ventricular systolic function is normal. The visual ejection  fraction is estimated at 55-60%. Regional wall motion abnormalities cannot be  excluded due to limited visualization.     Right Ventricle  The right ventricle is normal size. The right ventricular systolic function is  normal.     Atria  The left atrium is mildly dilated. Right atrial size is normal.     Mitral Valve  There is trace mitral regurgitation.        Tricuspid Valve  Right ventricular systolic pressure could not be approximated due to  inadequate tricuspid regurgitation. There is trace tricuspid regurgitation.     Aortic Valve  The aortic valve is not well visualized. The aortic valve is trileaflet. No  aortic regurgitation is present.     Pulmonic Valve  There is no pulmonic valvular stenosis.     Vessels  The aortic root is normal size. The IVC is normal in size and reactivity with  respiration, suggesting normal central venous pressure.     Pericardium  There is no pericardial effusion.        Rhythm  The rhythm was undetermined.  _____________________________________________________________________________  __  MMode/2D Measurements & Calculations  IVSd: 1.1 cm     LVIDd: 4.5 cm  LVIDs:  4.0 cm  LVPWd: 1.0 cm  FS: 11.1 %  EDV(Teich): 94.3 ml  ESV(Teich): 71.5 ml  LV mass(C)d: 168.5 grams  LV mass(C)dI: 91.8 grams/m2  Ao root diam: 3.5 cm  LA dimension: 4.0 cm  LA/Ao: 1.2                 _____________________________________________________________________________  __           Report approved by: Misael Cohn 09/26/2017 03:42 PM      Magnesium   Result Value Ref Range    Magnesium 2.7 (H) 1.6 - 2.3 mg/dL   Troponin I   Result Value Ref Range    Troponin I ES <0.015 0.000 - 0.045 ug/L   Comprehensive metabolic panel   Result Value Ref Range    Sodium 145 (H) 133 - 144 mmol/L    Potassium 3.3 (L) 3.4 - 5.3 mmol/L    Chloride 114 (H) 94 - 109 mmol/L    Carbon Dioxide 24 20 - 32 mmol/L    Anion Gap 7 3 - 14 mmol/L    Glucose 105 (H) 70 - 99 mg/dL    Urea Nitrogen 9 7 - 30 mg/dL    Creatinine 0.71 0.66 - 1.25 mg/dL    GFR Estimate >90 >60 mL/min/1.7m2    GFR Estimate If Black >90 >60 mL/min/1.7m2    Calcium 8.8 8.5 - 10.1 mg/dL    Bilirubin Total 0.6 0.2 - 1.3 mg/dL    Albumin 3.3 (L) 3.4 - 5.0 g/dL    Protein Total 6.8 6.8 - 8.8 g/dL    Alkaline Phosphatase 111 40 - 150 U/L    ALT 18 0 - 70 U/L    AST 21 0 - 45 U/L   CBC with platelets differential   Result Value Ref Range    WBC 6.1 4.0 - 11.0 10e9/L    RBC Count 3.44 (L) 4.4 - 5.9 10e12/L    Hemoglobin 9.0 (L) 13.3 - 17.7 g/dL    Hematocrit 26.6 (L) 40.0 - 53.0 %    MCV 77 (L) 78 - 100 fl    MCH 26.2 (L) 26.5 - 33.0 pg    MCHC 33.8 31.5 - 36.5 g/dL    RDW 15.6 (H) 10.0 - 15.0 %    Platelet Count 154 150 - 450 10e9/L    Diff Method Automated Method     % Neutrophils 64.7 %    % Lymphocytes 24.5 %    % Monocytes 8.4 %    % Eosinophils 2.0 %    % Basophils 0.2 %    % Immature Granulocytes 0.2 %    Absolute Neutrophil 3.9 1.6 - 8.3 10e9/L    Absolute Lymphocytes 1.5 0.8 - 5.3 10e9/L    Absolute Monocytes 0.5 0.0 - 1.3 10e9/L    Absolute Eosinophils 0.1 0.0 - 0.7 10e9/L    Absolute Basophils 0.0 0.0 - 0.2 10e9/L    Abs Immature Granulocytes 0.0 0 -  0.4 10e9/L       Assessment and Plan:    Wade Acevedo is a 57 year old male with PMH significant for chronic anemia, cancer associated pain, NSCLC current on Opdivo therapy, depression, tobacco use disorder, hx of EtOH abuse, and hx of thrombocytopenia who now presents with altered mental status.        Acute Encephalopathy   Daughter-in law complains of confusion for at least the last 24 hours. VSS. Drug screen positive only for opiates.  UA negative.  CXR negative.  CT head again shows pituitary mass. EtOH level was 0.00.  Acetaminophen was less than 2 and salicylate level was 5.  DDx: Medications (hx of overuse of scheduled MS Contin as recent at 09/20/17) versus infection versus metabolic derangements (changes in magnesium, thyroid, Wernicke's)[JE1.1]  No sign of infectious cause, normal ammonia, T4 and cortisol normal, MRI of poor quality due to motion--repeat with sedation recommended[JE1.3]    Urinary Retention[JE1.1]-probably secondary to AMS[JE1.2]  Straight cathed for 800cc of urine overnight.  No history of urinary retention.  - straight cath, PRN for urine greater than 350cc on bladder scan  - instruct RN to inform MD if straight cath x3; will likely need to place lion     Non-small cell lung cancer (NSCLC) (H) with metastasis to bone (5th lateral rib, scapula)  Follows with Dr. Naylor. Diagnosed by CT guided biopsy. PET Scan confirms tumor with hypermetabolic lymph nodes and hypermetabolic lesions to the scapula and 5th lateral rib. Previously on carbo/taxol - this was stopped due pancytopenia. Received first dose of Opdivo chemotherapy (immune therapy) 09/14/2017.  Next scheduled chemotherapy infusion of Opdivo 09/28/2017.     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  Normal TSH with low normal T4/T3.  Mildly abnormal cosyntropin stim testing. Seen by [facility] (Dr. Padma Medley) on 05/10/2017.  Most likely thought to be non-functioning pituitary  macroadenoma.  Was supposed to have repeat MRI 08/2017 - did not do so.  Has follow up with endocrinology 11/14/2017.  Most recently had mildly low TSH with normal T4 09/14/2017.[JE1.1]    Abnormal EKG  New t inversions[JE1.4] since April 2017, no wall motion abnormalities, trops normal, no chest pain--given metastatic cancer and no sign of ACS, will simply observe at this time, doubt implicated in his AMS.[JE1.5]    Positive Clostridia difficile test-no diarrha-doubt clinical ds  Test ordered by Dr Fuentes-no diarrhea, no notation regarding why test sent.  Test was positive but I doubt it represents clinical ds--on empiric oral flagyl while work up for AMS is on going. Would probably dc flagyl at some point if no diarrhea.[JE1.6]     [JE1.5]  Anemia  Has received transfusions in the past for low hemoglobin - last received 2 units approximately 09/07/2017.  This was thought to be secondary to chemotherapy (carbo/taxol).  Recently switched from this to immune chemotherapy.  Hemoglobin on arrival 10.7 on arrival.  8.6 on admission day 1. VSS.  - AM CBC with platelets, will transfuse if hbg less than 7.0     Hx of Thrombocytopenia  Platelets as low as 42k approximately 3 weeks ago.  Received 2u of PRBCs WITHOUT platelets, seemed to respond  - continue to monitor      Cancer associated pain  - continue PTA scheduled MS Contin  - continue PTA PRN oxycodone  - continue PTA dermal capsicin cream Q2 PRN  - continue PTA Voltaren gel 4x daily  - continue PTA Emla cream with access to port     Constipation, chronic  - continue PTA scheduled miralax, senna-ducosate     Major depressive disorder, recurrent episode, moderate (H)  - continue PTA Paxil      Tobacco use disorder  Nicotine patch in place     GERD  - continue PTA pantoprazole[JE1.1]     Discussion  Source of AMS still unclear, no overt infection, Still worried about metastatic ds.--will attempt to repeat MR with anesthesia sedation. Will discuss the possibility of  Opdivo causing this[JE1.5]with Dr Naylor.[JE1.2]  I did talk to the POA yesterday and asked her to re-address code status.[JE1.5]  If MR is unremarkable-might consider LP.[JE1.2]      12:18 PM[JE1.7] discussed with Dr Naylor  He felt that AMS could be from Opdivo-but is unlikely  If MR is neg-he thought LP would be of little use-would suggest holding Opdivo until situation is clearer.[JE1.8]      Revision History        User Key Date/Time User Provider Type Action    > JE1.7 9/27/2017  4:48 PM Cristian Batista MD Physician Sign     JE1.8 9/27/2017 12:17 PM Cristian Batista MD Physician      JE1.2 9/27/2017  9:18 AM Cristian Batista MD Physician      JE1.6 9/27/2017  8:41 AM Cristian Batista MD Physician      JE1.5 9/27/2017  8:37 AM Cristian Batista MD Physician      JE1.4 9/27/2017  8:36 AM Cristian Batista MD Physician      JE1.3 9/27/2017  8:34 AM Cristian Batista MD Physician      JE1.1 9/27/2017  8:32 AM Cristian Batista MD Physician                   H&P (View-Only) by Cristian Batista MD at 9/27/2017  8:32 AM     Author:  Cristian Batista MD Service:  Hospitalist Author Type:  Physician    Filed:  9/27/2017 12:20 PM Date of Service:  9/27/2017  8:32 AM Creation Time:  9/27/2017  4:48 PM    Status:  Signed :  Cristian Batista MD (Physician)         [facility]      Subjective:[JE1.1]  Continue confusion  No pain  No fever  No ha  No neck pain[JE1.2]    Review of Systems:[JE1.1]  C: NEGATIVE for fever, chills, change in weight  I: NEGATIVE for worrisome rashes, moles or lesions  E: NEGATIVE for vision changes or irritation  E/M: NEGATIVE for ear, mouth and throat problems  R: NEGATIVE for significant cough or SOB  B: NEGATIVE for masses, tenderness or discharge  CV: NEGATIVE for chest pain, palpitations or peripheral edema  GI: NEGATIVE for nausea, abdominal pain, heartburn, or change in bowel habits  : NEGATIVE for  frequency, dysuria, or hematuria  MUSCULOSKELETAL:chronic pain buttox and lower extremities  N: NEGATIVE for weakness, dizziness or paresthesias  E: NEGATIVE for temperature intolerance, skin/hair changes  H: NEGATIVE for bleeding problems  P: NEGATIVE for changes in mood or affect    Physical Ex[JE1.2]am:  Vitals Were Reviewed    Patient Vitals for the past 16 hrs:   BP Temp Temp src Pulse Heart Rate Resp SpO2 Weight   09/27/17 0756 (!) 150/93 98.1  F (36.7  C) Oral 93 - 18 99 % -   09/27/17 0605 - - - - - - - 67.3 kg (148 lb 5.9 oz)   09/27/17 0403 155/90 98.3  F (36.8  C) Oral 89 - 18 97 % -   09/26/17 2359 139/88 97.6  F (36.4  C) Oral - 96 18 99 % -   09/26/17 1941 114/74 - - 86 - - 97 % -         Intake/Output Summary (Last 24 hours) at 09/27/17 0832  Last data filed at 09/27/17 0657   Gross per 24 hour   Intake             2070 ml   Output             4700 ml   Net            -2630 ml[JE1.1]       GENERAL APPEARANCE: confused, speaks in confused fashion, ambulates  EYES: conjunctiva clear, eyes grossly normal  RESP: lungs clear to auscultation - no rales, rhonchi or wheezes  CV: regular rate and rhythm, normal S1 S2, no S3 or S4 and no murmur, click or rub   ABDOMEN: soft, nontender, no HSM or masses and bowel sounds normal  MS: no clubbing, cyanosis; no edema  SKIN: clear without significant rashes or lesions  NEURO: alert, disoriented, speech is clear but doesn't make sense, exam otherwise nonfocal    Lab:[JE1.2]  Recent Labs   Lab Test  09/27/17   0800  09/26/17   0640   NA  145*  139   POTASSIUM  3.3*  3.3*   CHLORIDE  114*  109   CO2  24  21   ANIONGAP  7  9   GLC  105*  85   BUN  9  12   CR  0.71  0.69   JOHANN  8.8  8.4*     CBC RESULTS:   Recent Labs   Lab Test  09/27/17   0800  09/26/17   0640   WBC  6.1  6.5   RBC  3.44*  3.22*   HGB  9.0*  8.6*   HCT  26.6*  25.1*   PLT  154  143*       Results for orders placed or performed during the hospital encounter of 09/25/17 (from the past 24 hour(s))    Ammonia   Result Value Ref Range    Ammonia 17 10 - 50 umol/L   TSH   Result Value Ref Range    TSH 0.17 (L) 0.40 - 4.00 mU/L   Magnesium   Result Value Ref Range    Magnesium 1.5 (L) 1.6 - 2.3 mg/dL   Folate   Result Value Ref Range    Folate 32.0 >5.4 ng/mL   Cortisol   Result Value Ref Range    Cortisol Serum 13.0 4 - 22 ug/dL   T4 free   Result Value Ref Range    T4 Free 0.80 0.76 - 1.46 ng/dL   Care Transition RN/SW IP Consult    Narrative    Barb Ramsay RN     9/26/2017  4:00 PM  Care Transition Initial Assessment - RN    Reason For Consult: discharge planning   Met with: Patient and Family.    DATA:   Principal Problem:    Altered level of consciousness  Active Problems:    Anemia    Non-small cell lung cancer (NSCLC) (H)    Cancer associated pain    Constipation, chronic    Major depressive disorder, recurrent episode, moderate (H)    Tobacco use disorder    Altered mental status       Cognitive Status: awake, alert and confused.  [facility] Care Clinic [facility]: [facility]  Primary Care MD Name: Memo  Contact information and PCP information verified: Yes  Lives With: alone   Living Arrangements: house. Has to go up and down stairs to get   to kitchen. Sarah is concerned that he has not been eating good   since last Wednesday.     Description of Support System: Supportive, Involved   Who is your support system?: Other (specify), Sibling(s)   (Daughter in law Sarah, Uncle)    Support Assessment: Lacks necessary supervision and assistance   Insurance concerns: No Insurance issues identified    ASSESSMENT:  Patient currently receives the following services:  [facility]   Palliative Care ([phone] Fax: [phone])       Identified issues/concerns regarding health management: new   confusion, medication compliance: was taking both MS Contin and   Oxycodone as PRN meds. Also, patient's home care nurse told Sarah   that he hadn't taken his routine meds from Wednesday of last   week. Just diagnosed with cancer earlier  this year. Did not   tolerate first chemotherapy so was started on Opdiva 9/14/17 and   he is due for second dose on 9/28.[facility]   + C. Diff. New hoarse voice since Monday.  Transportation concerns: family may be able to provide, depending   on work schedule    PLAN:  Financial costs for the patient include: none noted at this time.  Patient given options and choices for discharge: This writer met   with pt and his daughter in law Inna, introduced self and role.   Discussed medicare coverage in regards to home care, TCU and LTC.     Patient/family is agreeable to the plan?  Yes, patient doesn't   like the idea of going back to TCU, but knows he may have to   because of his continued confusion.  Patient anticipates discharging to: TCU. Patient was provided   with Medicare certified nursing home list. Pts choices are as   follows 1) [place] [facility] (Phone: [phone] Fax:   [phone]) 2) [facility] Phone:   ([phone]) Fax: ([phone])  3) [facility]   (Phone: [phone]) Fax: ([phone])     Resources List: Transitional Care  Patient anticipates needs for home equipment: No       Discharge Planner   Discharge Plans in progress: Referrals out to TCUs  Barriers to discharge plan: medical stability, may need to put   chemotherapy treatments on hold  Plan/Disposition: TCU   Care  (CTS) will   continue to follow as needed.    1500: Discussed with Sarah, that TCUs do not take patients that   are currently receiving chemotherapy. Sarah states that patient's   MD did say that they were concerned that patient's confusion   hadn't improved greatly. They are awaiting MRI results and now   patient was to get an echocardiogram. Writer will continue to   assist as needed.    1540: Call received from [facility]. If patient puts his   chemotherapy on hold, they still would not be able to take   patient until Friday at the earliest  (no private bathroom until   then) and are still waiting for their business office to approve.   MR Brain w/o & w Contrast    Narrative    MRI BRAIN WITHOUT AND WITH CONTRAST  9/26/2017 2:19 PM    HISTORY:  Alerted mental status. Rule out brain metastasis.    TECHNIQUE:  Multiplanar, multisequence MRI of the brain without and  with 6 mL Gadavist.    COMPARISON: Head CT 9/25/2017. Brain MR 4/21/2017.    FINDINGS: Images are severely degraded by motion artifact. No definite  large intracranial mass is identified noting limitations. There is no  mass effect or midline shift. The ventricles are not enlarged out of  proportion to the cerebral sulci. Mild diffuse parenchymal volume  loss. Patchy deep and subcortical white matter T2 hyperintensities are  nonspecific.    Enhancing sellar mass extends superiorly into the suprasellar cistern  and likely abuts the optic chiasm. No definite other intracranial  enhancing mass is identified noting marked limitations given motion  artifact.    Polypoid mucosal thickening in the left maxillary sinus.       Impression    IMPRESSION:      1. Images are severely degraded by motion artifact.  2. Evaluation for metastatic disease is limited given the motion. No  definite large intracranial mass is identified, but smaller lesions as  well as possible leptomeningeal disease cannot be excluded.  3. Enhancing expansile mass in the sella extending into the  suprasellar cistern. This may represent a pituitary macroadenoma,  although it is poorly characterized on this motion degraded study.  Metastatic lesion could also be considered.  4. Patchy white matter T2 hyperintense lesions. These are  indeterminate and could be due to chronic microvascular ischemic  disease; however, given the motion artifact, evaluation for associated  enhancement is limited, and these could potentially represent small  metastatic lesions. Recommend repeat imaging with sedation.    LUCAS RAMIREZ MD   Echocardiogram  Complete    Narrative    [id number]  [id number]  [id number]  [id number]^KHANHS^AMARIS^KATIE           [facility]  Echocardiography Laboratory  [address]  [place] [id number]        Name: JOHN SANCHEZ  MRN: [id number]  : 1960  Study Date: 2017 03:08 PM  Age: 57 yrs  Gender: Male  Patient Location: [facility]  Reason For Study: Abn EKG  Ordering Physician: AMARIS JOHNSON  Referring Physician: TUSHAR WHITTEN  Performed By: Hilary Garcia RDCS     BSA: 1.8 m2  Height: 71 in  Weight: 144 lb  HR: 110  BP: 110/63 mmHg  _____________________________________________________________________________  __        Procedure  Complete Portable Echo Adult.  _____________________________________________________________________________  __        Interpretation Summary     Technically difficult study due to patient's agitation. Limited views  obtained.     Grossly normal left ventricular and right ventricular systolic function in  subcostal views.  No significant valvular stenosis or regurgitation noted on doppler  interrogation.  _____________________________________________________________________________  __        Left Ventricle  The left ventricle is normal in size. There is normal left ventricular wall  thickness. Left ventricular systolic function is normal. The visual ejection  fraction is estimated at 55-60%. Regional wall motion abnormalities cannot be  excluded due to limited visualization.     Right Ventricle  The right ventricle is normal size. The right ventricular systolic function is  normal.     Atria  The left atrium is mildly dilated. Right atrial size is normal.     Mitral Valve  There is trace mitral regurgitation.        Tricuspid Valve  Right ventricular systolic pressure could not be approximated due to  inadequate tricuspid regurgitation. There is trace tricuspid regurgitation.     Aortic Valve  The aortic valve is not well visualized. The aortic valve is trileaflet. No  aortic  regurgitation is present.     Pulmonic Valve  There is no pulmonic valvular stenosis.     Vessels  The aortic root is normal size. The IVC is normal in size and reactivity with  respiration, suggesting normal central venous pressure.     Pericardium  There is no pericardial effusion.        Rhythm  The rhythm was undetermined.  _____________________________________________________________________________  __  MMode/2D Measurements & Calculations  IVSd: 1.1 cm     LVIDd: 4.5 cm  LVIDs: 4.0 cm  LVPWd: 1.0 cm  FS: 11.1 %  EDV(Teich): 94.3 ml  ESV(Teich): 71.5 ml  LV mass(C)d: 168.5 grams  LV mass(C)dI: 91.8 grams/m2  Ao root diam: 3.5 cm  LA dimension: 4.0 cm  LA/Ao: 1.2                 _____________________________________________________________________________  __           Report approved by: Misael Cohn 09/26/2017 03:42 PM      Magnesium   Result Value Ref Range    Magnesium 2.7 (H) 1.6 - 2.3 mg/dL   Troponin I   Result Value Ref Range    Troponin I ES <0.015 0.000 - 0.045 ug/L   Comprehensive metabolic panel   Result Value Ref Range    Sodium 145 (H) 133 - 144 mmol/L    Potassium 3.3 (L) 3.4 - 5.3 mmol/L    Chloride 114 (H) 94 - 109 mmol/L    Carbon Dioxide 24 20 - 32 mmol/L    Anion Gap 7 3 - 14 mmol/L    Glucose 105 (H) 70 - 99 mg/dL    Urea Nitrogen 9 7 - 30 mg/dL    Creatinine 0.71 0.66 - 1.25 mg/dL    GFR Estimate >90 >60 mL/min/1.7m2    GFR Estimate If Black >90 >60 mL/min/1.7m2    Calcium 8.8 8.5 - 10.1 mg/dL    Bilirubin Total 0.6 0.2 - 1.3 mg/dL    Albumin 3.3 (L) 3.4 - 5.0 g/dL    Protein Total 6.8 6.8 - 8.8 g/dL    Alkaline Phosphatase 111 40 - 150 U/L    ALT 18 0 - 70 U/L    AST 21 0 - 45 U/L   CBC with platelets differential   Result Value Ref Range    WBC 6.1 4.0 - 11.0 10e9/L    RBC Count 3.44 (L) 4.4 - 5.9 10e12/L    Hemoglobin 9.0 (L) 13.3 - 17.7 g/dL    Hematocrit 26.6 (L) 40.0 - 53.0 %    MCV 77 (L) 78 - 100 fl    MCH 26.2 (L) 26.5 - 33.0 pg    MCHC 33.8 31.5 - 36.5 g/dL    RDW 15.6 (H)  10.0 - 15.0 %    Platelet Count 154 150 - 450 10e9/L    Diff Method Automated Method     % Neutrophils 64.7 %    % Lymphocytes 24.5 %    % Monocytes 8.4 %    % Eosinophils 2.0 %    % Basophils 0.2 %    % Immature Granulocytes 0.2 %    Absolute Neutrophil 3.9 1.6 - 8.3 10e9/L    Absolute Lymphocytes 1.5 0.8 - 5.3 10e9/L    Absolute Monocytes 0.5 0.0 - 1.3 10e9/L    Absolute Eosinophils 0.1 0.0 - 0.7 10e9/L    Absolute Basophils 0.0 0.0 - 0.2 10e9/L    Abs Immature Granulocytes 0.0 0 - 0.4 10e9/L       Assessment and Plan:    Wade Acevedo is a 57 year old male with PMH significant for chronic anemia, cancer associated pain, NSCLC current on Opdivo therapy, depression, tobacco use disorder, hx of EtOH abuse, and hx of thrombocytopenia who now presents with altered mental status.        Acute Encephalopathy   Daughter-in law complains of confusion for at least the last 24 hours. VSS. Drug screen positive only for opiates.  UA negative.  CXR negative.  CT head again shows pituitary mass. EtOH level was 0.00.  Acetaminophen was less than 2 and salicylate level was 5.  DDx: Medications (hx of overuse of scheduled MS Contin as recent at 09/20/17) versus infection versus metabolic derangements (changes in magnesium, thyroid, Wernicke's)[JE1.1]  No sign of infectious cause, normal ammonia, T4 and cortisol normal, MRI of poor quality due to motion--repeat with sedation recommended[JE1.3]    Urinary Retention[JE1.1]-probably secondary to AMS[JE1.2]  Straight cathed for 800cc of urine overnight.  No history of urinary retention.  - straight cath, PRN for urine greater than 350cc on bladder scan  - instruct RN to inform MD if straight cath x3; will likely need to place lion     Non-small cell lung cancer (NSCLC) (H) with metastasis to bone (5th lateral rib, scapula)  Follows with Dr. Naylor. Diagnosed by CT guided biopsy. PET Scan confirms tumor with hypermetabolic lymph nodes and hypermetabolic lesions to the scapula and 5th  lateral rib. Previously on carbo/taxol - this was stopped due pancytopenia. Received first dose of Opdivo chemotherapy (immune therapy) 09/14/2017.  Next scheduled chemotherapy infusion of Opdivo 09/28/2017.     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  Normal TSH with low normal T4/T3.  Mildly abnormal cosyntropin stim testing. Seen by [facility] (Dr. Padma Medley) on 05/10/2017.  Most likely thought to be non-functioning pituitary macroadenoma.  Was supposed to have repeat MRI 08/2017 - did not do so.  Has follow up with endocrinology 11/14/2017.  Most recently had mildly low TSH with normal T4 09/14/2017.[JE1.1]    Abnormal EKG  New t inversions[JE1.4] since April 2017, no wall motion abnormalities, trops normal, no chest pain--given metastatic cancer and no sign of ACS, will simply observe at this time, doubt implicated in his AMS.[JE1.5]    Positive Clostridia difficile test-no diarrha-doubt clinical ds  Test ordered by Dr Fuentes-no diarrhea, no notation regarding why test sent.  Test was positive but I doubt it represents clinical ds--on empiric oral flagyl while work up for AMS is on going. Would probably dc flagyl at some point if no diarrhea.[JE1.6]     [JE1.5]  Anemia  Has received transfusions in the past for low hemoglobin - last received 2 units approximately 09/07/2017.  This was thought to be secondary to chemotherapy (carbo/taxol).  Recently switched from this to immune chemotherapy.  Hemoglobin on arrival 10.7 on arrival.  8.6 on admission day 1. VSS.  - AM CBC with platelets, will transfuse if hbg less than 7.0     Hx of Thrombocytopenia  Platelets as low as 42k approximately 3 weeks ago.  Received 2u of PRBCs WITHOUT platelets, seemed to respond  - continue to monitor      Cancer associated pain  - continue PTA scheduled MS Contin  - continue PTA PRN oxycodone  - continue PTA dermal capsicin cream Q2 PRN  - continue PTA Voltaren gel 4x daily  - continue PTA  Emla cream with access to port     Constipation, chronic  - continue PTA scheduled miralax, senna-ducosate     Major depressive disorder, recurrent episode, moderate (H)  - continue PTA Paxil      Tobacco use disorder  Nicotine patch in place     GERD  - continue PTA pantoprazole[JE1.1]     Discussion  Source of AMS still unclear, no overt infection, Still worried about metastatic ds.--will attempt to repeat MR with anesthesia sedation. Will discuss the possibility of Opdivo causing this[JE1.5]with Dr Naylor.[JE1.2]  I did talk to the POA yesterday and asked her to re-address code status.[JE1.5]  If MR is unremarkable-might consider LP.[JE1.2]      12:18 PM[JE1.7] discussed with Dr Naylor  He felt that AMS could be from Opdivo-but is unlikely  If MR is neg-he thought LP would be of little use-would suggest holding Opdivo until situation is clearer.[JE1.8]     Revision History        User Key Date/Time User Provider Type Action    > JE1.7 9/27/2017  4:48 PM Cristian Batista MD Physician Sign     JE1.8 9/27/2017 12:17 PM Cristian Batista MD Physician      JE1.2 9/27/2017  9:18 AM Cristian Batista MD Physician      JE1.6 9/27/2017  8:41 AM Cristian Batista MD Physician      JE1.5 9/27/2017  8:37 AM Cristian Batista MD Physician      JE1.4 9/27/2017  8:36 AM Cristian Batista MD Physician      JE1.3 9/27/2017  8:34 AM Cristian Batista MD Physician      JE1.1 9/27/2017  8:32 AM Cristian Batista MD Physician             H&P by Morena Byrd PA-C at 9/26/2017  7:46 AM     Author:  Morena Byrd PA-C Service:  Internal Medicine Author Type:  Physician Assistant - [place]    Filed:  9/26/2017  9:02 AM Date of Service:  9/26/2017  7:46 AM Creation Time:  9/26/2017  7:12 AM    Status:  Attested :  Morena Byrd PA-C (Physician Assistant - C)    Cosigner:  Cristian Batista MD at 9/26/2017  9:20 AM        Attestation signed by Cristian Batista MD at  9/26/2017  9:20 AM        Physician Attestation   I, Cristian Batista MD, saw and evaluated Wade Acevedo as part of a shared visit.  I have reviewed and discussed with the advanced practice provider their history, physical and plan.    I personally reviewed the vital signs, medications, labs and imaging.    My key history or physical exam findings: see separate note    Key management decisions made by me: see separate note    Cristian Batista MD  Date of Service (when I saw the patient): 09/26/17                               [facility]    History and Physical  Hospital Medicine       Date of Admission:  9/25/2017  Date of Service: 9/26/2017[CH1.1]     Assessment & Plan[CH1.2]   Wade Acevedo is a 57 year old male with PMH significant for chronic anemia, cancer associated pain, NSCLC current on Opdivo therapy, depression, tobacco use disorder, hx of EtOH abuse, and hx of thrombocytopenia who now presents with altered mental status.      Acute Encephalopathy[CH1.1]   Daughter-in law complains of confusion for at least the last 24 hours.[CH1.3] VSS. Drug screen positive only for opiates.  UA negative.  CXR negative.  CT head again shows pituitary mass. EtOH level was 0.00.  Acetaminophen was less than 2 and salicylate level was 5.  DDx: Medications (hx of overuse of scheduled MS Contin as recent at 09/20/17) versus infection versus metabolic derangements (changes in magnesium, thyroid, Wernicke's)  - given previous EtOH abuse, will start multivitamins now.  Reportedly sober[CH1.1] since April of 2017[CH1.3]  - order ammonia, TSH, magnesium, folate  - urine culture ordered, pending  - IP Palliative Care Consult placed given ongoing pan management issues and possible misuse of pain medication due to ineffective pain control[CH1.1]    Urinary Retention  Straight cathed for 800cc of urine overnight.  No history of urinary retention.  - straight cath, PRN for urine greater than 350cc on  bladder scan  - instruct RN to inform MD if straight cath x3; will likely need to place lion[CH1.3]    Non-small cell lung cancer (NSCLC) (H) with metastasis to bone (5th lateral rib, scapula)  Follows with Dr. Naylor. Diagnosed by CT guided biopsy. PET Scan confirms tumor with hypermetabolic lymph nodes and hypermetabolic lesions to the scapula and 5th lateral rib. Previously on carbo/taxol - this was stopped due pancytopenia. Received first dose of Opdivo chemotherapy (immune therapy) 09/14/2017.  Next scheduled chemotherapy infusion of Opdivo 09/28/2017.    Pituitary Macroadenoma  Diagnosed 04/21/2017 by MRI.  Normal TSH with low normal T4/T3.  Mildly abnormal cosyntropin stim testing. Seen by [facility] Endocrinology (Dr. Padma Medley) on 05/10/2017.  Most likely thought to be non-functioning pituitary macroadenoma.  Was supposed to have repeat MRI 08/2017 - did not do so.  Has follow up with endocrinology 11/14/2017.  Most recently had mildly low TSH with normal T4 09/14/2017.    Anemia  Has received transfusions in the past for low hemoglobin - last received 2 units approximately 09/07/2017.  This was thought to be secondary to chemotherapy (carbo/taxol).  Recently switched from this to immune chemotherapy.  Hemoglobin on arrival 10.7 on arrival.  8.6 on admission day 1. VSS.  - AM CBC with platelets, will transfuse if hbg less than 7.0    Hx of Thrombocytopenia  Platelets as low as 42k approximately 3 weeks ago.  Received 2u of PRBCs WITHOUT platelets, seemed to respond  - continue to monitor      Cancer associated pain  - continue PTA scheduled MS Contin  - continue PTA PRN oxycodone  - continue PTA dermal capsicin cream Q2 PRN  - continue PTA Voltaren gel 4x daily  - continue PTA Emla cream with access to port    Constipation, chronic  - continue PTA scheduled miralax, senna-ducosate    Major depressive disorder, recurrent episode, moderate (H)  - continue PTA Paxil      Tobacco use disorder  Nicotine patch  "in place    GERD  - continue PTA pantoprazole       F:[CH1.1] 50[CH1.3]cc/hr 0.9NS  E: BMP in AM  N: regular diet  DVT Prophylaxis: not indicated, ambulatory    Code Status:[CH1.1] Full Code[CH1.3]    Disposition: Anticipate discharge in 2-3 days. Appropriate for inpatient care.    Case discussed with Dr. Cristian Batista.  Assessment and plan as written above.    Morena Byrd PA-C  [facility]    History is obtained from the patient and review of the EMR.[CH1.1]    Past Medical History    Past Medical History:   Diagnosis Date     Brain cancer (H)      Constipation      H/O ETOH abuse      Hx of tobacco use, presenting hazards to health      Lung cancer (H)      Pain 5/6/2017     Recurrent falls      Unsteady gait      Weight loss        Past Surgical History   Past Surgical History:   Procedure Laterality Date     INSERT PORT VASCULAR ACCESS N/A 5/17/2017    Procedure: INSERT PORT VASCULAR ACCESS;  Port-a-Cath Insertion;  Surgeon: Pawan Collins MD;  Location: [facility] OR       Family History    History reviewed. No pertinent family history.    History of Present Illness[CH1.2]   Wade Acevedo is a 57 year old male with PMH significant for chronic anemia, cancer associated pain, NSCLC current on Opdivo therapy, depression, tobacco use disorder, hx of EtOH abuse, and hx of thrombocytopenia who now presents with altered mental status.[CH1.1]    The patient is obviously confused.  As such, history is provided by his daughter in-law Sarah whom is his POA.  She  spoke with him on Friday and noted that while alert and oriented, he was rather agitated.  He seemed to fixate upon his pain and stated \"no one believes me\" and reported that his pain was not adequately controlled.  She did not speak with him over the weekend.  She phoned him early Monday morning and thought he seemed a bit \"confused\" and \"tangential\", but attributed this to it being early morning.  The patient requested that she call him back " "later in the day.  His uncle then contacted him mid-morning and again, felt that he was both confused and disoriented.  As such, his daughter-in-law was again contacted.  She requested that Homecare visit the patient.  Upon arrival, his homecare RN felt he was altered.  In addition, upon inspection of his medication, she found that 4 doses of 30mg MS Contin were \"missing\" and that only 44/100 10mg oxycodone pills were left; this prescription was filled 09/07/2017. This implies that the patient has taken an average of approximately 3 oxycodone per day (56 pills in 19 days).  The patient himself states he takes approximately 3 MS contin and 3 oxycodone per day.  Given altered mental status, the patient's homecare RN suggested he present to the ER for further evaluation.    The patient has no subjective complaints of fever, chills, loss of appetite, CP, SOB, cough, abdominal pain, dysuria, lightheadedness, dizziness, acute onset of changes to hearing/vision, new LE swelling, new rashes.  At present, he complains of pain only; this is his choric pain that is reportedly associated with his cancer.  This is located in his buttocks, thighs, calves, feet and toes.  He states that this is chronically an 8/10 and can not ascribe a characteristic to his pain.  Nothing makes the pain better or worse.    The patient has abstained from all alcohol as of 04/20/2017.  He continues to smoke the occasional cigarette.[CH1.3]    Prior to Admission Medications   Prior to Admission Medications   Prescriptions Last Dose Informant Patient Reported? Taking?   PARoxetine (PAXIL) 10 MG tablet Past Week at Unknown time Self No Yes   Sig: Take 1 tablet (10 mg) by mouth At Bedtime (Needs follow-up appointment for this medication)   bisacodyl (DULCOLAX) 10 MG Suppository Unknown at Unknown time Self No No   Sig: Place 1 suppository (10 mg) rectally daily as needed for constipation   capsaicin (ZOSTRIX) 0.025 % CREA cream Unknown at Unknown time " Self No No   Sig: Apply to R hip and thigh every 2 hours as needed for pain.   dexamethasone (DECADRON) 4 MG tablet Past Week at Unknown time Self No Yes   Sig: Take 5 tablets (20 mg) by mouth daily X 3 days each cycle. To be taken the day before, day of, and day after chemotherapy infusion.   diclofenac (VOLTAREN) 1 % GEL topical gel Unknown at Unknown time Self No No   Sig: Apply 4 grams to knees or 2 grams to hands four times daily using enclosed dosing card.   lidocaine-prilocaine (EMLA) cream Unknown at Unknown time Self No No   Sig: Apply topically over port 45 - 60 minutes prior to port access.   morphine (MS CONTIN) 30 MG 12 hr tablet 9/25/2017 at Unknown time Self No Yes   Sig: Take 1 tablet (30 mg) by mouth 2 times daily   nicotine (NICODERM CQ) 14 MG/24HR 24 hr patch Unknown at Unknown time Self No No   Sig: Place 1 patch onto the skin every 24 hours   nicotine (NICODERM CQ) 21 MG/24HR 24 hr patch Unknown at Unknown time Self No No   Sig: Place 1 patch onto the skin every 24 hours   nicotine (NICODERM CQ) 7 MG/24HR 24 hr patch Unknown at Unknown time Self No No   Sig: Place 1 patch onto the skin every 24 hours   oxyCODONE (ROXICODONE) 10 MG IR tablet 9/25/2017 at Unknown time Self No Yes   Sig: Take 1 tablet (10 mg) by mouth every 4 hours as needed for moderate to severe pain   pantoprazole (PROTONIX) 40 MG EC tablet Past Week at Unknown time Self No Yes   Sig: Take 1 tablet (40 mg) by mouth 2 times daily (before meals)   polyethylene glycol (MIRALAX) powder Past Week at Unknown time Self No Yes   Sig: Take 17 g (1 capful) by mouth daily Mix in 4-8 oz of fluid of choice. For constipation   senna-docusate (SENOKOT-S;PERICOLACE) 8.6-50 MG per tablet Past Week at Unknown time Self No Yes   Sig: Take 1 tablet by mouth 2 times daily Reported on 5/4/2017   Patient taking differently: Take 1 tablet by mouth 2 times daily Reported on 5/4/2017 Taking 2 tablets in AM & 1 tablet at night.      Facility-Administered  Medications: None       Allergies   Allergies   Allergen Reactions     Lubriderm Rash       Social History   Social History     Social History     Marital status:      Spouse name: N/A     Number of children: N/A     Years of education: N/A     Occupational History     Not on file.     Social History Main Topics     Smoking status: Current Every Day Smoker     Packs/day: 1.00     Years: 45.00     Types: Cigarettes     Smokeless tobacco: Never Used      Comment: 10 cig per day.  Will be quiting soon.     Alcohol use No      Comment: hx ETOH  quit 2017     Drug use: Not on file     Sexual activity: Not on file     Other Topics Concern     Not on file     Social History Narrative    2017:  Was  11 years ago when his wife  while under hospice care.  His DIL Sarah Burch, who lives in [facility], is his health care agent and, per patient, his POA as well.  He lives alone in his own home in [place], which is a [facility] with 3 levels.  He retired from his job as a  in  with back and joint pains.  He does have a h/o alcohol abuse, but states he has been abstinent since he was diagnosed with cancer.  He smoked 1 PPD x 45 years and still really enjoys sneaking a cigarette here and there but mostly tries to avoid them.      Mickey Bernard (Ann), ARIC    Palliative Care    Private cell:  [phone][CH1.2]         ROS: 10 point ROS neg other than the symptoms noted above in the HPI.[CH1.1]    Physical Exam     BP (!) 145/91  Pulse 84  Temp 99.2  F (37.3  C) (Oral)  Resp 18  Wt 65.7 kg (144 lb 13.5 oz)  SpO2 98%  BMI 20.12 kg/m2[CH1.2]     Weight:[CH1.1] 144 lbs 13.48 oz Body mass index is 20.12 kg/(m^2).[CH1.2]     Constitutional: Alert and oriented x[CH1.1]2 (self, year.  Does NOT recall month, president)[CH1.3].  Cooperative.  Appears[CH1.1] older than[CH1.3] stated age.  Appears in[CH1.1] no acute[CH1.3] distress.[CH1.1]  Tangential thought process.  Somewhat  agitated.[CH1.3]  HEENT: Oropharynx is clear and moist. No evidence of cranial trauma.  Lymph/Hematologic: No occipital, submental, submandibular, anterior or posterior cervical, or supraclavicular lymphadenopathy is appreciated.  Cardiovascular: Regular rate/ rhythm.  S1 and S2 grossly normal.  No appreciable murmur, rub, gallop.[CH1.1] No[CH1.3] lower extremity edema.  Respiratory: Clear to auscultation bilaterally. Equal chest expansion.  GI: Soft, non-tender, normal bowel sounds, no hepatosplenomegaly.[CH1.1] No asterixis[CH1.3]  Genitourinary: Deferred  Musculoskeletal: Normal muscle bulk and tone.  Skin: Warm and dry, no rashes.   Neurologic: Neck supple. Cranial nerves 3-12 are grossly intact.  is symmetric.[CH1.1] No pronator drift.  Upper and lower extremity strength is grossly intact.  Normal rapid alternating motion.  Normal finger to nose testing.  Gait is unsteady; this is chronic.[CH1.3]    Data[CH1.2]   Data reviewed today:[CH1.1]     Recent Labs  Lab 09/26/17  0640 09/25/17  2035   WBC 6.5 8.3   HGB 8.6* 10.7*   MCV 78 78   * 196    138   POTASSIUM 3.3* 3.4   CHLORIDE 109 107   CO2 21 22   BUN 12 15   CR 0.69 0.88   ANIONGAP 9 9   JOHANN 8.4* 9.4   GLC 85 97   ALBUMIN  --  3.7   PROTTOTAL  --  7.6   BILITOTAL  --  1.1   ALKPHOS  --  124   ALT  --  17   AST  --  22   TROPI <0.015 <0.015       Recent Results (from the past 24 hour(s))   XR Chest 2 Views    Narrative    CHEST TWO VIEWS  9/25/2017  9:54 PM     COMPARISON: Frontal chest x-ray 5/17/2017    HISTORY: Weakness.    FINDINGS: A left subclavian central venous Port-A-Cath is again noted.  The cardiac silhouette, pulmonary vasculature, lungs and pleural  spaces are within normal limits.      Impression    IMPRESSION: Clear lungs.    MIRACLE VINCENT MD   CT Head w/o Contrast    Narrative    CT HEAD W/O CONTRAST  9/25/2017 11:14 PM      HISTORY: Altered mental status. Metastatic non-small cell lung cancer.    TECHNIQUE: Routine  noncontrast head CT. Radiation dose for this scan  was reduced using automated exposure control, adjustment of the mA  and/or kV according to patient size, or iterative reconstruction  technique.    COMPARISON: MRI 4/21/2017.    FINDINGS: Brain volume is within normal limits for age. There is a  pituitary mass as seen on the previous MRI. No other mass, mass effect  or intracranial hemorrhage. No evidence of acute infarct.  Periventricular low attenuation is consistent with chronic small  vessel ischemic disease. The visualized paranasal sinuses are clear.      Impression    IMPRESSION: No acute abnormality.[CH1.2]       Morena Byrd PA-C  [facility] Medicine[CH1.1]               Revision History        User Key Date/Time User Provider Type Action    > CH1.2 9/26/2017  9:02 AM Morena Byrd PA-C Physician Assistant Donita GANDHI Sign     CH1.3 9/26/2017  8:42 AM Morena Byrd PA-C Physician Assistant - C      CH1.1 9/26/2017  7:12 AM Morena Byrd PA-C Physician Assistant Donita GANDHI                   Discharge Summaries     No notes of this type exist for this encounter.         Consult Notes      Consults by Barb Ramsay RN at 9/26/2017  1:11 PM     Author:  Barb Ramsay RN Service:  Care Coordinator Author Type:      Filed:  9/26/2017  4:00 PM Date of Service:  9/26/2017  1:11 PM Creation Time:  9/26/2017 12:56 PM    Status:  Addendum :  Barb Ramsay RN ()     Consult Orders:    1. Care Transition RN/SW IP Consult [[id number]] ordered by Morena Byrd PA-C at 09/26/17 0930                Care Transition Initial Assessment - RN    Reason For Consult: discharge planning   Met with: Patient and Family.    DATA:[LM1.1]   Principal Problem:    Altered level of consciousness  Active Problems:    Anemia    Non-small cell lung cancer (NSCLC) (H)    Cancer associated pain    Constipation, chronic    Major depressive disorder, recurrent episode, moderate (H)    Tobacco use  disorder    Altered mental status[LM1.2]       Cognitive Status: awake, alert and confused.  [facility]  Primary Care MD Name: Lukeon  Contact information and PCP information verified: Yes  Lives With: alone   Living Arrangements: house[LM1.1]. Has to go up and down stairs to get to kitchen. Sarah is concerned that he has not been eating good since last Wednesday.[LM1.3]     Description of Support System: Supportive, Involved   Who is your support system?: Other (specify), Sibling(s) (Daughter in law Sarah, Uncle)    Support Assessment: Lacks necessary supervision and assistance   Insurance concerns: No Insurance issues identified    ASSESSMENT:  Patient currently receives the following services:  [facility] ([phone] Fax: [phone])       Identified issues/concerns regarding health management: new confusion, medication compliance: was taking both MS Contin and Oxycodone as PRN meds.[LM1.1] Also, patient's home care nurse told Sarah that he hadn't taken his routine meds from Wednesday of last week. Just diagnosed with cancer earlier this year. Did not tolerate first chemotherapy so was started on Opdiva 9/14/17 and he is due for second dose on 9/28.Was in [place] [facility] in April. + C. Diff. New hoarse voice since Monday.[LM1.3]  Transportation concerns: family may be able to provide, depending on work schedule    PLAN:  Financial costs for the patient include: none noted at this time.  Patient given options and choices for discharge: This writer met with pt and his daughter in law Inna, introduced self and role. Discussed medicare coverage in regards to home care, TCU and LTC.   Patient/family is agreeable to the plan?  Yes, patient doesn't like the idea of going back to TCU, but knows he may have to because of his continued confusion[LM1.1].[LM1.3]  Patient anticipates discharging to: TCU. Patient was provided with Medicare certified nursing home list. Pts choices are as  follows 1) [facility] (Phone: [phone] Fax: [phone]) 2) [facility] Phone: ([phone]) Fax: ([phone])  3) [facility] (Phone: [phone]) Fax: ([phone])     Resources List: Transitional Care  Patient anticipates needs for home equipment: No[LM1.1]       Discharge Planner[LM1.2]   Discharge Plans in progress: Referrals out to TCUs  Barriers to discharge plan: medical stability, may need to put chemotherapy treatments on hold  Plan/Disposition: TCU   Care  (CTS) will continue to follow as needed.[LM1.1]    1500: Discussed with Sarah, that TCUs do not take patients that are currently receiving chemotherapy. Sarah states that patient's MD did say that they were concerned that patient's confusion hadn't improved greatly. They are awaiting MRI results and now patient was to get an echocardiogram. Writer will continue to assist as needed.[LM1.4]    1540: Call received from [facility]. If patient puts his chemotherapy on hold, they still would not be able to take patient until Friday at the earliest (no private bathroom until then) and are still waiting for their business office to approve.[LM1.5]     Revision History        User Key Date/Time User Provider Type Action    > LM1.5 9/26/2017  4:00 PM Barb Ramsay RN Case Manager Addend     LM1.4 9/26/2017  3:04 PM Barb Ramsay RN Case Manager Addend     LM1.3 9/26/2017  1:47 PM Barb Ramsay RN Case Manager Addend     LM1.2 9/26/2017  1:12 PM Barb Ramsay RN Case Manager Sign     LM1.1 9/26/2017 12:56 PM Barb Ramsay RN Case Manager                      Progress Notes - Physician (Notes from 09/26/17 through 09/29/17)      Progress Notes by Micki Ramos LICSW at 9/29/2017  1:14 PM     Author:  Micki Ramos LICSW Service:  (none) Author Type:      Filed:  9/29/2017  1:15 PM Date of Service:  9/29/2017  1:14 PM Creation Time:  9/29/2017  1:14 PM    Status:   Signed :  Micki Ramos LICSW ()         Name: Wade Acevedo    MRN#: [id number]    Reason for Hospitalization: Confusion [R41.0]  Metastatic cancer (H) [C79.9]  Non-small cell lung cancer, unspecified laterality (H) [C34.90]    Discharge Date: 9/29/2017    Patient / Family response to discharge plan: Pt will dc [facility] to [facility] Phone: ([phone]) Fax: ([phone]) via [facility] [phone] at 1630. He will be picked up in oncology center.     PAS-RR    Per DHS regulation, CTS team completed and submitted PAS-RR to [place] Board on Aging Direct Connect via the Senior LinkAge Line. CTS team advised SNF and they are aware a PAS-RR has been submitted.     CTS team reviewed with pt or health care agent that they may be contacted for a follow up appointment within 10 days of hospital discharge if SNF stay is <30 days. Contact information for Senior LinkAge Line was also provided.     Pt or health care agent verbalized understanding.     PAS-RR # [id number]      Other Providers (Care Coordinator, County Services, PCA services etc): No    Future Appointments: Future Appointments  Date Time Provider Department Center   9/29/2017 1:45 PM Pasquale Naylor MD [facility]   9/29/2017 2:00 PM ROOM 8 [facility]   11/14/2017 11:30 AM Padma Medley MD [facility]       Discharge Disposition: transitional care unit    Micki Ramos [facility], JAIR, [facility] [phone][AK1.1]         Revision History        User Key Date/Time User Provider Type Action    > AK1.1 9/29/2017  1:15 PM Micki Ramos LICSW  Sign            Progress Notes by Cristian Pino MD at 9/28/2017  4:35 PM     Author:  Cristian Pino MD Service:  Family Medicine Author Type:  Physician    Filed:  9/28/2017  4:40 PM Date of Service:  9/28/2017  4:35 PM Creation Time:  9/28/2017  4:35 PM    Status:  Signed :  Cristian Pino MD (Physician)         [facility]  Service      Subjective:  Less confused. knows date, time of year.  Had significant urinary retention after 800cc removed via cath he has been less agitated and confused.     Review of Systems:  C: NEGATIVE for fever, chills, change in weight  I: NEGATIVE for worrisome rashes, moles or lesions  E: NEGATIVE for vision changes or irritation  E/M: NEGATIVE for ear, mouth and throat problems  R: NEGATIVE for significant cough or SOB  B: NEGATIVE for masses, tenderness or discharge  CV: NEGATIVE for chest pain, palpitations or peripheral edema  GI: NEGATIVE for nausea, abdominal pain, heartburn, or change in bowel habits  : NEGATIVE for frequency, dysuria, or hematuria  MUSCULOSKELETAL:chronic pain buttox and lower extremities  N: NEGATIVE for weakness, dizziness or paresthesias  E: NEGATIVE for temperature intolerance, skin/hair changes  H: NEGATIVE for bleeding problems  P: NEGATIVE for changes in mood or affect    Physical Exam:  Vitals Were Reviewed    Patient Vitals for the past 16 hrs:   BP Temp Temp src Pulse Heart Rate Resp SpO2 Weight   09/28/17 1500 111/75 98.4  F (36.9  C) Oral - 111 18 96 % -   09/28/17 0815 136/87 99.4  F (37.4  C) Oral - 120 18 99 % -   09/28/17 0337 (!) 144/96 98.1  F (36.7  C) Oral 117 - 18 99 % 143 lb 15.4 oz (65.3 kg)         Intake/Output Summary (Last 24 hours) at 09/27/17 0832  Last data filed at 09/27/17 0657   Gross per 24 hour   Intake             2070 ml   Output             4700 ml   Net            -2630 ml       GENERAL APPEARANCE: confused, speaks in confused fashion, ambulates  EYES: conjunctiva clear, eyes grossly normal  RESP: lungs clear to auscultation - no rales, rhonchi or wheezes  CV: regular rate and rhythm, normal S1 S2, no S3 or S4 and no murmur, click or rub   ABDOMEN: soft, nontender, no HSM or masses and bowel sounds normal  MS: no clubbing, cyanosis; no edema  SKIN: clear without significant rashes or lesions  NEURO: alert, disoriented, speech is clear but  doesn't make sense, exam otherwise nonfocal    Lab:  Recent Labs   Lab Test  09/27/17   0800  09/26/17   0640   NA  145*  139   POTASSIUM  3.3*  3.3*   CHLORIDE  114*  109   CO2  24  21   ANIONGAP  7  9   GLC  105*  85   BUN  9  12   CR  0.71  0.69   JOHANN  8.8  8.4*     CBC RESULTS:   Recent Labs   Lab Test  09/27/17   0800  09/26/17   0640   WBC  6.1  6.5   RBC  3.44*  3.22*   HGB  9.0*  8.6*   HCT  26.6*  25.1*   PLT  154  143*       Results for orders placed or performed during the hospital encounter of 09/25/17 (from the past 24 hour(s))   Potassium   Result Value Ref Range    Potassium 4.0 3.4 - 5.3 mmol/L   CBC with platelets   Result Value Ref Range    WBC 7.2 4.0 - 11.0 10e9/L    RBC Count 3.59 (L) 4.4 - 5.9 10e12/L    Hemoglobin 9.3 (L) 13.3 - 17.7 g/dL    Hematocrit 28.1 (L) 40.0 - 53.0 %    MCV 78 78 - 100 fl    MCH 25.9 (L) 26.5 - 33.0 pg    MCHC 33.1 31.5 - 36.5 g/dL    RDW 16.1 (H) 10.0 - 15.0 %    Platelet Count 173 150 - 450 10e9/L   Comprehensive metabolic panel   Result Value Ref Range    Sodium 145 (H) 133 - 144 mmol/L    Potassium 3.8 3.4 - 5.3 mmol/L    Chloride 116 (H) 94 - 109 mmol/L    Carbon Dioxide 24 20 - 32 mmol/L    Anion Gap 5 3 - 14 mmol/L    Glucose 112 (H) 70 - 99 mg/dL    Urea Nitrogen 9 7 - 30 mg/dL    Creatinine 0.80 0.66 - 1.25 mg/dL    GFR Estimate >90 >60 mL/min/1.7m2    GFR Estimate If Black >90 >60 mL/min/1.7m2    Calcium 8.9 8.5 - 10.1 mg/dL    Bilirubin Total 0.6 0.2 - 1.3 mg/dL    Albumin 3.3 (L) 3.4 - 5.0 g/dL    Protein Total 6.8 6.8 - 8.8 g/dL    Alkaline Phosphatase 110 40 - 150 U/L    ALT 17 0 - 70 U/L    AST 25 0 - 45 U/L   Phosphorus   Result Value Ref Range    Phosphorus 3.1 2.5 - 4.5 mg/dL       Assessment and Plan:    Wade Acevedo is a 57 year old male with PMH significant for chronic anemia, cancer associated pain, NSCLC current on Opdivo therapy, depression, tobacco use disorder, hx of EtOH abuse, and hx of thrombocytopenia who now presents with altered mental  status.        Acute Encephalopathy   Daughter-in law complains of confusion for at least the last 24 hours. VSS. Drug screen positive only for opiates.  UA negative.  CXR negative.  CT head again shows pituitary mass. EtOH level was 0.00.  Acetaminophen was less than 2 and salicylate level was 5.  DDx: Medications (hx of overuse of scheduled MS Contin as recent at 09/20/17) versus infection versus metabolic derangements (changes in magnesium, thyroid, Wernicke's)  No sign of infectious cause, normal ammonia, T4 and cortisol normal, MRI of poor quality due to motion--repeat with sedation recommended  9/28/17 improved.  MRi  Shows increase in pituitary mass.  Will continue to see if he clears.  Either way daughter in law does not feel he is safe at home,  Will need TCU on dc but if in tcU  WON'T BE ABLE TO GET cHEMO.  WILL DISCUSS WITH ONCOLOGY.      Urinary Retention-probably secondary to AMS  Straight cathed for 800cc of urine overnight.  No history of urinary retention.  - straight cath, PRN for urine greater than 350cc on bladder scan  - instruct RN to inform MD if straight cath x3; will likely need to place lion  IMPROVED  HAS HAD BETTER OUTPUT WITH FOMAX     Non-small cell lung cancer (NSCLC) (H) with metastasis to bone (5th lateral rib, scapula)  Follows with Dr. Naylor. Diagnosed by CT guided biopsy. PET Scan confirms tumor with hypermetabolic lymph nodes and hypermetabolic lesions to the scapula and 5th lateral rib. Previously on carbo/taxol - this was stopped due pancytopenia. Received first dose of Opdivo chemotherapy (immune therapy) 09/14/2017.  Next scheduled chemotherapy infusion of Opdivo 09/28/2017.     Pituitary Macroadenoma/mass  Diagnosed 04/21/2017 by MRI.  Normal TSH with low normal T4/T3.  Mildly abnormal cosyntropin stim testing. Seen by [facility] (Dr. Padma Medley) on 05/10/2017.  Most likely thought to be non-functioning pituitary macroadenoma.  Was supposed to have repeat MRI  08/2017 - did not do so.  Has follow up with endocrinology 11/14/2017.  Most recently had mildly low TSH with normal T4 09/14/2017.    Abnormal EKG  New t inversions since April 2017, no wall motion abnormalities, trops normal, no chest pain--given metastatic cancer and no sign of ACS, will simply observe at this time, doubt implicated in his AMS.    Positive Clostridia difficile test-no diarrha-doubt clinical ds  Test ordered by Dr Fuentes-no diarrhea, no notation regarding why test sent.  Test was positive but I doubt it represents clinical ds--on empiric oral flagyl while work up for AMS is on going. Would probably dc flagyl at some point if no diarrhea.       Anemia  Has received transfusions in the past for low hemoglobin - last received 2 units approximately 09/07/2017.  This was thought to be secondary to chemotherapy (carbo/taxol).  Recently switched from this to immune chemotherapy.  Hemoglobin on arrival 10.7 on arrival.  8.6 on admission day 1. VSS.  - AM CBC with platelets, will transfuse if hbg less than 7.0     Hx of Thrombocytopenia  Platelets as low as 42k approximately 3 weeks ago.  Received 2u of PRBCs WITHOUT platelets, seemed to respond  - continue to monitor      Cancer associated pain  - continue PTA scheduled MS Contin  - continue PTA PRN oxycodone  - continue PTA dermal capsicin cream Q2 PRN  - continue PTA Voltaren gel 4x daily  - continue PTA Emla cream with access to port     Constipation, chronic  - continue PTA scheduled miralax, senna-ducosate     Major depressive disorder, recurrent episode, moderate (H)  - continue PTA Paxil      Tobacco use disorder  Nicotine patch in place     GERD  - continue PTA pantoprazole     Discussion  Source of AMS still unclear, no overt infection, Still worried about metastatic ds.--will attempt to repeat MR with anesthesia sedation. Will discuss the possibility of Opdivo causing thiswith Dr Naylor.  I did talk to the POA yesterday and asked her to  re-address code status.  If MR is unremarkable-might consider LP.  9/28/17  IMPROVING  WILL CONTINUE WITH CURRENT TREATMENT  JOSÉ LUIS POSSIBLE CHEMO ALEXANDR? THEN TO tcu        He felt that AMS could be from Opdivo-but is unlikely  If MR is neg-he thought LP would be of little use-would suggest holding Opdivo until situation is clearer.[JN1.1]     Revision History        User Key Date/Time User Provider Type Action    > JN1.1 9/28/2017  4:40 PM Cristian Pino MD Physician Sign            Progress Notes by Fiona Beltran, RN at 9/28/2017  2:40 PM     Author:  Fiona Beltran, RN Service:  (none) Author Type:  Registered Nurse    Filed:  9/28/2017  2:41 PM Date of Service:  9/28/2017  2:40 PM Creation Time:  9/28/2017  2:40 PM    Status:  Signed :  Fiona Beltran, RN (Registered Nurse)         Pt had previously scheduled appt for chemo today.  Per oncology, no chemo today, they are aware that pt is here.  Dr. Pino aware.  Pt and family notified.[KN1.1]     Revision History        User Key Date/Time User Provider Type Action    > KN1.1 9/28/2017  2:41 PM Fiona Beltran, RN Registered Nurse Sign            Progress Notes by Patricia Larson PT at 9/28/2017  2:12 PM     Author:  Patricia Larson PT Service:  (none) Author Type:  Physical Therapist    Filed:  9/28/2017  2:13 PM Date of Service:  9/28/2017  2:12 PM Creation Time:  9/28/2017  2:12 PM    Status:  Signed :  Patricia Larson PT (Physical Therapist)            09/28/17 1400   Signing Clinician's Name / Credentials   Signing clinician's name / credentials Patricia Larson PT   Quick Adds   Rehab Discipline PT   Additional Documentation   OT Plan Cancel- Pt declined therapy today, visiting- agreeable to therapy tomorrow[CS1.1]         Revision History        User Key Date/Time User Provider Type Action    > CS1.1 9/28/2017  2:13 PM Patricia Larson PT Physical Therapist Sign            Progress Notes by Beena Henry at 9/28/2017  1:55 PM     Author:   Beena Henry Service:  (none) Author Type:      Filed:  9/28/2017  1:59 PM Date of Service:  9/28/2017  1:55 PM Creation Time:  9/28/2017  1:55 PM    Status:  Signed :  Beena Henry ()         Met with Sarah, patient's POA.  Discussed that during patient's TCU stay, he would not be receiving chemo.  Sarah is aware of this.  Updated her on TCU status and gathered more choices.      [facility] - Accepted - has to admit tomorrow or they need to give up the bed  [facility] - Declined - No Beds  [facility] - Declined - No Beds  [facility] - stated they didn't get the referral - refaxed referral    Additional Referrals sent to:    [facility] [phone] Phone, [phone] Fax  [facility] [phone] Phone, [phone] Fax    [facility] [phone]/ [facility] [phone][JK1.1]       Revision History        User Key Date/Time User Provider Type Action    > JK1.1 9/28/2017  1:59 PM Beena Henry  Sign            Progress Notes by Caitlin Juarez RN at 9/27/2017  5:31 PM     Author:  Caitlin Juarez, RN Service:  (none) Author Type:  Registered Nurse    Filed:  9/27/2017  5:34 PM Date of Service:  9/27/2017  5:31 PM Creation Time:  9/27/2017  5:31 PM    Status:  Signed :  Caitlin Juarez, RN (Registered Nurse)         [facility] TRANSPORT NOTE  Data:   Reason for Transport:  Return from MRI/PACU.    Wade Acevedo was transported from PACU via cart at 1720.  Patient was accompanied by Nursing Assistant. Equipment used for transport: None. Family was aware of reason for transport: Sarah TRONCOSO present in room.    Action:  Report: received from Leanne Mike RN.    Response:  Patient's condition upon return was stable.      Patient incontinent of urine upon arrival.  Priyanka-cares done and incontinence brief applied. /80, pulse 119, oxygen saturation 95 % room air.    Caitlin Juarez[HH1.1]     Revision History         User Key Date/Time User Provider Type Action    > HH1.1 9/27/2017  5:34 PM Caitlin Juarez, RN Registered Nurse Sign            Progress Notes by Barb Ramsay RN at 9/27/2017  1:21 PM     Author:  Barb Ramsay RN Service:  Care Coordinator Author Type:      Filed:  9/27/2017  2:05 PM Date of Service:  9/27/2017  1:21 PM Creation Time:  9/27/2017  1:21 PM    Status:  Signed :  Barb Ramsay RN ()         Reason for Follow up: TCU vs. LTC. Patient to have an MRI under general anesthesia at 1500 today. Sarah, patient's daughter in law stated that the results of the MRI will give her a better idea of where they would like patient to dc to. Writer told her that [facility] has accepted him for admission Thursday or Friday ([phone]) Fax: ([phone]). She requested information regarding facilities around the [facility]. Writer printed out list of Medicare Certified facilities in a 50 mile radius of [place].    Anticipated discharge needs: tbd    Next steps: Referrals to SNFs in [facility] sent[LM1.1] 1) [facility] in [place]  Phone: [phone]: [phone]  2) [facility] Phone: [phone]: [phone][LM1.2]. CTS to continue to assist with TCU or LTC bed as diagnosis/prognosis is known.    Barb Ramsay CTS-RN  *[phone][LM1.1]         Revision History        User Key Date/Time User Provider Type Action    > LM1.2 9/27/2017  2:05 PM Barb Ramsay RN Case Manager Sign     LM1.1 9/27/2017  1:21 PM Barb Ramsay RN Case Manager             Progress Notes by Cristian Batista MD at 9/27/2017  8:32 AM     Author:  Cristian Batista MD Service:  Hospitalist Author Type:  Physician    Filed:  9/27/2017 12:20 PM Date of Service:  9/27/2017  8:32 AM Creation Time:  9/27/2017  8:32 AM    Status:  Addendum :  Cristian Batista MD (Physician)         [facility]  Service      Subjective:[JE1.1]  Continue confusion  No pain  No fever  No ha  No neck pain[JE1.2]    Review of Systems:[JE1.1]  C: NEGATIVE for fever, chills, change in weight  I: NEGATIVE for worrisome rashes, moles or lesions  E: NEGATIVE for vision changes or irritation  E/M: NEGATIVE for ear, mouth and throat problems  R: NEGATIVE for significant cough or SOB  B: NEGATIVE for masses, tenderness or discharge  CV: NEGATIVE for chest pain, palpitations or peripheral edema  GI: NEGATIVE for nausea, abdominal pain, heartburn, or change in bowel habits  : NEGATIVE for frequency, dysuria, or hematuria  MUSCULOSKELETAL:chronic pain buttox and lower extremities  N: NEGATIVE for weakness, dizziness or paresthesias  E: NEGATIVE for temperature intolerance, skin/hair changes  H: NEGATIVE for bleeding problems  P: NEGATIVE for changes in mood or affect    Physical Ex[JE1.2]am:  Vitals Were Reviewed    Patient Vitals for the past 16 hrs:   BP Temp Temp src Pulse Heart Rate Resp SpO2 Weight   09/27/17 0756 (!) 150/93 98.1  F (36.7  C) Oral 93 - 18 99 % -   09/27/17 0605 - - - - - - - 67.3 kg (148 lb 5.9 oz)   09/27/17 0403 155/90 98.3  F (36.8  C) Oral 89 - 18 97 % -   09/26/17 2359 139/88 97.6  F (36.4  C) Oral - 96 18 99 % -   09/26/17 1941 114/74 - - 86 - - 97 % -         Intake/Output Summary (Last 24 hours) at 09/27/17 0832  Last data filed at 09/27/17 0657   Gross per 24 hour   Intake             2070 ml   Output             4700 ml   Net            -2630 ml[JE1.1]       GENERAL APPEARANCE: confused, speaks in confused fashion, ambulates  EYES: conjunctiva clear, eyes grossly normal  RESP: lungs clear to auscultation - no rales, rhonchi or wheezes  CV: regular rate and rhythm, normal S1 S2, no S3 or S4 and no murmur, click or rub   ABDOMEN: soft, nontender, no HSM or masses and bowel sounds normal  MS: no clubbing, cyanosis; no edema  SKIN: clear without significant rashes or lesions  NEURO: alert, disoriented, speech  is clear but doesn't make sense, exam otherwise nonfocal    Lab:[JE1.2]  Recent Labs   Lab Test  09/27/17   0800  09/26/17   0640   NA  145*  139   POTASSIUM  3.3*  3.3*   CHLORIDE  114*  109   CO2  24  21   ANIONGAP  7  9   GLC  105*  85   BUN  9  12   CR  0.71  0.69   JOHANN  8.8  8.4*     CBC RESULTS:   Recent Labs   Lab Test  09/27/17   0800  09/26/17   0640   WBC  6.1  6.5   RBC  3.44*  3.22*   HGB  9.0*  8.6*   HCT  26.6*  25.1*   PLT  154  143*       Results for orders placed or performed during the hospital encounter of 09/25/17 (from the past 24 hour(s))   Ammonia   Result Value Ref Range    Ammonia 17 10 - 50 umol/L   TSH   Result Value Ref Range    TSH 0.17 (L) 0.40 - 4.00 mU/L   Magnesium   Result Value Ref Range    Magnesium 1.5 (L) 1.6 - 2.3 mg/dL   Folate   Result Value Ref Range    Folate 32.0 >5.4 ng/mL   Cortisol   Result Value Ref Range    Cortisol Serum 13.0 4 - 22 ug/dL   T4 free   Result Value Ref Range    T4 Free 0.80 0.76 - 1.46 ng/dL   Care Transition RN/SW IP Consult    Narrative    Barb Ramsay RN     9/26/2017  4:00 PM  Care Transition Initial Assessment - RN    Reason For Consult: discharge planning   Met with: Patient and Family.    DATA:   Principal Problem:    Altered level of consciousness  Active Problems:    Anemia    Non-small cell lung cancer (NSCLC) (H)    Cancer associated pain    Constipation, chronic    Major depressive disorder, recurrent episode, moderate (H)    Tobacco use disorder    Altered mental status       Cognitive Status: awake, alert and confused.  [facility]  Primary Care MD Name: Memo  Contact information and PCP information verified: Yes  Lives With: alone   Living Arrangements: house. Has to go up and down stairs to get   to kitchen. Sarah is concerned that he has not been eating good   since last Wednesday.     Description of Support System: Supportive, Involved   Who is your support system?: Other (specify), Sibling(s)   (Daughter  in law Smith, Uncle)    Support Assessment: Lacks necessary supervision and assistance   Insurance concerns: No Insurance issues identified    ASSESSMENT:  Patient currently receives the following services:  [facility]   Palliative Care ([phone] Fax: [phone])       Identified issues/concerns regarding health management: new   confusion, medication compliance: was taking both MS Contin and   Oxycodone as PRN meds. Also, patient's home care nurse told Sarah   that he hadn't taken his routine meds from Wednesday of last   week. Just diagnosed with cancer earlier this year. Did not   tolerate first chemotherapy so was started on Opdiva 9/14/17 and   he is due for second dose on 9/28.Was in [facility] in April.   + C. Diff. New hoarse voice since Monday.  Transportation concerns: family may be able to provide, depending   on work schedule    PLAN:  Financial costs for the patient include: none noted at this time.  Patient given options and choices for discharge: This writer met   with pt and his daughter in law Keith, introduced self and role.   Discussed medicare coverage in regards to home care, TCU and LTC.     Patient/family is agreeable to the plan?  Yes, patient doesn't   like the idea of going back to TCU, but knows he may have to   because of his continued confusion.  Patient anticipates discharging to: TCU. Patient was provided   with Medicare certified nursing home list. Pts choices are as   follows 1) [place] [facility] (Phone: [phone] Fax:   [phone]) 2) [facility] Phone:   ([phone]) Fax: ([phone])  3) [facility]   (Phone: [phone]) Fax: ([phone])     Resources List: Transitional Care  Patient anticipates needs for home equipment: No       Discharge Planner   Discharge Plans in progress: Referrals out to TCUs  Barriers to discharge plan: medical stability, may need to put   chemotherapy treatments on hold  Plan/Disposition: TCU   Care   (CTS) will   continue to follow as needed.    1500: Discussed with Saarh, that TCUs do not take patients that   are currently receiving chemotherapy. Sarah states that patient's   MD did say that they were concerned that patient's confusion   hadn't improved greatly. They are awaiting MRI results and now   patient was to get an echocardiogram. Writer will continue to   assist as needed.    1540: Call received from [facility]. If patient puts his   chemotherapy on hold, they still would not be able to take   patient until Friday at the earliest (no private bathroom until   then) and are still waiting for their business office to approve.   MR Brain w/o & w Contrast    Narrative    MRI BRAIN WITHOUT AND WITH CONTRAST  9/26/2017 2:19 PM    HISTORY:  Alerted mental status. Rule out brain metastasis.    TECHNIQUE:  Multiplanar, multisequence MRI of the brain without and  with 6 mL Gadavist.    COMPARISON: Head CT 9/25/2017. Brain MR 4/21/2017.    FINDINGS: Images are severely degraded by motion artifact. No definite  large intracranial mass is identified noting limitations. There is no  mass effect or midline shift. The ventricles are not enlarged out of  proportion to the cerebral sulci. Mild diffuse parenchymal volume  loss. Patchy deep and subcortical white matter T2 hyperintensities are  nonspecific.    Enhancing sellar mass extends superiorly into the suprasellar cistern  and likely abuts the optic chiasm. No definite other intracranial  enhancing mass is identified noting marked limitations given motion  artifact.    Polypoid mucosal thickening in the left maxillary sinus.       Impression    IMPRESSION:      1. Images are severely degraded by motion artifact.  2. Evaluation for metastatic disease is limited given the motion. No  definite large intracranial mass is identified, but smaller lesions as  well as possible leptomeningeal disease cannot be excluded.  3. Enhancing expansile mass in  the sella extending into the  suprasellar cistern. This may represent a pituitary macroadenoma,  although it is poorly characterized on this motion degraded study.  Metastatic lesion could also be considered.  4. Patchy white matter T2 hyperintense lesions. These are  indeterminate and could be due to chronic microvascular ischemic  disease; however, given the motion artifact, evaluation for associated  enhancement is limited, and these could potentially represent small  metastatic lesions. Recommend repeat imaging with sedation.    LUCAS RAMIREZ MD   Echocardiogram Complete    Narrative    [id number]  [facility]19  [id number]  [id number]^ALEX^AMARIS^KATIE           [facility]  Echocardiography Laboratory  [address] [place] [address]        Name: JOHN SANCHEZ  MRN: [id number]  : 1960  Study Date: 2017 03:08 PM  Age: 57 yrs  Gender: Male  Patient Location: [facility]  Reason For Study: Abn EKG  Ordering Physician: AMARIS JOHNSON  Referring Physician: TUSHAR WHITTEN  Performed By: Hilary Garcia RDCS     BSA: 1.8 m2  Height: 71 in  Weight: 144 lb  HR: 110  BP: 110/63 mmHg  _____________________________________________________________________________  __        Procedure  Complete Portable Echo Adult.  _____________________________________________________________________________  __        Interpretation Summary     Technically difficult study due to patient's agitation. Limited views  obtained.     Grossly normal left ventricular and right ventricular systolic function in  subcostal views.  No significant valvular stenosis or regurgitation noted on doppler  interrogation.  _____________________________________________________________________________  __        Left Ventricle  The left ventricle is normal in size. There is normal left ventricular wall  thickness. Left ventricular systolic function is normal. The visual ejection  fraction is estimated at 55-60%. Regional wall motion  abnormalities cannot be  excluded due to limited visualization.     Right Ventricle  The right ventricle is normal size. The right ventricular systolic function is  normal.     Atria  The left atrium is mildly dilated. Right atrial size is normal.     Mitral Valve  There is trace mitral regurgitation.        Tricuspid Valve  Right ventricular systolic pressure could not be approximated due to  inadequate tricuspid regurgitation. There is trace tricuspid regurgitation.     Aortic Valve  The aortic valve is not well visualized. The aortic valve is trileaflet. No  aortic regurgitation is present.     Pulmonic Valve  There is no pulmonic valvular stenosis.     Vessels  The aortic root is normal size. The IVC is normal in size and reactivity with  respiration, suggesting normal central venous pressure.     Pericardium  There is no pericardial effusion.        Rhythm  The rhythm was undetermined.  _____________________________________________________________________________  __  MMode/2D Measurements & Calculations  IVSd: 1.1 cm     LVIDd: 4.5 cm  LVIDs: 4.0 cm  LVPWd: 1.0 cm  FS: 11.1 %  EDV(Teich): 94.3 ml  ESV(Teich): 71.5 ml  LV mass(C)d: 168.5 grams  LV mass(C)dI: 91.8 grams/m2  Ao root diam: 3.5 cm  LA dimension: 4.0 cm  LA/Ao: 1.2                 _____________________________________________________________________________  __           Report approved by: Misael Cohn 09/26/2017 03:42 PM      Magnesium   Result Value Ref Range    Magnesium 2.7 (H) 1.6 - 2.3 mg/dL   Troponin I   Result Value Ref Range    Troponin I ES <0.015 0.000 - 0.045 ug/L   Comprehensive metabolic panel   Result Value Ref Range    Sodium 145 (H) 133 - 144 mmol/L    Potassium 3.3 (L) 3.4 - 5.3 mmol/L    Chloride 114 (H) 94 - 109 mmol/L    Carbon Dioxide 24 20 - 32 mmol/L    Anion Gap 7 3 - 14 mmol/L    Glucose 105 (H) 70 - 99 mg/dL    Urea Nitrogen 9 7 - 30 mg/dL    Creatinine 0.71 0.66 - 1.25 mg/dL    GFR Estimate >90 >60 mL/min/1.7m2     GFR Estimate If Black >90 >60 mL/min/1.7m2    Calcium 8.8 8.5 - 10.1 mg/dL    Bilirubin Total 0.6 0.2 - 1.3 mg/dL    Albumin 3.3 (L) 3.4 - 5.0 g/dL    Protein Total 6.8 6.8 - 8.8 g/dL    Alkaline Phosphatase 111 40 - 150 U/L    ALT 18 0 - 70 U/L    AST 21 0 - 45 U/L   CBC with platelets differential   Result Value Ref Range    WBC 6.1 4.0 - 11.0 10e9/L    RBC Count 3.44 (L) 4.4 - 5.9 10e12/L    Hemoglobin 9.0 (L) 13.3 - 17.7 g/dL    Hematocrit 26.6 (L) 40.0 - 53.0 %    MCV 77 (L) 78 - 100 fl    MCH 26.2 (L) 26.5 - 33.0 pg    MCHC 33.8 31.5 - 36.5 g/dL    RDW 15.6 (H) 10.0 - 15.0 %    Platelet Count 154 150 - 450 10e9/L    Diff Method Automated Method     % Neutrophils 64.7 %    % Lymphocytes 24.5 %    % Monocytes 8.4 %    % Eosinophils 2.0 %    % Basophils 0.2 %    % Immature Granulocytes 0.2 %    Absolute Neutrophil 3.9 1.6 - 8.3 10e9/L    Absolute Lymphocytes 1.5 0.8 - 5.3 10e9/L    Absolute Monocytes 0.5 0.0 - 1.3 10e9/L    Absolute Eosinophils 0.1 0.0 - 0.7 10e9/L    Absolute Basophils 0.0 0.0 - 0.2 10e9/L    Abs Immature Granulocytes 0.0 0 - 0.4 10e9/L       Assessment and Plan:    Wade Acevedo is a 57 year old male with PMH significant for chronic anemia, cancer associated pain, NSCLC current on Opdivo therapy, depression, tobacco use disorder, hx of EtOH abuse, and hx of thrombocytopenia who now presents with altered mental status.        Acute Encephalopathy   Daughter-in law complains of confusion for at least the last 24 hours. VSS. Drug screen positive only for opiates.  UA negative.  CXR negative.  CT head again shows pituitary mass. EtOH level was 0.00.  Acetaminophen was less than 2 and salicylate level was 5.  DDx: Medications (hx of overuse of scheduled MS Contin as recent at 09/20/17) versus infection versus metabolic derangements (changes in magnesium, thyroid, Wernicke's)[JE1.1]  No sign of infectious cause, normal ammonia, T4 and cortisol normal, MRI of poor quality due to motion--repeat with  sedation recommended[JE1.3]    Urinary Retention[JE1.1]-probably secondary to AMS[JE1.2]  Straight cathed for 800cc of urine overnight.  No history of urinary retention.  - straight cath, PRN for urine greater than 350cc on bladder scan  - instruct RN to inform MD if straight cath x3; will likely need to place lion     Non-small cell lung cancer (NSCLC) (H) with metastasis to bone (5th lateral rib, scapula)  Follows with Dr. Naylor. Diagnosed by CT guided biopsy. PET Scan confirms tumor with hypermetabolic lymph nodes and hypermetabolic lesions to the scapula and 5th lateral rib. Previously on carbo/taxol - this was stopped due pancytopenia. Received first dose of Opdivo chemotherapy (immune therapy) 09/14/2017.  Next scheduled chemotherapy infusion of Opdivo 09/28/2017.     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  Normal TSH with low normal T4/T3.  Mildly abnormal cosyntropin stim testing. Seen by [facility] (Dr. Padma Medley) on 05/10/2017.  Most likely thought to be non-functioning pituitary macroadenoma.  Was supposed to have repeat MRI 08/2017 - did not do so.  Has follow up with endocrinology 11/14/2017.  Most recently had mildly low TSH with normal T4 09/14/2017.[JE1.1]    Abnormal EKG  New t inversions[JE1.4] since April 2017, no wall motion abnormalities, trops normal, no chest pain--given metastatic cancer and no sign of ACS, will simply observe at this time, doubt implicated in his AMS.[JE1.5]    Positive Clostridia difficile test-no diarrha-doubt clinical ds  Test ordered by Dr Fuentes-no diarrhea, no notation regarding why test sent.  Test was positive but I doubt it represents clinical ds--on empiric oral flagyl while work up for AMS is on going. Would probably dc flagyl at some point if no diarrhea.[JE1.6]     [JE1.5]  Anemia  Has received transfusions in the past for low hemoglobin - last received 2 units approximately 09/07/2017.  This was thought to be  secondary to chemotherapy (carbo/taxol).  Recently switched from this to immune chemotherapy.  Hemoglobin on arrival 10.7 on arrival.  8.6 on admission day 1. VSS.  - AM CBC with platelets, will transfuse if hbg less than 7.0     Hx of Thrombocytopenia  Platelets as low as 42k approximately 3 weeks ago.  Received 2u of PRBCs WITHOUT platelets, seemed to respond  - continue to monitor      Cancer associated pain  - continue PTA scheduled MS Contin  - continue PTA PRN oxycodone  - continue PTA dermal capsicin cream Q2 PRN  - continue PTA Voltaren gel 4x daily  - continue PTA Emla cream with access to port     Constipation, chronic  - continue PTA scheduled miralax, senna-ducosate     Major depressive disorder, recurrent episode, moderate (H)  - continue PTA Paxil      Tobacco use disorder  Nicotine patch in place     GERD  - continue PTA pantoprazole[JE1.1]     Discussion  Source of AMS still unclear, no overt infection, Still worried about metastatic ds.--will attempt to repeat MR with anesthesia sedation. Will discuss the possibility of Opdivo causing this[JE1.5]with Dr Naylor.[JE1.2]  I did talk to the POA yesterday and asked her to re-address code status.[JE1.5]  If MR is unremarkable-might consider LP.[JE1.2]      12:18 PM[JE1.7] discussed with Dr Naylor  He felt that AMS could be from Opdivo-but is unlikely  If MR is neg-he thought LP would be of little use-would suggest holding Opdivo until situation is clearer.[JE1.8]     Revision History        User Key Date/Time User Provider Type Action    > [N/A] 9/27/2017 12:20 PM Cristian Batista MD Physician Addend     JE1.7 9/27/2017 12:19 PM Cristian Batista MD Physician Addend     JE1.8 9/27/2017 12:17 PM Cristian Batista MD Physician      JE1.2 9/27/2017  9:21 AM Cristian Batista MD Physician Sign     JE1.6 9/27/2017  8:41 AM Cristian Batista MD Physician      JE1.5 9/27/2017  8:37 AM Cristian Batista MD Physician      JE1.4 9/27/2017   8:36 AM Cristian Batista MD Physician      JE1.3 9/27/2017  8:34 AM Cristian Batista MD Physician      JE1.1 9/27/2017  8:32 AM Cristian Batista MD Physician             Progress Notes by Patricia Larson PT at 9/27/2017 11:58 AM     Author:  Patricia Larson PT Service:  (none) Author Type:  Physical Therapist    Filed:  9/27/2017 12:02 PM Date of Service:  9/27/2017 11:58 AM Creation Time:  9/27/2017 11:58 AM    Status:  Signed :  Patricia Larson PT (Physical Therapist)            09/27/17 1100   Quick Adds   Type of Visit Initial PT Evaluation   Living Environment   Lives With alone   Living Arrangements house   Home Accessibility stairs to enter home;stairs within home   Number of Stairs to Enter Home 3   Transportation Available family or friend will provide   Living Environment Comment Pt resides in a multilevel  home; ambulating steps inside of his home prior to hospital admission w/o difficulty    Functional Level Prior   Ambulation 0-->independent   Transferring 0-->independent   Toileting 0-->independent   Bathing 0-->independent   Dressing 0-->independent   Eating 0-->independent   Communication 0-->understands/communicates without difficulty   Swallowing 0-->swallows foods/liquids without difficulty   Cognition 0 - no cognition issues reported   Fall history within last six months no   General Information   Onset of Illness/Injury or Date of Surgery - Date 09/25/17   Referring Physician Lester   Patient/Family Goals Statement Pt unable to state goal. Per family member  goals of care undecided depending  on results of MRI. Possible TCU vs LTC/ with assistance    Pertinent History of Current Problem (include personal factors and/or comorbidities that impact the POC) 57 year old male with PMH significant for chronic anemia, cancer associated pain, NSCLC current on Opdivo therapy, depression, tobacco use disorder, hx of EtOH abuse, and hx of thrombocytopenia who now presents with altered mental  status. Head MRI pending   Precautions/Limitations fall precautions   General Observations Pt alert, confused.    Cognitive Status Examination   Orientation (NT- states name, . slow to respond, difficulty recalling last name )   Level of Consciousness alert   Follows Commands and Answers Questions able to follow multistep instructions   Personal Safety and Judgment impaired  (at times moves impulsicely, not cognizant of lines)   Pain Assessment   Patient Currently in Pain No  (Pt reporting no pain presently. Family member reports hx of LE ede, possible due to chemo. )   Posture    Posture Not impaired   Range of Motion (ROM)   ROM Comment WFL    Strength   Strength Comments Unable to test fully due to  cognition. returns to bed to rest. Does appear mildy weak w/ ambulation   Bed Mobility   Bed Mobility Comments Min. assistance supine> sitting.; returns to bed following amb w/ SBA- crawls foward into bed   Transfer Skills   Transfer Comments SBA sit>stand  w/ no device.; does uses wide TALIA for inital standig balance   Gait   Gait Comments Pt completes in room mobility only. Ambulated to from BR w/ use of one UE on IVpole for support    Balance   Balance Comments fair/ good    Sensory Examination   Sensory Perception no deficits were identified   General Therapy Interventions   Planned Therapy Interventions bed mobility training;gait training;strengthening   Clinical Impression   Criteria for Skilled Therapeutic Intervention yes, treatment indicated   PT Diagnosis Impaired  Mobility. Generalized weakness   Influenced by the following impairments Decreased strength. Balance. Cogntion   Functional limitations due to impairments Decreased ambulatory  status- need for asssistance, unlilateral support, decreased indep. w/ bed mobility and transfers   Clinical Presentation Stable/Uncomplicated   Clinical Presentation Rationale clinical judgement   Clinical Decision Making (Complexity) Low complexity   Therapy Frequency`  "daily   Predicted Duration of Therapy Intervention (days/wks) 3 days   Anticipated Equipment Needs at Discharge (if needing AD- ie SEC for gait, question pt's ability to use correctly )   Anticipated Discharge Disposition Transitional Care Facility;Long Term Care Facility   Risk & Benefits of therapy have been explained Yes   Patient, Family & other staff in agreement with plan of care Yes   Clinical Impression Comments Pt would benefit from continued PT to address strength/ safety w/ mobility, depending on pt and family's goals  of care. See POC for PT goals    [facility] AM-PAC  \"6 Clicks\" V.2 Basic Mobility Inpatient Short Form   1. Turning from your back to your side while in a flat bed without using bedrails? 4 - None   2. Moving from lying on your back to sitting on the side of a flat bed without using bedrails? 3 - A Little   3. Moving to and from a bed to a chair (including a wheelchair)? 3 - A Little   4. Standing up from a chair using your arms (e.g., wheelchair, or bedside chair)? 3 - A Little   5. To walk in hospital room? 3 - A Little   6. Climbing 3-5 steps with a railing? 3 - A Little   Basic Mobility Raw Score (Score out of 24.Lower scores equate to lower levels of function) 19[CS1.1]        Revision History        User Key Date/Time User Provider Type Action    > CS1.1 9/27/2017 12:02 PM Patricia Larson, PT Physical Therapist Sign            Progress Notes by Kiana Rojas OT at 9/27/2017 11:07 AM     Author:  Kiana Rojas OT Service:  (none) Author Type:  Occupational Therapist    Filed:  9/27/2017 11:07 AM Date of Service:  9/27/2017 11:07 AM Creation Time:  9/27/2017 11:07 AM    Status:  Signed :  Kiana Rojas OT (Occupational Therapist)          09/27/17 1000   Quick Adds   Type of Visit Initial Occupational Therapy Evaluation   Living Environment   Lives With alone   Living Arrangements house   Home Accessibility tub/shower is not walk in   Number of Stairs Within Home " (multi-level home. )   Transportation Available family or friend will provide   Self-Care   Equipment Currently Used at Home none   Functional Level Prior   Ambulation 0-->independent   Transferring 0-->independent   Toileting 0-->independent   Bathing 0-->independent   Dressing 0-->independent   Eating 0-->independent   Communication 0-->understands/communicates without difficulty   Swallowing 0-->swallows foods/liquids without difficulty   Cognition 0 - no cognition issues reported   General Information   Onset of Illness/Injury or Date of Surgery - Date 09/25/17   Referring Physician Morena yBrd PA-C   Patient/Family Goals Statement Pt unable to state d/t AMS. Daughter would like pt to participate in therapy, depending on MRI results TCU vs. LTC   Additional Occupational Profile Info/Pertinent History of Current Problem Wade Acevedo is a 57 year old male with PMH significant for chronic anemia, cancer associated pain, NSCLC current on Opdivo therapy, depression, tobacco use disorder, hx of EtOH abuse, and hx of thrombocytopenia who now presents with altered mental status.   Cognitive Status Examination   Orientation person   Level of Consciousness confused   Able to Follow Commands moderate impairment;success, 1-step commands   Personal Safety (Cognitive) severe impairment;at risk behaviors demonstrated;decreased awareness, need for assist;decreased awareness, need for safety;decreased insight to deficits   Memory impaired   Attention Distractible during evaluation;Quiet environment required;Sustained attention impaired   Organization/Problem Solving Sequencing impaired;Problem solving impaired   Executive Function Impulsive;Working memory impaired, decreased storage of information for performing tasks;Cognitive flexibility impaired;Planning ability impaired;Self awareness/monitoring impaired   Cognitive Comment Year: 2018 - unable to correct with 3 VCs  Month: Unable to answer with any month, when  given 2 cues pt states October- unable to correct answer with 2 VC's. Unable to recall month or year 5 minutes later. Pt is impulsive at times during therapy session, laying back down quickly with head towards foot of the bed.    Pain Assessment   Patient Currently in Pain Yes, see Vital Sign flowsheet  (Daughter-in-law states B LE's give in the most pain. )   Range of Motion (ROM)   ROM Comment B UE ROM: Unable to assess d/t pt's cognition, however daughter reports ROM is limited by pain. If pain is controlled able to complete full ROM with B UE's.    Strength   Strength Comments Not formally assessed d/t pt's cognition.    Hand Strength   Hand Strength Comments B  strength: WFL to hold IV pole.    Mobility   Bed Mobility Comments Min A for supine <> sit   Transfer Skill: Bed to Chair/Chair to Bed   Level of [place]: Bed to Chair contact guard   Physical Assist/Nonphysical Assist: Bed to Chair 1 person assist   Weight-Bearing Restrictions full weight-bearing   Assistive Device - Transfer Skill Bed to Chair Chair to Bed Rehab Eval (IV pole. )   Transfer Skill: Sit to Stand   Level of [place]: Sit/Stand contact guard   Physical Assist/Nonphysical Assist: Sit/Stand 1 person assist   Transfer Skill: Sit to Stand full weight-bearing   Assistive Device for Transfer: Sit/Stand (IV pole)   Transfer Skill: Toilet Transfer   Level of [place]: Toilet contact guard   Physical Assist/Nonphysical Assist: Toilet 1 person assist   Weight-Bearing Restrictions: Toilet full weight-bearing   Assistive Device (IV pole)   Upper Body Dressing   Level of [place]: Dress Upper Body independent   Lower Body Dressing   Level of [place]: Dress Lower Body contact guard   Instrumental Activities of Daily Living (IADL)   Previous Responsibilities meal prep;housekeeping;laundry;medication management   Activities of Daily Living Analysis   Impairments Contributing to Impaired Activities of Daily Living balance  "impaired;cognition impaired;pain;strength decreased   General Therapy Interventions   Planned Therapy Interventions ADL retraining;cognition;strengthening;progressive activity/exercise   Clinical Impression   Criteria for Skilled Therapeutic Interventions Met yes, treatment indicated   OT Diagnosis decreased independence with ADLs and functional mobility   Influenced by the following impairments impaired cognition, weakness, pain.    Assessment of Occupational Performance 5 or more Performance Deficits   Identified Performance Deficits dressing, toileting, bathing, functional mobility, IADL tasks (cooking, laundry,  home management tasks, medication management).    Clinical Decision Making (Complexity) Low complexity   Therapy Frequency daily   Predicted Duration of Therapy Intervention (days/wks) 2-3 days   Anticipated Equipment Needs at Discharge (TBD at TCU)   Anticipated Discharge Disposition Transitional Care Facility  (pending pt/family goals of care. )   Risks and Benefits of Treatment have been explained. Yes   Patient, Family & other staff in agreement with plan of care Yes   [facility] AM-PAC  \"6 Clicks\" Daily Activity Inpatient Short Form   1. Putting on and taking off regular lower body clothing? 3 - A Little   2. Bathing (including washing, rinsing, drying)? 2 - A Lot   3. Toileting, which includes using toilet, bedpan or urinal? 3 - A Little   4. Putting on and taking off regular upper body clothing? 3 - A Little   5. Taking care of personal grooming such as brushing teeth? 3 - A Little   6. Eating meals? 4 - None   Daily Activity Raw Score (Score out of 24.Lower scores equate to lower levels of function) 18   Total Evaluation Time   Total Evaluation Time (Minutes) 10[LC1.1]        Revision History        User Key Date/Time User Provider Type Action    > LC1.1 9/27/2017 11:07 AM Kiana Rojas, ANGELO Occupational Therapist Sign            Progress Notes by Patricia Larson, PT at 9/26/2017  3:44 PM  "    Author:  Patricia Larson PT Service:  (none) Author Type:  Physical Therapist    Filed:  9/26/2017  3:45 PM Date of Service:  9/26/2017  3:44 PM Creation Time:  9/26/2017  3:44 PM    Status:  Signed :  Patricia Larson PT (Physical Therapist)            09/26/17 1500   Signing Clinician's Name / Credentials   Signing clinician's name / credentials Patricia LarsonPT   Additional Documentation   PT Plan cancel- Order received, pt not seen. Spoke w/ family member  and decided to defer PT today .  Family reporting pt fatigued  and w/ increased confusion; will attempt again 9/27[CS1.1]        Revision History        User Key Date/Time User Provider Type Action    > CS1.1 9/26/2017  3:45 PM Patricia Larson PT Physical Therapist Sign            Progress Notes by Ester So RN at 9/26/2017  1:45 PM     Author:  Ester So RN Service:  (none) Author Type:  Registered Nurse    Filed:  9/26/2017  1:46 PM Date of Service:  9/26/2017  1:45 PM Creation Time:  9/26/2017  1:45 PM    Status:  Signed :  Ester So, RN (Registered Nurse)         IV Ativan administered and pt transported down to MRI.[JB1.1]     Revision History        User Key Date/Time User Provider Type Action    > JB1.1 9/26/2017  1:46 PM Ester So, RN Registered Nurse Sign            Progress Notes by Cristian Batista MD at 9/26/2017  1:01 PM     Author:  Cristian Batista MD Service:  Hospitalist Author Type:  Physician    Filed:  9/26/2017  1:25 PM Date of Service:  9/26/2017  1:01 PM Creation Time:  9/26/2017  1:01 PM    Status:  Addendum :  Cristian Batista MD (Physician)         Pt with lung cancer metastatic on Opdivo rx with 2-3 days of confusion.  No fever.  No diarrhea but Clostridia difficile ordered in ER and pos---doubt true clinical ds, but covering with flagyl. Unclear why ordered.  No obvious etiology.  CT head neg--proceed with MRI.  ? Whether side effect to Opdivo.  Asked POA to consider his resuscitation status.  Might  consider LP.  If MR is neg--might discuss with Dr Naylor.[JE1.1]  ekg shows t inversions that are new-neg trop, no cp--obtain echo.  Discussed possible need for tcu.[JE1.2]       Revision History        User Key Date/Time User Provider Type Action    > [N/A] 9/26/2017  1:25 PM Cristian Batista MD Physician Addend     [N/A] 9/26/2017  1:05 PM Cristian Batista MD Physician Addend     JE1.2 9/26/2017  1:04 PM Cristian Batista MD Physician Sign     JE1.1 9/26/2017  1:01 PM Cristian Batista MD Physician             Progress Notes by Morena Byrd PA-C at 9/26/2017  9:30 AM     Author:  Morena Byrd PA-C Service:  Internal Medicine Author Type:  Physician Assistant - C    Filed:  9/26/2017 12:22 PM Date of Service:  9/26/2017  9:30 AM Creation Time:  9/26/2017  9:30 AM    Status:  Addendum :  Morena Byrd PA-C (Physician Assistant - C)         [facility]    Hospital Medicine   Care Update  Date of Service: 9/26/2017     Per discussion with Dr. Batista, will get MR of brain and add on cortisol to this morning's labs.[CH1.1]    In addition, C. Diff toxin testing has returned and is positive.  There is no ER documentation to support why this was collected.  The patient himself complains of no subjective diarrhea/abdominal pain at present.  Nonetheless, given AMS, will plan to treat    Plan: start flagyl 500mg PO TID, first dose now[CH1.2].  Stop miralax, senna-cot[CH1.3]      Morena Byrd PA-C[CH1.1]       Revision History        User Key Date/Time User Provider Type Action    > CH1.3 9/26/2017 12:22 PM Morena Byrd PA-C Physician Assistant - C Addend     CH1.2 9/26/2017 10:29 AM Morena Byrd PA-C Physician Assistant - C Addend     CH1.1 9/26/2017  9:31 AM Morena Byrd PA-C Physician Assistant - C Sign            Progress Notes by Bhavna Avalos, RN at 9/26/2017 12:45 AM     Author:  Bhavna Avalos, RN Service:   (none) Author Type:  Registered Nurse    Filed:  9/26/2017  3:16 AM Date of Service:  9/26/2017 12:45 AM Creation Time:  9/26/2017  3:14 AM    Status:  Signed :  Bhavna Avalos RN (Registered Nurse)         [facility] ADMISSION NOTE    Patient admitted to room 2309 at approximately 0045 via cart from emergency room. Patient was accompanied by transport tech.     Verbal SBAR report received from Jeannie CRUZ prior to patient arrival.     Patient trasferred to bed via self. Patient alert and oriented X 3. Pain is controlled with current analgesics.  Medication(s) being used: narcotic analgesics including IV dilaudid.  . Admission vital signs:[MW1.1] Blood pressure (!) 135/93, pulse 95, temperature 98.7  F (37.1  C), temperature source Oral, resp. rate 18, weight 65.7 kg (144 lb 13.5 oz), SpO2 96 %.[MW1.2] Patient was oriented to plan of care, call light, bed controls, tv, telephone, bathroom and visiting hours.     The following safety risks were identified during admission: fall. Yellow risk band applied: YES.     Bhavna Avalos[MW1.1]       Revision History        User Key Date/Time User Provider Type Action    > MW1.2 9/26/2017  3:16 AM Bhavna Avalos RN Registered Nurse Sign     MW1.1 9/26/2017  3:14 AM Bhavna Avalos RN Registered Nurse                   Procedure Notes     No notes of this type exist for this encounter.         Progress Notes - Therapies (Notes from 09/26/17 through 09/29/17)      Progress Notes by Patricia Larson PT at 9/28/2017  2:12 PM     Author:  Patricia Larson PT Service:  (none) Author Type:  Physical Therapist    Filed:  9/28/2017  2:13 PM Date of Service:  9/28/2017  2:12 PM Creation Time:  9/28/2017  2:12 PM    Status:  Signed :  Patricia Larson PT (Physical Therapist)            09/28/17 1400   Signing Clinician's Name / Credentials   Signing clinician's name / credentials Patricia Larson PT   Quick Adds   Rehab Discipline PT   Additional Documentation   OT Plan Cancel- Pt  declined therapy today, visiting- agreeable to therapy tomorrow[CS1.1]         Revision History        User Key Date/Time User Provider Type Action    > CS1.1 9/28/2017  2:13 PM Patricia Larson PT Physical Therapist Sign            Progress Notes by Patricia Larson PT at 9/27/2017 11:58 AM     Author:  Patricia Larson PT Service:  (none) Author Type:  Physical Therapist    Filed:  9/27/2017 12:02 PM Date of Service:  9/27/2017 11:58 AM Creation Time:  9/27/2017 11:58 AM    Status:  Signed :  Patricia Larson PT (Physical Therapist)            09/27/17 1100   Quick Adds   Type of Visit Initial PT Evaluation   Living Environment   Lives With alone   Living Arrangements house   Home Accessibility stairs to enter home;stairs within home   Number of Stairs to Enter Home 3   Transportation Available family or friend will provide   Living Environment Comment Pt resides in a multilevel  home; ambulating steps inside of his home prior to hospital admission w/o difficulty    Functional Level Prior   Ambulation 0-->independent   Transferring 0-->independent   Toileting 0-->independent   Bathing 0-->independent   Dressing 0-->independent   Eating 0-->independent   Communication 0-->understands/communicates without difficulty   Swallowing 0-->swallows foods/liquids without difficulty   Cognition 0 - no cognition issues reported   Fall history within last six months no   General Information   Onset of Illness/Injury or Date of Surgery - Date 09/25/17   Referring Physician Lester   Patient/Family Goals Statement Pt unable to state goal. Per family member  goals of care undecided depending  on results of MRI. Possible TCU vs LTC/ with assistance    Pertinent History of Current Problem (include personal factors and/or comorbidities that impact the POC) 57 year old male with PMH significant for chronic anemia, cancer associated pain, NSCLC current on Opdivo therapy, depression, tobacco use disorder, hx of EtOH abuse, and hx of  thrombocytopenia who now presents with altered mental status. Head MRI pending   Precautions/Limitations fall precautions   General Observations Pt alert, confused.    Cognitive Status Examination   Orientation (NT- states name, . slow to respond, difficulty recalling last name )   Level of Consciousness alert   Follows Commands and Answers Questions able to follow multistep instructions   Personal Safety and Judgment impaired  (at times moves impulsicely, not cognizant of lines)   Pain Assessment   Patient Currently in Pain No  (Pt reporting no pain presently. Family member reports hx of LE ede, possible due to chemo. )   Posture    Posture Not impaired   Range of Motion (ROM)   ROM Comment WFL    Strength   Strength Comments Unable to test fully due to  cognition. returns to bed to rest. Does appear mildy weak w/ ambulation   Bed Mobility   Bed Mobility Comments Min. assistance supine> sitting.; returns to bed following amb w/ SBA- crawls foward into bed   Transfer Skills   Transfer Comments SBA sit>stand  w/ no device.; does uses wide TALIA for inital standig balance   Gait   Gait Comments Pt completes in room mobility only. Ambulated to from BR w/ use of one UE on IVpole for support    Balance   Balance Comments fair/ good    Sensory Examination   Sensory Perception no deficits were identified   General Therapy Interventions   Planned Therapy Interventions bed mobility training;gait training;strengthening   Clinical Impression   Criteria for Skilled Therapeutic Intervention yes, treatment indicated   PT Diagnosis Impaired  Mobility. Generalized weakness   Influenced by the following impairments Decreased strength. Balance. Cogntion   Functional limitations due to impairments Decreased ambulatory  status- need for asssistance, unlilateral support, decreased indep. w/ bed mobility and transfers   Clinical Presentation Stable/Uncomplicated   Clinical Presentation Rationale clinical judgement   Clinical Decision  "Making (Complexity) Low complexity   Therapy Frequency` daily   Predicted Duration of Therapy Intervention (days/wks) 3 days   Anticipated Equipment Needs at Discharge (if needing AD- ie SEC for gait, question pt's ability to use correctly )   Anticipated Discharge Disposition Transitional Care Facility;Long Term Care Facility   Risk & Benefits of therapy have been explained Yes   Patient, Family & other staff in agreement with plan of care Yes   Clinical Impression Comments Pt would benefit from continued PT to address strength/ safety w/ mobility, depending on pt and family's goals  of care. See POC for PT goals    [facility] AM-PAC  \"6 Clicks\" V.2 Basic Mobility Inpatient Short Form   1. Turning from your back to your side while in a flat bed without using bedrails? 4 - None   2. Moving from lying on your back to sitting on the side of a flat bed without using bedrails? 3 - A Little   3. Moving to and from a bed to a chair (including a wheelchair)? 3 - A Little   4. Standing up from a chair using your arms (e.g., wheelchair, or bedside chair)? 3 - A Little   5. To walk in hospital room? 3 - A Little   6. Climbing 3-5 steps with a railing? 3 - A Little   Basic Mobility Raw Score (Score out of 24.Lower scores equate to lower levels of function) 19[CS1.1]        Revision History        User Key Date/Time User Provider Type Action    > CS1.1 9/27/2017 12:02 PM Patricia Larson, PT Physical Therapist Sign            Progress Notes by Kinaa Rojas OT at 9/27/2017 11:07 AM     Author:  Kiana Rojas OT Service:  (none) Author Type:  Occupational Therapist    Filed:  9/27/2017 11:07 AM Date of Service:  9/27/2017 11:07 AM Creation Time:  9/27/2017 11:07 AM    Status:  Signed :  Kiana Rojas OT (Occupational Therapist)          09/27/17 1000   Quick Adds   Type of Visit Initial Occupational Therapy Evaluation   Living Environment   Lives With alone   Living Arrangements house   Home Accessibility " tub/shower is not walk in   Number of Stairs Within Home (multi-level home. )   Transportation Available family or friend will provide   Self-Care   Equipment Currently Used at Home none   Functional Level Prior   Ambulation 0-->independent   Transferring 0-->independent   Toileting 0-->independent   Bathing 0-->independent   Dressing 0-->independent   Eating 0-->independent   Communication 0-->understands/communicates without difficulty   Swallowing 0-->swallows foods/liquids without difficulty   Cognition 0 - no cognition issues reported   General Information   Onset of Illness/Injury or Date of Surgery - Date 09/25/17   Referring Physician Morena Byrd PA-C   Patient/Family Goals Statement Pt unable to state d/t AMS. Daughter would like pt to participate in therapy, depending on MRI results TCU vs. LTC   Additional Occupational Profile Info/Pertinent History of Current Problem Wade Acevedo is a 57 year old male with PMH significant for chronic anemia, cancer associated pain, NSCLC current on Opdivo therapy, depression, tobacco use disorder, hx of EtOH abuse, and hx of thrombocytopenia who now presents with altered mental status.   Cognitive Status Examination   Orientation person   Level of Consciousness confused   Able to Follow Commands moderate impairment;success, 1-step commands   Personal Safety (Cognitive) severe impairment;at risk behaviors demonstrated;decreased awareness, need for assist;decreased awareness, need for safety;decreased insight to deficits   Memory impaired   Attention Distractible during evaluation;Quiet environment required;Sustained attention impaired   Organization/Problem Solving Sequencing impaired;Problem solving impaired   Executive Function Impulsive;Working memory impaired, decreased storage of information for performing tasks;Cognitive flexibility impaired;Planning ability impaired;Self awareness/monitoring impaired   Cognitive Comment Year: 2018 - unable to correct with  3 VCs  Month: Unable to answer with any month, when given 2 cues pt states October- unable to correct answer with 2 VC's. Unable to recall month or year 5 minutes later. Pt is impulsive at times during therapy session, laying back down quickly with head towards foot of the bed.    Pain Assessment   Patient Currently in Pain Yes, see Vital Sign flowsheet  (Daughter-in-law states B LE's give in the most pain. )   Range of Motion (ROM)   ROM Comment B UE ROM: Unable to assess d/t pt's cognition, however daughter reports ROM is limited by pain. If pain is controlled able to complete full ROM with B UE's.    Strength   Strength Comments Not formally assessed d/t pt's cognition.    Hand Strength   Hand Strength Comments B  strength: WFL to hold IV pole.    Mobility   Bed Mobility Comments Min A for supine <> sit   Transfer Skill: Bed to Chair/Chair to Bed   Level of Nelson: Bed to Chair contact guard   Physical Assist/Nonphysical Assist: Bed to Chair 1 person assist   Weight-Bearing Restrictions full weight-bearing   Assistive Device - Transfer Skill Bed to Chair Chair to Bed Rehab Eval (IV pole. )   Transfer Skill: Sit to Stand   Level of Nelson: Sit/Stand contact guard   Physical Assist/Nonphysical Assist: Sit/Stand 1 person assist   Transfer Skill: Sit to Stand full weight-bearing   Assistive Device for Transfer: Sit/Stand (IV pole)   Transfer Skill: Toilet Transfer   Level of Nelson: Toilet contact guard   Physical Assist/Nonphysical Assist: Toilet 1 person assist   Weight-Bearing Restrictions: Toilet full weight-bearing   Assistive Device (IV pole)   Upper Body Dressing   Level of Nelson: Dress Upper Body independent   Lower Body Dressing   Level of Nelson: Dress Lower Body contact guard   Instrumental Activities of Daily Living (IADL)   Previous Responsibilities meal prep;housekeeping;laundry;medication management   Activities of Daily Living Analysis   Impairments Contributing to  "Impaired Activities of Daily Living balance impaired;cognition impaired;pain;strength decreased   General Therapy Interventions   Planned Therapy Interventions ADL retraining;cognition;strengthening;progressive activity/exercise   Clinical Impression   Criteria for Skilled Therapeutic Interventions Met yes, treatment indicated   OT Diagnosis decreased independence with ADLs and functional mobility   Influenced by the following impairments impaired cognition, weakness, pain.    Assessment of Occupational Performance 5 or more Performance Deficits   Identified Performance Deficits dressing, toileting, bathing, functional mobility, IADL tasks (cooking, laundry,  home management tasks, medication management).    Clinical Decision Making (Complexity) Low complexity   Therapy Frequency daily   Predicted Duration of Therapy Intervention (days/wks) 2-3 days   Anticipated Equipment Needs at Discharge (TBD at TCU)   Anticipated Discharge Disposition Transitional Care Facility  (pending pt/family goals of care. )   Risks and Benefits of Treatment have been explained. Yes   Patient, Family & other staff in agreement with plan of care Yes   [facility] AM-PAC  \"6 Clicks\" Daily Activity Inpatient Short Form   1. Putting on and taking off regular lower body clothing? 3 - A Little   2. Bathing (including washing, rinsing, drying)? 2 - A Lot   3. Toileting, which includes using toilet, bedpan or urinal? 3 - A Little   4. Putting on and taking off regular upper body clothing? 3 - A Little   5. Taking care of personal grooming such as brushing teeth? 3 - A Little   6. Eating meals? 4 - None   Daily Activity Raw Score (Score out of 24.Lower scores equate to lower levels of function) 18   Total Evaluation Time   Total Evaluation Time (Minutes) 10[LC1.1]        Revision History        User Key Date/Time User Provider Type Action    > LC1.1 9/27/2017 11:07 AM Kiana Rojas OT Occupational Therapist Sign            Progress Notes " by Patricia Larson PT at 9/26/2017  3:44 PM     Author:  Patricia Larson PT Service:  (none) Author Type:  Physical Therapist    Filed:  9/26/2017  3:45 PM Date of Service:  9/26/2017  3:44 PM Creation Time:  9/26/2017  3:44 PM    Status:  Signed :  Patricia Larson PT (Physical Therapist)            09/26/17 1500   Signing Clinician's Name / Credentials   Signing clinician's name / credentials Patricia Larson PT   Additional Documentation   PT Plan cancel- Order received, pt not seen. Spoke w/ family member  and decided to defer PT today .  Family reporting pt fatigued  and w/ increased confusion; will attempt again 9/27[CS1.1]        Revision History        User Key Date/Time User Provider Type Action    > CS1.1 9/26/2017  3:45 PM Patricia Larson PT Physical Therapist Sign

## 2017-09-25 NOTE — IP AVS SNAPSHOT
` `     [facility] SURGICAL: [phone]            Medication Administration Report for Wade Acevedo as of 09/29/17 1335   Legend:    Given Hold Not Given Due Canceled Entry Other Actions    Time Time (Time) Time  Time-Action       Inactive    Active    Linked        Medications 09/23/17 09/24/17 09/25/17 09/26/17 09/27/17 09/28/17 09/29/17    0.9% sodium chloride infusion  Rate: 50 mL/hr Freq: CONTINUOUS Route: IV  Start: 09/26/17 0100       0125-ED Infusing on Admission/transfer       0937 ( )-Rate/Dose Change       1602 ( )-New Bag       2228 ( )-Rate/Dose Verify        0006 ( )-Rate/Dose Verify       1411 ( )-New Bag       1729-ED Infusing on Admission/transfer        0051 ( )-New Bag            acetaminophen (TYLENOL) tablet 650 mg  Dose: 650 mg Freq: EVERY 4 HOURS PRN Route: PO  PRN Reason: mild pain  Start: 09/26/17 0858   Admin Instructions: Alternate ibuprofen (if ordered) with acetaminophen.  Maximum acetaminophen dose from all sources = 75 mg/kg/day not to exceed 4 grams/day.        1731 (650 mg)-Given        1630-Auto Hold       1708-Unhold             capsaicin (ZOSTRIX) cream  Freq: 3 TIMES DAILY PRN Route: Top  PRN Comment: when requested  Start: 09/26/17 0610   Admin Instructions: Apply to right hip         1630-Auto Hold       1708-Unhold             folic acid (FOLVITE) tablet 1 mg  Dose: 1 mg Freq: DAILY Route: PO  Start: 09/26/17 0800   Admin Instructions: If patient is unable to tolerate oral folic acid, an intravenous dose of folic acid should be considered.        0926 (1 mg)-Given        0845 (1 mg)-Given       1630-Auto Hold       1708-Unhold        0831 (1 mg)-Given        0808 (1 mg)-Given           heparin 100 UNIT/ML injection 5 mL  Dose: 5 mL Freq: EVERY 28 DAYS Route: IK  Start: 09/29/17 1030   Admin Instructions: ONLY to de-access each port in dual implanted port.  Flush with 10 mL NS followed by 5 mL heparin (100 units/mL) at discharge and at least every 28  "days.  MAX: 5 mL per port           (1258)-Not Given           heparin lock flush 10 UNIT/ML injection 5-10 mL  Dose: 5-10 mL Freq: EVERY 24 HOURS Route: IK  Start: 09/29/17 1045   Admin Instructions: To lock each dormant port in dual implanted port.  Check PRN heparin flush order to see when last dose of PRN heparin was given before administering.   MAX: 5 mL per port.           (1258)-Not Given           lidocaine (LMX4) kit  Freq: EVERY 1 HOUR PRN Route: Top  PRN Reason: moderate pain  PRN Comment: with VAD insertion or accessing implanted port  Start: 09/29/17 1028   Admin Instructions: Do NOT give if patient has a history of allergy to any local anesthetic or any \"shaka\" product.   Apply 30 minutes prior to VAD insertion or port access.  MAX Dose:  2.5 g (  of 5 g tube)               lidocaine (LMX4) kit  Freq: EVERY 1 HOUR PRN Route: Top  PRN Reason: moderate pain  PRN Comment: with VAD insertion or accessing implanted port  Start: 09/29/17 1025   Admin Instructions: Do NOT give if patient has a history of allergy to any local anesthetic or any \"shaka\" product.   Apply 30 minutes prior to VAD insertion or port access.  MAX Dose:  2.5 g (  of 5 g tube)               lidocaine (LMX4) kit  Freq: DAILY PRN Route: Top  PRN Reason: moderate pain  Start: 09/26/17 0612   Admin Instructions: Apply to port 30-45 minutes before access         1630-Auto Hold       1708-Unhold             lidocaine 1 % 1 mL  Dose: 1 mL Freq: EVERY 1 HOUR PRN Route: OTHER  PRN Comment: mild pain with VAD insertion or accessing implanted port  Start: 09/29/17 1028   Admin Instructions: Do NOT give if patient has a history of allergy to any local anesthetic or any \"shaka\" product. MAX dose 1 mL subcutaneous OR intradermal in divided doses.               lidocaine 1 % 1 mL  Dose: 1 mL Freq: EVERY 1 HOUR PRN Route: OTHER  PRN Comment: mild pain with VAD insertion or accessing implanted port  Start: 09/29/17 1025   Admin Instructions: Do NOT " "give if patient has a history of allergy to any local anesthetic or any \"shaka\" product. MAX dose 1 mL subcutaneous OR intradermal in divided doses.               magnesium citrate solution 148 mL  Dose: 148 mL Freq: ONCE PRN Route: PO  PRN Reason: constipation  Start: 09/29/17 0853   Admin Instructions: May repeat in one hour x 1 if insufficient results. Avoid in severe renal disease. Hold for loose stools.  This is the third step of a three step constipation treatment protocol.               magnesium sulfate 4 g in 100 mL sterile water (premade)  Dose: 4 g Freq: EVERY 4 HOURS PRN Route: IV  PRN Reason: magnesium supplementation  Start: 09/26/17 1225   Admin Instructions: For serum Mg++ less than 1.6 mg/dL  Give 4 g and recheck magnesium level 2 hours after dose, and next AM.        1603 (4 g)-New Bag        1630-Auto Hold       1708-Unhold             metroNIDAZOLE (FLAGYL) tablet 500 mg  Dose: 500 mg Freq: EVERY 8 HOURS SCHEDULED Route: PO  Indications of Use: CLOSTRIDIUM DIFFICILE  Start: 09/26/17 1030       1234 (500 mg)-Given       1609 (500 mg)-Given       2136 (500 mg)-Given        0625 (500 mg)-Given       (1406)-Not Given       1630-Auto Hold       1708-Unhold       2125 (500 mg)-Given        0555 (500 mg)-Given       1356 (500 mg)-Given       2133 (500 mg)-Given        0628 (500 mg)-Given       [ ] 1400       [ ] 2200           morphine (MS CONTIN) 12 hr tablet 30 mg  Dose: 30 mg Freq: 2 TIMES DAILY Route: PO  Start: 09/26/17 0800   Admin Instructions: DO NOT CRUSH.        0925 (30 mg)-Given       2005 (30 mg)-Given [C]        0845 (30 mg)-Given       1630-Auto Hold       1708-Unhold       2126 (30 mg)-Given        0831 (30 mg)-Given       2003 (30 mg)-Given        0808 (30 mg)-Given       [ ] 2000           multivitamin, therapeutic with minerals (THERA-VIT-M) tablet 1 tablet  Dose: 1 tablet Freq: DAILY Route: PO  Start: 09/26/17 0800   Admin Instructions: If patient is unable to tolerate oral " multivitamins an intravenous dose of multivitamins should be considered.        0925 (1 tablet)-Given        0845 (1 tablet)-Given       1630-Auto Hold       1708-Unhold        0831 (1 tablet)-Given        0808 (1 tablet)-Given           naloxone (NARCAN) injection 0.1-0.4 mg  Dose: 0.1-0.4 mg Freq: EVERY 2 MIN PRN Route: IV  PRN Reason: opioid reversal  Start: 09/26/17 0856   Admin Instructions: For respiratory rate LESS than or EQUAL to 8.  Partial reversal dose:  0.1 mg titrated q 2 minutes for Analgesia Side Effects Monitoring Sedation Level of 3 (frequently drowsy, arousable, drifts to sleep during conversation).Full reversal dose:  0.4 mg bolus for Analgesia Side Effects Monitoring Sedation Level of 4 (somnolent, minimal or no response to stimulation).         1630-Auto Hold       1708-Unhold             nicotine (NICODERM CQ) 14 MG/24HR 24 hr patch 1 patch  Dose: 1 patch Freq: DAILY Route: TD  Start: 09/26/17 0800       0926 (1 patch)-Given        (0847)-Not Given       1630-Auto Hold       1708-Unhold        0833 (1 patch)-Given        0809 (1 patch)-Given           nicotine Patch in Place  Freq: EVERY 8 HOURS Route: TD  Start: 09/26/17 0730   Admin Instructions: Chart every shift, confirming that patch is still in place on patient (no barcode scan needed). See patch order for dose information.               1609 ( )-Patch in Place        0117 ( )-Patch in Place [C]       0847 ( )-Patch Removed       1630-Auto Hold       1708-Unhold       1728 ( )-Patch in Place [C]        0051 ( )-Patch in Place       0835 ( )-Given       1439 ( )-Patch in Place        0050 ( )-Patch in Place       0810 ( )-Patch Removed       [ ] 1530       [ ] 2330           nicotine patch REMOVAL  Freq: DAILY Route: TD  Start: 09/27/17 0800   Admin Instructions: Remove patch when new patch is applied or patch is discontinued.         0847 ( )-Patch Removed       1630-Auto Hold       1708-Unhold        0835 ( )-Patch Removed        0811 (  )-Patch Removed           oxyCODONE (ROXICODONE) IR tablet 10 mg  Dose: 10 mg Freq: EVERY 4 HOURS PRN Route: PO  PRN Reason: moderate to severe pain  Start: 09/26/17 0612        0402 (10 mg)-Given       0845 (10 mg)-Given       1406 (10 mg)-Given       1630-Auto Hold       1708-Unhold       1822 (10 mg)-Given        0458 (10 mg)-Given       1625 (10 mg)-Given        0143 (10 mg)-Given       1305 (10 mg)-Given           pantoprazole (PROTONIX) EC tablet 40 mg  Dose: 40 mg Freq: 2 TIMES DAILY BEFORE MEALS Route: PO  Start: 09/26/17 0630   Admin Instructions: DO NOT CRUSH.        0926 (40 mg)-Given       1609 (40 mg)-Given        0625 (40 mg)-Given       1630-Auto Hold       1708-Unhold       1822 (40 mg)-Given        0555 (40 mg)-Given       1545 (40 mg)-Given        0628 (40 mg)-Given       [ ] 1630           PARoxetine (PAXIL) tablet 10 mg  Dose: 10 mg Freq: AT BEDTIME Route: PO  Start: 09/26/17 2200       2135 (10 mg)-Given        1630-Auto Hold       1708-Unhold       2125 (10 mg)-Given        2133 (10 mg)-Given        [ ] 2200           polyethylene glycol (MIRALAX/GLYCOLAX) Packet 17 g  Dose: 17 g Freq: DAILY PRN Route: PO  PRN Reason: constipation  Start: 09/29/17 0853   Admin Instructions: Give in 8oz of  water, juice, or soda. Hold for loose stools.  This is the second step of a three step constipation treatment protocol.  1 Packet = 17 grams. Mixed prescribed dose in 8 ounces of water. Follow with 8 oz. of water.               potassium chloride (KLOR-CON) Packet 20-40 mEq  Dose: 20-40 mEq Freq: EVERY 2 HOURS PRN Route: ORAL OR FEED  PRN Reason: potassium supplementation  Start: 09/27/17 0926   Admin Instructions: Use if unable to tolerate tablets.  If Serum K+ 3.0-3.3, dose = 60 mEq po total dose (40 mEq x1 followed in 2 hours by 20 mEq x1). Recheck K+ level 4 hours after dose and the next AM.  If Serum K+ 2.5-2.9, dose = 80 mEq po total dose (40 mEq Q2H x2). Recheck K+ level 4 hours after dose and the next  AM.  If Serum K+ less than 2.5, See IV order.  Dissolve packet contents in 4-8 ounces of cold water or juice.         1630-Auto Hold       1708-Unhold             potassium chloride 10 mEq in 100 mL intermittent infusion  Dose: 10 mEq Freq: EVERY 1 HOUR PRN Route: IV  PRN Reason: potassium supplementation  Start: 09/27/17 0926   Admin Instructions: Infuse via PERIPHERAL LINE or CENTRAL LINE. Use for central line replacement if patient weight less than 65 kg, if patient is on TPN with high potassium content or if unit does not stock 20 mEq bags.   If Serum K+ 3.0-3.3, dose = 10 mEq/hr x4 doses (40 mEq IV total dose). Recheck K+ level 2 hours after dose and the next AM.   If Serum K+ less than 3.0, dose = 10 mEq/hr x6 doses (60 mEq IV total dose). Recheck K+ level 2 hours after dose and the next AM.         1630-Auto Hold       1708-Unhold             potassium chloride 10 mEq in 100 mL intermittent infusion with 10 mg lidocaine  Dose: 10 mEq Freq: EVERY 1 HOUR PRN Route: IV  PRN Reason: potassium supplementation  Start: 09/27/17 0926   Admin Instructions: Infuse via PERIPHERAL LINE. Use potassium with lidocaine for pain with peripheral administration.  If Serum K+ 3.0-3.3, dose = 10 mEq/hr x4 doses (40 mEq IV total dose). Recheck K+ level 2 hours after dose and the next AM.  If Serum K+ less than 3.0, dose = 10 mEq/hr x6 doses (60 mEq IV total dose). Recheck K+ level 2 hours after dose and the next AM.         1630-Auto Hold       1708-Unhold             potassium chloride 20 mEq in 50 mL intermittent infusion  Dose: 20 mEq Freq: EVERY 1 HOUR PRN Route: IV  PRN Reason: potassium supplementation  Start: 09/27/17 0926   Admin Instructions: Infuse via CENTRAL LINE Only. May need EKG if less than 65 kg or on TPN - Max rate is 0.3 mEq/kg/hr for patients not on EKG monitoring.   If Serum K+ 3.0-3.3, dose = 20 mEq/hr x2 doses (40 mEq IV total dose). Recheck K+ level 2 hours after dose and the next AM.  If Serum K+ less than  3.0, dose = 20 mEq/hr x3 doses (60 mEq IV total dose). Recheck K+ level 2 hours after dose and the next AM.         1630-Auto Hold       1708-Unhold             potassium chloride SA (K-DUR/KLOR-CON M) CR tablet 20-40 mEq  Dose: 20-40 mEq Freq: EVERY 2 HOURS PRN Route: PO  PRN Reason: potassium supplementation  Start: 09/27/17 0926   Admin Instructions: Use if able to take PO.   If Serum K+ 3.0-3.3, dose = 60 mEq po total dose (40 mEq x1 followed in 2 hours by 20 mEq x1). Recheck K+ level 4 hours after dose and the next AM.  If Serum K+ 2.5-2.9, dose = 80 mEq po total dose (40 mEq Q2H x2). Recheck K+ level 4 hours after dose and the next AM.  If Serum K+ less than 2.5, See IV order.  DO NOT CRUSH         1630-Auto Hold       1708-Unhold       1836 (40 mEq)-Given       2126 (20 mEq)-Given             prochlorperazine (COMPAZINE) injection 5-10 mg  Dose: 5-10 mg Freq: EVERY 6 HOURS PRN Route: IV  PRN Reasons: nausea,vomiting  Start: 09/26/17 0858   Admin Instructions: This is Step 2 of nausea and vomiting management.   If nausea not resolved in 15 minutes, give metoclopramide (REGLAN) if ordered (step 3 of nausea and vomiting management)         1630-Auto Hold       1708-Unhold            Or  prochlorperazine (COMPAZINE) tablet 5-10 mg  Dose: 5-10 mg Freq: EVERY 6 HOURS PRN Route: PO  PRN Reason: vomiting  Start: 09/26/17 0858   Admin Instructions: This is Step 2 of nausea and vomiting management.   If nausea not resolved in 15 minutes, give metoclopramide (REGLANI) if ordered (step 3 of nausea and vomiting management)         1630-Auto Hold       1708-Unhold            Or  prochlorperazine (COMPAZINE) Suppository 25 mg  Dose: 25 mg Freq: EVERY 12 HOURS PRN Route: RE  PRN Reasons: nausea,vomiting  Start: 09/26/17 0858   Admin Instructions: This is Step 2 of nausea and vomiting management.   If nausea not resolved in 15 minutes, give metoclopramide (REGLAN) if ordered (step 3 of nausea and vomiting management)          1630-Auto Hold       1708-Unhold             senna-docusate (SENOKOT-S;PERICOLACE) 8.6-50 MG per tablet 1-2 tablet  Dose: 1-2 tablet Freq: 2 TIMES DAILY Route: PO  Start: 09/29/17 0900   Admin Instructions: Start with 1 tablet PO BID, If no bowel movement in 24 hours, increase to 2 tablets PO BID.  Hold for loose stools.           0950 (2 tablet)-Given       [ ] 2000           sodium chloride (PF) 0.9% PF flush 10-20 mL  Dose: 10-20 mL Freq: EVERY 1 HOUR PRN Route: IK  PRN Reasons: line flush,post meds or blood draw  PRN Comment: To flush each CVC Implanted port.  Start: 09/29/17 1029   Admin Instructions: 10 mL post IV meds;   20 mL post blood draw.               sodium chloride (PF) 0.9% PF flush 10-20 mL  Dose: 10-20 mL Freq: EVERY 1 HOUR PRN Route: IK  PRN Reasons: line flush,post meds or blood draw  PRN Comment: To flush each CVC Implanted port.  Start: 09/29/17 1025   Admin Instructions: 10 mL post IV meds;   20 mL post blood draw.               tamsulosin (FLOMAX) capsule 0.4 mg  Dose: 0.4 mg Freq: DAILY Route: PO  Start: 09/28/17 0845   Admin Instructions: Administer 30 minutes after the same meal each day.  Capsules should be swallowed whole; do not crush chew or open.          0918 (0.4 mg)-Given        0808 (0.4 mg)-Given           thiamine tablet 100 mg  Dose: 100 mg Freq: DAILY Route: PO  Start: 09/26/17 0800   End: 10/01/17 0759   Admin Instructions: Administer first dose as soon as order set is initiated unless given in ED.   If patient is unable to tolerate oral thiamine, an intravenous dose of thiamine should be considered.        0926 (100 mg)-Given        0845 (100 mg)-Given       1630-Auto Hold       1708-Unhold        0831 (100 mg)-Given        0808 (100 mg)-Given          Completed Medications  Medications 09/23/17 09/24/17 09/25/17 09/26/17 09/27/17 09/28/17 09/29/17         Dose: 6 mL Freq: ONCE Route: IV  Start: 09/27/17 1500   End: 09/27/17 1508        1508 (6 mL)-Given               Dose:  0.5 mL Freq: PRIOR TO DISCHARGE Route: IM  Start: 09/27/17 1000   End: 09/29/17 1135   Admin Instructions: Administer when afebrile (less than 100.4 F) x 24 hours, or Prior to Discharge. If not administering when scheduled , change the due time by following the instructions in the reference link below. If patient refuses vaccine, chart as Vaccine Refused.         1147-Hold         1135 (0.5 mL)-Given             Dose: 10 mL Freq: ONCE Route: IV  Start: 09/27/17 1500   End: 09/27/17 1508        1508 (10 mL)-Given            Discontinued Medications  Medications 09/23/17 09/24/17 09/25/17 09/26/17 09/27/17 09/28/17 09/29/17         Dose: 2.5 mg Freq: EVERY 4 HOURS PRN Route: NEBULIZATION  PRN Reason: shortness of breath / dyspnea  Start: 09/27/17 1641   End: 09/27/17 1708        1708-Med Discontinued           Dose: 4 mg Freq: ONCE PRN Route: IV  PRN Reason: nausea  PRN Comment: nausea  (If not administered in the OR and Post Op Nausea and Vomiting persists after droperidol dosing if ordered)  Start: 09/27/17 1641   End: 09/27/17 1708        1708-Med Discontinued           Dose: 4 mg Freq: ONCE PRN Route: IV  PRN Reason: other  PRN Comment: nausea  (If not administered in the OR and Post Op Nausea and Vomiting persists after droperidol dosing if ordered)  Start: 09/27/17 1641   End: 09/27/17 1708   Admin Instructions: May repeat times 1         1708-Med Discontinued           Dose: 2 g Freq: 4 TIMES DAILY Route: Top  Start: 09/26/17 0800   End: 09/27/17 1824   Admin Instructions: Apply to hands. Use dosing card.   Send dosing card with product.        (1235)-Not Given       (1236)-Not Given       (1826)-Not Given       2139 (2 g)-Given        0626 (2 g)-Given              (1241)-Not Given       1630-Auto Hold       1708-Unhold       (1824)-Not Given       1824-Med Discontinued           Dose: 25-50 mcg Freq: EVERY 2 MIN PRN Route: IV  PRN Reason: other  PRN Comment: acute pain  Start: 09/27/17 1641   End: 09/27/17  1708   Admin Instructions: MAX cumulative dose = 250 mcg.    Use Fentanyl initially, as a short acting agent for acute pain control.  If insufficient, or a longer acting agent is needed, begin Morphine or Hydromorphone if ordered.         1708-Med Discontinued           Dose: 0.3-0.5 mg Freq: EVERY 5 MIN PRN Route: IV  PRN Reason: other  PRN Comment: acute pain.  May administer if Respiratory Rate is greater than 10  Start: 09/27/17 1641   End: 09/27/17 1708   Admin Instructions: If fentanyl is also ordered, use HYDROmorphone if pain control insufficient with fentanyl or a longer acting agent is needed.   Max cumulative dose = 2 mg         1708-Med Discontinued           Dose: 25 mg Freq: EVERY 6 HOURS PRN Route: PO  PRN Reason: other  PRN Comment: adjuvant pain  Start: 09/27/17 1642   End: 09/27/17 1708        1708-Med Discontinued        Or    Dose: 50 mg Freq: EVERY 6 HOURS PRN Route: PO  PRN Reason: other  PRN Comment: adjuvant pain  Start: 09/27/17 1642   End: 09/27/17 1708        1708-Med Discontinued           Rate: 100 mL/hr Freq: CONTINUOUS Route: IV  Start: 09/27/17 1645   End: 09/27/17 1708   Admin Instructions: Continue until IV catheter is weaned                1708-Med Discontinued           Dose: 1-2 mg Freq: EVERY 5 MIN PRN Route: IV  PRN Reason: high blood pressure  PRN Comment: for Systemic Blood Pressure greater than 160 and Heart Rate greater than 60 bpm.    Start: 09/27/17 1642   End: 09/27/17 1708   Admin Instructions: Max cumulative dose = 10 mg.  For PACU USE ONLY.  DC WHEN TRANSFERRED TO FLOOR.         1708-Med Discontinued           Dose: 0.5-1 mg Freq: EVERY 5 MIN PRN Route: IV  PRN Reason: muscle spasms  Start: 09/27/17 1641   End: 09/27/17 1708   Admin Instructions: Max cumulative dose = 2 mg         1708-Med Discontinued           Dose: 4 mg Freq: EVERY 30 MIN PRN Route: PO  PRN Reason: nausea  Start: 09/27/17 1641   End: 09/27/17 1708   Admin Instructions: MAX total dose = 8 mg,  including OR dosing. If not resolved in 15 minutes, then go to step 2 (Prochlorperazine if ordered).         1708-Med Discontinued        Or    Dose: 4 mg Freq: EVERY 30 MIN PRN Route: IV  PRN Reason: nausea  Start: 09/27/17 1641   End: 09/27/17 1708   Admin Instructions: MAX total dose = 8 mg, including OR dosing. If not resolved in 15 minutes, then go to step 2 (Prochlorperazine if ordered).  Irritant.         1708-Med Discontinued      Medications 09/23/17 09/24/17 09/25/17 09/26/17 09/27/17 09/28/17 09/29/17

## 2017-09-25 NOTE — IP AVS SNAPSHOT
"          [facility] SURGICAL: [phone]                                              INTERAGENCY TRANSFER FORM - LAB / IMAGING / EKG / EMG RESULTS   2017                    Hospital Admission Date: 2017  JOHN SANCHEZ   : 1960  Sex: Male        Attending Provider: Cristian Pino MD     Allergies:  Lubriderm    Infection:  None   Service:  GENERAL MEDI    Ht:  1.807 m (5' 11.14\")   Wt:  66.9 kg (147 lb 7.8 oz)   Admission Wt:  65.7 kg (144 lb 13.5 oz)    BMI:  20.49 kg/m 2   BSA:  1.83 m 2            Patient PCP Information     Provider PCP Type    Pawan Hampton MD General         Lab Results - 3 Days      Phosphorus [[id number]]  Resulted: 17 0709, Result status: Final result    Ordering provider: Cristian Batista MD  17 0000 Resulting lab: [facility]    Specimen Information    Type Source Collected On   Blood  17 0608          Components       Value Reference Range Flag Lab   Phosphorus 3.1 2.5 - 4.5 mg/dL  59            Comprehensive metabolic panel [[id number]] (Abnormal)  Resulted: 17 0638, Result status: Final result    Ordering provider: Cristian Batista MD  17 0000 Resulting lab: [facility]    Specimen Information    Type Source Collected On   Blood  17 0608          Components       Value Reference Range Flag Lab   Sodium 145 133 - 144 mmol/L H 59   Potassium 3.8 3.4 - 5.3 mmol/L  59   Chloride 116 94 - 109 mmol/L H 59   Carbon Dioxide 24 20 - 32 mmol/L  59   Anion Gap 5 3 - 14 mmol/L  59   Glucose 112 70 - 99 mg/dL H 59   Urea Nitrogen 9 7 - 30 mg/dL  59   Creatinine 0.80 0.66 - 1.25 mg/dL  59   GFR Estimate >90 >60 mL/min/1.7m2  59   Comment:  Non  GFR Calc   GFR Estimate If Black >90 >60 mL/min/1.7m2  59   Comment:  African American GFR Calc   Calcium 8.9 8.5 - 10.1 mg/dL  59   Bilirubin Total 0.6 0.2 - 1.3 mg/dL  59   Albumin 3.3 3.4 - 5.0 g/dL L 59   Protein Total " 6.8 6.8 - 8.8 g/dL  59   Alkaline Phosphatase 110 40 - 150 U/L  59   ALT 17 0 - 70 U/L  59   AST 25 0 - 45 U/L  59            CBC with platelets [[id number]] (Abnormal)  Resulted: 09/28/17 0622, Result status: Final result    Ordering provider: Cristian Batista MD  09/28/17 0000 Resulting lab: [facility]    Specimen Information    Type Source Collected On   Blood  09/28/17 0608          Components       Value Reference Range Flag Lab   WBC 7.2 4.0 - 11.0 10e9/L  59   RBC Count 3.59 4.4 - 5.9 10e12/L L 59   Hemoglobin 9.3 13.3 - 17.7 g/dL L 59   Hematocrit 28.1 40.0 - 53.0 % L 59   MCV 78 78 - 100 fl  59   MCH 25.9 26.5 - 33.0 pg L 59   MCHC 33.1 31.5 - 36.5 g/dL  59   RDW 16.1 10.0 - 15.0 % H 59   Platelet Count 173 150 - 450 10e9/L  59            Blood culture [[id number]]  Resulted: 09/28/17 0607, Result status: Preliminary result    Ordering provider: Capo Fuentes MD  09/25/17 2109 Resulting lab: [facility]    Specimen Information    Type Source Collected On   Blood Portacath 09/25/17 2035          Components       Value Reference Range Flag Lab   Specimen Description Blood      Special Requests PORT   59   Culture Micro No growth after 3 days   59            Blood culture [[id number]]  Resulted: 09/28/17 0607, Result status: Preliminary result    Ordering provider: Capo Fuentes MD  09/25/17 2109 Resulting lab: [facility]    Specimen Information    Type Source Collected On   Blood Arm, Forearm Only, Left 09/25/17 2135          Components       Value Reference Range Flag Lab   Specimen Description Blood      Special Requests Left Arm   59   Culture Micro No growth after 3 days   59            Potassium [[id number]]  Resulted: 09/28/17 0204, Result status: Final result    Ordering provider: Cristian Batista MD  09/27/17 2150 Resulting lab: [facility]    Specimen Information    Type Source Collected On   Blood  09/28/17 0150           Components       Value Reference Range Flag Lab   Potassium 4.0 3.4 - 5.3 mmol/L  59            Urine Culture [[id number]]  Resulted: 09/27/17 1200, Result status: Final result    Ordering provider: Capo Fuentes MD  09/25/17 2109 Resulting lab: [facility]    Specimen Information    Type Source Collected On   Catheterized Urine Urine catheter 09/26/17 0525          Components       Value Reference Range Flag Lab   Specimen Description Catheterized Urine      Special Requests Specimen received in preservative   75   Culture Micro No growth   225            Comprehensive metabolic panel [[id number]] (Abnormal)  Resulted: 09/27/17 0831, Result status: Final result    Ordering provider: Morena Byrd PA-C  09/27/17 0000 Resulting lab: [facility]    Specimen Information    Type Source Collected On   Blood  09/27/17 0800          Components       Value Reference Range Flag Lab   Sodium 145 133 - 144 mmol/L H 59   Potassium 3.3 3.4 - 5.3 mmol/L L 59   Chloride 114 94 - 109 mmol/L H 59   Carbon Dioxide 24 20 - 32 mmol/L  59   Anion Gap 7 3 - 14 mmol/L  59   Glucose 105 70 - 99 mg/dL H 59   Urea Nitrogen 9 7 - 30 mg/dL  59   Creatinine 0.71 0.66 - 1.25 mg/dL  59   GFR Estimate >90 >60 mL/min/1.7m2  59   Comment:  Non  GFR Calc   GFR Estimate If Black >90 >60 mL/min/1.7m2  59   Comment:  African American GFR Calc   Calcium 8.8 8.5 - 10.1 mg/dL  59   Bilirubin Total 0.6 0.2 - 1.3 mg/dL  59   Albumin 3.3 3.4 - 5.0 g/dL L 59   Protein Total 6.8 6.8 - 8.8 g/dL  59   Alkaline Phosphatase 111 40 - 150 U/L  59   ALT 18 0 - 70 U/L  59   AST 21 0 - 45 U/L  59            CBC with platelets differential [[id number]] (Abnormal)  Resulted: 09/27/17 0819, Result status: Final result    Ordering provider: Morena Byrd PA-C  09/27/17 0000 Resulting lab: [facility]    Specimen Information    Type Source Collected On   Blood  09/27/17 0800           Components       Value Reference Range Flag Lab   WBC 6.1 4.0 - 11.0 10e9/L  59   RBC Count 3.44 4.4 - 5.9 10e12/L L 59   Hemoglobin 9.0 13.3 - 17.7 g/dL L 59   Hematocrit 26.6 40.0 - 53.0 % L 59   MCV 77 78 - 100 fl L 59   MCH 26.2 26.5 - 33.0 pg L 59   MCHC 33.8 31.5 - 36.5 g/dL  59   RDW 15.6 10.0 - 15.0 % H 59   Platelet Count 154 150 - 450 10e9/L  59   Diff Method Automated Method   59   % Neutrophils 64.7 %  59   % Lymphocytes 24.5 %  59   % Monocytes 8.4 %  59   % Eosinophils 2.0 %  59   % Basophils 0.2 %  59   % Immature Granulocytes 0.2 %  59   Absolute Neutrophil 3.9 1.6 - 8.3 10e9/L  59   Absolute Lymphocytes 1.5 0.8 - 5.3 10e9/L  59   Absolute Monocytes 0.5 0.0 - 1.3 10e9/L  59   Absolute Eosinophils 0.1 0.0 - 0.7 10e9/L  59   Absolute Basophils 0.0 0.0 - 0.2 10e9/L  59   Abs Immature Granulocytes 0.0 0 - 0.4 10e9/L  59            Troponin I [[id number]]  Resulted: 09/27/17 0714, Result status: Final result    Ordering provider: Cristian Batista MD  09/27/17 0000 Resulting lab: [facility]    Specimen Information    Type Source Collected On   Blood  09/27/17 0620          Components       Value Reference Range Flag Lab   Troponin I ES <0.015 0.000 - 0.045 ug/L  59   Comment:         The 99th percentile for upper reference range is 0.045 ug/L.  Troponin values   in the range of 0.045 - 0.120 ug/L may be associated with risks of adverse   clinical events.              Magnesium [[id number]] (Abnormal)  Resulted: 09/26/17 2138, Result status: Final result    Ordering provider: Cristian Batista MD  09/26/17 2106 Resulting lab: [facility]    Specimen Information    Type Source Collected On   Blood  09/26/17 2107          Components       Value Reference Range Flag Lab   Magnesium 2.7 1.6 - 2.3 mg/dL H 59            Folate [[id number]]  Resulted: 09/26/17 1421, Result status: Final result    Ordering provider: Morena Byrd PA-C  09/26/17 0732  Resulting lab: [facility]    Specimen Information    Type Source Collected On   Blood  09/26/17 0900          Components       Value Reference Range Flag Lab   Folate 32.0 >5.4 ng/mL  51            Cortisol [[id number]]  Resulted: 09/26/17 1325, Result status: Final result    Ordering provider: Morena Byrd PA-C  09/26/17 0929 Resulting lab: [facility]    Specimen Information    Type Source Collected On   Blood  09/26/17 0900          Components       Value Reference Range Flag Lab   Cortisol Serum 13.0 4 - 22 ug/dL  51   Comment:         8 AM Cortisol Reference Range = 4-22 ug/dL  4 PM Cortisol Reference Range = 3-17 ug/dL              T4 free [[id number]]  Resulted: 09/26/17 1016, Result status: Final result    Ordering provider: Morena Byrd PA-C  09/26/17 0900 Resulting lab: [facility]    Specimen Information    Type Source Collected On     09/26/17 0900          Components       Value Reference Range Flag Lab   T4 Free 0.80 0.76 - 1.46 ng/dL  59            Clostridium difficile toxin B PCR [[id number]] (Abnormal)  Resulted: 09/26/17 1002, Result status: Final result    Ordering provider: Capo Fuentes MD  09/25/17 2219 Resulting lab: [facility]    Specimen Information    Type Source Collected On   Feces  09/25/17 2213          Components       Value Reference Range Flag Lab   Specimen Description Feces   59   C Diff Toxin B PCR Positive NEG^Negative A 75   Comment:         Positive: Toxin producing Clostridium difficile target DNA sequences detected,   presumed positive for Clostridium difficile toxin B.  Clostridium difficile (Requires Enteric Isolation)  FDA approved assay performed using I[employer] GeneXpert real-time PCR.              TSH [[id number]] (Abnormal)  Resulted: 09/26/17 0938, Result status: Final result    Ordering provider: Morena Byrd PA-C  09/26/17 0732  Resulting lab: [facility]    Specimen Information    Type Source Collected On   Blood  09/26/17 0900          Components       Value Reference Range Flag Lab   TSH 0.17 0.40 - 4.00 mU/L L 59            Magnesium [[id number]] (Abnormal)  Resulted: 09/26/17 0934, Result status: Final result    Ordering provider: Morena Byrd PA-C  09/26/17 0732 Resulting lab: [facility]    Specimen Information    Type Source Collected On   Blood  09/26/17 0900          Components       Value Reference Range Flag Lab   Magnesium 1.5 1.6 - 2.3 mg/dL L 59            Ammonia [[id number]]  Resulted: 09/26/17 0925, Result status: Final result    Ordering provider: Morena Byrd PA-C  09/26/17 0732 Resulting lab: [facility]    Specimen Information    Type Source Collected On   Blood  09/26/17 0900          Components       Value Reference Range Flag Lab   Ammonia 17 10 - 50 umol/L  59            Troponin I [[id number]]  Resulted: 09/26/17 0712, Result status: Final result    Ordering provider: Capo Fuentes MD  09/26/17 0600 Resulting lab: [facility]    Specimen Information    Type Source Collected On   Blood  09/26/17 0640          Components       Value Reference Range Flag Lab   Troponin I ES <0.015 0.000 - 0.045 ug/L  59   Comment:         The 99th percentile for upper reference range is 0.045 ug/L.  Troponin values   in the range of 0.045 - 0.120 ug/L may be associated with risks of adverse   clinical events.              Basic metabolic panel [[id number]] (Abnormal)  Resulted: 09/26/17 0712, Result status: Final result    Ordering provider: Chris Persaud MD  09/26/17 0634 Resulting lab: [facility]    Specimen Information    Type Source Collected On   Blood  09/26/17 0640          Components       Value Reference Range Flag Lab   Sodium 139 133 - 144 mmol/L  59   Potassium 3.3 3.4 - 5.3 mmol/L L 59   Chloride 109 94 - 109  mmol/L  59   Carbon Dioxide 21 20 - 32 mmol/L  59   Anion Gap 9 3 - 14 mmol/L  59   Glucose 85 70 - 99 mg/dL  59   Urea Nitrogen 12 7 - 30 mg/dL  59   Creatinine 0.69 0.66 - 1.25 mg/dL  59   GFR Estimate >90 >60 mL/min/1.7m2  59   Comment:  Non  GFR Calc   GFR Estimate If Black >90 >60 mL/min/1.7m2  59   Comment:  African American GFR Calc   Calcium 8.4 8.5 - 10.1 mg/dL L 59            CBC with platelets differential [[id number]] (Abnormal)  Resulted: 09/26/17 0653, Result status: Final result    Ordering provider: Chris Persaud MD  09/26/17 0634 Resulting lab: [facility]    Specimen Information    Type Source Collected On   Blood  09/26/17 0640          Components       Value Reference Range Flag Lab   WBC 6.5 4.0 - 11.0 10e9/L  59   RBC Count 3.22 4.4 - 5.9 10e12/L L 59   Hemoglobin 8.6 13.3 - 17.7 g/dL L 59   Hematocrit 25.1 40.0 - 53.0 % L 59   MCV 78 78 - 100 fl  59   MCH 26.7 26.5 - 33.0 pg  59   MCHC 34.3 31.5 - 36.5 g/dL  59   RDW 15.8 10.0 - 15.0 % H 59   Platelet Count 143 150 - 450 10e9/L L 59   Diff Method Automated Method   59   % Neutrophils 57.7 %  59   % Lymphocytes 31.1 %  59   % Monocytes 9.3 %  59   % Eosinophils 1.4 %  59   % Basophils 0.3 %  59   % Immature Granulocytes 0.2 %  59   Absolute Neutrophil 3.7 1.6 - 8.3 10e9/L  59   Absolute Lymphocytes 2.0 0.8 - 5.3 10e9/L  59   Absolute Monocytes 0.6 0.0 - 1.3 10e9/L  59   Absolute Eosinophils 0.1 0.0 - 0.7 10e9/L  59   Absolute Basophils 0.0 0.0 - 0.2 10e9/L  59   Abs Immature Granulocytes 0.0 0 - 0.4 10e9/L  59            Drug Screen Urine [[id number]] (Abnormal)  Resulted: 09/26/17 0603, Result status: Final result    Ordering provider: Capo Fuentes MD  09/25/17 2774 Resulting lab: [facility]    Specimen Information    Type Source Collected On   Urine Urine catheter 09/26/17 0544          Components       Value Reference Range Flag Lab   Amphetamine Qual Urine Negative NEG^Negative  59    Comment:  Cutoff for a negative amphetamine is 500 ng/mL or less.   Barbiturates Qual Urine Negative NEG^Negative  59   Comment:  Cutoff for a negative barbiturate is 200 ng/mL or less.   Benzodiazepine Qual Urine Negative NEG^Negative  59   Comment:  Cutoff for a negative benzodiazepine is 200 ng/mL or less.   Cannabinoids Qual Urine Negative NEG^Negative  59   Comment:  Cutoff for a negative cannabinoid is 50 ng/mL or less.   Cocaine Qual Urine Negative NEG^Negative  59   Comment:  Cutoff for a negative cocaine is 300 ng/mL or less.   Opiates Qualitative Urine Positive NEG^Negative A 59   Comment:         Cutoff for a positive opiate is greater than 300 ng/mL. This is an unconfirmed   screening result to be used for medical purposes only.     PCP Qual Urine Negative NEG^Negative  59   Comment:  Cutoff for a negative PCP is 25 ng/mL or less.            UA with Microscopic [[id number]]  Resulted: 09/26/17 0547, Result status: Final result    Ordering provider: Capo Fuentes MD  09/25/17 210 Resulting lab: [facility]    Specimen Information    Type Source Collected On   Midstream Urine Urine catheter 09/26/17 0525          Components       Value Reference Range Flag Lab   Color Urine Light Yellow   59   Appearance Urine Clear   59   Glucose Urine Negative NEG^Negative mg/dL  59   Bilirubin Urine Negative NEG^Negative  59   Ketones Urine Negative NEG^Negative mg/dL  59   Specific Gravity Urine 1.003 1.003 - 1.035  59   Blood Urine Negative NEG^Negative  59   pH Urine 7.0 5.0 - 7.0 pH  59   Protein Albumin Urine Negative NEG^Negative mg/dL  59   Urobilinogen mg/dL Normal 0.0 - 2.0 mg/dL  59   Nitrite Urine Negative NEG^Negative  59   Leukocyte Esterase Urine Negative NEG^Negative  59   Source Midstream Urine   59   WBC Urine <1 0 - 2 /HPF  59   RBC Urine <1 0 - 2 /HPF  59            Testing Performed By     Lab - Abbreviation Name Director Address Valid Date Range    51 - Unknown [facility]  "[facility] [place] Unknown [address] [place] [address] 12/31/14 1010 - Present    59 - Unknown [facility] Unknown [address] [address] 12/31/14 1006 - Present    75 - Unknown [facility] Unknown [address] [facility] [address] 01/15/15 1019 - Present    225 - Unknown [facility] Unknown [address] [facility] [address] 12/19/14 0954 - Present            Unresulted Labs (24h ago through future)    Start       Ordered    Unscheduled  Magnesium  (Magnesium Replacement -  Adult - \"Standard\" - Replacement for all levels less than 1.6 mg/dL )  CONDITIONAL (SPECIFY),   Routine     Comments:  Obtain Magnesium Level for these conditions:  *IF no magnesium result within 24 hrs before initiation of order set, draw magnesium level with next lab collect.    *2 HOURS AFTER last magnesium replacement dose when magnesium replacement given for level less than 1.6   *Next morning after magnesium dose.     Repeat Magnesium Replacement if necessary.    09/26/17 1225    Unscheduled  Potassium  (Potassium Replacement - \"Standard\" - For K levels less than 3.4 mmol/L - UU,UR,UA,RH,SH,PH,WY )  CONDITIONAL (SPECIFY),   Routine     Comments:  Obtain Potassium Level for these conditions:  *IF no potassium result within 24 hours before initiation of order set, draw potassium level with next lab collect.    *2 HOURS AFTER last IV potassium replacement dose and 4 hours after an oral replacement dose.  *Next morning after potassium dose.     Repeat Potassium Replacement if necessary.    09/27/17 0926         Imaging Results - 3 Days      MR Brain w/o & w Contrast [[id number]]  Resulted: 09/27/17 1631, Result status: Final result    Ordering provider: Cristian Batista MD  09/27/17 0918 Resulted by: Mark De Los Santos MD    Performed: 09/27/17 1447 - 09/27/17 1615 Resulting lab: RADIOLOGY RESULTS    Narrative:       MRI OF " THE BRAIN WITHOUT AND WITH CONTRAST 9/27/2017 4:15 PM     COMPARISON: Brain MRI one day prior (limited by motion). Brain MRI  4/21/2017.    HISTORY: Altered mental status. Lung cancer, rule out metastasis.      TECHNIQUE: Axial diffusion-weighted with ADC map, axial T2-weighted  with fat saturation, axial T1-weighted, axial turboFLAIR and coronal  T1-weighted images of the brain were acquired without intravenous  contrast.  Following intravenous administration of gadolinium (6 mL  Gadavist), axial T1-weighted images of the brain were acquired.     FINDINGS: There is mild diffuse cerebral volume loss. There are a few  tiny scattered focal areas of abnormal T2 signal hyperintensity in the  cerebral white matter bilaterally that are consistent with sequela of  chronic small vessel ischemic disease.    The ventricles and basal cisterns are within normal limits in  configuration given the degree of cerebral volume loss. There is no  midline shift. There are no extra-axial fluid collections. There is no  evidence for stroke or acute intracranial hemorrhage. Again noted is  an irregularly-shaped, enhancing lesion within the sella extending to  the suprasellar cistern consistent with a pituitary adenoma. This  lesion measures 1.9 x 1.7 x 1.6 cm in the AP, transverse and  cephalocaudad dimensions respectively which is increased in size from  1.5 x 1.5 x 1.3 cm in the same dimensions on the comparison study.  This mass is again noted to abut and likely displace the optic chiasm.  There is no other abnormal contrast enhancement in the brain or its  coverings.    There is no sinusitis or mastoiditis.      Impression:       IMPRESSION:   1. Interval increase in size of the enhancing mass within the sella  consistent with a pituitary adenoma; however, a metastatic lesion from  the patient's lung cancer cannot be completely excluded. No additional  enhancing intracranial lesions are identified.  2. Diffuse cerebral volume loss and  cerebral white matter changes  consistent with chronic small vessel ischemic disease. No evidence for  acute intracranial pathology.    MIRACLE VINCENT MD      CT Head w/o Contrast [[id number]]  Resulted: 09/26/17 2301, Result status: Final result    Ordering provider: Capo Fuentes MD  09/25/17 2301 Resulted by: Jeff Griffin MD    Performed: 09/25/17 2308 - 09/25/17 2314 Resulting lab: RADIOLOGY RESULTS    Narrative:       CT HEAD W/O CONTRAST  9/25/2017 11:14 PM      HISTORY: Altered mental status. Metastatic non-small cell lung cancer.    TECHNIQUE: Routine noncontrast head CT. Radiation dose for this scan  was reduced using automated exposure control, adjustment of the mA  and/or kV according to patient size, or iterative reconstruction  technique.    COMPARISON: MRI 4/21/2017.    FINDINGS: Brain volume is within normal limits for age. There is a  pituitary mass as seen on the previous MRI. No other mass, mass effect  or intracranial hemorrhage. No evidence of acute infarct.  Periventricular low attenuation is consistent with chronic small  vessel ischemic disease. The visualized paranasal sinuses are clear.      Impression:       IMPRESSION: No acute abnormality.    JEFF GRIFFIN MD      MR Brain w/o & w Contrast [[id number]]  Resulted: 09/26/17 1512, Result status: Final result    Ordering provider: Morena Byrd PA-C  09/26/17 0900 Resulted by: Rohan Garibay MD    Performed: 09/26/17 1000 - 09/26/17 1419 Resulting lab: RADIOLOGY RESULTS    Narrative:       MRI BRAIN WITHOUT AND WITH CONTRAST  9/26/2017 2:19 PM    HISTORY:  Alerted mental status. Rule out brain metastasis.    TECHNIQUE:  Multiplanar, multisequence MRI of the brain without and  with 6 mL Gadavist.    COMPARISON: Head CT 9/25/2017. Brain MR 4/21/2017.    FINDINGS: Images are severely degraded by motion artifact. No definite  large intracranial mass is identified noting limitations. There is no  mass effect or midline shift. The  ventricles are not enlarged out of  proportion to the cerebral sulci. Mild diffuse parenchymal volume  loss. Patchy deep and subcortical white matter T2 hyperintensities are  nonspecific.    Enhancing sellar mass extends superiorly into the suprasellar cistern  and likely abuts the optic chiasm. No definite other intracranial  enhancing mass is identified noting marked limitations given motion  artifact.    Polypoid mucosal thickening in the left maxillary sinus.       Impression:       IMPRESSION:      1. Images are severely degraded by motion artifact.  2. Evaluation for metastatic disease is limited given the motion. No  definite large intracranial mass is identified, but smaller lesions as  well as possible leptomeningeal disease cannot be excluded.  3. Enhancing expansile mass in the sella extending into the  suprasellar cistern. This may represent a pituitary macroadenoma,  although it is poorly characterized on this motion degraded study.  Metastatic lesion could also be considered.  4. Patchy white matter T2 hyperintense lesions. These are  indeterminate and could be due to chronic microvascular ischemic  disease; however, given the motion artifact, evaluation for associated  enhancement is limited, and these could potentially represent small  metastatic lesions. Recommend repeat imaging with sedation.    LUCAS RAMIREZ MD      Testing Performed By     Lab - Abbreviation Name Director Address Valid Date Range    104 - Rad Rslts RADIOLOGY RESULTS Unknown Unknown 02/16/05 1553 - Present               ECG/EMG Results      Echocardiogram Complete [[id number]]  Resulted: 09/26/17 1508, Result status: Edited Result - FINAL    Ordering provider: Amaris Batista MD  09/26/17 1229 Resulted by: Simon Zee MD    Performed: 09/26/17 1527 - 09/26/17 1527 Resulting lab: RADIOLOGY RESULTS    Narrative:       [id number]  Replaced by [facility]  [id number]  [id number]^ALEX^AMARIS^KATIE           [facility]  [place]  Echocardiography Laboratory  [address]  [place] [address]        Name: JOHN SANCHEZ  MRN: [id number]  : 1960  Study Date: 2017 03:08 PM  Age: 57 yrs  Gender: Male  Patient Location: [facility]  Reason For Study: Abn EKG  Ordering Physician: AMARIS JOHNSON  Referring Physician: TUSHAR WHITTEN  Performed By: Hilary Garcia RDCS     BSA: 1.8 m2  Height: 71 in  Weight: 144 lb  HR: 110  BP: 110/63 mmHg  _____________________________________________________________________________  __        Procedure  Complete Portable Echo Adult.  _____________________________________________________________________________  __        Interpretation Summary     Technically difficult study due to patient's agitation. Limited views  obtained.     Grossly normal left ventricular and right ventricular systolic function in  subcostal views.  No significant valvular stenosis or regurgitation noted on doppler  interrogation.  _____________________________________________________________________________  __        Left Ventricle  The left ventricle is normal in size. There is normal left ventricular wall  thickness. Left ventricular systolic function is normal. The visual ejection  fraction is estimated at 55-60%. Regional wall motion abnormalities cannot be  excluded due to limited visualization.     Right Ventricle  The right ventricle is normal size. The right ventricular systolic function is  normal.     Atria  The left atrium is mildly dilated. Right atrial size is normal.     Mitral Valve  There is trace mitral regurgitation.        Tricuspid Valve  Right ventricular systolic pressure could not be approximated due to  inadequate tricuspid regurgitation. There is trace tricuspid regurgitation.     Aortic Valve  The aortic valve is not well visualized. The aortic valve is trileaflet. No  aortic regurgitation is present.     Pulmonic Valve  There is no pulmonic valvular stenosis.     Vessels  The aortic root is  normal size. The IVC is normal in size and reactivity with  respiration, suggesting normal central venous pressure.     Pericardium  There is no pericardial effusion.        Rhythm  The rhythm was undetermined.  _____________________________________________________________________________  __  MMode/2D Measurements & Calculations  IVSd: 1.1 cm     LVIDd: 4.5 cm  LVIDs: 4.0 cm  LVPWd: 1.0 cm  FS: 11.1 %  EDV(Teich): 94.3 ml  ESV(Teich): 71.5 ml  LV mass(C)d: 168.5 grams  LV mass(C)dI: 91.8 grams/m2  Ao root diam: 3.5 cm  LA dimension: 4.0 cm  LA/Ao: 1.2                 _____________________________________________________________________________  __           Report approved by: Misael Cohn 09/26/2017 03:42 PM       1    Type Source Collected On     09/26/17 1508          View Image (below)              Encounter-Level Documents:     There are no encounter-level documents.      Order-Level Documents:     There are no order-level documents.

## 2017-09-25 NOTE — IP AVS SNAPSHOT
"` `           [facility] SURGICAL: [phone]                                              INTERAGENCY TRANSFER FORM - NURSING   2017                    Hospital Admission Date: 2017  JOHN SANCHEZ   : 1960  Sex: Male        Attending Provider: Cristian Pino MD     Allergies:  Lubriderm    Infection:  None   Service:  GENERAL [facility]    Ht:  1.807 m (5' 11.14\")   Wt:  66.9 kg (147 lb 7.8 oz)   Admission Wt:  65.7 kg (144 lb 13.5 oz)    BMI:  20.49 kg/m 2   BSA:  1.83 m 2            Patient PCP Information     Provider PCP Type    Pawan Hampton MD General      Current Code Status     Date Active Code Status Order ID Comments User Context       Prior      Code Status History     Date Active Date Inactive Code Status Order ID Comments User Context    2017 12:52 PM  DNR/DNI [id number]  Cristian Pino MD Outpatient    2017  2:17 PM 2017 12:52 PM DNR/DNI [id number]  Cristian Batista MD Inpatient    2017  8:58 AM 2017  2:17 PM Full Code [id number]  Morena Byrd PA-C Inpatient    2017  5:31 PM 2017  3:26 PM Full Code [id number]  Patel Schrader MD Inpatient      Advance Directives        Does patient have a scanned Advance Directive/ACP document in EPIC?           Yes        Hospital Problems as of 2017              Priority Class Noted POA    Anemia Medium  2017 Yes    Non-small cell lung cancer (NSCLC) (H) Medium  2017 Yes    Cancer associated pain Medium  2017 Yes    Constipation, chronic Medium  Unknown Yes    Major depressive disorder, recurrent episode, moderate (H) Medium  2017 Yes    Tobacco use disorder Medium  2017 Yes    * (Principal)Altered level of consciousness Medium  2017 Yes    Altered mental status Medium  2017 Yes      Non-Hospital Problems as of 2017              Priority Class Noted    Weight loss Medium  2017    H/O ETOH abuse Medium  Unknown    Unsteady gait Medium  Unknown "    Recurrent falls Medium  Unknown    Pituitary macroadenoma (H) Medium  5/10/2017    Sinusitis Medium  5/12/2017    Thrombocytopenia (H) Medium  7/13/2017    ACP (advance care planning) Medium  8/11/2017      Immunizations     Name Date      Influenza Vaccine IM 3yrs+ 4 Valent IIV4 09/29/17     Pneumococcal 23 valent 04/21/17          END      ASSESSMENT     Discharge Profile Flowsheet     EXPECTED DISCHARGE     Last Bowel Movement  09/28/17 09/29/17 0838    Expected Discharge Date  09/28/17 09/26/17 1256   Passing flatus  yes 09/29/17 0210    DISCHARGE NEEDS ASSESSMENT     COMMUNICATION ASSESSMENT      Concerns To Be Addressed  adjustment to diagnosis/illness concerns;medication concerns 09/26/17 1256   Patient's communication style  spoken language (English or Bilingual) 09/26/17 0056    Concerns Comments  unable to reach-followed by HC 08/31/17 1000   FINAL RESOURCES      Patient/family verbalizes understanding of discharge plan recommendations?  Yes (although patient remains confused at times) 09/26/17 1256   Resources List  Transitional Care 09/26/17 1256    Medical Team notified of plan?  yes 09/26/17 1256   Other Resources  Chemical Dependency Services 08/31/17 0956    Readmission Within The Last 30 Days  no previous admission in last 30 days 09/26/17 1256   Senior Linkage Line Referral Placed  -- (n/a) 09/26/17 1256    Anticipated Changes Related to Illness  inability to care for self 09/26/17 1256   F/U Appointment Brochure Provided  -- (discussed importance) 09/26/17 1256    Equipment Currently Used at Home  none 09/27/17 1059   SKIN      Transportation Available  family or friend will provide 09/27/17 1157   Inspection of bony prominences  Full 09/29/17 0838    Current Discharge Risk  cognitively impaired 09/26/17 1256   Skin WDL  ex 09/29/17 0838    # of Referrals Placed by [facility]  Post Acute Facilities;MTM 09/26/17 1256   Skin Color/Characteristics  yellow 09/29/17 0210    Key Recommendations  Patient  "will need more supervision related to medication usage and close follow-up with PCP, Clinic Care Coordinator. May need TCU as there are no family members that are able to provide 24/7 supervision at this time. 09/26/17 1256   Skin Temperature  cool 09/29/17 0210    Does Patient Need a Referral for Clinic CC  Yes 09/26/17 1256   Skin Moisture  dry 09/29/17 0210    Coordination Referral Criteria  Fragility;Multiple Providers/Specialties 09/26/17 1256   Skin Elasticity  quick return to original state 09/29/17 0210    Confirm patient is eligible for [employer] telemonitoring  No - b/c of DX 09/26/17 1256   Skin Integrity  bruise(s) 09/29/17 0210    Is patient in another telemonitoring program  No 09/26/17 1256   SAFETY      GASTROINTESTINAL (ADULT,PEDIATRIC,OB)     Safety WDL  WDL 09/29/17 0838    GI WDL  WDL 09/29/17 0838   All Alarms  none present (daughter in law in room, VPM not in use now) 09/29/17 0838                 Assessment WDL (Within Defined Limits) Definitions           Safety WDL     Effective: 09/28/15    Row Information: <b>WDL Definition:</b> Bed in low position, wheels locked; call light in reach; upper side rails up x 2; ID band on<br> <font color=\"gray\"><i>Item=AS safety wdl>>List=AS safety wdl>>Version=F14</i></font>      Skin WDL     Effective: 09/28/15    Row Information: <b>WDL Definition:</b> Warm; dry; intact; elastic; without discoloration; pressure points without redness<br> <font color=\"gray\"><i>Item=AS skin wdl>>List=AS skin wdl>>Version=F14</i></font>      Vitals     Vital Signs Flowsheet     VITAL SIGNS     Change in Pain  getting better 09/27/17 1621    Temp  99  F (37.2  C) 09/29/17 1255   Pain Control  partially effective 09/27/17 1621    Temp src  Oral 09/29/17 1255   Functioning  can do most things, but pain gets in the way of some 09/27/17 1621    Resp  18 09/29/17 1255   Sleep  normal sleep 09/27/17 1621    Pulse  105 09/29/17 0348   ANALGESIA SIDE EFFECTS MONITORING      Heart Rate  " 107 09/29/17 1255   Side Effects Monitoring: Respiratory Quality  R 09/26/17 0354    Pulse/Heart Rate Source  Monitor 09/29/17 1255   Side Effects Monitoring: Respiratory Depth  N 09/26/17 0354    BP  102/76 09/29/17 1254   Side Effects Monitoring: Sedation Level  1 09/27/17 1656    BP Location  Left arm 09/29/17 0853   HEIGHT AND WEIGHT      OXYGEN THERAPY     Weight  66.9 kg (147 lb 7.8 oz) 09/29/17 0621    SpO2  93 % 09/29/17 1254   Weight Method  Bed scale 09/27/17 0606    O2 Device  None (Room air) 09/29/17 0853   Bed Scale  Pillow (add comment for number);Fitted sheet;Draw sheet/ pad (add comment for number);Cover/flat sheet (add comment for number);[place] (add comment for number) 09/27/17 0606    Oxygen Delivery  3 LPM 09/27/17 1632   POSITIONING      PAIN/COMFORT     Body Position  supine 09/29/17 0853    Patient Currently in Pain  sleeping: patient not able to self report 09/29/17 0216   Head of Bed (HOB)  HOB at 30-45 degrees 09/29/17 0853    Preferred Pain Scale  CAPA (Clinically Aligned Pain Assessment) ([facility] Adults Only) 09/29/17 0216   Chair  Recline and up in chair 09/29/17 0853    Pain Location  Abdomen 09/28/17 0503   DAILY CARE      Pain Orientation  Right;Left 09/27/17 0405   Activity Management  activity adjusted per tolerance 09/29/17 0838    Pain Descriptors  Aching 09/29/17 0216   Activity Assistance Provided  assistance, stand-by 09/29/17 0838    Pain Intervention(s)  Medication (See eMAR) 09/29/17 0216   ECG      CLINICALLY ALIGNED PAIN ASSESSMENT (CAPA) ([facility] ADULTS ONLY)     ECG Rhythm  Sinus tachycardia 09/27/17 1646    Comfort  comfortably manageable 09/29/17 0216   Ectopy  None 09/27/17 1646            Patient Lines/Drains/Airways Status    Active LINES/DRAINS/AIRWAYS     Name: Placement date: Placement time: Site: Days: Last dressing change:    ETT (adult) 09/27/17   1452    1             Patient Lines/Drains/Airways Status    Active  PICC/CVC     Name: Placement date: Placement time: Site: Days: Additional Info Last dressing change:    Port A Cath Single 05/17/17 Left Chest wall 05/17/17   1226   Chest wall   135 Orientation: Left            Power Port: Yes            Inserted by: RAYRAY            Line Flushed (See MAR): Yes            Diameter Swazi Size: 8 Fr            Lot #: [id number]               Intake/Output Detail Report     Date Intake     Output   Net    Shift P.O. I.V. IV Piggyback Total Urine Other Total       Noc 09/27/17 2300 - 09/28/17 0659 -- 662 -- 662 -- -- -- 662    Day 09/28/17 0700 - 09/28/17 1459 480 395 -- 875 600 -- 600 275    [place] 09/28/17 1500 - 09/28/17 2259 240 -- -- 240 600 -- 600 -360    Noc 09/28/17 2300 - 09/29/17 0659 650 445 -- 1095 1400 -- 1400 -305    Day 09/29/17 0700 - 09/29/17 1459 400 -- -- 400 0 -- -- 400      Last Void/BM       Most Recent Value    Urine Occurrence 1 at 09/28/2017 2301    Stool Occurrence 1 at 09/27/2017 0300      Case Management/Discharge Planning     Case Management/Discharge Planning Flowsheet     REFERRAL INFORMATION     ASSESSMENT/CONCERNS TO BE ADDRESSED      Did the Initial Social Work Assessment result in a Social Work Case?  Yes 09/26/17 1244   Concerns To Be Addressed  adjustment to diagnosis/illness concerns;medication concerns 09/26/17 1256    Admission Type  inpatient 09/26/17 1244   Concerns Comments  unable to reach-followed by HC 08/31/17 1000    Arrived From  home or self-care 09/26/17 1244   DISCHARGE PLANNING      Referral Source  physician 09/26/17 1244   Patient/family verbalizes understanding of discharge plan recommendations?  Yes (although patient remains confused at times) 09/26/17 1256    # of Referrals Placed by CTS  Post Acute Facilities;MT 09/26/17 1256   Medical Team notified of plan?  yes 09/26/17 1256    Reason For Consult  discharge planning 09/26/17 1244   Readmission Within The Last 30 Days  no previous admission in last 30 days 09/26/17 1256    Record  Reviewed  clinical discipline documentation;history and physical;medical record;patient profile;plan of care 09/26/17 1244   Anticipated Changes Related to Illness  inability to care for self 09/26/17 1256    Referral Information Comments  jesusita Ramsay 09/26/17 1244   Transportation Available  family or friend will provide 09/27/17 1157    Primary Care [facility] Name  [facility] 09/26/17 1256   Current Discharge Risk  cognitively impaired 09/26/17 1256    Primary Care MD Name  Memo 09/26/17 1256   Key Recommendations  Patient will need more supervision related to medication usage and close follow-up with PCP, Clinic Care Coordinator. May need TCU as there are no family members that are able to provide 24/7 supervision at this time. 09/26/17 1256    LIVING ENVIRONMENT     Does Patient Need a Referral for Clinic CC  Yes 09/26/17 1256    Lives With  alone 09/27/17 1157   Coordination Referral Criteria  Fragility;Multiple Providers/Specialties 09/26/17 1256    Living Arrangements  house 09/27/17 1157   FINAL RESOURCES      Provides Primary Care For  no one 09/26/17 1256   Equipment Currently Used at Home  none 09/27/17 1059    Able to Return to Prior Living Arrangements  no 09/26/17 1256   Resources List  Transitional Care 09/26/17 1256    ASSESSMENT OF FAMILY/SOCIAL SUPPORT     Other Resources  Chemical Dependency Services 08/31/17 0956    Marital Status   09/26/17 1256   Senior Linkage Line Referral Placed  -- (n/a) 09/26/17 1256    Who is your support system?  Other (specify);Sibling(s) (Daughter in law Sarah, Uncle) 09/26/17 1256   F/U Appointment Brochure Provided  -- (discussed importance) 09/26/17 1256    Description of Support System  Supportive;Involved 09/26/17 1256   ABUSE RISK SCREEN      Support Assessment  Lacks necessary supervision and assistance 09/26/17 1256   QUESTION TO PATIENT:  Has a member of your family or a partner(now or in the past) intimidated, hurt, manipulated, or controlled you in  any way?  patient declined to answer or is unable to answer 09/26/17 0158    COPING/STRESS     QUESTION TO PATIENT: Do you feel safe going back to the place where you are living?  patient declined to answer or is unable to answer 09/26/17 0158    Major Change/Loss/Stressor  illness 09/26/17 0120   OBSERVATION: Is there reason to believe there has been maltreatment of a vulnerable adult (ie. Physical/Sexual/Emotional abuse, self neglect, lack of adequate food, shelter, medical care, or financial exploitation)?  no 09/26/17 0158    EXPECTED DISCHARGE     (R) MENTAL HEALTH SUICIDE RISK      Expected Discharge Date  09/28/17 09/26/17 1256   Are you depressed or being treated for depression?  Yes 09/26/17 0120

## 2017-09-26 PROBLEM — C34.90 NON-SMALL CELL LUNG CANCER (NSCLC) (H): Chronic | Status: ACTIVE | Noted: 2017-01-01

## 2017-09-26 PROBLEM — R40.4 ALTERED LEVEL OF CONSCIOUSNESS: Status: ACTIVE | Noted: 2017-01-01

## 2017-09-26 PROBLEM — R41.82 ALTERED MENTAL STATUS: Status: ACTIVE | Noted: 2017-01-01

## 2017-09-26 PROBLEM — G89.3 CANCER ASSOCIATED PAIN: Chronic | Status: ACTIVE | Noted: 2017-01-01

## 2017-09-26 PROBLEM — F17.200 TOBACCO USE DISORDER: Chronic | Status: ACTIVE | Noted: 2017-01-01

## 2017-09-26 PROBLEM — D64.9 ANEMIA: Chronic | Status: ACTIVE | Noted: 2017-01-01

## 2017-09-26 PROBLEM — D69.6 THROMBOCYTOPENIA (H): Chronic | Status: ACTIVE | Noted: 2017-01-01

## 2017-09-26 PROBLEM — F33.1 MAJOR DEPRESSIVE DISORDER, RECURRENT EPISODE, MODERATE (H): Chronic | Status: ACTIVE | Noted: 2017-01-01

## 2017-09-26 PROBLEM — I95.9 HYPOTENSION: Status: RESOLVED | Noted: 2017-01-01 | Resolved: 2017-01-01

## 2017-09-26 PROBLEM — D63.8 ANEMIA, CHRONIC DISEASE: Status: RESOLVED | Noted: 2017-01-01 | Resolved: 2017-01-01

## 2017-09-26 NOTE — PROGRESS NOTES
[facility] ADMISSION NOTE    Patient admitted to room 2309 at approximately 0045 via cart from emergency room. Patient was accompanied by transport tech.     Verbal SBAR report received from Jeannie CRUZ prior to patient arrival.     Patient trasferred to bed via self. Patient alert and oriented X 3. Pain is controlled with current analgesics.  Medication(s) being used: narcotic analgesics including IV dilaudid.  . Admission vital signs: Blood pressure (!) 135/93, pulse 95, temperature 98.7  F (37.1  C), temperature source Oral, resp. rate 18, weight 65.7 kg (144 lb 13.5 oz), SpO2 96 %. Patient was oriented to plan of care, call light, bed controls, tv, telephone, bathroom and visiting hours.     The following safety risks were identified during admission: fall. Yellow risk band applied: YES.     Bhavna Avalos

## 2017-09-26 NOTE — ED NOTES
Home care called   Patient is cancer patient that is more confused   Nurse is concerned about how much pain medications  Patient is or is not taking  Pulse is elevated    HX of A pleural effusion. LUCIANOZENY is Sarah  [phone] Daughter in law

## 2017-09-26 NOTE — ED PROVIDER NOTES
History     Chief Complaint   Patient presents with     Altered Mental Status     HPI  Wade Acevedo is a 57 year old male who presentys with history of NSCLC followed here by Dr. torres. on Opdiva with last dose of chemo 10 days ago, with onset with myalgias primarily in both legs and back with intermittent confusion.   congested earlier, no cough, weak, no shortness of breath,.  no chest pain.  Tmax 99.7 earlier at home tonight.  No URI symptoms  No nausea or vomiting.   No abdominal pain.  NO blood in stool or black stools. normal urine.     I have reviewed the Medications, Allergies, Past Medical and Surgical History, and Social History in the Epic system.    Allergies:   Allergies   Allergen Reactions     Lubriderm Rash         No current facility-administered medications on file prior to encounter.   Current Outpatient Prescriptions on File Prior to Encounter:  morphine (MS CONTIN) 30 MG 12 hr tablet Take 1 tablet (30 mg) by mouth 2 times daily   oxyCODONE (ROXICODONE) 10 MG IR tablet Take 1 tablet (10 mg) by mouth every 4 hours as needed for moderate to severe pain   PARoxetine (PAXIL) 10 MG tablet Take 1 tablet (10 mg) by mouth At Bedtime (Needs follow-up appointment for this medication)   dexamethasone (DECADRON) 4 MG tablet Take 5 tablets (20 mg) by mouth daily X 3 days each cycle. To be taken the day before, day of, and day after chemotherapy infusion.   diclofenac (VOLTAREN) 1 % GEL topical gel Apply 4 grams to knees or 2 grams to hands four times daily using enclosed dosing card.   capsaicin (ZOSTRIX) 0.025 % CREA cream Apply to R hip and thigh every 2 hours as needed for pain.   pantoprazole (PROTONIX) 40 MG EC tablet Take 1 tablet (40 mg) by mouth 2 times daily (before meals)   polyethylene glycol (MIRALAX) powder Take 17 g (1 capful) by mouth daily Mix in 4-8 oz of fluid of choice. For constipation   bisacodyl (DULCOLAX) 10 MG Suppository Place 1 suppository (10 mg) rectally daily as needed for  "constipation   nicotine (NICODERM CQ) 21 MG/24HR 24 hr patch Place 1 patch onto the skin every 24 hours   nicotine (NICODERM CQ) 14 MG/24HR 24 hr patch Place 1 patch onto the skin every 24 hours   nicotine (NICODERM CQ) 7 MG/24HR 24 hr patch Place 1 patch onto the skin every 24 hours   senna-docusate (SENOKOT-S;PERICOLACE) 8.6-50 MG per tablet Take 1 tablet by mouth 2 times daily Reported on 5/4/2017 (Patient taking differently: Take 1 tablet by mouth 2 times daily Reported on 5/4/2017 Taking 2 tablets in AM & 1 tablet at night.)   lidocaine-prilocaine (EMLA) cream Apply topically over port 45 - 60 minutes prior to port access.       Patient Active Problem List   Diagnosis     Anemia     Hypotension     Non-small cell lung cancer (NSCLC) (H)     Cancer associated pain     Anemia, chronic disease     Weight loss     Constipation     H/O ETOH abuse     Unsteady gait     Recurrent falls     Pituitary macroadenoma (H)     Sinusitis     Major depressive disorder, recurrent episode, moderate (H)     Tobacco use disorder     Thrombocytopenia (H)     ACP (advance care planning)       Past Surgical History:   Procedure Laterality Date     INSERT PORT VASCULAR ACCESS N/A 5/17/2017    Procedure: INSERT PORT VASCULAR ACCESS;  Port-a-Cath Insertion;  Surgeon: Pawan Collins MD;  Location: [facility] OR       Social History   Substance Use Topics     Smoking status: Current Every Day Smoker     Packs/day: 1.00     Years: 45.00     Types: Cigarettes     Smokeless tobacco: Never Used      Comment: 10 cig per day.  Will be quiting soon.     Alcohol use No      Comment: hx ETOH  quit 4/2017       Most Recent Immunizations   Administered Date(s) Administered     Pneumococcal 23 valent 04/21/2017       BMI: Estimated body mass index is 20.98 kg/(m^2) as calculated from the following:    Height as of 9/14/17: 1.807 m (5' 11.14\").    Weight as of 9/14/17: 68.5 kg (151 lb).      Review of Systems   Constitutional: Positive for chills, " diaphoresis and fever.   HENT: Negative for ear pain, sinus pressure and sore throat.    Eyes: Negative for visual disturbance.   Respiratory: Negative for cough, shortness of breath and wheezing.    Cardiovascular: Negative for chest pain and palpitations.   Gastrointestinal: Negative for abdominal pain, blood in stool, constipation, diarrhea, nausea and vomiting.   Genitourinary: Negative for dysuria, frequency and urgency.   Musculoskeletal: Positive for back pain and myalgias.   Skin: Negative for rash.   Neurological: Negative for headaches.   Psychiatric/Behavioral: Positive for confusion.   All other systems reviewed and are negative.      Physical Exam   BP: 114/79  Pulse: 121  Temp: 97.6  F (36.4  C)  Resp: 20  SpO2: 98 %  Physical Exam   Constitutional: He appears distressed.   HENT:   Mouth/Throat: Oropharynx is clear and moist.   Eyes: Conjunctivae are normal.   Neck: Neck supple.   Cardiovascular: Normal rate and regular rhythm.  Exam reveals no gallop and no friction rub.    No murmur heard.  Pulmonary/Chest: Effort normal and breath sounds normal. No respiratory distress. He has no wheezes. He has no rales.   Abdominal: Soft. Bowel sounds are normal. He exhibits no distension. There is no tenderness. There is no rebound and no guarding.   Musculoskeletal: Normal range of motion. He exhibits no edema.   Neurological: He is alert.   Skin: No rash noted. He is not diaphoretic. There is pallor.       ED Course     ED Course     Procedures        EKG done at 2117 hrs. demonstrates a sinus rhythm at 96 bpm with a normal axis and possible ST depression in the lateral leads along with T-wave inversion in leads V2 and V3 V4 V5 and V6.  There is T-wave flattening in 2-3 and aVF.  There are no Q waves.  Normal R progression.  No ectopy.  Normal conduction.  Normal intervals.  Impression sinus rhythm at 96 bpm with ST depression and T-wave inversion in the precordial leads.  Some of this has been seen on prior  EKG including on 04/20/2017 but the T-wave inversion appears to be relatively new     EKG done at 2254 hrs. demonstrates a sinus rhythm at 90 bpm with a normal axis and no ST depression.  T-wave inversion in leads V2 through V5.  T-wave flattening in leads 2, 3, aVF.   Normal R progression.  No Q waves.  Normal intervals.  Normal conduction.  No ectopy.  Impression sinus rhythm 90 bpm T-wave inversion across the precordium.  T flattening inferiorly.  No change from the EKG done earlier this evening.    Critical Care time:  none               Results for orders placed or performed during the hospital encounter of 09/25/17   XR Chest 2 Views    Narrative    CHEST TWO VIEWS  9/25/2017  9:54 PM     COMPARISON: Frontal chest x-ray 5/17/2017    HISTORY: Weakness.    FINDINGS: A left subclavian central venous Port-A-Cath is again noted.  The cardiac silhouette, pulmonary vasculature, lungs and pleural  spaces are within normal limits.      Impression    IMPRESSION: Clear lungs.    MIRACLE VINCENT MD   CBC with platelets differential   Result Value Ref Range    WBC 8.3 4.0 - 11.0 10e9/L    RBC Count 4.02 (L) 4.4 - 5.9 10e12/L    Hemoglobin 10.7 (L) 13.3 - 17.7 g/dL    Hematocrit 31.2 (L) 40.0 - 53.0 %    MCV 78 78 - 100 fl    MCH 26.6 26.5 - 33.0 pg    MCHC 34.3 31.5 - 36.5 g/dL    RDW 16.2 (H) 10.0 - 15.0 %    Platelet Count 196 150 - 450 10e9/L    Diff Method Automated Method     % Neutrophils 57.8 %    % Lymphocytes 29.7 %    % Monocytes 11.2 %    % Eosinophils 0.8 %    % Basophils 0.4 %    % Immature Granulocytes 0.1 %    Absolute Neutrophil 4.8 1.6 - 8.3 10e9/L    Absolute Lymphocytes 2.5 0.8 - 5.3 10e9/L    Absolute Monocytes 0.9 0.0 - 1.3 10e9/L    Absolute Eosinophils 0.1 0.0 - 0.7 10e9/L    Absolute Basophils 0.0 0.0 - 0.2 10e9/L    Abs Immature Granulocytes 0.0 0 - 0.4 10e9/L   Comprehensive metabolic panel   Result Value Ref Range    Sodium 138 133 - 144 mmol/L    Potassium 3.4 3.4 - 5.3 mmol/L    Chloride 107 94  - 109 mmol/L    Carbon Dioxide 22 20 - 32 mmol/L    Anion Gap 9 3 - 14 mmol/L    Glucose 97 70 - 99 mg/dL    Urea Nitrogen 15 7 - 30 mg/dL    Creatinine 0.88 0.66 - 1.25 mg/dL    GFR Estimate 89 >60 mL/min/1.7m2    GFR Estimate If Black >90 >60 mL/min/1.7m2    Calcium 9.4 8.5 - 10.1 mg/dL    Bilirubin Total 1.1 0.2 - 1.3 mg/dL    Albumin 3.7 3.4 - 5.0 g/dL    Protein Total 7.6 6.8 - 8.8 g/dL    Alkaline Phosphatase 124 40 - 150 U/L    ALT 17 0 - 70 U/L    AST 22 0 - 45 U/L   Lactic acid whole blood   Result Value Ref Range    Lactic Acid 0.9 0.7 - 2.0 mmol/L   Troponin I   Result Value Ref Range    Troponin I ES <0.015 0.000 - 0.045 ug/L         Assessments & Plan (with Medical Decision Making)     MDM: Wade Acevedo is a 57 year old male who presents with a history of metastatic non-small cell lung cancer and had last chemo 10 days ago.  Presented here with tactile warmth without significant fever.  Appeared pale and felt ill today as well as more confused than baseline.  Labs are reassuring.  EKG does demonstrate T-wave inversion in the precordial leads which is new from prior EKG but troponin is normal.  No obvious source is identified on imaging and labs.  Urinalysis was pending.  Improved after IV hydration but still family notes not back to baseline in mental status.  He is able to communicate and answer questions for me.  CT head was performed - no pange.  LP has not been done but could be considered.  I do believe he should be observed/admitted.  I discussed with Dr. Mueller.      I have reviewed the nursing notes.    I have reviewed the findings, diagnosis, plan and need for follow up with the patient.    ED to Inpatient Handoff:    Discussed with Dr. Persaud at 2340  Patient accepted for Admit  Pending studies include urinalysis, tox/etoh/salicylate/acet levels  Code Status: Not Addressed           New Prescriptions    No medications on file       Final diagnoses:   Non-small cell lung cancer, unspecified  laterality (H)   Metastatic cancer (H)   Confusion       9/25/2017   [facility] EMERGENCY DEPARTMENT     Capo Fuentes MD  09/25/17 6902

## 2017-09-26 NOTE — PROGRESS NOTES
Pt with lung cancer metastatic on Opdivo rx with 2-3 days of confusion.  No fever.  No diarrhea but Clostridia difficile ordered in ER and pos---doubt true clinical ds, but covering with flagyl. Unclear why ordered.  No obvious etiology.  CT head neg--proceed with MRI.  ? Whether side effect to Opdivo.  Asked POA to consider his resuscitation status.  Might consider LP.  If MR is neg--might discuss with Dr Naylor.  ekg shows t inversions that are new-neg trop, no cp--obtain echo.  Discussed possible need for tcu.

## 2017-09-26 NOTE — PLAN OF CARE
Problem: Confusion, Acute (Adult)  Goal: Identify Related Risk Factors and Signs and Symptoms  Related risk factors and signs and symptoms are identified upon initiation of Human [employer] Clinical Practice Guideline (CPG).   Outcome: Declining  According to daughter-in-law, Pt is much worse this afternoon.  Pt is very restless, agitated, unless he is asleep, He is moving.  He is very confused, speech Illogical.  Pt can't follow directions for neuro checks.  Edit at 2235, pt is in and out of bed, chair, up to the bathroom all the time.  Pt doesn't know what he is doing or who he is talking to, except his daughter-in-law.  Pt is drinking well, though.  Pt is incontinent more than continent.  Pt daughter in law put up fourth siderail and when I expressed concerns that we were not to do that, she insisted the fourth side rail be up.    ROXANNE Laguna RN

## 2017-09-26 NOTE — ED NOTES
Per Dr Fuentes - will wait for urine - no cath at this time - EKG will be obtained - patient remains pleasantly confused. C/O headache, bilat leg pain.

## 2017-09-26 NOTE — PLAN OF CARE
Problem: Patient Care Overview  Goal: Plan of Care/Patient Progress Review  OT: Upon attempt pt declines OOB activity d/t fatigue. Daughter-in-law at bedside and does report that pt has had a busy morning. Introduced self and role, pt/family verbalizes understanding.

## 2017-09-26 NOTE — PLAN OF CARE
Problem: Patient Care Overview  Goal: Plan of Care/Patient Progress Review  Outcome: Improving  Alert, confused but pleasant. Orientation seems to fluctuate.  VSS on RA. C/o lower back & bilateral leg pain-given one dose of dilaudid with relief. Unsteady, A1. Required st cath x1 w/800ml OP. Tox screen & UA sent, Patient up to void multiple times prior to cath & unable to void-not known to have issues with retention.  Port a cath infusing. Tolerating clear liquid diet.  Two loose stools since admit,  CDiff sent in ED.

## 2017-09-26 NOTE — PROGRESS NOTES
09/26/17 1500   Signing Clinician's Name / Credentials   Signing clinician's name / credentials Patricia Larson,PT   Additional Documentation   PT Plan cancel- Order received, pt not seen. Spoke w/ family member  and decided to defer PT today .  Family reporting pt fatigued  and w/ increased confusion; will attempt again 9/27

## 2017-09-26 NOTE — PROVIDER NOTIFICATION
Pt was unable to hold still for MRI. ERYN Wray paged for sedation.     Cdfiff came back positive. ERYN Wray notified.

## 2017-09-26 NOTE — CONSULTS
Care Transition Initial Assessment - RN    Reason For Consult: discharge planning   Met with: Patient and Family.    DATA:   Principal Problem:    Altered level of consciousness  Active Problems:    Anemia    Non-small cell lung cancer (NSCLC) (H)    Cancer associated pain    Constipation, chronic    Major depressive disorder, recurrent episode, moderate (H)    Tobacco use disorder    Altered mental status       Cognitive Status: awake, alert and confused.  Primary Care Clinic Name: [facility]  Primary Care MD Name: Lukeon  Contact information and PCP information verified: Yes  Lives With: alone   Living Arrangements: house. Has to go up and down stairs to get to kitchen. Sarah is concerned that he has not been eating good since last Wednesday.     Description of Support System: Supportive, Involved   Who is your support system?: Other (specify), Sibling(s) (Daughter in law Sarah, Uncle)    Support Assessment: Lacks necessary supervision and assistance   Insurance concerns: No Insurance issues identified    ASSESSMENT:  Patient currently receives the following services:  [facility] ([phone] Fax: [phone])       Identified issues/concerns regarding health management: new confusion, medication compliance: was taking both MS Contin and Oxycodone as PRN meds. Also, patient's home care nurse told Sarah that he hadn't taken his routine meds from Wednesday of last week. Just diagnosed with cancer earlier this year. Did not tolerate first chemotherapy so was started on Opdiva 9/14/17 and he is due for second dose on 9/28.[facility] + C. Diff. New hoarse voice since Monday.  Transportation concerns: family may be able to provide, depending on work schedule    PLAN:  Financial costs for the patient include: none noted at this time.  Patient given options and choices for discharge: This writer met with pt and his daughter in law Inna, introduced self and role. Discussed medicare coverage in  regards to home care, TCU and LTC.   Patient/family is agreeable to the plan?  Yes, patient doesn't like the idea of going back to TCU, but knows he may have to because of his continued confusion.  Patient anticipates discharging to: TCU. Patient was provided with Medicare certified nursing home list. Pts choices are as follows 1) [facility] (Phone: [phone] Fax: [phone]) 2) [facility] Phone: ([phone]) Fax: ([phone])  3) [facility] (Phone: [phone]) Fax: ([phone])     Resources List: Transitional Care  Patient anticipates needs for home equipment: No       Discharge Planner   Discharge Plans in progress: Referrals out to TCUs  Barriers to discharge plan: medical stability, may need to put chemotherapy treatments on hold  Plan/Disposition: TCU   Care  (CTS) will continue to follow as needed.    1500: Discussed with Sarah, that TCUs do not take patients that are currently receiving chemotherapy. Sarah states that patient's MD did say that they were concerned that patient's confusion hadn't improved greatly. They are awaiting MRI results and now patient was to get an echocardiogram. Writer will continue to assist as needed.    1540: Call received from [facility]. If patient puts his chemotherapy on hold, they still would not be able to take patient until Friday at the earliest (no private bathroom until then) and are still waiting for their business office to approve.

## 2017-09-26 NOTE — H&P
[facility]    History and Physical  Hospital Medicine       Date of Admission:  9/25/2017  Date of Service: 9/26/2017     Assessment & Plan   Wade Acevedo is a 57 year old male with PMH significant for chronic anemia, cancer associated pain, NSCLC current on Opdivo therapy, depression, tobacco use disorder, hx of EtOH abuse, and hx of thrombocytopenia who now presents with altered mental status.      Acute Encephalopathy   Daughter-in law complains of confusion for at least the last 24 hours. VSS. Drug screen positive only for opiates.  UA negative.  CXR negative.  CT head again shows pituitary mass. EtOH level was 0.00.  Acetaminophen was less than 2 and salicylate level was 5.  DDx: Medications (hx of overuse of scheduled MS Contin as recent at 09/20/17) versus infection versus metabolic derangements (changes in magnesium, thyroid, Wernicke's)  - given previous EtOH abuse, will start multivitamins now.  Reportedly sober since April of 2017  - order ammonia, TSH, magnesium, folate  - urine culture ordered, pending  - IP Palliative Care Consult placed given ongoing pan management issues and possible misuse of pain medication due to ineffective pain control    Urinary Retention  Straight cathed for 800cc of urine overnight.  No history of urinary retention.  - straight cath, PRN for urine greater than 350cc on bladder scan  - instruct RN to inform MD if straight cath x3; will likely need to place lion    Non-small cell lung cancer (NSCLC) (H) with metastasis to bone (5th lateral rib, scapula)  Follows with Dr. Naylor. Diagnosed by CT guided biopsy. PET Scan confirms tumor with hypermetabolic lymph nodes and hypermetabolic lesions to the scapula and 5th lateral rib. Previously on carbo/taxol - this was stopped due pancytopenia. Received first dose of Opdivo chemotherapy (immune therapy) 09/14/2017.  Next scheduled chemotherapy infusion of Opdivo 09/28/2017.    Pituitary  Macroadenoma  Diagnosed 04/21/2017 by MRI.  Normal TSH with low normal T4/T3.  Mildly abnormal cosyntropin stim testing. Seen by [facility] (Dr. Padma Medley) on 05/10/2017.  Most likely thought to be non-functioning pituitary macroadenoma.  Was supposed to have repeat MRI 08/2017 - did not do so.  Has follow up with endocrinology 11/14/2017.  Most recently had mildly low TSH with normal T4 09/14/2017.    Anemia  Has received transfusions in the past for low hemoglobin - last received 2 units approximately 09/07/2017.  This was thought to be secondary to chemotherapy (carbo/taxol).  Recently switched from this to immune chemotherapy.  Hemoglobin on arrival 10.7 on arrival.  8.6 on admission day 1. VSS.  - AM CBC with platelets, will transfuse if hbg less than 7.0    Hx of Thrombocytopenia  Platelets as low as 42k approximately 3 weeks ago.  Received 2u of PRBCs WITHOUT platelets, seemed to respond  - continue to monitor      Cancer associated pain  - continue PTA scheduled MS Contin  - continue PTA PRN oxycodone  - continue PTA dermal capsicin cream Q2 PRN  - continue PTA Voltaren gel 4x daily  - continue PTA Emla cream with access to port    Constipation, chronic  - continue PTA scheduled miralax, senna-ducosate    Major depressive disorder, recurrent episode, moderate (H)  - continue PTA Paxil      Tobacco use disorder  Nicotine patch in place    GERD  - continue PTA pantoprazole       F: 50cc/hr 0.9NS  E: BMP in AM  N: regular diet  DVT Prophylaxis: not indicated, ambulatory    Code Status: Full Code    Disposition: Anticipate discharge in 2-3 days. Appropriate for inpatient care.    Case discussed with Dr. Cristian Batista.  Assessment and plan as written above.    Morena Byrd PA-C  [facility] Hospitalist Program    History is obtained from the patient and review of the EMR.    Past Medical History    Past Medical History:   Diagnosis Date     Brain cancer (H)      Constipation      H/O ETOH abuse   "    Hx of tobacco use, presenting hazards to health      Lung cancer (H)      Pain 5/6/2017     Recurrent falls      Unsteady gait      Weight loss        Past Surgical History   Past Surgical History:   Procedure Laterality Date     INSERT PORT VASCULAR ACCESS N/A 5/17/2017    Procedure: INSERT PORT VASCULAR ACCESS;  Port-a-Cath Insertion;  Surgeon: Pawan Collins MD;  Location: [facility] OR       Family History    History reviewed. No pertinent family history.    History of Present Illness   Wade Acevedo is a 57 year old male with PMH significant for chronic anemia, cancer associated pain, NSCLC current on Opdivo therapy, depression, tobacco use disorder, hx of EtOH abuse, and hx of thrombocytopenia who now presents with altered mental status.    The patient is obviously confused.  As such, history is provided by his daughter in-law Sarah whom is his POA.  She  spoke with him on Friday and noted that while alert and oriented, he was rather agitated.  He seemed to fixate upon his pain and stated \"no one believes me\" and reported that his pain was not adequately controlled.  She did not speak with him over the weekend.  She phoned him early Monday morning and thought he seemed a bit \"confused\" and \"tangential\", but attributed this to it being early morning.  The patient requested that she call him back later in the day.  His uncle then contacted him mid-morning and again, felt that he was both confused and disoriented.  As such, his daughter-in-law was again contacted.  She requested that Homecare visit the patient.  Upon arrival, his homecare RN felt he was altered.  In addition, upon inspection of his medication, she found that 4 doses of 30mg MS Contin were \"missing\" and that only 44/100 10mg oxycodone pills were left; this prescription was filled 09/07/2017. This implies that the patient has taken an average of approximately 3 oxycodone per day (56 pills in 19 days).  The patient himself states he takes approximately " 3 MS contin and 3 oxycodone per day.  Given altered mental status, the patient's homecare RN suggested he present to the ER for further evaluation.    The patient has no subjective complaints of fever, chills, loss of appetite, CP, SOB, cough, abdominal pain, dysuria, lightheadedness, dizziness, acute onset of changes to hearing/vision, new LE swelling, new rashes.  At present, he complains of pain only; this is his choric pain that is reportedly associated with his cancer.  This is located in his buttocks, thighs, calves, feet and toes.  He states that this is chronically an 8/10 and can not ascribe a characteristic to his pain.  Nothing makes the pain better or worse.    The patient has abstained from all alcohol as of 04/20/2017.  He continues to smoke the occasional cigarette.    Prior to Admission Medications   Prior to Admission Medications   Prescriptions Last Dose Informant Patient Reported? Taking?   PARoxetine (PAXIL) 10 MG tablet Past Week at Unknown time Self No Yes   Sig: Take 1 tablet (10 mg) by mouth At Bedtime (Needs follow-up appointment for this medication)   bisacodyl (DULCOLAX) 10 MG Suppository Unknown at Unknown time Self No No   Sig: Place 1 suppository (10 mg) rectally daily as needed for constipation   capsaicin (ZOSTRIX) 0.025 % CREA cream Unknown at Unknown time Self No No   Sig: Apply to R hip and thigh every 2 hours as needed for pain.   dexamethasone (DECADRON) 4 MG tablet Past Week at Unknown time Self No Yes   Sig: Take 5 tablets (20 mg) by mouth daily X 3 days each cycle. To be taken the day before, day of, and day after chemotherapy infusion.   diclofenac (VOLTAREN) 1 % GEL topical gel Unknown at Unknown time Self No No   Sig: Apply 4 grams to knees or 2 grams to hands four times daily using enclosed dosing card.   lidocaine-prilocaine (EMLA) cream Unknown at Unknown time Self No No   Sig: Apply topically over port 45 - 60 minutes prior to port access.   morphine (MS CONTIN) 30 MG  12 hr tablet 9/25/2017 at Unknown time Self No Yes   Sig: Take 1 tablet (30 mg) by mouth 2 times daily   nicotine (NICODERM CQ) 14 MG/24HR 24 hr patch Unknown at Unknown time Self No No   Sig: Place 1 patch onto the skin every 24 hours   nicotine (NICODERM CQ) 21 MG/24HR 24 hr patch Unknown at Unknown time Self No No   Sig: Place 1 patch onto the skin every 24 hours   nicotine (NICODERM CQ) 7 MG/24HR 24 hr patch Unknown at Unknown time Self No No   Sig: Place 1 patch onto the skin every 24 hours   oxyCODONE (ROXICODONE) 10 MG IR tablet 9/25/2017 at Unknown time Self No Yes   Sig: Take 1 tablet (10 mg) by mouth every 4 hours as needed for moderate to severe pain   pantoprazole (PROTONIX) 40 MG EC tablet Past Week at Unknown time Self No Yes   Sig: Take 1 tablet (40 mg) by mouth 2 times daily (before meals)   polyethylene glycol (MIRALAX) powder Past Week at Unknown time Self No Yes   Sig: Take 17 g (1 capful) by mouth daily Mix in 4-8 oz of fluid of choice. For constipation   senna-docusate (SENOKOT-S;PERICOLACE) 8.6-50 MG per tablet Past Week at Unknown time Self No Yes   Sig: Take 1 tablet by mouth 2 times daily Reported on 5/4/2017   Patient taking differently: Take 1 tablet by mouth 2 times daily Reported on 5/4/2017 Taking 2 tablets in AM & 1 tablet at night.      Facility-Administered Medications: None       Allergies   Allergies   Allergen Reactions     Lubriderm Rash       Social History   Social History     Social History     Marital status:      Spouse name: N/A     Number of children: N/A     Years of education: N/A     Occupational History     Not on file.     Social History Main Topics     Smoking status: Current Every Day Smoker     Packs/day: 1.00     Years: 45.00     Types: Cigarettes     Smokeless tobacco: Never Used      Comment: 10 cig per day.  Will be quiting soon.     Alcohol use No      Comment: hx ETOH  quit 4/2017     Drug use: Not on file     Sexual activity: Not on file     Other  Topics Concern     Not on file     Social History Narrative    2017:  Was  11 years ago when his wife  while under hospice care.  His DIL Sarah Burch, who lives in [facility], is his health care agent and, per patient, his POA as well.  He lives alone in his own home in [place], which is a split entry with 3 levels.  He retired from his job as a  in  with back and joint pains.  He does have a h/o alcohol abuse, but states he has been abstinent since he was diagnosed with cancer.  He smoked 1 PPD x 45 years and still really enjoys sneaking a cigarette here and there but mostly tries to avoid them.      Mickey Bernard CNP ([place])    Palliative Care    Private cell:  [phone]         ROS: 10 point ROS neg other than the symptoms noted above in the HPI.    Physical Exam     BP (!) 145/91  Pulse 84  Temp 99.2  F (37.3  C) (Oral)  Resp 18  Wt 65.7 kg (144 lb 13.5 oz)  SpO2 98%  BMI 20.12 kg/m2     Weight: 144 lbs 13.48 oz Body mass index is 20.12 kg/(m^2).     Constitutional: Alert and oriented x2 (self, year.  Does NOT recall month, president).  Cooperative.  Appears older than stated age.  Appears in no acute distress.  Tangential thought process.  Somewhat agitated.  HEENT: Oropharynx is clear and moist. No evidence of cranial trauma.  Lymph/Hematologic: No occipital, submental, submandibular, anterior or posterior cervical, or supraclavicular lymphadenopathy is appreciated.  Cardiovascular: Regular rate/ rhythm.  S1 and S2 grossly normal.  No appreciable murmur, rub, gallop. No lower extremity edema.  Respiratory: Clear to auscultation bilaterally. Equal chest expansion.  GI: Soft, non-tender, normal bowel sounds, no hepatosplenomegaly. No asterixis  Genitourinary: Deferred  Musculoskeletal: Normal muscle bulk and tone.  Skin: Warm and dry, no rashes.   Neurologic: Neck supple. Cranial nerves 3-12 are grossly intact.  is symmetric. No pronator drift.  Upper and lower extremity  strength is grossly intact.  Normal rapid alternating motion.  Normal finger to nose testing.  Gait is unsteady; this is chronic.    Data   Data reviewed today:     Recent Labs  Lab 09/26/17  0640 09/25/17 2035   WBC 6.5 8.3   HGB 8.6* 10.7*   MCV 78 78   * 196    138   POTASSIUM 3.3* 3.4   CHLORIDE 109 107   CO2 21 22   BUN 12 15   CR 0.69 0.88   ANIONGAP 9 9   JOHANN 8.4* 9.4   GLC 85 97   ALBUMIN  --  3.7   PROTTOTAL  --  7.6   BILITOTAL  --  1.1   ALKPHOS  --  124   ALT  --  17   AST  --  22   TROPI <0.015 <0.015       Recent Results (from the past 24 hour(s))   XR Chest 2 Views    Narrative    CHEST TWO VIEWS  9/25/2017  9:54 PM     COMPARISON: Frontal chest x-ray 5/17/2017    HISTORY: Weakness.    FINDINGS: A left subclavian central venous Port-A-Cath is again noted.  The cardiac silhouette, pulmonary vasculature, lungs and pleural  spaces are within normal limits.      Impression    IMPRESSION: Clear lungs.    MIRACLE VINCENT MD   CT Head w/o Contrast    Narrative    CT HEAD W/O CONTRAST  9/25/2017 11:14 PM      HISTORY: Altered mental status. Metastatic non-small cell lung cancer.    TECHNIQUE: Routine noncontrast head CT. Radiation dose for this scan  was reduced using automated exposure control, adjustment of the mA  and/or kV according to patient size, or iterative reconstruction  technique.    COMPARISON: MRI 4/21/2017.    FINDINGS: Brain volume is within normal limits for age. There is a  pituitary mass as seen on the previous MRI. No other mass, mass effect  or intracranial hemorrhage. No evidence of acute infarct.  Periventricular low attenuation is consistent with chronic small  vessel ischemic disease. The visualized paranasal sinuses are clear.      Impression    IMPRESSION: No acute abnormality.       Morena Byrd PA-C  [facility]

## 2017-09-26 NOTE — PLAN OF CARE
Problem: Patient Care Overview  Goal: Plan of Care/Patient Progress Review  Outcome: No Change  Pt continues to be disoriented to place, time and situation. Neuros intact. Voiding in good amounts spontaneously. On enteric precautions for +Cdiff on oral Flagyl; pt had 1 stool today that was loose/soft-not diarrhea. Pt complains of pain in various places at various times; on MS contin. Appears comfortable at rest. Up with an assist of one; unsteady. Bed/chair alarm needed. Appetite fair. IV NS @50/hr. On RA. VSS. Max temp of 99.4.      Mg 1.5. Protocol in place. Will start replacement when pt is back from MRI.      Plan is for ECHO this afternoon.

## 2017-09-26 NOTE — PROGRESS NOTES
[facility]    Hospital Medicine   Care Update  Date of Service: 9/26/2017     Per discussion with Dr. Batista, will get MR of brain and add on cortisol to this morning's labs.    In addition, C. Diff toxin testing has returned and is positive.  There is no ER documentation to support why this was collected.  The patient himself complains of no subjective diarrhea/abdominal pain at present.  Nonetheless, given AMS, will plan to treat    Plan: start flagyl 500mg PO TID, first dose now.  Stop miralax, senna-cot      Morena Byrd PA-C

## 2017-09-26 NOTE — ED NOTES
Pt has been more confused today, c/o bilat leg aches and back pain - also c/o feeling like he just can't cough up phlegm. Patient is pale and slightly confused at times, here with his relative. States last chemo was Thursday

## 2017-09-27 NOTE — PLAN OF CARE
Problem: Patient Care Overview  Goal: Plan of Care/Patient Progress Review  Outcome: No Change  Orientation continues to fluctuate-patient will be oriented to person & place & using call light appropriately & 10mins later will be disoriented x4 w/illogical speech.  Neuros o/w intact.   Calm & cooperative, on VPM.  VSS on RA. C/o bilateral leg pain-given oxycodone w/some relief. A1, Voiding adequate amounts-incontinent at times. One small loose stool overnight. Plan is for PT/OT & palliative care consult today. DC pending-referrals sent to TCUs.

## 2017-09-27 NOTE — PROGRESS NOTES
Reason for Follow up: TCU vs. LTC. Patient to have an MRI under general anesthesia at 1500 today. Sarah, patient's daughter in law stated that the results of the MRI will give her a better idea of where they would like patient to dc to. Writer told her that [facility] has accepted him for admission Thursday or Friday ([phone]) Fax: ([phone]). She requested information regarding facilities around the [facility]. Writer printed out list of Medicare Certified facilities in a 50 mile radius of [place].    Anticipated discharge needs: tbd    Next steps: Referrals to SNFs in [facility] sent 1) [facility] in [place]  Phone: [phone]: [phone]  2) [facility] Phone: [phone]: [phone]. CTS to continue to assist with TCU or LTC bed as diagnosis/prognosis is known.    Barb Ramsay CTS-RN  *[phone]

## 2017-09-27 NOTE — PLAN OF CARE
Problem: Confusion, Acute (Adult)  Goal: Identify Related Risk Factors and Signs and Symptoms  Related risk factors and signs and symptoms are identified upon initiation of Human Response Clinical Practice Guideline (CPG).   Outcome: Improving  Pt was very agitated earlier, looked at dinner and didn't know what to do with it.  Family was leaving soon and a 1:1 arrainged for safety.  At that time pt was unable to be on VPM.  Pt finally fell asleep and has been calm ever since and being watched by the VPM.  Pt has only tried to get up once to use the bathroom.. Pt speech has been clearer, and he is answering questions appropriately at times.  Pt very different than earlier in the day. ROXANNE Vargas RN

## 2017-09-27 NOTE — ANESTHESIA CARE TRANSFER NOTE
Patient: Wade Acevedo    Procedure(s):  Sedation for MRI    Diagnosis: sedation for MRI  Diagnosis Additional Information: No value filed.    Anesthesia Type:   General, ETT     Note:  Airway :Nasal Cannula  Patient transferred to:PACU        Vitals: (Last set prior to Anesthesia Care Transfer)              Electronically Signed By: SALINAS Ross CRNA  September 27, 2017  4:52 PM

## 2017-09-27 NOTE — ANESTHESIA POSTPROCEDURE EVALUATION
Patient: Wade Acevedo    Procedure(s):  Sedation for MRI    Diagnosis:sedation for MRI  Diagnosis Additional Information: No value filed.    Anesthesia Type:  General, ETT    Note:  Anesthesia Post Evaluation    Patient location during evaluation: Bedside  Patient participation: Able to fully participate in evaluation  Level of consciousness: awake  Pain management: adequate  Airway patency: patent  Cardiovascular status: tachycardic (unchanged )  Respiratory status: nasal cannula  Hydration status: acceptable  PONV: none     Anesthetic complications: None          Last vitals:  Vitals:    09/27/17 1116 09/27/17 1630 09/27/17 1645   BP: 142/85 (!) 149/102 (!) 151/107   Pulse: 67     Resp: 18 16 16   Temp: 37.3  C (99.1  F)     SpO2: 96% 96% 98%         Electronically Signed By: SALINAS Ross CRNA  September 27, 2017  4:52 PM

## 2017-09-27 NOTE — PROGRESS NOTES
17 1100   Quick Adds   Type of Visit Initial PT Evaluation   Living Environment   Lives With alone   Living Arrangements house   Home Accessibility stairs to enter home;stairs within home   Number of Stairs to Enter Home 3   Transportation Available family or friend will provide   Living Environment Comment Pt resides in a multilevel  home; ambulating steps inside of his home prior to hospital admission w/o difficulty    Functional Level Prior   Ambulation 0-->independent   Transferring 0-->independent   Toileting 0-->independent   Bathing 0-->independent   Dressing 0-->independent   Eating 0-->independent   Communication 0-->understands/communicates without difficulty   Swallowing 0-->swallows foods/liquids without difficulty   Cognition 0 - no cognition issues reported   Fall history within last six months no   General Information   Onset of Illness/Injury or Date of Surgery - Date 17   Referring Physician Lester   Patient/Family Goals Statement Pt unable to state goal. Per family member  goals of care undecided depending  on results of MRI. Possible TCU vs LTC/ with assistance    Pertinent History of Current Problem (include personal factors and/or comorbidities that impact the POC) 57 year old male with PMH significant for chronic anemia, cancer associated pain, NSCLC current on Opdivo therapy, depression, tobacco use disorder, hx of EtOH abuse, and hx of thrombocytopenia who now presents with altered mental status. Head MRI pending   Precautions/Limitations fall precautions   General Observations Pt alert, confused.    Cognitive Status Examination   Orientation (NT- states name, . slow to respond, difficulty recalling last name )   Level of Consciousness alert   Follows Commands and Answers Questions able to follow multistep instructions   Personal Safety and Judgment impaired  (at times moves impulsicely, not cognizant of lines)   Pain Assessment   Patient Currently in Pain No  (Pt reporting  no pain presently. Family member reports hx of LE ede, possible due to chemo. )   Posture    Posture Not impaired   Range of Motion (ROM)   ROM Comment WFL    Strength   Strength Comments Unable to test fully due to  cognition. returns to bed to rest. Does appear mildy weak w/ ambulation   Bed Mobility   Bed Mobility Comments Min. assistance supine> sitting.; returns to bed following amb w/ SBA- crawls foward into bed   Transfer Skills   Transfer Comments SBA sit>stand  w/ no device.; does uses wide TALIA for inital standig balance   Gait   Gait Comments Pt completes in room mobility only. Ambulated to from BR w/ use of one UE on IVpole for support    Balance   Balance Comments fair/ good    Sensory Examination   Sensory Perception no deficits were identified   General Therapy Interventions   Planned Therapy Interventions bed mobility training;gait training;strengthening   Clinical Impression   Criteria for Skilled Therapeutic Intervention yes, treatment indicated   PT Diagnosis Impaired  Mobility. Generalized weakness   Influenced by the following impairments Decreased strength. Balance. Cogntion   Functional limitations due to impairments Decreased ambulatory  status- need for asssistance, unlilateral support, decreased indep. w/ bed mobility and transfers   Clinical Presentation Stable/Uncomplicated   Clinical Presentation Rationale clinical judgement   Clinical Decision Making (Complexity) Low complexity   Therapy Frequency` daily   Predicted Duration of Therapy Intervention (days/wks) 3 days   Anticipated Equipment Needs at Discharge (if needing AD- ie SEC for gait, question pt's ability to use correctly )   Anticipated Discharge Disposition Transitional Care Facility;Long Term Care Facility   Risk & Benefits of therapy have been explained Yes   Patient, Family & other staff in agreement with plan of care Yes   Clinical Impression Comments Pt would benefit from continued PT to address strength/ safety w/  "mobility, depending on pt and family's goals  of care. See POC for PT goals    [facility] AM-PAC  \"6 Clicks\" V.2 Basic Mobility Inpatient Short Form   1. Turning from your back to your side while in a flat bed without using bedrails? 4 - None   2. Moving from lying on your back to sitting on the side of a flat bed without using bedrails? 3 - A Little   3. Moving to and from a bed to a chair (including a wheelchair)? 3 - A Little   4. Standing up from a chair using your arms (e.g., wheelchair, or bedside chair)? 3 - A Little   5. To walk in hospital room? 3 - A Little   6. Climbing 3-5 steps with a railing? 3 - A Little   Basic Mobility Raw Score (Score out of 24.Lower scores equate to lower levels of function) 19     "

## 2017-09-27 NOTE — PLAN OF CARE
Problem: Patient Care Overview  Goal: Plan of Care/Patient Progress Review  Discharge Planner PT       Patient plan for discharge:   Pt unable to state goal. Per family member  goals of care undecided depending  on results of MRI. Possible TCU vs LTC/ with assistance      Current status:   Evaluation completed. Pt alert, confused, required minimal assistance w/ bed mobility; ambulating short in room distances w/ use of unilateral support on IV pole.      Barriers to return to prior living situation: decreased independence, safety .  Pt previously living alone, indep. with mobility/ ambulating without  an AD      Recommendations for discharge: TCU, see above              Entered by: Patricia Larson 09/27/2017 12:03 PM

## 2017-09-27 NOTE — PROGRESS NOTES
[facility] TRANSPORT NOTE  Data:   Reason for Transport:  Return from MRI/PACU.    Wade Acevedo was transported from PACU via cart at 1720.  Patient was accompanied by Nursing Assistant. Equipment used for transport: None. Family was aware of reason for transport: Sarah TRONCOSO present in room.    Action:  Report: received from Leanne Mike RN.    Response:  Patient's condition upon return was stable.      Patient incontinent of urine upon arrival.  Priyanka-cares done and incontinence brief applied. /80, pulse 119, oxygen saturation 95 % room air.    Caitlin Juarez

## 2017-09-27 NOTE — PLAN OF CARE
"Problem: Patient Care Overview  Goal: Plan of Care/Patient Progress Review  Discharge Planner OT   Patient plan for discharge: Pt unable to state discharge plan. Per daughter in law TCU   Current status: Pt continues to be confused to place, situation, year and month. States \"2018\" for year- unable to correct with 3 verbal cues. Unable to state month: when given 1 VC states \"october\"- unable to correct with 2 VC's. Min A for supine to sit, CGA for sit to supine. Only in agreement to walk to/from window, does so with CGA and unilateral support from IV pole.   Barriers to return to prior living situation: Confusion, lives alone, increased pain with movement, weakness  Recommendations for discharge: TCU   Rationale for recommendations: to increase independence with ADLs, further monitor/assess cognition for safety with ADLs/IADLs.        Entered by: Kiana Rojas 09/27/2017 11:11 AM             "

## 2017-09-27 NOTE — PROGRESS NOTES
09/27/17 1000   Quick Adds   Type of Visit Initial Occupational Therapy Evaluation   Living Environment   Lives With alone   Living Arrangements house   Home Accessibility tub/shower is not walk in   Number of Stairs Within Home (multi-level home. )   Transportation Available family or friend will provide   Self-Care   Equipment Currently Used at Home none   Functional Level Prior   Ambulation 0-->independent   Transferring 0-->independent   Toileting 0-->independent   Bathing 0-->independent   Dressing 0-->independent   Eating 0-->independent   Communication 0-->understands/communicates without difficulty   Swallowing 0-->swallows foods/liquids without difficulty   Cognition 0 - no cognition issues reported   General Information   Onset of Illness/Injury or Date of Surgery - Date 09/25/17   Referring Physician Morena Byrd PA-C   Patient/Family Goals Statement Pt unable to state d/t AMS. Daughter would like pt to participate in therapy, depending on MRI results TCU vs. LTC   Additional Occupational Profile Info/Pertinent History of Current Problem Wade Acevedo is a 57 year old male with PMH significant for chronic anemia, cancer associated pain, NSCLC current on Opdivo therapy, depression, tobacco use disorder, hx of EtOH abuse, and hx of thrombocytopenia who now presents with altered mental status.   Cognitive Status Examination   Orientation person   Level of Consciousness confused   Able to Follow Commands moderate impairment;success, 1-step commands   Personal Safety (Cognitive) severe impairment;at risk behaviors demonstrated;decreased awareness, need for assist;decreased awareness, need for safety;decreased insight to deficits   Memory impaired   Attention Distractible during evaluation;Quiet environment required;Sustained attention impaired   Organization/Problem Solving Sequencing impaired;Problem solving impaired   Executive Function Impulsive;Working memory impaired, decreased storage of  information for performing tasks;Cognitive flexibility impaired;Planning ability impaired;Self awareness/monitoring impaired   Cognitive Comment Year: 2018 - unable to correct with 3 VCs  Month: Unable to answer with any month, when given 2 cues pt states October- unable to correct answer with 2 VC's. Unable to recall month or year 5 minutes later. Pt is impulsive at times during therapy session, laying back down quickly with head towards foot of the bed.    Pain Assessment   Patient Currently in Pain Yes, see Vital Sign flowsheet  (Daughter-in-law states B LE's give in the most pain. )   Range of Motion (ROM)   ROM Comment B UE ROM: Unable to assess d/t pt's cognition, however daughter reports ROM is limited by pain. If pain is controlled able to complete full ROM with B UE's.    Strength   Strength Comments Not formally assessed d/t pt's cognition.    Hand Strength   Hand Strength Comments B  strength: WFL to hold IV pole.    Mobility   Bed Mobility Comments Min A for supine <> sit   Transfer Skill: Bed to Chair/Chair to Bed   Level of [place]: Bed to Chair contact guard   Physical Assist/Nonphysical Assist: Bed to Chair 1 person assist   Weight-Bearing Restrictions full weight-bearing   Assistive Device - Transfer Skill Bed to Chair Chair to Bed Rehab Eval (IV pole. )   Transfer Skill: Sit to Stand   Level of [place]: Sit/Stand contact guard   Physical Assist/Nonphysical Assist: Sit/Stand 1 person assist   Transfer Skill: Sit to Stand full weight-bearing   Assistive Device for Transfer: Sit/Stand (IV pole)   Transfer Skill: Toilet Transfer   Level of [place]: Toilet contact guard   Physical Assist/Nonphysical Assist: Toilet 1 person assist   Weight-Bearing Restrictions: Toilet full weight-bearing   Assistive Device (IV pole)   Upper Body Dressing   Level of [place]: Dress Upper Body independent   Lower Body Dressing   Level of [place]: Dress Lower Body contact guard   Instrumental  "Activities of Daily Living (IADL)   Previous Responsibilities meal prep;housekeeping;laundry;medication management   Activities of Daily Living Analysis   Impairments Contributing to Impaired Activities of Daily Living balance impaired;cognition impaired;pain;strength decreased   General Therapy Interventions   Planned Therapy Interventions ADL retraining;cognition;strengthening;progressive activity/exercise   Clinical Impression   Criteria for Skilled Therapeutic Interventions Met yes, treatment indicated   OT Diagnosis decreased independence with ADLs and functional mobility   Influenced by the following impairments impaired cognition, weakness, pain.    Assessment of Occupational Performance 5 or more Performance Deficits   Identified Performance Deficits dressing, toileting, bathing, functional mobility, IADL tasks (cooking, laundry,  home management tasks, medication management).    Clinical Decision Making (Complexity) Low complexity   Therapy Frequency daily   Predicted Duration of Therapy Intervention (days/wks) 2-3 days   Anticipated Equipment Needs at Discharge (TBD at TCU)   Anticipated Discharge Disposition Transitional Care Facility  (pending pt/family goals of care. )   Risks and Benefits of Treatment have been explained. Yes   Patient, Family & other staff in agreement with plan of care Yes   [facility] AM-PAC  \"6 Clicks\" Daily Activity Inpatient Short Form   1. Putting on and taking off regular lower body clothing? 3 - A Little   2. Bathing (including washing, rinsing, drying)? 2 - A Lot   3. Toileting, which includes using toilet, bedpan or urinal? 3 - A Little   4. Putting on and taking off regular upper body clothing? 3 - A Little   5. Taking care of personal grooming such as brushing teeth? 3 - A Little   6. Eating meals? 4 - None   Daily Activity Raw Score (Score out of 24.Lower scores equate to lower levels of function) 18   Total Evaluation Time   Total Evaluation Time (Minutes) 10     "

## 2017-09-27 NOTE — H&P (VIEW-ONLY)
[facility]      Subjective:  Continue confusion  No pain  No fever  No ha  No neck pain    Review of Systems:  C: NEGATIVE for fever, chills, change in weight  I: NEGATIVE for worrisome rashes, moles or lesions  E: NEGATIVE for vision changes or irritation  E/M: NEGATIVE for ear, mouth and throat problems  R: NEGATIVE for significant cough or SOB  B: NEGATIVE for masses, tenderness or discharge  CV: NEGATIVE for chest pain, palpitations or peripheral edema  GI: NEGATIVE for nausea, abdominal pain, heartburn, or change in bowel habits  : NEGATIVE for frequency, dysuria, or hematuria  MUSCULOSKELETAL:chronic pain buttox and lower extremities  N: NEGATIVE for weakness, dizziness or paresthesias  E: NEGATIVE for temperature intolerance, skin/hair changes  H: NEGATIVE for bleeding problems  P: NEGATIVE for changes in mood or affect    Physical Exam:  Vitals Were Reviewed    Patient Vitals for the past 16 hrs:   BP Temp Temp src Pulse Heart Rate Resp SpO2 Weight   09/27/17 0756 (!) 150/93 98.1  F (36.7  C) Oral 93 - 18 99 % -   09/27/17 0605 - - - - - - - 67.3 kg (148 lb 5.9 oz)   09/27/17 0403 155/90 98.3  F (36.8  C) Oral 89 - 18 97 % -   09/26/17 2359 139/88 97.6  F (36.4  C) Oral - 96 18 99 % -   09/26/17 1941 114/74 - - 86 - - 97 % -         Intake/Output Summary (Last 24 hours) at 09/27/17 0832  Last data filed at 09/27/17 0657   Gross per 24 hour   Intake             2070 ml   Output             4700 ml   Net            -2630 ml       GENERAL APPEARANCE: confused, speaks in confused fashion, ambulates  EYES: conjunctiva clear, eyes grossly normal  RESP: lungs clear to auscultation - no rales, rhonchi or wheezes  CV: regular rate and rhythm, normal S1 S2, no S3 or S4 and no murmur, click or rub   ABDOMEN: soft, nontender, no HSM or masses and bowel sounds normal  MS: no clubbing, cyanosis; no edema  SKIN: clear without significant rashes or lesions  NEURO: alert, disoriented, speech is  clear but doesn't make sense, exam otherwise nonfocal    Lab:  Recent Labs   Lab Test  09/27/17   0800  09/26/17   0640   NA  145*  139   POTASSIUM  3.3*  3.3*   CHLORIDE  114*  109   CO2  24  21   ANIONGAP  7  9   GLC  105*  85   BUN  9  12   CR  0.71  0.69   JOHANN  8.8  8.4*     CBC RESULTS:   Recent Labs   Lab Test  09/27/17   0800  09/26/17   0640   WBC  6.1  6.5   RBC  3.44*  3.22*   HGB  9.0*  8.6*   HCT  26.6*  25.1*   PLT  154  143*       Results for orders placed or performed during the hospital encounter of 09/25/17 (from the past 24 hour(s))   Ammonia   Result Value Ref Range    Ammonia 17 10 - 50 umol/L   TSH   Result Value Ref Range    TSH 0.17 (L) 0.40 - 4.00 mU/L   Magnesium   Result Value Ref Range    Magnesium 1.5 (L) 1.6 - 2.3 mg/dL   Folate   Result Value Ref Range    Folate 32.0 >5.4 ng/mL   Cortisol   Result Value Ref Range    Cortisol Serum 13.0 4 - 22 ug/dL   T4 free   Result Value Ref Range    T4 Free 0.80 0.76 - 1.46 ng/dL   Care Transition RN/SW IP Consult    Narrative    Barb Ramsay RN     9/26/2017  4:00 PM  Care Transition Initial Assessment - RN    Reason For Consult: discharge planning   Met with: Patient and Family.    DATA:   Principal Problem:    Altered level of consciousness  Active Problems:    Anemia    Non-small cell lung cancer (NSCLC) (H)    Cancer associated pain    Constipation, chronic    Major depressive disorder, recurrent episode, moderate (H)    Tobacco use disorder    Altered mental status       Cognitive Status: awake, alert and confused.  [facility]  Primary Care MD Name: Memo  Contact information and PCP information verified: Yes  Lives With: alone   Living Arrangements: house. Has to go up and down stairs to get   to kitchen. Sarah is concerned that he has not been eating good   since last Wednesday.     Description of Support System: Supportive, Involved   Who is your support system?: Other (specify), Sibling(s)   (Daughter in law  Sarah, Uncle)    Support Assessment: Lacks necessary supervision and assistance   Insurance concerns: No Insurance issues identified    ASSESSMENT:  Patient currently receives the following services:  [facility]   Palliative Care ([phone] Fax: [phone])       Identified issues/concerns regarding health management: new   confusion, medication compliance: was taking both MS Contin and   Oxycodone as PRN meds. Also, patient's home care nurse told Sarah   that he hadn't taken his routine meds from Wednesday of last   week. Just diagnosed with cancer earlier this year. Did not   tolerate first chemotherapy so was started on Opdiva 9/14/17 and   he is due for second dose on 9/28.Was in [facility] in April.   + C. Diff. New hoarse voice since Monday.  Transportation concerns: family may be able to provide, depending   on work schedule    PLAN:  Financial costs for the patient include: none noted at this time.  Patient given options and choices for discharge: This writer met   with pt and his daughter in law Inna, introduced self and role.   Discussed medicare coverage in regards to home care, TCU and LTC.     Patient/family is agreeable to the plan?  Yes, patient doesn't   like the idea of going back to TCU, but knows he may have to   because of his continued confusion.  Patient anticipates discharging to: TCU. Patient was provided   with Medicare certified nursing home list. Pts choices are as   follows 1) [facility] (Phone: [phone] Fax:   [phone]) 2) [facility] Phone:   ([phone]) Fax: ([phone])  3) [facility]   (Phone: [phone]) Fax: ([phone])     Resources List: Transitional Care  Patient anticipates needs for home equipment: No       Discharge Planner   Discharge Plans in progress: Referrals out to TCUs  Barriers to discharge plan: medical stability, may need to put   chemotherapy treatments on hold  Plan/Disposition: TCU   Care Transition  Specialist (CTS) will   continue to follow as needed.    1500: Discussed with Sarah, that TCUs do not take patients that   are currently receiving chemotherapy. Sarah states that patient's   MD did say that they were concerned that patient's confusion   hadn't improved greatly. They are awaiting MRI results and now   patient was to get an echocardiogram. Writer will continue to   assist as needed.    1540: Call received from [facility]. If patient puts his   chemotherapy on hold, they still would not be able to take   patient until Friday at the earliest (no private bathroom until   then) and are still waiting for their business office to approve.   MR Brain w/o & w Contrast    Narrative    MRI BRAIN WITHOUT AND WITH CONTRAST  9/26/2017 2:19 PM    HISTORY:  Alerted mental status. Rule out brain metastasis.    TECHNIQUE:  Multiplanar, multisequence MRI of the brain without and  with 6 mL Gadavist.    COMPARISON: Head CT 9/25/2017. Brain MR 4/21/2017.    FINDINGS: Images are severely degraded by motion artifact. No definite  large intracranial mass is identified noting limitations. There is no  mass effect or midline shift. The ventricles are not enlarged out of  proportion to the cerebral sulci. Mild diffuse parenchymal volume  loss. Patchy deep and subcortical white matter T2 hyperintensities are  nonspecific.    Enhancing sellar mass extends superiorly into the suprasellar cistern  and likely abuts the optic chiasm. No definite other intracranial  enhancing mass is identified noting marked limitations given motion  artifact.    Polypoid mucosal thickening in the left maxillary sinus.       Impression    IMPRESSION:      1. Images are severely degraded by motion artifact.  2. Evaluation for metastatic disease is limited given the motion. No  definite large intracranial mass is identified, but smaller lesions as  well as possible leptomeningeal disease cannot be excluded.  3. Enhancing expansile mass in the sella  extending into the  suprasellar cistern. This may represent a pituitary macroadenoma,  although it is poorly characterized on this motion degraded study.  Metastatic lesion could also be considered.  4. Patchy white matter T2 hyperintense lesions. These are  indeterminate and could be due to chronic microvascular ischemic  disease; however, given the motion artifact, evaluation for associated  enhancement is limited, and these could potentially represent small  metastatic lesions. Recommend repeat imaging with sedation.    LUCAS RAMIREZ MD   Echocardiogram Complete    Narrative    [id number]  [id number]  [id number]  [id number]^ALEX^AMARIS^KATIE           [facility]  Echocardiography Laboratory  [address] [place] [address]        Name: JOHN SANCHEZ  MRN: [id number]  : 1960  Study Date: 2017 03:08 PM  Age: 57 yrs  Gender: Male  Patient Location: [facility]  Reason For Study: Abn EKG  Ordering Physician: AMARIS JOHNSON  Referring Physician: TUSHAR WHITTEN  Performed By: Hilary Garcia RDCS     BSA: 1.8 m2  Height: 71 in  Weight: 144 lb  HR: 110  BP: 110/63 mmHg  _____________________________________________________________________________  __        Procedure  Complete Portable Echo Adult.  _____________________________________________________________________________  __        Interpretation Summary     Technically difficult study due to patient's agitation. Limited views  obtained.     Grossly normal left ventricular and right ventricular systolic function in  subcostal views.  No significant valvular stenosis or regurgitation noted on doppler  interrogation.  _____________________________________________________________________________  __        Left Ventricle  The left ventricle is normal in size. There is normal left ventricular wall  thickness. Left ventricular systolic function is normal. The visual ejection  fraction is estimated at 55-60%. Regional wall motion abnormalities  cannot be  excluded due to limited visualization.     Right Ventricle  The right ventricle is normal size. The right ventricular systolic function is  normal.     Atria  The left atrium is mildly dilated. Right atrial size is normal.     Mitral Valve  There is trace mitral regurgitation.        Tricuspid Valve  Right ventricular systolic pressure could not be approximated due to  inadequate tricuspid regurgitation. There is trace tricuspid regurgitation.     Aortic Valve  The aortic valve is not well visualized. The aortic valve is trileaflet. No  aortic regurgitation is present.     Pulmonic Valve  There is no pulmonic valvular stenosis.     Vessels  The aortic root is normal size. The IVC is normal in size and reactivity with  respiration, suggesting normal central venous pressure.     Pericardium  There is no pericardial effusion.        Rhythm  The rhythm was undetermined.  _____________________________________________________________________________  __  MMode/2D Measurements & Calculations  IVSd: 1.1 cm     LVIDd: 4.5 cm  LVIDs: 4.0 cm  LVPWd: 1.0 cm  FS: 11.1 %  EDV(Teich): 94.3 ml  ESV(Teich): 71.5 ml  LV mass(C)d: 168.5 grams  LV mass(C)dI: 91.8 grams/m2  Ao root diam: 3.5 cm  LA dimension: 4.0 cm  LA/Ao: 1.2                 _____________________________________________________________________________  __           Report approved by: Misael Cohn 09/26/2017 03:42 PM      Magnesium   Result Value Ref Range    Magnesium 2.7 (H) 1.6 - 2.3 mg/dL   Troponin I   Result Value Ref Range    Troponin I ES <0.015 0.000 - 0.045 ug/L   Comprehensive metabolic panel   Result Value Ref Range    Sodium 145 (H) 133 - 144 mmol/L    Potassium 3.3 (L) 3.4 - 5.3 mmol/L    Chloride 114 (H) 94 - 109 mmol/L    Carbon Dioxide 24 20 - 32 mmol/L    Anion Gap 7 3 - 14 mmol/L    Glucose 105 (H) 70 - 99 mg/dL    Urea Nitrogen 9 7 - 30 mg/dL    Creatinine 0.71 0.66 - 1.25 mg/dL    GFR Estimate >90 >60 mL/min/1.7m2    GFR Estimate  If Black >90 >60 mL/min/1.7m2    Calcium 8.8 8.5 - 10.1 mg/dL    Bilirubin Total 0.6 0.2 - 1.3 mg/dL    Albumin 3.3 (L) 3.4 - 5.0 g/dL    Protein Total 6.8 6.8 - 8.8 g/dL    Alkaline Phosphatase 111 40 - 150 U/L    ALT 18 0 - 70 U/L    AST 21 0 - 45 U/L   CBC with platelets differential   Result Value Ref Range    WBC 6.1 4.0 - 11.0 10e9/L    RBC Count 3.44 (L) 4.4 - 5.9 10e12/L    Hemoglobin 9.0 (L) 13.3 - 17.7 g/dL    Hematocrit 26.6 (L) 40.0 - 53.0 %    MCV 77 (L) 78 - 100 fl    MCH 26.2 (L) 26.5 - 33.0 pg    MCHC 33.8 31.5 - 36.5 g/dL    RDW 15.6 (H) 10.0 - 15.0 %    Platelet Count 154 150 - 450 10e9/L    Diff Method Automated Method     % Neutrophils 64.7 %    % Lymphocytes 24.5 %    % Monocytes 8.4 %    % Eosinophils 2.0 %    % Basophils 0.2 %    % Immature Granulocytes 0.2 %    Absolute Neutrophil 3.9 1.6 - 8.3 10e9/L    Absolute Lymphocytes 1.5 0.8 - 5.3 10e9/L    Absolute Monocytes 0.5 0.0 - 1.3 10e9/L    Absolute Eosinophils 0.1 0.0 - 0.7 10e9/L    Absolute Basophils 0.0 0.0 - 0.2 10e9/L    Abs Immature Granulocytes 0.0 0 - 0.4 10e9/L       Assessment and Plan:    Wade Acevedo is a 57 year old male with PMH significant for chronic anemia, cancer associated pain, NSCLC current on Opdivo therapy, depression, tobacco use disorder, hx of EtOH abuse, and hx of thrombocytopenia who now presents with altered mental status.        Acute Encephalopathy   Daughter-in law complains of confusion for at least the last 24 hours. VSS. Drug screen positive only for opiates.  UA negative.  CXR negative.  CT head again shows pituitary mass. EtOH level was 0.00.  Acetaminophen was less than 2 and salicylate level was 5.  DDx: Medications (hx of overuse of scheduled MS Contin as recent at 09/20/17) versus infection versus metabolic derangements (changes in magnesium, thyroid, Wernicke's)  No sign of infectious cause, normal ammonia, T4 and cortisol normal, MRI of poor quality due to motion--repeat with sedation  recommended    Urinary Retention-probably secondary to AMS  Straight cathed for 800cc of urine overnight.  No history of urinary retention.  - straight cath, PRN for urine greater than 350cc on bladder scan  - instruct RN to inform MD if straight cath x3; will likely need to place lion     Non-small cell lung cancer (NSCLC) (H) with metastasis to bone (5th lateral rib, scapula)  Follows with Dr. Naylor. Diagnosed by CT guided biopsy. PET Scan confirms tumor with hypermetabolic lymph nodes and hypermetabolic lesions to the scapula and 5th lateral rib. Previously on carbo/taxol - this was stopped due pancytopenia. Received first dose of Opdivo chemotherapy (immune therapy) 09/14/2017.  Next scheduled chemotherapy infusion of Opdivo 09/28/2017.     Pituitary Macroadenoma/mass  Diagnosed 04/21/2017 by MRI.  Normal TSH with low normal T4/T3.  Mildly abnormal cosyntropin stim testing. Seen by [facility] (Dr. Padma Medley) on 05/10/2017.  Most likely thought to be non-functioning pituitary macroadenoma.  Was supposed to have repeat MRI 08/2017 - did not do so.  Has follow up with endocrinology 11/14/2017.  Most recently had mildly low TSH with normal T4 09/14/2017.    Abnormal EKG  New t inversions since April 2017, no wall motion abnormalities, trops normal, no chest pain--given metastatic cancer and no sign of ACS, will simply observe at this time, doubt implicated in his AMS.    Positive Clostridia difficile test-no diarrha-doubt clinical ds  Test ordered by Dr Fuentes-no diarrhea, no notation regarding why test sent.  Test was positive but I doubt it represents clinical ds--on empiric oral flagyl while work up for AMS is on going. Would probably dc flagyl at some point if no diarrhea.       Anemia  Has received transfusions in the past for low hemoglobin - last received 2 units approximately 09/07/2017.  This was thought to be secondary to chemotherapy (carbo/taxol).  Recently switched from this to immune  chemotherapy.  Hemoglobin on arrival 10.7 on arrival.  8.6 on admission day 1. VSS.  - AM CBC with platelets, will transfuse if hbg less than 7.0     Hx of Thrombocytopenia  Platelets as low as 42k approximately 3 weeks ago.  Received 2u of PRBCs WITHOUT platelets, seemed to respond  - continue to monitor      Cancer associated pain  - continue PTA scheduled MS Contin  - continue PTA PRN oxycodone  - continue PTA dermal capsicin cream Q2 PRN  - continue PTA Voltaren gel 4x daily  - continue PTA Emla cream with access to port     Constipation, chronic  - continue PTA scheduled miralax, senna-ducosate     Major depressive disorder, recurrent episode, moderate (H)  - continue PTA Paxil      Tobacco use disorder  Nicotine patch in place     GERD  - continue PTA pantoprazole     Discussion  Source of AMS still unclear, no overt infection, Still worried about metastatic ds.--will attempt to repeat MR with anesthesia sedation. Will discuss the possibility of Opdivo causing thiswith Dr Naylor.  I did talk to the POA yesterday and asked her to re-address code status.  If MR is unremarkable-might consider LP.      12:18 PM discussed with Dr Naylor  He felt that AMS could be from Opdivo-but is unlikely  If MR is neg-he thought LP would be of little use-would suggest holding Opdivo until situation is clearer.

## 2017-09-27 NOTE — PROGRESS NOTES
[facility]      Subjective:  Continue confusion  No pain  No fever  No ha  No neck pain    Review of Systems:  C: NEGATIVE for fever, chills, change in weight  I: NEGATIVE for worrisome rashes, moles or lesions  E: NEGATIVE for vision changes or irritation  E/M: NEGATIVE for ear, mouth and throat problems  R: NEGATIVE for significant cough or SOB  B: NEGATIVE for masses, tenderness or discharge  CV: NEGATIVE for chest pain, palpitations or peripheral edema  GI: NEGATIVE for nausea, abdominal pain, heartburn, or change in bowel habits  : NEGATIVE for frequency, dysuria, or hematuria  MUSCULOSKELETAL:chronic pain buttox and lower extremities  N: NEGATIVE for weakness, dizziness or paresthesias  E: NEGATIVE for temperature intolerance, skin/hair changes  H: NEGATIVE for bleeding problems  P: NEGATIVE for changes in mood or affect    Physical Exam:  Vitals Were Reviewed    Patient Vitals for the past 16 hrs:   BP Temp Temp src Pulse Heart Rate Resp SpO2 Weight   09/27/17 0756 (!) 150/93 98.1  F (36.7  C) Oral 93 - 18 99 % -   09/27/17 0605 - - - - - - - 67.3 kg (148 lb 5.9 oz)   09/27/17 0403 155/90 98.3  F (36.8  C) Oral 89 - 18 97 % -   09/26/17 2359 139/88 97.6  F (36.4  C) Oral - 96 18 99 % -   09/26/17 1941 114/74 - - 86 - - 97 % -         Intake/Output Summary (Last 24 hours) at 09/27/17 0832  Last data filed at 09/27/17 0657   Gross per 24 hour   Intake             2070 ml   Output             4700 ml   Net            -2630 ml       GENERAL APPEARANCE: confused, speaks in confused fashion, ambulates  EYES: conjunctiva clear, eyes grossly normal  RESP: lungs clear to auscultation - no rales, rhonchi or wheezes  CV: regular rate and rhythm, normal S1 S2, no S3 or S4 and no murmur, click or rub   ABDOMEN: soft, nontender, no HSM or masses and bowel sounds normal  MS: no clubbing, cyanosis; no edema  SKIN: clear without significant rashes or lesions  NEURO: alert, disoriented, speech is  clear but doesn't make sense, exam otherwise nonfocal    Lab:  Recent Labs   Lab Test  09/27/17   0800  09/26/17   0640   NA  145*  139   POTASSIUM  3.3*  3.3*   CHLORIDE  114*  109   CO2  24  21   ANIONGAP  7  9   GLC  105*  85   BUN  9  12   CR  0.71  0.69   JOHANN  8.8  8.4*     CBC RESULTS:   Recent Labs   Lab Test  09/27/17   0800  09/26/17   0640   WBC  6.1  6.5   RBC  3.44*  3.22*   HGB  9.0*  8.6*   HCT  26.6*  25.1*   PLT  154  143*       Results for orders placed or performed during the hospital encounter of 09/25/17 (from the past 24 hour(s))   Ammonia   Result Value Ref Range    Ammonia 17 10 - 50 umol/L   TSH   Result Value Ref Range    TSH 0.17 (L) 0.40 - 4.00 mU/L   Magnesium   Result Value Ref Range    Magnesium 1.5 (L) 1.6 - 2.3 mg/dL   Folate   Result Value Ref Range    Folate 32.0 >5.4 ng/mL   Cortisol   Result Value Ref Range    Cortisol Serum 13.0 4 - 22 ug/dL   T4 free   Result Value Ref Range    T4 Free 0.80 0.76 - 1.46 ng/dL   Care Transition RN/SW IP Consult    Narrative    Barb Ramsay RN     9/26/2017  4:00 PM  Care Transition Initial Assessment - RN    Reason For Consult: discharge planning   Met with: Patient and Family.    DATA:   Principal Problem:    Altered level of consciousness  Active Problems:    Anemia    Non-small cell lung cancer (NSCLC) (H)    Cancer associated pain    Constipation, chronic    Major depressive disorder, recurrent episode, moderate (H)    Tobacco use disorder    Altered mental status       Cognitive Status: awake, alert and confused.  [facility]  Primary Care MD Name: Memo  Contact information and PCP information verified: Yes  Lives With: alone   Living Arrangements: house. Has to go up and down stairs to get   to kitchen. Sarah is concerned that he has not been eating good   since last Wednesday.     Description of Support System: Supportive, Involved   Who is your support system?: Other (specify), Sibling(s)   (Daughter in law  Sarah, Uncle)    Support Assessment: Lacks necessary supervision and assistance   Insurance concerns: No Insurance issues identified    ASSESSMENT:  Patient currently receives the following services:  [facility]   Palliative Care ([phone] Fax: [phone])       Identified issues/concerns regarding health management: new   confusion, medication compliance: was taking both MS Contin and   Oxycodone as PRN meds. Also, patient's home care nurse told Sarah   that he hadn't taken his routine meds from Wednesday of last   week. Just diagnosed with cancer earlier this year. Did not   tolerate first chemotherapy so was started on Opdiva 9/14/17 and   he is due for second dose on 9/28.Was in [facility] in April.   + C. Diff. New hoarse voice since Monday.  Transportation concerns: family may be able to provide, depending   on work schedule    PLAN:  Financial costs for the patient include: none noted at this time.  Patient given options and choices for discharge: This writer met   with pt and his daughter in law Inna, introduced self and role.   Discussed medicare coverage in regards to home care, TCU and LTC.     Patient/family is agreeable to the plan?  Yes, patient doesn't   like the idea of going back to TCU, but knows he may have to   because of his continued confusion.  Patient anticipates discharging to: TCU. Patient was provided   with Medicare certified nursing home list. Pts choices are as   follows 1) [facility] (Phone: [phone] Fax:   [phone]) 2) [facility] Phone:   ([phone]) Fax: ([phone])  3) [facility]   (Phone: [phone]) Fax: ([phone])     Resources List: Transitional Care  Patient anticipates needs for home equipment: No       Discharge Planner   Discharge Plans in progress: Referrals out to TCUs  Barriers to discharge plan: medical stability, may need to put   chemotherapy treatments on hold  Plan/Disposition: TCU   Care Transition  Specialist (CTS) will   continue to follow as needed.    1500: Discussed with Sarah, that TCUs do not take patients that   are currently receiving chemotherapy. Sarah states that patient's   MD did say that they were concerned that patient's confusion   hadn't improved greatly. They are awaiting MRI results and now   patient was to get an echocardiogram. Writer will continue to   assist as needed.    1540: Call received from [facility]. If patient puts his   chemotherapy on hold, they still would not be able to take   patient until Friday at the earliest (no private bathroom until   then) and are still waiting for their business office to approve.   MR Brain w/o & w Contrast    Narrative    MRI BRAIN WITHOUT AND WITH CONTRAST  9/26/2017 2:19 PM    HISTORY:  Alerted mental status. Rule out brain metastasis.    TECHNIQUE:  Multiplanar, multisequence MRI of the brain without and  with 6 mL Gadavist.    COMPARISON: Head CT 9/25/2017. Brain MR 4/21/2017.    FINDINGS: Images are severely degraded by motion artifact. No definite  large intracranial mass is identified noting limitations. There is no  mass effect or midline shift. The ventricles are not enlarged out of  proportion to the cerebral sulci. Mild diffuse parenchymal volume  loss. Patchy deep and subcortical white matter T2 hyperintensities are  nonspecific.    Enhancing sellar mass extends superiorly into the suprasellar cistern  and likely abuts the optic chiasm. No definite other intracranial  enhancing mass is identified noting marked limitations given motion  artifact.    Polypoid mucosal thickening in the left maxillary sinus.       Impression    IMPRESSION:      1. Images are severely degraded by motion artifact.  2. Evaluation for metastatic disease is limited given the motion. No  definite large intracranial mass is identified, but smaller lesions as  well as possible leptomeningeal disease cannot be excluded.  3. Enhancing expansile mass in the sella  extending into the  suprasellar cistern. This may represent a pituitary macroadenoma,  although it is poorly characterized on this motion degraded study.  Metastatic lesion could also be considered.  4. Patchy white matter T2 hyperintense lesions. These are  indeterminate and could be due to chronic microvascular ischemic  disease; however, given the motion artifact, evaluation for associated  enhancement is limited, and these could potentially represent small  metastatic lesions. Recommend repeat imaging with sedation.    LUCAS RAMIREZ MD   Echocardiogram Complete    Narrative    [id number]  [id number]  [id number]  [id number]^ALEX^AMARIS^KATIE           [facility]  Echocardiography Laboratory  [address] [place] [address]        Name: JOHN SANCHEZ  MRN: [id number]  : 1960  Study Date: 2017 03:08 PM  Age: 57 yrs  Gender: Male  Patient Location: [facility]  Reason For Study: Abn EKG  Ordering Physician: AMARIS JOHNSON  Referring Physician: TUSHAR WHITTEN  Performed By: Hilary Garcia RDCS     BSA: 1.8 m2  Height: 71 in  Weight: 144 lb  HR: 110  BP: 110/63 mmHg  _____________________________________________________________________________  __        Procedure  Complete Portable Echo Adult.  _____________________________________________________________________________  __        Interpretation Summary     Technically difficult study due to patient's agitation. Limited views  obtained.     Grossly normal left ventricular and right ventricular systolic function in  subcostal views.  No significant valvular stenosis or regurgitation noted on doppler  interrogation.  _____________________________________________________________________________  __        Left Ventricle  The left ventricle is normal in size. There is normal left ventricular wall  thickness. Left ventricular systolic function is normal. The visual ejection  fraction is estimated at 55-60%. Regional wall motion abnormalities  cannot be  excluded due to limited visualization.     Right Ventricle  The right ventricle is normal size. The right ventricular systolic function is  normal.     Atria  The left atrium is mildly dilated. Right atrial size is normal.     Mitral Valve  There is trace mitral regurgitation.        Tricuspid Valve  Right ventricular systolic pressure could not be approximated due to  inadequate tricuspid regurgitation. There is trace tricuspid regurgitation.     Aortic Valve  The aortic valve is not well visualized. The aortic valve is trileaflet. No  aortic regurgitation is present.     Pulmonic Valve  There is no pulmonic valvular stenosis.     Vessels  The aortic root is normal size. The IVC is normal in size and reactivity with  respiration, suggesting normal central venous pressure.     Pericardium  There is no pericardial effusion.        Rhythm  The rhythm was undetermined.  _____________________________________________________________________________  __  MMode/2D Measurements & Calculations  IVSd: 1.1 cm     LVIDd: 4.5 cm  LVIDs: 4.0 cm  LVPWd: 1.0 cm  FS: 11.1 %  EDV(Teich): 94.3 ml  ESV(Teich): 71.5 ml  LV mass(C)d: 168.5 grams  LV mass(C)dI: 91.8 grams/m2  Ao root diam: 3.5 cm  LA dimension: 4.0 cm  LA/Ao: 1.2                 _____________________________________________________________________________  __           Report approved by: Misael Cohn 09/26/2017 03:42 PM      Magnesium   Result Value Ref Range    Magnesium 2.7 (H) 1.6 - 2.3 mg/dL   Troponin I   Result Value Ref Range    Troponin I ES <0.015 0.000 - 0.045 ug/L   Comprehensive metabolic panel   Result Value Ref Range    Sodium 145 (H) 133 - 144 mmol/L    Potassium 3.3 (L) 3.4 - 5.3 mmol/L    Chloride 114 (H) 94 - 109 mmol/L    Carbon Dioxide 24 20 - 32 mmol/L    Anion Gap 7 3 - 14 mmol/L    Glucose 105 (H) 70 - 99 mg/dL    Urea Nitrogen 9 7 - 30 mg/dL    Creatinine 0.71 0.66 - 1.25 mg/dL    GFR Estimate >90 >60 mL/min/1.7m2    GFR Estimate  If Black >90 >60 mL/min/1.7m2    Calcium 8.8 8.5 - 10.1 mg/dL    Bilirubin Total 0.6 0.2 - 1.3 mg/dL    Albumin 3.3 (L) 3.4 - 5.0 g/dL    Protein Total 6.8 6.8 - 8.8 g/dL    Alkaline Phosphatase 111 40 - 150 U/L    ALT 18 0 - 70 U/L    AST 21 0 - 45 U/L   CBC with platelets differential   Result Value Ref Range    WBC 6.1 4.0 - 11.0 10e9/L    RBC Count 3.44 (L) 4.4 - 5.9 10e12/L    Hemoglobin 9.0 (L) 13.3 - 17.7 g/dL    Hematocrit 26.6 (L) 40.0 - 53.0 %    MCV 77 (L) 78 - 100 fl    MCH 26.2 (L) 26.5 - 33.0 pg    MCHC 33.8 31.5 - 36.5 g/dL    RDW 15.6 (H) 10.0 - 15.0 %    Platelet Count 154 150 - 450 10e9/L    Diff Method Automated Method     % Neutrophils 64.7 %    % Lymphocytes 24.5 %    % Monocytes 8.4 %    % Eosinophils 2.0 %    % Basophils 0.2 %    % Immature Granulocytes 0.2 %    Absolute Neutrophil 3.9 1.6 - 8.3 10e9/L    Absolute Lymphocytes 1.5 0.8 - 5.3 10e9/L    Absolute Monocytes 0.5 0.0 - 1.3 10e9/L    Absolute Eosinophils 0.1 0.0 - 0.7 10e9/L    Absolute Basophils 0.0 0.0 - 0.2 10e9/L    Abs Immature Granulocytes 0.0 0 - 0.4 10e9/L       Assessment and Plan:    Wade Acevedo is a 57 year old male with PMH significant for chronic anemia, cancer associated pain, NSCLC current on Opdivo therapy, depression, tobacco use disorder, hx of EtOH abuse, and hx of thrombocytopenia who now presents with altered mental status.        Acute Encephalopathy   Daughter-in law complains of confusion for at least the last 24 hours. VSS. Drug screen positive only for opiates.  UA negative.  CXR negative.  CT head again shows pituitary mass. EtOH level was 0.00.  Acetaminophen was less than 2 and salicylate level was 5.  DDx: Medications (hx of overuse of scheduled MS Contin as recent at 09/20/17) versus infection versus metabolic derangements (changes in magnesium, thyroid, Wernicke's)  No sign of infectious cause, normal ammonia, T4 and cortisol normal, MRI of poor quality due to motion--repeat with sedation  recommended    Urinary Retention-probably secondary to AMS  Straight cathed for 800cc of urine overnight.  No history of urinary retention.  - straight cath, PRN for urine greater than 350cc on bladder scan  - instruct RN to inform MD if straight cath x3; will likely need to place lion     Non-small cell lung cancer (NSCLC) (H) with metastasis to bone (5th lateral rib, scapula)  Follows with Dr. Naylor. Diagnosed by CT guided biopsy. PET Scan confirms tumor with hypermetabolic lymph nodes and hypermetabolic lesions to the scapula and 5th lateral rib. Previously on carbo/taxol - this was stopped due pancytopenia. Received first dose of Opdivo chemotherapy (immune therapy) 09/14/2017.  Next scheduled chemotherapy infusion of Opdivo 09/28/2017.     Pituitary Macroadenoma/mass  Diagnosed 04/21/2017 by MRI.  Normal TSH with low normal T4/T3.  Mildly abnormal cosyntropin stim testing. Seen by [facility] (Dr. Padma Medley) on 05/10/2017.  Most likely thought to be non-functioning pituitary macroadenoma.  Was supposed to have repeat MRI 08/2017 - did not do so.  Has follow up with endocrinology 11/14/2017.  Most recently had mildly low TSH with normal T4 09/14/2017.    Abnormal EKG  New t inversions since April 2017, no wall motion abnormalities, trops normal, no chest pain--given metastatic cancer and no sign of ACS, will simply observe at this time, doubt implicated in his AMS.    Positive Clostridia difficile test-no diarrha-doubt clinical ds  Test ordered by Dr Fuentes-no diarrhea, no notation regarding why test sent.  Test was positive but I doubt it represents clinical ds--on empiric oral flagyl while work up for AMS is on going. Would probably dc flagyl at some point if no diarrhea.       Anemia  Has received transfusions in the past for low hemoglobin - last received 2 units approximately 09/07/2017.  This was thought to be secondary to chemotherapy (carbo/taxol).  Recently switched from this to immune  chemotherapy.  Hemoglobin on arrival 10.7 on arrival.  8.6 on admission day 1. VSS.  - AM CBC with platelets, will transfuse if hbg less than 7.0     Hx of Thrombocytopenia  Platelets as low as 42k approximately 3 weeks ago.  Received 2u of PRBCs WITHOUT platelets, seemed to respond  - continue to monitor      Cancer associated pain  - continue PTA scheduled MS Contin  - continue PTA PRN oxycodone  - continue PTA dermal capsicin cream Q2 PRN  - continue PTA Voltaren gel 4x daily  - continue PTA Emla cream with access to port     Constipation, chronic  - continue PTA scheduled miralax, senna-ducosate     Major depressive disorder, recurrent episode, moderate (H)  - continue PTA Paxil      Tobacco use disorder  Nicotine patch in place     GERD  - continue PTA pantoprazole     Discussion  Source of AMS still unclear, no overt infection, Still worried about metastatic ds.--will attempt to repeat MR with anesthesia sedation. Will discuss the possibility of Opdivo causing thiswith Dr Naylor.  I did talk to the POA yesterday and asked her to re-address code status.  If MR is unremarkable-might consider LP.      12:18 PM discussed with Dr Naylor  He felt that AMS could be from Opdivo-but is unlikely  If MR is neg-he thought LP would be of little use-would suggest holding Opdivo until situation is clearer.

## 2017-09-27 NOTE — ANESTHESIA PREPROCEDURE EVALUATION
Anesthesia Evaluation     . Pt has had prior anesthetic.     No history of anesthetic complications          ROS/MED HX    ENT/Pulmonary:     (+)tobacco use, Past use 45 pk yrs packs/day  , . .    Neurologic:     (+)other neuro unsteady gait    Cardiovascular:  - neg cardiovascular ROS   (+) ----. : . . . :. . Previous cardiac testing date:results:date: results:ECG reviewed date:4-2017 results:Sinus Tachycardia   Low voltage in limb leads.    -Nonspecific ST depression +   Nonspecific T-abnormality  -Nondiagnostic.     ABNORMAL date: results:          METS/Exercise Tolerance:  3 - Able to walk 1-2 blocks without stopping   Hematologic:     (+) Anemia, -      Musculoskeletal:  - neg musculoskeletal ROS       GI/Hepatic:     (+) GERD Asymptomatic on medication, liver disease, Other GI/Hepatic ETOH, acute weight loss      Renal/Genitourinary:  - ROS Renal section negative       Endo:     (+) Other Endocrine Disorder pituitary macroadenoma.      Psychiatric:  - neg psychiatric ROS       Infectious Disease:  - neg infectious disease ROS       Malignancy:   (+) Malignancy History of Lung  Lung CA Active status post.         Other:    (+) No chance of pregnancy C-spine cleared: N/A, H/O Chronic Pain,H/O chronic opiod use , no other significant disability                    Physical Exam  Normal systems: cardiovascular and pulmonary    Airway   Mallampati: II  TM distance: >3 FB  Neck ROM: full    Dental   (+) missing, chipped and other    Cardiovascular       Pulmonary                         Anesthesia Plan      History & Physical Review  History and physical reviewed and following examination; no interval change.    ASA Status:  3 .    NPO Status:  > 8 hours    Plan for General and ETT with Propofol and Intravenous induction. Maintenance will be Balanced.    PONV prophylaxis:  Ondansetron (or other 5HT-3) and Dexamethasone or Solumedrol       Postoperative Care  Postoperative pain management:  IV analgesics and Oral  pain medications.      Consents  Anesthetic plan, risks, benefits and alternatives discussed with:  Patient and Other (See Comment) (Daughter-in-law is POA--consent obtained from her )..                          .

## 2017-09-27 NOTE — PLAN OF CARE
Problem: Patient Care Overview  Goal: Plan of Care/Patient Progress Review  Outcome: No Change  Pt continues to be confused, orientation fluctuates, daughter-in-law here this morning to be with pt throughout the day and therefore VPM was discontinued.  Pt up in room with SBA.  Has been incontinent of a large amount of urine several times today.  No stools since early this morning.  NPO since 8:30 am for MRI this afternoon that is being done under general anesthesia.  Pt down to MRI via cart at 1500, daughter-in-law (POA) down with pt.

## 2017-09-27 NOTE — INTERVAL H&P NOTE
The causes and treatment of seasonal allergic rhinitis are discussed in detail. Allergen avoidance, use and side effects of OTC and prescription antihistamine decongestant products, and the use and side effects of inhaled nasal corticosteroids is reviewed. Allergy desensitization shots are reserved for severe and refractory cases.

## 2017-09-28 NOTE — PLAN OF CARE
Problem: Patient Care Overview  Goal: Plan of Care/Patient Progress Review  Outcome: Improving  Pt continues to have intermittent confusion, disoriented to date and situation, but does seem a little more clear this afternoon.  Answers some questions appropriately.  Started on flomax today for frequency and retention.  Pt voided 600 cc without difficulty this shift.  Denies pain or nausea, appetite poor, but drinking plenty of fluids.  VPM discontinued during the day, family here at bedside.  Family would like VPM in room at night when they're not here, will update oncoming RN.

## 2017-09-28 NOTE — PROGRESS NOTES
Pt had previously scheduled appt for chemo today.  Per oncology, no chemo today, they are aware that pt is here.  Dr. Pino aware.  Pt and family notified.

## 2017-09-28 NOTE — PROGRESS NOTES
09/28/17 1400   Signing Clinician's Name / Credentials   Signing clinician's name / credentials Patricia Larson PT   Quick Adds   Rehab Discipline PT   Additional Documentation   OT Plan Cancel- Pt declined therapy today, visiting- agreeable to therapy tomorrow

## 2017-09-28 NOTE — PLAN OF CARE
Problem: Patient Care Overview  Goal: Plan of Care/Patient Progress Review  OT: Pt declining therapy today. Will attempt tomorrow as appropriate.

## 2017-09-28 NOTE — PLAN OF CARE
Problem: Patient Care Overview  Goal: Plan of Care/Patient Progress Review  Outcome: No Change  Patient VSS, up with assist to bathroom. Port infusing NS 50/hr, site WNL. Potassium replaced, lab recheck will be at 0130 on 9/28 and then again in AM. Patient sometimes confused to time and situation. Patient's family requests VPM during the night and to have 4 bed rails up for safety. Patient able to swallow pills well. Taking sips of water, Gatorade, milk. MS Contin for pain and prn Oxy. Continuing Flagyl as ordered. Remains on enteric precautions.

## 2017-09-28 NOTE — PROGRESS NOTES
Met with Sarah, patient's POA.  Discussed that during patient's TCU stay, he would not be receiving chemo.  Sarah is aware of this.  Updated her on TCU status and gathered more choices.      [facility] - Accepted - has to admit tomorrow or they need to give up the bed  [facility] - Declined - No Beds  [facility] - Declined - No Beds  [facility] - stated they didn't get the referral - refaxed referral    Additional Referrals sent to:    [facility] [phone] Phone, [phone] Fax  [facility] [phone] Phone, [phone] Fax    [facility] [phone]/ [facility] [phone]

## 2017-09-28 NOTE — PLAN OF CARE
Problem: Patient Care Overview  Goal: Plan of Care/Patient Progress Review  Outcome: No Change  Patient up with assist of one to bathroom throughout night; no bowel movement this shift. Patient alert and oriented to self and place. Patient appears calm. Potassium recheck at 0200 was 4. Patient given oxy 10mg around 0500 for leg and abdomen pain; relief noted.

## 2017-09-28 NOTE — PROGRESS NOTES
[facility]      Subjective:  Less confused. knows date, time of year.  Had significant urinary retention after 800cc removed via cath he has been less agitated and confused.     Review of Systems:  C: NEGATIVE for fever, chills, change in weight  I: NEGATIVE for worrisome rashes, moles or lesions  E: NEGATIVE for vision changes or irritation  E/M: NEGATIVE for ear, mouth and throat problems  R: NEGATIVE for significant cough or SOB  B: NEGATIVE for masses, tenderness or discharge  CV: NEGATIVE for chest pain, palpitations or peripheral edema  GI: NEGATIVE for nausea, abdominal pain, heartburn, or change in bowel habits  : NEGATIVE for frequency, dysuria, or hematuria  MUSCULOSKELETAL:chronic pain buttox and lower extremities  N: NEGATIVE for weakness, dizziness or paresthesias  E: NEGATIVE for temperature intolerance, skin/hair changes  H: NEGATIVE for bleeding problems  P: NEGATIVE for changes in mood or affect    Physical Exam:  Vitals Were Reviewed    Patient Vitals for the past 16 hrs:   BP Temp Temp src Pulse Heart Rate Resp SpO2 Weight   09/28/17 1500 111/75 98.4  F (36.9  C) Oral - 111 18 96 % -   09/28/17 0815 136/87 99.4  F (37.4  C) Oral - 120 18 99 % -   09/28/17 0337 (!) 144/96 98.1  F (36.7  C) Oral 117 - 18 99 % 143 lb 15.4 oz (65.3 kg)         Intake/Output Summary (Last 24 hours) at 09/27/17 0832  Last data filed at 09/27/17 0657   Gross per 24 hour   Intake             2070 ml   Output             4700 ml   Net            -2630 ml       GENERAL APPEARANCE: confused, speaks in confused fashion, ambulates  EYES: conjunctiva clear, eyes grossly normal  RESP: lungs clear to auscultation - no rales, rhonchi or wheezes  CV: regular rate and rhythm, normal S1 S2, no S3 or S4 and no murmur, click or rub   ABDOMEN: soft, nontender, no HSM or masses and bowel sounds normal  MS: no clubbing, cyanosis; no edema  SKIN: clear without significant rashes or lesions  NEURO: alert,  disoriented, speech is clear but doesn't make sense, exam otherwise nonfocal    Lab:  Recent Labs   Lab Test  09/27/17   0800  09/26/17   0640   NA  145*  139   POTASSIUM  3.3*  3.3*   CHLORIDE  114*  109   CO2  24  21   ANIONGAP  7  9   GLC  105*  85   BUN  9  12   CR  0.71  0.69   JOHANN  8.8  8.4*     CBC RESULTS:   Recent Labs   Lab Test  09/27/17   0800  09/26/17   0640   WBC  6.1  6.5   RBC  3.44*  3.22*   HGB  9.0*  8.6*   HCT  26.6*  25.1*   PLT  154  143*       Results for orders placed or performed during the hospital encounter of 09/25/17 (from the past 24 hour(s))   Potassium   Result Value Ref Range    Potassium 4.0 3.4 - 5.3 mmol/L   CBC with platelets   Result Value Ref Range    WBC 7.2 4.0 - 11.0 10e9/L    RBC Count 3.59 (L) 4.4 - 5.9 10e12/L    Hemoglobin 9.3 (L) 13.3 - 17.7 g/dL    Hematocrit 28.1 (L) 40.0 - 53.0 %    MCV 78 78 - 100 fl    MCH 25.9 (L) 26.5 - 33.0 pg    MCHC 33.1 31.5 - 36.5 g/dL    RDW 16.1 (H) 10.0 - 15.0 %    Platelet Count 173 150 - 450 10e9/L   Comprehensive metabolic panel   Result Value Ref Range    Sodium 145 (H) 133 - 144 mmol/L    Potassium 3.8 3.4 - 5.3 mmol/L    Chloride 116 (H) 94 - 109 mmol/L    Carbon Dioxide 24 20 - 32 mmol/L    Anion Gap 5 3 - 14 mmol/L    Glucose 112 (H) 70 - 99 mg/dL    Urea Nitrogen 9 7 - 30 mg/dL    Creatinine 0.80 0.66 - 1.25 mg/dL    GFR Estimate >90 >60 mL/min/1.7m2    GFR Estimate If Black >90 >60 mL/min/1.7m2    Calcium 8.9 8.5 - 10.1 mg/dL    Bilirubin Total 0.6 0.2 - 1.3 mg/dL    Albumin 3.3 (L) 3.4 - 5.0 g/dL    Protein Total 6.8 6.8 - 8.8 g/dL    Alkaline Phosphatase 110 40 - 150 U/L    ALT 17 0 - 70 U/L    AST 25 0 - 45 U/L   Phosphorus   Result Value Ref Range    Phosphorus 3.1 2.5 - 4.5 mg/dL       Assessment and Plan:    Wade Acevedo is a 57 year old male with PMH significant for chronic anemia, cancer associated pain, NSCLC current on Opdivo therapy, depression, tobacco use disorder, hx of EtOH abuse, and hx of thrombocytopenia who  now presents with altered mental status.        Acute Encephalopathy   Daughter-in law complains of confusion for at least the last 24 hours. VSS. Drug screen positive only for opiates.  UA negative.  CXR negative.  CT head again shows pituitary mass. EtOH level was 0.00.  Acetaminophen was less than 2 and salicylate level was 5.  DDx: Medications (hx of overuse of scheduled MS Contin as recent at 09/20/17) versus infection versus metabolic derangements (changes in magnesium, thyroid, Wernicke's)  No sign of infectious cause, normal ammonia, T4 and cortisol normal, MRI of poor quality due to motion--repeat with sedation recommended  9/28/17 improved.  MRi  Shows increase in pituitary mass.  Will continue to see if he clears.  Either way daughter in law does not feel he is safe at home,  Will need TCU on dc but if in tcU  WON'T BE ABLE TO GET cHEMO.  WILL DISCUSS WITH ONCOLOGY.      Urinary Retention-probably secondary to AMS  Straight cathed for 800cc of urine overnight.  No history of urinary retention.  - straight cath, PRN for urine greater than 350cc on bladder scan  - instruct RN to inform MD if straight cath x3; will likely need to place lion  IMPROVED  HAS HAD BETTER OUTPUT WITH FOMAX     Non-small cell lung cancer (NSCLC) (H) with metastasis to bone (5th lateral rib, scapula)  Follows with Dr. Naylor. Diagnosed by CT guided biopsy. PET Scan confirms tumor with hypermetabolic lymph nodes and hypermetabolic lesions to the scapula and 5th lateral rib. Previously on carbo/taxol - this was stopped due pancytopenia. Received first dose of Opdivo chemotherapy (immune therapy) 09/14/2017.  Next scheduled chemotherapy infusion of Opdivo 09/28/2017.     Pituitary Macroadenoma/mass  Diagnosed 04/21/2017 by MRI.  Normal TSH with low normal T4/T3.  Mildly abnormal cosyntropin stim testing. Seen by [facility] (Dr. Padma Medley) on 05/10/2017.  Most likely thought to be non-functioning pituitary macroadenoma.   Was supposed to have repeat MRI 08/2017 - did not do so.  Has follow up with endocrinology 11/14/2017.  Most recently had mildly low TSH with normal T4 09/14/2017.    Abnormal EKG  New t inversions since April 2017, no wall motion abnormalities, trops normal, no chest pain--given metastatic cancer and no sign of ACS, will simply observe at this time, doubt implicated in his AMS.    Positive Clostridia difficile test-no diarrha-doubt clinical ds  Test ordered by Dr Fuentes-no diarrhea, no notation regarding why test sent.  Test was positive but I doubt it represents clinical ds--on empiric oral flagyl while work up for AMS is on going. Would probably dc flagyl at some point if no diarrhea.       Anemia  Has received transfusions in the past for low hemoglobin - last received 2 units approximately 09/07/2017.  This was thought to be secondary to chemotherapy (carbo/taxol).  Recently switched from this to immune chemotherapy.  Hemoglobin on arrival 10.7 on arrival.  8.6 on admission day 1. VSS.  - AM CBC with platelets, will transfuse if hbg less than 7.0     Hx of Thrombocytopenia  Platelets as low as 42k approximately 3 weeks ago.  Received 2u of PRBCs WITHOUT platelets, seemed to respond  - continue to monitor      Cancer associated pain  - continue PTA scheduled MS Contin  - continue PTA PRN oxycodone  - continue PTA dermal capsicin cream Q2 PRN  - continue PTA Voltaren gel 4x daily  - continue PTA Emla cream with access to port     Constipation, chronic  - continue PTA scheduled miralax, senna-ducosate     Major depressive disorder, recurrent episode, moderate (H)  - continue PTA Paxil      Tobacco use disorder  Nicotine patch in place     GERD  - continue PTA pantoprazole     Discussion  Source of AMS still unclear, no overt infection, Still worried about metastatic ds.--will attempt to repeat MR with anesthesia sedation. Will discuss the possibility of Opdivo causing thiswith Dr Naylor.  I did talk to the POA  yesterday and asked her to re-address code status.  If MR is unremarkable-might consider LP.  9/28/17  IMPROVING  WILL CONTINUE WITH CURRENT TREATMENT  JOSÉ LUIS POSSIBLE CHEMO ALEXANDR? THEN TO tcu        He felt that AMS could be from Opdivo-but is unlikely  If MR is neg-he thought LP would be of little use-would suggest holding Opdivo until situation is clearer.

## 2017-09-29 NOTE — MR AVS SNAPSHOT
After Visit Summary   9/29/2017    Wade Acevedo    MRN: [id number]           Patient Information     Date Of Birth          1960        Visit Information        Provider Department      9/29/2017 1:45 PM Pasquale Naylor MD [facility] ONCOLOGY      Care Instructions    No Opdivo today. We would like to see you back in clinic with Dr. Call in 2 weeks with Opdivo to follow. Copy of appointments, and after visit summary (AVS) given to patient.  If you have any questions during business hours (M-F 8 AM- 4PM), please call Jillian Renee, RN, BSN, OCN Oncology Hematology /Breast Cancer Navigator at [facility] [phone].   For questions after business hours, or on holidays/weekends, please call our after hours Nurse Triage line [phone]. Thank you.            Follow-ups after your visit        Your next 10 appointments already scheduled     Oct 12, 2017  1:45 PM CDT   Return Visit with Pasquale Naylor MD   [facility] ([facility])    [facility]  [address]  [facility] [address]   [phone]            Oct 12, 2017  2:00 PM CDT   Level 2 with ROOM 7 [facility] ([facility])    [facility]  [address]  [facility] [address]   [phone]            Nov 14, 2017 11:30 AM CST   (Arrive by 11:15 AM)   RETURN ENDOCRINE with Padma Medley MD   [facility] ([facility])    [address] [place] [address] [phone]              Who to contact     If you have questions or need follow up information about today's clinic visit or your schedule please contact [facility] directly at [phone].  Normal or non-critical lab and imaging results will be communicated to you by MyChart, letter or phone within 4 business days after the clinic has  "received the results. If you do not hear from us within 7 days, please contact the clinic through [employer] or phone. If you have a critical or abnormal lab result, we will notify you by phone as soon as possible.  Submit refill requests through [employer] or call your pharmacy and they will forward the refill request to us. Please allow 3 business days for your refill to be completed.          Additional Information About Your Visit        [employer] Information     [employer] lets you send messages to your doctor, view your test results, renew your prescriptions, schedule appointments and more. To sign up, go to [email or web address] . Click on \"Log in\" on the left side of the screen, which will take you to the Welcome page. Then click on \"Sign up Now\" on the right side of the page.     You will be asked to enter the access code listed below, as well as some personal information. Please follow the directions to create your username and password.     Your access code is: [email or web address]  Expires: 2017  9:54 AM     Your access code will  in 90 days. If you need help or a new code, please call your [place] clinic or [phone].        Care EveryWhere ID     This is your Care EveryWhere ID. This could be used by other organizations to access your [place] medical records  [id number]        Your Vitals Were     Pulse Temperature Respirations Height Pulse Oximetry BMI (Body Mass Index)    116 100.4  F (38  C) (Tympanic) 18 1.807 m (5' 11.14\") 98% 20.62 kg/m2       Blood Pressure from Last 3 Encounters:   17 114/68   17 102/76   17 117/66    Weight from Last 3 Encounters:   17 67.3 kg (148 lb 6.4 oz)   17 66.9 kg (147 lb 7.8 oz)   17 68.5 kg (151 lb)              Today, you had the following     No orders found for display         Today's Medication Changes          These changes are accurate as of: 17  2:47 PM.  If you have any questions, ask your nurse or doctor.             "   These medicines have changed or have updated prescriptions.        Dose/Directions    senna-docusate 8.6-50 MG per tablet   Commonly known as:  SENOKOT-S;PERICOLACE   This may have changed:  additional instructions   Used for:  Non-small cell lung cancer (NSCLC) (H)        Dose:  1 tablet   Take 1 tablet by mouth 2 times daily Reported on 5/4/2017   Quantity:  100 tablet   Refills:  3                Primary Care Provider Office Phone # Fax #    Pawan Hampton [phone] [address] [facility] [address]        Equal Access to Services     [facility]: Hadii jaxon laurent hadasho Soomaali, waaxda luqadaha, qaybta kaalmada raymundo, gena romero . So [facility] [phone].    ATENCIÓN: Si habla español, tiene a burns disposición servicios gratuitos de asistencia lingüística. [facility] [phone].    We comply with applicable federal civil rights laws and [place] laws. We do not discriminate on the basis of race, color, national origin, age, disability, sex, sexual orientation, or gender identity.            Thank you!     Thank you for choosing [facility]  for your care. Our goal is always to provide you with excellent care. Hearing back from our patients is one way we can continue to improve our services. Please take a few minutes to complete the written survey that you may receive in the mail after your visit with us. Thank you!             Your Updated Medication List - Protect others around you: Learn how to safely use, store and throw away your medicines at www.disposemymeds.org.          This list is accurate as of: 9/29/17  2:47 PM.  Always use your most recent med list.                   Brand Name Dispense Instructions for use Diagnosis    acetaminophen 325 MG tablet    TYLENOL    100 tablet    Take 2 tablets (650 mg) by mouth every 4 hours as needed for mild pain        bisacodyl 10 MG Suppository    DULCOLAX    12 suppository    Place 1  suppository (10 mg) rectally daily as needed for constipation    Drug-induced constipation       capsaicin 0.025 % Crea cream    ZOSTRIX    120 g    Apply to R hip and thigh every 2 hours as needed for pain.    Multiple joint pain, Cancer associated pain       diclofenac 1 % Gel topical gel    VOLTAREN    200 g    Apply 4 grams to knees or 2 grams to hands four times daily using enclosed dosing card.    Multiple joint pain       folic acid 1 MG tablet    FOLVITE    30 tablet    Take 1 tablet (1 mg) by mouth daily        lidocaine-prilocaine cream    EMLA    30 g    Apply topically over port 45 - 60 minutes prior to port access.    Non-small cell lung cancer (NSCLC) (H)       metroNIDAZOLE 500 MG tablet    FLAGYL    21 tablet    Take 1 tablet (500 mg) by mouth 3 times daily    Constipation, chronic       morphine 30 MG 12 hr tablet    MS CONTIN    60 tablet    Take 1 tablet (30 mg) by mouth 2 times daily    Non-small cell lung cancer (NSCLC) (H)       * nicotine 21 MG/24HR 24 hr patch    NICODERM CQ    30 patch    Place 1 patch onto the skin every 24 hours    Tobacco abuse       * nicotine 14 MG/24HR 24 hr patch    NICODERM CQ    30 patch    Place 1 patch onto the skin every 24 hours    Tobacco abuse       * nicotine 7 MG/24HR 24 hr patch    NICODERM CQ    30 patch    Place 1 patch onto the skin every 24 hours    Tobacco abuse       oxyCODONE 10 MG IR tablet    ROXICODONE    60 tablet    Take 1 tablet (10 mg) by mouth every 4 hours as needed for moderate to severe pain    Non-small cell lung cancer (NSCLC) (H), Cancer associated pain       pantoprazole 40 MG EC tablet    PROTONIX    60 tablet    Take 1 tablet (40 mg) by mouth 2 times daily (before meals)    Non-small cell lung cancer (NSCLC) (H)       PARoxetine 10 MG tablet    PAXIL    30 tablet    Take 1 tablet (10 mg) by mouth At Bedtime (Needs follow-up appointment for this medication)    Major depressive disorder, recurrent episode, moderate (H)        polyethylene glycol powder    MIRALAX    510 g    Take 17 g (1 capful) by mouth daily Mix in 4-8 oz of fluid of choice. For constipation    Drug-induced constipation       senna-docusate 8.6-50 MG per tablet    SENOKOT-S;PERICOLACE    100 tablet    Take 1 tablet by mouth 2 times daily Reported on 5/4/2017    Non-small cell lung cancer (NSCLC) (H)       tamsulosin 0.4 MG capsule    FLOMAX    30 capsule    Take 1 capsule (0.4 mg) by mouth daily    Urinary retention with incomplete bladder emptying       * Notice:  This list has 3 medication(s) that are the same as other medications prescribed for you. Read the directions carefully, and ask your doctor or other care provider to review them with you.

## 2017-09-29 NOTE — MR AVS SNAPSHOT
"              After Visit Summary   9/29/2017    Wade Acevedo    MRN: [id number]           Patient Information     Date Of Birth          1960        Visit Information        Provider Department      9/29/2017 2:00 PM ROOM 8 [facility]fusion        Today's Diagnoses     Cancer associated pain    -  1    Non-small cell lung cancer (NSCLC) (H)           Follow-ups after your visit        Your next 10 appointments already scheduled     Nov 14, 2017 11:30 AM CST   (Arrive by 11:15 AM)   RETURN ENDOCRINE with Padma Medley MD   [facility] ([facility])    [address] [place] [id number] [phone]              Who to contact     If you have questions or need follow up information about today's clinic visit or your schedule please contact [facility] INFUSION directly at [phone].  Normal or non-critical lab and imaging results will be communicated to you by [employer], letter or phone within 4 business days after the clinic has received the results. If you do not hear from us within 7 days, please contact the clinic through [employer] or phone. If you have a critical or abnormal lab result, we will notify you by phone as soon as possible.  Submit refill requests through [employer] or call your pharmacy and they will forward the refill request to us. Please allow 3 business days for your refill to be completed.          Additional Information About Your Visit        [employer] Information     [employer] lets you send messages to your doctor, view your test results, renew your prescriptions, schedule appointments and more. To sign up, go to [email or web address] . Click on \"Log in\" on the left side of the screen, which will take you to the Welcome page. Then click on \"Sign up Now\" on the right side of the page.     You will be asked to enter the access code listed below, as well as some personal information. Please follow the directions to create " your username and password.     Your access code is: [email or web address]  Expires: 2017  9:54 AM     Your access code will  in 90 days. If you need help or a new code, please call your [facility] clinic or [phone].        Care EveryWhere ID     This is your Care EveryWhere ID. This could be used by other organizations to access your [facility] medical records  [id number]         Blood Pressure from Last 3 Encounters:   17 114/68   17 102/76   17 117/66    Weight from Last 3 Encounters:   17 67.3 kg (148 lb 6.4 oz)   17 66.9 kg (147 lb 7.8 oz)   17 68.5 kg (151 lb)              Today, you had the following     No orders found for display         Today's Medication Changes          These changes are accurate as of: 17  2:33 PM.  If you have any questions, ask your nurse or doctor.               These medicines have changed or have updated prescriptions.        Dose/Directions    senna-docusate 8.6-50 MG per tablet   Commonly known as:  SENOKOT-S;PERICOLACE   This may have changed:  additional instructions   Used for:  Non-small cell lung cancer (NSCLC) (H)        Dose:  1 tablet   Take 1 tablet by mouth 2 times daily Reported on 2017   Quantity:  100 tablet   Refills:  3                Primary Care Provider Office Phone # Fax #    Pawan Cristian Hampton [phone] [address] [address]        Equal Access to Services     GONZALO SMITH AH: Hadgaudencio gerard Sonilsa, waaxda luqadaha, qaybta kaalmada adeegconor, gena evans. So [facility] [phone].    ATENCIÓN: Si habla español, tiene a burns disposición servicios gratuitos de asistencia lingüística. Llame al [phone].    We comply with applicable federal civil rights laws and [place] laws. We do not discriminate on the basis of race, color, national origin, age, disability, sex, sexual orientation, or gender identity.            Thank you!     Thank you  for choosing [facility] CANCER INFUSION  for your care. Our goal is always to provide you with excellent care. Hearing back from our patients is one way we can continue to improve our services. Please take a few minutes to complete the written survey that you may receive in the mail after your visit with us. Thank you!             Your Updated Medication List - Protect others around you: Learn how to safely use, store and throw away your medicines at www.disposemymeds.org.          This list is accurate as of: 9/29/17  2:33 PM.  Always use your most recent med list.                   Brand Name Dispense Instructions for use Diagnosis    acetaminophen 325 MG tablet    TYLENOL    100 tablet    Take 2 tablets (650 mg) by mouth every 4 hours as needed for mild pain        bisacodyl 10 MG Suppository    DULCOLAX    12 suppository    Place 1 suppository (10 mg) rectally daily as needed for constipation    Drug-induced constipation       capsaicin 0.025 % Crea cream    ZOSTRIX    120 g    Apply to R hip and thigh every 2 hours as needed for pain.    Multiple joint pain, Cancer associated pain       diclofenac 1 % Gel topical gel    VOLTAREN    200 g    Apply 4 grams to knees or 2 grams to hands four times daily using enclosed dosing card.    Multiple joint pain       folic acid 1 MG tablet    FOLVITE    30 tablet    Take 1 tablet (1 mg) by mouth daily        lidocaine-prilocaine cream    EMLA    30 g    Apply topically over port 45 - 60 minutes prior to port access.    Non-small cell lung cancer (NSCLC) (H)       metroNIDAZOLE 500 MG tablet    FLAGYL    21 tablet    Take 1 tablet (500 mg) by mouth 3 times daily    Constipation, chronic       morphine 30 MG 12 hr tablet    MS CONTIN    60 tablet    Take 1 tablet (30 mg) by mouth 2 times daily    Non-small cell lung cancer (NSCLC) (H)       * nicotine 21 MG/24HR 24 hr patch    NICODERM CQ    30 patch    Place 1 patch onto the skin every 24 hours    Tobacco abuse       * nicotine  14 MG/24HR 24 hr patch    NICODERM CQ    30 patch    Place 1 patch onto the skin every 24 hours    Tobacco abuse       * nicotine 7 MG/24HR 24 hr patch    NICODERM CQ    30 patch    Place 1 patch onto the skin every 24 hours    Tobacco abuse       oxyCODONE 10 MG IR tablet    ROXICODONE    60 tablet    Take 1 tablet (10 mg) by mouth every 4 hours as needed for moderate to severe pain    Non-small cell lung cancer (NSCLC) (H), Cancer associated pain       pantoprazole 40 MG EC tablet    PROTONIX    60 tablet    Take 1 tablet (40 mg) by mouth 2 times daily (before meals)    Non-small cell lung cancer (NSCLC) (H)       PARoxetine 10 MG tablet    PAXIL    30 tablet    Take 1 tablet (10 mg) by mouth At Bedtime (Needs follow-up appointment for this medication)    Major depressive disorder, recurrent episode, moderate (H)       polyethylene glycol powder    MIRALAX    510 g    Take 17 g (1 capful) by mouth daily Mix in 4-8 oz of fluid of choice. For constipation    Drug-induced constipation       senna-docusate 8.6-50 MG per tablet    SENOKOT-S;PERICOLACE    100 tablet    Take 1 tablet by mouth 2 times daily Reported on 5/4/2017    Non-small cell lung cancer (NSCLC) (H)       tamsulosin 0.4 MG capsule    FLOMAX    30 capsule    Take 1 capsule (0.4 mg) by mouth daily    Urinary retention with incomplete bladder emptying       * Notice:  This list has 3 medication(s) that are the same as other medications prescribed for you. Read the directions carefully, and ask your doctor or other care provider to review them with you.

## 2017-09-29 NOTE — PLAN OF CARE
Problem: Patient Care Overview  Goal: Plan of Care/Patient Progress Review  Outcome: No Change  Patient's pain being managed with scheduled MS Contin and prn Oxy. Nicotine patch remains in place. Port site WNL, fluids remain infusing 50ml/hr. Patient drinking water and Gatorade well throughout the night. Up with SBA to bathroom, at times incontinent. Confused to time, moments when he is totally A&O. Voice is hoarse. Able to verbalize needs. VPM and 4 bed rails in use per family request. Patient's family hopeful he might be able to have chemo later this morning before TCU.

## 2017-09-29 NOTE — LETTER
Hand-off  for Care Coordination  What is Care Coordination?  [facility] Care Coordination Services are available to people in complex situations,   for example medical, social or financial. The Care Coordinator, a SW or an RN, works with the   patient and their doctor to determine health goals, obtain resources, achieve outcomes,   and develop plans to coordinate care across settings.      o Patient Name:   Wade Acevedo  o Patient :     1960  o Patient PCP:     Pawan Hampton MD    o Patient Primary Clinic:   [address] [place] [id number]  o D/C Facility: [facility] Phone: ([phone]) Fax: ([phone])  o TCU Contact Info for questions: ___________________________  o D/C Date:  ______________________________________________  o Follow-up Apt with PCP after TCU D/C:   ____________________  o Other Follow-Up Apt s: ____________________________________  Additional information (concerns, and Home Care, ect ):   __________________________________________________________________  __________________________________________________________________        __________________________________________________________________    Care Coordinator to Contact  **Please fax this sheet back to me when pt is ready to discharge**  José Miguel Esparza RN  Clinic Care Coordinator  Fax: [phone]  Phone: [phone]

## 2017-09-29 NOTE — PLAN OF CARE
Problem: Patient Care Overview  Goal: Plan of Care/Patient Progress Review  Occupational Therapy Discharge Summary     Reason for therapy discharge:    Discharged to transitional care facility.     Progress towards therapy goal(s). See goals on Care Plan in [facility] electronic health record for goal details.  Goals partially met.  Barriers to achieving goals:   discharge from facility.     Therapy recommendation(s):    Continued therapy is recommended.  Rationale/Recommendations:  Pt will benefit from ongoing OT at TCU to maximize safety and independence with ADLs. .

## 2017-09-29 NOTE — PROGRESS NOTES
Infusion Nursing Note:  Wade Acevedo presents today for Opdivo   HELD.    Patient seen by provider today: Yes: Saw Dr. Naylor    Pt was not seen in Infusion this day.    Note: N/A.    Intravenous Access:  NA    Treatment Conditions:  NA      Post Infusion Assessment:  NA    Discharge Plan:   NA    Ra Rizvi RN

## 2017-09-29 NOTE — DISCHARGE SUMMARY
Discharge Summary    Wade Acevedo MRN# [id number]   YOB: 1960 Age: 57 year old     Date of Admission:  9/25/2017  Date of Discharge:  9/29/2017  Admitting Physician:  Cristian Batista MD  Discharge Physician:  No att. providers found  Discharging Service:  Hospitalist     Home clinic: [facility]  Primary Provider: Pawan Hampton          Admission Diagnoses:   Acute Encephalopathy    Urinary Retention-probably secondary to altered mental status.  Non-small cell lung cancer (NSCLC) (H) with metastasis to bone (5th lateral rib, scapula)  Pituitary Macroadenoma/mass  Abnormal EKG  Positive Clostridia difficile test  Anemia  Hx of Thrombocytopenia  Cancer associated pain  Constipation, chronic  Major depressive disorder, recurrent episode, moderate   Tobacco use disorder  GERD       Discharge Diagnosis:   Acute Encephalopathy   Delirium with agitation - improved. most likely to urinary retention  Dementia  Urinary Retention  Non-small cell lung cancer (NSCLC) (H) with metastasis to bone (5th lateral rib, scapula)  Pituitary Macroadenoma/mass  Abnormal EKG  Positive Clostridia difficile test  Anemia  Hx of Thrombocytopenia  Cancer associated pain  Constipation, chronic  Major depressive disorder, recurrent episode, moderate   Tobacco use disorder  GERD         Discharge Disposition:   Discharged to rehabilitation facility           Condition on Discharge:   Discharge condition: Stable   Discharge vitals: Blood pressure 102/76, pulse 105, temperature 99  F (37.2  C), temperature source Oral, resp. rate 18, weight 147 lb 7.8 oz (66.9 kg), SpO2 93 %.     Code status on discharge: DNR / DNI           Procedures / Labs / Imaging:   No procedures performed during this admission          Medications Prior to Admission:        morphine (MS CONTIN) 30 MG 12 hr tablet Take 1 tablet (30 mg) by mouth 2 times daily, Disp-60 tablet, R-0, Local Print      oxyCODONE (ROXICODONE) 10 MG IR tablet  Take 1 tablet (10 mg) by mouth every 4 hours as needed for moderate to severe pain, Disp-60 tablet, R-0, Local Print         CONTINUE these medications which have NOT CHANGED    Details   PARoxetine (PAXIL) 10 MG tablet Take 1 tablet (10 mg) by mouth At Bedtime (Needs follow-up appointment for this medication), Disp-30 tablet, R-11, E-Prescribe      pantoprazole (PROTONIX) 40 MG EC tablet Take 1 tablet (40 mg) by mouth 2 times daily (before meals), Disp-60 tablet, R-6, E-Prescribe      polyethylene glycol (MIRALAX) powder Take 17 g (1 capful) by mouth daily Mix in 4-8 oz of fluid of choice. For constipation, Disp-510 g, R-11, E-Prescribe      senna-docusate (SENOKOT-S;PERICOLACE) 8.6-50 MG per tablet Take 1 tablet by mouth 2 times daily Reported on 5/4/2017, Disp-100 tablet, R-3, E-Prescribe      diclofenac (VOLTAREN) 1 % GEL topical gel Apply 4 grams to knees or 2 grams to hands four times daily using enclosed dosing card.Disp-200 g, [id number]      capsaicin (ZOSTRIX) 0.025 % CREA cream Apply to R hip and thigh every 2 hours as needed for pain., Disp-120 g, R-3, E-PrescribeJust dispnese 60 if not covered by insurance plz      bisacodyl (DULCOLAX) 10 MG Suppository Place 1 suppository (10 mg) rectally daily as needed for constipation, Disp-12 suppository, R-11, E-Prescribe      nicotine (NICODERM CQ) 21 MG/24HR 24 hr patch Place 1 patch onto the skin every 24 hours, Disp-30 patch, R-0, E-Prescribe      nicotine (NICODERM CQ) 14 MG/24HR 24 hr patch Place 1 patch onto the skin every 24 hours, Disp-30 patch, R-0, E-Prescribe      nicotine (NICODERM CQ) 7 MG/24HR 24 hr patch Place 1 patch onto the skin every 24 hours, Disp-30 patch, R-0, E-Prescribe      lidocaine-prilocaine (EMLA) cream Apply topically over port 45 - 60 minutes prior to port access.Disp-30 g, [id number]                Discharge Medications:     Discharge Medication List as of 9/29/2017  1:35 PM      START taking these medications    Details    acetaminophen (TYLENOL) 325 MG tablet Take 2 tablets (650 mg) by mouth every 4 hours as needed for mild pain, Disp-100 tablet, R-0, OTC      folic acid (FOLVITE) 1 MG tablet Take 1 tablet (1 mg) by mouth daily, Disp-30 tablet, R-0, E-Prescribe      tamsulosin (FLOMAX) 0.4 MG capsule Take 1 capsule (0.4 mg) by mouth daily, Disp-30 capsule, R-0, E-Prescribe      metroNIDAZOLE (FLAGYL) 500 MG tablet Take 1 tablet (500 mg) by mouth 3 times daily, Disp-21 tablet, R-0, E-Prescribe         CONTINUE these medications which have CHANGED    Details   morphine (MS CONTIN) 30 MG 12 hr tablet Take 1 tablet (30 mg) by mouth 2 times daily, Disp-60 tablet, R-0, Local Print      oxyCODONE (ROXICODONE) 10 MG IR tablet Take 1 tablet (10 mg) by mouth every 4 hours as needed for moderate to severe pain, Disp-60 tablet, R-0, Local Print         CONTINUE these medications which have NOT CHANGED    Details   PARoxetine (PAXIL) 10 MG tablet Take 1 tablet (10 mg) by mouth At Bedtime (Needs follow-up appointment for this medication), Disp-30 tablet, R-11, E-Prescribe      pantoprazole (PROTONIX) 40 MG EC tablet Take 1 tablet (40 mg) by mouth 2 times daily (before meals), Disp-60 tablet, R-6, E-Prescribe      polyethylene glycol (MIRALAX) powder Take 17 g (1 capful) by mouth daily Mix in 4-8 oz of fluid of choice. For constipation, Disp-510 g, R-11, E-Prescribe      senna-docusate (SENOKOT-S;PERICOLACE) 8.6-50 MG per tablet Take 1 tablet by mouth 2 times daily Reported on 5/4/2017, Disp-100 tablet, R-3, E-Prescribe      diclofenac (VOLTAREN) 1 % GEL topical gel Apply 4 grams to knees or 2 grams to hands four times daily using enclosed dosing card.Disp-200 g, [id number]      capsaicin (ZOSTRIX) 0.025 % CREA cream Apply to R hip and thigh every 2 hours as needed for pain., Disp-120 g, R-3, E-PrescribeJust dispnese 60 if not covered by insurance plz      bisacodyl (DULCOLAX) 10 MG Suppository Place 1 suppository (10 mg) rectally daily as  "needed for constipation, Disp-12 suppository, R-11, E-Prescribe      nicotine (NICODERM CQ) 21 MG/24HR 24 hr patch Place 1 patch onto the skin every 24 hours, Disp-30 patch, R-0, E-Prescribe      nicotine (NICODERM CQ) 14 MG/24HR 24 hr patch Place 1 patch onto the skin every 24 hours, Disp-30 patch, R-0, E-Prescribe      nicotine (NICODERM CQ) 7 MG/24HR 24 hr patch Place 1 patch onto the skin every 24 hours, Disp-30 patch, R-0, E-Prescribe      lidocaine-prilocaine (EMLA) cream Apply topically over port 45 - 60 minutes prior to port access.Disp-30 g, [id number]         STOP taking these medications       dexamethasone (DECADRON) 4 MG tablet Comments:   Reason for Stopping:                     Consultations:   No consultations were requested during this admission             Brief History of Illness:     Wade Acevedo is a 57 year old male with PMH significant for chronic anemia, cancer associated pain, NSCLC current on Opdivo therapy, depression, tobacco use disorder, hx of EtOH abuse, and hx of thrombocytopenia who now presents with altered mental status.     The patient is obviously confused.  As such, history is provided by his daughter in-law Sarah whom is his POA.  She  spoke with him on Friday and noted that while alert and oriented, he was rather agitated.  He seemed to fixate upon his pain and stated \"no one believes me\" and reported that his pain was not adequately controlled.  She did not speak with him over the weekend.  She phoned him early Monday morning and thought he seemed a bit \"confused\" and \"tangential\", but attributed this to it being early morning.  The patient requested that she call him back later in the day.  His uncle then contacted him mid-morning and again, felt that he was both confused and disoriented.  As such, his daughter-in-law was again contacted.  She requested that Homecare visit the patient.  Upon arrival, his homecare RN felt he was altered.  In addition, upon inspection of " "his medication, she found that 4 doses of 30mg MS Contin were \"missing\" and that only 44/100 10mg oxycodone pills were left; this prescription was filled 09/07/2017. This implies that the patient has taken an average of approximately 3 oxycodone per day (56 pills in 19 days).  The patient himself states he takes approximately 3 MS contin and 3 oxycodone per day.  Given altered mental status, the patient's homecare RN suggested he present to the ER for further evaluation.     The patient has no subjective complaints of fever, chills, loss of appetite, CP, SOB, cough, abdominal pain, dysuria, lightheadedness, dizziness, acute onset of changes to hearing/vision, new LE swelling, new rashes.  At present, he complains of pain only; this is his choric pain that is reportedly associated with his cancer.  This is located in his buttocks, thighs, calves, feet and toes.  He states that this is chronically an 8/10 and can not ascribe a characteristic to his pain.  Nothing makes the pain better or worse.   The patient has abstained from all alcohol as of 04/20/2017.  He continues to smoke the occasional cigarette.       Wade Acevedo is a 57 year old male with PMH significant for chronic anemia, cancer associated pain, NSCLC current on Opdivo therapy, depression, tobacco use disorder, hx of EtOH abuse, and hx of thrombocytopenia who now presents with altered mental status.          Acute Encephalopathy   Daughter-in law complains of confusion for at least the last 24 hours. VSS. Drug screen positive only for opiates.  UA negative.  CXR negative.  CT head again shows pituitary mass. EtOH level was 0.00.  Acetaminophen was less than 2 and salicylate level was 5.  DDx: Medications (hx of overuse of scheduled MS Contin as recent at 09/20/17) versus infection versus metabolic derangements (changes in magnesium, thyroid, Wernicke's)   normal ammonia, T4 and cortisol normal, MRI of poor quality due to motion--repeat with sedation " recommended     Urinary Retention-probably secondary to AMS  Straight cathed for 800cc of urine overnight.  No history of urinary retention.  - straight cath, PRN for urine greater than 350cc on bladder scan  - instruct RN to inform MD if straight cath x3; will likely need to place lion      Non-small cell lung cancer (NSCLC) (H) with metastasis to bone (5th lateral rib, scapula)  Follows with Dr. Naylor. Diagnosed by CT guided biopsy. PET Scan confirms tumor with hypermetabolic lymph nodes and hypermetabolic lesions to the scapula and 5th lateral rib. Previously on carbo/taxol - this was stopped due pancytopenia. Received first dose of Opdivo chemotherapy (immune therapy) 09/14/2017.  Next scheduled chemotherapy infusion of Opdivo 09/28/2017.      Pituitary Macroadenoma/mass  Diagnosed 04/21/2017 by MRI.  Normal TSH with low normal T4/T3.  Mildly abnormal cosyntropin stim testing. Seen by [facility] (Dr. Padma Medley) on 05/10/2017.  Most likely thought to be non-functioning pituitary macroadenoma.  Was supposed to have repeat MRI 08/2017 - did not do so.  Has follow up with endocrinology 11/14/2017.  Most recently had mildly low TSH with normal T4 09/14/2017.     Abnormal EKG  New t inversions since April 2017, no wall motion abnormalities, trops normal, no chest pain--given metastatic cancer and no sign of ACS, will simply observe at this time, doubt implicated in his AMS.     Positive Clostridia difficile test-no diarrha-doubt clinical ds   Would probably dc flagyl at some point if no diarrhea.       Anemia  Has received transfusions in the past for low hemoglobin - last received 2 units approximately 09/07/2017.  This was thought to be secondary to chemotherapy (carbo/taxol).  Recently switched from this to immune chemotherapy.  Hemoglobin on arrival 10.7 on arrival.  8.6 on admission day 1. VSS.  - AM CBC with platelets, will transfuse if hbg less than 7.0      Hx of Thrombocytopenia  Platelets as  low as 42k approximately 3 weeks ago.  Received 2u of PRBCs WITHOUT platelets, seemed to respond  - continue to monitor     Cancer associated pain  - continue PTA scheduled MS Contin  - continue PTA PRN oxycodone  - continue PTA dermal capsicin cream Q2 PRN  - continue PTA Voltaren gel 4x daily  - continue PTA Emla cream with access to port      Constipation, chronic  - continue PTA scheduled miralax, senna-ducosate      Major depressive disorder, recurrent episode, moderate (H)  - continue PTA Paxil      Tobacco use disorder  Nicotine patch in place      GERD  - continue PTA pantoprazole         12:18 PM discussed with Dr Naylor  He felt that AMS could be from Opdivo-but is unlikely  If MR is neg-he thought LP would be of little use-would suggest holding Opdivo until situation is clearer.       Hospital Course:   He had significant urinary retention while in hospital.  It was noted that when he voided or was cathed he was clearer mentally for several hours.  flomax was started and pt continued to clear and was voiding without difficult prior to discharge.     He was also treated for c dif without diarrhea or cramping.  This initially  Was felt to not be infectious.  However his mental clearing did coincide with treatment with metronidazole,  C dif infection may have been part of his AMS.    His daughter in Law who is his POA and does most of his cares expressed she is not able to meet the needs he has to be safe at home. It was discussed he will need TCU and then perhaps long term care vs home with home care .  He raheel receive another dose of immunotherapy today on his way to TCU.    If he does not significantl respnd he may need home hospice vs hospice and LTC.    /76  Pulse 105  Temp 99  F (37.2  C) (Oral)  Resp 18  Wt 147 lb 7.8 oz (66.9 kg)  SpO2 93%  BMI 20.49 kg/m2    Physical exam     /76  Pulse 105  Temp 99  F (37.2  C) (Oral)  Resp 18  Wt 147 lb 7.8 oz (66.9 kg)  SpO2 93%  BMI 20.49  kg/m2  General: healthy,alert,no distress joking with me  HENT: Normocephalic. TM's grossly normal, oropharynx without significant findings.  Neck: supple,without thyromegaly or thyroid nodularity,without cervical or jugular adenopathy  Lungs: clear of wheezes or rales  Heart:RRR. No murmurs, clicks gallops or rub}  Abdomen: non tender nondistended, active bowel sounds no organomegally  Extremities:extremities normal- no gross deformities noted, normal range of motion, no edema  Endocrine: negative   Neuro:moves all extremities equally, nonfocal       Significant Results:   None             Pending Results:   None           Discharge Instructions and Follow-Up:   Discharge diet: Regular   Discharge activity: Up with assist   Discharge follow-up: Follow up with primary care provider within 7  days   Outpatient therapy: None

## 2017-09-29 NOTE — PROGRESS NOTES
Left message at  at [facility] to give report, per the woman who answered the phone, there wasn't anyone available to give report to. I will pass this message on to jorge Gasca RN.

## 2017-09-29 NOTE — PATIENT INSTRUCTIONS
No Opdivo today. We would like to see you back in clinic with Dr. Call in 2 weeks with Opdivo to follow. Copy of appointments, and after visit summary (AVS) given to patient.  If you have any questions during business hours (M-F 8 AM- 4PM), please call Jillian Renee RN, BSN, OCN Oncology Hematology /Breast Cancer Navigator at [facility] [phone].   For questions after business hours, or on holidays/weekends, please call our after hours Nurse Triage line [phone]. Thank you.

## 2017-09-29 NOTE — PROGRESS NOTES
Name: Wade Acevedo    MRN#: [id number]    Reason for Hospitalization: Confusion [R41.0]  Metastatic cancer (H) [C79.9]  Non-small cell lung cancer, unspecified laterality (H) [C34.90]    Discharge Date: 9/29/2017    Patient / Family response to discharge plan: Pt will dc today to [facility] Phone: ([phone]) Fax: ([phone]) via [facility] [phone] at 1630. He will be picked up in oncology center.     PAS-RR    Per DHS regulation, CTS team completed and submitted PAS-RR to [place] [employer]ging Direct Connect via the [employer]. CTS team advised SNF and they are aware a PAS-RR has been submitted.     CTS team reviewed with pt or health care agent that they may be contacted for a follow up appointment within 10 days of hospital discharge if SNF stay is <30 days. Contact information for [employer] was also provided.     Pt or health care agent verbalized understanding.     PAS-RR # [id number]      Other Providers (Care Coordinator, County Services, PCA services etc): No    Future Appointments: Future Appointments  Date Time Provider Department Center   9/29/2017 1:45 PM Pasquale Naylor MD [facility]   9/29/2017 2:00 PM ROOM 8 [facility]   11/14/2017 11:30 AM Padma Medley MD [facility]       Discharge Disposition: transitional care unit    Micki CAPONE, [facility], [facility] [phone]

## 2017-09-29 NOTE — NURSING NOTE
"Oncology Rooming Note    September 29, 2017 2:10 PM   Wade Acevedo is a 57 year old male who presents for:    Chief Complaint   Patient presents with     Oncology Clinic Visit     Post hospital follow up for Non-Small Cell Lung CA.      Initial Vitals: /68 (BP Location: Right arm, Patient Position: Chair, Cuff Size: Adult Regular)  Pulse 116  Temp 100.4  F (38  C) (Tympanic)  Resp 18  Wt 67.3 kg (148 lb 6.4 oz)  SpO2 98%  BMI 20.62 kg/m2 Estimated body mass index is 20.62 kg/(m^2) as calculated from the following:    Height as of 9/14/17: 1.807 m (5' 11.14\").    Weight as of this encounter: 67.3 kg (148 lb 6.4 oz). Body surface area is 1.84 meters squared.  Worst Pain (10) Comment: buttocks, knees and legs.    No LMP for male patient.  Allergies reviewed: Yes  Medications reviewed: Yes    Medications: Medication refills not needed today.  Pharmacy name entered into [facility]: [facility] - [address] AT [address]    Clinical concerns: Post hospital follow up NSCLC. Opdivo to follow.      7 minutes for nursing intake (face to face time)     Denise Carlos [facility] [address]            "

## 2017-09-29 NOTE — PLAN OF CARE
Problem: Patient Care Overview  Goal: Plan of Care/Patient Progress Review  Physical Therapy Discharge Summary     Reason for therapy discharge:    Discharged to transitional care facility.     Progress towards therapy goal(s). See goals on Care Plan in [facility] electronic health record for goal details.  Goals partially met.  Barriers to achieving goals:   discharge from facility.     Therapy recommendation(s):    Continued therapy is recommended.  Rationale/Recommendations:  to increase strength/ address mobility.

## 2017-09-29 NOTE — PROGRESS NOTES
[place] [facility] DISCHARGE NOTE    Patient discharged to infusion therapy at 1:50 PM via wheel chair. Accompanied by other:Sarah, POA and staff. Discharge instructions reviewed with Sarah, opportunity offered to ask questions. Prescriptions sent to patients preferred pharmacy. All belongings sent with patient.  Brought patient to infusion therapy-Imelda will pick her up at 4:30p to bring him to [facility] patient and family aware of transport and plan of care.  Per Elizabeth in Infusion therapy, they did not want me to heparinize his port, they will do this after his immunotherapy today.     Maritza Ramon

## 2017-09-29 NOTE — DISCHARGE INSTRUCTIONS
Urology consult to jaylene urinary retetion    [facility] Discharge Instructions     Discharge disposition:  Discharged to rehabilitation facility       Diet:  Regular               Follow-up: Follow up with primary care provider in within 7 days       Additional instructions: Follow up with oncology, and follow up with infusion therapy for immunotherapy later today

## 2017-09-29 NOTE — PROGRESS NOTES
Hematology/ Oncology Follow-up Visit:  Sep 29, 2017    Reason for Visit:   Chief Complaint   Patient presents with     Oncology Clinic Visit     Post hospital follow up for Non-Small Cell Lung CA.        Oncologic History:  Non-small cell lung cancer (NSCLC) (H)  The patient presented with 2-3 months history of weakness and fainting episodes. He was seen in the emergency room further workup was done including a CT scan which showed a 3 cm mass in the medial left upper lobe. There was adjacent atelectasis at the prominent mediastinal lymph nodes. There is a destructive bone lesion involving left lateral fifth rib. Patient underwent transthoracic CT-guided biopsy in the hospital. The pathology report came back consistent with mixed tumor including adenocarcinoma and squamous histology. 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 He had a normal TSH, but he had low T3 and T4. He had a serum cortisol morning level 4.4. He underwent a cosyntropin stim test which was mildly abnormal. He had a cortisol level 14.8 at 30 minutes and 19.6 at 60 minutes. His 24-hour cortisol in his urine was normal. He was seen by endocrinology we will continue to monitor him in 6 months.   PET scan showed hypermetabolic bilateral level Ib and right level III lymph nodes in the neck suspicious for metastatic disease. There was also hypermetabolic activity seen in the sella consistent with pituitary macroadenoma. There is hypermetabolic mediastinal left hilar lymphadenopathy.  There is metastatic disease seen in the left scapula and left fifth rib. There is left pleural effusion and small right pleural effusion seen as well.  ALK 4 came back negative. EGFR was negative for mutation. . PDL 1 is less than 1%.       Interval History:  Patient is here today following his hospitalization because of altered mental status. Since yesterday he has been improving. There is no clear evidence  of metastatic disease in the brain seen on the MRI. Although there is some changes in the pituitary adenoma. Over the last 2 weeks the patient performance status have gradually been deteriorating. His appetite has been worsening. He has lost weight. He denies any significant pain.    Review Of Systems:  Constitutional: Negative for fever, chills, and night sweats. Fatigue and sleepiness  Skin: negative.  Eyes: negative.  Ears/Nose/Throat: negative.  Respiratory: No shortness of breath, dyspnea on exertion, cough, or hemoptysis.  Cardiovascular: negative.  Gastrointestinal: negative.  Genitourinary: negative.  Musculoskeletal: negative.  Neurologic: negative.  Psychiatric: negative.  Hematologic/Lymphatic/Immunologic: negative.  Endocrine: negative.    All other ROS negative unless mentioned in interval history.    Past medical, social, surgical, and family histories reviewed.    Allergies:  Allergies as of 09/29/2017 - Kvng as Reviewed 09/29/2017   Allergen Reaction Noted     Lubriderm Rash 04/20/2017       Current Medications:  Current Outpatient Prescriptions   Medication Sig Dispense Refill     folic acid (FOLVITE) 1 MG tablet Take 1 tablet (1 mg) by mouth daily 30 tablet 0     tamsulosin (FLOMAX) 0.4 MG capsule Take 1 capsule (0.4 mg) by mouth daily 30 capsule 0     metroNIDAZOLE (FLAGYL) 500 MG tablet Take 1 tablet (500 mg) by mouth 3 times daily 21 tablet 0     morphine (MS CONTIN) 30 MG 12 hr tablet Take 1 tablet (30 mg) by mouth 2 times daily 60 tablet 0     oxyCODONE (ROXICODONE) 10 MG IR tablet Take 1 tablet (10 mg) by mouth every 4 hours as needed for moderate to severe pain 60 tablet 0     PARoxetine (PAXIL) 10 MG tablet Take 1 tablet (10 mg) by mouth At Bedtime (Needs follow-up appointment for this medication) 30 tablet 11     pantoprazole (PROTONIX) 40 MG EC tablet Take 1 tablet (40 mg) by mouth 2 times daily (before meals) 60 tablet 6     polyethylene glycol (MIRALAX) powder Take 17 g (1 capful) by  "mouth daily Mix in 4-8 oz of fluid of choice. For constipation 510 g 11     nicotine (NICODERM CQ) 14 MG/24HR 24 hr patch Place 1 patch onto the skin every 24 hours 30 patch 0     senna-docusate (SENOKOT-S;PERICOLACE) 8.6-50 MG per tablet Take 1 tablet by mouth 2 times daily Reported on 5/4/2017 (Patient taking differently: Take 1 tablet by mouth 2 times daily Reported on 5/4/2017 Taking 2 tablets in AM & 1 tablet at night.) 100 tablet 3     acetaminophen (TYLENOL) 325 MG tablet Take 2 tablets (650 mg) by mouth every 4 hours as needed for mild pain (Patient not taking: Reported on 9/29/2017) 100 tablet 0     diclofenac (VOLTAREN) 1 % GEL topical gel Apply 4 grams to knees or 2 grams to hands four times daily using enclosed dosing card. (Patient not taking: Reported on 9/29/2017) 200 g 3     capsaicin (ZOSTRIX) 0.025 % CREA cream Apply to R hip and thigh every 2 hours as needed for pain. (Patient not taking: Reported on 9/29/2017) 120 g 3     bisacodyl (DULCOLAX) 10 MG Suppository Place 1 suppository (10 mg) rectally daily as needed for constipation (Patient not taking: Reported on 9/29/2017) 12 suppository 11     nicotine (NICODERM CQ) 21 MG/24HR 24 hr patch Place 1 patch onto the skin every 24 hours (Patient not taking: Reported on 9/29/2017) 30 patch 0     nicotine (NICODERM CQ) 7 MG/24HR 24 hr patch Place 1 patch onto the skin every 24 hours (Patient not taking: Reported on 9/29/2017) 30 patch 0     lidocaine-prilocaine (EMLA) cream Apply topically over port 45 - 60 minutes prior to port access. (Patient not taking: Reported on 9/29/2017) 30 g 3        Physical Exam:  /68 (BP Location: Right arm, Patient Position: Chair, Cuff Size: Adult Regular)  Pulse 116  Temp 100.4  F (38  C) (Tympanic)  Resp 18  Ht 1.807 m (5' 11.14\")  Wt 67.3 kg (148 lb 6.4 oz)  SpO2 98%  BMI 20.62 kg/m2  Wt Readings from Last 12 Encounters:   09/29/17 67.3 kg (148 lb 6.4 oz)   09/29/17 66.9 kg (147 lb 7.8 oz)   09/14/17 68.5 " kg (151 lb)   09/07/17 71.2 kg (157 lb)   08/24/17 70.7 kg (155 lb 12.8 oz)   08/17/17 75.5 kg (166 lb 6.4 oz)   08/10/17 72.6 kg (160 lb 1.6 oz)   08/10/17 72.6 kg (160 lb 0.9 oz)   07/13/17 71.2 kg (156 lb 14.4 oz)   07/13/17 71.2 kg (156 lb 15.5 oz)   06/22/17 71.2 kg (157 lb)   06/22/17 71.2 kg (157 lb)     ECOG performance status: 1  GENERAL APPEARANCE: Healthy, alert and in no acute distress.  HEENT: Sclerae anicteric. PERRLA. Oropharynx without ulcers, lesions, or thrush.  NECK: Supple. No asymmetry or masses.  LYMPHATICS: No palpable cervical, supraclavicular, axillary, or inguinal lymphadenopathy.  RESP: Lungs clear to auscultation bilaterally without rales, rhonchi or wheezes.  CARDIOVASCULAR: Regular rate and rhythm. Normal S1, S2; no S3 or S4. No murmur, gallop, or rub.  ABDOMEN: Soft, nontender. Bowel sounds normal. No palpable organomegaly or masses.  MUSCULOSKELETAL: Extremities without gross deformities noted. No edema of bilateral lower extremities.  SKIN: No suspicious lesions or rashes.  NEURO: Alert and oriented x 3. Cranial nerves II-XII grossly intact.  PSYCHIATRIC: Mentation and affect appear normal.    Laboratory/Imaging Studies:  No visits with results within 2 Week(s) from this visit.  Latest known visit with results is:    Infusion Therapy Visit on 09/14/2017   Component Date Value Ref Range Status     Sodium 09/14/2017 139  133 - 144 mmol/L Final     Potassium 09/14/2017 3.9  3.4 - 5.3 mmol/L Final     Chloride 09/14/2017 106  94 - 109 mmol/L Final     Carbon Dioxide 09/14/2017 26  20 - 32 mmol/L Final     Anion Gap 09/14/2017 7  3 - 14 mmol/L Final     Glucose 09/14/2017 155* 70 - 99 mg/dL Final     Urea Nitrogen 09/14/2017 19  7 - 30 mg/dL Final     Creatinine 09/14/2017 0.82  0.66 - 1.25 mg/dL Final     GFR Estimate 09/14/2017 >90  >60 mL/min/1.7m2 Final    Non  GFR Calc     GFR Estimate If Black 09/14/2017 >90  >60 mL/min/1.7m2 Final    African American GFR Calc      Calcium 09/14/2017 9.1  8.5 - 10.1 mg/dL Final     Bilirubin Total 09/14/2017 0.7  0.2 - 1.3 mg/dL Final     Albumin 09/14/2017 3.5  3.4 - 5.0 g/dL Final     Protein Total 09/14/2017 7.2  6.8 - 8.8 g/dL Final     Alkaline Phosphatase 09/14/2017 129  40 - 150 U/L Final     ALT 09/14/2017 17  0 - 70 U/L Final     AST 09/14/2017 19  0 - 45 U/L Final     TSH 09/14/2017 0.34* 0.40 - 4.00 mU/L Final     WBC 09/14/2017 4.7  4.0 - 11.0 10e9/L Final     RBC Count 09/14/2017 4.04* 4.4 - 5.9 10e12/L Final     Hemoglobin 09/14/2017 10.7* 13.3 - 17.7 g/dL Final     Hematocrit 09/14/2017 31.9* 40.0 - 53.0 % Final     MCV 09/14/2017 79  78 - 100 fl Final     MCH 09/14/2017 26.5  26.5 - 33.0 pg Final     MCHC 09/14/2017 33.5  31.5 - 36.5 g/dL Final     RDW 09/14/2017 18.4* 10.0 - 15.0 % Final     Platelet Count 09/14/2017 176  150 - 450 10e9/L Final     Diff Method 09/14/2017 Automated Method   Final     % Neutrophils 09/14/2017 86.3  % Final     % Lymphocytes 09/14/2017 12.6  % Final     % Monocytes 09/14/2017 0.9  % Final     % Eosinophils 09/14/2017 0.0  % Final     % Basophils 09/14/2017 0.0  % Final     % Immature Granulocytes 09/14/2017 0.2  % Final     Absolute Neutrophil 09/14/2017 4.0  1.6 - 8.3 10e9/L Final     Absolute Lymphocytes 09/14/2017 0.6* 0.8 - 5.3 10e9/L Final     Absolute Monocytes 09/14/2017 0.0  0.0 - 1.3 10e9/L Final     Absolute Eosinophils 09/14/2017 0.0  0.0 - 0.7 10e9/L Final     Absolute Basophils 09/14/2017 0.0  0.0 - 0.2 10e9/L Final     Abs Immature Granulocytes 09/14/2017 0.0  0 - 0.4 10e9/L Final     T4 Free 09/14/2017 0.76  0.76 - 1.46 ng/dL Final        Recent Results (from the past 744 hour(s))   XR Chest 2 Views    Narrative    CHEST TWO VIEWS  9/25/2017  9:54 PM     COMPARISON: Frontal chest x-ray 5/17/2017    HISTORY: Weakness.    FINDINGS: A left subclavian central venous Port-A-Cath is again noted.  The cardiac silhouette, pulmonary vasculature, lungs and pleural  spaces are within normal  limits.      Impression    IMPRESSION: Clear lungs.    MIRACLE VINCENT MD   CT Head w/o Contrast    Narrative    CT HEAD W/O CONTRAST  9/25/2017 11:14 PM      HISTORY: Altered mental status. Metastatic non-small cell lung cancer.    TECHNIQUE: Routine noncontrast head CT. Radiation dose for this scan  was reduced using automated exposure control, adjustment of the mA  and/or kV according to patient size, or iterative reconstruction  technique.    COMPARISON: MRI 4/21/2017.    FINDINGS: Brain volume is within normal limits for age. There is a  pituitary mass as seen on the previous MRI. No other mass, mass effect  or intracranial hemorrhage. No evidence of acute infarct.  Periventricular low attenuation is consistent with chronic small  vessel ischemic disease. The visualized paranasal sinuses are clear.      Impression    IMPRESSION: No acute abnormality.    BREN SHANKAR MD   MR Brain w/o & w Contrast    Narrative    MRI BRAIN WITHOUT AND WITH CONTRAST  9/26/2017 2:19 PM    HISTORY:  Alerted mental status. Rule out brain metastasis.    TECHNIQUE:  Multiplanar, multisequence MRI of the brain without and  with 6 mL Gadavist.    COMPARISON: Head CT 9/25/2017. Brain MR 4/21/2017.    FINDINGS: Images are severely degraded by motion artifact. No definite  large intracranial mass is identified noting limitations. There is no  mass effect or midline shift. The ventricles are not enlarged out of  proportion to the cerebral sulci. Mild diffuse parenchymal volume  loss. Patchy deep and subcortical white matter T2 hyperintensities are  nonspecific.    Enhancing sellar mass extends superiorly into the suprasellar cistern  and likely abuts the optic chiasm. No definite other intracranial  enhancing mass is identified noting marked limitations given motion  artifact.    Polypoid mucosal thickening in the left maxillary sinus.       Impression    IMPRESSION:      1. Images are severely degraded by motion artifact.  2. Evaluation  for metastatic disease is limited given the motion. No  definite large intracranial mass is identified, but smaller lesions as  well as possible leptomeningeal disease cannot be excluded.  3. Enhancing expansile mass in the sella extending into the  suprasellar cistern. This may represent a pituitary macroadenoma,  although it is poorly characterized on this motion degraded study.  Metastatic lesion could also be considered.  4. Patchy white matter T2 hyperintense lesions. These are  indeterminate and could be due to chronic microvascular ischemic  disease; however, given the motion artifact, evaluation for associated  enhancement is limited, and these could potentially represent small  metastatic lesions. Recommend repeat imaging with sedation.    LUCAS RAMIREZ MD   MR Brain w/o & w Contrast    Narrative    MRI OF THE BRAIN WITHOUT AND WITH CONTRAST 9/27/2017 4:15 PM     COMPARISON: Brain MRI one day prior (limited by motion). Brain MRI  4/21/2017.    HISTORY: Altered mental status. Lung cancer, rule out metastasis.      TECHNIQUE: Axial diffusion-weighted with ADC map, axial T2-weighted  with fat saturation, axial T1-weighted, axial turboFLAIR and coronal  T1-weighted images of the brain were acquired without intravenous  contrast.  Following intravenous administration of gadolinium (6 mL  Gadavist), axial T1-weighted images of the brain were acquired.     FINDINGS: There is mild diffuse cerebral volume loss. There are a few  tiny scattered focal areas of abnormal T2 signal hyperintensity in the  cerebral white matter bilaterally that are consistent with sequela of  chronic small vessel ischemic disease.    The ventricles and basal cisterns are within normal limits in  configuration given the degree of cerebral volume loss. There is no  midline shift. There are no extra-axial fluid collections. There is no  evidence for stroke or acute intracranial hemorrhage. Again noted is  an irregularly-shaped, enhancing  lesion within the sella extending to  the suprasellar cistern consistent with a pituitary adenoma. This  lesion measures 1.9 x 1.7 x 1.6 cm in the AP, transverse and  cephalocaudad dimensions respectively which is increased in size from  1.5 x 1.5 x 1.3 cm in the same dimensions on the comparison study.  This mass is again noted to abut and likely displace the optic chiasm.  There is no other abnormal contrast enhancement in the brain or its  coverings.    There is no sinusitis or mastoiditis.      Impression    IMPRESSION:   1. Interval increase in size of the enhancing mass within the sella  consistent with a pituitary adenoma; however, a metastatic lesion from  the patient's lung cancer cannot be completely excluded. No additional  enhancing intracranial lesions are identified.  2. Diffuse cerebral volume loss and cerebral white matter changes  consistent with chronic small vessel ischemic disease. No evidence for  acute intracranial pathology.    MIRACLE VINCENT MD       Assessment and plan:    (C34.90) Non-small cell lung cancer (NSCLC) (H)  Because of the worsening performance status I will hold his immunotherapy treatment today. Patient will return back in 2 weeks to proceed with treatment according to the treatment plan.    (R40.4) Altered level of consciousness  His mental status continued to improve.     (D64.9) Anemia, unspecified type  We will continue to monitor hemoglobin and off her blood transfusion if required.    (R63.4) Weight loss  We talked to the patient about increasing caloric intake.    (D35.2) Pituitary macroadenoma (H)   The patient is under care of endocrinology.    The patient is ready to learn, no apparent learning barriers were identified.  Diagnosis and treatment plans were explained to the patient. The patient expressed understanding of the content. The patient asked appropriate questions. The patient questions were answered to his satisfaction.    Chart documentation with Dragon  Voice recognition Software. Although reviewed after completion, some words and grammatical errors may remain.

## 2017-09-29 NOTE — PLAN OF CARE
Problem: Patient Care Overview  Goal: Plan of Care/Patient Progress Review  Outcome: Improving  Pt has been up A1-2, can be unsteady on feet at times. Is A&O x2. Family was here until 1900 than VPM was placed in room for safety per family request along with all 4 bed rails. Pt did not take chemo today per Dr. Naylor request but family was wondering if he was going to get it before he leave for a TCU tomorrow? Left page with Dr. Pino to clarify but did not hear back. Sarah, daughter in law, will be back in the morning. Is voiding in large amounts- Flomax seems to be working well. appetite is low but does like the strawberry Ensure. Oxycodone prn given for generalized pain. /72 (BP Location: Right arm)  Pulse 115  Temp 98.5  F (36.9  C) (Oral)  Resp 18  Wt 65.3 kg (143 lb 15.4 oz)  SpO2 98%  BMI 20 kg/m2  Mariaelena Guardado RN BSN

## 2017-09-29 NOTE — PROGRESS NOTES
Clinic Care Coordination Contact    Situation: Patient chart reviewed by care coordinator.    Background: Received CTS on this patient. He remains hospitalized at this time with plan to DC late this afternoon after infusion.   Per CTS:  Key Recommendations: Pt will dc to  [facility] Phone: [phone]) Fax: [phone]) today at 1630 following an infusion in the cancer center. Pt and pts dtr in [facility] are hoping that pt can eventually [facility] (Phone: [phone] Fax: [phone]) if he needs long term care. He currently lives alone in the [facility] and his dtr in [facility], health care agent, lives in [place].     Assessment:No CC f/u today as he remains in the hospital    Plan/Recommendations: RN CC will review chart the following business day and will f/u as needed.    José Miguel GAMEZ,RN- BC  Clinic Care Coordinator  [facility]  Phone: [phone]

## 2017-09-30 NOTE — TELEPHONE ENCOUNTER
"Staff call to clarify Voltaren gel order to read \"apply 4 gm to knees AND 2 gm to hands\" - OK given for above (previously read as OR 2 gm to hand).    Electronically signed by SALINAS Julian GNP   "

## 2017-10-02 PROBLEM — C78.00 MALIGNANT NEOPLASM METASTATIC TO LUNG, UNSPECIFIED LATERALITY (H): Status: ACTIVE | Noted: 2017-01-01

## 2017-10-02 PROBLEM — A04.72 C. DIFFICILE COLITIS: Status: ACTIVE | Noted: 2017-01-01

## 2017-10-02 PROBLEM — D63.8 ANEMIA, CHRONIC DISEASE: Status: ACTIVE | Noted: 2017-01-01

## 2017-10-02 PROBLEM — R33.8 ACUTE RETENTION OF URINE: Status: ACTIVE | Noted: 2017-01-01

## 2017-10-02 NOTE — PROGRESS NOTES
Clinic Care Coordination Contact  Care Coordination Transition Communication    Referral Source: CTS    Clinical Data: Patient was [place] at [facility] [place]25 to *9/29 with diagnosis of Encephalopathy, Urinary retention    Transition to Facility:Per CTS 9/29:           Key Recommendations: Pt will dc to  [facility] Phone: [phone]) Fax: [phone]) today at 1630 following an infusion in the cancer center. Pt and pts dtr in [facility] are hoping that pt can eventually [facility] (Phone: [phone] Fax: [phone]) if he needs long term care. He currently lives alone in the [facility] and his dtr in [facility], health care agent, lives in [place].      Plan: RN/SW Care Coordinator will await notification from facility staff informing RN/SW Care Coordinator of patient's discharge plans/needs. RN/SW Care Coordinator will review chart and outreach to facility staff every 4 weeks and as needed.     José Miguel GAMZE,RN- BC  Clinic Care Coordinator  [facility]  Phone: [phone]

## 2017-10-02 NOTE — PROGRESS NOTES
[place] [facility]  PRIMARY CARE PROVIDER AND CLINIC:  Pawan Hampton [address] [facility] / [facility] [place] [address]  Chief Complaint   Patient presents with     Hospital F/U       HPI:    Wade Acevedo is a 57 year old  (1960),admitted to the [facility]  from [facility].  Hospital stay 9/25/17 through 9/26/17.  Admitted to this facility for  rehab, medical management and nursing care.  HPI information obtained from: facility chart records, facility staff and patient report.     Mr. Olvera was admitted due to delirium and it appears it's likely from unintentional opiate misuse.  Etiology of his pain is unclear as he notes bilateral LE pain, knee pain, RUE pain, not able to describe very well.  He is mildly confused, alert to self, but falls asleep and conversation wanders.  This is my first time meeting Mr. Acevedo, no family, thus baseline is unclear, but he tells me he is living alone currently.      He has NSCLC with PET in May showing bony mets to Left scapula and L 5th rib.  Query if this could of progressed.  He was on one form of chemo but recently switched to Opdivo therapy.  He met with Oncology 9/29 and they plan to hold this for now.  Unclear if this could be etiology of pain.  He also has a known ongoing pituitary mass/macroadenoma and is followed by Endo.  I note he has been followed by Palliative care as well.  They note ongoing chronic pain as well.      Currently he reports 5/10 pain in his bilateral LE legs/knee's, RUE, not able to describe/report very well.  He endorses intermittent light headedness, has no other complaints, but as noted is sleepy, mild delirium, CAM+    Current issues are:      Delirium, likely secondary to unintentional misuse of opiates   Malignant neoplasm metastatic to lung, unspecified laterality (H), on chemotherapy  Bony metastasis (H), L scapula and L 5th rib  Cancer associated pain  Clearly has a reason to have pain  but per the May PET CT, unclear why he has pain in legs/knees/arm that does not correlate to PET findings.  Good distal pulses/CMS, so not ischemic in nature.  Query if this could be due to the chemo, unknown advanced bony mets, or pain syndrome, is unclear.  He reports his goal is to get stronger to continue chemo and to get back home, but I note he's being followed by Onco and Palliative care, chemo/Opdivo on hold for now.  Prognosis of his malignancy is unclear, but query goals of care.     C. difficile colitis  He is immunocompromised due to chemo, was found to be positive in hospital, started on Flagyl on 9/26.     Acute retention of urine, unclear etiology/chroniticy, query if secondary to opiates   History of this is unclear, note he also was started on Flomax, appears started in hospital.  They did note PVR's in the 800's and needing to be straight cathed.  Query if this is acute, query if this is secondary to high dose opiates.     Anemia, chronic disease  EMR notes to keep Hgb > 7.0, was 9.3 on 9/28.  Did get 2 u RBC's on 9/7 per EMR.     Pituitary macroadenoma (H)  -Followed by Endo.     CODE STATUS/ADVANCE DIRECTIVES DISCUSSION:   DNR / DNI  Patient's living condition: lives alone    ALLERGIES:Lubriderm  PAST MEDICAL HISTORY:  has a past medical history of Brain cancer (H); Constipation; H/O ETOH abuse; tobacco use, presenting hazards to health; Lung cancer (H); Pain (5/6/2017); Recurrent falls; Unsteady gait; and Weight loss.  PAST SURGICAL HISTORY:  has a past surgical history that includes Insert port vascular access (N/A, 5/17/2017) and Anesthesia out of OR MRI (N/A, 9/27/2017).  FAMILY HISTORY: family history is not on file.  SOCIAL HISTORY:  reports that he has been smoking Cigarettes.  He has a 45.00 pack-year smoking history. He has never used smokeless tobacco. He reports that he does not drink alcohol.    Post Discharge Medication Reconciliation Status: discharge medications reconciled and  changed, per note/orders (see AVS).  Current Outpatient Prescriptions   Medication Sig Dispense Refill     senna-docusate (SENOKOT-S;PERICOLACE) 8.6-50 MG per tablet Take 1 tablet by mouth 2 times daily       Acetaminophen (TYLENOL PO) Take 1,000 mg by mouth 3 times daily       folic acid (FOLVITE) 1 MG tablet Take 1 tablet (1 mg) by mouth daily 30 tablet 0     tamsulosin (FLOMAX) 0.4 MG capsule Take 1 capsule (0.4 mg) by mouth daily 30 capsule 0     metroNIDAZOLE (FLAGYL) 500 MG tablet Take 1 tablet (500 mg) by mouth 3 times daily 21 tablet 0     morphine (MS CONTIN) 30 MG 12 hr tablet Take 1 tablet (30 mg) by mouth 2 times daily 60 tablet 0     oxyCODONE (ROXICODONE) 10 MG IR tablet Take 1 tablet (10 mg) by mouth every 4 hours as needed for moderate to severe pain 60 tablet 0     PARoxetine (PAXIL) 10 MG tablet Take 1 tablet (10 mg) by mouth At Bedtime (Needs follow-up appointment for this medication) 30 tablet 11     diclofenac (VOLTAREN) 1 % GEL topical gel Apply 4 grams to knees or 2 grams to hands four times daily using enclosed dosing card. 200 g 3     capsaicin (ZOSTRIX) 0.025 % CREA cream Apply to R hip and thigh every 2 hours as needed for pain. 120 g 3     pantoprazole (PROTONIX) 40 MG EC tablet Take 1 tablet (40 mg) by mouth 2 times daily (before meals) 60 tablet 6     polyethylene glycol (MIRALAX) powder Take 17 g (1 capful) by mouth daily Mix in 4-8 oz of fluid of choice. For constipation 510 g 11     bisacodyl (DULCOLAX) 10 MG Suppository Place 1 suppository (10 mg) rectally daily as needed for constipation 12 suppository 11     nicotine (NICODERM CQ) 21 MG/24HR 24 hr patch Place 1 patch onto the skin every 24 hours 30 patch 0     lidocaine-prilocaine (EMLA) cream Apply topically over port 45 - 60 minutes prior to port access. 30 g 3       ROS:  7 point ROS done including, light headedness/dizziness, fever/chills, pain, Resp, CV, GI, and  and is negative other than noted in HPI.     Exam:  /71   Pulse 97  Temp 98  F (36.7  C)  Resp 18  Wt 144 lb (65.3 kg)  SpO2 97%  BMI 20 kg/m2     GENERAL APPEARANCE:  Well developed older male resting in bed, NAD, non-toxic.  ENT:  Mouth and oropharynx normal, moist mucous membranes.   RESP:  Lungs CTA.  Regular relaxed breathing effort.  No cough.   CV: S1/S2 no murmur or rubs.  Regular rhythm and rate.  No generalized edema.   ABDOMEN:   Flat, soft, non-tender with active bowel sounds.  No guarding, rigidity, or rebound tenderness.  EXTREMITIES:  No lower extremity edema, no calf tenderness.   PSYCH: Alert to self, not place/date/situation.  CAM+, somnolent but arrousable.      Lab/Diagnostic data:     CBC RESULTS:   Recent Labs   Lab Test  09/28/17   0608  09/27/17   0800   WBC  7.2  6.1   RBC  3.59*  3.44*   HGB  9.3*  9.0*   HCT  28.1*  26.6*   MCV  78  77*   MCH  25.9*  26.2*   MCHC  33.1  33.8   RDW  16.1*  15.6*   PLT  173  154       Last Basic Metabolic Panel:  Recent Labs   Lab Test  09/28/17   0608  09/28/17   0150  09/27/17   0800   NA  145*   --   145*   POTASSIUM  3.8  4.0  3.3*   CHLORIDE  116*   --   114*   JOHANN  8.9   --   8.8   CO2  24   --   24   BUN  9   --   9   CR  0.80   --   0.71   GLC  112*   --   105*       Liver Function Studies -   Recent Labs   Lab Test  09/28/17   0608  09/27/17   0800   PROTTOTAL  6.8  6.8   ALBUMIN  3.3*  3.3*   BILITOTAL  0.6  0.6   ALKPHOS  110  111   AST  25  21   ALT  17  18       TSH   Date Value Ref Range Status   09/26/2017 0.17 (L) 0.40 - 4.00 mU/L Final   09/14/2017 0.34 (L) 0.40 - 4.00 mU/L Final       ASSESSMENT/PLAN:  Delirium, likely secondary to unintentional misuse of opiates   Malignant neoplasm metastatic to lung, unspecified laterality (H), on chemotherapy  Bony metastasis (H), L scapula and L 5th rib  Cancer associated pain  This will be difficult to balance I believe.  Older Palliative note suggested Fentanyl patch at 25 mcg starting, but not done.   -Changed Acetaminophen to scheduled.  -Continue  MS Contin 30 mg BID for now.   -Continue PRN Oxycodone 10 mg's for now.   -Continue PRN Capsaicin and Diclofenac creams for now.  -Continue scheduled Senna and Miralax for now.  --Consider Fentanyl patch as noted above if continues to be an issue.   --Will attempt to contact POA/Daughter in law and further discuss goals of care.   --f/u with Onco on/around 10/13.     C. difficile colitis  -Continue Flagyl 500 mg's TID, placed a 11 Oct 17 stop date for 14 days of treatment.     Acute retention of urine, unclear etiology/chroniticy, query if secondary to opiates   -Bladder scan and straight cath per [facility].  --This may become chronic is he remains on high dose opiates, which is likely.     Anemia, chronic disease  -Keep Hgb > 7.0 per EMR.     Pituitary macroadenoma (H)  -Per Endocrinology.     Orders:  1. Bladder scan and straight cath TID and PRN per [facility] Dx acute urine retention  2. Change Flagyl to 500 mg TID with stop date of 10/11/17 Dx C and P SF  3. DC prn tylenol  4. Start tylenol 1000 mg po TID Dx chronic pain  5. Arrange F/U with oncologist Dr Naylor on or around 10/13 or have family arrange.    Total time spent with patient visit at the skilled nursing facility was 35 min including patient visit and review of past records. Greater than 50% of total time spent with counseling and coordinating care due to complex medical case, review of HPI, development/review of POC, patient education and review of POC with pt.      Electronically signed by:  SALINAS Ledezma CNP

## 2017-10-03 PROBLEM — I95.1 ORTHOSTATIC HYPOTENSION: Status: ACTIVE | Noted: 2017-01-01

## 2017-10-03 NOTE — PROGRESS NOTES
[place] [facility]    Chief Complaint   Patient presents with     Nursing Home Acute       HPI:    Wade Acevedo is a 57 year old  (1960), who is being seen today for an episodic care visit at [facility].  HPI information obtained from: facility chart records, facility staff and patient report.    Mr. Acevedo was admitted to the TCU due to unintentional misuse of opiates that lead to delirium in setting of known lung cancer with bony mets.  Today RN notified me that is BP was 74/xx.  I arrived at bedside and check manuals myself L 82/53 and R 80-90/ hard to assess.  Had him sit up, where he noted he was moderately dizzy, BP dropped to 66/44.  This was in setting of getting his MS Contin 2-3 hrs ago and Oxycodone roughly 1 hr ago.      Unfortunately he remains delirium thus HPI is questionable.  He denies diarrhea but has known C-diff.  Besides his ongoing LE and buttocks pain, which he reports started when he was on chemo, and the dizziness with sitting up, he has no complaints.  He remains alert to self, but not place, date or situation.  He does have family present, does recognize them, they note his mentation is better than a few days ago, but not back to baseline.     Today's concern is:  Orthostatic hypotension with unclear etiology  As noted above, hypotensive and orthostatic for unclear reasons.  Does have known pancytopenia s/p transfusions, but no overt bleeding here, Hgb was 9/3 on 9/28, thus low suspicion this is anemia related.  He is eating/drinking but unclear how much.  Mucous membranes are mostly dry.  RN could not give me report on current stools, thus unclear if there is excessive diarrhea in setting of C-diff.  He still is requiring straight cath's most of the time.  Query if affect from opiates as well?   Besides the C-diff I do not appreciate any other infectious etiology at this time.     Delirium, likely secondary to unintentional misuse of opiates   This continues, but seems to  be slowly improving.  Balancing pain and mentation will likely continue to be difficult.      Malignant neoplasm metastatic to lung, unspecified laterality (H), on chemotherapy  Pituitary macroadenoma (H)  -Followed by Onco, next appointment around 10/13?  -Followed by Endo next appt 14 Nov.     Cancer associated pain  Bony metastasis (H), L scapula and L 5th rib  Pain is in his LE's and buttocks, I think R>L but difficult to assess due to delirium.  He reports it started with chemo.  As noted, last PET from May did not indicate bony met's in those areas.  Thus etiology of pain is unclear.  Remains on scheduled MS contin and PRN Oxycodone.  Scheduled Acetaminophen started yesterday.     C. difficile colitis  Continues on Flagyl.     ALLERGIES: Lubriderm  Past Medical, Surgical, Family and Social History reviewed and updated in [facility].    Current Outpatient Prescriptions   Medication Sig Dispense Refill     senna-docusate (SENOKOT-S;PERICOLACE) 8.6-50 MG per tablet Take 1 tablet by mouth 2 times daily       Acetaminophen (TYLENOL PO) Take 1,000 mg by mouth 3 times daily       folic acid (FOLVITE) 1 MG tablet Take 1 tablet (1 mg) by mouth daily 30 tablet 0     tamsulosin (FLOMAX) 0.4 MG capsule Take 1 capsule (0.4 mg) by mouth daily 30 capsule 0     metroNIDAZOLE (FLAGYL) 500 MG tablet Take 1 tablet (500 mg) by mouth 3 times daily 21 tablet 0     morphine (MS CONTIN) 30 MG 12 hr tablet Take 1 tablet (30 mg) by mouth 2 times daily 60 tablet 0     oxyCODONE (ROXICODONE) 10 MG IR tablet Take 1 tablet (10 mg) by mouth every 4 hours as needed for moderate to severe pain 60 tablet 0     PARoxetine (PAXIL) 10 MG tablet Take 1 tablet (10 mg) by mouth At Bedtime (Needs follow-up appointment for this medication) 30 tablet 11     diclofenac (VOLTAREN) 1 % GEL topical gel Apply 4 grams to knees or 2 grams to hands four times daily using enclosed dosing card. 200 g 3     capsaicin (ZOSTRIX) 0.025 % CREA cream Apply to R hip and  thigh every 2 hours as needed for pain. 120 g 3     pantoprazole (PROTONIX) 40 MG EC tablet Take 1 tablet (40 mg) by mouth 2 times daily (before meals) 60 tablet 6     polyethylene glycol (MIRALAX) powder Take 17 g (1 capful) by mouth daily Mix in 4-8 oz of fluid of choice. For constipation 510 g 11     bisacodyl (DULCOLAX) 10 MG Suppository Place 1 suppository (10 mg) rectally daily as needed for constipation 12 suppository 11     nicotine (NICODERM CQ) 21 MG/24HR 24 hr patch Place 1 patch onto the skin every 24 hours 30 patch 0     lidocaine-prilocaine (EMLA) cream Apply topically over port 45 - 60 minutes prior to port access. 30 g 3     Medications reviewed:  Medications reconciled to facility chart and changes were made to reflect current medications as identified as above med list. Below are the changes that were made:   Medications stopped since last EPIC medication reconciliation:   There are no discontinued medications.    Medications started since last [facility] medication reconciliation:  No orders of the defined types were placed in this encounter.    REVIEW OF SYSTEMS:  7 point ROS done including, light headedness/dizziness, fever/chills, pain, Resp, CV, GI, and  and is negative other than noted in HPI.      Physical Exam:  BP (!) 73/50  Pulse 103  Temp 99.3  F (37.4  C)  Resp 16  SpO2 96%     GENERAL APPEARANCE:  Well developed older male resting in bed, NAD, non-toxic.  ENT:  Mouth and oropharynx normal, mostly dry mucous membranes.   RESP:  Lungs CTA.  Regular relaxed breathing effort.  No cough.   CV: S1/S2 no murmur or rubs.  Regular rhythm and rate.  No generalized edema.   ABDOMEN:   Flat, soft, non-tender with active bowel sounds.  No guarding, rigidity, or rebound tenderness.  EXTREMITIES:  No lower extremity edema, no calf tenderness.   PSYCH: Alert to self, not place/date/situation.  CAM+, somnolent but arrousable.      Recent Labs:  All labs reviewed, none  recent.    Assessment/Plan:  Orthostatic hypotension with unclear etiology  -Will have IV placed and give 1 L 0.9 NS bolus. .  -Family went to get Gatorade, encourage increased PO intake/fluids.   -Will have RN watch BP's.    --Will check Hgb, WBC and Lactate with tomorrow's lab to verify anemia is stable.   --If does not improve, may need to transport to ED for further eval.   --If does continue, may need to adjust opiates, if suspected etiology.     Delirium, likely secondary to unintentional misuse of opiates   -No acute changes at this time.     Malignant neoplasm metastatic to lung, unspecified laterality (H), on chemotherapy  Pituitary macroadenoma (H)  -No changes at this time.   -Continue with Onco and Endo planned f/u appts.     Cancer associated pain  Bony metastasis (H), L scapula and L 5th rib  -No changes at this time.  --As noted above, is suspected etiology to hypotension, may need to adjust.     C. difficile colitis  -Continue Flagyl for now.     Orders:  1. Start IV for IVF's, then give 1 liter 0.9 NS over 2 hrs ASAP. Repeat vitals 15 min after fluids and update NP. Dx hypotension  2. Check Hgb on 10/4 Dx anemia    Electronically signed by  SALINAS Ledezma CNP

## 2017-10-04 NOTE — PROGRESS NOTES
[place] [facility]    Chief Complaint   Patient presents with     Nursing Home Acute       HPI:    Wade Acevedo is a 57 year old  (1960), who is being seen today for an episodic care visit at [facility].  HPI information obtained from: facility chart records, facility staff, patient report and [facility] chart review.    Following up from yesterdays orthostatic hypotension which is of unclear etiology, as he is drinking some.  Yesterday SBP's were down in the 70's, gave 1 L IVF's last night in house.  Today's SBP in the 120's.  However, upon sitting him up, he endorses light headedness again, but not as bad as yesterday.  He still endorses ongoing vague leg and buttocks pain, still encephalopathic.  Hgb was 9.3 on 9/28, today he's down to 7.9, RN's not aware of any bloody stools. He endorses intermittent nausea but no emesis.     He has chronic anemia and is s/p chemotherapy thus that may be contributing to his acute on chronic anemia.  Could be contributing to his orthostatic hypotension as well.  In setting of malignancy, there could also be GI losses.  Were also treating for C-diff.      In regards to his pain, this appears improved but not resolved, but he remains confused.  He has a degree of urine retention but improved with PVR's in the 200-300 range, but he has been refusing straight caths, is voiding.     Today's concern is:  Orthostatic hypotension with unclear etiology  Anemia, chronic disease  Etiology unclear, possible multifactorial.  Anemia is acute on chronic, likely multifactorial as well.  He did get 2 u RBC's on 9/7.     Delirium, likely secondary to unintentional misuse of opiates   Stable overall.  Query if there is not a degree of cognitive decline as well, but unclear.     Malignant neoplasm metastatic to lung, unspecified laterality (H), on chemotherapy  Cancer associated pain  Bony metastasis (H), L scapula and L 5th rib  Pituitary macroadenoma (H)  As noted, does have  bony mets' but pain does not correlate to location on last PET.  Pain still in legs and buttocks, still does not describe very well, but also notes it's better the last few days.       C. difficile colitis  On Flagyl.     ALLERGIES: Lubriderm  Past Medical, Surgical, Family and Social History reviewed and updated in [facility].    Current Outpatient Prescriptions   Medication Sig Dispense Refill     Ondansetron HCl (ZOFRAN PO) Take 4 mg by mouth every 6 hours as needed for nausea or vomiting       senna-docusate (SENOKOT-S;PERICOLACE) 8.6-50 MG per tablet Take 1 tablet by mouth 2 times daily       Acetaminophen (TYLENOL PO) Take 1,000 mg by mouth 3 times daily       folic acid (FOLVITE) 1 MG tablet Take 1 tablet (1 mg) by mouth daily 30 tablet 0     tamsulosin (FLOMAX) 0.4 MG capsule Take 1 capsule (0.4 mg) by mouth daily 30 capsule 0     metroNIDAZOLE (FLAGYL) 500 MG tablet Take 1 tablet (500 mg) by mouth 3 times daily 21 tablet 0     morphine (MS CONTIN) 30 MG 12 hr tablet Take 1 tablet (30 mg) by mouth 2 times daily 60 tablet 0     oxyCODONE (ROXICODONE) 10 MG IR tablet Take 1 tablet (10 mg) by mouth every 4 hours as needed for moderate to severe pain 60 tablet 0     PARoxetine (PAXIL) 10 MG tablet Take 1 tablet (10 mg) by mouth At Bedtime (Needs follow-up appointment for this medication) 30 tablet 11     diclofenac (VOLTAREN) 1 % GEL topical gel Apply 4 grams to knees or 2 grams to hands four times daily using enclosed dosing card. 200 g 3     capsaicin (ZOSTRIX) 0.025 % CREA cream Apply to R hip and thigh every 2 hours as needed for pain. 120 g 3     pantoprazole (PROTONIX) 40 MG EC tablet Take 1 tablet (40 mg) by mouth 2 times daily (before meals) 60 tablet 6     polyethylene glycol (MIRALAX) powder Take 17 g (1 capful) by mouth daily Mix in 4-8 oz of fluid of choice. For constipation 510 g 11     bisacodyl (DULCOLAX) 10 MG Suppository Place 1 suppository (10 mg) rectally daily as needed for constipation 12  suppository 11     nicotine (NICODERM CQ) 21 MG/24HR 24 hr patch Place 1 patch onto the skin every 24 hours 30 patch 0     lidocaine-prilocaine (EMLA) cream Apply topically over port 45 - 60 minutes prior to port access. 30 g 3     Medications reviewed:  Medications reconciled to facility chart and changes were made to reflect current medications as identified as above med list. Below are the changes that were made:   Medications stopped since last EPIC medication reconciliation:   There are no discontinued medications.    Medications started since last [facility] medication reconciliation:  No orders of the defined types were placed in this encounter.    REVIEW OF SYSTEMS:  7 point ROS done including, light headedness/dizziness, fever/chills, pain, Resp, CV, GI, and  and is negative other than noted in HPI.      Physical Exam:  /80  Pulse 103  Temp 98.9  F (37.2  C)  Resp 16  Wt 144 lb (65.3 kg)  SpO2 96%  BMI 20 kg/m2     GENERAL APPEARANCE:  Well developed older male resting in bed, NAD, non-toxic.  ENT:  Mouth and oropharynx normal, mostly dry mucous membranes.   RESP:  Lungs CTA.  Regular relaxed breathing effort.  No cough.   CV: S1/S2 no murmur or rubs.  Regular rhythm and rate.  No generalized edema.   ABDOMEN:   Flat, soft, non-tender with active bowel sounds.  No guarding, rigidity, or rebound tenderness.  EXTREMITIES:  No lower extremity edema, no calf tenderness.   PSYCH: Alert to self, not place/date/situation.  CAM+, but more alert last couple days.     Recent Labs: CBC  Date:  10/4/17  Hgb 7.9, Hct 24.4, WBC 5.8,     Assessment/Plan:  Orthostatic hypotension with unclear etiology  Anemia, chronic disease  -Will repeat orthostatic tomorrow and follow VS daily for now.  --Low threshold to give another 1 L 0.9 NS if SBP <100.   -Will recheck Hgb on 10/9, transfuse to keep Hgb >7.0.  --Asked RN's to get a FIT hemoglobin test on his stool.     Delirium, likely secondary to unintentional  misuse of opiates   -Continue to clinically monitor, do not see     Malignant neoplasm metastatic to lung, unspecified laterality (H), on chemotherapy  Cancer associated pain  Bony metastasis (H), L scapula and L 5th rib  Pituitary macroadenoma (H)  -Clinically monitoring.   -Followed by Onco and Endo.     C. difficile colitis  -Continue Flagyl.     Orders:  1. Give zofran 4 mg po q 6 hr prn nausea  2. Encourage po liquids, goal of 2 L or more/day or >600 cc's meal Dx hypotension  3. Check HGB on 10/9 please Dx Anemia  4. Check FIT hemoglobin test on stool Dx Anemia    Electronically signed by  SALINAS Ledezma CNP

## 2017-10-09 PROBLEM — R41.89 COGNITIVE DECLINE: Status: ACTIVE | Noted: 2017-01-01

## 2017-10-09 PROBLEM — Z71.89 ADVANCE CARE PLANNING: Status: ACTIVE | Noted: 2017-01-01

## 2017-10-09 NOTE — PROGRESS NOTES
[place] [facility]    Chief Complaint   Patient presents with     RECHECK       HPI:    Wade Acevedo is a 57 year old  (1960), who is being seen today for an episodic care visit at [facility].  HPI information obtained from: facility chart records, facility staff, patient report and [facility] chart review.    We continue to follow Mr. Acevedo with stage IV lung cancer admitted due to delirium due to overuse of opiates.  Mr. Acevedo's mentation has improved, is more alert/aware but still is cognitive declined and CAM+, would say he does not have capacity to make good decisions.      He reports his pain is much better today, currently no pain.  Unfortunately he has not been able to do any PT/OT as upon sitting he still get's moderately dizzy.  Recheck of Orthostatics this AM were positive, he appears dehydrated, despite giving him 1 L IVF last week and encouraging PO fluids.  Etiology of ongoing dehydration is unclear, as RN'd do not report excessive diarrhea with his C-diff, or excessive voiding, but they do not do I+O's here.      I did call the POA daughter in law Smith and reviewed his overall case.  She reports she is aware of his bad prognosis, has been contemplating palliative/hospice, but as long as chemo is an option they plan to pursue it.  I know it was put on hold upon admission here, but he's scheduled to return for f/u on 10/12, she will make further decisions upon that meeting with Onco.  Otherwise she is in agreement with more IVF's and transfusion if it should be needed.     Today's concern is:  Orthostatic hypotension with unclear etiology  On going supine 104/73, sitting 70/44, standing was aborted due to dizziness but read 60/41.  Highly symptomatic with just sitting.  Etiology unclear, his PO intake is not the best but he is drinking Gatorade here and there.  Not currently getting chemo.  Does have C-diff but no clear excessive output.  Does have a Pituitary macroadenoma that has  been noted as benign.  Does have anemia and Hgb has been drifting down, all as differential/etiologies.     Anemia, chronic disease  Was 9.3 on admission, down to 7.9 10/4, and today still at 8.2, thus not likely etiology of orthostatic hypotension.     Delirium, likely secondary to unintentional misuse of opiates   Cognitive decline with unclear etiology in setting of stage IV lung cancer   This has improved to some degree, but still a good degree of cognitive decline.  Query some degree of Wernicke's due to history.  Chronic pain and malignancy could also be contributing.     Malignant neoplasm metastatic to lung, unspecified laterality (H), on chemotherapy  Cancer associated pain  Pituitary macroadenoma (H)  Followed by Endo and Onco.    Pain under better control lately.    Onco f/u 10/12 where ongoing chemo options will be discussed.  Endo 11/14.     C. difficile colitis  On Flagyl, last day 10/11.     Advance care planning  Spoke 20+ minutes with KARYNA Smith whom is contemplating hospice, does realize his goal of returning home is unlikely.  However, they do note if he is able to get chemo they plan on continuing with that if offered.  Thus she is waiting for the Onco f/u on 10/12 before making further GOC decisions.  Is DNR/DNI but ok with aggressive cares, IVF's today and transfusion if he so would need.      ALLERGIES: [employer]  Past Medical, Surgical, Family and Social History reviewed and updated in [employer].    Current Outpatient Prescriptions   Medication Sig Dispense Refill     Ondansetron HCl (ZOFRAN PO) Take 4 mg by mouth every 6 hours as needed for nausea or vomiting       senna-docusate (SENOKOT-S;PERICOLACE) 8.6-50 MG per tablet Take 1 tablet by mouth 2 times daily       Acetaminophen (TYLENOL PO) Take 1,000 mg by mouth 3 times daily       folic acid (FOLVITE) 1 MG tablet Take 1 tablet (1 mg) by mouth daily 30 tablet 0     tamsulosin (FLOMAX) 0.4 MG capsule Take 1 capsule (0.4 mg) by mouth daily 30  "capsule 0     metroNIDAZOLE (FLAGYL) 500 MG tablet Take 1 tablet (500 mg) by mouth 3 times daily 21 tablet 0     morphine (MS CONTIN) 30 MG 12 hr tablet Take 1 tablet (30 mg) by mouth 2 times daily 60 tablet 0     oxyCODONE (ROXICODONE) 10 MG IR tablet Take 1 tablet (10 mg) by mouth every 4 hours as needed for moderate to severe pain 60 tablet 0     PARoxetine (PAXIL) 10 MG tablet Take 1 tablet (10 mg) by mouth At Bedtime (Needs follow-up appointment for this medication) 30 tablet 11     diclofenac (VOLTAREN) 1 % GEL topical gel Apply 4 grams to knees or 2 grams to hands four times daily using enclosed dosing card. 200 g 3     capsaicin (ZOSTRIX) 0.025 % CREA cream Apply to R hip and thigh every 2 hours as needed for pain. 120 g 3     pantoprazole (PROTONIX) 40 MG EC tablet Take 1 tablet (40 mg) by mouth 2 times daily (before meals) 60 tablet 6     bisacodyl (DULCOLAX) 10 MG Suppository Place 1 suppository (10 mg) rectally daily as needed for constipation 12 suppository 11     nicotine (NICODERM CQ) 21 MG/24HR 24 hr patch Place 1 patch onto the skin every 24 hours 30 patch 0     Medications reviewed:  Medications reconciled to facility chart and changes were made to reflect current medications as identified as above med list. Below are the changes that were made:   Medications stopped since last EPIC medication reconciliation:   Medications Discontinued During This Encounter   Medication Reason     polyethylene glycol (MIRALAX) powder Medication Reconciliation Clean Up     lidocaine-prilocaine (EMLA) cream Medication Reconciliation Clean Up       Medications started since last [facility] medication reconciliation:  No orders of the defined types were placed in this encounter.    REVIEW OF SYSTEMS:  7 point ROS done including, light headedness/dizziness, fever/chills, pain, Resp, CV, GI, and  and is negative other than noted in HPI.     Physical Exam:  /67  Pulse 98  Temp 99  F (37.2  C)  Resp 14  Ht 5' 4.5\" " (1.638 m)  Wt 144 lb 3.2 oz (65.4 kg)  SpO2 97%  BMI 24.37 kg/m2     GENERAL APPEARANCE:  Well developed, thin/cachetic, older male resting in bed, NAD, non-toxic.  ENT:  Mouth and oropharynx normal, mostly dry mucous membranes.   RESP:  Lungs CTA.  Regular relaxed breathing effort.  No cough.   CV: S1/S2 no murmur or rubs.  Regular rhythm and rate.  No generalized edema.   ABDOMEN:   Flat, soft, non-tender with active bowel sounds.  No guarding, rigidity, or rebound tenderness.  EXTREMITIES:  No lower extremity edema, no calf tenderness.   PSYCH: Alert to self, place, date today, not situation.  CAM+, but more alert last couple days.     Assessment/Plan:  Orthostatic hypotension with unclear etiology  -Will get IV access and give 1 L NS today, POA aware.    Anemia, chronic disease  -Hgb stable at 8.2, plan is to keep >7.0.     Delirium, likely secondary to unintentional misuse of opiates   Cognitive decline with unclear etiology in setting of stage IV lung cancer   -No changes, clinically monitoring.     Malignant neoplasm metastatic to lung, unspecified laterality (H), on chemotherapy  Cancer associated pain  -Continue scheduled Acetaminophen, BID MS contin, PRN Oxycodone.   -Onco 10/12 to determine next steps, more Chemo?     Pituitary macroadenoma (H)  -Endo 11/14.     C. difficile colitis  -Flagyl until 10/11.     Advance care planning  -Continue with DNR/DNI but ok for ongoing aggressive cares for now.     Orders:  1. Please access I-VAD for IVF's if not able please start IV- Dx: Dehydration & Hypotension  2. Give IL 0.9 NS over 4 hours - Dx: Dehydration & Hypotension     I spent 20 minutes face to face with patient/family discussing advance care planning.     Electronically signed by  SALINAS Ledezma CNP

## 2017-10-12 NOTE — MR AVS SNAPSHOT
After Visit Summary   10/12/2017    Wade Acevedo    MRN: [id number]           Patient Information     Date Of Birth          1960        Visit Information        Provider Department      10/12/2017 1:40 PM Hailey Bernard APRN [facility]        Today's Diagnoses     Non-small cell cancer of left lung (H)    -  1    Loss of weight        Radicular pain of sacrum           Follow-ups after your visit        Your next 10 appointments already scheduled     Oct 26, 2017  1:30 PM CDT   Level O with ROOM 9 [facility] ([facility])    [facility]  [address]  [facility] [address]   [phone]            Nov 02, 2017  2:00 PM CDT   Level O with ROOM 10 [facility] ([facility])    [facility]  [address]  [facility] [address]   [phone]            Nov 09, 2017  1:00 PM CST   Level O with ROOM 4 [facility] ([facility])    [facility]  [address]  [facility] [address]   [phone]            Nov 14, 2017 11:30 AM CST   (Arrive by 11:15 AM)   RETURN ENDOCRINE with Padma Medley MD   [facility] ([facility])    [address] [place] [id number] [phone]            Nov 16, 2017  1:30 PM CST   Level O with ROOM 7 [facility] ([facility])    [facility] [place] [address]  [facility] [address]   [phone]            Nov 16, 2017  2:30 PM CST   Return Visit with Pasquale Naylor MD   [facility] ([facility])    [facility]  [address]  [facility] [address]   [phone]            Nov 24, 2017  1:00 PM CST   Level O with ROOM 10 [facility] ([facility])    [facility]  "[facility]  [address]  [facility] [address]   [phone]            Dec 01, 2017  1:00 PM CST   Level O with ROOM 9 [facility] ([facility])    n Medical Ctr [facility]  [address]  [facility] [address]   [phone]              Who to contact     If you have questions or need follow up information about today's clinic visit or your schedule please contact [facility] directly at [phone].  Normal or non-critical lab and imaging results will be communicated to you by [employer], letter or phone within 4 business days after the clinic has received the results. If you do not hear from us within 7 days, please contact the clinic through [employer] or phone. If you have a critical or abnormal lab result, we will notify you by phone as soon as possible.  Submit refill requests through [employer] or call your pharmacy and they will forward the refill request to us. Please allow 3 business days for your refill to be completed.          Additional Information About Your Visit        MyChart Information     [employer] lets you send messages to your doctor, view your test results, renew your prescriptions, schedule appointments and more. To sign up, go to [email or web address] . Click on \"Log in\" on the left side of the screen, which will take you to the Welcome page. Then click on \"Sign up Now\" on the right side of the page.     You will be asked to enter the access code listed below, as well as some personal information. Please follow the directions to create your username and password.     Your access code is: [email or web address]  Expires: 2017  9:54 AM     Your access code will  in 90 days. If you need help or a new code, please call your [facility] or [phone].        Care EveryWhere ID     This is your Care EveryWhere ID. This could be used by other organizations to access your [place] medical records  [id number]      "   Your Vitals Were     Pulse Temperature Respirations Pulse Oximetry BMI (Body Mass Index)       105 99  F (37.2  C) 18 98% 24.38 kg/m2        Blood Pressure from Last 3 Encounters:   10/24/17 96/65   10/23/17 118/83   10/19/17 109/77    Weight from Last 3 Encounters:   10/24/17 137 lb (62.1 kg)   10/23/17 146 lb 12.8 oz (66.6 kg)   10/19/17 141 lb 8.6 oz (64.2 kg)              Today, you had the following     No orders found for display       Primary Care Provider Office Phone # Fax #    Pawan Hampton [phone] [address] [address]        Equal Access to Services     GONZALO SMITH : Saul bergmano Sonilsa, waaxda luqadaha, qaybta kaalmada adeegyada, gena romero . So [facility] [phone].    ATENCIÓN: Si habla español, tiene a burns disposición servicios gratuitos de asistencia lingüística. [facility] [phone].    We comply with applicable federal civil rights laws and [place] laws. We do not discriminate on the basis of race, color, national origin, age, disability, sex, sexual orientation, or gender identity.            Thank you!     Thank you for choosing [facility]  for your care. Our goal is always to provide you with excellent care. Hearing back from our patients is one way we can continue to improve our services. Please take a few minutes to complete the written survey that you may receive in the mail after your visit with us. Thank you!             Your Updated Medication List - Protect others around you: Learn how to safely use, store and throw away your medicines at www.disposemymeds.org.          This list is accurate as of: 10/12/17 11:59 PM.  Always use your most recent med list.                   Brand Name Dispense Instructions for use Diagnosis    bisacodyl 10 MG Suppository    DULCOLAX    12 suppository    Place 1 suppository (10 mg) rectally daily as needed for constipation    Drug-induced constipation        folic acid 1 MG tablet    FOLVITE    30 tablet    Take 1 tablet (1 mg) by mouth daily        morphine 30 MG 12 hr tablet    MS CONTIN    60 tablet    Take 1 tablet (30 mg) by mouth 2 times daily    Non-small cell lung cancer (NSCLC) (H)       nicotine 21 MG/24HR 24 hr patch    NICODERM CQ    30 patch    Place 1 patch onto the skin every 24 hours    Tobacco abuse       oxyCODONE 10 MG IR tablet    ROXICODONE    60 tablet    Take 1 tablet (10 mg) by mouth every 4 hours as needed for moderate to severe pain    Non-small cell lung cancer (NSCLC) (H), Cancer associated pain       pantoprazole 40 MG EC tablet    PROTONIX    60 tablet    Take 1 tablet (40 mg) by mouth 2 times daily (before meals)    Non-small cell lung cancer (NSCLC) (H)       PARoxetine 10 MG tablet    PAXIL    30 tablet    Take 1 tablet (10 mg) by mouth At Bedtime (Needs follow-up appointment for this medication)    Major depressive disorder, recurrent episode, moderate (H)       polyethylene glycol powder    MIRALAX/GLYCOLAX     Take 17 g by mouth 2 times daily Hold for loose stools        tamsulosin 0.4 MG capsule    FLOMAX    30 capsule    Take 1 capsule (0.4 mg) by mouth daily    Urinary retention with incomplete bladder emptying       TYLENOL PO      Take 1,000 mg by mouth 3 times daily        ZOFRAN PO      Take 4 mg by mouth every 6 hours as needed for nausea or vomiting

## 2017-10-12 NOTE — PATIENT INSTRUCTIONS
We would like to see you back in clinic with Dr. Naylor in 1 week with CT prior. No infusion today.  Copy of appointments, and after visit summary (AVS) given to patient.  If you have any questions during business hours (M-F 8 AM- 4PM), please call Jillian Renee RN, BSN, OCN Oncology Hematology /Breast Cancer Navigator at [facility] [phone].   For questions after business hours, or on holidays/weekends, please call our after hours Nurse Triage line [phone]. Thank you.

## 2017-10-12 NOTE — MR AVS SNAPSHOT
After Visit Summary   10/12/2017    Wade Acevedo    MRN: [id number]           Patient Information     Date Of Birth          1960        Visit Information        Provider Department      10/12/2017 1:45 PM Pasquale Naylor MD [facility] ONCOLOGY      Today's Diagnoses     Non-small cell lung cancer, unspecified laterality (H)    -  1      Care Instructions    We would like to see you back in clinic with Dr. Naylor in 1 week with CT prior. No infusion today.  Copy of appointments, and after visit summary (AVS) given to patient.  If you have any questions during business hours (M-F 8 AM- 4PM), please call Jillian Renee RN, BSN, OCN Oncology Hematology /Breast Cancer Navigator at [facility] [phone].   For questions after business hours, or on holidays/weekends, please call our after hours Nurse Triage line [phone]. Thank you.            Follow-ups after your visit        Follow-up notes from your care team     Return in about 1 week (around 10/19/2017) for Imaging ordered before next appointment.      Your next 10 appointments already scheduled     Oct 16, 2017 11:00 AM CDT   Nursing Home with Raquel De Leon MD   [facility] ([facility])    [address] [address] [phone]            Oct 18, 2017  9:30 AM CDT   CT CHEST/ABDOMEN/PELVIS W CONTRAST with WYCT1   [facility] CT ([facility])    [address] [address] [phone]           Please bring any scans or X-rays taken at other hospitals, if similar tests were done. Also bring a list of your medicines, including vitamins, minerals and over-the-counter drugs. It is safest to leave personal items at home.  Be sure to tell your doctor:   If you have any allergies.   If there s any chance you are pregnant.   If you are breastfeeding.   If you have any special needs.  You may have  contrast for this exam. To prepare:   Do not eat or drink for 2 hours before your exam. If you need to take medicine, you may take it with small sips of water. (We may ask you to take liquid medicine as well.)   The day before your exam, drink extra fluids at least six 8-ounce glasses (unless your doctor tells you to restrict your fluids).  Patients over 70 or patients with diabetes or kidney problems:   If you haven t had a blood test (creatinine test) within the last 30 days, go to your clinic or Diagnostic Imaging Department for this test.  If you have diabetes:   If your kidney function is normal, continue taking your metformin (Avandamet, Glucophage, Glucovance, Metaglip) on the day of your exam.   If your kidney function is abnormal, wait 48 hours before restarting this medicine.  You will have oral contrast for this exam:   You will drink the contrast at home. Get this from your clinic or Diagnostic Imaging Department. Please follow the directions given.  Please wear loose clothing, such as a sweat suit or jogging clothes. Avoid snaps, zippers and other metal. We may ask you to undress and put on a hospital gown.  If you have any questions, please call the Imaging Department where you will have your exam.            Oct 19, 2017  3:30 PM CDT   Return Visit with Pasquale Naylor MD   [facility] ([facility])    [facility]  [address]  [facility] [address]   [phone]            Nov 14, 2017 11:30 AM CST   (Arrive by 11:15 AM)   RETURN ENDOCRINE with Padma Medley MD   [facility] ([facility])    [address] [place] [address] [phone]              Future tests that were ordered for you today     Open Future Orders        Priority Expected Expires Ordered    CT Chest/Abdomen/Pelvis w Contrast Routine  10/13/2018 10/12/2017            Who to contact     If you have questions or  "need follow up information about today's clinic visit or your schedule please contact [facility] directly at [phone].  Normal or non-critical lab and imaging results will be communicated to you by Pavlokhart, letter or phone within 4 business days after the clinic has received the results. If you do not hear from us within 7 days, please contact the clinic through Pavlokhart or phone. If you have a critical or abnormal lab result, we will notify you by phone as soon as possible.  Submit refill requests through sfilatino or call your pharmacy and they will forward the refill request to us. Please allow 3 business days for your refill to be completed.          Additional Information About Your Visit        [email or web address] Information     sfilatino lets you send messages to your doctor, view your test results, renew your prescriptions, schedule appointments and more. To sign up, go to [email or web address] . Click on \"Log in\" on the left side of the screen, which will take you to the Welcome page. Then click on \"Sign up Now\" on the right side of the page.     You will be asked to enter the access code listed below, as well as some personal information. Please follow the directions to create your username and password.     Your access code is: [email or web address]  Expires: 2017  9:54 AM     Your access code will  in 90 days. If you need help or a new code, please call your [place] clinic or [phone].        Care EveryWhere ID     This is your Care EveryWhere ID. This could be used by other organizations to access your [place] medical records  [id number]        Your Vitals Were     Pulse Temperature Respirations Height Pulse Oximetry       104 99  F (37.2  C) (Tympanic) 18 1.638 m (5' 4.49\") 98%        Blood Pressure from Last 3 Encounters:   10/12/17 (!) 76/45   10/09/17 106/67   10/04/17 120/80    Weight from Last 3 Encounters:   10/09/17 65.4 kg (144 lb 3.2 oz)   10/04/17 65.3 kg (144 lb)   10/02/17 65.3 kg " (144 lb)               Primary Care Provider Office Phone # Fax #    Pawan Hampton [phone] [address] [facility] [address]        Equal Access to Services     GONZALO SMITH : Hadii aad ku hadaurelioo Sopabloali, waaxda luqadaha, qaybta kaalmada aderoxy, gena seo laKimmarilyn evans. So [facility] [phone].    ATENCIÓN: Si habla español, tiene a burns disposición servicios gratuitos de asistencia lingüística. Llame al [phone].    We comply with applicable federal civil rights laws and [place] laws. We do not discriminate on the basis of race, color, national origin, age, disability, sex, sexual orientation, or gender identity.            Thank you!     Thank you for choosing [facility] [place] by [facility]  for your care. Our goal is always to provide you with excellent care. Hearing back from our patients is one way we can continue to improve our services. Please take a few minutes to complete the written survey that you may receive in the mail after your visit with us. Thank you!             Your Updated Medication List - Protect others around you: Learn how to safely use, store and throw away your medicines at www.disposemymeds.org.          This list is accurate as of: 10/12/17  2:42 PM.  Always use your most recent med list.                   Brand Name Dispense Instructions for use Diagnosis    bisacodyl 10 MG Suppository    DULCOLAX    12 suppository    Place 1 suppository (10 mg) rectally daily as needed for constipation    Drug-induced constipation       capsaicin 0.025 % Crea cream    ZOSTRIX    120 g    Apply to R hip and thigh every 2 hours as needed for pain.    Multiple joint pain, Cancer associated pain       diclofenac 1 % Gel topical gel    VOLTAREN    200 g    Apply 4 grams to knees or 2 grams to hands four times daily using enclosed dosing card.    Multiple joint pain       folic acid 1 MG tablet    FOLVITE    30 tablet    Take 1 tablet (1 mg) by mouth daily         metroNIDAZOLE 500 MG tablet    FLAGYL    21 tablet    Take 1 tablet (500 mg) by mouth 3 times daily    Constipation, chronic       morphine 30 MG 12 hr tablet    MS CONTIN    60 tablet    Take 1 tablet (30 mg) by mouth 2 times daily    Non-small cell lung cancer (NSCLC) (H)       nicotine 21 MG/24HR 24 hr patch    NICODERM CQ    30 patch    Place 1 patch onto the skin every 24 hours    Tobacco abuse       oxyCODONE 10 MG IR tablet    ROXICODONE    60 tablet    Take 1 tablet (10 mg) by mouth every 4 hours as needed for moderate to severe pain    Non-small cell lung cancer (NSCLC) (H), Cancer associated pain       pantoprazole 40 MG EC tablet    PROTONIX    60 tablet    Take 1 tablet (40 mg) by mouth 2 times daily (before meals)    Non-small cell lung cancer (NSCLC) (H)       PARoxetine 10 MG tablet    PAXIL    30 tablet    Take 1 tablet (10 mg) by mouth At Bedtime (Needs follow-up appointment for this medication)    Major depressive disorder, recurrent episode, moderate (H)       senna-docusate 8.6-50 MG per tablet    SENOKOT-S;PERICOLACE     Take 1 tablet by mouth 2 times daily        tamsulosin 0.4 MG capsule    FLOMAX    30 capsule    Take 1 capsule (0.4 mg) by mouth daily    Urinary retention with incomplete bladder emptying       TYLENOL PO      Take 1,000 mg by mouth 3 times daily        ZOFRAN PO      Take 4 mg by mouth every 6 hours as needed for nausea or vomiting

## 2017-10-12 NOTE — NURSING NOTE
"Oncology Rooming Note    October 12, 2017 1:51 PM   Wade Acevedo is a 57 year old male who presents for:    Chief Complaint   Patient presents with     Oncology Clinic Visit     2 week follow up Non-Small Cell lung CA. Chemo.      Initial Vitals: BP (!) 74/58 (BP Location: Right arm, Patient Position: Sitting, Cuff Size: Adult Regular)  Pulse 105  Temp 99  F (37.2  C) (Tympanic)  Resp 18  Ht 1.638 m (5' 4.49\")  SpO2 98% Estimated body mass index is 24.37 kg/(m^2) as calculated from the following:    Height as of 10/9/17: 1.638 m (5' 4.5\").    Weight as of 10/9/17: 65.4 kg (144 lb 3.2 oz). There is no height or weight on file to calculate BSA.  Mild Pain (3) Comment: left side   No LMP for male patient.  Allergies reviewed: Yes  Medications reviewed: Yes    Medications: Medication refills not needed today.  Pharmacy name entered into [employer]: [facility] - [address] AT [address]    Clinical concerns: 2 week follow up Non-Small Cell lung CA. Chemo.     1.Patient having trouble hearing and vision right eye.  2. Appetite decreased.     7 minutes for nursing intake (face to face time)     Reena Leon [facility]              "

## 2017-10-13 NOTE — PROGRESS NOTES
[facility]  PRIMARY CARE PROVIDER AND CLINIC:  Pawan Hampton [address] [facility] [address]  Chief Complaint   Patient presents with     Hospital F/U       HPI:    Wade Acevedo is a 57 year old  (1960),admitted to the [facility] from [facility].  Hospital stay 9/25/17 through 9/26/17.  Admitted to this facility for  rehab, medical management and nursing care.  HPI information obtained from: facility chart records, facility staff and patient report.      Interval History:  - Pt with significant PMH of lung cancer stage IV with mets, pituitary mass/macroadenoma  admitted to the above hospital for metabolic encephalopathy, felt to be 2/2 to opioid use.   - Hospitalization was c/w C-diff started on Flagyl,  Urinary retention started on flomax    At TCU:  - noted to have hypotension, given IVF on two occasion, fell a few days ago. Reports no HA and blurry vision is occasional and nothing new.   - Reports dizziness in general is better, and appetite is improving. GNP feels appetite is not that good, and  Started on IVF NS one liter daily since the 12th.       CODE STATUS/ADVANCE DIRECTIVES DISCUSSION:   DNR / DNI  Patient's living condition: lives alone    ALLERGIES:Lubriderm  PAST MEDICAL HISTORY:  has a past medical history of Brain cancer (H); Constipation; H/O ETOH abuse; tobacco use, presenting hazards to health; Lung cancer (H); Pain (5/6/2017); Recurrent falls; Unsteady gait; and Weight loss.  PAST SURGICAL HISTORY:  has a past surgical history that includes Insert port vascular access (N/A, 5/17/2017) and Anesthesia out of OR MRI (N/A, 9/27/2017).  FAMILY HISTORY: family history is not on file.  SOCIAL HISTORY:  reports that he has been smoking Cigarettes.  He has a 45.00 pack-year smoking history. He has never used smokeless tobacco. He reports that he does not drink alcohol.    Post Discharge Medication Reconciliation Status: discharge  medications reconciled and changed, per note/orders (see AVS).  Current Outpatient Prescriptions   Medication Sig Dispense Refill     polyethylene glycol (MIRALAX/GLYCOLAX) powder Take 1 capful by mouth as needed for constipation       Ondansetron HCl (ZOFRAN PO) Take 4 mg by mouth every 6 hours as needed for nausea or vomiting       senna-docusate (SENOKOT-S;PERICOLACE) 8.6-50 MG per tablet Take 1 tablet by mouth 2 times daily       Acetaminophen (TYLENOL PO) Take 1,000 mg by mouth 3 times daily       folic acid (FOLVITE) 1 MG tablet Take 1 tablet (1 mg) by mouth daily 30 tablet 0     tamsulosin (FLOMAX) 0.4 MG capsule Take 1 capsule (0.4 mg) by mouth daily 30 capsule 0     metroNIDAZOLE (FLAGYL) 500 MG tablet Take 1 tablet (500 mg) by mouth 3 times daily 21 tablet 0     morphine (MS CONTIN) 30 MG 12 hr tablet Take 1 tablet (30 mg) by mouth 2 times daily 60 tablet 0     oxyCODONE (ROXICODONE) 10 MG IR tablet Take 1 tablet (10 mg) by mouth every 4 hours as needed for moderate to severe pain 60 tablet 0     PARoxetine (PAXIL) 10 MG tablet Take 1 tablet (10 mg) by mouth At Bedtime (Needs follow-up appointment for this medication) 30 tablet 11     diclofenac (VOLTAREN) 1 % GEL topical gel Apply 4 grams to knees or 2 grams to hands four times daily using enclosed dosing card. 200 g 3     capsaicin (ZOSTRIX) 0.025 % CREA cream Apply to R hip and thigh every 2 hours as needed for pain. 120 g 3     pantoprazole (PROTONIX) 40 MG EC tablet Take 1 tablet (40 mg) by mouth 2 times daily (before meals) 60 tablet 6     bisacodyl (DULCOLAX) 10 MG Suppository Place 1 suppository (10 mg) rectally daily as needed for constipation 12 suppository 11     nicotine (NICODERM CQ) 21 MG/24HR 24 hr patch Place 1 patch onto the skin every 24 hours 30 patch 0       ROS:  10 point ROS of systems including Constitutional, Eyes, Respiratory, Cardiovascular, Gastroenterology, Genitourinary, Integumentary, Muscularskeletal, Psychiatric were all  negative except for pertinent positives noted in my HPI.    Exam:  /81  Pulse 102  Temp 98.5  F (36.9  C)  Resp 18  Wt 137 lb (62.1 kg)  SpO2 94%  BMI 23.16 kg/m2  GENERAL APPEARANCE:  Alert, in no distress  ENT:  Mouth and posterior oropharynx normal, moist mucous membranes  EYES:  EOM, conjunctivae, lids, pupils and irises normal  NECK:  No adenopathy,masses or thyromegaly  RESP:  respiratory effort and palpation of chest normal, no respiratory distress, diminished breath sounds over LLL, no wheezing or crackles.   CV:  Palpation and auscultation of heart done , regular rate and rhythm, no murmur, rub, or gallop  ABDOMEN:  normal bowel sounds, soft, nontender, no hepatosplenomegaly or other masses  LYMPHATICS:  No adenopathy in neck   M/S:   Gait and station abnormal usnteady gate,no calf swelling   SKIN:  A-port left upper anterior chest wall;healing abrasion over nose, forehead and over dorsal surface of left hand.   NEURO:   Cranial nerves 2-12 are normal tested and grossly at patient's baseline, no purposeful movement in upper and lower extremities  PSYCH:  oriented X 3, normal insight, judgement and memory    Lab/Diagnostic data:     CBC RESULTS:   Recent Labs   Lab Test 10/04/17  09/28/17   0608   WBC  5.8  7.2   RBC  2.97*  3.59*   HGB  7.9*  9.3*   HCT  24.4*  28.1*   MCV  82  78   MCH  26.6*  25.9*   MCHC  32.4  33.1   RDW  16.9*  16.1*   PLT  206  173       Last Basic Metabolic Panel:  Recent Labs   Lab Test 10/12/17  09/28/17   0608   NA  143  145*   POTASSIUM  3.9  3.8   CHLORIDE  110*  116*   JOHANN  9.1  8.9   CO2  25  24   BUN  12  9   CR  0.83  0.80   GLC  91  112*       Liver Function Studies -   Recent Labs   Lab Test  09/28/17   0608  09/27/17   0800   PROTTOTAL  6.8  6.8   ALBUMIN  3.3*  3.3*   BILITOTAL  0.6  0.6   ALKPHOS  110  111   AST  25  21   ALT  17  18       TSH   Date Value Ref Range Status   09/26/2017 0.17 (L) 0.40 - 4.00 mU/L Final   09/14/2017 0.34 (L) 0.40 - 4.00 mU/L  Final       ASSESSMENT/PLAN:  Orthostatic hypotension with unclear etiology  - had a all a few days ago while urinating, sustained small head injury.   - On IVF one liter NS daily since 12th by the order of Dr. Hart, Oncologist.   -   BP Readings from Last 3 Encounters:   10/13/17 115/81   10/12/17 (!) 76/45   10/13/17 (!) 74/58   - Pt feels it is better.   - Possbily 2/2 to decreased oral intake.       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 Immunotherapy stopped, and the plan is to work up for staging.  - Follow on Dr Toledo's recommendations.   - Mr. Acevedo wants to explore more  Treatment options for now.     Anemia, chronic disease  - of chronic disease, however, microcytic.   - Had blood transfusion last month, continue to monitor and transfuse if Hb < 7.       Disorientation  - likely 2/2 to decreased oral intake and opoid.   - Improving.     Cancer associated pain  - improving with current regiment.     Pituitary macroadenoma (H)  - with recent enlargement in size per record.   - has a follow up with Endo in November.     Physical deconditioning  - 2/2 to multiple comorbidities. Slight improvement, will need more therapy before can go home. This discussed with Mr. Acevedo.       Orders:  - See above, otherwise, continue the rest of the current POC.     Electronically signed by:  Raquel De Leon MD

## 2017-10-13 NOTE — TELEPHONE ENCOUNTER
Patient on IV meds - has a port, staff ask for orders to access. Gave OK to access - flush per facility protocol.     Electronically signed by SALINAS Julian, GNP

## 2017-10-13 NOTE — PROGRESS NOTES
"Palliative Care Follow-up Note  Date:  October 13, 2017  Location: [facility]fusion [facility]  Accompanied by: daughter-in-law Sarah, who is also POA and 1o health care agent       HPI:  57 year old y/o male with NSCLC of medial left upper lobe with metastasis to L lateral 5th rib, L scapula, cervical lymph nodes, and L hilar mediastinum currently treated with Opdivo, followed by palliative care for pain and symptom management. Chemo often delayed due to anemia, and has required multiple transfusions.  Also has a pituitary adenoma followed by [facility].  Was last seen by me on 9/7 at which time he complained of b/l radicular buttock pain that radiated down both legs to anterior knees.  He was using a \"handful\" of Ibuprofen and advised to use diclofenac gel on a scheduled basis instead. Patient admitted he wasn't compliant with keeping track of his use of OxyIR.    Interval History:  Home care RN reported on 9/20 that patient reported oxycodone didn't work, so he was taking extra MS Contin instead, sometimes as many as 5 tabs at one time.  He was hospitalized 9/25 - 9/25 with acute encephalopathy thought due to inadvertent opioid overdose vs brain metastasis vs pituitary adenoma.  [facility]ent [facility] upon discharge, chemo remains on hold for now, with POA giving some consideration to placement in a LTC/[facility] facility in [place], closer to her home in [facility].    Allergies   Allergen Reactions     Lubriderm Rash         Current Outpatient Prescriptions on File Prior to Visit:  senna-docusate (SENOKOT-S;PERICOLACE) 8.6-50 MG per tablet Take 1 tablet by mouth 2 times daily   Acetaminophen (TYLENOL PO) Take 1,000 mg by mouth 3 times daily   folic acid (FOLVITE) 1 MG tablet Take 1 tablet (1 mg) by mouth daily   tamsulosin (FLOMAX) 0.4 MG capsule Take 1 capsule (0.4 mg) by mouth daily   metroNIDAZOLE (FLAGYL) 500 MG tablet Take 1 tablet (500 mg) by mouth 3 times daily   morphine (MS CONTIN) 30 " MG 12 hr tablet Take 1 tablet (30 mg) by mouth 2 times daily   oxyCODONE (ROXICODONE) 10 MG IR tablet Take 1 tablet (10 mg) by mouth every 4 hours as needed for moderate to severe pain   PARoxetine (PAXIL) 10 MG tablet Take 1 tablet (10 mg) by mouth At Bedtime (Needs follow-up appointment for this medication)   pantoprazole (PROTONIX) 40 MG EC tablet Take 1 tablet (40 mg) by mouth 2 times daily (before meals)   nicotine (NICODERM CQ) 21 MG/24HR 24 hr patch Place 1 patch onto the skin every 24 hours   Ondansetron HCl (ZOFRAN PO) Take 4 mg by mouth every 6 hours as needed for nausea or vomiting   diclofenac (VOLTAREN) 1 % GEL topical gel Apply 4 grams to knees or 2 grams to hands four times daily using enclosed dosing card.   capsaicin (ZOSTRIX) 0.025 % CREA cream Apply to R hip and thigh every 2 hours as needed for pain.   bisacodyl (DULCOLAX) 10 MG Suppository Place 1 suppository (10 mg) rectally daily as needed for constipation     No current facility-administered medications on file prior to visit.       Subjective:  Chief Complaint   Patient presents with     Pain     Re-assess in light of recent inadvertant excess use of opioids   Patient states he is satisfied with his current level of pain control--again in one or the other or both buttocks and radiating down legs.  From a review of the patient's MAR which was sent with him, it appears he has been given 0-2 doses of OxyIR daily.  His biggest complaint, however, is a dry mouth.  Has been eating and drinking little.  Low energy.  No dyspnea.  Recent orthostasis with diziness.  Denies constipation.      Objective:  BP (!) 74/58  Pulse 105  Temp 99  F (37.2  C)  Resp 18  Wt 144 lb 2.9 oz (65.4 kg)  SpO2 98%  BMI 24.38 kg/m2  Wt Readings from Last 10 Encounters:   10/13/17 144 lb 2.9 oz (65.4 kg)   10/09/17 144 lb 3.2 oz (65.4 kg)   10/04/17 144 lb (65.3 kg)   10/02/17 144 lb (65.3 kg)   09/29/17 148 lb 6.4 oz (67.3 kg)   09/29/17 147 lb 7.8 oz (66.9 kg)    09/14/17 151 lb (68.5 kg)   09/07/17 157 lb (71.2 kg)   08/24/17 155 lb 12.8 oz (70.7 kg)   08/17/17 166 lb 6.4 oz (75.5 kg)   Very pale  OP dry  CV RRR, tachy  Lungs clear, dimished effort  Abd soft, NT, hypoactive bowel sounds   Ext warm, without edema   MSK: palpation of arms and legs fails to induce pain  Psych: recalls my name, oriented to person and place, impaired insight, no anxiety    Results for JOHN SANCHEZ (MRN [id number]) as of 10/13/2017 10:28   Ref. Range 9/28/2017 06:08 10/12/2017 00:00   Sodium Latest Ref Range: 135 - 146 mmol/L 145 (H) 143   Potassium Latest Ref Range: 3.5 - 5.0 mmol/L 3.8 3.9   Chloride Latest Ref Range: 98 - 107 mmol/L 116 (H) 110 (A)   Carbon Dioxide Latest Ref Range: 22 - 31 mmol/L 24 25   Urea Nitrogen Latest Ref Range: 6 - 22 mg/dL 9 12   Creatinine Latest Ref Range: 0.70 - 1.30 mg/dL 0.80 0.83   GFR Estimate Latest Ref Range: >60 ml/min/1.73m2 >90 >60   GFR Estimate If Black Latest Ref Range: >60 ml/min/1.73m2 >90 >60   Calcium Latest Ref Range: 8.5 - 10.5 mg/dL 8.9 9.1   Anion Gap Latest Ref Range: 5 - 18 mmol/L 5 8   Phosphorus Latest Ref Range: 2.5 - 4.5 mg/dL 3.1    Albumin Latest Ref Range: 3.4 - 5.0 g/dL 3.3 (L)    Protein Total Latest Ref Range: 6.8 - 8.8 g/dL 6.8    Bilirubin Total Latest Ref Range: 0.2 - 1.3 mg/dL 0.6    Alkaline Phosphatase Latest Ref Range: 40 - 150 U/L 110    ALT Latest Ref Range: 0 - 70 U/L 17    AST Latest Ref Range: 0 - 45 U/L 25      Results for JOHN SANCHEZ (MRN [id number]) as of 10/13/2017 10:28   Ref. Range 9/28/2017 06:08 10/4/2017 00:00   WBC Latest Ref Range: 4.0 - 11.0 thou/uL 7.2 5.8   Hemoglobin Latest Ref Range: 14.0 - 18.0 g/dL 9.3 (L) 7.9 (A)   Hematocrit Latest Ref Range: 40.0 - 51.0 % 28.1 (L) 24.4 (A)   Platelet Count Latest Ref Range: 140 - 440 thou/uL 173 206   RBC Count Latest Ref Range: 4.40 - 6.20 mill/uL 3.59 (L) 2.97 (A)   MCV Latest Ref Range: 80 - 100 fl 78 82   MCH Latest Ref Range: 27.0 - 34.0 pg 25.9 (L) 26.6  (A)   MCHC Latest Ref Range: 32.0 - 36.0 g/dL 33.1 32.4   RDW Latest Ref Range: 11.0 - 14.5 % 16.1 (H) 16.9 (A)       MRI OF THE BRAIN WITHOUT AND WITH CONTRAST 9/27/2017 4:15 PM      COMPARISON: Brain MRI one day prior (limited by motion). Brain MRI  4/21/2017.     HISTORY: Altered mental status. Lung cancer, rule out metastasis.       TECHNIQUE: Axial diffusion-weighted with ADC map, axial T2-weighted  with fat saturation, axial T1-weighted, axial turboFLAIR and coronal  T1-weighted images of the brain were acquired without intravenous  contrast.  Following intravenous administration of gadolinium (6 mL  Gadavist), axial T1-weighted images of the brain were acquired.      FINDINGS: There is mild diffuse cerebral volume loss. There are a few  tiny scattered focal areas of abnormal T2 signal hyperintensity in the  cerebral white matter bilaterally that are consistent with sequela of  chronic small vessel ischemic disease.     The ventricles and basal cisterns are within normal limits in  configuration given the degree of cerebral volume loss. There is no  midline shift. There are no extra-axial fluid collections. There is no  evidence for stroke or acute intracranial hemorrhage. Again noted is  an irregularly-shaped, enhancing lesion within the sella extending to  the suprasellar cistern consistent with a pituitary adenoma. This  lesion measures 1.9 x 1.7 x 1.6 cm in the AP, transverse and  cephalocaudad dimensions respectively which is increased in size from  1.5 x 1.5 x 1.3 cm in the same dimensions on the comparison study.  This mass is again noted to abut and likely displace the optic chiasm.  There is no other abnormal contrast enhancement in the brain or its  coverings.     There is no sinusitis or mastoiditis.         IMPRESSION:   1. Interval increase in size of the enhancing mass within the sella  consistent with a pituitary adenoma; however, a metastatic lesion from  the patient's lung cancer cannot be  completely excluded. No additional  enhancing intracranial lesions are identified.  2. Diffuse cerebral volume loss and cerebral white matter changes  consistent with chronic small vessel ischemic disease. No evidence for  acute intracranial pathology.     MIRACLE VINCENT MD      Assessment/Plan:  1. Adult failure to thrive with anorexia and unplanned 22 lb wt loss (13%) in just 2 months.     >daily IV fluids and prednisone 20 per Dr Naylor with plans to reevaluate in one week    2. Bilateral radicular buttock to leg pain, appears controlled at present, likely due to his meds being administered in a controlled setting by someone other than himself   >no med changes at present    3. Goals of care   >notes elsewhere in Epic indicate POA is considering hospice but, for now, wants chemo and PRN transfusions to continue   >very probably not returning home again, but no final decisions at this time    Thank you for the opportunity to be of service to this patient and family.      [id number] [phone] [id number] face to face, 25 min, > 50% spent discussing current state of symptoms.     Mickey Bernard CNP ([facility])  Palliative Care  Private cell:  [phone]

## 2017-10-13 NOTE — PROGRESS NOTES
Hematology/ Oncology Follow-up Visit:  Oct 12, 2017    Reason for Visit:   Chief Complaint   Patient presents with     Oncology Clinic Visit     2 week follow up Non-Small Cell lung CA. Chemo.        Oncologic History:  Non-small cell lung cancer (NSCLC) (H)  The patient presented with 2-3 months history of weakness and fainting episodes. He was seen in the emergency room further workup was done including a CT scan which showed a 3 cm mass in the medial left upper lobe. There was adjacent atelectasis at the prominent mediastinal lymph nodes. There is a destructive bone lesion involving left lateral fifth rib. Patient underwent transthoracic CT-guided biopsy in the hospital. The pathology report came back consistent with mixed tumor including adenocarcinoma and squamous histology. 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 He had a normal TSH, but he had low T3 and T4. He had a serum cortisol morning level 4.4. He underwent a cosyntropin stim test which was mildly abnormal. He had a cortisol level 14.8 at 30 minutes and 19.6 at 60 minutes. His 24-hour cortisol in his urine was normal. He was seen by endocrinology we will continue to monitor him in 6 months.   PET scan showed hypermetabolic bilateral level Ib and right level III lymph nodes in the neck suspicious for metastatic disease. There was also hypermetabolic activity seen in the sella consistent with pituitary macroadenoma. There is hypermetabolic mediastinal left hilar lymphadenopathy.  There is metastatic disease seen in the left scapula and left fifth rib. There is left pleural effusion and small right pleural effusion seen as well.  ALK 4 came back negative. EGFR was negative for mutation. . PDL 1 is less than 1%.     Interval History:  Patient is here today for follow-up. Is gradually improving in nursing home. His pain is not well controlled. His appetite is gradually improving as well as  his mental status. Still having some issues with urination.    Review Of Systems:  Constitutional: Negative for fever, chills, and night sweats.  Skin: negative.  Eyes: negative.  Ears/Nose/Throat: negative.  Respiratory: No shortness of breath, dyspnea on exertion, cough, or hemoptysis.  Cardiovascular: negative.  Gastrointestinal: negative.  Genitourinary: negative.  Musculoskeletal: negative.  Neurologic: negative.  Psychiatric: negative.  Hematologic/Lymphatic/Immunologic: negative.  Endocrine: negative.    All other ROS negative unless mentioned in interval history.    Past medical, social, surgical, and family histories reviewed.    Allergies:  Allergies as of 10/12/2017 - Kvng as Reviewed 10/12/2017   Allergen Reaction Noted     Lubriderm Rash 04/20/2017       Current Medications:  Current Outpatient Prescriptions   Medication Sig Dispense Refill     Ondansetron HCl (ZOFRAN PO) Take 4 mg by mouth every 6 hours as needed for nausea or vomiting       senna-docusate (SENOKOT-S;PERICOLACE) 8.6-50 MG per tablet Take 1 tablet by mouth 2 times daily       Acetaminophen (TYLENOL PO) Take 1,000 mg by mouth 3 times daily       folic acid (FOLVITE) 1 MG tablet Take 1 tablet (1 mg) by mouth daily 30 tablet 0     tamsulosin (FLOMAX) 0.4 MG capsule Take 1 capsule (0.4 mg) by mouth daily 30 capsule 0     metroNIDAZOLE (FLAGYL) 500 MG tablet Take 1 tablet (500 mg) by mouth 3 times daily 21 tablet 0     morphine (MS CONTIN) 30 MG 12 hr tablet Take 1 tablet (30 mg) by mouth 2 times daily 60 tablet 0     oxyCODONE (ROXICODONE) 10 MG IR tablet Take 1 tablet (10 mg) by mouth every 4 hours as needed for moderate to severe pain 60 tablet 0     PARoxetine (PAXIL) 10 MG tablet Take 1 tablet (10 mg) by mouth At Bedtime (Needs follow-up appointment for this medication) 30 tablet 11     diclofenac (VOLTAREN) 1 % GEL topical gel Apply 4 grams to knees or 2 grams to hands four times daily using enclosed dosing card. 200 g 3     capsaicin  "(ZOSTRIX) 0.025 % CREA cream Apply to R hip and thigh every 2 hours as needed for pain. 120 g 3     pantoprazole (PROTONIX) 40 MG EC tablet Take 1 tablet (40 mg) by mouth 2 times daily (before meals) 60 tablet 6     bisacodyl (DULCOLAX) 10 MG Suppository Place 1 suppository (10 mg) rectally daily as needed for constipation 12 suppository 11     nicotine (NICODERM CQ) 21 MG/24HR 24 hr patch Place 1 patch onto the skin every 24 hours 30 patch 0     polyethylene glycol (MIRALAX/GLYCOLAX) powder Take 1 capful by mouth as needed for constipation          Physical Exam:  BP (!) 76/45 (BP Location: Right arm, Patient Position: Sitting, Cuff Size: Adult Regular)  Pulse 104  Temp 99  F (37.2  C) (Tympanic)  Resp 18  Ht 1.638 m (5' 4.49\")  SpO2 98%  Wt Readings from Last 12 Encounters:   10/13/17 65.4 kg (144 lb 2.9 oz)   10/09/17 65.4 kg (144 lb 3.2 oz)   10/04/17 65.3 kg (144 lb)   10/02/17 65.3 kg (144 lb)   09/29/17 67.3 kg (148 lb 6.4 oz)   09/29/17 66.9 kg (147 lb 7.8 oz)   09/14/17 68.5 kg (151 lb)   09/07/17 71.2 kg (157 lb)   08/24/17 70.7 kg (155 lb 12.8 oz)   08/17/17 75.5 kg (166 lb 6.4 oz)   08/10/17 72.6 kg (160 lb 1.6 oz)   08/10/17 72.6 kg (160 lb 0.9 oz)     ECOG performance status: 2  GENERAL APPEARANCE: Healthy, alert and in no acute distress.  HEENT: Sclerae anicteric. PERRLA. Oropharynx without ulcers, lesions, or thrush.  NECK: Supple. No asymmetry or masses.  LYMPHATICS: No palpable cervical, supraclavicular, axillary, or inguinal lymphadenopathy.  RESP: Lungs clear to auscultation bilaterally without rales, rhonchi or wheezes.  CARDIOVASCULAR: Regular rate and rhythm. Normal S1, S2; no S3 or S4. No murmur, gallop, or rub.  ABDOMEN: Soft, nontender. Bowel sounds normal. No palpable organomegaly or masses.  MUSCULOSKELETAL: Extremities without gross deformities noted. No edema of bilateral lower extremities.  SKIN: No suspicious lesions or rashes.  NEURO: Alert and oriented x 3. Cranial nerves " II-XII grossly intact.  PSYCHIATRIC: Mentation and affect appear normal.    Laboratory/Imaging Studies:  Transferred Records on 10/12/2017   Component Date Value Ref Range Status     Sodium 10/12/2017 143  135 - 146 mmol/L Final     Potassium 10/12/2017 3.9  3.5 - 5.0 mmol/L Final     Chloride 10/12/2017 110* 98 - 107 mmol/L Final     Carbon Dioxide 10/12/2017 25  22 - 31 mmol/L Final     Anion Gap 10/12/2017 8  5 - 18 mmol/L Final     Glucose 10/12/2017 91  70 - 125 mg/dL Final     Urea Nitrogen 10/12/2017 12  6 - 22 mg/dL Final     Creatinine 10/12/2017 0.83  0.70 - 1.30 mg/dL Final     Calcium 10/12/2017 9.1  8.5 - 10.5 mg/dL Final     GFR Estimate 10/12/2017 >60  >60 ml/min/1.73m2 Final     GFR Estimate If Black 10/12/2017 >60  >60 ml/min/1.73m2 Final   Transferred Records on 10/04/2017   Component Date Value Ref Range Status     WBC 10/04/2017 5.8  4.0 - 11.0 thou/uL Final     RBC Count 10/04/2017 2.97* 4.40 - 6.20 mill/uL Final     Hemoglobin 10/04/2017 7.9* 14.0 - 18.0 g/dL Final     Hematocrit 10/04/2017 24.4* 40.0 - 51.0 % Final     MCV 10/04/2017 82  80 - 100 fl Final     MCH 10/04/2017 26.6* 27.0 - 34.0 pg Final     MCHC 10/04/2017 32.4  32.0 - 36.0 g/dL Final     RDW 10/04/2017 16.9* 11.0 - 14.5 % Final     Platelet Count 10/04/2017 206  140 - 440 thou/uL Final     Differential 10/04/2017 See Scanned Document   Final        Recent Results (from the past 744 hour(s))   XR Chest 2 Views    Narrative    CHEST TWO VIEWS  9/25/2017  9:54 PM     COMPARISON: Frontal chest x-ray 5/17/2017    HISTORY: Weakness.    FINDINGS: A left subclavian central venous Port-A-Cath is again noted.  The cardiac silhouette, pulmonary vasculature, lungs and pleural  spaces are within normal limits.      Impression    IMPRESSION: Clear lungs.    MIRACLE VINCENT MD   CT Head w/o Contrast    Narrative    CT HEAD W/O CONTRAST  9/25/2017 11:14 PM      HISTORY: Altered mental status. Metastatic non-small cell lung cancer.    TECHNIQUE:  Routine noncontrast head CT. Radiation dose for this scan  was reduced using automated exposure control, adjustment of the mA  and/or kV according to patient size, or iterative reconstruction  technique.    COMPARISON: MRI 4/21/2017.    FINDINGS: Brain volume is within normal limits for age. There is a  pituitary mass as seen on the previous MRI. No other mass, mass effect  or intracranial hemorrhage. No evidence of acute infarct.  Periventricular low attenuation is consistent with chronic small  vessel ischemic disease. The visualized paranasal sinuses are clear.      Impression    IMPRESSION: No acute abnormality.    BREN SHANKAR MD   MR Brain w/o & w Contrast    Narrative    MRI BRAIN WITHOUT AND WITH CONTRAST  9/26/2017 2:19 PM    HISTORY:  Alerted mental status. Rule out brain metastasis.    TECHNIQUE:  Multiplanar, multisequence MRI of the brain without and  with 6 mL Gadavist.    COMPARISON: Head CT 9/25/2017. Brain MR 4/21/2017.    FINDINGS: Images are severely degraded by motion artifact. No definite  large intracranial mass is identified noting limitations. There is no  mass effect or midline shift. The ventricles are not enlarged out of  proportion to the cerebral sulci. Mild diffuse parenchymal volume  loss. Patchy deep and subcortical white matter T2 hyperintensities are  nonspecific.    Enhancing sellar mass extends superiorly into the suprasellar cistern  and likely abuts the optic chiasm. No definite other intracranial  enhancing mass is identified noting marked limitations given motion  artifact.    Polypoid mucosal thickening in the left maxillary sinus.       Impression    IMPRESSION:      1. Images are severely degraded by motion artifact.  2. Evaluation for metastatic disease is limited given the motion. No  definite large intracranial mass is identified, but smaller lesions as  well as possible leptomeningeal disease cannot be excluded.  3. Enhancing expansile mass in the sella extending  into the  suprasellar cistern. This may represent a pituitary macroadenoma,  although it is poorly characterized on this motion degraded study.  Metastatic lesion could also be considered.  4. Patchy white matter T2 hyperintense lesions. These are  indeterminate and could be due to chronic microvascular ischemic  disease; however, given the motion artifact, evaluation for associated  enhancement is limited, and these could potentially represent small  metastatic lesions. Recommend repeat imaging with sedation.    LUCAS RAMIREZ MD   MR Brain w/o & w Contrast    Narrative    MRI OF THE BRAIN WITHOUT AND WITH CONTRAST 9/27/2017 4:15 PM     COMPARISON: Brain MRI one day prior (limited by motion). Brain MRI  4/21/2017.    HISTORY: Altered mental status. Lung cancer, rule out metastasis.      TECHNIQUE: Axial diffusion-weighted with ADC map, axial T2-weighted  with fat saturation, axial T1-weighted, axial turboFLAIR and coronal  T1-weighted images of the brain were acquired without intravenous  contrast.  Following intravenous administration of gadolinium (6 mL  Gadavist), axial T1-weighted images of the brain were acquired.     FINDINGS: There is mild diffuse cerebral volume loss. There are a few  tiny scattered focal areas of abnormal T2 signal hyperintensity in the  cerebral white matter bilaterally that are consistent with sequela of  chronic small vessel ischemic disease.    The ventricles and basal cisterns are within normal limits in  configuration given the degree of cerebral volume loss. There is no  midline shift. There are no extra-axial fluid collections. There is no  evidence for stroke or acute intracranial hemorrhage. Again noted is  an irregularly-shaped, enhancing lesion within the sella extending to  the suprasellar cistern consistent with a pituitary adenoma. This  lesion measures 1.9 x 1.7 x 1.6 cm in the AP, transverse and  cephalocaudad dimensions respectively which is increased in size from  1.5 x  1.5 x 1.3 cm in the same dimensions on the comparison study.  This mass is again noted to abut and likely displace the optic chiasm.  There is no other abnormal contrast enhancement in the brain or its  coverings.    There is no sinusitis or mastoiditis.      Impression    IMPRESSION:   1. Interval increase in size of the enhancing mass within the sella  consistent with a pituitary adenoma; however, a metastatic lesion from  the patient's lung cancer cannot be completely excluded. No additional  enhancing intracranial lesions are identified.  2. Diffuse cerebral volume loss and cerebral white matter changes  consistent with chronic small vessel ischemic disease. No evidence for  acute intracranial pathology.    MIRACLE VINCENT MD       Assessment and plan:  (C34.90) Non-small cell lung cancer, unspecified laterality (H)  (primary encounter diagnosis)  (C79.51) Bony metastasis (H)  (C78.00) Malignant neoplasm metastatic to lung, unspecified laterality (H)  I would recommend stopping immunotherapy at this point. I'm going to arrange for staging workup to assess the state of his disease at this point. I will be seeing the patient again in the next week or so to discuss further management plan.    (D35.2) Pituitary macroadenoma (H)  Most recent MRI shows worsening adenoma. I would recommend patient to be reevaluated by endocrinology.    (G89.3) Cancer associated pain  Still having increasing pain. He will be seen today by palliative care for pain management.    (D63.8) Anemia, chronic disease  We will continue to monitor hemoglobin and offer him blood transfusion if required.    (R41.0) Delirium  Patient has been improving gradually.      The patient is ready to learn, no apparent learning barriers were identified.  Diagnosis and treatment plans were explained to the patient. The patient expressed understanding of the content. The patient asked appropriate questions. The patient questions were answered to his  satisfaction.    Chart documentation with Dragon Voice recognition Software. Although reviewed after completion, some words and grammatical errors may remain.

## 2017-10-19 PROBLEM — R53.81 DEBILITY: Status: ACTIVE | Noted: 2017-01-01

## 2017-10-19 PROBLEM — E86.1 HYPOVOLEMIA: Status: ACTIVE | Noted: 2017-01-01
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
## 2017-10-19 NOTE — TELEPHONE ENCOUNTER
"'s [facility] center staff, Meera, calling to get instructions from today's office visit.  \"Was he supposed to get lab drawn?  Is his prednisone supposed to be changed to 10mg daily?\"  Staff unsure of plan of care.  Routed to P [id number].  Please call [facility] back at [phone].    Rosalia Cornejo RN  [facility]    "

## 2017-10-19 NOTE — PROGRESS NOTES
Palliative Care Follow-up Note  Date:  October 19, 2017  Location: [facility]  Accompanied by: KARYNA/JOHN Smith      HPI:  57 year old y/o male with NSCLC of medial left upper lobe with metastasis to L lateral 5th rib, L scapula, cervical lymph nodes, and L hilar mediastinum most recently treated with Opdivo, followed by palliative care for pain and symptom management. Chemo often delayed due to anemia, and has required multiple transfusions.  Also has a pituitary adenoma followed by [facility].  He is currently residing at [facility] where he was admitted following an inadvertent opioid overdose; at last visit his pain was reported as being satisfactorially controlled, likely due in large part to residing in a controlled environment where nurses  administer medications on a schedule, unlike his personal practices.  Pain has been located in the buttocks and radiates down legs to knees.  He has admitted to medication noncompliance in the past when living independently.  He is being evaluated today also by Dr Naylor who ordered, last week, daily IV fluids and Prednisone in attempt to treat/counteract recent h/o AFTT, ie, weight loss, hypotension, weakness, anemia.  Last transfusion on 9/7--2u PRBCs for Hgb of 7.3. Since then, C1D1 of Opdivo on 9/14 and thereafter chemo on hold as patient has been at [facility] since shortly thereafter.       Allergies   Allergen Reactions     Lubriderm Rash         Current Outpatient Prescriptions on File Prior to Visit:  carbamide peroxide (DEBROX) 6.5 % otic solution 5-10 drops 2 times daily   PREDNISONE PO Take 10 mg by mouth daily    Nutritional Supplements (ENSURE ENLIVE PO) Take 1 Bottle by mouth 3 times daily   lidocaine HCl 3 % cream Apply topically 3 times daily   polyethylene glycol (MIRALAX/GLYCOLAX) powder Take 17 g by mouth 2 times daily Hold for loose stools   Ondansetron HCl (ZOFRAN PO) Take 4 mg by mouth every 6 hours as needed  for nausea or vomiting   Acetaminophen (TYLENOL PO) Take 1,000 mg by mouth 3 times daily   folic acid (FOLVITE) 1 MG tablet Take 1 tablet (1 mg) by mouth daily   tamsulosin (FLOMAX) 0.4 MG capsule Take 1 capsule (0.4 mg) by mouth daily   morphine (MS CONTIN) 30 MG 12 hr tablet Take 1 tablet (30 mg) by mouth 2 times daily   PARoxetine (PAXIL) 10 MG tablet Take 1 tablet (10 mg) by mouth At Bedtime (Needs follow-up appointment for this medication)   pantoprazole (PROTONIX) 40 MG EC tablet Take 1 tablet (40 mg) by mouth 2 times daily (before meals)   bisacodyl (DULCOLAX) 10 MG Suppository Place 1 suppository (10 mg) rectally daily as needed for constipation   nicotine (NICODERM CQ) 21 MG/24HR 24 hr patch Place 1 patch onto the skin every 24 hours     No current facility-administered medications on file prior to visit.       Subjective:  Chief Complaint   Patient presents with     Palliative     f/u eval of daily IV fluid administration and daily prednisone prescribed by Dr HENDERSON at last visit for AFTT, ie, goals of care   At [facility], he was recently found again to be severely constipated requiring enemas; his bowel regimen again adjusted.  Current BID Senokot regimen still results in often-hard stools.   He thinks the oxycodone is far more constipating than is the morphine.  MAR appears to indicate he takes between 0-3 doses daily of the oxycodone.  POA believes his hearing is still bad and wonders if his ear is still full of wax--though he has been given Debrox at facility. POA reports that the facility is still supporting his attempts at rehab, and she is optimistic that patient may eventually be able to return to his home (she had previously wondered if he might need long term care and, if so, had planned on moving him to a faciilty closer to her in [facility].)  She would like him to resume chemo if possible.     Objective:  /77  Pulse 94  Temp 99  F (37.2  C)  Resp 12  Wt 141 lb 8.6 oz (64.2 kg)  SpO2 94%   BMI 23.93 kg/m2  Wt Readings from Last 10 Encounters:   10/19/17 141 lb 8.6 oz (64.2 kg)   10/19/17 141 lb 9.6 oz (64.2 kg)   10/19/17 146 lb (66.2 kg)   10/13/17 137 lb (62.1 kg)   10/13/17 144 lb 2.9 oz (65.4 kg)   10/09/17 144 lb 3.2 oz (65.4 kg)   10/04/17 144 lb (65.3 kg)   10/02/17 144 lb (65.3 kg)   09/29/17 148 lb 6.4 oz (67.3 kg)   09/29/17 147 lb 7.8 oz (66.9 kg)   09/14/17 151 lb (68.5 kg)   09/07/17 157 lb (71.2 kg)   Awake, alert, in no apparent distress   Cognitively engaged in discussion  Pale  Normocephalic  Sclera anicteric  PERRL, nonmiotic  Bilateral TM's w/light reflex, minimal wax remains in left ear  OP pink, moist  CV RRR  Lungs clear  Abd w/hypoactive bowel sounds  Ext warm, without edema     Results for JOHN SANCHEZ (MRN [id number]) as of 10/26/2017 16:21   Ref. Range 10/19/2017 16:20   Sodium Latest Ref Range: 133 - 144 mmol/L 137   Potassium Latest Ref Range: 3.4 - 5.3 mmol/L 5.4 (H)   Chloride Latest Ref Range: 94 - 109 mmol/L 107   Carbon Dioxide Latest Ref Range: 20 - 32 mmol/L 27   Urea Nitrogen Latest Ref Range: 7 - 30 mg/dL 16   Creatinine Latest Ref Range: 0.66 - 1.25 mg/dL 0.76   GFR Estimate Latest Ref Range: >60 mL/min/1.7m2 >90   GFR Estimate If Black Latest Ref Range: >60 mL/min/1.7m2 >90   Calcium Latest Ref Range: 8.5 - 10.1 mg/dL 9.2   Anion Gap Latest Ref Range: 3 - 14 mmol/L 3   Magnesium Latest Ref Range: 1.6 - 2.3 mg/dL 2.0   Albumin Latest Ref Range: 3.4 - 5.0 g/dL 3.6   Protein Total Latest Ref Range: 6.8 - 8.8 g/dL 7.5   Bilirubin Total Latest Ref Range: 0.2 - 1.3 mg/dL 0.4   Alkaline Phosphatase Latest Ref Range: 40 - 150 U/L 108   ALT Latest Ref Range: 0 - 70 U/L 38   AST Latest Ref Range: 0 - 45 U/L 44   Ferritin Latest Ref Range: 26 - 388 ng/mL 1135 (H)   Glucose Latest Ref Range: 70 - 99 mg/dL 138 (H)   WBC Latest Ref Range: 4.0 - 11.0 10e9/L 8.2   Hemoglobin Latest Ref Range: 13.3 - 17.7 g/dL 9.5 (L)   Hematocrit Latest Ref Range: 40.0 - 53.0 % 29.1 (L)    Platelet Count Latest Ref Range: 150 - 450 10e9/L 322   RBC Count Latest Ref Range: 4.4 - 5.9 10e12/L 3.75 (L)   MCV Latest Ref Range: 78 - 100 fl 78   MCH Latest Ref Range: 26.5 - 33.0 pg 25.3 (L)   MCHC Latest Ref Range: 31.5 - 36.5 g/dL 32.6   RDW Latest Ref Range: 10.0 - 15.0 % 16.5 (H)   Diff Method Unknown Automated Method   % Neutrophils Latest Units: % 88.0   % Lymphocytes Latest Units: % 9.2   % Monocytes Latest Units: % 2.1   % Eosinophils Latest Units: % 0.0   % Basophils Latest Units: % 0.1   % Immature Granulocytes Latest Units: % 0.6   Absolute Neutrophil Latest Ref Range: 1.6 - 8.3 10e9/L 7.3   Absolute Lymphocytes Latest Ref Range: 0.8 - 5.3 10e9/L 0.8   Absolute Monocytes Latest Ref Range: 0.0 - 1.3 10e9/L 0.2   Absolute Eosinophils Latest Ref Range: 0.0 - 0.7 10e9/L 0.0   Absolute Basophils Latest Ref Range: 0.0 - 0.2 10e9/L 0.0   Abs Immature Granulocytes Latest Ref Range: 0 - 0.4 10e9/L 0.1     CT CHEST/ABDOMEN/PELVIS WITH CONTRAST 10/18/2017 9:02 AM      HISTORY:  Follow up. Malignant neoplasm of unspecified part of  unspecified bronchus or lung.     CONTRAST DOSE:  67 mL Isovue 370.     COMPARISON: 8/3/2017.     Radiation dose for this scan was reduced using automated exposure  control, adjustment of the mA and/or kV according to patient size, or  iterative reconstruction technique.     FINDINGS:  Irregular mass is present anteriorly within the left upper  lobe measuring approximately 1.6 x 2.2 cm, similar if not slightly  increased in size since 8/3/2017. Adjacent linear stranding is noted  which may be related to atelectasis, unchanged. 0.4 cm right upper  lobe pulmonary nodule on image 21 is unchanged. 0.2 cm nodule in the  right lower lobe on image 22 also appears unchanged. Calcified  granuloma is noted in the right lower lobe as well. No new pulmonary  nodules are demonstrated. Sclerotic lesions in the anterior right ribs  appear unchanged. Lytic lesion within the lateral left rib  also  appears essentially unchanged. Mediastinal adenopathy is again noted.  2.2 x 1.6 cm node is seen anterior to the aorticopulmonary window,  slightly increased from 2.0 x 1.4 cm in August. To the left of the  pulmonary outflow tract, there is a 2.5 cm node with hypodense or  necrotic center which is increased from 1.4 cm previously. Additional  smaller lymph nodes are noted which are similar in appearance to the  prior scan.     Abdomen/pelvis: The liver, spleen, kidneys, adrenal glands, pancreas,  and gallbladder appear within normal limits. Aortic and iliac artery  calcifications are noted. There is no evidence of bowel obstruction.  Trace peritoneal fluid is noted within the pelvis. Pelvic contents  otherwise appear within normal limits. No free peritoneal fluid or  air. No evidence of retroperitoneal adenopathy.         IMPRESSION:  1. Left upper lobe mass, similar if not slightly increased in size  since 8/3/2017.  2. Mediastinal adenopathy, with a couple of lymph nodes increased in  size since 8/3/2017.  3. Additional pulmonary nodules are noted, stable in appearance from  8/3/2017.  4. Rib lesions appear essentially unchanged from 8/3/2017.  5. Trace peritoneal fluid within the pelvis of indeterminate etiology  or significance. No other evidence of metastatic disease within the  abdomen or pelvis.     CHET DAVILA MD      Assessment/Plan:  1. Radicular pain possibly due to NSCLC, though he does have a h/o DJD and pains relating to his years of work as a .  Pain management has improved since he moved into a supervised setting where his meds are administered to him by nursing personnel.    2. Opioid-induced constipation   >DC oxycodone   >replace it with Lortab 7.5 mg q4h PRN, NOT to be given if pain is due to constipation.     Thank you for the opportunity to be of service to this patient and family.      1620 - 1646 face to face, 25 min, > 50% spent reviewing symptoms and goals.  .    Mickey Joaquin)  [facility]  Palliative Care  Private cell:  [phone]

## 2017-10-19 NOTE — PATIENT INSTRUCTIONS
You will need to have labs drawn today.  We will call you with the results.  Depending on your results, you may need to have a blood transfusion.  We will be starting you on epo, to be injected weekly based on lab results. We would like to see you back in clinic with Dr. Naylor in 1 month with labs prior. Copy of appointments, and after visit summary (AVS) given to patient.  If you have any questions during business hours (M-F 8 AM- 4PM), please call Jillian Renee RN, BSN, OCN Oncology Hematology /Breast Cancer Navigator at [facility] [phone].   For questions after business hours, or on holidays/weekends, please call our after hours Nurse Triage line [phone]. Thank you.

## 2017-10-19 NOTE — PROGRESS NOTES
[facility]    Chief Complaint   Patient presents with     Nursing Home Acute       HPI:    Wade Aecvedo is a 57 year old  (1960), who is being seen today for an episodic care visit at [facility].  HPI information obtained from: facility chart records, facility staff and patient report.    Mr Acevedo continues to be a complex case with tenuous status.  He continues to have delirium/encephalopathy and remains a poor historian.  He continues to endorse pain in legs and buttocks, he continues to endorse light headedness/dizziness with minimal activity, in one sentences he reports he wants to continue with treatment, next sentence he reports if we can not stop the pain he wants to die.  He endorses being constipated and RN confirms no BM for days.  RN confirms low PO intake.  Has no other complaints.  Did mention he did NOT want to go to [facility] as planned later today, but he did go.     Today's concern is:     Delirium  Malignant neoplasm metastatic to lung, unspecified laterality (H)  Bony metastasis (H)  Cancer associated pain  Hypovolemia  C. difficile colitis  Acute retention of urine  Anemia, chronic disease  Pituitary macroadenoma (H)  Fall, subsequent encounter  Debility    ALLERGIES: Lubriderm  Past Medical, Surgical, Family and Social History reviewed and updated in [employer].    Current Outpatient Prescriptions   Medication Sig Dispense Refill     sodium phosphate (FLEET ENEMA) 7-19 GM/118ML rectal enema Place 1 enema rectally See Admin Instructions Give every 3 days prn if no bowel mocement       polyethylene glycol (MIRALAX/GLYCOLAX) powder Take 17 g by mouth daily        carbamide peroxide (DEBROX) 6.5 % otic solution 5-10 drops 2 times daily       PREDNISONE PO Take 20 mg by mouth daily       Nutritional Supplements (ENSURE ENLIVE PO) Take 1 Bottle by mouth 3 times daily       lidocaine HCl 3 % cream Apply topically 3 times daily       HYDROcodone-acetaminophen (NORCO) 7.5-325 MG per tablet  Take 1 tablet by mouth every 4 hours as needed for moderate to severe pain (do NOT give if pain is due to constipation) 60 tablet 0     senna-docusate (SENOKOT-S;PERICOLACE) 8.6-50 MG per tablet One tab every morning and TWO tabs every night 100 tablet 11     Ondansetron HCl (ZOFRAN PO) Take 4 mg by mouth every 6 hours as needed for nausea or vomiting       Acetaminophen (TYLENOL PO) Take 1,000 mg by mouth 3 times daily       folic acid (FOLVITE) 1 MG tablet Take 1 tablet (1 mg) by mouth daily 30 tablet 0     tamsulosin (FLOMAX) 0.4 MG capsule Take 1 capsule (0.4 mg) by mouth daily 30 capsule 0     morphine (MS CONTIN) 30 MG 12 hr tablet Take 1 tablet (30 mg) by mouth 2 times daily 60 tablet 0     PARoxetine (PAXIL) 10 MG tablet Take 1 tablet (10 mg) by mouth At Bedtime (Needs follow-up appointment for this medication) 30 tablet 11     diclofenac (VOLTAREN) 1 % GEL topical gel Apply 4 grams to knees or 2 grams to hands four times daily using enclosed dosing card. 200 g 3     capsaicin (ZOSTRIX) 0.025 % CREA cream Apply to R hip and thigh every 2 hours as needed for pain. 120 g 3     pantoprazole (PROTONIX) 40 MG EC tablet Take 1 tablet (40 mg) by mouth 2 times daily (before meals) 60 tablet 6     bisacodyl (DULCOLAX) 10 MG Suppository Place 1 suppository (10 mg) rectally daily as needed for constipation 12 suppository 11     nicotine (NICODERM CQ) 21 MG/24HR 24 hr patch Place 1 patch onto the skin every 24 hours 30 patch 0     Medications reviewed:  Medications reconciled to facility chart and changes were made to reflect current medications as identified as above med list. Below are the changes that were made:   Medications stopped since last EPIC medication reconciliation:   There are no discontinued medications.    Medications started since last Psychiatric medication reconciliation:  No orders of the defined types were placed in this encounter.    REVIEW OF SYSTEMS:  10 point ROS of systems including Constitutional,  Eyes, Respiratory, Cardiovascular, Gastroenterology, Genitourinary, Integumentary, Muscularskeletal, Psychiatric were all negative except for pertinent positives noted in my HPI.  However, is poor historian/.      Physical Exam:  /82  Pulse 86  Temp 98.2  F (36.8  C)  Resp 18  Wt 146 lb (66.2 kg)  SpO2 96%  BMI 24.68 kg/m2     GENERAL APPEARANCE:  Well developed, thin/cachetic, older male resting in bed, NAD, non-toxic.  ENT:  Mouth and oropharynx normal, mostly dry mucous membranes.   RESP:  Lungs CTA.  Regular relaxed breathing effort.  No cough.   CV: S1/S2 no murmur or rubs.  Regular rhythm and rate.  No generalized edema.   ABDOMEN:   Flat, soft, non-tender with active bowel sounds.  No guarding, rigidity, or rebound tenderness.  EXTREMITIES:  No lower extremity edema, no calf tenderness.   PSYCH: Alert to self, place, date today, not situation.  CAM+, but more alert than admission.   WOUNDS: Scabbed superficial scrap on his nose, no s/s of infection.  Several scabbed superficial scrapes on his Left hand, no s/s of infection.    SKIN: Right chest IVAD accessed, transparent dressing in place, IV running, no s/s of infection.     Recent Labs:    Results for orders placed or performed during the hospital encounter of 10/18/17   CT Chest/Abdomen/Pelvis w Contrast    Narrative    CT CHEST/ABDOMEN/PELVIS WITH CONTRAST 10/18/2017 9:02 AM     HISTORY:  Follow up. Malignant neoplasm of unspecified part of  unspecified bronchus or lung.    CONTRAST DOSE:  67 mL Isovue 370.    COMPARISON: 8/3/2017.    Radiation dose for this scan was reduced using automated exposure  control, adjustment of the mA and/or kV according to patient size, or  iterative reconstruction technique.    FINDINGS:  Irregular mass is present anteriorly within the left upper  lobe measuring approximately 1.6 x 2.2 cm, similar if not slightly  increased in size since 8/3/2017. Adjacent linear stranding is noted  which may be related  to atelectasis, unchanged. 0.4 cm right upper  lobe pulmonary nodule on image 21 is unchanged. 0.2 cm nodule in the  right lower lobe on image 22 also appears unchanged. Calcified  granuloma is noted in the right lower lobe as well. No new pulmonary  nodules are demonstrated. Sclerotic lesions in the anterior right ribs  appear unchanged. Lytic lesion within the lateral left rib also  appears essentially unchanged. Mediastinal adenopathy is again noted.  2.2 x 1.6 cm node is seen anterior to the aorticopulmonary window,  slightly increased from 2.0 x 1.4 cm in August. To the left of the  pulmonary outflow tract, there is a 2.5 cm node with hypodense or  necrotic center which is increased from 1.4 cm previously. Additional  smaller lymph nodes are noted which are similar in appearance to the  prior scan.    Abdomen/pelvis: The liver, spleen, kidneys, adrenal glands, pancreas,  and gallbladder appear within normal limits. Aortic and iliac artery  calcifications are noted. There is no evidence of bowel obstruction.  Trace peritoneal fluid is noted within the pelvis. Pelvic contents  otherwise appear within normal limits. No free peritoneal fluid or  air. No evidence of retroperitoneal adenopathy.      Impression    IMPRESSION:  1. Left upper lobe mass, similar if not slightly increased in size  since 8/3/2017.  2. Mediastinal adenopathy, with a couple of lymph nodes increased in  size since 8/3/2017.  3. Additional pulmonary nodules are noted, stable in appearance from  8/3/2017.  4. Rib lesions appear essentially unchanged from 8/3/2017.  5. Trace peritoneal fluid within the pelvis of indeterminate etiology  or significance. No other evidence of metastatic disease within the  abdomen or pelvis.    CHET DAVILA MD       Assessment/Plan:  Delirium, intermittent/ongoing, likely secondary to ongoing need for opiates  Malignant neoplasm metastatic to lung, unspecified laterality (H), on chemotherapy  Bony metastasis (H)  to Left scapula and 5th L rib area  Cancer associated pain  Continues to endorse ongoing pain in same areas, L>R LE's and buttocks area.  We have weaned down the opiates to where is delirium is improved, but not resolved.  He continues to endorse pain, but reports at times its better than before.  I am reluctant to lower any more at this point as he really is palliative in nature.  He was seen by [facility] a few days ago whom started the daily IV infusions which is appropriate.  They also started Prednisone 20 mg's daily for now.  He is scheduled to return today 10/19.  I do note he was followed by Palliative as outpatient, and I have spoken with his POA/Sarah about hospice, but she reports she wants to talk with [facility] before deciding that.      My biggest concern is that we continue with daily IVF's but PO intake remains poor and he continues to have soft SBP's and is light headed/dizzy with minimal activity.  Continues to have pain issues, thus has been getting no PT/OT and he likely is not a good chemotherapy candidate.      Hypovolemia ongoing, etiology unclear, but poor po intake contributing  As noted above, [facility] started 1 L NS daily via his IVAD, which is appropriate right now.  Will f/u with [facility] rec's but if still light headed/dizzy we may need to increase the volume.     C. difficile colitis, likely resolved   Finished Flagyl, no more diarrhea, actually constipation now.   -Started BM regimen.     Acute retention of urine, unclear etiology/chroniticy, query if secondary to opiates   Continues to have small degree of urine retention, suspect secondary to opiates.  He has been refusing bladder scans and straight cath's but has been voiding large amounts, asymptomatic, thus we have just been clinically monitoring.    -Continue to monitor as this may worsen if we increase IVF's or opiates.     Anemia, chronic disease  Likely secondary to prior chemo, last Hgb showed still trending down, was 7.7 on 10/16, plan is to  keep >7.0.   -Next Hgb check 10/23.     Pituitary macroadenoma (H)  This is followed by [facility] has f/u planned 11/14.  I query if this is not contributing to his delirium but past notes indicate this has been stable.     Fall, subsequent encounter  Debility  As noted above, not able to participate with PT/OT currently.  Did fall a number of days ago and suffered superficial abrasions to nose and Left hand.      Orders:  1. Change miralax 17 g po to qd scheduled and hold for loose stools Dx constipation  2. Fleet enema MD prn every 3 days w/o bowel movement/HARMEET constipation  3. Check Hgb and BMP on 10/23/17 Dx anemia and SALLY  4. Please check glucose finger stick q day in am x 7 days Dx hyperglycemia  5. Follow up with [facility] rec's from later appointment today 10/19.   6. Continue with daily IVF 1 L infusions.     Electronically signed by  SALINAS Ledezma CNP

## 2017-10-19 NOTE — NURSING NOTE
"Oncology Rooming Note    October 19, 2017 3:47 PM   Wade Acevedo is a 57 year old male who presents for:    Chief Complaint   Patient presents with     Oncology Clinic Visit     1 week recheck NSCLC, review CT scan     Initial Vitals: /77 (BP Location: Right arm, Patient Position: Sitting, Cuff Size: Adult Regular)  Pulse 94  Temp 99  F (37.2  C) (Tympanic)  Resp (!) 93  Ht 1.638 m (5' 4.49\")  Wt 64.2 kg (141 lb 9.6 oz)  SpO2 (!) 12%  BMI 23.94 kg/m2 Estimated body mass index is 23.94 kg/(m^2) as calculated from the following:    Height as of this encounter: 1.638 m (5' 4.49\").    Weight as of this encounter: 64.2 kg (141 lb 9.6 oz). Body surface area is 1.71 meters squared.  Moderate Pain (5) Comment: Butt & Legs    No LMP for male patient.  Allergies reviewed: Yes   Medications reviewed: Yes    Medications: Medication refills not needed today.  Pharmacy name entered into [facility]: [facility] - [place], [place] - [address] AT [facility] OF [address] [place] [address]    Clinical concerns:  1 week recheck NSCLC, review CT scan.   1. CONSTIPATED, SUPPOSITORIES HELP.   2.. FELL LAST Thursday.     10 minutes for nursing intake (face to face time)     Reena Leon CMA              "

## 2017-10-19 NOTE — MR AVS SNAPSHOT
After Visit Summary   10/19/2017    Wade Acevedo    MRN: [id number]           Patient Information     Date Of Birth          1960        Visit Information        Provider Department      10/19/2017 3:30 PM Pasquale Naylor MD [facility]      Today's Diagnoses     Anemia, unspecified type    -  1      Care Instructions    You will need to have labs drawn today.  We will call you with the results.  Depending on your results, you may need to have a blood transfusion.  We will be starting you on epo, to be injected weekly based on lab results. We would like to see you back in clinic with Dr. Naylor in 1 month with labs prior. Copy of appointments, and after visit summary (AVS) given to patient.  If you have any questions during business hours (M-F 8 AM- 4PM), please call Jillian Renee RN, BSN, OCN Oncology Hematology /Breast Cancer Navigator at [facility] [phone].   For questions after business hours, or on holidays/weekends, please call our after hours Nurse Triage line [phone]. Thank you.            Follow-ups after your visit        Your next 10 appointments already scheduled     Oct 20, 2017 10:30 AM CDT   Level 4 with ROOM 1 [facility] ([facility])    n Medical Ctr [facility]  [address]  [facility] [address]   [phone]            Nov 14, 2017 11:30 AM CST   (Arrive by 11:15 AM)   RETURN ENDOCRINE with Padma Medley MD   [facility] Endocrinology ([facility])    [address] [place] [address]  3rd [facility] [address] [phone]              Who to contact     If you have questions or need follow up information about today's clinic visit or your schedule please contact [facility] directly at [phone].  Normal or non-critical lab and imaging results will be communicated to you by MyChart, letter or phone within 4  "business days after the clinic has received the results. If you do not hear from us within 7 days, please contact the clinic through [employer] or phone. If you have a critical or abnormal lab result, we will notify you by phone as soon as possible.  Submit refill requests through [employer] or call your pharmacy and they will forward the refill request to us. Please allow 3 business days for your refill to be completed.          Additional Information About Your Visit        [employer] Information     [employer] lets you send messages to your doctor, view your test results, renew your prescriptions, schedule appointments and more. To sign up, go to [email or web address] . Click on \"Log in\" on the left side of the screen, which will take you to the Welcome page. Then click on \"Sign up Now\" on the right side of the page.     You will be asked to enter the access code listed below, as well as some personal information. Please follow the directions to create your username and password.     Your access code is: [email or web address]  Expires: 2017  9:54 AM     Your access code will  in 90 days. If you need help or a new code, please call your [place] clinic or [phone].        Care EveryWhere ID     This is your Care EveryWhere ID. This could be used by other organizations to access your [place] medical records  [id number]        Your Vitals Were     Pulse Temperature Respirations Height Pulse Oximetry BMI (Body Mass Index)    94 99  F (37.2  C) (Tympanic) 93 1.638 m (5' 4.49\") 12% 23.94 kg/m2       Blood Pressure from Last 3 Encounters:   10/19/17 109/77   10/19/17 109/77   10/19/17 122/82    Weight from Last 3 Encounters:   10/19/17 64.2 kg (141 lb 8.6 oz)   10/19/17 64.2 kg (141 lb 9.6 oz)   10/19/17 66.2 kg (146 lb)              We Performed the Following     Ferritin        Primary Care Provider Office Phone # Fax #    Pawan Hampton [phone] [address] [facility] [address]      "   Equal Access to Services     [facility] : Hadii jaxon laurent moustapha Molina, [place] luqadaha, qaybta kadenigena kuhn. So [facility] [phone].    ATENCIÓN: Si habla jennifer, tiene a burns disposición servicios gratuitos de asistencia lingüística. Iraisame al [phone].    We comply with applicable federal civil rights laws and [place] laws. We do not discriminate on the basis of race, color, national origin, age, disability, sex, sexual orientation, or gender identity.            Thank you!     Thank you for choosing [facility]  for your care. Our goal is always to provide you with excellent care. Hearing back from our patients is one way we can continue to improve our services. Please take a few minutes to complete the written survey that you may receive in the mail after your visit with us. Thank you!             Your Updated Medication List - Protect others around you: Learn how to safely use, store and throw away your medicines at www.disposemymeds.org.          This list is accurate as of: 10/19/17  4:39 PM.  Always use your most recent med list.                   Brand Name Dispense Instructions for use Diagnosis    bisacodyl 10 MG Suppository    DULCOLAX    12 suppository    Place 1 suppository (10 mg) rectally daily as needed for constipation    Drug-induced constipation       capsaicin 0.025 % Crea cream    ZOSTRIX    120 g    Apply to R hip and thigh every 2 hours as needed for pain.    Multiple joint pain, Cancer associated pain       DEBROX 6.5 % otic solution   Generic drug:  carbamide peroxide      5-10 drops 2 times daily    Anemia, unspecified type       diclofenac 1 % Gel topical gel    VOLTAREN    200 g    Apply 4 grams to knees or 2 grams to hands four times daily using enclosed dosing card.    Multiple joint pain       ENSURE ENLIVE PO      Take 1 Bottle by mouth 3 times daily    Anemia, unspecified type       folic acid 1 MG tablet    FOLVITE     30 tablet    Take 1 tablet (1 mg) by mouth daily        lidocaine HCl 3 % cream      Apply topically 3 times daily    Anemia, unspecified type       morphine 30 MG 12 hr tablet    MS CONTIN    60 tablet    Take 1 tablet (30 mg) by mouth 2 times daily    Non-small cell lung cancer (NSCLC) (H)       nicotine 21 MG/24HR 24 hr patch    NICODERM CQ    30 patch    Place 1 patch onto the skin every 24 hours    Tobacco abuse       oxyCODONE 10 MG IR tablet    ROXICODONE    60 tablet    Take 1 tablet (10 mg) by mouth every 4 hours as needed for moderate to severe pain    Non-small cell lung cancer (NSCLC) (H), Cancer associated pain       pantoprazole 40 MG EC tablet    PROTONIX    60 tablet    Take 1 tablet (40 mg) by mouth 2 times daily (before meals)    Non-small cell lung cancer (NSCLC) (H)       PARoxetine 10 MG tablet    PAXIL    30 tablet    Take 1 tablet (10 mg) by mouth At Bedtime (Needs follow-up appointment for this medication)    Major depressive disorder, recurrent episode, moderate (H)       polyethylene glycol powder    MIRALAX/GLYCOLAX     Take 17 g by mouth daily        PREDNISONE PO      Take 20 mg by mouth daily    Anemia, unspecified type       senna-docusate 8.6-50 MG per tablet    SENOKOT-S;PERICOLACE     Take 1 tablet by mouth 2 times daily        sodium phosphate 7-19 GM/118ML rectal enema      Place 1 enema rectally See Admin Instructions Give every 3 days prn if no bowel mocement        tamsulosin 0.4 MG capsule    FLOMAX    30 capsule    Take 1 capsule (0.4 mg) by mouth daily    Urinary retention with incomplete bladder emptying       TYLENOL PO      Take 1,000 mg by mouth 3 times daily        ZOFRAN PO      Take 4 mg by mouth every 6 hours as needed for nausea or vomiting

## 2017-10-19 NOTE — TELEPHONE ENCOUNTER
"'s [facility] center staff, Meera, calling to get instructions from today's office visit.  \"Was he supposed to get labs drawn?  Is his prednisone supposed to be changed to 10mg daily?\"  Staff unsure of plan of care.  Routed to P 59046.  Please call [facility] back at [phone].    Rosalia Cornejo RN  [facility]    "

## 2017-10-19 NOTE — MR AVS SNAPSHOT
After Visit Summary   10/19/2017    Wade Acevedo    MRN: [id number]           Patient Information     Date Of Birth          1960        Visit Information        Provider Department      10/19/2017 4:00 PM Hailey Bernard APRN CNP [facility]        Today's Diagnoses     Non-small cell lung cancer, unspecified laterality (H)    -  1    Cancer associated pain        Constipation due to pain medication           Follow-ups after your visit        Your next 10 appointments already scheduled     Nov 02, 2017  2:00 PM CDT   Level O with ROOM 10 [facility] ([facility])    [facility]  [address]  [facility] [place] [place] [address]   [phone]            Nov 09, 2017  1:00 PM CST   Level O with ROOM 4 [facility] ([facility])    [facility]  [address]  [facility] [place] [place] [address]   [phone]            Nov 14, 2017 11:30 AM CST   (Arrive by 11:15 AM)   RETURN ENDOCRINE with Padma Medley MD   [facility] Endocrinology ([facility] [place])    [address] [place] [address] [phone]            Nov 16, 2017  1:30 PM CST   Level O with ROOM 7 [facility] ([facility])    [facility]  [address]  [facility] [place] [place] [address]   [phone]            Nov 16, 2017  2:30 PM CST   Return Visit with Pasquale Naylor MD   [facility] ([facility])    [facility]  [address]  [facility] [place] [place] [address]   [phone]            Nov 16, 2017  3:00 PM CST   Return Visit with SALINAS Raphael CNP   [facility]    [address] [facility] [address]   [phone]            Nov 24, 2017  1:00 PM CST   Level O with ROOM 10 [facility] ([facility])     "[facility]  [address]  [facility] [place] [address]   [phone]            Dec 01, 2017  1:00 PM CST   Level O with ROOM 9 [facility] ([facility])    [facility]  [address]  [facility] [place] [address]   [phone]              Who to contact     If you have questions or need follow up information about today's clinic visit or your schedule please contact [facility] directly at [phone].  Normal or non-critical lab and imaging results will be communicated to you by Fl[employer] Codehart, letter or phone within 4 business days after the clinic has received the results. If you do not hear from us within 7 days, please contact the clinic through [employer] (TMG)t or phone. If you have a critical or abnormal lab result, we will notify you by phone as soon as possible.  Submit refill requests through [employer] or call your pharmacy and they will forward the refill request to us. Please allow 3 business days for your refill to be completed.          Additional Information About Your Visit        MyChart Information     [employer] lets you send messages to your doctor, view your test results, renew your prescriptions, schedule appointments and more. To sign up, go to [email or web address] . Click on \"Log in\" on the left side of the screen, which will take you to the Welcome page. Then click on \"Sign up Now\" on the right side of the page.     You will be asked to enter the access code listed below, as well as some personal information. Please follow the directions to create your username and password.     Your access code is: [email or web address]  Expires: 2017  9:54 AM     Your access code will  in 90 days. If you need help or a new code, please call your [facility] or [phone].        Care EveryWhere ID     This is your Care EveryWhere ID. This could be used by other organizations to access your [place] medical " records  [id number]        Your Vitals Were     Pulse Temperature Respirations Pulse Oximetry BMI (Body Mass Index)       94 99  F (37.2  C) 12 94% 23.93 kg/m2        Blood Pressure from Last 3 Encounters:   10/26/17 134/89   10/26/17 (!) 136/92   10/24/17 96/65    Weight from Last 3 Encounters:   10/26/17 137 lb (62.1 kg)   10/24/17 137 lb (62.1 kg)   10/23/17 146 lb 12.8 oz (66.6 kg)              Today, you had the following     No orders found for display         Today's Medication Changes          These changes are accurate as of: 10/19/17 11:59 PM.  If you have any questions, ask your nurse or doctor.               These medicines have changed or have updated prescriptions.        Dose/Directions    senna-docusate 8.6-50 MG per tablet   Commonly known as:  SENOKOT-S;PERICOLACE   This may have changed:    - how much to take  - how to take this  - additional instructions   Changed by:  Hailey Bernard APRN CNP        Dose:  2 tablet   2 tablets 2 times daily Hold for loose stools   Quantity:  100 tablet   Refills:  11         Stop taking these medicines if you haven't already. Please contact your care team if you have questions.     oxyCODONE 10 MG IR tablet   Commonly known as:  ROXICODONE   Stopped by:  Hailey Bernard APRN CNP                    Primary Care Provider Office Phone # Fax #    Pawan Cristian Hampton [phone] [address] [facility] [address]        Equal Access to Services     [facility]: Hadii aad ku hadasho Soomaali, waaxda luqadaha, qaybta kaalmada adeegyada, waxay lianna encinas adejered romero . So [facility] [phone].    ATENCIÓN: Si habla español, tiene a burns disposición servicios gratuitos de asistencia lingüística. Llame al [phone].    We comply with applicable federal civil rights laws and [place] laws. We do not discriminate on the basis of race, color, national origin, age, disability, sex, sexual orientation, or gender identity.             Thank you!     Thank you for choosing [facility]ER [facility]  for your care. Our goal is always to provide you with excellent care. Hearing back from our patients is one way we can continue to improve our services. Please take a few minutes to complete the written survey that you may receive in the mail after your visit with us. Thank you!             Your Updated Medication List - Protect others around you: Learn how to safely use, store and throw away your medicines at www.disposemymeds.org.          This list is accurate as of: 10/19/17 11:59 PM.  Always use your most recent med list.                   Brand Name Dispense Instructions for use Diagnosis    bisacodyl 10 MG Suppository    DULCOLAX    12 suppository    Place 1 suppository (10 mg) rectally daily as needed for constipation    Drug-induced constipation       DEBROX 6.5 % otic solution   Generic drug:  carbamide peroxide      5-10 drops 2 times daily    Anemia, unspecified type       ENSURE ENLIVE PO      Take 1 Bottle by mouth 3 times daily    Anemia, unspecified type       folic acid 1 MG tablet    FOLVITE    30 tablet    Take 1 tablet (1 mg) by mouth daily        HYDROcodone-acetaminophen 7.5-325 MG per tablet    NORCO    60 tablet    Take 1 tablet by mouth every 4 hours as needed for moderate to severe pain (do NOT give if pain is due to constipation)    Cancer associated pain       lidocaine HCl 3 % cream      Apply topically 3 times daily    Anemia, unspecified type       morphine 30 MG 12 hr tablet    MS CONTIN    60 tablet    Take 1 tablet (30 mg) by mouth 2 times daily    Non-small cell lung cancer (NSCLC) (H)       nicotine 21 MG/24HR 24 hr patch    NICODERM CQ    30 patch    Place 1 patch onto the skin every 24 hours    Tobacco abuse       pantoprazole 40 MG EC tablet    PROTONIX    60 tablet    Take 1 tablet (40 mg) by mouth 2 times daily (before meals)    Non-small cell lung cancer (NSCLC) (H)       PARoxetine 10 MG tablet    PAXIL    30  tablet    Take 1 tablet (10 mg) by mouth At Bedtime (Needs follow-up appointment for this medication)    Major depressive disorder, recurrent episode, moderate (H)       polyethylene glycol powder    MIRALAX/GLYCOLAX     Take 17 g by mouth 2 times daily Hold for loose stools        PREDNISONE PO      Take 10 mg by mouth daily    Anemia, unspecified type       senna-docusate 8.6-50 MG per tablet    SENOKOT-S;PERICOLACE    100 tablet    2 tablets 2 times daily Hold for loose stools        sodium phosphate 7-19 GM/118ML rectal enema      Place 1 enema rectally See Admin Instructions Give every 3 days prn if no bowel mocement        tamsulosin 0.4 MG capsule    FLOMAX    30 capsule    Take 1 capsule (0.4 mg) by mouth daily    Urinary retention with incomplete bladder emptying       TYLENOL PO      Take 1,000 mg by mouth 3 times daily        ZOFRAN PO      Take 4 mg by mouth every 6 hours as needed for nausea or vomiting

## 2017-10-20 NOTE — PROGRESS NOTES
Hematology/ Oncology Follow-up Visit:  Oct 19, 2017    Reason for Visit:   Chief Complaint   Patient presents with     Oncology Clinic Visit     1 week recheck NSCLC, review CT scan       Oncologic History:  Non-small cell lung cancer (NSCLC) (H)  The patient presented with 2-3 months history of weakness and fainting episodes. He was seen in the emergency room further workup was done including a CT scan which showed a 3 cm mass in the medial left upper lobe. There was adjacent atelectasis at the prominent mediastinal lymph nodes. There is a destructive bone lesion involving left lateral fifth rib. Patient underwent transthoracic CT-guided biopsy in the hospital. The pathology report came back consistent with mixed tumor including adenocarcinoma and squamous histology. 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 He had a normal TSH, but he had low T3 and T4. He had a serum cortisol morning level 4.4. He underwent a cosyntropin stim test which was mildly abnormal. He had a cortisol level 14.8 at 30 minutes and 19.6 at 60 minutes. His 24-hour cortisol in his urine was normal. He was seen by endocrinology we will continue to monitor him in 6 months.   PET scan showed hypermetabolic bilateral level Ib and right level III lymph nodes in the neck suspicious for metastatic disease. There was also hypermetabolic activity seen in the sella consistent with pituitary macroadenoma. There is hypermetabolic mediastinal left hilar lymphadenopathy.  There is metastatic disease seen in the left scapula and left fifth rib. There is left pleural effusion and small right pleural effusion seen as well.  ALK 4 came back negative. EGFR was negative for mutation. . PDL 1 is less than 1%.       Interval History:  Patient is here today to review most recent imaging studies. Still having pain. He cannot locate painful areas. Is currently on MS Contin 30 mg twice daily and oxycodone  for breakthrough pain. His appetite has improved since he started on prednisone last week. His confusion also has been improving.    Review Of Systems:  Constitutional: Negative for fever, chills, and night sweats. Fatigue,. Periods of confusion  Skin: negative.  Eyes: negative.  Ears/Nose/Throat: negative.  Respiratory: No shortness of breath, dyspnea on exertion, cough, or hemoptysis.  Cardiovascular: negative.  Gastrointestinal: His appetite has improved, constipation  Genitourinary: negative.  Musculoskeletal: He is still having increasing pain, generalized. He describes his pain today as for a 10  Neurologic: negative.  Psychiatric: negative.  Hematologic/Lymphatic/Immunologic: negative.  Endocrine: negative.    All other ROS negative unless mentioned in interval history.    Past medical, social, surgical, and family histories reviewed.    Allergies:  Allergies as of 10/19/2017 - Kvng as Reviewed 10/19/2017   Allergen Reaction Noted     Lubriderm Rash 04/20/2017       Current Medications:  Current Outpatient Prescriptions   Medication Sig Dispense Refill     sodium phosphate (FLEET ENEMA) 7-19 GM/118ML rectal enema Place 1 enema rectally See Admin Instructions Give every 3 days prn if no bowel mocement       carbamide peroxide (DEBROX) 6.5 % otic solution 5-10 drops 2 times daily       PREDNISONE PO Take 20 mg by mouth daily       Nutritional Supplements (ENSURE ENLIVE PO) Take 1 Bottle by mouth 3 times daily       lidocaine HCl 3 % cream Apply topically 3 times daily       polyethylene glycol (MIRALAX/GLYCOLAX) powder Take 17 g by mouth daily        Ondansetron HCl (ZOFRAN PO) Take 4 mg by mouth every 6 hours as needed for nausea or vomiting       Acetaminophen (TYLENOL PO) Take 1,000 mg by mouth 3 times daily       folic acid (FOLVITE) 1 MG tablet Take 1 tablet (1 mg) by mouth daily 30 tablet 0     tamsulosin (FLOMAX) 0.4 MG capsule Take 1 capsule (0.4 mg) by mouth daily 30 capsule 0     morphine (MS CONTIN)  "30 MG 12 hr tablet Take 1 tablet (30 mg) by mouth 2 times daily 60 tablet 0     PARoxetine (PAXIL) 10 MG tablet Take 1 tablet (10 mg) by mouth At Bedtime (Needs follow-up appointment for this medication) 30 tablet 11     diclofenac (VOLTAREN) 1 % GEL topical gel Apply 4 grams to knees or 2 grams to hands four times daily using enclosed dosing card. 200 g 3     capsaicin (ZOSTRIX) 0.025 % CREA cream Apply to R hip and thigh every 2 hours as needed for pain. 120 g 3     pantoprazole (PROTONIX) 40 MG EC tablet Take 1 tablet (40 mg) by mouth 2 times daily (before meals) 60 tablet 6     bisacodyl (DULCOLAX) 10 MG Suppository Place 1 suppository (10 mg) rectally daily as needed for constipation 12 suppository 11     nicotine (NICODERM CQ) 21 MG/24HR 24 hr patch Place 1 patch onto the skin every 24 hours 30 patch 0     HYDROcodone-acetaminophen (NORCO) 7.5-325 MG per tablet Take 1 tablet by mouth every 4 hours as needed for moderate to severe pain (do NOT give if pain is due to constipation) 60 tablet 0     senna-docusate (SENOKOT-S;PERICOLACE) 8.6-50 MG per tablet One tab every morning and TWO tabs every night 100 tablet 11        Physical Exam:  /77 (BP Location: Right arm, Patient Position: Sitting, Cuff Size: Adult Regular)  Pulse 94  Temp 99  F (37.2  C) (Tympanic)  Resp (!) 93  Ht 1.638 m (5' 4.49\")  Wt 64.2 kg (141 lb 9.6 oz)  SpO2 (!) 12%  BMI 23.94 kg/m2  Wt Readings from Last 12 Encounters:   10/19/17 64.2 kg (141 lb 8.6 oz)   10/19/17 64.2 kg (141 lb 9.6 oz)   10/19/17 66.2 kg (146 lb)   10/13/17 62.1 kg (137 lb)   10/13/17 65.4 kg (144 lb 2.9 oz)   10/09/17 65.4 kg (144 lb 3.2 oz)   10/04/17 65.3 kg (144 lb)   10/02/17 65.3 kg (144 lb)   09/29/17 67.3 kg (148 lb 6.4 oz)   09/29/17 66.9 kg (147 lb 7.8 oz)   09/14/17 68.5 kg (151 lb)   09/07/17 71.2 kg (157 lb)     ECOG performance status: 1  GENERAL APPEARANCE: Healthy, alert and in no acute distress.  HEENT: Sclerae anicteric. PERRLA. Oropharynx " without ulcers, lesions, or thrush.  NECK: Supple. No asymmetry or masses.  LYMPHATICS: No palpable cervical, supraclavicular, axillary, or inguinal lymphadenopathy.  RESP: Lungs clear to auscultation bilaterally without rales, rhonchi or wheezes.  CARDIOVASCULAR: Regular rate and rhythm. Normal S1, S2; no S3 or S4. No murmur, gallop, or rub.  ABDOMEN: Soft, nontender. Bowel sounds normal. No palpable organomegaly or masses.  MUSCULOSKELETAL: Extremities without gross deformities noted. No edema of bilateral lower extremities.  SKIN: No suspicious lesions or rashes.  NEURO: Alert and oriented x 3. Cranial nerves II-XII grossly intact.  PSYCHIATRIC: Mentation and affect appear normal.    Laboratory/Imaging Studies:  Transferred Records on 10/16/2017   Component Date Value Ref Range Status     Hemoglobin 10/16/2017 7.7* 14.0 - 18.0 g/dL Final   Transferred Records on 10/12/2017   Component Date Value Ref Range Status     Sodium 10/12/2017 143  135 - 146 mmol/L Final     Potassium 10/12/2017 3.9  3.5 - 5.0 mmol/L Final     Chloride 10/12/2017 110* 98 - 107 mmol/L Final     Carbon Dioxide 10/12/2017 25  22 - 31 mmol/L Final     Anion Gap 10/12/2017 8  5 - 18 mmol/L Final     Glucose 10/12/2017 91  70 - 125 mg/dL Final     Urea Nitrogen 10/12/2017 12  6 - 22 mg/dL Final     Creatinine 10/12/2017 0.83  0.70 - 1.30 mg/dL Final     Calcium 10/12/2017 9.1  8.5 - 10.5 mg/dL Final     GFR Estimate 10/12/2017 >60  >60 ml/min/1.73m2 Final     GFR Estimate If Black 10/12/2017 >60  >60 ml/min/1.73m2 Final   Transferred Records on 10/09/2017   Component Date Value Ref Range Status     Hemoglobin 10/09/2017 8.2* 14.0 - 18.0 g/dL Final        Recent Results (from the past 744 hour(s))   XR Chest 2 Views    Narrative    CHEST TWO VIEWS  9/25/2017  9:54 PM     COMPARISON: Frontal chest x-ray 5/17/2017    HISTORY: Weakness.    FINDINGS: A left subclavian central venous Port-A-Cath is again noted.  The cardiac silhouette, pulmonary  vasculature, lungs and pleural  spaces are within normal limits.      Impression    IMPRESSION: Clear lungs.    MIRACLE VINCENT MD   CT Head w/o Contrast    Narrative    CT HEAD W/O CONTRAST  9/25/2017 11:14 PM      HISTORY: Altered mental status. Metastatic non-small cell lung cancer.    TECHNIQUE: Routine noncontrast head CT. Radiation dose for this scan  was reduced using automated exposure control, adjustment of the mA  and/or kV according to patient size, or iterative reconstruction  technique.    COMPARISON: MRI 4/21/2017.    FINDINGS: Brain volume is within normal limits for age. There is a  pituitary mass as seen on the previous MRI. No other mass, mass effect  or intracranial hemorrhage. No evidence of acute infarct.  Periventricular low attenuation is consistent with chronic small  vessel ischemic disease. The visualized paranasal sinuses are clear.      Impression    IMPRESSION: No acute abnormality.    BREN SHANKAR MD   MR Brain w/o & w Contrast    Narrative    MRI BRAIN WITHOUT AND WITH CONTRAST  9/26/2017 2:19 PM    HISTORY:  Alerted mental status. Rule out brain metastasis.    TECHNIQUE:  Multiplanar, multisequence MRI of the brain without and  with 6 mL Gadavist.    COMPARISON: Head CT 9/25/2017. Brain MR 4/21/2017.    FINDINGS: Images are severely degraded by motion artifact. No definite  large intracranial mass is identified noting limitations. There is no  mass effect or midline shift. The ventricles are not enlarged out of  proportion to the cerebral sulci. Mild diffuse parenchymal volume  loss. Patchy deep and subcortical white matter T2 hyperintensities are  nonspecific.    Enhancing sellar mass extends superiorly into the suprasellar cistern  and likely abuts the optic chiasm. No definite other intracranial  enhancing mass is identified noting marked limitations given motion  artifact.    Polypoid mucosal thickening in the left maxillary sinus.       Impression    IMPRESSION:      1. Images  are severely degraded by motion artifact.  2. Evaluation for metastatic disease is limited given the motion. No  definite large intracranial mass is identified, but smaller lesions as  well as possible leptomeningeal disease cannot be excluded.  3. Enhancing expansile mass in the sella extending into the  suprasellar cistern. This may represent a pituitary macroadenoma,  although it is poorly characterized on this motion degraded study.  Metastatic lesion could also be considered.  4. Patchy white matter T2 hyperintense lesions. These are  indeterminate and could be due to chronic microvascular ischemic  disease; however, given the motion artifact, evaluation for associated  enhancement is limited, and these could potentially represent small  metastatic lesions. Recommend repeat imaging with sedation.    LUCAS RAMIREZ MD   MR Brain w/o & w Contrast    Narrative    MRI OF THE BRAIN WITHOUT AND WITH CONTRAST 9/27/2017 4:15 PM     COMPARISON: Brain MRI one day prior (limited by motion). Brain MRI  4/21/2017.    HISTORY: Altered mental status. Lung cancer, rule out metastasis.      TECHNIQUE: Axial diffusion-weighted with ADC map, axial T2-weighted  with fat saturation, axial T1-weighted, axial turboFLAIR and coronal  T1-weighted images of the brain were acquired without intravenous  contrast.  Following intravenous administration of gadolinium (6 mL  Gadavist), axial T1-weighted images of the brain were acquired.     FINDINGS: There is mild diffuse cerebral volume loss. There are a few  tiny scattered focal areas of abnormal T2 signal hyperintensity in the  cerebral white matter bilaterally that are consistent with sequela of  chronic small vessel ischemic disease.    The ventricles and basal cisterns are within normal limits in  configuration given the degree of cerebral volume loss. There is no  midline shift. There are no extra-axial fluid collections. There is no  evidence for stroke or acute intracranial  hemorrhage. Again noted is  an irregularly-shaped, enhancing lesion within the sella extending to  the suprasellar cistern consistent with a pituitary adenoma. This  lesion measures 1.9 x 1.7 x 1.6 cm in the AP, transverse and  cephalocaudad dimensions respectively which is increased in size from  1.5 x 1.5 x 1.3 cm in the same dimensions on the comparison study.  This mass is again noted to abut and likely displace the optic chiasm.  There is no other abnormal contrast enhancement in the brain or its  coverings.    There is no sinusitis or mastoiditis.      Impression    IMPRESSION:   1. Interval increase in size of the enhancing mass within the sella  consistent with a pituitary adenoma; however, a metastatic lesion from  the patient's lung cancer cannot be completely excluded. No additional  enhancing intracranial lesions are identified.  2. Diffuse cerebral volume loss and cerebral white matter changes  consistent with chronic small vessel ischemic disease. No evidence for  acute intracranial pathology.    MIRACLE VINCENT MD   CT Chest/Abdomen/Pelvis w Contrast    Narrative    CT CHEST/ABDOMEN/PELVIS WITH CONTRAST 10/18/2017 9:02 AM     HISTORY:  Follow up. Malignant neoplasm of unspecified part of  unspecified bronchus or lung.    CONTRAST DOSE:  67 mL Isovue 370.    COMPARISON: 8/3/2017.    Radiation dose for this scan was reduced using automated exposure  control, adjustment of the mA and/or kV according to patient size, or  iterative reconstruction technique.    FINDINGS:  Irregular mass is present anteriorly within the left upper  lobe measuring approximately 1.6 x 2.2 cm, similar if not slightly  increased in size since 8/3/2017. Adjacent linear stranding is noted  which may be related to atelectasis, unchanged. 0.4 cm right upper  lobe pulmonary nodule on image 21 is unchanged. 0.2 cm nodule in the  right lower lobe on image 22 also appears unchanged. Calcified  granuloma is noted in the right lower lobe  as well. No new pulmonary  nodules are demonstrated. Sclerotic lesions in the anterior right ribs  appear unchanged. Lytic lesion within the lateral left rib also  appears essentially unchanged. Mediastinal adenopathy is again noted.  2.2 x 1.6 cm node is seen anterior to the aorticopulmonary window,  slightly increased from 2.0 x 1.4 cm in August. To the left of the  pulmonary outflow tract, there is a 2.5 cm node with hypodense or  necrotic center which is increased from 1.4 cm previously. Additional  smaller lymph nodes are noted which are similar in appearance to the  prior scan.    Abdomen/pelvis: The liver, spleen, kidneys, adrenal glands, pancreas,  and gallbladder appear within normal limits. Aortic and iliac artery  calcifications are noted. There is no evidence of bowel obstruction.  Trace peritoneal fluid is noted within the pelvis. Pelvic contents  otherwise appear within normal limits. No free peritoneal fluid or  air. No evidence of retroperitoneal adenopathy.      Impression    IMPRESSION:  1. Left upper lobe mass, similar if not slightly increased in size  since 8/3/2017.  2. Mediastinal adenopathy, with a couple of lymph nodes increased in  size since 8/3/2017.  3. Additional pulmonary nodules are noted, stable in appearance from  8/3/2017.  4. Rib lesions appear essentially unchanged from 8/3/2017.  5. Trace peritoneal fluid within the pelvis of indeterminate etiology  or significance. No other evidence of metastatic disease within the  abdomen or pelvis.    CHET DAVILA MD       Assessment and plan:    (C34.90) Non-small cell lung cancer (NSCLC) (H)  (primary encounter diagnosis)   (C79.51) Bony metastasis (H)    I reviewed with the patient today the results from most recent imaging studies. Overall his disease has been stable. Because of his worsening of his performance status over the last few weeks I would recommend hold chemotherapy for now. I will see the patient again in one month time or  sooner if there are new developments or concerns.    Anemia  The patient  has been persistently anemic. Ferritin has been elevated. His anemia is probably related to chemotherapy. I recommend to use of Darbopotin 100  g subcutaneously weekly. Hemoglobin and hematocrit will be followed up religiously and medication will be held according to the guidelines.    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 Patient will be evaluated by palliative care.    (R26.81) Unsteady gait  patient has been refusing physical therapy.    (D35.2) Pituitary macroadenoma (H)  Patient has been followed by endocrinology.    The patient is ready to learn, no apparent learning barriers were identified.  Diagnosis and treatment plans were explained to the patient. The patient expressed understanding of the content. The patient asked appropriate questions. The patient questions were answered to his satisfaction.    Chart documentation with Dragon Voice recognition Software. Although reviewed after completion, some words and grammatical errors may remain.

## 2017-10-20 NOTE — TELEPHONE ENCOUNTER
Discussed these questions, as well as a few others, with Viridiana ([place] nurse) today. Jillian Renee, RN, BSN, OCN

## 2017-10-23 PROBLEM — K59.03 DRUG-INDUCED CONSTIPATION: Status: ACTIVE | Noted: 2017-01-01

## 2017-10-23 NOTE — PROGRESS NOTES
[facility]    Chief Complaint   Patient presents with     RECHECK       HPI:    Wade Acevedo is a 57 year old  (1960), who is being seen today for an episodic care visit at [facility].  HPI information obtained from: facility chart records, facility staff, patient report and [facility] chart review.    We continue to follow Mr. Acevedo closely due to his complex medical status.  Mr. Acevedo continues to have mild delirium with some ongoing memory/cognitive impairment.  He continues to endorse pain in his LE's and today knees, reports this as intermittent.  He continues to endorse ongoing constipation, but no abd pain, N/V.  He also endorses ongoing light headedness, mild and less than last week.  Overall he still reports pain as his biggest concern, has not been very active with PT/OT, PO intake appears minimal.      Today's concern is:     Delirium  Malignant neoplasm metastatic to lung, unspecified laterality (H)  Bony metastasis (H)  Cancer associated pain  Hypovolemia  C. difficile colitis  Drug-induced constipation  Anemia, chronic disease  Debility    ALLERGIES: Lubriderm  Past Medical, Surgical, Family and Social History reviewed and updated in [facility].    Current Outpatient Prescriptions   Medication Sig Dispense Refill     sodium phosphate (FLEET ENEMA) 7-19 GM/118ML rectal enema Place 1 enema rectally See Admin Instructions Give every 3 days prn if no bowel mocement       carbamide peroxide (DEBROX) 6.5 % otic solution 5-10 drops 2 times daily       PREDNISONE PO Take 20 mg by mouth daily       Nutritional Supplements (ENSURE ENLIVE PO) Take 1 Bottle by mouth 3 times daily       lidocaine HCl 3 % cream Apply topically 3 times daily       HYDROcodone-acetaminophen (NORCO) 7.5-325 MG per tablet Take 1 tablet by mouth every 4 hours as needed for moderate to severe pain (do NOT give if pain is due to constipation) 60 tablet 0     senna-docusate (SENOKOT-S;PERICOLACE) 8.6-50 MG per tablet 2  tablets 2 times daily Hold for loose stools 100 tablet 11     polyethylene glycol (MIRALAX/GLYCOLAX) powder Take 17 g by mouth 2 times daily Hold for loose stools       Ondansetron HCl (ZOFRAN PO) Take 4 mg by mouth every 6 hours as needed for nausea or vomiting       Acetaminophen (TYLENOL PO) Take 1,000 mg by mouth 3 times daily       folic acid (FOLVITE) 1 MG tablet Take 1 tablet (1 mg) by mouth daily 30 tablet 0     tamsulosin (FLOMAX) 0.4 MG capsule Take 1 capsule (0.4 mg) by mouth daily 30 capsule 0     morphine (MS CONTIN) 30 MG 12 hr tablet Take 1 tablet (30 mg) by mouth 2 times daily 60 tablet 0     PARoxetine (PAXIL) 10 MG tablet Take 1 tablet (10 mg) by mouth At Bedtime (Needs follow-up appointment for this medication) 30 tablet 11     pantoprazole (PROTONIX) 40 MG EC tablet Take 1 tablet (40 mg) by mouth 2 times daily (before meals) 60 tablet 6     bisacodyl (DULCOLAX) 10 MG Suppository Place 1 suppository (10 mg) rectally daily as needed for constipation 12 suppository 11     nicotine (NICODERM CQ) 21 MG/24HR 24 hr patch Place 1 patch onto the skin every 24 hours 30 patch 0     Medications reviewed:  Medications reconciled to facility chart and changes were made to reflect current medications as identified as above med list. Below are the changes that were made:   Medications stopped since last EPIC medication reconciliation:   Medications Discontinued During This Encounter   Medication Reason     diclofenac (VOLTAREN) 1 % GEL topical gel Medication Reconciliation Clean Up     capsaicin (ZOSTRIX) 0.025 % CREA cream Medication Reconciliation Clean Up       Medications started since last [facility] medication reconciliation:  No orders of the defined types were placed in this encounter.    REVIEW OF SYSTEMS:  10 point ROS of systems including Constitutional, Eyes, Respiratory, Cardiovascular, Gastroenterology, Genitourinary, Integumentary, Muscularskeletal, Psychiatric were all negative except for pertinent  "positives noted in my HPI.    Physical Exam:  /83  Pulse 94  Temp 98.4  F (36.9  C)  Resp 16  Ht 5' 4.5\" (1.638 m)  Wt 146 lb 12.8 oz (66.6 kg)  SpO2 95%  BMI 24.81 kg/m2    GENERAL APPEARANCE:  Well developed, thin/cachetic, older male resting in bed, NAD, non-toxic.  ENT:  Mouth and oropharynx normal, mostly dry mucous membranes.   RESP:  Lungs CTA.  Regular relaxed breathing effort.  No cough.   CV: S1/S2 no murmur or rubs.  Regular rhythm and rate.  No generalized edema.   ABDOMEN:   Flat, soft, non-tender with active bowel sounds.  No guarding, rigidity, or rebound tenderness.  EXTREMITIES:  No lower extremity edema, no calf tenderness.   PSYCH: Alert to self, place, date, not situation.  CAM+, falls asleep during exam but arousable.  Unchanged.   WOUNDS: Scabbed superficial scrap on his nose, no s/s of infection.  Several scabbed superficial scrapes on his Left hand, no s/s of infection.    SKIN: Right chest IVAD accessed, transparent dressing in place, no s/s of infection.     Recent Labs:   CBC RESULTS:   Recent Labs   Lab Test  10/19/17   1620 10/16/17  10/04/17   WBC  8.2   --    --   5.8   RBC  3.75*   --    --   2.97*   HGB  9.5*  7.7*   < >  7.9*   HCT  29.1*   --    --   24.4*   MCV  78   --    --   82   MCH  25.3*   --    --   26.6*   MCHC  32.6   --    --   32.4   RDW  16.5*   --    --   16.9*   PLT  322   --    --   206    < > = values in this interval not displayed.       Last Basic Metabolic Panel:  Recent Labs   Lab Test  10/19/17   1620 10/12/17   NA  137  143   POTASSIUM  5.4*  3.9   CHLORIDE  107  110*   JOHANN  9.2  9.1   CO2  27  25   BUN  16  12   CR  0.76  0.83   GLC  138*  91       Liver Function Studies -   Recent Labs   Lab Test  10/19/17   1620  09/28/17   0608   PROTTOTAL  7.5  6.8   ALBUMIN  3.6  3.3*   BILITOTAL  0.4  0.6   ALKPHOS  108  110   AST  44  25   ALT  38  17       TSH   Date Value Ref Range Status   09/26/2017 0.17 (L) 0.40 - 4.00 mU/L Final   09/14/2017 0.34 " (L) 0.40 - 4.00 mU/L Final     Assessment/Plan:  Overall Mr. Acevedo's status remains tenuous.    Delirium, intermittent/ongoing, likely secondary to ongoing need for opiates  Malignant neoplasm metastatic to lung, unspecified laterality (H), on chemotherapy  Bony metastasis (H) to Left scapula and 5th L rib area  Cancer associated pain  I note he continues to have pain issues, but improved since admission, mostly tolerable per his report.  I note recent Oncology notes recommended reduction in pain medications to help with his ongoing constipation, but this was in setting of us avoiding softners due to recent C-diff, thus we likely just got behind on BM regimen as etiology.  In his setting of ongoing chronic pain, I don't feel reduction of analgesics is best at this time, as he has made it clear to us, he wants pain management as a priority, thus we increased BM regimen instead.  Even tho Onco is contemplating ongoing chemo, it still is palliative at best at this point.    -Kept analgesics at Acetaminophen TID, MS Contin BID, PRN Oxycodone.  -Prednisone 10 mg's daily per Onco.   -Increased Miralax to BID, increased Senna to 2 tab's BID.   -Fleet enema PRN when no BM for 3 days.   --I suspect his hypovolemia is contributing and he may need IVF's again.      Hypovolemia ongoing, etiology unclear, but poor po intake contributing  Last week he was light headed and dizzy and orthostatic.  Today he reports improved but still light headed/dizzy with sitting up, he appears hypovolemic.  Last week Onco arranged for daily IVF's of 1L per day, suspect he may need that again.   -Asked RN to get orthostatic BP/HR.   -Low threshold to resume IVF's 1L / day via accessed IVAD.     C. difficile colitis  As noted above diarrhea resolved, last day of Flagyl was 10/11.  Considering this resolved.     Drug-induced constipation  As noted above, likely secondary to high level of opiate requirements.   -See above for changes.     Anemia,  chronic disease  Has needed transfusions in the past, noted pancytopenic due to last chemo regimen.  Hgb was 7.7 on 10/16, today's recheck is still pending.    -Keep Hgb >7.0 for now.     Debility  Due to pain, delirium, light headedness and overall debility, he still does not participate much with PT/OT and spends most of his day in bed.  We have approached his POA/Sarah about hospice considerations whom she noted she will discuss with Dr. Naylor his Oncologist.     Orders:  1. Increase Miralax 17g po to BID scheduled, Hold for loose stools  2. Increase Senna/Colase to 2 tabs BID scheduled hold for loose stools  3. Continue with Fleet enema for 3 days if no BM  4. Check orthostatic BP/HR today and notify NP with results      Electronically signed by  SALINAS Ledezma CNP

## 2017-10-24 NOTE — PROGRESS NOTES
[facility]    Chief Complaint   Patient presents with     Nursing Home Acute       HPI:    Wade Acevedo is a 57 year old  (1960), who is being seen today for an episodic care visit at [facility].  HPI information obtained from: facility chart records, facility staff, patient report and [facility] chart review.    Followed up with Mr. Acevedo today due to ongoing c/o light headedness/dizziness with minimal movement or getting out of bed.  He reports with just sitting up in bed he is light headed/dizzy.  He continues to endorse ongoing pain, but stable/unchanged.  Continues to have memory/cognitive impairment, but also stable/unchanged.  Besides the constant pain and dizziness, no other complaints.     Today's concern is:     Hypovolemia  Orthostatic hypotension  Malignant neoplasm metastatic to lung, unspecified laterality (H)  Pituitary macroadenoma (H)  Cancer associated pain    ALLERGIES: Lubriderm  Past Medical, Surgical, Family and Social History reviewed and updated in [facility].    Current Outpatient Prescriptions   Medication Sig Dispense Refill     sodium phosphate (FLEET ENEMA) 7-19 GM/118ML rectal enema Place 1 enema rectally See Admin Instructions Give every 3 days prn if no bowel mocement       carbamide peroxide (DEBROX) 6.5 % otic solution 5-10 drops 2 times daily       PREDNISONE PO Take 20 mg by mouth daily       Nutritional Supplements (ENSURE ENLIVE PO) Take 1 Bottle by mouth 3 times daily       lidocaine HCl 3 % cream Apply topically 3 times daily       HYDROcodone-acetaminophen (NORCO) 7.5-325 MG per tablet Take 1 tablet by mouth every 4 hours as needed for moderate to severe pain (do NOT give if pain is due to constipation) 60 tablet 0     senna-docusate (SENOKOT-S;PERICOLACE) 8.6-50 MG per tablet 2 tablets 2 times daily Hold for loose stools 100 tablet 11     polyethylene glycol (MIRALAX/GLYCOLAX) powder Take 17 g by mouth 2 times daily Hold for loose stools        Ondansetron HCl (ZOFRAN PO) Take 4 mg by mouth every 6 hours as needed for nausea or vomiting       Acetaminophen (TYLENOL PO) Take 1,000 mg by mouth 3 times daily       folic acid (FOLVITE) 1 MG tablet Take 1 tablet (1 mg) by mouth daily 30 tablet 0     tamsulosin (FLOMAX) 0.4 MG capsule Take 1 capsule (0.4 mg) by mouth daily 30 capsule 0     morphine (MS CONTIN) 30 MG 12 hr tablet Take 1 tablet (30 mg) by mouth 2 times daily 60 tablet 0     PARoxetine (PAXIL) 10 MG tablet Take 1 tablet (10 mg) by mouth At Bedtime (Needs follow-up appointment for this medication) 30 tablet 11     pantoprazole (PROTONIX) 40 MG EC tablet Take 1 tablet (40 mg) by mouth 2 times daily (before meals) 60 tablet 6     bisacodyl (DULCOLAX) 10 MG Suppository Place 1 suppository (10 mg) rectally daily as needed for constipation 12 suppository 11     nicotine (NICODERM CQ) 21 MG/24HR 24 hr patch Place 1 patch onto the skin every 24 hours 30 patch 0     Medications reviewed:  Medications reconciled to facility chart and changes were made to reflect current medications as identified as above med list. Below are the changes that were made:   Medications stopped since last EPIC medication reconciliation:   There are no discontinued medications.    Medications started since last [facility] medication reconciliation:  No orders of the defined types were placed in this encounter.    REVIEW OF SYSTEMS:  7 point ROS done including, light headedness/dizziness, fever/chills, pain, Resp, CV, GI, and  and is negative other than noted in HPI.      Physical Exam:  BP 96/65  Pulse 92  Temp 98.6  F (37  C)  Resp 16  Wt 137 lb (62.1 kg)  SpO2 96%  BMI 23.15 kg/m2    GENERAL APPEARANCE:  Well developed, thin/cachetic, older male resting in bed, NAD, non-toxic.  ENT:  Mouth and oropharynx normal, mostly dry mucous membranes.   RESP:  Lungs CTA.  Regular relaxed breathing effort.  No cough.   CV: S1/S2 no murmur or rubs.  Regular rhythm and rate.  No generalized  edema.   ABDOMEN:   Flat, soft, non-tender with active bowel sounds.  No guarding, rigidity, or rebound tenderness.  EXTREMITIES:  No lower extremity edema, no calf tenderness.   PSYCH: Alert to self, place, date today, not situation.  CAM+ and inattentive, with degree of cognitive/memory impairment, but has been stable last few weeks.   SKIN: Right chest IVAD accessed, transparent dressing in place,  no s/s of infection.     Recent Labs:    Results for orders placed or performed in visit on 10/19/17   Ferritin   Result Value Ref Range    Ferritin 1135 (H) 26 - 388 ng/mL   CBC with platelets differential   Result Value Ref Range    WBC 8.2 4.0 - 11.0 10e9/L    RBC Count 3.75 (L) 4.4 - 5.9 10e12/L    Hemoglobin 9.5 (L) 13.3 - 17.7 g/dL    Hematocrit 29.1 (L) 40.0 - 53.0 %    MCV 78 78 - 100 fl    MCH 25.3 (L) 26.5 - 33.0 pg    MCHC 32.6 31.5 - 36.5 g/dL    RDW 16.5 (H) 10.0 - 15.0 %    Platelet Count 322 150 - 450 10e9/L    Diff Method Automated Method     % Neutrophils 88.0 %    % Lymphocytes 9.2 %    % Monocytes 2.1 %    % Eosinophils 0.0 %    % Basophils 0.1 %    % Immature Granulocytes 0.6 %    Absolute Neutrophil 7.3 1.6 - 8.3 10e9/L    Absolute Lymphocytes 0.8 0.8 - 5.3 10e9/L    Absolute Monocytes 0.2 0.0 - 1.3 10e9/L    Absolute Eosinophils 0.0 0.0 - 0.7 10e9/L    Absolute Basophils 0.0 0.0 - 0.2 10e9/L    Abs Immature Granulocytes 0.1 0 - 0.4 10e9/L   Comprehensive metabolic panel   Result Value Ref Range    Sodium 137 133 - 144 mmol/L    Potassium 5.4 (H) 3.4 - 5.3 mmol/L    Chloride 107 94 - 109 mmol/L    Carbon Dioxide 27 20 - 32 mmol/L    Anion Gap 3 3 - 14 mmol/L    Glucose 138 (H) 70 - 99 mg/dL    Urea Nitrogen 16 7 - 30 mg/dL    Creatinine 0.76 0.66 - 1.25 mg/dL    GFR Estimate >90 >60 mL/min/1.7m2    GFR Estimate If Black >90 >60 mL/min/1.7m2    Calcium 9.2 8.5 - 10.1 mg/dL    Bilirubin Total 0.4 0.2 - 1.3 mg/dL    Albumin 3.6 3.4 - 5.0 g/dL    Protein Total 7.5 6.8 - 8.8 g/dL    Alkaline Phosphatase  108 40 - 150 U/L    ALT 38 0 - 70 U/L    AST 44 0 - 45 U/L   Magnesium   Result Value Ref Range    Magnesium 2.0 1.6 - 2.3 mg/dL   ABO/Rh type and screen   Result Value Ref Range    Units Ordered 2     ABO O     RH(D) Pos     Antibody Screen Neg     Test Valid Only At [facility]        Specimen Expires 10/22/2017     Crossmatch Red Blood Cells    Blood component   Result Value Ref Range    Unit Number [id number]     Blood Component Type Red Blood Cells Leukocyte Reduced     Division Number 00     Status of Unit No longer available 10/23/2017 1904     Blood Product Code [id number]     Unit Status RET    Blood component   Result Value Ref Range    Unit Number [id number]     Blood Component Type Red Blood Cells Leukocyte Reduced     Division Number 00     Status of Unit No longer available 10/23/2017 1904     Blood Product Code J4919S12     Unit Status RET        Assessment/Plan:  Hypovolemia ongoing, etiology unclear, but poor po intake contributing  Orthostatic hypotension  Malignant neoplasm metastatic to lung, unspecified laterality (H), chemotherapy on hold at this time  Pituitary macroadenoma (H)  Cancer associated pain  Unfortunately Mr. Acevedo continues to c/o significant light headedness/dizziness even with just sitting up in bed at mid fowlers position.  Was not able to stand safety for orthostatic checks per RN.    BP supine 95/69 with HR of 101.  BP sitting 76/53 with HR of 109.   Symptomatic and unable to stand per RN.  He does appear mildly dehydrated on exam.  During exam I noted 400+ cc's of normal colored yellow urine in bedside urinal.  He reports he's voiding several times a day.  He reports he's drinking well, but RN reports his PO intake has been minimal.  He is trying to stay hydrated with water and at times Gatorade.  He received several days of IVF's last week 1 L / day via his IVAD with some improvement.      Etiology remains unclear.  Do not appreciate any s/s of infection, his  C-diff is treated, he never really had bad diarrhea with his C-diff.  He is constipated now which we are working on.  He appears to be making adequate urine.  One though is did he develop DI secondary to his known pituitary adenoma but I note his Na+ have been WNL's, urine does not appear overly dilute on exam.  Query if he has developed a autonomic or neurogenic failure secondary to his malignancy.  Lastly could it just be due to ongoing opiates which we have been reluctant to lower any further due to c/o pain all day, but improved since admission.  Either way he still is not eating/drinking much, and spends all day in bed not participating with PT/OT.    Plan:  -Will send a note to Oncology to get their opinion.   -Will see if we can not move up his Endo appointment which is currently planned for 11/14.   -Will give 1L IVF via IVAD daily x 5 days for now.      Orders:  1. Access IVAD and give 1 liter 0.9 normal saline over 5 hours q daily x 4 days starting today Dx dehydration    Electronically signed by  SALINAS Ledezma CNP

## 2017-10-26 PROBLEM — R20.2 PARESTHESIA: Status: ACTIVE | Noted: 2017-01-01

## 2017-10-26 NOTE — MR AVS SNAPSHOT
After Visit Summary   10/26/2017    Wade Acevedo    MRN: [id number]           Patient Information     Date Of Birth          1960        Visit Information        Provider Department      10/26/2017 1:30 PM ROOM 9 [facility]        Today's Diagnoses     Anemia, chronic disease    -  1    Hypovolemia ongoing, etiology unclear, but poor po intake contributing           Follow-ups after your visit        Your next 10 appointments already scheduled     Nov 02, 2017  2:00 PM CDT   Level O with ROOM 10 [facility] ([facility])    [facility]  [address]  [facility] [address]   [phone]            Nov 09, 2017  1:00 PM CST   Level O with ROOM 4 [facility] ([facility])    [facility]  [address]  [facility] [address]   [phone]            Nov 14, 2017 11:30 AM CST   (Arrive by 11:15 AM)   RETURN ENDOCRINE with Padma Medley MD   [facility] ([facility])    [address] [place] [address]   [phone]            Nov 16, 2017  1:30 PM CST   Level O with ROOM 7 [facility] ([facility])    [facility]  [address]  [facility] [address]   [phone]            Nov 16, 2017  2:30 PM CST   Return Visit with Pasquale Naylor MD   [facility] ([facility])    [facility]  [address]  [facility] [address]   [phone]            Nov 24, 2017  1:00 PM CST   Level O with ROOM 10 [facility] ([facility])    [facility]  [address]  [facility] [address]   [phone]            Dec 01, 2017  1:00 PM CST   Level O with ROOM 9 [facility] ([facility])    [facility] [place]  "[place]  [address] [place] [address] [address]  [facility] [address]   [phone]              Who to contact     If you have questions or need follow up information about today's clinic visit or your schedule please contact [facility] directly at [phone].  Normal or non-critical lab and imaging results will be communicated to you by MyChart, letter or phone within 4 business days after the clinic has received the results. If you do not hear from us within 7 days, please contact the clinic through MyChart or phone. If you have a critical or abnormal lab result, we will notify you by phone as soon as possible.  Submit refill requests through [employer] or call your pharmacy and they will forward the refill request to us. Please allow 3 business days for your refill to be completed.          Additional Information About Your Visit        MyChart Information     [employer] lets you send messages to your doctor, view your test results, renew your prescriptions, schedule appointments and more. To sign up, go to [email or web address] . Click on \"Log in\" on the left side of the screen, which will take you to the Welcome page. Then click on \"Sign up Now\" on the right side of the page.     You will be asked to enter the access code listed below, as well as some personal information. Please follow the directions to create your username and password.     Your access code is: [email or web address]  Expires: 2017  9:54 AM     Your access code will  in 90 days. If you need help or a new code, please call your [place] clinic or [phone].        Care EveryWhere ID     This is your Care EveryWhere ID. This could be used by other organizations to access your [place] medical records  [id number]        Your Vitals Were     Pulse Temperature                82 97.6  F (36.4  C) (Oral)           Blood Pressure from Last 3 Encounters:   10/26/17 134/89   10/26/17 (!) 136/92   10/24/17 96/65    Weight from Last 3 Encounters: "   10/26/17 62.1 kg (137 lb)   10/24/17 62.1 kg (137 lb)   10/23/17 66.6 kg (146 lb 12.8 oz)              We Performed the Following     Hemoglobin and hematocrit          Today's Medication Changes          These changes are accurate as of: 10/26/17  4:18 PM.  If you have any questions, ask your nurse or doctor.               Stop taking these medicines if you haven't already. Please contact your care team if you have questions.     HYDROcodone-acetaminophen 7.5-325 MG per tablet   Commonly known as:  NORCO   Stopped by:  Pawan Mullins, SALINAS CORBETT                    Primary Care Provider Office Phone # Fax #    Pawan Hampton [phone] [address] [address]        Equal Access to Services     GONZALO SMITH : Saul gerard Sonilsa, waaxda luqadaha, qaybta kaalmada pumayafam, gena romero . So [facility] [phone].    ATENCIÓN: Si habla español, tiene a burns disposición servicios gratuitos de asistencia lingüística. Llame al [phone].    We comply with applicable federal civil rights laws and [place] laws. We do not discriminate on the basis of race, color, national origin, age, disability, sex, sexual orientation, or gender identity.            Thank you!     Thank you for choosing [facility]  for your care. Our goal is always to provide you with excellent care. Hearing back from our patients is one way we can continue to improve our services. Please take a few minutes to complete the written survey that you may receive in the mail after your visit with us. Thank you!             Your Updated Medication List - Protect others around you: Learn how to safely use, store and throw away your medicines at www.disposemymeds.org.          This list is accurate as of: 10/26/17  4:18 PM.  Always use your most recent med list.                   Brand Name Dispense Instructions for use Diagnosis    bisacodyl 10 MG Suppository     DULCOLAX    12 suppository    Place 1 suppository (10 mg) rectally daily as needed for constipation    Drug-induced constipation       DEBROX 6.5 % otic solution   Generic drug:  carbamide peroxide      5-10 drops 2 times daily    Anemia, unspecified type       ENSURE ENLIVE PO      Take 1 Bottle by mouth 3 times daily    Anemia, unspecified type       folic acid 1 MG tablet    FOLVITE    30 tablet    Take 1 tablet (1 mg) by mouth daily        lidocaine HCl 3 % cream      Apply topically 3 times daily    Anemia, unspecified type       morphine 30 MG 12 hr tablet    MS CONTIN    60 tablet    Take 1 tablet (30 mg) by mouth 2 times daily    Non-small cell lung cancer (NSCLC) (H)       nicotine 21 MG/24HR 24 hr patch    NICODERM CQ    30 patch    Place 1 patch onto the skin every 24 hours    Tobacco abuse       OXYCODONE HCL PO      Take 10 mg by mouth every 4 hours as needed        pantoprazole 40 MG EC tablet    PROTONIX    60 tablet    Take 1 tablet (40 mg) by mouth 2 times daily (before meals)    Non-small cell lung cancer (NSCLC) (H)       PARoxetine 10 MG tablet    PAXIL    30 tablet    Take 1 tablet (10 mg) by mouth At Bedtime (Needs follow-up appointment for this medication)    Major depressive disorder, recurrent episode, moderate (H)       polyethylene glycol powder    MIRALAX/GLYCOLAX     Take 17 g by mouth 2 times daily Hold for loose stools        PREDNISONE PO      Take 10 mg by mouth daily    Anemia, unspecified type       senna-docusate 8.6-50 MG per tablet    SENOKOT-S;PERICOLACE    100 tablet    2 tablets 2 times daily Hold for loose stools        sodium phosphate 7-19 GM/118ML rectal enema      Place 1 enema rectally See Admin Instructions Give every 3 days prn if no bowel mocement        tamsulosin 0.4 MG capsule    FLOMAX    30 capsule    Take 1 capsule (0.4 mg) by mouth daily    Urinary retention with incomplete bladder emptying       TYLENOL PO      Take 1,000 mg by mouth 3 times daily         ZOFRAN PO      Take 4 mg by mouth every 6 hours as needed for nausea or vomiting

## 2017-10-26 NOTE — PROGRESS NOTES
Infusion Nursing Note:  Wade Kristina presents today for Aranesp. Pt required 1L of NS as well for hypotension.   Patient seen by provider today: No   present during visit today: Not Applicable.    Note: spoke with Dr. Naylor regarding pt's low BP and he gave verbal order for pt to receive 1L of NS today.    Intravenous Access:  Implanted Port. Previously accessed from [facility].    Treatment Conditions:  Hgb = 8.8 today.      Post Infusion Assessment:  Patient tolerated infusion without incident.  Patient tolerated injection without incident.  Blood return noted pre and post infusion.  Site patent and intact, free from redness, edema or discomfort.  No evidence of extravasations.  Needle remains intact for use at [facility].    Discharge Plan:   Patient discharged in stable condition accompanied by:  from [facility] transportation.  Departure Mode: Wheelchair.    Homa Alvarez RN

## 2017-10-26 NOTE — PROGRESS NOTES
[facility]    Chief Complaint   Patient presents with     Nursing Home Acute       HPI:    Wade Acevedo is a 57 year old  (1960), who is being seen today for an episodic care visit at [facility].  HPI information obtained from: facility chart records, facility staff, patient report and [facility] chart review.    Mr. Acevedo asked for me to visit today, he reports he has developed numbness in his Right thumb, 4th finger and Right big toe.  His HPI is inconsistent but reports this has been going on for at least 4 weeks, but this is the first time he has reported it to me.  He still endorses intermittent, vague LE pain.  He continues to endorse light headedness/dizziness, but improved from a couple days ago.      Today's concern is:     Paresthesia  Hypovolemia  Orthostatic hypotension  Malignant neoplasm metastatic to lung, unspecified laterality (H)  Pituitary macroadenoma (H)  Cancer associated pain  Delirium  Cognitive decline    ALLERGIES: Lubriderm  Past Medical, Surgical, Family and Social History reviewed and updated in [facility].    Current Outpatient Prescriptions   Medication Sig Dispense Refill     OXYCODONE HCL PO Take 10 mg by mouth every 4 hours as needed       sodium phosphate (FLEET ENEMA) 7-19 GM/118ML rectal enema Place 1 enema rectally See Admin Instructions Give every 3 days prn if no bowel mocement       carbamide peroxide (DEBROX) 6.5 % otic solution 5-10 drops 2 times daily       PREDNISONE PO Take 10 mg by mouth daily        Nutritional Supplements (ENSURE ENLIVE PO) Take 1 Bottle by mouth 3 times daily       lidocaine HCl 3 % cream Apply topically 3 times daily       senna-docusate (SENOKOT-S;PERICOLACE) 8.6-50 MG per tablet 2 tablets 2 times daily Hold for loose stools 100 tablet 11     polyethylene glycol (MIRALAX/GLYCOLAX) powder Take 17 g by mouth 2 times daily Hold for loose stools       Ondansetron HCl (ZOFRAN PO) Take 4 mg by mouth every 6 hours as needed for  nausea or vomiting       Acetaminophen (TYLENOL PO) Take 1,000 mg by mouth 3 times daily       folic acid (FOLVITE) 1 MG tablet Take 1 tablet (1 mg) by mouth daily 30 tablet 0     tamsulosin (FLOMAX) 0.4 MG capsule Take 1 capsule (0.4 mg) by mouth daily 30 capsule 0     morphine (MS CONTIN) 30 MG 12 hr tablet Take 1 tablet (30 mg) by mouth 2 times daily 60 tablet 0     PARoxetine (PAXIL) 10 MG tablet Take 1 tablet (10 mg) by mouth At Bedtime (Needs follow-up appointment for this medication) 30 tablet 11     pantoprazole (PROTONIX) 40 MG EC tablet Take 1 tablet (40 mg) by mouth 2 times daily (before meals) 60 tablet 6     bisacodyl (DULCOLAX) 10 MG Suppository Place 1 suppository (10 mg) rectally daily as needed for constipation 12 suppository 11     nicotine (NICODERM CQ) 21 MG/24HR 24 hr patch Place 1 patch onto the skin every 24 hours 30 patch 0     Medications reviewed:  Medications reconciled to facility chart and changes were made to reflect current medications as identified as above med list. Below are the changes that were made:   Medications stopped since last EPIC medication reconciliation:   Medications Discontinued During This Encounter   Medication Reason     HYDROcodone-acetaminophen (NORCO) 7.5-325 MG per tablet        Medications started since last [facility] medication reconciliation:  Orders Placed This Encounter   Medications     OXYCODONE HCL PO     Sig: Take 10 mg by mouth every 4 hours as needed       REVIEW OF SYSTEMS:  7 point ROS done including, light headedness/dizziness, fever/chills, pain, Resp, CV, GI, and  and is negative other than noted in HPI.     GENERAL APPEARANCE:  Well developed, thin/cachetic, older male resting in bed, NAD, non-toxic.  ENT:  Mouth and oropharynx normal, mostly dry mucous membranes.   RESP:  Lungs CTA.  Regular relaxed breathing effort.  No cough.   CV: S1/S2 no murmur or rubs.  Regular rhythm and rate.  No generalized edema.   ABDOMEN:   Flat, soft, non-tender with  active bowel sounds.  No guarding, rigidity, or rebound tenderness.  EXTREMITIES:  No lower extremity edema, no calf tenderness.   PSYCH: Alert to self, place, date, questionable to situation.  CAM+ and inattentive/sleepy, with degree of cognitive/memory impairment, but has been stable last few weeks.   SKIN: Right chest IVAD accessed, transparent dressing in place,  no s/s of infection.   SPINE: Mild tenderness at the T10-11 area, no masses felt.  No other tenderness along entire spine.    MS: Able to manipulate Right thumb and fingers independently, same with Right big toe, no s/s of trauma or swelling.     Assessment/Plan:  Paresthesia, Right thumb and Right big toe, unclear etiology and chronicity   He reports this going on for at least 4 weeks, but this is first time I'm hearing of it, and his HPI is somewhat inconsistent.  He also reports he had this in the past and it went away on it's own.  Etiology is unclear.  Before we questioned if his vague LE pain was radicular/neuropathic in nature, but could also be from the chemo he received, but it appears to be more stable these days.  Etiology into his thumb/big toe numbness is unclear.  He does have some T10-11 focal tenderness, but this would not really explain either, no other spinal tenderness.  He was a  for years, heavy alcohol use h/o, thus query if this is really more chronic and today he's just reporting.  Of note: PET scan from May 2017 did not note any spinal met's.     I spoke with ARIC Bernard with Palliative (phone 15+ minutes) about him in general and about these new findings.  She does not recommend Gabapentin in this setting as it likely will contribute more to confusion and make his light headedness/dizziness worse than help with pain.  She recommended ongoing analgesics as ordered, unless we feel this is truly causing clinical pain/issues for him, which he denied, just numbness no pain.   -Will continue to clinically monitor.       Hypovolemia ongoing, etiology unclear, but poor po intake contributing  Orthostatic hypotension  As noted yesterday, etiology remains unclear.  Now question if he's developed DI or autonomic dysfunction secondary to his known pituitary adenoma, as most recent MRI does note it has increased in size.   BP's 101/69 HR 96 supine.   90/56  sitting.   83/46  andrea standing but had to lay down due to symptomatic.     I did get a note back from Oncology whom is in agreement with moving up his Endo consult/follow up appointment which is currently planned for 11/14.      -Continue with 1 L IVF per day via IVAD for several more days.   -Will reassess Monday.     Malignant neoplasm metastatic to lung, unspecified laterality (H), chemotherapy on hold at this time  Pituitary macroadenoma (H)  Cancer associated pain  Yesterday I spoke with Sarah/KARYNA/daughter in law about Mr. Acevedo's overall status.  My plan was to again suggest Hospice.  However, Sarah reports that her opinion from [facility] is that Mr. Hunts cancer is not really a big problem, it's minor and stable, and not causing all the issues he is having.  She is under the impression why Mr. Acevedo is not doing well is because he's just being lazy and we need to be making him do more.  She clearly did not have a good understanding of his grim prognosis.  Palliative reports they had the same experience with her.     I spoke with jennifer Jaimes (phone 15+ minutes) from Dr. Garcia's office/[facility] and shared my concerns for the break in understanding/communication and suggested that this be re-addressed with the next visit and hospice be part of the discussion.      Delirium, intermittent/ongoing, likely multifactorial but ongoing need for opiates contributing  Cognitive decline with unclear etiology in setting of stage IV lung cancer   Overall his mentation has been stable, but still has moderate memory/cognitive impairment.  I was thinking of cutting back opiates and  starting gabapentin trial, but per Palliative care, they recommend against that and they recommend no change right now.  As noted above, they are in agreement he is more palliative than anything, thus we will not make any changes at this time.     Orders:  1. He see's Endocrinology at [facility] Dr Melissa Medley f/u on 11/14. Please call and see if we can get sooner and let NP know new date Dx Pituitary adenoma    I have spent 45+ minutes in coordination of cares due to complex medical case, multiple phone calls with other providers to coordinate care, review of HPI, development of POC, patient education and review of POC with pt.      Electronically signed by  SALINAS Momin, CNP  [facility]

## 2017-10-30 NOTE — TELEPHONE ENCOUNTER
Patient reported concerns about ability to eat on the Oncology Distress Screening tool. The patient is currently residing at [facility] so will contact him at this time.    Liudmila Porras RD,LD  Clinical Dietitian

## 2017-10-30 NOTE — TELEPHONE ENCOUNTER
----- Message -----     From: Tim Greenberg RN     Sent: 10/30/2017  12:15 PM       To: Padma Medley MD  Subject: RE: call back/ questions about an appt w/ Dr#    You had opening this Wed so I scheduled his there instead. Thanks.   ----- Message -----     From: Padma Medley MD     Sent: 10/26/2017   5:27 PM       To: Tim Greenberg RN  Subject: RE: call back/ questions about an appt w/ Dr#    OK next week prefer Monday    Padma   ----- Message -----     From: Tim Greenberg RN     Sent: 10/26/2017   2:56 PM       To: Padma Medley MD  Subject: FW: call back/ questions about an appt w/ Dr#    Per the [facility] patient possibly maybe having neuropathy cerebral/DI  and needs to be seen sooner than 11/14--you are booked.   ----- Message -----     From: Sylvia Tilley     Sent: 10/26/2017   2:07 PM       To: [facility] Triage-  Subject: call back/ questions about an appt w/  Ar#    Jing from [facility], requesting call back to discuss a possible sooner appt with Dr. Medley. Pt currently has one for 11/14/17, but Jing stated his oncologist wanted the pt to be seen sooner do to some other issues. Jing can be reached at [phone]. Thanks.    [facility]  Call Center    Please DO NOT send this message and/or reply back to sender.  Call Center Representatives DO NOT respond to messages.

## 2017-10-30 NOTE — PROGRESS NOTES
[facility]    Chief Complaint   Patient presents with     Nursing Home Acute       HPI:    Wade Acevedo is a 57 year old  (1960), who is being seen today for an episodic care visit at [facility].  HPI information obtained from: facility chart records, facility staff, patient report and [facility] chart review.    Met with Mr. Acevedo today for follow up.  Unfortunately Mr. Acevedo's fatigue and delirium is more pronounced today, repeats a good deal of questions and asked a lot of questions we discussed last week.  Mr. Acevedo remembers he has lung cancer and a brain mass, but can not make the connection to his debility, fatigue, pain.  He continues to endorse vague intermittent LE pain, continues to endorse Right thumb/foot numbness, vague back pain.  He endorses he feels tired all the time, he endorses he get's light headed/dizzy even with just sitting up.  Reports he's having BM's but still feels constipated.      We spent 10-15 minutes just discussing his overall situation and I shared my concern that he is not progressing well, I do not feel there is a likely chance he will recover to a point of being independent and getting home, asked if he was considering Hospice yet.  He was not able comment directly on his goals but he was very clear being pain free and comfortable as his priority, and he's comfortable with the fact he does not get out of bed but just for BM's at bedside, spends most of his day sleeping.      See below for more on this.     Today's concern is:     Malignant neoplasm metastatic to lung, unspecified laterality (H)  Pituitary macroadenoma (H)  Cancer associated pain  Delirium  Paresthesia  Hypovolemia  Orthostatic hypotension  Drug-induced constipation  Advance care planning    ALLERGIES: Lubriderm  Past Medical, Surgical, Family and Social History reviewed and updated in [facility].    Current Outpatient Prescriptions   Medication Sig Dispense Refill     OXYCODONE HCL PO Take 10  mg by mouth every 4 hours as needed       sodium phosphate (FLEET ENEMA) 7-19 GM/118ML rectal enema Place 1 enema rectally See Admin Instructions Give every 3 days prn if no bowel mocement       PREDNISONE PO Take 10 mg by mouth daily        Nutritional Supplements (ENSURE ENLIVE PO) Take 1 Bottle by mouth 3 times daily       lidocaine HCl 3 % cream Apply topically 3 times daily       senna-docusate (SENOKOT-S;PERICOLACE) 8.6-50 MG per tablet 2 tablets 2 times daily Hold for loose stools 100 tablet 11     polyethylene glycol (MIRALAX/GLYCOLAX) powder Take 17 g by mouth 2 times daily Hold for loose stools       Ondansetron HCl (ZOFRAN PO) Take 4 mg by mouth every 6 hours as needed for nausea or vomiting       Acetaminophen (TYLENOL PO) Take 1,000 mg by mouth 3 times daily       folic acid (FOLVITE) 1 MG tablet Take 1 tablet (1 mg) by mouth daily 30 tablet 0     tamsulosin (FLOMAX) 0.4 MG capsule Take 1 capsule (0.4 mg) by mouth daily 30 capsule 0     morphine (MS CONTIN) 30 MG 12 hr tablet Take 1 tablet (30 mg) by mouth 2 times daily 60 tablet 0     PARoxetine (PAXIL) 10 MG tablet Take 1 tablet (10 mg) by mouth At Bedtime (Needs follow-up appointment for this medication) 30 tablet 11     pantoprazole (PROTONIX) 40 MG EC tablet Take 1 tablet (40 mg) by mouth 2 times daily (before meals) 60 tablet 6     bisacodyl (DULCOLAX) 10 MG Suppository Place 1 suppository (10 mg) rectally daily as needed for constipation 12 suppository 11     Medications reviewed:  Medications reconciled to facility chart and changes were made to reflect current medications as identified as above med list. Below are the changes that were made:   Medications stopped since last EPIC medication reconciliation:   Medications Discontinued During This Encounter   Medication Reason     nicotine (NICODERM CQ) 21 MG/24HR 24 hr patch      carbamide peroxide (DEBROX) 6.5 % otic solution        Medications started since last EPIC medication  reconciliation:  No orders of the defined types were placed in this encounter.    REVIEW OF SYSTEMS:  7 point ROS done including, light headedness/dizziness, fever/chills, pain, Resp, CV, GI, and  and is negative other than noted in HPI.      Physical Exam:  /65  Pulse 98  Temp 97.4  F (36.3  C)  Resp 20  Wt 136 lb (61.7 kg)  SpO2 91%  BMI 22.98 kg/m2     GENERAL APPEARANCE:  Thin/cachetic, older male resting in bed, appears fatigued, NAD, non-toxic.  ENT:  Mouth and oropharynx normal, mostly dry mucous membranes.   RESP:  Lungs CTA.  Regular relaxed breathing effort.  No cough.   CV: S1/S2 no murmur or rubs.  Regular rhythm and rate.  No generalized edema.   ABDOMEN:   Flat, soft, non-tender with active bowel sounds.  No guarding, rigidity, or rebound tenderness.  EXTREMITIES:  No lower extremity edema, no calf tenderness.   PSYCH: Alert to self, place, not date, questionable to situation.  CAM+ and inattentive/sleepy, with degree of cognitive/memory impairment, worse than last week.   SKIN: Right chest IVAD accessed, transparent dressing in place,  no s/s of infection.     Recent Labs:    Results for orders placed or performed in visit on 10/26/17   Hemoglobin and hematocrit   Result Value Ref Range    Hemoglobin 8.8 (L) 13.3 - 17.7 g/dL    Hematocrit 27.9 (L) 40.0 - 53.0 %       Assessment/Plan:  Malignant neoplasm metastatic to lung, unspecified laterality (H), chemotherapy on hold at this time  Pituitary macroadenoma (H)  As noted last week, there is some inconsistent reports on what his actual prognosis is and how his KARYNA Smith understands it.  Thus we sent notes to Dr. Hart ([facility]) to address this with her.  However, today I shared with her and Mr. Acevedo that I suspect with the ongoing weight loss, ongoing minimal PO intake, sleeping all day, no PT/OT and ongoing hypotension, his overall prognosis is worsening.  It is my impression that hospice would be most appropriate at this time.  I shared  this with a 10-15 minute discussion with him then later 10-15 minutes on the phone with the POA/Sarah and encouraged her to meet with [facility] and Palliative care and discuss this as she has a long history with them.      Cancer associated pain  Delirium, intermittent/ongoing, likely multifactorial but ongoing need for opiates contributing  Paresthesia, Right thumb and Right big toe, unclear etiology and chronicity   Delirium has been wavering to certain degrees, was worse today.  I would say he likely does not have capacity to make medical decisions at this time.  I did mention to him that one option would be to further reduce his analgesics and see if he is more energetic, and if it helps increase his appetite and PT/OT participation, lower light headedness.  He is very clear being pain free and comfortable is most important and he does not want us to lower his analgesics and Sarah is in agreement.   -Has [facility] follow up.   -We moved his Endo appointment to this week.     Hypovolemia ongoing, etiology unclear, but poor po intake contributing  Orthostatic hypotension, symptomatic   Despite getting fluids 1L/week last week, he continues to be orthostatic and symptomatic. Etiology unclear but poor PO intake is contributing.  I query if he's not developed a degree of SI, but unclear.   -Will give 1 L IVF / day x 4 days and recheck in end of the week.       Drug-induced constipation  Still has a degree of constipation, but improving with aggressive BM regimen.      Advance care planning  As noted above, I strongly recommended his POA/Sarah meet with [facility] and Palliative care providers again and discuss goals of care, as I feel he's voicing he wants a more palliative/hospice approach and with his decline, and Hospice may be more in line with what he wants.      Orders:  1. Access IVAD and give 1 liter 0.9 NS IV over 5 hrs qd x 4 days starting today Dx Dehydration  2. Leave IVAD accessed and flush qd with 10 ccs of saline and  heparin lock with 5 cc of heparin qd Dx dehydration  3. Give Enemeez docusate enema QD in no BM over 2 days Dx constipation.    Electronically signed by  SALINAS Ledezma CNP

## 2017-11-01 NOTE — PROGRESS NOTES
Patient did not come today, I called Sarah, and talked.   D similarities he started to have altered mental status, decreasing appetite.   Positive review months of chemotherapy, considering hospice care.   His vision is okay. Patient cannot come with [address].    I suggested her to check testosterone level, if DBT 20s little we can prescribe testosterone injection as a part of quality of life paliative care.       Padma Medley MD  Staff Physician  Endocrinology and Metabolism  [facility]  License: [id number]  Pager: [phone]

## 2017-11-01 NOTE — PROGRESS NOTES
Clinic Care Coordination Contact    Situation: Patient chart reviewed by care coordinator.    Background: Patient active in CC. Per chart review today, [facility] and does not appear to be progressing. Hospice has been discussed.      Assessment: Patient remains at TCU at this time.    Plan/Recommendations:RN CC will continue to monitor chart and will f/u as needed.    José Miguel GAMEZ,RN- BC  Clinic Care Coordinator  [facility]  Phone: [phone]

## 2017-11-02 NOTE — TELEPHONE ENCOUNTER
----- Message from Tomasa Aguilera sent at 11/2/2017 12:33 PM CDT -----  Regarding: JELANI connert cancellations  [facility] called today and cancelled the patient's [facility] appointments for 11/2, 11/9, and 11/16 per the families wishes at this time. I left the appointments on for 11/24 and 12/01 incase they agree to continue them after the MD visit.    Catherine

## 2017-11-02 NOTE — PROGRESS NOTES
[facility]    Chief Complaint   Patient presents with     Nursing Home Acute       HPI:    Wade Acevedo is a 57 year old  (1960), who is being seen today for an episodic care visit at [facility].  HPI information obtained from: facility chart records, facility staff, patient report and [facility] chart review.    Unfortunately Mr. Acevedo continues to clinically decline.  Was called to his room due to agitation and refusing cares from the RN.  He is more confused today.  He reports his paraesthesia in his Right hand and foot are hurting him.  Reports overall he hurts, but can not tell me where.  He is frequently dozing off during exam, flight of ideas, thus HPI/ROS is difficult.  He wavers between restless/somnolent but no other acute distress.     Today's concern is:     Delirium  Cognitive decline  Cancer associated pain  Paresthesia  Malignant neoplasm metastatic to lung, unspecified laterality (H)  Pituitary macroadenoma (H)  Hypovolemia  Orthostatic hypotension  Anemia, chronic disease  Drug-induced constipation  Debility  Advance care planning    ALLERGIES: Lubriderm  Past Medical, Surgical, Family and Social History reviewed and updated in [facility].    Current Outpatient Prescriptions   Medication Sig Dispense Refill     diclofenac (VOLTAREN) 1 % GEL topical gel Place 2 g onto the skin 4 times daily Apply to hands QID pain       OXYCODONE HCL PO Take 10 mg by mouth every 4 hours as needed       PREDNISONE PO Take 10 mg by mouth daily        Nutritional Supplements (ENSURE ENLIVE PO) Take 1 Bottle by mouth 3 times daily       senna-docusate (SENOKOT-S;PERICOLACE) 8.6-50 MG per tablet 2 tablets 2 times daily Hold for loose stools 100 tablet 11     polyethylene glycol (MIRALAX/GLYCOLAX) powder Take 17 g by mouth 2 times daily Hold for loose stools       Ondansetron HCl (ZOFRAN PO) Take 4 mg by mouth every 6 hours as needed for nausea or vomiting       Acetaminophen (TYLENOL PO) Take 1,000  mg by mouth 3 times daily       folic acid (FOLVITE) 1 MG tablet Take 1 tablet (1 mg) by mouth daily 30 tablet 0     tamsulosin (FLOMAX) 0.4 MG capsule Take 1 capsule (0.4 mg) by mouth daily 30 capsule 0     morphine (MS CONTIN) 30 MG 12 hr tablet Take 1 tablet (30 mg) by mouth 2 times daily 60 tablet 0     PARoxetine (PAXIL) 10 MG tablet Take 1 tablet (10 mg) by mouth At Bedtime (Needs follow-up appointment for this medication) 30 tablet 11     pantoprazole (PROTONIX) 40 MG EC tablet Take 1 tablet (40 mg) by mouth 2 times daily (before meals) 60 tablet 6     bisacodyl (DULCOLAX) 10 MG Suppository Place 1 suppository (10 mg) rectally daily as needed for constipation 12 suppository 11     Medications reviewed:  Medications reconciled to facility chart and changes were made to reflect current medications as identified as above med list. Below are the changes that were made:   Medications stopped since last EPIC medication reconciliation:   There are no discontinued medications.    Medications started since last [facility] medication reconciliation:  No orders of the defined types were placed in this encounter.    REVIEW OF SYSTEMS:  Unobtainable secondary to cognitive impairment or aphasia.    Physical Exam:  BP 91/56  Pulse 91  Temp 97.6  F (36.4  C)  Resp 18  Wt 137 lb (62.1 kg)  SpO2 (!) 89%  BMI 23.15 kg/m2     GENERAL APPEARANCE:  Thin/cachetic, older male, resting in bed, NAD, but fluctuates between restlessness and somnolence.  ENT:  Mouth and oropharynx normal, mostly dry mucous membranes.   RESP:  Lungs CTA.  Regular relaxed breathing effort.  No cough.   CV: S1/S2 no murmur or rubs.  Regular rhythm and rate.  No generalized edema.   ABDOMEN:   Flat, soft, non-tender with active bowel sounds.   No guarding, rigidity, or rebound tenderness.  EXTREMITIES:  No lower extremity edema, no calf tenderness.   PSYCH: Alert to self, not date, place, situation, delirium is worse the last couple days.   LINES: IVAD in  Right chest is accessed, covered with transparent dressing, no s/s of infection.     Recent Labs:  All labs reviewed, none recent.    Assessment/Plan:  Delirium, intermittent/ongoing, likely multifactorial but ongoing need for opiates contributing  Cognitive decline with unclear etiology in setting of stage IV lung cancer   Cancer associated pain  Paresthesia, Right thumb and Right big toe, unclear etiology and chronicity   Malignant neoplasm metastatic to lung, unspecified laterality (H), chemotherapy on hold at this time  Pituitary macroadenoma (H)  Etiology into his worsening delirium is unclear.  No acute changes to analgesics at this time, but he is complaining of more Right hand/foot pain with unclear etiology.  Query if pain could be contributing to delirium.  Does not appear toxic, afebrile, thus low suspicion for infectious etiology, but do note he has IVAD access'd thus is at risk for line affection/bacteremia.  Does have the cancer and pituitary adenoma which we know is expanding.      He was to go to Endo yesterday 11/1 but the POA/Sarah visited and noted his worsening confusion thus she cancelled the appointment.  Per her report today she spoke with Endo on the phone and reported they do not feel the pituitary adenoma is contributing to his overall decline, however, they will checks some hormone labs to see if he would be candidate for Testosterone or other therapies.      See adv care planning below.     Hypovolemia ongoing, etiology unclear, but poor po intake contributing  Orthostatic hypotension, symptomatic   Anemia, chronic disease  Etiology into ongoing hypovolemia and symptomatic orthostatic is unclear but does have ongoing poor PO intake contributing.  There is question if he's also developed dysphagia but lungs are CTA, no respiratory findings, thus suspicion for aspiration is low at this point.  Due to minimal PO intake, we are reluctant to start dysphagia diet as PO intake would likely  further decline.    -Will resume 1 L IVF 0.9 NS daily for now, no stop date at this time.     Mr. Acevedo was to go into [facility] today for a weekly Aranesp injection, at this time due to advanced delirium and ongoing decline, this was cancelled.     See adv care planning below.     Drug-induced constipation  Appears to be improving/controlled at this time, but we likely will be increasing analgesics in the near future and will need to boost BM regimen as well.     Debility  This has become profound, to the point he is SBA to get to bedside commode.  Is symptomatic light headed/dizzy with just sitting up in bed.  Sleeps 80% of the day, rarely get's out of bed.     Advance care planning  I attempted to repeat the POC and goals of care with Mr. Acevedo today, but his delirium is advanced and he is not able to do that.  But pain free and comfortable were clear goals with past conversations.  I spent 20+ minutes with POA/Sarah on the phone today.  We did continue POC/goals of care with her and I shared that with all the multiple issues and significant medical decline and ongoing delirium, I feel Mr. Acevedo's prognosis has become poor and we need to consider either re-hospitalization and review by [facility] to see if were missing something, or consider Hospice.  She is in agreement that further workup's are likely not going to change his prognosis and it likely is not what he would want at this point, which is why she cancelled the Endo and today's injection appointment.  She is thinking hospice is needed but reports she want's him up in [place] close to her and other family, so she is looking for a place up there.  Ideally she would like him transfer up there to start hospice.  I did share with her I'm not sure I can keep him stable enough for transfer, not sure we have a lot of time to do all that.  She reports she would like us to try and keep him comfortable and stable for now, she is working with  on transfer.  We will give IVF's  and do our best to keep him stable/comfortable, but will have a low threshold to encourage her to start hospice here.  I did send a note to Dr. Hart/[facility] updating him on this.      Orders:  1.  Access IVAD and give 1 l 0.9 NS IV over 5 hrs qd Dx dehydration  2. Leave IVAD accessed and flush qd with 10 cc's of saline, heparin lock with 5 cc of 100 unit/ml heparin qd as well.    Total time spent with patient visit at the skilled nursing facility was 35 min including patient visit, review of past records and phone call to patient contact. Greater than 50% of total time spent with counseling and coordinating care due to complex medical case, review of HPI, development of POC, patient/POA education and review of POC with POA and discussion of goals of care.       Electronically signed by  SALINAS Ledezma CNP

## 2017-11-06 PROBLEM — R29.6 FALLS FREQUENTLY: Status: ACTIVE | Noted: 2017-01-01

## 2017-11-06 NOTE — PROGRESS NOTES
"[facility]    Chief Complaint   Patient presents with     Nursing Home Acute       HPI:    Wade Acevedo is a 57 year old  (1960), who is being seen today for an episodic care visit at [facility].  HPI information obtained from: facility chart records, facility staff, patient report and [facility] chart review.    We are following Mr. Acevedo closely due to his overall condition is worsening.  His delirium/confusion is worse the last week.  Today he is only alert to self, not place, date nor situation.  She continues to endorse Right hand/Right big toe pain, intermittent vague LE pain, no other complaints.  Later was told by RN that Mr. Acevedo fell trying to \"Get to his car\".  Reports no injuries, in speaking with Mr. Acevedo, he does not remember the event, even 30 minutes after.      Today's concern is:     Delirium  Cognitive decline  Cancer associated pain  Paresthesia  Malignant neoplasm metastatic to lung, unspecified laterality (H)  Hypovolemia  Orthostatic hypotension  Falls frequently    ALLERGIES: Lubriderm  Past Medical, Surgical, Family and Social History reviewed and updated in [facility].    Current Outpatient Prescriptions   Medication Sig Dispense Refill     diclofenac (VOLTAREN) 1 % GEL topical gel Place 2 g onto the skin 4 times daily Apply to hands QID pain       OXYCODONE HCL PO Take 10 mg by mouth every 4 hours as needed       PREDNISONE PO Take 10 mg by mouth daily        Nutritional Supplements (ENSURE ENLIVE PO) Take 1 Bottle by mouth 3 times daily       senna-docusate (SENOKOT-S;PERICOLACE) 8.6-50 MG per tablet 2 tablets 2 times daily Hold for loose stools 100 tablet 11     polyethylene glycol (MIRALAX/GLYCOLAX) powder Take 17 g by mouth 2 times daily Hold for loose stools       Ondansetron HCl (ZOFRAN PO) Take 4 mg by mouth every 6 hours as needed for nausea or vomiting       Acetaminophen (TYLENOL PO) Take 1,000 mg by mouth 3 times daily       folic acid (FOLVITE) 1 MG tablet " Take 1 tablet (1 mg) by mouth daily 30 tablet 0     tamsulosin (FLOMAX) 0.4 MG capsule Take 1 capsule (0.4 mg) by mouth daily 30 capsule 0     morphine (MS CONTIN) 30 MG 12 hr tablet Take 1 tablet (30 mg) by mouth 2 times daily 60 tablet 0     PARoxetine (PAXIL) 10 MG tablet Take 1 tablet (10 mg) by mouth At Bedtime (Needs follow-up appointment for this medication) 30 tablet 11     pantoprazole (PROTONIX) 40 MG EC tablet Take 1 tablet (40 mg) by mouth 2 times daily (before meals) 60 tablet 6     bisacodyl (DULCOLAX) 10 MG Suppository Place 1 suppository (10 mg) rectally daily as needed for constipation 12 suppository 11     Medications reviewed:  Medications reconciled to facility chart and changes were made to reflect current medications as identified as above med list. Below are the changes that were made:   Medications stopped since last EPIC medication reconciliation:   There are no discontinued medications.    Medications started since last [facility] medication reconciliation:  No orders of the defined types were placed in this encounter.    REVIEW OF SYSTEMS:  Unobtainable secondary to cognitive impairment or aphasia.    Physical Exam:  /72  Pulse 76  Temp 97.8  F (36.6  C)  Resp 22  Wt 137 lb (62.1 kg)  SpO2 94%  BMI 23.15 kg/m2     GENERAL APPEARANCE:  Thin/cachetic, older male, resting in bed, NAD, restlessness this morning.   ENT:  Mouth and oropharynx normal, mostly dry mucous membranes.   RESP:  Lungs CTA.  Regular relaxed breathing effort.  No cough.   CV: S1/S2 no murmur or rubs.  Regular rhythm and rate.  No generalized edema.   ABDOMEN:   Flat, soft, non-tender with active bowel sounds.   No guarding, rigidity, or rebound tenderness.  EXTREMITIES:  No lower extremity edema, no calf tenderness.   PSYCH: Alert to self, not date, place, situation, worse than couple weeks prior.   LINES: IVAD in Right chest is accessed, covered with transparent dressing, no s/s of infection.     Recent Labs:  All  labs reviewed, none recent.    Assessment/Plan:  Delirium, worsening, unclear etioloyg  Cognitive decline with unclear etiology in setting of stage IV lung cancer   Cancer associated pain  Paresthesia, Right thumb and Right big toe, unclear etiology and chronicity   Malignant neoplasm metastatic to lung, unspecified laterality (H), chemotherapy on hold at this time  Etiology of worsening confusion is unclear.  Afebrile and no overt s/s of infection, but can not be excluded at this time.  We have not changed opiates for several weeks, delirium worsening with no change.  PO nutrition still minimal, has continued on 1 L IVF per day.  In discussion with Endocrinology they do not feel it's the pituitary adenoma.  Next step would be full chemistry and infectious workup or cutting down opiates.  When we last talked with Dave we strongly recommended Palliative care and she agreed, but was trying to get him closer to home in [place] thus we have only been giving IVF's. We have not lowered opiates as he was clear he wants to be comfortable/pain free when he was more alert.      Hypovolemia ongoing, etiology unclear, but poor po intake contributing  Orthostatic hypotension, symptomatic   Falls frequently  As noted prior, etiology of hypovolemia is unclear, but poor PO intake is partly cause.  Na's when checked were WNL's thus lower suspicion for DI, but has not been ruled out.  Dave cancelled Endocrinology appointment last week.  Assess post fall, no acute changes, no overt injuries, does not remember fall roughly 30 minutes after.     Plan: For today continue with IVF's, 1L per day NS.  Per POWER Smith working on new placement closer to home/[place], and will enroll in hospice upon arrival.  Possible as soon as tomorrow.  Thus no changes today, will call Sarah tomorrow and talk with SW to verify transfer to hospice.      Orders:  1. No new orders today.     Electronically signed by  SALINAS Ledezma  CNP

## 2017-11-07 PROBLEM — C34.92 NON-SMALL CELL CANCER OF LEFT LUNG (H): Status: ACTIVE | Noted: 2017-01-01

## 2017-11-07 NOTE — PROGRESS NOTES
[facility] DISCHARGE SUMMARY    PATIENT'S NAME: Wade Acevedo  YOB: 1960  MEDICAL RECORD NUMBER:  [id number]    PRIMARY CARE PROVIDER AND CLINIC RESPONSIBLE AFTER TRANSFER: Pawan Hampton [address] [facility] [id number]     CODE STATUS/ADVANCE DIRECTIVES DISCUSSION:   DNR / DNI       Allergies   Allergen Reactions     Lubriderm Rash       TRANSFERRING PROVIDERS: Pawan Mullins, SALINAS CNP,   DATE OF SNF ADMISSION:  September / 26 / 2017  DATE OF SNF (anticipated) DISCHARGE: November / 08 / 2017  DISCHARGE DISPOSITION: [facility] Provider   Nursing Facility: [facility] stay 9/25/17 to 9/26/17.     Condition on Discharge:  Declining.  Function:  Slums 20/30 to 8/30 fluctuates, constantly orthostatic and symptomatic, frequent falls and significant debility so no mobility testing, spends most of time in bed.  Heavy assist of 2 with transfers.  Cognitive Scores: Unknown, constant delirium since admission.     Equipment: Wheelchair    DISCHARGE DIAGNOSIS:   1. 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 Bony metastasis (H) to Left scapula and 5th L rib area    3. Pituitary macroadenoma (H)    4. Delirium, worsening, unclear etioloyg    5. Cancer associated pain    6. Paresthesia, Right thumb and Right big toe, unclear etiology and chronicity     7. Hypovolemia ongoing, etiology unclear, but poor po intake contributing    8. Orthostatic hypotension, symptomatic     9. Falls frequently    10. Debility    11. Drug-induced constipation    12. Advance care planning        HPI Nursing Facility Course:  HPI information obtained from: facility chart records, facility staff, patient report and [facility] chart review.    Mr. Acevedo was admitted post hospital due to delirium and confusion though to be due to unintentional opiate use, falls and debility in setting of NSC lung cancer with mets.  He also has a known pituitary adenoma  which per Endo is a separate issue.  He was on one form of chemo but it was causing him LE bone pain and pancytopenia thus it was stopped prior to admission here.  Unfortunately Mr. Acevedo continues to clinically decline. He was followed by Dr. Dayday Phillips whom was planning Opdivo therapy but that has been on hold due to decline.      Mr. Acevedo continues to have delirium, we have tapered some of the opiates but he has ongoing chronic pain and he has made it clear his goal is to be pain free, thus we have allowed the delirium.  We query if there is other etiology to the confusion but Na has been WNL, no evidence of infectious process, so it's unclear.  He has remained orthostatic and symptomatic with several falls, etiology is unclear, but every time we stop IVF's his SBP get's to the 70's and he's dizzy supine.  PO intake is minimal.  No evidence of DI at this time with WNL Na's.  We were trending Hemoglobins to keep them >7.0, he has not needed blood here.     Due to ongoing delirium, significant pain, symptomatic ortho stasis, and spends al his time in bed sleeping, we have had several discussions about goals of care with his KARYNA Smith (Daughter in law) whom is now considering hospice.  Her goal was to get him closer to family, thus he is transferring to a facility in [place].  The plan has been to keep him comfortable as possible, but continue with daily IVF's and medication to keep him stable until transfer.  She has voiced enrollment to hospice upon arrival.      In speaking with Mr. Acevedo he is alert to self, not place, date, situation.  Fluctuates, he has had some mild agitation, but has not been aggressive.  He has at times attempted to stand/walk and subsequently falls.  He endorses newer Right hand/Right big toe parathesia, otherwise older ongoing vague LE pain that comes/goes, back pain.  He at times has mentioned double vision but reports vary day to day.      Non-small cell cancer of  medial left upper  lobe(H)  Bony metastasis (H) to Left scapula and 5th L rib area  Our impression is that Mr. Acevedo is in decline due to his NSCLC.  He is getting more debilitated, sleeps all day, minimal PO intake.   We have lowered his opiates some, but he still endorses significant pain at times, and he has voiced several times, pain/comfort as his main goal.  We have conversations with [facility] and they are aware, they have placed Chemo on hold for now.  He was getting Aranesp weekly injections but we have stopped these at this time.      Pituitary macroadenoma (H)  He was set up to see Endo for f/u and to see if they felt this was contributing to his decline, due to imaging showing this has increased in size.  POA/Sarah cancelled the appointment due to decline.  Per her report Endo does not feel this is contributing but they were planning on checking a good number of labs and hormone levels, but this has been cancelled at this time.      Delirium, worsening, unclear etioloyg  Cancer associated pain  Paresthesia, Right thumb and Right big toe, unclear etiology and chronicity   Drug-induced constipation  Etiology of delirium is likely multifactorial with opiates contributing.   His older pain is vague reports of LE and knee pain that comes and goes, at times back pain.  Newer is reports of Right hand/Right big toe numbness, etiology also unclear.  Query new mets with compression but unclear.  We have not increased opiates at this time as we are trying to keep him stable for transfer closer to home, not wanting to further drop his BP.    -Prednisone 10 mg's daily, per [facility].   -Morphine sulfate 30 mg's BID.   -Acetaminophen 1000 mg TID.   -Oxycodone 10 mg's q4h PRN.   --Needs to be on aggressive BM regimen.   --Has been followed by Palliative.     Hypovolemia ongoing, etiology unclear, but poor po intake contributing  Orthostatic hypotension, symptomatic   Falls frequently  Debility  Etiology into ongoing hypovolemia/orthostatic is  unclear, some poor PO intake contributing.  Is making normal amount of urine, WNL Na+, thus lower suspicion for DI.  He has been on/off daily IVF's 1L NS per day, but when we stop, SBP's drop to 70's.  Thus he's been on 1L / day for some time now via his L chest IVAD.    -0.9 NS 1 L / day for now, ongoing use per new PCP/hospice.     Advance care planning  We have had several discussion with Sarah/KARYNA and we have suggested hospice for Mr. Acevedo.  We feel a good number of his issues are related to his NSCLC and unfortunately with ongoing debility, delirium, decline, he will not be a candidate for further chemotherapy.  We recommended either re-hospitalization with workup with On[facility]/Endo or transition to hospice.  Per Sarah we kept him stable to get closer to her around [place], thus the transfer.      PAST MEDICAL HISTORY:  has a past medical history of Brain cancer (H); Constipation; H/O ETOH abuse; tobacco use, presenting hazards to health; Lung cancer (H); Pain (5/6/2017); Recurrent falls; Unsteady gait; and Weight loss.    DISCHARGE MEDICATIONS:  Current Outpatient Prescriptions   Medication Sig Dispense Refill     OXYCODONE HCL PO Take 10 mg by mouth every 4 hours as needed       PREDNISONE PO Take 10 mg by mouth daily        Nutritional Supplements (ENSURE ENLIVE PO) Take 1 Bottle by mouth 3 times daily       senna-docusate (SENOKOT-S;PERICOLACE) 8.6-50 MG per tablet 2 tablets 2 times daily Hold for loose stools 100 tablet 11     polyethylene glycol (MIRALAX/GLYCOLAX) powder Take 17 g by mouth 2 times daily Hold for loose stools       Ondansetron HCl (ZOFRAN PO) Take 4 mg by mouth every 6 hours as needed for nausea or vomiting       Acetaminophen (TYLENOL PO) Take 1,000 mg by mouth 3 times daily       folic acid (FOLVITE) 1 MG tablet Take 1 tablet (1 mg) by mouth daily 30 tablet 0     tamsulosin (FLOMAX) 0.4 MG capsule Take 1 capsule (0.4 mg) by mouth daily 30 capsule 0     morphine (MS CONTIN) 30 MG 12 hr tablet  Take 1 tablet (30 mg) by mouth 2 times daily 60 tablet 0     PARoxetine (PAXIL) 10 MG tablet Take 1 tablet (10 mg) by mouth At Bedtime (Needs follow-up appointment for this medication) 30 tablet 11     pantoprazole (PROTONIX) 40 MG EC tablet Take 1 tablet (40 mg) by mouth 2 times daily (before meals) 60 tablet 6     bisacodyl (DULCOLAX) 10 MG Suppository Place 1 suppository (10 mg) rectally daily as needed for constipation 12 suppository 11       MEDICATION CHANGES/RATIONALE:      Controlled medications sent with patient: Script for Morphine Sulfate and Oxycodone medication for 2 week supply sent with discharge to new LTC facility to have them fill at their pharmacy.  Refills per new PCP/Hopsice.      ROS:    7 point ROS done including, light headedness/dizziness, fever/chills, pain, Resp, CV, GI, and  and is negative other than noted in HPI.  But he remains a poor .     Physical Exam:   Vitals: /76  Pulse 74  Temp 98  F (36.7  C)  Resp 16  Wt 137 lb (62.1 kg)  SpO2 98%  BMI 23.15 kg/m2  BMI= Body mass index is 23.15 kg/(m^2).    GENERAL APPEARANCE:  Thin/cachetic, older male, resting in bed, NAD, but fluctuates between restlessness and somnolence.  ENT:  Mouth and oropharynx normal, slightly dry mucous membranes.   RESP:  Lungs CTA.  Regular relaxed breathing effort.  Mild non-productive cough.    CV: S1/S2 no murmur or rubs.  Regular rhythm and rate.  No generalized edema.   ABDOMEN:   Flat, soft, non-tender with active bowel sounds.   No guarding, rigidity, or rebound tenderness.  EXTREMITIES:  No lower extremity edema, no calf tenderness.   PSYCH:  Alert to self, not date, place, situation.    LINES: IVAD in Right chest is accessed, covered with transparent dressing, no s/s of infection.   SKIN: Left hand abrasion and mild nose abrasion healing as expected, no s/s of infection.     DISCHARGE PLAN:  Transfer to new TCU/LTC facility, anticipate family will initiate hospice upon arrival.    Patient instructed to follow-up with: New PCP or hospice within 3-5 days.       Current [place] scheduled appointments:  Future Appointments  Date Time Provider Department Center   11/16/2017 1:30 PM ROOM 7 [facility]   11/16/2017 2:30 PM Pasquale Naylor MD [facility]   11/16/2017 3:00 PM Hailey Bernard APRN CNP [facility]   11/24/2017 1:00 PM ROOM 10 [facility]   12/1/2017 1:00 PM ROOM 9 [facility]       MTM referral needed and placed by this provider: No    Pending labs: none  SNF labs   Results for orders placed or performed in visit on 10/26/17   Hemoglobin and hematocrit   Result Value Ref Range    Hemoglobin 8.8 (L) 13.3 - 17.7 g/dL    Hematocrit 27.9 (L) 40.0 - 53.0 %       Discharge Treatments: 0.9 NS IVF 1 liter/day via Left chest IVAD with daily saline/heparin flushes per facility protocol.   1. Okay to tranfer to other LTC facility on 11/8/17 if no major status change.  TOTAL DISCHARGE TIME:   Greater than 30 minutes  Electronically signed by:  SALINAS Ledezma CNP

## 2017-11-08 NOTE — PROGRESS NOTES
Clinic Care Coordination Contact  Care Team Conversations  D/I: RN CC received CTS when patient discharged from hospital to [facility]. At that time, hope was he would be able to move closer to family in [place]. Today, RN CC  received notification from [facility] that patient is discharging today. Per notification, he is Moving to [facility] in [place], [place] for LTC/Hospice care.  P: RN CC will close to active CC as he will no longer be followed by PCP at [facility].    José Miguel GAMEZ,RN- BC  Clinic Care Coordinator  [facility]  Phone: [phone]

## 2020-11-20 NOTE — PROGRESS NOTES
Head, normocephalic, atraumatic, Face, Face within normal limits, Ears, External ears within normal limits, Nose/Nasopharynx, External nose  normal appearance, nares patent, no nasal discharge, Mouth and Throat, Oral cavity appearance normal, Breath odor normal, Lips, Appearance normal Palliative Care Follow-up Note  Date:  September 7, 2017  Location: [facility]  Accompanied by: no one; here alone      HPI:  57 year old y/o male with NSCLC, followed by palliative care for pain and symptom management, goals of care.  Current chemo is carbo/taxol, but patient often needs to postpone chemo a week due to anemia, including today.     Allergies   Allergen Reactions     Lubriderm Rash         Current Outpatient Prescriptions on File Prior to Visit:  morphine (MS CONTIN) 30 MG 12 hr tablet Take 1 tablet (30 mg) by mouth 2 times daily   oxyCODONE (ROXICODONE) 10 MG IR tablet Take 1 tablet (10 mg) by mouth every 4 hours as needed for moderate to severe pain   PARoxetine (PAXIL) 10 MG tablet Take 1 tablet (10 mg) by mouth At Bedtime (Needs follow-up appointment for this medication)   dexamethasone (DECADRON) 4 MG tablet Take 5 tablets (20 mg) by mouth daily X 3 days each cycle. To be taken the day before, day of, and day after chemotherapy infusion.   diclofenac (VOLTAREN) 1 % GEL topical gel Apply 4 grams to knees or 2 grams to hands four times daily using enclosed dosing card.   capsaicin (ZOSTRIX) 0.025 % CREA cream Apply to R hip and thigh every 2 hours as needed for pain.   pantoprazole (PROTONIX) 40 MG EC tablet Take 1 tablet (40 mg) by mouth 2 times daily (before meals)   polyethylene glycol (MIRALAX) powder Take 17 g (1 capful) by mouth daily Mix in 4-8 oz of fluid of choice. For constipation   bisacodyl (DULCOLAX) 10 MG Suppository Place 1 suppository (10 mg) rectally daily as needed for constipation   nicotine (NICODERM CQ) 21 MG/24HR 24 hr patch Place 1 patch onto the skin every 24 hours (Patient not taking: Reported on 9/7/2017)   nicotine (NICODERM CQ) 14 MG/24HR 24 hr patch Place 1 patch onto the skin every 24 hours (Patient not taking: Reported on 9/7/2017)   nicotine (NICODERM CQ) 7 MG/24HR 24 hr patch Place 1 patch onto the skin every 24 hours (Patient  "not taking: Reported on 9/7/2017)   senna-docusate (SENOKOT-S;PERICOLACE) 8.6-50 MG per tablet Take 1 tablet by mouth 2 times daily Reported on 5/4/2017 (Patient taking differently: Take 1 tablet by mouth 2 times daily Reported on 5/4/2017 Taking 2 tablets in AM & 1 tablet at night.)   lidocaine-prilocaine (EMLA) cream Apply topically over port 45 - 60 minutes prior to port access.   LORazepam (ATIVAN) 0.5 MG tablet Take 1 tablet (0.5 mg) by mouth every 4 hours as needed (Anxiety, Nausea/Vomiting or Sleep) (Patient not taking: Reported on 9/7/2017)   prochlorperazine (COMPAZINE) 10 MG tablet Take 1 tablet (10 mg) by mouth every 6 hours as needed (Nausea/Vomiting) (Patient not taking: Reported on 9/7/2017)   ondansetron (ZOFRAN) 8 MG tablet Take 1 tablet (8 mg) by mouth every 8 hours as needed (Nausea/Vomiting) (Patient not taking: Reported on 9/7/2017)     No current facility-administered medications on file prior to visit.       Subjective:  Chief Complaint   Patient presents with     Palliative     f/u symptoms   Pain:   He only tried the diclofenac gel to the painful area on his thigh once or twice, and didn't try either capsaicin or icy hot topicals.  He continues to take a \"handful\" of ibuprofen for his pain despite being advised against doing so by Dr Naylor.  He develops absolutely no stomach distress when taking the Ibuprofen.  Pain originates in either the R and/or L buttock and radiates down the corresponding to front of knee.  He hasn't recognized any alleviating or triggering factors.  He was given opioid refills already by Dr Naylor.  Bowels:   He did try using the Bisacodyl suppository and found that it did help soften his stool w/o causing diarrhea.  Dyspnea:   Has noted episodic dyspnea, only on exertion, not at rest.      Objective:  /66  Pulse 60  Temp 96.9  F (36.1  C)  SpO2 95%  Wt Readings from Last 5 Encounters:   09/07/17 157 lb (71.2 kg)   08/24/17 155 lb 12.8 oz (70.7 kg) "   08/17/17 166 lb 6.4 oz (75.5 kg)   08/10/17 160 lb 1.6 oz (72.6 kg)   08/10/17 160 lb 0.9 oz (72.6 kg)   Awake, alert, in no apparent distress, neatly groomed  Pale  CV RRR  Lungs clear, diminished  Abd firm, NT, hypoactive bowel sounds   Ext warm with 1+ nonpitting edema    Results for JOHN SANCHEZ (MRN [id number]) as of 9/12/2017 17:31   Ref. Range 8/17/2017 08:05 9/7/2017 08:15   Sodium Latest Ref Range: 133 - 144 mmol/L 141 138   Potassium Latest Ref Range: 3.4 - 5.3 mmol/L 4.4 3.9   Chloride Latest Ref Range: 94 - 109 mmol/L 107 105   Carbon Dioxide Latest Ref Range: 20 - 32 mmol/L 29 25   Urea Nitrogen Latest Ref Range: 7 - 30 mg/dL 23 15   Creatinine Latest Ref Range: 0.66 - 1.25 mg/dL 0.85 0.73   GFR Estimate Latest Ref Range: >60 mL/min/1.7m2 >90 >90   GFR Estimate If Black Latest Ref Range: >60 mL/min/1.7m2 >90 >90   Calcium Latest Ref Range: 8.5 - 10.1 mg/dL 8.6 8.8   Anion Gap Latest Ref Range: 3 - 14 mmol/L 5 8   Albumin Latest Ref Range: 3.4 - 5.0 g/dL 3.1 (L) 3.5   Protein Total Latest Ref Range: 6.8 - 8.8 g/dL 6.2 (L) 6.9   Bilirubin Total Latest Ref Range: 0.2 - 1.3 mg/dL 0.3 0.3   Alkaline Phosphatase Latest Ref Range: 40 - 150 U/L 178 (H) 157 (H)   ALT Latest Ref Range: 0 - 70 U/L 20 22   AST Latest Ref Range: 0 - 45 U/L 24 32   Glucose Latest Ref Range: 70 - 99 mg/dL 93 124 (H)   WBC Latest Ref Range: 4.0 - 11.0 10e9/L 4.9 2.5 (L)   Hemoglobin Latest Ref Range: 13.3 - 17.7 g/dL 8.7 (L) 7.3 (L)   Hematocrit Latest Ref Range: 40.0 - 53.0 % 26.6 (L) 21.8 (L)   Platelet Count Latest Ref Range: 150 - 450 10e9/L 245 42 (LL)   RBC Count Latest Ref Range: 4.4 - 5.9 10e12/L 3.38 (L) 2.89 (L)   MCV Latest Ref Range: 78 - 100 fl 79 75 (L)   MCH Latest Ref Range: 26.5 - 33.0 pg 25.7 (L) 25.3 (L)   MCHC Latest Ref Range: 31.5 - 36.5 g/dL 32.7 33.5   RDW Latest Ref Range: 10.0 - 15.0 % 20.0 (H) 17.7 (H)   Diff Method Unknown Automated Method Automated Method   % Neutrophils Latest Units: % 73.1 60.4   %  Lymphocytes Latest Units: % 19.2 31.6   % Monocytes Latest Units: % 6.9 8.0   % Eosinophils Latest Units: % 0.0 0.0   % Basophils Latest Units: % 0.0 0.0   % Immature Granulocytes Latest Units: % 0.8 0.0   Absolute Neutrophil Latest Ref Range: 1.6 - 8.3 10e9/L 3.6 1.5 (L)   Absolute Lymphocytes Latest Ref Range: 0.8 - 5.3 10e9/L 1.0 0.8   Absolute Monocytes Latest Ref Range: 0.0 - 1.3 10e9/L 0.3 0.2   Absolute Eosinophils Latest Ref Range: 0.0 - 0.7 10e9/L 0.0 0.0   Absolute Basophils Latest Ref Range: 0.0 - 0.2 10e9/L 0.0 0.0   Abs Immature Granulocytes Latest Ref Range: 0 - 0.4 10e9/L 0.0 0.0     8/3/17 CT of chest/abd/pelvis  IMPRESSION:  1.  Neoplastic disease in the chest shows areas that are stable or  improved. No new neoplastic disease is demonstrated.  2. There is a new modest generalized edematous state.  3. There is a small amount of free pelvic fluid which may relate to  the patient's generalized edematous state.     AZALEA ART MD      Assessment/Plan:  1. NSCLC, stable or improved per recent CT    2. Radicular b/l LE pain, sciatica-like    >encouraged pt to use the Diclofenac on a regular basis, QID and consider using Ibuprofen less often; kidneys look good now, but that could change.     3. Opioid-induced constipation, not particularly compliant with regular use of stool softeners   >Bisacodyl effective; using this is better than nothing at all    Overall, fairly stable.  May sign off--will first confer with Dr Naylor, who referred pt to me origninally.    1210 - 1240 face to face, 30 min, > 50% spent providing patient education on oral medications, risks and alternatives.     Mickey Bernard (Ann) CNP  Palliative Care  Private cell:  [phone] .

## 2020-12-03 NOTE — PATIENT INSTRUCTIONS
Bedside and Verbal shift change report given to self (oncoming nurse) by Indu Collado (offgoing nurse). Report included the following information SBAR, Kardex, Intake/Output and MAR. We would like to see you back in clinic with Dr. Naylor next week, proceed with chemotherapy today.  Chemotherapy to be scheduled per treatment plan. Copy of appointments, and after visit summary (AVS) given to patient.  If you have any questions during business hours (M-F 8 AM- 4PM), please call Jillian Renee RN, BSN, OCN Oncology Hematology /Breast Cancer Navigator at [facility] [phone].   For questions after business hours, or on holidays/weekends, please call our after hours Nurse Triage line [phone]. Thank you.

## 2021-05-25 ENCOUNTER — RECORDS - HEALTHEAST (OUTPATIENT)
Dept: ADMINISTRATIVE | Facility: CLINIC | Age: 61
End: 2021-05-25

## 2022-05-29 NOTE — PROGRESS NOTES
[facility]      Subjective:  Feels better  Still weak  Eating some  Able to walk some  Unsure if he could care for self  Review of Systems:  C: NEGATIVE for fever, chills, change in weight  I: NEGATIVE for worrisome rashes, moles or lesions  E: NEGATIVE for vision changes or irritation  E/M: NEGATIVE for ear, mouth and throat problems  RESP:slaughter  B: NEGATIVE for masses, tenderness or discharge  CV: some cp over abn rib area  GI: NEGATIVE for nausea, abdominal pain, heartburn, or change in bowel habits  : NEGATIVE for frequency, dysuria, or hematuria  M: NEGATIVE for significant arthralgias or myalgia  N: very weak  E: NEGATIVE for temperature intolerance, skin/hair changes  H: NEGATIVE for bleeding problems  P: NEGATIVE for changes in mood or affect    Physical Exam:  Vitals Were Reviewed    Patient Vitals for the past 16 hrs:   BP Temp Temp src Heart Rate Resp SpO2 Weight   04/24/17 1100 107/65 98.9  F (37.2  C) Oral 83 18 95 % -   04/24/17 0756 135/88 99.2  F (37.3  C) Oral 94 18 95 % -   04/24/17 0634 - - - - - - 81.1 kg (178 lb 12.7 oz)   04/24/17 0000 - - - 79 - - -   04/23/17 2358 (!) 88/58 99.2  F (37.3  C) Oral 76 18 92 % -         Intake/Output Summary (Last 24 hours) at 04/24/17 1205  Last data filed at 04/24/17 1136   Gross per 24 hour   Intake              450 ml   Output             2500 ml   Net            -2050 ml       GENERAL APPEARANCE: very weak, alert nad  RESP: lungs clear to auscultation - no rales, rhonchi or wheezes  CV: regular rate and rhythm, normal S1 S2, no S3 or S4 and no murmur, click or rub   ABDOMEN: soft, nontender, no HSM or masses and bowel sounds normal  MS: no clubbing, cyanosis; no edema  SKIN: clear without significant rashes or lesions  NEURO: diffuse weakness    Lab:  Recent Labs   Lab Test  04/23/17   0720  04/22/17   0620   NA  147*  150*   POTASSIUM  3.9  4.1   CHLORIDE  118*  122*   CO2  17*  16*   ANIONGAP  12  12   GLC  81  85   BUN  11  11    CR  1.26*  1.32*   JOHANN  7.6*  7.5*     CBC RESULTS:   Recent Labs   Lab Test  04/23/17   0720  04/22/17   0620   WBC  5.9  5.8   RBC  3.39*  3.52*   HGB  8.4*  8.6*   HCT  24.6*  25.6*   PLT  159  159       Results for orders placed or performed during the hospital encounter of 04/20/17 (from the past 24 hour(s))   UA with Microscopic reflex to Culture   Result Value Ref Range    Color Urine Light Yellow     Appearance Urine Clear     Glucose Urine Negative NEG mg/dL    Bilirubin Urine Negative NEG    Ketones Urine Negative NEG mg/dL    Specific Gravity Urine 1.002 (L) 1.003 - 1.035    Blood Urine Negative NEG    pH Urine 6.0 5.0 - 7.0 pH    Protein Albumin Urine Negative NEG mg/dL    Urobilinogen mg/dL Normal 0.0 - 2.0 mg/dL    Nitrite Urine Negative NEG    Leukocyte Esterase Urine Negative NEG    Source Midstream Urine     WBC Urine 1 0 - 2 /HPF    RBC Urine 0 0 - 2 /HPF   Ferritin   Result Value Ref Range    Ferritin 752 (H) 26 - 388 ng/mL   TSH   Result Value Ref Range    TSH 1.30 0.40 - 4.00 mU/L   T4 free   Result Value Ref Range    T4 Free 0.38 (L) 0.76 - 1.46 ng/dL       Assessment and Plan:    Hypotension:   -appeared hypovolemic on admission. Unclear cause, but seems poor intake plus some ongoing polyuria may be cause.   -remains low BPs but good UO, warm toes, making sense, so perfusion seems ok. Will try some albumen.   -with the albumen BP came up short time but back down after a few hours and he refused further IV sticks.   -has been asymptomatic with these low BPs.   -will start some florinef.  -start some hormonal workup for the sellar tumor.  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 Hormonal workup recommended. If low cortisol, this could be the reason for the low BPs.  April 24, 2017 T4 quite low-but normal tsh, awaiting other lab      NAGMA:   -from all the saline he has had. Stop even the LR today. Try albumen  for the hypotension. Follow.   April 24, 2017 will add on chem today, off fluids     Weakness: due to hypotension/anemia/ca  -this is multifactorial      Lung mass- Probably Lung Cancer  3 cm mass in the Left Upper Lobe AND Mediastinal Lymphadenopathy, destructive bone lesion Left 5th Rib  ? Due to metastatic Lung Cancer  Will discuss with Radiology and do Lung Biopsy  April 24, 2017 Dr Kimble can do, INR ordered for tomorrow (previous normal)     Anemia  ? Due to nutritional deficiencies and/or due to cancer/CKD  Peripheral Blood Smear is done   2 Units of Blood will be transfused  Iron studies as well as B12 and Folate Levels normal .   Likely anemia of chronic disease.  April 24, 2017 hgb yesterday 8.4-will check tomorrow       Acute Kidney Injury, likely on CKD.   ? Due to NSAIDS Induced Nephropathy or Pigment induced Nephropathy due to Rhabdomyolysis  -slightly better with fluids already.   -stable at 1.3 Cr.   April 24, 2017 add on chem today      Acute Hypokalemia   Will replace Potassium  Also check Magnesium and Phosphorus level in a patient with history of Alcohol use.    Hypophos: Needs replacement.              Alcohol Abuse  Recently quit Drinking  Will give Thiamine  Start Folic Acid later after Folic level is checked      Acute Rhabdomyolysis   IV Fluids  Resolving       DVT Prophylaxis: Pneumatic Compression Devices  Code Status: Full Code   Dispo:   I added on cortisol , chem  inr tomorrow  Await time for bx  Will ultimately need to determine if he needs tcu                  3:18 PM cortisol still pending  Given hypotension and sella issue -need this before dc.  Biopsy will not be until Wednesday     Attending with

## 2023-04-03 PROBLEM — C79.51 MALIGNANT NEOPLASM METASTATIC TO BONE (H): Status: ACTIVE | Noted: 2017-01-01

## (undated) DEVICE — SOL WATER IRRIG 1000ML BOTTLE 07139-09

## (undated) DEVICE — DECANTER BAG 2002S

## (undated) DEVICE — NDL COUNTER 20CT 31142493

## (undated) DEVICE — SYR 10ML FINGER CONTROL W/O NDL 309695

## (undated) DEVICE — DRAPE C-ARM 60X42" 1013

## (undated) DEVICE — GOWN XLG DISP 9545

## (undated) DEVICE — STOCKING SLEEVE COMPRESSION CALF MED

## (undated) DEVICE — LABEL MEDICATION SYSTEM  3304

## (undated) DEVICE — PROBE COVER ULTRASOUND 6X96"

## (undated) DEVICE — DRAPE SHEET REV FOLD 3/4 9349

## (undated) DEVICE — BLADE KNIFE SURG 15 371115

## (undated) DEVICE — SPONGE RAY-TEC 4X8" 7318

## (undated) DEVICE — LIGHT HANDLE X2

## (undated) DEVICE — SU VICRYL 3-0 SH 27" J316H

## (undated) DEVICE — BLADE KNIFE SURG 11 371111

## (undated) DEVICE — SU MONOCRYL 4-0 PS-2 18" UND Y496G

## (undated) DEVICE — SYR 10ML LL W/O NDL

## (undated) DEVICE — BASIN SET MINOR DISP

## (undated) DEVICE — NDL 25GA 1.5" 305127

## (undated) DEVICE — DECANTER VIAL 2006S

## (undated) DEVICE — SOL NACL 0.9% IRRIG 1000ML BOTTLE 07138-09

## (undated) DEVICE — SU SILK 2-0 SH 30" K833H

## (undated) DEVICE — ESU PENCIL W/COATED BLADE E2450H

## (undated) DEVICE — ADHESIVE SWIFTSET 0.8ML OCTYL SS6

## (undated) DEVICE — GLOVE PROTEXIS W/NEU-THERA 8.0  2D73TE80

## (undated) DEVICE — PACK LAP TRANSVERSE STD

## (undated) DEVICE — PREP CHLORAPREP 26ML TINTED ORANGE  260815

## (undated) RX ORDER — FENTANYL CITRATE 50 UG/ML
INJECTION, SOLUTION INTRAMUSCULAR; INTRAVENOUS
Status: DISPENSED
Start: 2017-01-01

## (undated) RX ORDER — LIDOCAINE HYDROCHLORIDE 10 MG/ML
INJECTION, SOLUTION EPIDURAL; INFILTRATION; INTRACAUDAL; PERINEURAL
Status: DISPENSED
Start: 2017-01-01

## (undated) RX ORDER — PROPOFOL 10 MG/ML
INJECTION, EMULSION INTRAVENOUS
Status: DISPENSED
Start: 2017-01-01

## (undated) RX ORDER — GLYCOPYRROLATE 0.2 MG/ML
INJECTION, SOLUTION INTRAMUSCULAR; INTRAVENOUS
Status: DISPENSED
Start: 2017-01-01

## (undated) RX ORDER — OXYCODONE AND ACETAMINOPHEN 5; 325 MG/1; MG/1
TABLET ORAL
Status: DISPENSED
Start: 2017-01-01

## (undated) RX ORDER — ONDANSETRON 2 MG/ML
INJECTION INTRAMUSCULAR; INTRAVENOUS
Status: DISPENSED
Start: 2017-01-01

## (undated) RX ORDER — BUPIVACAINE HYDROCHLORIDE AND EPINEPHRINE 2.5; 5 MG/ML; UG/ML
INJECTION, SOLUTION INFILTRATION; PERINEURAL
Status: DISPENSED
Start: 2017-01-01

## (undated) RX ORDER — DEXAMETHASONE SODIUM PHOSPHATE 4 MG/ML
INJECTION, SOLUTION INTRA-ARTICULAR; INTRALESIONAL; INTRAMUSCULAR; INTRAVENOUS; SOFT TISSUE
Status: DISPENSED
Start: 2017-01-01

## (undated) RX ORDER — HEPARIN SODIUM (PORCINE) LOCK FLUSH IV SOLN 100 UNIT/ML 100 UNIT/ML
SOLUTION INTRAVENOUS
Status: DISPENSED
Start: 2017-01-01

## (undated) RX ORDER — CEFAZOLIN SODIUM 1 G/3ML
INJECTION, POWDER, FOR SOLUTION INTRAMUSCULAR; INTRAVENOUS
Status: DISPENSED
Start: 2017-01-01